# Patient Record
Sex: MALE | Race: WHITE | Employment: OTHER | URBAN - METROPOLITAN AREA
[De-identification: names, ages, dates, MRNs, and addresses within clinical notes are randomized per-mention and may not be internally consistent; named-entity substitution may affect disease eponyms.]

---

## 2020-09-17 ENCOUNTER — APPOINTMENT (OUTPATIENT)
Dept: GENERAL RADIOLOGY | Age: 70
End: 2020-09-17
Payer: MEDICARE

## 2020-09-17 ENCOUNTER — HOSPITAL ENCOUNTER (EMERGENCY)
Age: 70
Discharge: HOME | End: 2020-09-17
Payer: MEDICARE

## 2020-09-17 ENCOUNTER — APPOINTMENT (OUTPATIENT)
Dept: CT IMAGING | Age: 70
End: 2020-09-17
Payer: MEDICARE

## 2020-09-17 VITALS
WEIGHT: 164 LBS | SYSTOLIC BLOOD PRESSURE: 138 MMHG | RESPIRATION RATE: 17 BRPM | HEART RATE: 90 BPM | TEMPERATURE: 96.8 F | DIASTOLIC BLOOD PRESSURE: 70 MMHG | OXYGEN SATURATION: 98 %

## 2020-09-17 LAB
ALBUMIN SERPL-MCNC: 4 G/DL (ref 3.5–4.6)
ALP BLD-CCNC: 68 U/L (ref 35–104)
ALT SERPL-CCNC: 13 U/L (ref 0–41)
ANION GAP SERPL CALCULATED.3IONS-SCNC: 16 MEQ/L (ref 9–15)
AST SERPL-CCNC: 14 U/L (ref 0–40)
BASOPHILS ABSOLUTE: 0.1 K/UL (ref 0–0.2)
BASOPHILS RELATIVE PERCENT: 0.8 %
BILIRUB SERPL-MCNC: 0.6 MG/DL (ref 0.2–0.7)
BUN BLDV-MCNC: 21 MG/DL (ref 8–23)
CALCIUM SERPL-MCNC: 9.1 MG/DL (ref 8.5–9.9)
CHLORIDE BLD-SCNC: 98 MEQ/L (ref 95–107)
CO2: 19 MEQ/L (ref 20–31)
CREAT SERPL-MCNC: 1.14 MG/DL (ref 0.7–1.2)
EKG ATRIAL RATE: 79 BPM
EKG P AXIS: 80 DEGREES
EKG P-R INTERVAL: 182 MS
EKG Q-T INTERVAL: 396 MS
EKG QRS DURATION: 86 MS
EKG QTC CALCULATION (BAZETT): 454 MS
EKG R AXIS: 67 DEGREES
EKG T AXIS: 67 DEGREES
EKG VENTRICULAR RATE: 79 BPM
EOSINOPHILS ABSOLUTE: 0.1 K/UL (ref 0–0.7)
EOSINOPHILS RELATIVE PERCENT: 0.8 %
GFR AFRICAN AMERICAN: >60
GFR NON-AFRICAN AMERICAN: >60
GLOBULIN: 2.3 G/DL (ref 2.3–3.5)
GLUCOSE BLD-MCNC: 135 MG/DL (ref 70–99)
HCT VFR BLD CALC: 46.8 % (ref 42–52)
HEMOGLOBIN: 16 G/DL (ref 14–18)
LYMPHOCYTES ABSOLUTE: 2.2 K/UL (ref 1–4.8)
LYMPHOCYTES RELATIVE PERCENT: 21.3 %
MAGNESIUM: 1.9 MG/DL (ref 1.7–2.4)
MCH RBC QN AUTO: 31.6 PG (ref 27–31.3)
MCHC RBC AUTO-ENTMCNC: 34.1 % (ref 33–37)
MCV RBC AUTO: 92.5 FL (ref 80–100)
MONOCYTES ABSOLUTE: 0.8 K/UL (ref 0.2–0.8)
MONOCYTES RELATIVE PERCENT: 7.5 %
NEUTROPHILS ABSOLUTE: 7.1 K/UL (ref 1.4–6.5)
NEUTROPHILS RELATIVE PERCENT: 69.6 %
PDW BLD-RTO: 14.5 % (ref 11.5–14.5)
PLATELET # BLD: 268 K/UL (ref 130–400)
POTASSIUM SERPL-SCNC: 4.5 MEQ/L (ref 3.4–4.9)
RBC # BLD: 5.06 M/UL (ref 4.7–6.1)
SODIUM BLD-SCNC: 133 MEQ/L (ref 135–144)
TOTAL PROTEIN: 6.3 G/DL (ref 6.3–8)
TROPONIN: <0.01 NG/ML (ref 0–0.01)
WBC # BLD: 10.2 K/UL (ref 4.8–10.8)

## 2020-09-17 PROCEDURE — 36415 COLL VENOUS BLD VENIPUNCTURE: CPT

## 2020-09-17 PROCEDURE — 70450 CT HEAD/BRAIN W/O DYE: CPT

## 2020-09-17 PROCEDURE — 71045 X-RAY EXAM CHEST 1 VIEW: CPT

## 2020-09-17 PROCEDURE — 6370000000 HC RX 637 (ALT 250 FOR IP): Performed by: PHYSICIAN ASSISTANT

## 2020-09-17 PROCEDURE — 84484 ASSAY OF TROPONIN QUANT: CPT

## 2020-09-17 PROCEDURE — 6360000002 HC RX W HCPCS: Performed by: PHYSICIAN ASSISTANT

## 2020-09-17 PROCEDURE — 96374 THER/PROPH/DIAG INJ IV PUSH: CPT

## 2020-09-17 PROCEDURE — 83735 ASSAY OF MAGNESIUM: CPT

## 2020-09-17 PROCEDURE — 99284 EMERGENCY DEPT VISIT MOD MDM: CPT

## 2020-09-17 PROCEDURE — 94640 AIRWAY INHALATION TREATMENT: CPT

## 2020-09-17 PROCEDURE — 80053 COMPREHEN METABOLIC PANEL: CPT

## 2020-09-17 PROCEDURE — 93005 ELECTROCARDIOGRAM TRACING: CPT | Performed by: PHYSICIAN ASSISTANT

## 2020-09-17 PROCEDURE — 85025 COMPLETE CBC W/AUTO DIFF WBC: CPT

## 2020-09-17 RX ORDER — DEXAMETHASONE SODIUM PHOSPHATE 4 MG/ML
10 INJECTION, SOLUTION INTRA-ARTICULAR; INTRALESIONAL; INTRAMUSCULAR; INTRAVENOUS; SOFT TISSUE ONCE
Status: COMPLETED | OUTPATIENT
Start: 2020-09-17 | End: 2020-09-17

## 2020-09-17 RX ORDER — METHYLPREDNISOLONE 4 MG/1
TABLET ORAL
Qty: 1 KIT | Refills: 0 | Status: SHIPPED | OUTPATIENT
Start: 2020-09-17 | End: 2020-09-23

## 2020-09-17 RX ORDER — MECLIZINE HYDROCHLORIDE 25 MG/1
25 TABLET ORAL 3 TIMES DAILY PRN
Qty: 15 TABLET | Refills: 0 | Status: SHIPPED | OUTPATIENT
Start: 2020-09-17 | End: 2020-09-27

## 2020-09-17 RX ORDER — IPRATROPIUM BROMIDE AND ALBUTEROL SULFATE 2.5; .5 MG/3ML; MG/3ML
1 SOLUTION RESPIRATORY (INHALATION) EVERY 4 HOURS PRN
Status: DISCONTINUED | OUTPATIENT
Start: 2020-09-17 | End: 2020-09-17 | Stop reason: HOSPADM

## 2020-09-17 RX ORDER — MECLIZINE HYDROCHLORIDE 25 MG/1
25 TABLET ORAL ONCE
Status: COMPLETED | OUTPATIENT
Start: 2020-09-17 | End: 2020-09-17

## 2020-09-17 RX ADMIN — MECLIZINE HYDROCHLORIDE 25 MG: 25 TABLET ORAL at 16:52

## 2020-09-17 RX ADMIN — DEXAMETHASONE SODIUM PHOSPHATE 10 MG: 4 INJECTION, SOLUTION INTRAMUSCULAR; INTRAVENOUS at 16:52

## 2020-09-17 RX ADMIN — IPRATROPIUM BROMIDE AND ALBUTEROL SULFATE 1 AMPULE: .5; 3 SOLUTION RESPIRATORY (INHALATION) at 16:03

## 2020-09-17 ASSESSMENT — ENCOUNTER SYMPTOMS
VOICE CHANGE: 0
NAUSEA: 0
ABDOMINAL PAIN: 0
PHOTOPHOBIA: 0
SHORTNESS OF BREATH: 1
ANAL BLEEDING: 0
BACK PAIN: 1
APNEA: 0
VOMITING: 0
EYE DISCHARGE: 0
COUGH: 1
ABDOMINAL DISTENTION: 0

## 2020-09-17 NOTE — ED PROVIDER NOTES
[address] [facility] ED  eMERGENCY dEPARTMENT eNCOUnter      Pt Name: Becky Wilson  MRN: [id number]  [place] 1950  Date of evaluation: 9/17/2020  Provider: Ellis Nava PA-C    CHIEF COMPLAINT       Chief Complaint   Patient presents with    Dizziness     states its vertigo also has cloudy vision and poor hearing         HISTORY OF PRESENT ILLNESS   (Location/Symptom, Timing/Onset,Context/Setting, Quality, Duration, Modifying Factors, Severity)  Note limiting factors. Becky Wilson is a 65 y.o. male who presents to the emergency department patient presents with dizziness states he had a Ménière's attack 2 days ago he states he gets a headache with these and usually resolve this 1 has not resolved consistent with prior episodes he has had these episodes for 10 years. He also notes that he has COPD and is been short of breath the past few days his inhalers do help but did not resolve symptoms denies chest pain abdominal pain nausea vomiting he does note his legs are felt weak he does have back pain. Denies rectal bleeding loss of bowel or bladder control. Symptoms mild to moderate severity worse with motion. HPI    NursingNotes were reviewed. REVIEW OF SYSTEMS    (2-9 systems for level 4, 10 or more for level 5)     Review of Systems   Constitutional: Negative for activity change, appetite change, fever and unexpected weight change. HENT: Negative for ear discharge, nosebleeds and voice change. Eyes: Negative for photophobia, discharge and visual disturbance. Respiratory: Positive for cough and shortness of breath. Negative for apnea. Cardiovascular: Negative for chest pain. Gastrointestinal: Negative for abdominal distention, abdominal pain, anal bleeding, nausea and vomiting. Endocrine: Negative for cold intolerance, heat intolerance and polyphagia. Genitourinary: Negative for difficulty urinating, dysuria, flank pain, genital sores and hematuria.    Musculoskeletal: Positive for back pain. Negative for joint swelling. Skin: Negative for pallor. Allergic/Immunologic: Negative for immunocompromised state. Neurological: Positive for dizziness and headaches. Negative for tremors, seizures, syncope, facial asymmetry and weakness. Hematological: Does not bruise/bleed easily. Psychiatric/Behavioral: Negative for behavioral problems, self-injury and sleep disturbance. All other systems reviewed and are negative. Except as noted above the remainder of the review of systems was reviewed and negative. PAST MEDICAL HISTORY     Past Medical History:   Diagnosis Date    [facility]     [facility]     Diabetes mellitus [facility]     Emphy[facility]ma of lung ([facility]     Hyperlipidemia     Hypertension     Meniere's disease     Vertigo          SURGICALHISTORY     History reviewed. No pertinent surgical history. CURRENT MEDICATIONS       Previous Medications    No medications on file       ALLERGIES     Patient has no known allergies. FAMILY HISTORY     History reviewed. No pertinent family history.        SOCIAL HISTORY       Social History     Socioeconomic History    Marital status:      Spouse name: None    Number of children: None    Years of education: None    Highest education level: None   Occupational History    None   Social Needs    Financial resource strain: None    Food insecurity     Worry: None     Inability: None    Transportation needs     Medical: None     Non-medical: None   Tobacco Use    Smoking status: Current Every Day Smoker    Smokeless tobacco: Never Used   Substance and Sexual Activity    Alcohol use: None    Drug use: None    Sexual activity: None   Lifestyle    Physical activity     Days per week: None     Minutes per session: None    Stress: None   Relationships    Social connections     Talks on phone: None     Gets together: None     Attends [employer] service: mouth 3 times daily as needed for Dizziness    METHYLPREDNISOLONE (MEDROL, SAARH,) 4 MG TABLET    Take by mouth. (Please note that portions of this note were completed with a voice recognition program.  Efforts were made to edit the dictations but occasionally words are mis-transcribed.)    Mago Sanchez PA-C (electronically signed)  Attending Emergency Physician       Mago Sanchez PA-C  09/17/20 8840    Attending Supervisory Note/Shared Visit   I have personally performed a face to face diagnostic evaluation on this patient. I have reviewed the mid-levels findings and agree.   History and Exam by me shows vertigo      Kasey Cuevas DO  Attending Emergency Physician         Kasey Cuevas DO  09/21/20 1038

## 2020-09-17 NOTE — ED TRIAGE NOTES
Pt to ER with c/o dizziness with cloudy vision and muffled hearing, pt states its his vertigo and meniere's , pt also states it feels hard to breathe, pt a&ox4, resp even and unlabored

## 2020-09-17 NOTE — ED NOTES
Pt up ambulating  Family at bedside  Denies needs  Will continue to monitor     [employer] V, RN  09/17/20 1806

## 2020-09-18 PROCEDURE — 93010 ELECTROCARDIOGRAM REPORT: CPT | Performed by: INTERNAL MEDICINE

## 2020-11-28 ENCOUNTER — APPOINTMENT (OUTPATIENT)
Dept: CT IMAGING | Age: 70
End: 2020-11-28
Payer: MEDICARE

## 2020-11-28 ENCOUNTER — HOSPITAL ENCOUNTER (EMERGENCY)
Age: 70
Discharge: HOME | End: 2020-11-28
Attending: EMERGENCY MEDICINE
Payer: MEDICARE

## 2020-11-28 VITALS
TEMPERATURE: 98.6 F | HEIGHT: 66 IN | DIASTOLIC BLOOD PRESSURE: 60 MMHG | SYSTOLIC BLOOD PRESSURE: 148 MMHG | RESPIRATION RATE: 10 BRPM | WEIGHT: 164 LBS | HEART RATE: 67 BPM | OXYGEN SATURATION: 100 % | BODY MASS INDEX: 26.36 KG/M2

## 2020-11-28 LAB
ALBUMIN SERPL-MCNC: 4.3 G/DL (ref 3.5–4.6)
ALP BLD-CCNC: 82 U/L (ref 35–104)
ALT SERPL-CCNC: 10 U/L (ref 0–41)
ANION GAP SERPL CALCULATED.3IONS-SCNC: 13 MEQ/L (ref 9–15)
AST SERPL-CCNC: 13 U/L (ref 0–40)
BASOPHILS ABSOLUTE: 0.1 K/UL (ref 0–0.2)
BASOPHILS RELATIVE PERCENT: 0.8 %
BILIRUB SERPL-MCNC: 0.4 MG/DL (ref 0.2–0.7)
BUN BLDV-MCNC: 10 MG/DL (ref 8–23)
CALCIUM SERPL-MCNC: 8.8 MG/DL (ref 8.5–9.9)
CHLORIDE BLD-SCNC: 98 MEQ/L (ref 95–107)
CO2: 22 MEQ/L (ref 20–31)
CREAT SERPL-MCNC: 0.97 MG/DL (ref 0.7–1.2)
EKG ATRIAL RATE: 72 BPM
EKG P AXIS: 88 DEGREES
EKG P-R INTERVAL: 178 MS
EKG Q-T INTERVAL: 408 MS
EKG QRS DURATION: 84 MS
EKG QTC CALCULATION (BAZETT): 446 MS
EKG R AXIS: 36 DEGREES
EKG T AXIS: 63 DEGREES
EKG VENTRICULAR RATE: 72 BPM
EOSINOPHILS ABSOLUTE: 0.1 K/UL (ref 0–0.7)
EOSINOPHILS RELATIVE PERCENT: 1.3 %
GFR AFRICAN AMERICAN: >60
GFR AFRICAN AMERICAN: >60
GFR NON-AFRICAN AMERICAN: >60
GFR NON-AFRICAN AMERICAN: >60
GLOBULIN: 2.5 G/DL (ref 2.3–3.5)
GLUCOSE BLD-MCNC: 254 MG/DL (ref 70–99)
HCT VFR BLD CALC: 47.1 % (ref 42–52)
HEMOGLOBIN: 15.9 G/DL (ref 14–18)
LYMPHOCYTES ABSOLUTE: 2.4 K/UL (ref 1–4.8)
LYMPHOCYTES RELATIVE PERCENT: 30.5 %
MAGNESIUM: 2.1 MG/DL (ref 1.7–2.4)
MCH RBC QN AUTO: 31.8 PG (ref 27–31.3)
MCHC RBC AUTO-ENTMCNC: 33.8 % (ref 33–37)
MCV RBC AUTO: 94.2 FL (ref 80–100)
MONOCYTES ABSOLUTE: 0.4 K/UL (ref 0.2–0.8)
MONOCYTES RELATIVE PERCENT: 5.2 %
NEUTROPHILS ABSOLUTE: 4.8 K/UL (ref 1.4–6.5)
NEUTROPHILS RELATIVE PERCENT: 62.2 %
PDW BLD-RTO: 15.9 % (ref 11.5–14.5)
PERFORMED ON: NORMAL
PLATELET # BLD: 349 K/UL (ref 130–400)
POC CREATININE: 1.1 MG/DL (ref 0.8–1.3)
POC SAMPLE TYPE: NORMAL
POTASSIUM SERPL-SCNC: 4.1 MEQ/L (ref 3.4–4.9)
RBC # BLD: 5 M/UL (ref 4.7–6.1)
SODIUM BLD-SCNC: 133 MEQ/L (ref 135–144)
TOTAL PROTEIN: 6.8 G/DL (ref 6.3–8)
TROPONIN: <0.01 NG/ML (ref 0–0.01)
WBC # BLD: 7.8 K/UL (ref 4.8–10.8)

## 2020-11-28 PROCEDURE — 71275 CT ANGIOGRAPHY CHEST: CPT

## 2020-11-28 PROCEDURE — 84484 ASSAY OF TROPONIN QUANT: CPT

## 2020-11-28 PROCEDURE — 2580000003 HC RX 258: Performed by: EMERGENCY MEDICINE

## 2020-11-28 PROCEDURE — 83735 ASSAY OF MAGNESIUM: CPT

## 2020-11-28 PROCEDURE — 96374 THER/PROPH/DIAG INJ IV PUSH: CPT

## 2020-11-28 PROCEDURE — 6370000000 HC RX 637 (ALT 250 FOR IP): Performed by: EMERGENCY MEDICINE

## 2020-11-28 PROCEDURE — 6360000004 HC RX CONTRAST MEDICATION: Performed by: EMERGENCY MEDICINE

## 2020-11-28 PROCEDURE — 6360000002 HC RX W HCPCS: Performed by: EMERGENCY MEDICINE

## 2020-11-28 PROCEDURE — 36415 COLL VENOUS BLD VENIPUNCTURE: CPT

## 2020-11-28 PROCEDURE — 99284 EMERGENCY DEPT VISIT MOD MDM: CPT

## 2020-11-28 PROCEDURE — 80053 COMPREHEN METABOLIC PANEL: CPT

## 2020-11-28 PROCEDURE — 96375 TX/PRO/DX INJ NEW DRUG ADDON: CPT

## 2020-11-28 PROCEDURE — 85025 COMPLETE CBC W/AUTO DIFF WBC: CPT

## 2020-11-28 PROCEDURE — 93005 ELECTROCARDIOGRAM TRACING: CPT | Performed by: EMERGENCY MEDICINE

## 2020-11-28 RX ORDER — TRAMADOL HYDROCHLORIDE 50 MG/1
50 TABLET ORAL EVERY 4 HOURS PRN
Qty: 18 TABLET | Refills: 0 | Status: SHIPPED | OUTPATIENT
Start: 2020-11-28 | End: 2020-12-01

## 2020-11-28 RX ORDER — AZITHROMYCIN 250 MG/1
TABLET, FILM COATED ORAL
Qty: 1 PACKET | Refills: 0 | Status: SHIPPED | OUTPATIENT
Start: 2020-11-28 | End: 2020-12-02

## 2020-11-28 RX ORDER — PREDNISONE 20 MG/1
40 TABLET ORAL DAILY
Qty: 20 TABLET | Refills: 0 | Status: SHIPPED | OUTPATIENT
Start: 2020-11-28 | End: 2020-12-08

## 2020-11-28 RX ORDER — KETOROLAC TROMETHAMINE 15 MG/ML
15 INJECTION, SOLUTION INTRAMUSCULAR; INTRAVENOUS ONCE
Status: COMPLETED | OUTPATIENT
Start: 2020-11-28 | End: 2020-11-28

## 2020-11-28 RX ORDER — LORAZEPAM 2 MG/ML
1 INJECTION INTRAMUSCULAR ONCE
Status: COMPLETED | OUTPATIENT
Start: 2020-11-28 | End: 2020-11-28

## 2020-11-28 RX ORDER — ACETAMINOPHEN 500 MG
1000 TABLET ORAL ONCE
Status: COMPLETED | OUTPATIENT
Start: 2020-11-28 | End: 2020-11-28

## 2020-11-28 RX ORDER — 0.9 % SODIUM CHLORIDE 0.9 %
1000 INTRAVENOUS SOLUTION INTRAVENOUS ONCE
Status: COMPLETED | OUTPATIENT
Start: 2020-11-28 | End: 2020-11-28

## 2020-11-28 RX ADMIN — IOPAMIDOL 100 ML: 612 INJECTION, SOLUTION INTRAVENOUS at 12:18

## 2020-11-28 RX ADMIN — KETOROLAC TROMETHAMINE 15 MG: 15 INJECTION, SOLUTION INTRAMUSCULAR; INTRAVENOUS at 11:10

## 2020-11-28 RX ADMIN — LORAZEPAM 1 MG: 2 INJECTION INTRAMUSCULAR; INTRAVENOUS at 11:10

## 2020-11-28 RX ADMIN — SODIUM CHLORIDE 1000 ML: 9 INJECTION, SOLUTION INTRAVENOUS at 11:10

## 2020-11-28 RX ADMIN — ACETAMINOPHEN 1000 MG: 500 TABLET ORAL at 11:12

## 2020-11-28 ASSESSMENT — ENCOUNTER SYMPTOMS
COUGH: 0
BACK PAIN: 0
ABDOMINAL PAIN: 0
DIARRHEA: 0
SORE THROAT: 0
VOMITING: 0
SHORTNESS OF BREATH: 0
NAUSEA: 0

## 2020-11-28 ASSESSMENT — PAIN SCALES - GENERAL
PAINLEVEL_OUTOF10: 0
PAINLEVEL_OUTOF10: 6

## 2020-11-28 ASSESSMENT — PAIN DESCRIPTION - ORIENTATION: ORIENTATION: MID

## 2020-11-28 ASSESSMENT — PAIN DESCRIPTION - PAIN TYPE: TYPE: ACUTE PAIN

## 2020-11-28 ASSESSMENT — PAIN DESCRIPTION - LOCATION: LOCATION: CHEST

## 2020-11-28 NOTE — ED TRIAGE NOTES
Pt in from home with mid chest pain 6/10 \"only when breathing\" started after an argument with granddaughter. Pt is A&Ox4, skin intact, msps intact, afebrile, breaths are equal and unlabored. Pt up to restroom on own.  Denies N/V/D

## 2020-11-28 NOTE — ED PROVIDER NOTES
[address] [facility] ED  eMERGENCYdEPARTMENT eNCOUnter      Pt Name: Leslie Robles  MRN: [id number]  [place] 1950  Date of evaluation: 11/28/2020  Christian Brown MD    CHIEF COMPLAINT           HPI  Leslie Robles is a 65 y.o. male per chart review has a h/o COPD, DM II, HTN, hpl presents to the ED with chest pain. Pt notes gradual onset, moderate, intermittent, aching, substernal chest pain since last night. Worse with deep breath. Pt notes he got into an argument with his granddaughter prior to arrival which made it worse. Pt denies fever, n/v, sob, ab pain, dysuria, diarrhea. ROS  Review of Systems   Constitutional: Negative for activity change, chills and fever. HENT: Negative for ear pain and sore throat. Eyes: Negative for visual disturbance. Respiratory: Negative for cough and shortness of breath. Cardiovascular: Positive for chest pain. Negative for palpitations and leg swelling. Gastrointestinal: Negative for abdominal pain, diarrhea, nausea and vomiting. Genitourinary: Negative for dysuria. Musculoskeletal: Negative for back pain. Skin: Negative for rash. Neurological: Negative for dizziness and weakness. Except as noted above the remainder of the review of systems was reviewed and negative. PAST MEDICAL HISTORY     Past Medical History:   Diagnosis Date    Chronic bronchitis ([address]     COPD (chronic obstructive pulmonary disease) ([address]     Diabetes mellitus ([address]     Emphysema of lung ([address]     Hyperlipidemia     Hypertension     Meniere's disease     Vertigo          SURGICAL HISTORY     History reviewed. No pertinent surgical history. CURRENTMEDICATIONS       Previous Medications    No medications on file       ALLERGIES     Patient has no known allergies. FAMILY HISTORY     History reviewed. No pertinent family history.        SOCIAL HISTORY       Social History     Socioeconomic History    Marital status:      Spouse name: None is no abdominal tenderness. Musculoskeletal: Normal range of motion. Skin:     General: Skin is warm and dry. Neurological:      Mental Status: He is alert and oriented to person, place, and time. Psychiatric:         Mood and Affect: Mood normal.           MDM  64 yo male presents to the ED with chest pain. Pt is afebrile, hemodynamically stable. Pt notes that argument with his daughter made his pain worse. EKG shows NSR with HR 72, normal axis, normal intervals, no ST changes. Pt given 1 L NS, IV toradol, PO tylenol, IV ativan in the ED. Labs remarkable for glucose 254. CT PE shows COPD with no PE. Pt reassessed and feels much better. Pt still smokes. Low suspicion for cardiac chest pain as chest pain only occurs with deep inspiration and lasts seconds. Pt counseled on smoking cessation x 8 minutes and advised about how smoking is making his condition worse. Pt and I discussed treatment options and pt wanted to try cold turkey to help with smoking cessation. Suspect that chest pain is caused by smoking and mild COPD exacerbation. Pt given prescription for tramadol, azithromycin, prednisone. Pt will f/u with pcp. Pt understands plan. FINAL IMPRESSION      1. Chest pain, unspecified type    2.  Pleurisy          DISPOSITION/PLAN   DISPOSITION Decision To Discharge 11/28/2020 12:28:29 PM        DISCHARGE MEDICATIONS:  [unfilled]         Sarkis Souza MD(electronically signed)  Attending Emergency Physician            Sarkis Souza MD  11/28/20 8097
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
## 2020-12-01 PROCEDURE — 93010 ELECTROCARDIOGRAM REPORT: CPT | Performed by: INTERNAL MEDICINE

## 2020-12-11 ENCOUNTER — APPOINTMENT (OUTPATIENT)
Dept: CT IMAGING | Age: 70
DRG: 247 | End: 2020-12-11
Payer: MEDICARE

## 2020-12-11 ENCOUNTER — APPOINTMENT (OUTPATIENT)
Dept: GENERAL RADIOLOGY | Age: 70
DRG: 247 | End: 2020-12-11
Payer: MEDICARE

## 2020-12-11 ENCOUNTER — HOSPITAL ENCOUNTER (INPATIENT)
Age: 70
LOS: 4 days | Discharge: HOME | DRG: 247 | End: 2020-12-15
Attending: INTERNAL MEDICINE | Admitting: INTERNAL MEDICINE
Payer: MEDICARE

## 2020-12-11 PROBLEM — I21.4 NSTEMI (NON-ST ELEVATED MYOCARDIAL INFARCTION) (HCC): Status: ACTIVE | Noted: 2020-12-11

## 2020-12-11 LAB
ALBUMIN SERPL-MCNC: 3.8 G/DL (ref 3.5–4.6)
ALP BLD-CCNC: 74 U/L (ref 35–104)
ALT SERPL-CCNC: 8 U/L (ref 0–41)
ANION GAP SERPL CALCULATED.3IONS-SCNC: 14 MEQ/L (ref 9–15)
APTT: 28 SEC (ref 24.4–36.8)
AST SERPL-CCNC: 10 U/L (ref 0–40)
BASOPHILS ABSOLUTE: 0.1 K/UL (ref 0–0.2)
BASOPHILS RELATIVE PERCENT: 0.7 %
BILIRUB SERPL-MCNC: 0.7 MG/DL (ref 0.2–0.7)
BUN BLDV-MCNC: 11 MG/DL (ref 8–23)
CALCIUM SERPL-MCNC: 8.3 MG/DL (ref 8.5–9.9)
CHLORIDE BLD-SCNC: 102 MEQ/L (ref 95–107)
CO2: 20 MEQ/L (ref 20–31)
CREAT SERPL-MCNC: 0.9 MG/DL (ref 0.7–1.2)
EOSINOPHILS ABSOLUTE: 0.1 K/UL (ref 0–0.7)
EOSINOPHILS RELATIVE PERCENT: 0.8 %
GFR AFRICAN AMERICAN: >60
GFR NON-AFRICAN AMERICAN: >60
GLOBULIN: 2.2 G/DL (ref 2.3–3.5)
GLUCOSE BLD-MCNC: 188 MG/DL (ref 70–99)
HCT VFR BLD CALC: 46.8 % (ref 42–52)
HEMOGLOBIN: 16 G/DL (ref 14–18)
INR BLD: 0.9
LYMPHOCYTES ABSOLUTE: 2.4 K/UL (ref 1–4.8)
LYMPHOCYTES RELATIVE PERCENT: 19.9 %
MAGNESIUM: 2.1 MG/DL (ref 1.7–2.4)
MCH RBC QN AUTO: 32.3 PG (ref 27–31.3)
MCHC RBC AUTO-ENTMCNC: 34.1 % (ref 33–37)
MCV RBC AUTO: 94.7 FL (ref 80–100)
MONOCYTES ABSOLUTE: 0.7 K/UL (ref 0.2–0.8)
MONOCYTES RELATIVE PERCENT: 5.6 %
NEUTROPHILS ABSOLUTE: 8.9 K/UL (ref 1.4–6.5)
NEUTROPHILS RELATIVE PERCENT: 73 %
PDW BLD-RTO: 16.4 % (ref 11.5–14.5)
PLATELET # BLD: 277 K/UL (ref 130–400)
POTASSIUM SERPL-SCNC: 4.4 MEQ/L (ref 3.4–4.9)
PRO-BNP: 153 PG/ML
PROTHROMBIN TIME: 11.9 SEC (ref 12.3–14.9)
RBC # BLD: 4.94 M/UL (ref 4.7–6.1)
SARS-COV-2, NAAT: NOT DETECTED
SODIUM BLD-SCNC: 136 MEQ/L (ref 135–144)
TOTAL CK: 23 U/L (ref 0–190)
TOTAL CK: 63 U/L (ref 0–190)
TOTAL PROTEIN: 6 G/DL (ref 6.3–8)
TROPONIN: 0.05 NG/ML (ref 0–0.01)
TROPONIN: <0.01 NG/ML (ref 0–0.01)
WBC # BLD: 12.1 K/UL (ref 4.8–10.8)

## 2020-12-11 PROCEDURE — 80053 COMPREHEN METABOLIC PANEL: CPT

## 2020-12-11 PROCEDURE — 82550 ASSAY OF CK (CPK): CPT

## 2020-12-11 PROCEDURE — 6360000004 HC RX CONTRAST MEDICATION: Performed by: PHYSICIAN ASSISTANT

## 2020-12-11 PROCEDURE — U0002 COVID-19 LAB TEST NON-CDC: HCPCS

## 2020-12-11 PROCEDURE — 2709999900 HC NON-CHARGEABLE SUPPLY

## 2020-12-11 PROCEDURE — 93005 ELECTROCARDIOGRAM TRACING: CPT | Performed by: INTERNAL MEDICINE

## 2020-12-11 PROCEDURE — C1894 INTRO/SHEATH, NON-LASER: HCPCS

## 2020-12-11 PROCEDURE — 2060000000 HC ICU INTERMEDIATE R&B

## 2020-12-11 PROCEDURE — 85610 PROTHROMBIN TIME: CPT

## 2020-12-11 PROCEDURE — 84484 ASSAY OF TROPONIN QUANT: CPT

## 2020-12-11 PROCEDURE — 83735 ASSAY OF MAGNESIUM: CPT

## 2020-12-11 PROCEDURE — 99285 EMERGENCY DEPT VISIT HI MDM: CPT

## 2020-12-11 PROCEDURE — 6370000000 HC RX 637 (ALT 250 FOR IP): Performed by: PHYSICIAN ASSISTANT

## 2020-12-11 PROCEDURE — 96375 TX/PRO/DX INJ NEW DRUG ADDON: CPT

## 2020-12-11 PROCEDURE — 36415 COLL VENOUS BLD VENIPUNCTURE: CPT

## 2020-12-11 PROCEDURE — 85730 THROMBOPLASTIN TIME PARTIAL: CPT

## 2020-12-11 PROCEDURE — 96374 THER/PROPH/DIAG INJ IV PUSH: CPT

## 2020-12-11 PROCEDURE — 71045 X-RAY EXAM CHEST 1 VIEW: CPT

## 2020-12-11 PROCEDURE — 6360000002 HC RX W HCPCS: Performed by: PHYSICIAN ASSISTANT

## 2020-12-11 PROCEDURE — C1887 CATHETER, GUIDING: HCPCS

## 2020-12-11 PROCEDURE — 85025 COMPLETE CBC W/AUTO DIFF WBC: CPT

## 2020-12-11 PROCEDURE — 96376 TX/PRO/DX INJ SAME DRUG ADON: CPT

## 2020-12-11 PROCEDURE — 6370000000 HC RX 637 (ALT 250 FOR IP): Performed by: INTERNAL MEDICINE

## 2020-12-11 PROCEDURE — 83880 ASSAY OF NATRIURETIC PEPTIDE: CPT

## 2020-12-11 PROCEDURE — 70498 CT ANGIOGRAPHY NECK: CPT

## 2020-12-11 RX ORDER — KETOROLAC TROMETHAMINE 30 MG/ML
30 INJECTION, SOLUTION INTRAMUSCULAR; INTRAVENOUS ONCE
Status: COMPLETED | OUTPATIENT
Start: 2020-12-11 | End: 2020-12-11

## 2020-12-11 RX ORDER — ACETAMINOPHEN 325 MG/1
650 TABLET ORAL EVERY 6 HOURS PRN
Status: DISCONTINUED | OUTPATIENT
Start: 2020-12-11 | End: 2020-12-15 | Stop reason: HOSPADM

## 2020-12-11 RX ORDER — LOSARTAN POTASSIUM 25 MG/1
25 TABLET ORAL DAILY
Status: ON HOLD | COMMUNITY
Start: 2020-11-09 | End: 2020-12-15 | Stop reason: HOSPADM

## 2020-12-11 RX ORDER — MORPHINE SULFATE 2 MG/ML
2 INJECTION, SOLUTION INTRAMUSCULAR; INTRAVENOUS
Status: DISCONTINUED | OUTPATIENT
Start: 2020-12-11 | End: 2020-12-15 | Stop reason: HOSPADM

## 2020-12-11 RX ORDER — SODIUM CHLORIDE 0.9 % (FLUSH) 0.9 %
10 SYRINGE (ML) INJECTION EVERY 12 HOURS SCHEDULED
Status: DISCONTINUED | OUTPATIENT
Start: 2020-12-11 | End: 2020-12-15 | Stop reason: HOSPADM

## 2020-12-11 RX ORDER — MORPHINE SULFATE 4 MG/ML
4 INJECTION, SOLUTION INTRAMUSCULAR; INTRAVENOUS ONCE
Status: COMPLETED | OUTPATIENT
Start: 2020-12-11 | End: 2020-12-11

## 2020-12-11 RX ORDER — OMEPRAZOLE 20 MG/1
20 CAPSULE, DELAYED RELEASE ORAL 2 TIMES DAILY
Status: ON HOLD | COMMUNITY
Start: 2020-11-05 | End: 2020-12-20 | Stop reason: HOSPADM

## 2020-12-11 RX ORDER — PAROXETINE HYDROCHLORIDE 40 MG/1
40 TABLET, FILM COATED ORAL DAILY
COMMUNITY
Start: 2020-12-07 | End: 2021-02-17 | Stop reason: SDUPTHER

## 2020-12-11 RX ORDER — METHYLPREDNISOLONE SODIUM SUCCINATE 125 MG/2ML
125 INJECTION, POWDER, LYOPHILIZED, FOR SOLUTION INTRAMUSCULAR; INTRAVENOUS ONCE
Status: COMPLETED | OUTPATIENT
Start: 2020-12-11 | End: 2020-12-11

## 2020-12-11 RX ORDER — ONDANSETRON 2 MG/ML
4 INJECTION INTRAMUSCULAR; INTRAVENOUS EVERY 6 HOURS PRN
Status: DISCONTINUED | OUTPATIENT
Start: 2020-12-11 | End: 2020-12-15 | Stop reason: HOSPADM

## 2020-12-11 RX ORDER — ACETAMINOPHEN 650 MG/1
650 SUPPOSITORY RECTAL EVERY 6 HOURS PRN
Status: DISCONTINUED | OUTPATIENT
Start: 2020-12-11 | End: 2020-12-15 | Stop reason: HOSPADM

## 2020-12-11 RX ORDER — DILTIAZEM HYDROCHLORIDE 120 MG/1
120 CAPSULE, COATED, EXTENDED RELEASE ORAL DAILY
COMMUNITY
Start: 2020-12-07 | End: 2021-02-22 | Stop reason: SDUPTHER

## 2020-12-11 RX ORDER — ASPIRIN 81 MG/1
81 TABLET, CHEWABLE ORAL DAILY
Status: DISCONTINUED | OUTPATIENT
Start: 2020-12-12 | End: 2020-12-15 | Stop reason: HOSPADM

## 2020-12-11 RX ORDER — NITROGLYCERIN 0.4 MG/1
0.4 TABLET SUBLINGUAL EVERY 5 MIN PRN
Status: DISCONTINUED | OUTPATIENT
Start: 2020-12-11 | End: 2020-12-15 | Stop reason: HOSPADM

## 2020-12-11 RX ORDER — ATORVASTATIN CALCIUM 80 MG/1
80 TABLET, FILM COATED ORAL NIGHTLY
Status: DISCONTINUED | OUTPATIENT
Start: 2020-12-11 | End: 2020-12-12 | Stop reason: DRUGHIGH

## 2020-12-11 RX ORDER — SODIUM CHLORIDE 0.9 % (FLUSH) 0.9 %
10 SYRINGE (ML) INJECTION PRN
Status: DISCONTINUED | OUTPATIENT
Start: 2020-12-11 | End: 2020-12-15 | Stop reason: HOSPADM

## 2020-12-11 RX ORDER — ONDANSETRON 2 MG/ML
4 INJECTION INTRAMUSCULAR; INTRAVENOUS ONCE
Status: COMPLETED | OUTPATIENT
Start: 2020-12-11 | End: 2020-12-11

## 2020-12-11 RX ORDER — POLYETHYLENE GLYCOL 3350 17 G/17G
17 POWDER, FOR SOLUTION ORAL DAILY PRN
Status: DISCONTINUED | OUTPATIENT
Start: 2020-12-11 | End: 2020-12-15 | Stop reason: HOSPADM

## 2020-12-11 RX ORDER — PROMETHAZINE HYDROCHLORIDE 12.5 MG/1
12.5 TABLET ORAL EVERY 6 HOURS PRN
Status: DISCONTINUED | OUTPATIENT
Start: 2020-12-11 | End: 2020-12-15 | Stop reason: HOSPADM

## 2020-12-11 RX ADMIN — MORPHINE SULFATE 4 MG: 4 INJECTION, SOLUTION INTRAMUSCULAR; INTRAVENOUS at 17:33

## 2020-12-11 RX ADMIN — METHYLPREDNISOLONE SODIUM SUCCINATE 125 MG: 125 INJECTION, POWDER, FOR SOLUTION INTRAMUSCULAR; INTRAVENOUS at 16:46

## 2020-12-11 RX ADMIN — MORPHINE SULFATE 4 MG: 4 INJECTION, SOLUTION INTRAMUSCULAR; INTRAVENOUS at 19:02

## 2020-12-11 RX ADMIN — ENOXAPARIN SODIUM 70 MG: 80 INJECTION SUBCUTANEOUS at 20:57

## 2020-12-11 RX ADMIN — NITROGLYCERIN 0.5 INCH: 20 OINTMENT TOPICAL at 20:57

## 2020-12-11 RX ADMIN — ATORVASTATIN CALCIUM 80 MG: 80 TABLET, FILM COATED ORAL at 23:27

## 2020-12-11 RX ADMIN — ONDANSETRON 4 MG: 2 INJECTION INTRAMUSCULAR; INTRAVENOUS at 17:33

## 2020-12-11 RX ADMIN — METOPROLOL TARTRATE 25 MG: 25 TABLET, FILM COATED ORAL at 23:27

## 2020-12-11 RX ADMIN — KETOROLAC TROMETHAMINE 30 MG: 30 INJECTION, SOLUTION INTRAMUSCULAR at 16:45

## 2020-12-11 RX ADMIN — IOPAMIDOL 100 ML: 612 INJECTION, SOLUTION INTRAVENOUS at 18:45

## 2020-12-11 ASSESSMENT — ENCOUNTER SYMPTOMS
EYE PAIN: 0
SHORTNESS OF BREATH: 0
APNEA: 0
COLOR CHANGE: 0
COUGH: 1
TROUBLE SWALLOWING: 0
ABDOMINAL PAIN: 0
CHEST TIGHTNESS: 1
ALLERGIC/IMMUNOLOGIC NEGATIVE: 1

## 2020-12-11 ASSESSMENT — PAIN SCALES - GENERAL
PAINLEVEL_OUTOF10: 9
PAINLEVEL_OUTOF10: 9
PAINLEVEL_OUTOF10: 7
PAINLEVEL_OUTOF10: 6
PAINLEVEL_OUTOF10: 2

## 2020-12-11 ASSESSMENT — PAIN DESCRIPTION - DESCRIPTORS: DESCRIPTORS: ACHING;THROBBING

## 2020-12-11 ASSESSMENT — PAIN DESCRIPTION - FREQUENCY: FREQUENCY: CONTINUOUS

## 2020-12-11 ASSESSMENT — PAIN DESCRIPTION - LOCATION: LOCATION: BACK

## 2020-12-11 ASSESSMENT — PAIN DESCRIPTION - PAIN TYPE: TYPE: ACUTE PAIN

## 2020-12-11 NOTE — ED NOTES
Pt on call light  Says he is in pain  Pt is pointing to left shoulder blade   Bg anna notified      Germán Henriquez RN  12/11/20 8796

## 2020-12-11 NOTE — ED TRIAGE NOTES
Pt presents to ED from home via EMS with c/o chest pain. Pt states that chest pain started at approximately 1000 this am while sitting in bed; pt denies nausea or SOB at onset. Pt reports that pain extended from his chest into his upper back, between his shoulder blades and down his left arm. Upon assessment, pt is A/Ox4, skin p/w/d, resp even and unlabored, msp's intact. Pt denies sob, n/v/d, fever, or chills.

## 2020-12-11 NOTE — ED NOTES
Bed: 18  Expected date: 12/11/20  Expected time:   Means of arrival:   Comments:  65year old male  Chest pain  Radiates to the back since 10 am. 139/73, 77, 18, 97% ra.   18 in left f.a.  324 mg asa given     April L Fariha Mendoza RN  12/11/20 5152
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
## 2020-12-12 PROBLEM — I21.9 AMI (ACUTE MYOCARDIAL INFARCTION) (HCC): Status: ACTIVE | Noted: 2020-12-12

## 2020-12-12 LAB
CHOLESTEROL, TOTAL: 244 MG/DL (ref 0–199)
EKG ATRIAL RATE: 56 BPM
EKG ATRIAL RATE: 76 BPM
EKG P AXIS: 81 DEGREES
EKG P AXIS: 85 DEGREES
EKG P-R INTERVAL: 172 MS
EKG P-R INTERVAL: 172 MS
EKG Q-T INTERVAL: 372 MS
EKG Q-T INTERVAL: 448 MS
EKG QRS DURATION: 76 MS
EKG QRS DURATION: 98 MS
EKG QTC CALCULATION (BAZETT): 418 MS
EKG QTC CALCULATION (BAZETT): 432 MS
EKG R AXIS: 68 DEGREES
EKG R AXIS: 70 DEGREES
EKG T AXIS: 39 DEGREES
EKG T AXIS: 67 DEGREES
EKG VENTRICULAR RATE: 56 BPM
EKG VENTRICULAR RATE: 76 BPM
GLUCOSE BLD-MCNC: 124 MG/DL (ref 60–115)
GLUCOSE BLD-MCNC: 146 MG/DL (ref 60–115)
GLUCOSE BLD-MCNC: 166 MG/DL (ref 60–115)
GLUCOSE BLD-MCNC: 182 MG/DL (ref 60–115)
HCT VFR BLD CALC: 47 % (ref 42–52)
HDLC SERPL-MCNC: 46 MG/DL (ref 40–59)
HEMOGLOBIN: 16 G/DL (ref 14–18)
LDL CHOLESTEROL CALCULATED: 159 MG/DL (ref 0–129)
MAGNESIUM: 2.4 MG/DL (ref 1.7–2.4)
MCH RBC QN AUTO: 32.1 PG (ref 27–31.3)
MCHC RBC AUTO-ENTMCNC: 34.1 % (ref 33–37)
MCV RBC AUTO: 94.3 FL (ref 80–100)
PDW BLD-RTO: 15.8 % (ref 11.5–14.5)
PERFORMED ON: ABNORMAL
PLATELET # BLD: 247 K/UL (ref 130–400)
RBC # BLD: 4.99 M/UL (ref 4.7–6.1)
TRIGL SERPL-MCNC: 195 MG/DL (ref 0–150)
TROPONIN: 0.2 NG/ML (ref 0–0.01)
TROPONIN: 0.41 NG/ML (ref 0–0.01)
WBC # BLD: 8.6 K/UL (ref 4.8–10.8)

## 2020-12-12 PROCEDURE — 93005 ELECTROCARDIOGRAM TRACING: CPT | Performed by: INTERNAL MEDICINE

## 2020-12-12 PROCEDURE — 84484 ASSAY OF TROPONIN QUANT: CPT

## 2020-12-12 PROCEDURE — 80061 LIPID PANEL: CPT

## 2020-12-12 PROCEDURE — 2580000003 HC RX 258: Performed by: INTERNAL MEDICINE

## 2020-12-12 PROCEDURE — 6360000002 HC RX W HCPCS: Performed by: INTERNAL MEDICINE

## 2020-12-12 PROCEDURE — 6370000000 HC RX 637 (ALT 250 FOR IP): Performed by: INTERNAL MEDICINE

## 2020-12-12 PROCEDURE — 85027 COMPLETE CBC AUTOMATED: CPT

## 2020-12-12 PROCEDURE — 83735 ASSAY OF MAGNESIUM: CPT

## 2020-12-12 PROCEDURE — 36415 COLL VENOUS BLD VENIPUNCTURE: CPT

## 2020-12-12 PROCEDURE — 2060000000 HC ICU INTERMEDIATE R&B

## 2020-12-12 PROCEDURE — 99223 1ST HOSP IP/OBS HIGH 75: CPT | Performed by: INTERNAL MEDICINE

## 2020-12-12 RX ORDER — PANTOPRAZOLE SODIUM 40 MG/1
40 TABLET, DELAYED RELEASE ORAL
Status: DISCONTINUED | OUTPATIENT
Start: 2020-12-13 | End: 2020-12-15 | Stop reason: HOSPADM

## 2020-12-12 RX ORDER — DEXTROSE MONOHYDRATE 25 G/50ML
12.5 INJECTION, SOLUTION INTRAVENOUS PRN
Status: DISCONTINUED | OUTPATIENT
Start: 2020-12-12 | End: 2020-12-15 | Stop reason: HOSPADM

## 2020-12-12 RX ORDER — NICOTINE POLACRILEX 4 MG
15 LOZENGE BUCCAL PRN
Status: DISCONTINUED | OUTPATIENT
Start: 2020-12-12 | End: 2020-12-15 | Stop reason: HOSPADM

## 2020-12-12 RX ORDER — ATORVASTATIN CALCIUM 10 MG/1
10 TABLET, FILM COATED ORAL NIGHTLY
Status: DISCONTINUED | OUTPATIENT
Start: 2020-12-12 | End: 2020-12-14

## 2020-12-12 RX ORDER — DEXTROSE MONOHYDRATE 50 MG/ML
100 INJECTION, SOLUTION INTRAVENOUS PRN
Status: DISCONTINUED | OUTPATIENT
Start: 2020-12-12 | End: 2020-12-15 | Stop reason: HOSPADM

## 2020-12-12 RX ORDER — PAROXETINE HYDROCHLORIDE 20 MG/1
40 TABLET, FILM COATED ORAL DAILY
Status: DISCONTINUED | OUTPATIENT
Start: 2020-12-12 | End: 2020-12-15 | Stop reason: HOSPADM

## 2020-12-12 RX ORDER — LOSARTAN POTASSIUM 25 MG/1
25 TABLET ORAL 2 TIMES DAILY
Status: DISCONTINUED | OUTPATIENT
Start: 2020-12-12 | End: 2020-12-15 | Stop reason: HOSPADM

## 2020-12-12 RX ADMIN — ATORVASTATIN CALCIUM 10 MG: 10 TABLET, FILM COATED ORAL at 21:06

## 2020-12-12 RX ADMIN — ENOXAPARIN SODIUM 40 MG: 40 INJECTION SUBCUTANEOUS at 21:06

## 2020-12-12 RX ADMIN — Medication 10 ML: at 08:22

## 2020-12-12 RX ADMIN — ASPIRIN 81 MG: 81 TABLET, CHEWABLE ORAL at 08:21

## 2020-12-12 RX ADMIN — Medication 10 ML: at 21:07

## 2020-12-12 RX ADMIN — PAROXETINE HYDROCHLORIDE 40 MG: 20 TABLET, FILM COATED ORAL at 16:21

## 2020-12-12 RX ADMIN — INSULIN LISPRO 1 UNITS: 100 INJECTION, SOLUTION INTRAVENOUS; SUBCUTANEOUS at 08:22

## 2020-12-12 RX ADMIN — METOPROLOL TARTRATE 25 MG: 25 TABLET, FILM COATED ORAL at 08:21

## 2020-12-12 RX ADMIN — LOSARTAN POTASSIUM 25 MG: 25 TABLET, FILM COATED ORAL at 16:21

## 2020-12-12 ASSESSMENT — PAIN SCALES - GENERAL
PAINLEVEL_OUTOF10: 0

## 2020-12-12 NOTE — CARE COORDINATION
[facility] [place] Case Management Initial Discharge Assessment    Met with Patient to discuss discharge plan. PCP: Wendy Islas MD                                Date of Last Visit:  2 1/2 MONTHS AGO    If no PCP, list provided? N/A    Discharge Planning    Living Arrangements: independently at home    Who do you live with? GF    Who helps you with your care:  self    If lives at home:     Do you have any barriers navigating in your home? no    Patient can perform ADL? Yes    Current Services (outpatient and in home) :  None    Dialysis: No    Is transportation available to get to your appointments? Yes -PT states he doesn't drive much due to verigo    DME Equipment:  no    Respiratory equipment: None    Respiratory provider:  no     Pharmacy:  yes - [facility] [address] with Medication Assistance Program?  No      Patient agreeable to [facility]? Declined    Patient agreeable to SNF/Rehab? Declined    Other discharge needs identified? Cardiac Rehab    [place] of choice list provided with basic dialogue that supports the patient's individualized plan of care/goals and shares the quality data associated with the providers. Yes    Does Patient Have a High-Risk for Readmission Diagnosis (CHF, PN, MI, COPD)? Yes    If Yes,       Consult with cardiologist? Yes   Cardiac Rehab referral if EF <35%? Yes   Consult with Pharmacy for medication assessment prior to discharge? Yes    Nutrition consult for CHF? Yes   Respiratory therapy consult that includes bedside instruction on administration of nebulizers and/or inhalers, and assessment of oxygen and equipment needs in the home? Yes    The plan for Transition of Care is related to the following treatment goals:Safe discharge    Initial Discharge Plan? (Note: please see concurrent daily documentation for any updates after initial note).     Return home with gf    The Patient   was provided with choice of any post-acute providers for care and equipment and agrees with discharge plan  Yes    Electronically signed by Diann Ceron on 12/12/2020 at 10:05 AM

## 2020-12-12 NOTE — H&P
History and Physical  Patient: Leslie Robles  Unit/Bed:W188/W188-01  YOB: 1950  MRN: [id number]  Acct: [de-identified]   Admitting Diagnosis: NSTEMI (non-ST elevated myocardial infarction) [facility]) [I21.4]  AMI (acute myocardial infarction) [facility]) [I21.9]  Admit Date:  12/11/2020  Hospital Day: 1      Chief Complaint:   Angina    History of Present Illness:  Middle-aged white male who lives with his girlfriend. Has been complaining of chest pain chest pressure across the chest very suspicious for angina. He thought was pleurisy. However on the day of admission the discomfort got worse he came to ER was admitted to my service. Troponins are positive. He is a smoker. Has diabetes. No chest pain currently. Vital signs are stable.     No Known Allergies    Current Facility-Administered Medications   Medication Dose Route Frequency Provider Last Rate Last Admin    insulin lispro (HUMALOG) injection vial 0-6 Units  0-6 Units Subcutaneous TID WC Alem Ozuna MD   1 Units at 12/12/20 6405    insulin lispro (HUMALOG) injection vial 0-3 Units  0-3 Units Subcutaneous Nightly MD Misty Laceyeeter ON 12/13/2020] empagliflozin (JARDIANCE) tablet TABS 10 mg  10 mg Oral Daily with breakfast Alem Ozuna MD        metFORMIN (GLUCOPHAGE) tablet 500 mg  500 mg Oral BID Alem Ozuna MD        [START ON 12/13/2020] pantoprazole (PROTONIX) tablet 40 mg  40 mg Oral QAM AC Alem Ozuna MD        PARoxetine (PAXIL) tablet 40 mg  40 mg Oral Daily Alem Ozuna MD        atorvastatin (LIPITOR) tablet 10 mg  10 mg Oral Nightly Alem Ozuna MD        metoprolol tartrate (LOPRESSOR) tablet 25 mg  25 mg Oral BID Alem Ozuna MD        losartan (COZAAR) tablet 25 mg  25 mg Oral BID Alem Ozuna MD        sodium chloride flush 0.9 % injection 10 mL  10 mL Intravenous 2 times per day Alem Ozuna MD   10 mL at 12/12/20 7896    sodium chloride flush 0.9 % injection 10 mL  10 mL Intravenous PRN Caterina Bass MD        acetaminophen (TYLENOL) tablet 650 mg  650 mg Oral Q6H PRN Caterina Bass MD        Or    acetaminophen (TYLENOL) suppository 650 mg  650 mg Rectal Q6H PRN Caterina Bass MD        polyethylene glycol [facility]) packet 17 g  17 g Oral Daily PRN Caterina Bass MD        promethazine (PHENERGAN) tablet 12.5 mg  12.5 mg Oral Q6H PRN Caterina Bass MD        Or    ondansetron [facility]) injection 4 mg  4 mg Intravenous Q6H PRN Caterina Bass MD        atorvastatin (LIPITOR) tablet 80 mg  80 mg Oral Nightly Caterina Bass MD   80 mg at 12/11/20 2327    aspirin chewable tablet 81 mg  81 mg Oral Daily Caterina Bass MD   81 mg at 12/12/20 3499    enoxaparin (LOVENOX) injection 40 mg  40 mg Subcutaneous Daily Caterina Bass MD        nitroGLYCERIN (NITROSTAT) SL tablet 0.4 mg  0.4 mg Sublingual Q5 Min PRN Caterina Bass MD        metoprolol tartrate (LOPRESSOR) tablet 25 mg  25 mg Oral BID Caterina Bass MD   25 mg at 12/12/20 8061    morphine (PF) injection 2 mg  2 mg Intravenous Q2H PRN Caterina Bass MD           PMHx:  Past Medical History:   Diagnosis Date    [facility]     COPD (chronic obstructive pulmonary disease) ([facility]     Diabetes mellitus ([facility]     Emphysema of lung ([facility]     Hyperlipidemia     Hypertension     Meniere's disease     Vertigo        PSHx:  History reviewed. No pertinent surgical history.     Social Hx:  Social History     Socioeconomic History    Marital status:      Spouse name: None    Number of children: None    Years of education: None    Highest education level: None   Occupational History    None   Social Needs    Financial resource strain: None    Food insecurity     Worry: None     Inability: None    Transportation needs     Medical: None     Non-medical: None   Tobacco Use    Smoking status: Current Every Day Smoker    Smokeless tobacco: Never Used   Substance and Sexual Activity    Alcohol use: None  Drug use: None    Sexual activity: None   Lifestyle    Physical activity     Days per week: None     Minutes per session: None    Stress: None   Relationships    Social connections     Talks on phone: None     Gets together: None     Attends [employer] service: None     Active member of club or organization: None     Attends meetings of clubs or organizations: None     Relationship status: None    Intimate partner violence     Fear of current or ex partner: None     Emotionally abused: None     Physically abused: None     Forced sexual activity: None   Other Topics Concern    None   Social History Narrative    None       Family Hx:  History reviewed. No pertinent family history.     Review ofSystems:   Review of Systems       Physical Examination:    BP (!) 131/56   Pulse 57   Temp 97.9 °F (36.6 °C) (Oral)   Resp 17   Ht 5' 6\" (1.676 m)   Wt 141 lb 12.8 oz (64.3 kg)   SpO2 97%   BMI 22.89 kg/m²    Physical Exam     LABS:  CBC:   Lab Results   Component Value Date    WBC 8.6 12/12/2020    RBC 4.99 12/12/2020    RBC 4.64 04/11/2012    HGB 16.0 12/12/2020    HCT 47.0 12/12/2020    MCV 94.3 12/12/2020    MCH 32.1 12/12/2020    MCHC 34.1 12/12/2020    RDW 15.8 12/12/2020     12/12/2020    MPV 7.3 06/08/2013     CBC with Differential:   Lab Results   Component Value Date    WBC 8.6 12/12/2020    RBC 4.99 12/12/2020    RBC 4.64 04/11/2012    HGB 16.0 12/12/2020    HCT 47.0 12/12/2020     12/12/2020    MCV 94.3 12/12/2020    MCH 32.1 12/12/2020    MCHC 34.1 12/12/2020    RDW 15.8 12/12/2020    LYMPHOPCT 19.9 12/11/2020    MONOPCT 5.6 12/11/2020    EOSPCT 0.8 04/11/2012    BASOPCT 0.7 12/11/2020    MONOSABS 0.7 12/11/2020    LYMPHSABS 2.4 12/11/2020    EOSABS 0.1 12/11/2020    BASOSABS 0.1 12/11/2020     CMP:    Lab Results   Component Value Date     12/11/2020    K 4.4 12/11/2020     12/11/2020    CO2 20 12/11/2020    BUN 11 12/11/2020    CREATININE 0.90 12/11/2020    GFRAA >60.0 12/11/2020    LABGLOM >60.0 12/11/2020    GLUCOSE 188 12/11/2020    GLUCOSE 152 04/11/2012    PROT 6.0 12/11/2020    LABALBU 3.8 12/11/2020    LABALBU 4.3 04/11/2012    CALCIUM 8.3 12/11/2020    BILITOT 0.7 12/11/2020    ALKPHOS 74 12/11/2020    AST 10 12/11/2020    ALT 8 12/11/2020     BMP:    Lab Results   Component Value Date     12/11/2020    K 4.4 12/11/2020     12/11/2020    CO2 20 12/11/2020    BUN 11 12/11/2020    LABALBU 3.8 12/11/2020    LABALBU 4.3 04/11/2012    CREATININE 0.90 12/11/2020    CALCIUM 8.3 12/11/2020    GFRAA >60.0 12/11/2020    LABGLOM >60.0 12/11/2020    GLUCOSE 188 12/11/2020    GLUCOSE 152 04/11/2012     Magnesium:    Lab Results   Component Value Date    MG 2.4 12/12/2020    MG 1.9 04/11/2012     Troponin:    Lab Results   Component Value Date    TROPONINI 0.415 12/12/2020     Recent Labs     12/11/20  1645   PROBNP 153     Recent Labs     12/11/20  1645   INR 0.9       RADIOLOGY:  Cta Neck W Wo Contrast    Result Date: 12/11/2020  EXAMINATION: CTA NECK W WO CONTRAST DATE AND TIME:12/11/2020 6:44 PM CLINICAL HISTORY: Stroke symptoms   \"severe\" left sided neck pain  COMPARISON: None TECHNIQUE: Helical CTA of the neck was performed from the aortic arch to the foramen magnum following the uneventful intravenous administration of 100 cc of nonionic contrast without incident. 2-D images were reconstructed in the sagittal and coronal planes. Three Dimensional Maximum Intensity Projection (3D-MIP) images were created. All images were reviewed and primarily archived to PACS workstation. All CT scans at this facility use dose modulation, iterative reconstruction, and/or weight based dosing when appropriate to reduce radiation dose to as low as reasonably achievable. NASCET Criteria were utilized FINDINGS Aortic Arch: The visualized portions of the aortic arch and proximal arch vessels demonstrates no focal stenosis aneurysm or dissection.   Carotid:   Right  Carotid Stenosis (% by NASCET Criteria): There is no hemodynamically significant stenosis, vascular dissection or aneurysm of the right common or internal carotid arteries. Left  Carotid Stenosis (% by NASCET Criteria): There is no hemodynamically significant stenosis, vascular dissection or aneurysm in the left common or internal carotid arteries. Cervical Vertebral Arteries:    Patency: The vertebral arteries are well visualized to the level of the basilar artery. There is no focal stenosis aneurysm or dissection. Vertebral arteries are codominant. Negative CTA of the neck. Xr Chest Portable    Result Date: 12/11/2020  Exam: XR CHEST PORTABLE History:  pain Technique: AP portable view of the chest obtained. Comparison: None Chest x-ray portable Findings: The cardiomediastinal silhouette is within normal limits. There are no infiltrates, consolidations or effusions. Bones of the thorax appear intact. No radiographic evidence of acute intrathoracic process. EKG:   Assessment:    Active Hospital Problems    Diagnosis Date Noted    AMI (acute myocardial infarction) ([facility] [I21.9] 12/12/2020    NSTEMI (non-ST elevated myocardial infarction) ([facility] [I21.4] 12/11/2020         Smoker        Type 2 DM    Plan:  1. Increase losartan  2. DC Cardizem  3. Start Lopressor  4. Start statin  5. And aspirin  6. Echo  7.  Cath        Electronically signed by Katrin Parker MD on 12/12/2020 at 1:29 PM

## 2020-12-12 NOTE — PROGRESS NOTES
2200 - Assessment completed. Pt Ax0x4. NSR/SB on tele. VSS at rest. Pt's home medications have been req'd. Denies any pain or needs at this time. Pt is resting comfortably in bed. Call light in reach, will note any deviations. Pt's troponin's are trending upwards. 0.010, 0.053, 0.201. Dr. Segovia Alter aware. Pt is not c/o any chest pain. Pt has been resting quietly.        Electronically signed by Aleksandra Barth RN on 12/12/2020 at 5:19 AM

## 2020-12-13 LAB
GLUCOSE BLD-MCNC: 111 MG/DL (ref 60–115)
GLUCOSE BLD-MCNC: 116 MG/DL (ref 60–115)
GLUCOSE BLD-MCNC: 123 MG/DL (ref 60–115)
GLUCOSE BLD-MCNC: 150 MG/DL (ref 60–115)
PERFORMED ON: ABNORMAL
PERFORMED ON: NORMAL

## 2020-12-13 PROCEDURE — 6370000000 HC RX 637 (ALT 250 FOR IP): Performed by: INTERNAL MEDICINE

## 2020-12-13 PROCEDURE — 2060000000 HC ICU INTERMEDIATE R&B

## 2020-12-13 PROCEDURE — 6360000002 HC RX W HCPCS: Performed by: INTERNAL MEDICINE

## 2020-12-13 PROCEDURE — 99233 SBSQ HOSP IP/OBS HIGH 50: CPT | Performed by: INTERNAL MEDICINE

## 2020-12-13 PROCEDURE — 2580000003 HC RX 258: Performed by: INTERNAL MEDICINE

## 2020-12-13 RX ADMIN — PANTOPRAZOLE SODIUM 40 MG: 40 TABLET, DELAYED RELEASE ORAL at 06:22

## 2020-12-13 RX ADMIN — ATORVASTATIN CALCIUM 10 MG: 10 TABLET, FILM COATED ORAL at 20:27

## 2020-12-13 RX ADMIN — ENOXAPARIN SODIUM 40 MG: 40 INJECTION SUBCUTANEOUS at 20:27

## 2020-12-13 RX ADMIN — Medication 10 ML: at 20:27

## 2020-12-13 RX ADMIN — INSULIN LISPRO 1 UNITS: 100 INJECTION, SOLUTION INTRAVENOUS; SUBCUTANEOUS at 17:01

## 2020-12-13 RX ADMIN — Medication 10 ML: at 08:42

## 2020-12-13 RX ADMIN — PAROXETINE HYDROCHLORIDE 40 MG: 20 TABLET, FILM COATED ORAL at 08:42

## 2020-12-13 RX ADMIN — METOPROLOL TARTRATE 25 MG: 25 TABLET, FILM COATED ORAL at 08:42

## 2020-12-13 RX ADMIN — ASPIRIN 81 MG: 81 TABLET, CHEWABLE ORAL at 08:42

## 2020-12-13 RX ADMIN — LOSARTAN POTASSIUM 25 MG: 25 TABLET, FILM COATED ORAL at 08:42

## 2020-12-13 RX ADMIN — LOSARTAN POTASSIUM 25 MG: 25 TABLET, FILM COATED ORAL at 17:01

## 2020-12-13 ASSESSMENT — ENCOUNTER SYMPTOMS
CHEST TIGHTNESS: 0
VOMITING: 0
APNEA: 0
ABDOMINAL DISTENTION: 0
COLOR CHANGE: 0
SHORTNESS OF BREATH: 0
DIARRHEA: 0
NAUSEA: 0

## 2020-12-13 ASSESSMENT — PAIN SCALES - GENERAL
PAINLEVEL_OUTOF10: 0

## 2020-12-13 NOTE — PROGRESS NOTES
Progress Note  Patient: Robb Ballard  Unit/Bed: F409/L609-18  YOB: 1950  MRN: [id number]  Acct: [de-identified]   Admitting Diagnosis: NSTEMI (non-ST elevated myocardial infarction) [facility]) [I21.4]  AMI (acute myocardial infarction) [facility]) [I21.9]  Date:  12/11/2020  Hospital Day: 2    Chief Complaint:  Unstable angina    Subjective  Uneventful night. No chest pain. No significant arrhythmias were noted. Review of Systems:   Review of Systems   Constitutional: Negative for appetite change, diaphoresis and fatigue. Respiratory: Negative for apnea, chest tightness and shortness of breath. Cardiovascular: Negative for chest pain, palpitations and leg swelling. Gastrointestinal: Negative for abdominal distention, diarrhea, nausea and vomiting. Skin: Negative for color change, pallor, rash and wound. Neurological: Negative for dizziness, syncope, weakness, light-headedness, numbness and headaches. Psychiatric/Behavioral: Negative for agitation, behavioral problems and confusion. The patient is not nervous/anxious and is not hyperactive. All other systems reviewed and are negative. Physical Examination:    /74   Pulse 67   Temp 98.1 °F (36.7 °C) (Oral)   Resp 17   Ht 5' 6\" (1.676 m)   Wt 141 lb 12.8 oz (64.3 kg)   SpO2 99%   BMI 22.89 kg/m²    Physical Exam  Constitutional:       Appearance: He is well-developed. HENT:      Head: Atraumatic. Eyes:      Conjunctiva/sclera: Conjunctivae normal.      Pupils: Pupils are equal, round, and reactive to light. Neck:      Thyroid: No thyromegaly. Vascular: No JVD. Trachea: No tracheal deviation. Cardiovascular:      Rate and Rhythm: Normal rate and regular rhythm. Heart sounds: No murmur. No friction rub. No gallop. Pulmonary:      Effort: No respiratory distress. Breath sounds: No wheezing or rales. Chest:      Chest wall: No tenderness. Abdominal:      General: There is no distension. Palpations: Abdomen is soft. There is no mass. Tenderness: There is no abdominal tenderness. There is no guarding or rebound. Musculoskeletal: Normal range of motion. Lymphadenopathy:      Cervical: No cervical adenopathy. Skin:     General: Skin is warm. Neurological:      Mental Status: He is alert and oriented to person, place, and time.          LABS:  CBC:   Lab Results   Component Value Date    WBC 8.6 12/12/2020    RBC 4.99 12/12/2020    RBC 4.64 04/11/2012    HGB 16.0 12/12/2020    HCT 47.0 12/12/2020    MCV 94.3 12/12/2020    MCH 32.1 12/12/2020    MCHC 34.1 12/12/2020    RDW 15.8 12/12/2020     12/12/2020    MPV 7.3 06/08/2013     CBC with Differential:   Lab Results   Component Value Date    WBC 8.6 12/12/2020    RBC 4.99 12/12/2020    RBC 4.64 04/11/2012    HGB 16.0 12/12/2020    HCT 47.0 12/12/2020     12/12/2020    MCV 94.3 12/12/2020    MCH 32.1 12/12/2020    MCHC 34.1 12/12/2020    RDW 15.8 12/12/2020    LYMPHOPCT 19.9 12/11/2020    MONOPCT 5.6 12/11/2020    EOSPCT 0.8 04/11/2012    BASOPCT 0.7 12/11/2020    MONOSABS 0.7 12/11/2020    LYMPHSABS 2.4 12/11/2020    EOSABS 0.1 12/11/2020    BASOSABS 0.1 12/11/2020     CMP:    Lab Results   Component Value Date     12/11/2020    K 4.4 12/11/2020     12/11/2020    CO2 20 12/11/2020    BUN 11 12/11/2020    CREATININE 0.90 12/11/2020    GFRAA >60.0 12/11/2020    LABGLOM >60.0 12/11/2020    GLUCOSE 188 12/11/2020    GLUCOSE 152 04/11/2012    PROT 6.0 12/11/2020    LABALBU 3.8 12/11/2020    LABALBU 4.3 04/11/2012    CALCIUM 8.3 12/11/2020    BILITOT 0.7 12/11/2020    ALKPHOS 74 12/11/2020    AST 10 12/11/2020    ALT 8 12/11/2020     BMP:    Lab Results   Component Value Date     12/11/2020    K 4.4 12/11/2020     12/11/2020    CO2 20 12/11/2020    BUN 11 12/11/2020    LABALBU 3.8 12/11/2020    LABALBU 4.3 04/11/2012    CREATININE 0.90 12/11/2020    CALCIUM 8.3 12/11/2020    GFRAA >60.0 12/11/2020    LABGLOM >60.0 12/11/2020    GLUCOSE 188 12/11/2020    GLUCOSE 152 04/11/2012     Magnesium:    Lab Results   Component Value Date    MG 2.4 12/12/2020    MG 1.9 04/11/2012     Troponin:    Lab Results   Component Value Date    TROPONINI 0.415 12/12/2020       Radiology:  Cta Neck W Wo Contrast    Result Date: 12/11/2020  EXAMINATION: CTA NECK W WO CONTRAST DATE AND TIME:12/11/2020 6:44 PM CLINICAL HISTORY: Stroke symptoms   \"severe\" left sided neck pain  COMPARISON: None TECHNIQUE: Helical CTA of the neck was performed from the aortic arch to the foramen magnum following the uneventful intravenous administration of 100 cc of nonionic contrast without incident. 2-D images were reconstructed in the sagittal and coronal planes. Three Dimensional Maximum Intensity Projection (3D-MIP) images were created. All images were reviewed and primarily archived to PACS workstation. All CT scans at this facility use dose modulation, iterative reconstruction, and/or weight based dosing when appropriate to reduce radiation dose to as low as reasonably achievable. NASCET Criteria were utilized FINDINGS Aortic Arch: The visualized portions of the aortic arch and proximal arch vessels demonstrates no focal stenosis aneurysm or dissection. Carotid:   Right  Carotid Stenosis (% by NASCET Criteria): There is no hemodynamically significant stenosis, vascular dissection or aneurysm of the right common or internal carotid arteries. Left  Carotid Stenosis (% by NASCET Criteria): There is no hemodynamically significant stenosis, vascular dissection or aneurysm in the left common or internal carotid arteries. Cervical Vertebral Arteries:    Patency: The vertebral arteries are well visualized to the level of the basilar artery. There is no focal stenosis aneurysm or dissection. Vertebral arteries are codominant. Negative CTA of the neck.      Xr Chest Portable    Result Date: 12/11/2020  Exam: XR CHEST PORTABLE History:  pain Technique: AP portable view of the chest obtained. Comparison: None Chest x-ray portable Findings: The cardiomediastinal silhouette is within normal limits. There are no infiltrates, consolidations or effusions. Bones of the thorax appear intact. No radiographic evidence of acute intrathoracic process. EKG:   Assessment:    Active Hospital Problems    Diagnosis Date Noted    AMI (acute myocardial infarction) ([facility] [I21.9] 12/12/2020    NSTEMI (non-ST elevated myocardial infarction) ([facility] [I21.4] 12/11/2020           Plan:  1. Cath in the morning  2.  Echo  Electronically signed by Alem Ozuna MD on 12/13/2020 at 9:47 AM

## 2020-12-13 NOTE — FLOWSHEET NOTE
Jana Blood 281 care of pt from [facility], [address] [place] [address].    2106 PM assessment and medications completed. Pt denies further needs at this time.

## 2020-12-14 ENCOUNTER — APPOINTMENT (OUTPATIENT)
Dept: CARDIAC CATH/INVASIVE PROCEDURES | Age: 70
DRG: 247 | End: 2020-12-14
Payer: MEDICARE

## 2020-12-14 LAB
GLUCOSE BLD-MCNC: 127 MG/DL (ref 60–115)
GLUCOSE BLD-MCNC: 134 MG/DL (ref 60–115)
GLUCOSE BLD-MCNC: 203 MG/DL (ref 60–115)
LV EF: 55 %
LV EF: 60 %
LVEF MODALITY: NORMAL
LVEF MODALITY: NORMAL
PERFORMED ON: ABNORMAL

## 2020-12-14 PROCEDURE — 93458 L HRT ARTERY/VENTRICLE ANGIO: CPT | Performed by: INTERNAL MEDICINE

## 2020-12-14 PROCEDURE — 027034Z DILATION OF CORONARY ARTERY, ONE ARTERY WITH DRUG-ELUTING INTRALUMINAL DEVICE, PERCUTANEOUS APPROACH: ICD-10-PCS | Performed by: INTERNAL MEDICINE

## 2020-12-14 PROCEDURE — 6370000000 HC RX 637 (ALT 250 FOR IP)

## 2020-12-14 PROCEDURE — 93010 ELECTROCARDIOGRAM REPORT: CPT | Performed by: INTERNAL MEDICINE

## 2020-12-14 PROCEDURE — 6360000002 HC RX W HCPCS: Performed by: INTERNAL MEDICINE

## 2020-12-14 PROCEDURE — 2500000003 HC RX 250 WO HCPCS

## 2020-12-14 PROCEDURE — B2151ZZ FLUOROSCOPY OF LEFT HEART USING LOW OSMOLAR CONTRAST: ICD-10-PCS | Performed by: INTERNAL MEDICINE

## 2020-12-14 PROCEDURE — 93306 TTE W/DOPPLER COMPLETE: CPT

## 2020-12-14 PROCEDURE — 6370000000 HC RX 637 (ALT 250 FOR IP): Performed by: INTERNAL MEDICINE

## 2020-12-14 PROCEDURE — C1725 CATH, TRANSLUMIN NON-LASER: HCPCS

## 2020-12-14 PROCEDURE — C1769 GUIDE WIRE: HCPCS

## 2020-12-14 PROCEDURE — 6360000004 HC RX CONTRAST MEDICATION: Performed by: INTERNAL MEDICINE

## 2020-12-14 PROCEDURE — 6360000002 HC RX W HCPCS

## 2020-12-14 PROCEDURE — 2709999900 HC NON-CHARGEABLE SUPPLY

## 2020-12-14 PROCEDURE — 2580000003 HC RX 258

## 2020-12-14 PROCEDURE — 92928 PRQ TCAT PLMT NTRAC ST 1 LES: CPT | Performed by: INTERNAL MEDICINE

## 2020-12-14 PROCEDURE — C9600 PERC DRUG-EL COR STENT SING: HCPCS | Performed by: INTERNAL MEDICINE

## 2020-12-14 PROCEDURE — C1874 STENT, COATED/COV W/DEL SYS: HCPCS

## 2020-12-14 PROCEDURE — C1776 JOINT DEVICE (IMPLANTABLE): HCPCS

## 2020-12-14 PROCEDURE — B2111ZZ FLUOROSCOPY OF MULTIPLE CORONARY ARTERIES USING LOW OSMOLAR CONTRAST: ICD-10-PCS | Performed by: INTERNAL MEDICINE

## 2020-12-14 PROCEDURE — 4A023N7 MEASUREMENT OF CARDIAC SAMPLING AND PRESSURE, LEFT HEART, PERCUTANEOUS APPROACH: ICD-10-PCS | Performed by: INTERNAL MEDICINE

## 2020-12-14 PROCEDURE — 85347 COAGULATION TIME ACTIVATED: CPT

## 2020-12-14 PROCEDURE — 2060000000 HC ICU INTERMEDIATE R&B

## 2020-12-14 PROCEDURE — 2580000003 HC RX 258: Performed by: INTERNAL MEDICINE

## 2020-12-14 RX ORDER — SODIUM CHLORIDE 9 MG/ML
INJECTION, SOLUTION INTRAVENOUS CONTINUOUS
Status: CANCELLED | OUTPATIENT
Start: 2020-12-14 | End: 2020-12-14

## 2020-12-14 RX ORDER — SODIUM CHLORIDE 0.9 % (FLUSH) 0.9 %
10 SYRINGE (ML) INJECTION PRN
Status: DISCONTINUED | OUTPATIENT
Start: 2020-12-14 | End: 2020-12-15 | Stop reason: HOSPADM

## 2020-12-14 RX ORDER — ACETAMINOPHEN 325 MG/1
650 TABLET ORAL EVERY 4 HOURS PRN
Status: CANCELLED | OUTPATIENT
Start: 2020-12-14

## 2020-12-14 RX ORDER — SODIUM CHLORIDE 0.9 % (FLUSH) 0.9 %
10 SYRINGE (ML) INJECTION EVERY 12 HOURS SCHEDULED
Status: DISCONTINUED | OUTPATIENT
Start: 2020-12-14 | End: 2020-12-15 | Stop reason: HOSPADM

## 2020-12-14 RX ORDER — ATORVASTATIN CALCIUM 40 MG/1
40 TABLET, FILM COATED ORAL NIGHTLY
Status: DISCONTINUED | OUTPATIENT
Start: 2020-12-14 | End: 2020-12-15 | Stop reason: HOSPADM

## 2020-12-14 RX ORDER — LABETALOL HYDROCHLORIDE 5 MG/ML
10 INJECTION, SOLUTION INTRAVENOUS EVERY 30 MIN PRN
Status: CANCELLED | OUTPATIENT
Start: 2020-12-14

## 2020-12-14 RX ORDER — SODIUM CHLORIDE 9 MG/ML
INJECTION, SOLUTION INTRAVENOUS CONTINUOUS
Status: DISCONTINUED | OUTPATIENT
Start: 2020-12-14 | End: 2020-12-15 | Stop reason: HOSPADM

## 2020-12-14 RX ORDER — ATROPINE SULFATE 0.1 MG/ML
INJECTION INTRAVENOUS
Status: DISPENSED
Start: 2020-12-14 | End: 2020-12-15

## 2020-12-14 RX ADMIN — ATORVASTATIN CALCIUM 40 MG: 40 TABLET, FILM COATED ORAL at 20:43

## 2020-12-14 RX ADMIN — METOPROLOL TARTRATE 25 MG: 25 TABLET, FILM COATED ORAL at 08:30

## 2020-12-14 RX ADMIN — SODIUM CHLORIDE: 9 INJECTION, SOLUTION INTRAVENOUS at 05:13

## 2020-12-14 RX ADMIN — ASPIRIN 81 MG: 81 TABLET, CHEWABLE ORAL at 08:29

## 2020-12-14 RX ADMIN — IOPAMIDOL 80 ML: 612 INJECTION, SOLUTION INTRAVENOUS at 16:13

## 2020-12-14 RX ADMIN — LOSARTAN POTASSIUM 25 MG: 25 TABLET, FILM COATED ORAL at 08:30

## 2020-12-14 RX ADMIN — METOPROLOL TARTRATE 25 MG: 25 TABLET, FILM COATED ORAL at 20:43

## 2020-12-14 RX ADMIN — LOSARTAN POTASSIUM 25 MG: 25 TABLET, FILM COATED ORAL at 20:47

## 2020-12-14 RX ADMIN — ENOXAPARIN SODIUM 40 MG: 40 INJECTION SUBCUTANEOUS at 20:43

## 2020-12-14 RX ADMIN — PAROXETINE HYDROCHLORIDE 40 MG: 20 TABLET, FILM COATED ORAL at 08:31

## 2020-12-14 RX ADMIN — ONDANSETRON 4 MG: 2 INJECTION INTRAMUSCULAR; INTRAVENOUS at 17:31

## 2020-12-14 RX ADMIN — TICAGRELOR 90 MG: 90 TABLET ORAL at 20:43

## 2020-12-14 ASSESSMENT — PAIN DESCRIPTION - DESCRIPTORS
DESCRIPTORS: DULL;DISCOMFORT
DESCRIPTORS: DISCOMFORT;DULL

## 2020-12-14 ASSESSMENT — PAIN DESCRIPTION - PAIN TYPE
TYPE: ACUTE PAIN;CHRONIC PAIN
TYPE: ACUTE PAIN;CHRONIC PAIN

## 2020-12-14 ASSESSMENT — PAIN DESCRIPTION - LOCATION
LOCATION: BACK
LOCATION: BACK

## 2020-12-14 ASSESSMENT — PAIN SCALES - GENERAL
PAINLEVEL_OUTOF10: 0
PAINLEVEL_OUTOF10: 0

## 2020-12-14 ASSESSMENT — PAIN DESCRIPTION - FREQUENCY
FREQUENCY: INTERMITTENT
FREQUENCY: INTERMITTENT

## 2020-12-14 NOTE — PROGRESS NOTES
Arrived to pre/post from the cath lab and report from [facility]. vasc band to right wrist no bleeding or hematoma. Attached to monitor and vitals are stable.   Resting comfortably

## 2020-12-14 NOTE — FLOWSHEET NOTE
Jana Blood 281 care of pt from [facility], [address] [place] [address].    2027 PM assessment and medications completed. Consents for cardiac cath and PPCI signed. Pt denies further needs at this time.

## 2020-12-14 NOTE — PROGRESS NOTES
Report called to rickie on one [facility].  Monitor attached to patient.   Right wrist no bleeding or hematoma

## 2020-12-15 VITALS
OXYGEN SATURATION: 100 % | WEIGHT: 141.8 LBS | HEIGHT: 66 IN | TEMPERATURE: 98.1 F | BODY MASS INDEX: 22.79 KG/M2 | SYSTOLIC BLOOD PRESSURE: 127 MMHG | RESPIRATION RATE: 17 BRPM | HEART RATE: 52 BPM | DIASTOLIC BLOOD PRESSURE: 40 MMHG

## 2020-12-15 LAB
GLUCOSE BLD-MCNC: 105 MG/DL (ref 60–115)
PERFORMED ON: NORMAL

## 2020-12-15 PROCEDURE — 6370000000 HC RX 637 (ALT 250 FOR IP): Performed by: INTERNAL MEDICINE

## 2020-12-15 PROCEDURE — 99239 HOSP IP/OBS DSCHRG MGMT >30: CPT | Performed by: INTERNAL MEDICINE

## 2020-12-15 RX ORDER — LOSARTAN POTASSIUM 25 MG/1
25 TABLET ORAL 2 TIMES DAILY
Qty: 30 TABLET | Refills: 3 | Status: SHIPPED | OUTPATIENT
Start: 2020-12-15 | End: 2020-12-15

## 2020-12-15 RX ORDER — LOSARTAN POTASSIUM 25 MG/1
25 TABLET ORAL 2 TIMES DAILY
Qty: 30 TABLET | Refills: 3 | Status: SHIPPED | OUTPATIENT
Start: 2020-12-15 | End: 2021-02-22 | Stop reason: SDUPTHER

## 2020-12-15 RX ORDER — ATORVASTATIN CALCIUM 40 MG/1
40 TABLET, FILM COATED ORAL NIGHTLY
Qty: 30 TABLET | Refills: 3 | Status: SHIPPED | OUTPATIENT
Start: 2020-12-15 | End: 2021-04-26 | Stop reason: SDUPTHER

## 2020-12-15 RX ORDER — ASPIRIN 81 MG/1
81 TABLET, CHEWABLE ORAL DAILY
Qty: 30 TABLET | Refills: 3 | Status: ON HOLD | OUTPATIENT
Start: 2020-12-16 | End: 2020-12-20 | Stop reason: HOSPADM

## 2020-12-15 RX ORDER — NITROGLYCERIN 0.4 MG/1
TABLET SUBLINGUAL
Qty: 25 TABLET | Refills: 3 | Status: SHIPPED | OUTPATIENT
Start: 2020-12-15

## 2020-12-15 RX ADMIN — PAROXETINE HYDROCHLORIDE 40 MG: 20 TABLET, FILM COATED ORAL at 08:30

## 2020-12-15 RX ADMIN — PANTOPRAZOLE SODIUM 40 MG: 40 TABLET, DELAYED RELEASE ORAL at 05:59

## 2020-12-15 RX ADMIN — METOPROLOL TARTRATE 25 MG: 25 TABLET, FILM COATED ORAL at 08:30

## 2020-12-15 RX ADMIN — LOSARTAN POTASSIUM 25 MG: 25 TABLET, FILM COATED ORAL at 08:30

## 2020-12-15 RX ADMIN — TICAGRELOR 90 MG: 90 TABLET ORAL at 08:30

## 2020-12-15 RX ADMIN — ASPIRIN 81 MG: 81 TABLET, CHEWABLE ORAL at 08:30

## 2020-12-15 ASSESSMENT — PAIN DESCRIPTION - PAIN TYPE
TYPE: ACUTE PAIN;CHRONIC PAIN
TYPE: ACUTE PAIN;CHRONIC PAIN

## 2020-12-15 ASSESSMENT — PAIN DESCRIPTION - DESCRIPTORS
DESCRIPTORS: DULL;DISCOMFORT
DESCRIPTORS: DULL;DISCOMFORT

## 2020-12-15 ASSESSMENT — PAIN DESCRIPTION - FREQUENCY
FREQUENCY: INTERMITTENT
FREQUENCY: INTERMITTENT

## 2020-12-15 ASSESSMENT — PAIN DESCRIPTION - LOCATION: LOCATION: BACK

## 2020-12-15 ASSESSMENT — PAIN SCALES - GENERAL
PAINLEVEL_OUTOF10: 0
PAINLEVEL_OUTOF10: 0

## 2020-12-15 ASSESSMENT — PAIN DESCRIPTION - ORIENTATION: ORIENTATION: MID

## 2020-12-15 NOTE — CARE COORDINATION
This Care Transition Nurse provided AMI booklet and zones sheet and reviewed. Discussed the use of the zones sheet and stressed importance of phoning cardiologist promptly for symptoms in the yellow zone and ems for symptoms in the red zone. Reviewed symptoms, risk factors, BP, medications, heart healthy diet, activity, smoking cessation. Discussed managing cholesterol, blood pressure, weight, diabetes, increase physical activity but get clearance from cardiologist before starting a new exercise routine, benefits of cardiac rehab, managing stress. Reviewed how to read food labels for fat, cholesterol, sodium, sugar. Advised to limit sodium intake to 2000 mg per day, eat fresh fruits, vegetables, whole grains, lean proteins  (chicken, turkey, fish, beans) , lowfat or non-fat dairy products, healthy fats in small amounts. Avoid trans fats, and saturated fat, limit beef. Limit eating out and when doing so choose grilled, baked, or broiled entrees. Stressed importance of PCP follow up within one week of discharge and cardiology follow up as directed. Patient states he tries to follow a health diet. He eats fish twice per week. He noted that the cod he purchases is high in sodium. Advised to purchase fish, meats, vegetables that have no seasoning added as these are higher in sodium. He was smoking prior to admission. He has stopped and plans on remaining smoke free. Advised to review tips in booklet for smoking cessation, attend smoking cessation classes when available. Discuss with cardiologist methods of smoking cessation that are acceptable such as a nicotine patch or medication. He states he recently lost 37 pounds rapidly without trying. Advised to discuss this with physician. Advised to add Glucerna to his diet for increased calories and nutrition since he is diabetic. Reviewed use of nitrogycerin. He is familiar since his girlfriend has nitro and he has had to give it to her.  Stressed importance of taking medications as directed especially blood thinners. He uses a medication box to organize his pills weekly. He does use some processed foods but will try to decrease the use of these. He lives in an upper level apartment and uses the stairs several times per day.

## 2020-12-15 NOTE — FLOWSHEET NOTE
PM assessment completed. VSS. Patient denies complaints of chest pain or shortness of breath. Right radial dressing dry and intact. No signs of hematoma or bruising present. Patient resting in bed. Voices no needs at this time.

## 2020-12-15 NOTE — DISCHARGE SUMMARY
Cardiology Discharge Summary      Patient Identification:  Deon Stafford  :   MRN: [id number]   Account: [de-identified]     Admit date: 2020  Discharge date: 12/15/20  Attending provider: Kareen Crews MD        Primary care provider: Azul Fan MD     Admission Diagnoses:  Non-ST elevation MI        Discharge Diagnoses: Active Hospital Problems    Diagnosis Date Noted    [facility] [I21.9] 2020    NSTEMI (non-ST elevated [facility]) ([facility] [I21.4] 2020         PTCA        Smoker        Hyperlipidemia        Hypertension       Hospital Course:   Deon Stafford is a68 y.o. male admitted to [facility] on 2020 for angina. Enzymes were positive. He underwent a cardiac catheterization requiring angioplasty of the right coronary artery. It was uncomplicated procedure. He will be discharged home with instruction follow with me as an outpatient and go to cardiac rehab. Procedures:   Heart cath angioplasty     Consults:       Examination:  BP (!) 127/40   Pulse 52   Temp 98.1 °F (36.7 °C) (Oral)   Resp 17   Ht 5' 6\" (1.676 m)   Wt 141 lb 12.8 oz (64.3 kg)   SpO2 100%   BMI 22.89 kg/m²    Physical Exam  Constitutional:       Appearance: He is well-developed. HENT:      Head: Atraumatic. Eyes:      Conjunctiva/sclera: Conjunctivae normal.      Pupils: Pupils are equal, round, and reactive to light. Neck:      Thyroid: No thyromegaly. Vascular: No JVD. Trachea: No tracheal deviation. Cardiovascular:      Rate and Rhythm: Normal rate and regular rhythm. Heart sounds: No murmur. No friction rub. No gallop. Pulmonary:      Effort: No respiratory distress. Breath sounds: No wheezing or rales. Chest:      Chest wall: No tenderness. Abdominal:      General: There is no distension. Palpations: Abdomen is soft. There is no mass. Tenderness: There is no abdominal tenderness. There is no guarding or rebound. Musculoskeletal: Normal range of motion. Lymphadenopathy:      Cervical: No cervical adenopathy. Skin:     General: Skin is warm. Neurological:      Mental Status: He is alert and oriented to person, place, and time. Medications:  Current Discharge Medication List      START taking these medications    Details   aspirin 81 MG chewable tablet Take 1 tablet by mouth daily  Qty: 30 tablet, Refills: 3      nitroGLYCERIN (NITROSTAT) 0.4 MG SL tablet up to max of 3 total doses. If no relief after 1 dose, call 911. Qty: 25 tablet, Refills: 3      atorvastatin (LIPITOR) 40 MG tablet Take 1 tablet by mouth nightly  Qty: 30 tablet, Refills: 3      metoprolol tartrate (LOPRESSOR) 25 MG tablet Take 1 tablet by mouth 2 times daily  Qty: 60 tablet, Refills: 3      !! losartan (COZAAR) 25 MG tablet Take 1 tablet by mouth 2 times daily  Qty: 30 tablet, Refills: 3      ticagrelor (BRILINTA) 90 MG TABS tablet Take 1 tablet by mouth 2 times daily  Qty: 60 tablet, Refills: 1       !! - Potential duplicate medications found. Please discuss with provider. CONTINUE these medications which have NOT CHANGED    Details   PARoxetine (PAXIL) 40 MG tablet Take 40 mg by mouth daily      dilTIAZem (CARDIZEM CD) 120 MG extended release capsule Take 120 mg by mouth daily      !! losartan (COZAAR) 25 MG tablet Take 25 mg by mouth daily      metFORMIN (GLUCOPHAGE) 500 MG tablet Take 500 mg by mouth 2 times daily      empagliflozin (JARDIANCE) 10 MG tablet Take 10 mg by mouth daily (with breakfast)      omeprazole (PRILOSEC) 20 MG delayed release capsule Take 20 mg by mouth 2 times daily       ! ! - Potential duplicate medications found. Please discuss with provider.           Significant Diagnostics:   Radiology: Cta Neck W Wo Contrast    Result Date: 12/11/2020  EXAMINATION: CTA NECK W WO CONTRAST DATE AND TIME:12/11/2020 6:44 PM CLINICAL HISTORY: Stroke symptoms   \"severe\" left sided neck pain  COMPARISON: None TECHNIQUE: Helical CTA of the neck was performed from the aortic arch to the foramen magnum following the uneventful intravenous administration of 100 cc of nonionic contrast without incident. 2-D images were reconstructed in the sagittal and coronal planes. Three Dimensional Maximum Intensity Projection (3D-MIP) images were created. All images were reviewed and primarily archived to PACS workstation. All CT scans at this facility use dose modulation, iterative reconstruction, and/or weight based dosing when appropriate to reduce radiation dose to as low as reasonably achievable. NASCET Criteria were utilized FINDINGS Aortic Arch: The visualized portions of the aortic arch and proximal arch vessels demonstrates no focal stenosis aneurysm or dissection. Carotid:   Right  Carotid Stenosis (% by NASCET Criteria): There is no hemodynamically significant stenosis, vascular dissection or aneurysm of the right common or internal carotid arteries. Left  Carotid Stenosis (% by NASCET Criteria): There is no hemodynamically significant stenosis, vascular dissection or aneurysm in the left common or internal carotid arteries. Cervical Vertebral Arteries:    Patency: The vertebral arteries are well visualized to the level of the basilar artery. There is no focal stenosis aneurysm or dissection. Vertebral arteries are codominant. Negative CTA of the neck. Xr Chest Portable    Result Date: 12/11/2020  Exam: XR CHEST PORTABLE History:  pain Technique: AP portable view of the chest obtained. Comparison: None Chest x-ray portable Findings: The cardiomediastinal silhouette is within normal limits. There are no infiltrates, consolidations or effusions. Bones of the thorax appear intact. No radiographic evidence of acute intrathoracic process.        Labs:   Recent Results (from the past 72 hour(s))   POCT Glucose    Collection Time: 12/12/20 11:08 AM   Result Value Ref Range    POC Glucose 124 (H) 60 - 115 mg/dl    Performed on ACCU-CHEK    POCT Glucose    Collection Time: 12/12/20  4:05 PM   Result Value Ref Range    POC Glucose 146 (H) 60 - 115 mg/dl    Performed on ACCU-CHEK    POCT Glucose    Collection Time: 12/12/20  8:13 PM   Result Value Ref Range    POC Glucose 166 (H) 60 - 115 mg/dl    Performed on ACCU-CHEK    POCT Glucose    Collection Time: 12/13/20  5:53 AM   Result Value Ref Range    POC Glucose 123 (H) 60 - 115 mg/dl    Performed on ACCU-CHEK    POCT Glucose    Collection Time: 12/13/20 11:36 AM   Result Value Ref Range    POC Glucose 111 60 - 115 mg/dl    Performed on ACCU-CHEK    POCT Glucose    Collection Time: 12/13/20  4:36 PM   Result Value Ref Range    POC Glucose 150 (H) 60 - 115 mg/dl    Performed on ACCU-CHEK    POCT Glucose    Collection Time: 12/13/20  8:04 PM   Result Value Ref Range    POC Glucose 116 (H) 60 - 115 mg/dl    Performed on ACCU-CHEK    POCT Glucose    Collection Time: 12/14/20  5:49 AM   Result Value Ref Range    POC Glucose 134 (H) 60 - 115 mg/dl    Performed on ACCU-CHEK    POCT Glucose    Collection Time: 12/14/20 11:04 AM   Result Value Ref Range    POC Glucose 127 (H) 60 - 115 mg/dl    Performed on ACCU-CHEK    POCT Glucose    Collection Time: 12/14/20  7:54 PM   Result Value Ref Range    POC Glucose 203 (H) 60 - 115 mg/dl    Performed on ACCU-CHEK    POCT Glucose    Collection Time: 12/15/20  5:57 AM   Result Value Ref Range    POC Glucose 105 60 - 115 mg/dl    Performed on ACCU-CHEK              Follow-up visits:   Gris Bain MD  [address] [facility]  [place]audia Bautista [address] [facility] [address]  [phone]    In 2 weeks         Assessment:  Active Hospital Problems    Diagnosis Date Noted    AM[facility] (acute [facility] infarction) ([facility] [I21.9] 12/12/2020    NSTEMI (non-ST elevated myocardial infarction) ([facility] [I21.4] 12/11/2020         Plan:   1.   Discharge home        Electronically signed by Rosanne Rees 12/15/2020 at 9:00 AM

## 2020-12-16 LAB
PERFORMED ON: ABNORMAL
POC ACTIVATED CLOTTING TIME KAOLIN: 290 SEC (ref 82–152)
POC SAMPLE TYPE: ABNORMAL

## 2020-12-17 ENCOUNTER — HOSPITAL ENCOUNTER (INPATIENT)
Age: 70
LOS: 3 days | Discharge: HOME | DRG: 377 | End: 2020-12-20
Attending: INTERNAL MEDICINE | Admitting: INTERNAL MEDICINE
Payer: MEDICARE

## 2020-12-17 ENCOUNTER — APPOINTMENT (OUTPATIENT)
Dept: CT IMAGING | Age: 70
DRG: 377 | End: 2020-12-17
Payer: MEDICARE

## 2020-12-17 PROBLEM — F41.9 ANXIETY: Status: ACTIVE | Noted: 2020-12-17

## 2020-12-17 PROBLEM — E78.49 OTHER HYPERLIPIDEMIA: Status: ACTIVE | Noted: 2020-12-17

## 2020-12-17 PROBLEM — I10 HTN (HYPERTENSION): Status: ACTIVE | Noted: 2020-12-17

## 2020-12-17 PROBLEM — K62.5 RECTAL BLEED: Status: ACTIVE | Noted: 2020-12-17

## 2020-12-17 PROBLEM — K57.92 DIVERTICULITIS: Status: ACTIVE | Noted: 2020-12-17

## 2020-12-17 PROBLEM — E11.9 TYPE 2 DIABETES MELLITUS, WITHOUT LONG-TERM CURRENT USE OF INSULIN (HCC): Status: ACTIVE | Noted: 2020-12-17

## 2020-12-17 LAB
ABO/RH: NORMAL
ABO/RH: NORMAL
ALBUMIN SERPL-MCNC: 4.2 G/DL (ref 3.5–4.6)
ALP BLD-CCNC: 96 U/L (ref 35–104)
ALT SERPL-CCNC: 12 U/L (ref 0–41)
ANION GAP SERPL CALCULATED.3IONS-SCNC: 11 MEQ/L (ref 9–15)
ANTIBODY SCREEN: NORMAL
APTT: 26.8 SEC (ref 24.4–36.8)
AST SERPL-CCNC: 13 U/L (ref 0–40)
BASOPHILS ABSOLUTE: 0.2 K/UL (ref 0–0.2)
BASOPHILS RELATIVE PERCENT: 1.5 %
BILIRUB SERPL-MCNC: 0.5 MG/DL (ref 0.2–0.7)
BUN BLDV-MCNC: 15 MG/DL (ref 8–23)
CALCIUM SERPL-MCNC: 8.8 MG/DL (ref 8.5–9.9)
CHLORIDE BLD-SCNC: 99 MEQ/L (ref 95–107)
CO2: 24 MEQ/L (ref 20–31)
CREAT SERPL-MCNC: 1.07 MG/DL (ref 0.7–1.2)
EKG ATRIAL RATE: 60 BPM
EKG P AXIS: 80 DEGREES
EKG P-R INTERVAL: 172 MS
EKG Q-T INTERVAL: 412 MS
EKG QRS DURATION: 74 MS
EKG QTC CALCULATION (BAZETT): 412 MS
EKG R AXIS: 45 DEGREES
EKG T AXIS: 66 DEGREES
EKG VENTRICULAR RATE: 60 BPM
EOSINOPHILS ABSOLUTE: 0.2 K/UL (ref 0–0.7)
EOSINOPHILS RELATIVE PERCENT: 1.4 %
GFR AFRICAN AMERICAN: >60
GFR AFRICAN AMERICAN: >60
GFR NON-AFRICAN AMERICAN: >60
GFR NON-AFRICAN AMERICAN: >60
GLOBULIN: 1.9 G/DL (ref 2.3–3.5)
GLUCOSE BLD-MCNC: 122 MG/DL (ref 60–115)
GLUCOSE BLD-MCNC: 129 MG/DL (ref 60–115)
GLUCOSE BLD-MCNC: 135 MG/DL (ref 60–115)
GLUCOSE BLD-MCNC: 135 MG/DL (ref 60–115)
GLUCOSE BLD-MCNC: 176 MG/DL (ref 70–99)
HCT VFR BLD CALC: 29.5 % (ref 42–52)
HCT VFR BLD CALC: 32.4 % (ref 42–52)
HCT VFR BLD CALC: 34.1 % (ref 42–52)
HCT VFR BLD CALC: 39.7 % (ref 42–52)
HCT VFR BLD CALC: 47.1 % (ref 42–52)
HEMOGLOBIN: 10 G/DL (ref 14–18)
HEMOGLOBIN: 11 G/DL (ref 14–18)
HEMOGLOBIN: 11.7 G/DL (ref 14–18)
HEMOGLOBIN: 13.3 G/DL (ref 14–18)
HEMOGLOBIN: 15.8 G/DL (ref 14–18)
INR BLD: 0.9
LYMPHOCYTES ABSOLUTE: 2.2 K/UL (ref 1–4.8)
LYMPHOCYTES RELATIVE PERCENT: 17.9 %
MCH RBC QN AUTO: 31.3 PG (ref 27–31.3)
MCHC RBC AUTO-ENTMCNC: 33.6 % (ref 33–37)
MCV RBC AUTO: 93.3 FL (ref 80–100)
MONOCYTES ABSOLUTE: 0.8 K/UL (ref 0.2–0.8)
MONOCYTES RELATIVE PERCENT: 6.4 %
NEUTROPHILS ABSOLUTE: 8.8 K/UL (ref 1.4–6.5)
NEUTROPHILS RELATIVE PERCENT: 72.8 %
PDW BLD-RTO: 15.5 % (ref 11.5–14.5)
PERFORMED ON: ABNORMAL
PERFORMED ON: NORMAL
PLATELET # BLD: 244 K/UL (ref 130–400)
POC CREATININE WHOLE BLOOD: 1.1
POC CREATININE: 1.1 MG/DL (ref 0.8–1.3)
POC SAMPLE TYPE: NORMAL
POTASSIUM SERPL-SCNC: 4.5 MEQ/L (ref 3.4–4.9)
PROTHROMBIN TIME: 11.8 SEC (ref 12.3–14.9)
RBC # BLD: 5.04 M/UL (ref 4.7–6.1)
SARS-COV-2, NAAT: NOT DETECTED
SODIUM BLD-SCNC: 134 MEQ/L (ref 135–144)
TOTAL PROTEIN: 6.1 G/DL (ref 6.3–8)
TROPONIN: 0.21 NG/ML (ref 0–0.01)
WBC # BLD: 12 K/UL (ref 4.8–10.8)

## 2020-12-17 PROCEDURE — 96361 HYDRATE IV INFUSION ADD-ON: CPT

## 2020-12-17 PROCEDURE — 2580000003 HC RX 258: Performed by: NURSE PRACTITIONER

## 2020-12-17 PROCEDURE — 1210000000 HC MED SURG R&B

## 2020-12-17 PROCEDURE — 36415 COLL VENOUS BLD VENIPUNCTURE: CPT

## 2020-12-17 PROCEDURE — 85014 HEMATOCRIT: CPT

## 2020-12-17 PROCEDURE — 74177 CT ABD & PELVIS W/CONTRAST: CPT

## 2020-12-17 PROCEDURE — 6360000004 HC RX CONTRAST MEDICATION: Performed by: PERSONAL EMERGENCY RESPONSE ATTENDANT

## 2020-12-17 PROCEDURE — 6370000000 HC RX 637 (ALT 250 FOR IP): Performed by: INTERNAL MEDICINE

## 2020-12-17 PROCEDURE — 85025 COMPLETE CBC W/AUTO DIFF WBC: CPT

## 2020-12-17 PROCEDURE — 2580000003 HC RX 258: Performed by: PERSONAL EMERGENCY RESPONSE ATTENDANT

## 2020-12-17 PROCEDURE — 99285 EMERGENCY DEPT VISIT HI MDM: CPT

## 2020-12-17 PROCEDURE — 85610 PROTHROMBIN TIME: CPT

## 2020-12-17 PROCEDURE — 86923 COMPATIBILITY TEST ELECTRIC: CPT

## 2020-12-17 PROCEDURE — P9016 RBC LEUKOCYTES REDUCED: HCPCS

## 2020-12-17 PROCEDURE — 99223 1ST HOSP IP/OBS HIGH 75: CPT | Performed by: INTERNAL MEDICINE

## 2020-12-17 PROCEDURE — 96374 THER/PROPH/DIAG INJ IV PUSH: CPT

## 2020-12-17 PROCEDURE — 93005 ELECTROCARDIOGRAM TRACING: CPT | Performed by: EMERGENCY MEDICINE

## 2020-12-17 PROCEDURE — 36430 TRANSFUSION BLD/BLD COMPNT: CPT

## 2020-12-17 PROCEDURE — 84484 ASSAY OF TROPONIN QUANT: CPT

## 2020-12-17 PROCEDURE — 99222 1ST HOSP IP/OBS MODERATE 55: CPT | Performed by: INTERNAL MEDICINE

## 2020-12-17 PROCEDURE — 85018 HEMOGLOBIN: CPT

## 2020-12-17 PROCEDURE — 6360000002 HC RX W HCPCS: Performed by: NURSE PRACTITIONER

## 2020-12-17 PROCEDURE — 80053 COMPREHEN METABOLIC PANEL: CPT

## 2020-12-17 PROCEDURE — 85730 THROMBOPLASTIN TIME PARTIAL: CPT

## 2020-12-17 PROCEDURE — U0002 COVID-19 LAB TEST NON-CDC: HCPCS

## 2020-12-17 PROCEDURE — 6360000002 HC RX W HCPCS: Performed by: PERSONAL EMERGENCY RESPONSE ATTENDANT

## 2020-12-17 PROCEDURE — 86901 BLOOD TYPING SEROLOGIC RH(D): CPT

## 2020-12-17 PROCEDURE — 86850 RBC ANTIBODY SCREEN: CPT

## 2020-12-17 PROCEDURE — 86900 BLOOD TYPING SEROLOGIC ABO: CPT

## 2020-12-17 RX ORDER — PROMETHAZINE HYDROCHLORIDE 12.5 MG/1
12.5 TABLET ORAL EVERY 6 HOURS PRN
Status: DISCONTINUED | OUTPATIENT
Start: 2020-12-17 | End: 2020-12-20 | Stop reason: HOSPADM

## 2020-12-17 RX ORDER — 0.9 % SODIUM CHLORIDE 0.9 %
1000 INTRAVENOUS SOLUTION INTRAVENOUS ONCE
Status: COMPLETED | OUTPATIENT
Start: 2020-12-17 | End: 2020-12-17

## 2020-12-17 RX ORDER — ACETAMINOPHEN 650 MG/1
650 SUPPOSITORY RECTAL EVERY 6 HOURS PRN
Status: DISCONTINUED | OUTPATIENT
Start: 2020-12-17 | End: 2020-12-20 | Stop reason: HOSPADM

## 2020-12-17 RX ORDER — NICOTINE POLACRILEX 4 MG
15 LOZENGE BUCCAL PRN
Status: DISCONTINUED | OUTPATIENT
Start: 2020-12-17 | End: 2020-12-20 | Stop reason: HOSPADM

## 2020-12-17 RX ORDER — ACETAMINOPHEN 325 MG/1
650 TABLET ORAL EVERY 6 HOURS PRN
Status: DISCONTINUED | OUTPATIENT
Start: 2020-12-17 | End: 2020-12-20 | Stop reason: HOSPADM

## 2020-12-17 RX ORDER — SODIUM CHLORIDE 0.9 % (FLUSH) 0.9 %
10 SYRINGE (ML) INJECTION EVERY 12 HOURS SCHEDULED
Status: DISCONTINUED | OUTPATIENT
Start: 2020-12-17 | End: 2020-12-20 | Stop reason: HOSPADM

## 2020-12-17 RX ORDER — 0.9 % SODIUM CHLORIDE 0.9 %
20 INTRAVENOUS SOLUTION INTRAVENOUS ONCE
Status: COMPLETED | OUTPATIENT
Start: 2020-12-17 | End: 2020-12-17

## 2020-12-17 RX ORDER — SODIUM CHLORIDE 9 MG/ML
INJECTION, SOLUTION INTRAVENOUS CONTINUOUS
Status: DISCONTINUED | OUTPATIENT
Start: 2020-12-17 | End: 2020-12-20 | Stop reason: HOSPADM

## 2020-12-17 RX ORDER — CIPROFLOXACIN 2 MG/ML
400 INJECTION, SOLUTION INTRAVENOUS EVERY 12 HOURS
Status: CANCELLED | OUTPATIENT
Start: 2020-12-17

## 2020-12-17 RX ORDER — CLOPIDOGREL BISULFATE 75 MG/1
75 TABLET ORAL DAILY
Status: DISCONTINUED | OUTPATIENT
Start: 2020-12-17 | End: 2020-12-20 | Stop reason: HOSPADM

## 2020-12-17 RX ORDER — ATORVASTATIN CALCIUM 80 MG/1
80 TABLET, FILM COATED ORAL NIGHTLY
Status: DISCONTINUED | OUTPATIENT
Start: 2020-12-17 | End: 2020-12-20 | Stop reason: HOSPADM

## 2020-12-17 RX ORDER — ONDANSETRON 2 MG/ML
4 INJECTION INTRAMUSCULAR; INTRAVENOUS EVERY 6 HOURS PRN
Status: DISCONTINUED | OUTPATIENT
Start: 2020-12-17 | End: 2020-12-20 | Stop reason: HOSPADM

## 2020-12-17 RX ORDER — DEXTROSE MONOHYDRATE 25 G/50ML
12.5 INJECTION, SOLUTION INTRAVENOUS PRN
Status: DISCONTINUED | OUTPATIENT
Start: 2020-12-17 | End: 2020-12-20 | Stop reason: HOSPADM

## 2020-12-17 RX ORDER — PEG-3350, SODIUM SULFATE, SODIUM CHLORIDE, POTASSIUM CHLORIDE, SODIUM ASCORBATE AND ASCORBIC ACID 7.5-2.691G
100 KIT ORAL ONCE
Status: COMPLETED | OUTPATIENT
Start: 2020-12-17 | End: 2020-12-17

## 2020-12-17 RX ORDER — SODIUM CHLORIDE 0.9 % (FLUSH) 0.9 %
10 SYRINGE (ML) INJECTION PRN
Status: DISCONTINUED | OUTPATIENT
Start: 2020-12-17 | End: 2020-12-20 | Stop reason: HOSPADM

## 2020-12-17 RX ORDER — DEXTROSE MONOHYDRATE 50 MG/ML
100 INJECTION, SOLUTION INTRAVENOUS PRN
Status: DISCONTINUED | OUTPATIENT
Start: 2020-12-17 | End: 2020-12-20 | Stop reason: HOSPADM

## 2020-12-17 RX ADMIN — CLOPIDOGREL BISULFATE 75 MG: 75 TABLET ORAL at 10:22

## 2020-12-17 RX ADMIN — PIPERACILLIN AND TAZOBACTAM 3.38 G: 3; .375 INJECTION, POWDER, LYOPHILIZED, FOR SOLUTION INTRAVENOUS at 12:51

## 2020-12-17 RX ADMIN — SODIUM CHLORIDE 250 ML: 9 INJECTION, SOLUTION INTRAVENOUS at 06:14

## 2020-12-17 RX ADMIN — IOPAMIDOL 100 ML: 612 INJECTION, SOLUTION INTRAVENOUS at 03:07

## 2020-12-17 RX ADMIN — Medication 10 ML: at 09:00

## 2020-12-17 RX ADMIN — NITROGLYCERIN 0.5 INCH: 20 OINTMENT TOPICAL at 12:56

## 2020-12-17 RX ADMIN — NITROGLYCERIN 0.5 INCH: 20 OINTMENT TOPICAL at 17:38

## 2020-12-17 RX ADMIN — SODIUM CHLORIDE 1000 ML: 9 INJECTION, SOLUTION INTRAVENOUS at 01:49

## 2020-12-17 RX ADMIN — ATORVASTATIN CALCIUM 80 MG: 80 TABLET, FILM COATED ORAL at 20:22

## 2020-12-17 RX ADMIN — SODIUM CHLORIDE: 9 INJECTION, SOLUTION INTRAVENOUS at 08:55

## 2020-12-17 RX ADMIN — POLYETHYLENE GLYCOL 3350, SODIUM SULFATE, SODIUM CHLORIDE, POTASSIUM CHLORIDE, ASCORBIC ACID, SODIUM ASCORBATE 100 G: KIT at 10:22

## 2020-12-17 RX ADMIN — SODIUM CHLORIDE 1000 ML: 9 INJECTION, SOLUTION INTRAVENOUS at 06:15

## 2020-12-17 RX ADMIN — PIPERACILLIN AND TAZOBACTAM 3.38 G: 3; .375 INJECTION, POWDER, LYOPHILIZED, FOR SOLUTION INTRAVENOUS at 03:52

## 2020-12-17 RX ADMIN — PIPERACILLIN AND TAZOBACTAM 3.38 G: 3; .375 INJECTION, POWDER, LYOPHILIZED, FOR SOLUTION INTRAVENOUS at 20:22

## 2020-12-17 SDOH — HEALTH STABILITY: MENTAL HEALTH: HOW OFTEN DO YOU HAVE A DRINK CONTAINING ALCOHOL?: NEVER

## 2020-12-17 ASSESSMENT — ENCOUNTER SYMPTOMS
WHEEZING: 0
BLOOD IN STOOL: 0
STRIDOR: 0
ABDOMINAL PAIN: 1
COLOR CHANGE: 0
PHOTOPHOBIA: 0
SORE THROAT: 0
BLOOD IN STOOL: 1
NAUSEA: 0
RECTAL PAIN: 1
CONSTIPATION: 1
EYES NEGATIVE: 1
BACK PAIN: 0
DIARRHEA: 0
SHORTNESS OF BREATH: 0
VOMITING: 0
RHINORRHEA: 0
ABDOMINAL PAIN: 0
RESPIRATORY NEGATIVE: 1
COUGH: 0
CHEST TIGHTNESS: 0

## 2020-12-17 ASSESSMENT — PAIN DESCRIPTION - LOCATION: LOCATION: ABDOMEN

## 2020-12-17 ASSESSMENT — PAIN SCALES - GENERAL: PAINLEVEL_OUTOF10: 1

## 2020-12-17 ASSESSMENT — PAIN DESCRIPTION - DESCRIPTORS: DESCRIPTORS: ACHING

## 2020-12-17 ASSESSMENT — PAIN DESCRIPTION - PAIN TYPE: TYPE: ACUTE PAIN

## 2020-12-17 ASSESSMENT — PAIN DESCRIPTION - ORIENTATION: ORIENTATION: LOWER

## 2020-12-17 ASSESSMENT — PAIN DESCRIPTION - FREQUENCY: FREQUENCY: CONTINUOUS

## 2020-12-17 NOTE — PROGRESS NOTES
Physician Progress Note      PATIENTTajl Stager  CSN #:                  [id number]  :                       1950  ADMIT DATE:       2020 1:43 AM  DISCH DATE:  RESPONDING  PROVIDER #:        Rasheed Avendaño MD          QUERY TEXT:    Pt admitted with lower GI bleed. Pt noted to have drop in H&H. If possible,   please document in the progress notes and discharge summary if you are   evaluating and/or treating any of the following: The medical record reflects the following:  Risk Factors: lower GI bleed, 2500 ml NS bolus given  Clinical Indicators: H&H 15.8/47.1  11.7/34.1  prior H 15-16  H&P-lower GI bleed. large amount of blood loss. Drop by 4 g hemoglobin. Patient started having multiple episodes of GI bleed. He had he lost over 700   mL of blood at home  Treatment: 2U PRBCs, H&H post transfusion and q 6hrs, GI consult, IVF    Thank you, Saad Heaton RN BSN [facility]  [phone]  Options provided:  -- Acute blood loss anemia  -- Precipitous drop in Hemoglobin and Hematocrit  -- Other - I will add my own diagnosis  -- Disagree - Not applicable / Not valid  -- Disagree - Clinically unable to determine / Unknown  -- Refer to Clinical Documentation Reviewer    PROVIDER RESPONSE TEXT:    This patient has acute blood loss anemia.     Query created by: Hai Gamez on 2020 9:05 AM      Electronically signed by:  Rasheed Avendaño MD 2020 2:02 PM

## 2020-12-17 NOTE — ED NOTES
Pt reports he is harris blood thinner and was just DCd from this hospital yesterday s/p MI with stent placement. Pt reports intermittent lower abd pain/cramping.  States he has had this rectal bleeding for the past 4 hours but it got worse in the last 30 mins     Selena Velasquez RN  12/17/20 [id number]

## 2020-12-17 NOTE — FLOWSHEET NOTE
Patient awake and resting in bed; Movi prep started, as per ordered per Kristine NP. BSC placed at bedside. Lungs clear. No edema. Alert and oriented x4. Had a 1 unit of blood that was started per ER-dept. Will look for post lab transfusion H & H. No bleeding noted so far. Dr Nelson Powell visited. Tele. [id number].

## 2020-12-17 NOTE — ED NOTES
Pt cleansed with soap and water, new bed linens and gown provided. Pt assisted to position of comfort. Call bell in reach.      Dinorah Lackey RN  12/17/20 [id number]

## 2020-12-17 NOTE — ED PROVIDER NOTES
[address] [facility] ED  eMERGENCY dEPARTMENT eNCOUnter      Pt Name: Rafi Castañeda  MRN: [id number]  [place] 1950  Date of evaluation: 12/17/2020  Provider: ALLIE Camara      HISTORY OF PRESENT ILLNESS    Rafi Castañeda is a 65 y.o. male with PMHx of COPD, diabetes, emphysema, NSTEMI, hyperlipidemia, hypertension, diverticulitis, vertigo presents to the emergency department with rectal bleeding. Pt had a NSTEMI 12/11 with cardiac catheterization with angioplasty of the right coronary artery, complicated. Patient was placed on aspirin and Brilinta. He states he has been constipated for the past couple days and 6 hours ago he was straining with a bowel movement started with bright red rectal bleeding. This has occurred 4 times since intermittently. He comes to the ER with continuous bleeding for 10 minutes. ~700cc blood loss ambulance noted on scene with 2 inch clot noted on pad with patient arrival.  Patient is lightheaded. He states he does have a history of internal hemorrhoids, has never had bleeding like this in the past, last colonoscopy 8 years ago. [place]. He denies any other bleeding sites. He has had lower abdominal cramping throughout the day. He denies fevers, cough, chest pain, shortness of breath, nausea, vomiting, diarrhea, urinary symptoms, hematuria. No rectal pain. No trauma to rectal area. HPI    Nursing Notes were reviewed. REVIEW OF SYSTEMS       Review of Systems   Constitutional: Negative for appetite change, chills and fever. HENT: Negative for congestion, rhinorrhea and sore throat. Respiratory: Negative for cough and shortness of breath. Cardiovascular: Negative for chest pain. Gastrointestinal: Positive for abdominal pain and rectal pain. Negative for blood in stool, diarrhea, nausea and vomiting. Genitourinary: Negative for difficulty urinating. Musculoskeletal: Negative for neck stiffness.    Skin: Negative for color change and rash.   Neurological: Positive for light-headedness. Negative for dizziness, syncope, weakness, numbness and headaches. All other systems reviewed and are negative. PAST MEDICAL HISTORY     Past Medical History:   Diagnosis Date    [facility]     COPD [facility] obstructive [facility]     Diabetes mellitus [facility]     Diverticulitis     Em[facility] lung ([facility]     Heart attack ([facility]     Hyperlipidemia     Hypertension     Meniere's disease     Vertigo          SURGICAL HISTORY     History reviewed. No pertinent surgical history. CURRENT MEDICATIONS       Previous Medications    ASPIRIN 81 MG CHEWABLE TABLET    Take 1 tablet by mouth daily    ATORVASTATIN (LIPITOR) 40 MG TABLET    Take 1 tablet by mouth nightly    DILTIAZEM (CARDIZEM CD) 120 MG EXTENDED RELEASE CAPSULE    Take 120 mg by mouth daily    EMPAGLIFLOZIN (JARDIANCE) 10 MG TABLET    Take 10 mg by mouth daily (with breakfast)    LOSARTAN (COZAAR) 25 MG TABLET    Take 1 tablet by mouth 2 times daily    METFORMIN (GLUCOPHAGE) 500 MG TABLET    Take 500 mg by mouth 2 times daily    METOPROLOL TARTRATE (LOPRESSOR) 25 MG TABLET    Take 1 tablet by mouth 2 times daily    NITROGLYCERIN (NITROSTAT) 0.4 MG SL TABLET    up to max of 3 total doses. If no relief after 1 dose, call 911. OMEPRAZOLE (PRILOSEC) 20 MG DELAYED RELEASE CAPSULE    Take 20 mg by mouth 2 times daily    PAROXETINE (PAXIL) 40 MG TABLET    Take 40 mg by mouth daily    TICAGRELOR (BRILINTA) 90 MG TABS TABLET    Take 1 tablet by mouth 2 times daily       ALLERGIES     Patient has no known allergies. FAMILY HISTORY     History reviewed. No pertinent family history.        SOCIAL HISTORY       Social History     Socioeconomic History    Marital status:      Spouse name: None    Number of children: None    Years of education: None    Highest education level: None   Occupational History    None   Social Needs    Financial resource strain: None    Food insecurity     Worry: None     Inability: None    Transportation needs     Medical: None     Non-medical: None   Tobacco Use    Smoking status: Current Every Day Smoker    Smokeless tobacco: Never Used   Substance and Sexual Activity    Alcohol use: Never     Frequency: Never    Drug use: Never    Sexual activity: Never   Lifestyle    Physical activity     Days per week: None     Minutes per session: None    Stress: None   Relationships    Social connections     Talks on phone: None     Gets together: None     Attends Hinduism service: None     Active member of club or organization: None     Attends meetings of clubs or organizations: None     Relationship status: None    Intimate partner violence     Fear of current or ex partner: None     Emotionally abused: None     Physically abused: None     Forced sexual activity: None   Other Topics Concern    None   Social History Narrative    None         PHYSICAL EXAM         ED Triage Vitals   BP Temp Temp Source Pulse Resp SpO2 Height Weight   12/17/20 0143 12/17/20 0143 12/17/20 0143 12/17/20 0143 12/17/20 0139 12/17/20 0143 12/17/20 0139 12/17/20 0139   123/72 98.1 °F (36.7 °C) Oral 69 16 98 % 5' 6\" (1.676 m) 170 lb (77.1 kg)       Physical Exam  Constitutional:       Appearance: He is well-developed. HENT:      Head: Normocephalic and atraumatic. Eyes:      Conjunctiva/sclera: Conjunctivae normal.      Pupils: Pupils are equal, round, and reactive to light. Neck:      Musculoskeletal: Normal range of motion and neck supple. Trachea: No tracheal deviation. Cardiovascular:      Heart sounds: Normal heart sounds. Pulmonary:      Effort: Pulmonary effort is normal. No respiratory distress. Breath sounds: Normal breath sounds. No stridor. Abdominal:      General: Bowel sounds are normal. There is no distension. Palpations: Abdomen is soft. There is no mass. Tenderness: There is abdominal tenderness.  There is no guarding or rebound. Comments: Patient does have mild LLQ and RLQ pain and moderate suprapubic abdominal TTP. Patient does have a 4 inch area of ecchymosis to left side of abdomen. abdomen soft and nondistended, no rebound or rigidity, no pulsatile mass or bruit. Denies falls or trauma to abdomen. Genitourinary:     Rectum: Guaiac result positive. Comments: 2 inch size clot noted on pad with no active bleeding. BRB noted on legs and around rectum. No external hemorrhoids seen. No internal hemorrhoids palpated. Musculoskeletal: Normal range of motion. Skin:     General: Skin is warm and dry. Capillary Refill: Capillary refill takes less than 2 seconds. Coloration: Skin is pale. Findings: No rash. Neurological:      Mental Status: He is alert and oriented to person, place, and time. Deep Tendon Reflexes: Reflexes are normal and symmetric. Psychiatric:         Behavior: Behavior normal.         Thought Content:  Thought content normal.         Judgment: Judgment normal.         DIAGNOSTIC RESULTS     EKG:All EKG's are interpreted by the Emergency Department Physician who either signs or Co-signs this chart in the absence of a cardiologist.        RADIOLOGY:   Non-plain film images such as CT, Ultrasound and MRI are read by theradiologist. Plain radiographic images are visualized and preliminarily interpreted by the emergency physician with the below findings:    Interpretation per theRadiologist below, if available at the time of this note:    CT ABDOMEN PELVIS W IV CONTRAST Additional Contrast? None    (Results Pending)           LABS:  Labs Reviewed   COMPREHENSIVE METABOLIC PANEL - Abnormal; Notable for the following components:       Result Value    Sodium 134 (*)     Glucose 176 (*)     Total Protein 6.1 (*)     Globulin 1.9 (*)     All other components within normal limits   CBC WITH AUTO DIFFERENTIAL - Abnormal; Notable for the following components:    WBC 12.0 (*) RDW 15.5 (*)     Neutrophils Absolute 8.8 (*)     All other components within normal limits   PROTIME-INR - Abnormal; Notable for the following components:    Protime 11.8 (*)     All other components within normal limits   POCT CREATININE - URINE - Normal   APTT   COVID-19   HEMOGLOBIN AND HEMATOCRIT, BLOOD   TROPONIN   TYPE AND SCREEN   ABO/RH       All other labs were within normal range or not returned as of this dictation. EMERGENCY DEPARTMENT COURSE and DIFFERENTIAL DIAGNOSIS/MDM:   Vitals:    Vitals:    12/17/20 0330 12/17/20 0400 12/17/20 0415 12/17/20 0430   BP: 117/78 (!) 109/55  99/62   Pulse: 62 65 64 68   Resp: 17 14 11 10   Temp:       TempSrc:       SpO2: 99% 100% 98% 98%   Weight:       Height:             MDM    Sodium 134, WBC 12.0. H&H stable and within normal limits, hemoglobin 15.8, hematocrit 47.1. Patient's vital signs at this time have remained within normal limits and hemodynamically stable. CT of abdomen shows colonic diverticulosis with mild sigmoid wall thickening and surrounding fat stranding, correlating with acute diverticulitis. Hyperdense material within the sigmoid at the abnormal segment may represent active bleeding. No free air or abscess. Irregular plaque/thrombus especially in upper abdominal aorta. Patient while in the ER has had minimal active bleeding. He has had 2 minimal episodes of small dime size clots. He was started on Zosyn. Admitted to the hospital.  Pt stood up and walked to Rm #3 to use the bathroom to have a BM. He was being walked to the wheelchair and felt nauseous and had a syncopal episode. Assisted down with RNs without injuries. Rectal bleeding began with BRB ~500cc with small clots as well. He was unresponsive with pulse present. Unresponsive for ~3 minutes without seizure like activity and then arousable. Pt placed on backboard and placed in bed with ~200cc bleeding occurring again.  EKG shows normal sinus rhythm, rate 60, normal intervals, no ST segment changes. 1L IVF started and H&H repeated. Hemoglobin has dropped 4 points to 11.7. 1 unit of PRBCs ordered. Pt hemodynamically stable at this time. No further bleeding since syncopal episode. Pt does have elevated troponin (ordered after syncope) but 0.4 5 days ago and it has decreased from then. EKG shows no acute changes. CRITICAL CARE TIME   Total Critical Caretime was 0 minutes, excluding separately reportable procedures. There was a high probability of clinically significant/life threatening deterioration in the patient's condition which required my urgent intervention. Procedures    FINAL IMPRESSION      1. Diverticulitis of colon    2. Lower GI bleed    3. Syncope and collapse          DISPOSITION/PLAN   DISPOSITION Admitted 12/17/2020 03:53:03 AM      PATIENT REFERRED TO:  No follow-up provider specified. DISCHARGE MEDICATIONS:  New Prescriptions    No medications on file          (Please notethat portions of this note were completed with a voice recognition program.  Efforts were made to edit the dictations but occasionally words are mis-transcribed. )    ALLIE Brizuela (electronically signed)  Emergency Physician Assistant         ALLIE Willard  12/17/20 0600

## 2020-12-17 NOTE — CONSULTS
Inpatient consult to Cardiology  Consult performed by: Brynn Henning MD  Consult ordered by: Casey Wilburn RN, NP          Patient Name: Amanda Ag Date: 2020  1:43 AM  MR #: [id number]  : 1950    Attending Physician: Skyla Benjamin MD  Reason for consult: CAD GIB    History of Presenting Illness:      Aparna Mao is a 65 y.o. male on hospital day 0 with a history of . History Obtained From:  patient, electronic medical record    Pt has RED RCA for NSTEMI on 2020. Last PM had Torito colored large BM with clots. Had significant abd cramping. In the ER had another large bloody stool and had a syncopal spell. Hb dropped 4g. He denies CP nor SOB. Has been taking all his meds to include DAPT with Brilinta. Trops elevated and it is related to recent NSTEMI    Stopped smoking 7 days ago. History:      EKG: SR  Past Medical History:   Diagnosis Date    Cancer ([address]     skin left neck    Chronic bronchitis (HCC)     COPD (chronic obstructive pulmonary disease) (HCC)     Diabetes mellitus (HCC)     Diverticulitis     Emphysema of lung ([address]     Heart attack ([facility]     History of blood transfusion     Hyperlipidemia     Hypertension     Meniere's disease     Vertigo      Past Surgical History:   Procedure Laterality Date    CARDIAC SURGERY      stent    SKIN BIOPSY      left neck       Family History  History reviewed. No pertinent family history.   [] Unable to obtain due to ventilated and/ or neurologic status    Social History     Socioeconomic History    Marital status:      Spouse name: Not on file    Number of children: Not on file    Years of education: Not on file    Highest education level: Not on file   Occupational History    Not on file   Social Needs    Financial resource strain: Not on file    Food insecurity     Worry: Not on file     Inability: Not on file    Transportation needs     Medical: Not on file     Non-medical: Not on file Tobacco Use    Smoking status: Former Smoker     Packs/day: 1.00     Years: 40.00     Pack years: 40.00     Types: Cigars     Quit date: 12/10/2020     Years since quittin.0    Smokeless tobacco: Never Used   Substance and Sexual Activity    Alcohol use: Never     Frequency: Never    Drug use: Never    Sexual activity: Never   Lifestyle    Physical activity     Days per week: Not on file     Minutes per session: Not on file    Stress: Not on file   Relationships    Social connections     Talks on phone: Not on file     Gets together: Not on file     Attends Buddhism service: Not on file     Active member of club or organization: Not on file     Attends meetings of clubs or organizations: Not on file     Relationship status: Not on file    Intimate partner violence     Fear of current or ex partner: Not on file     Emotionally abused: Not on file     Physically abused: Not on file     Forced sexual activity: Not on file   Other Topics Concern    Not on file   Social History Narrative    Not on file      [] Unable to obtain due to ventilated and/ or neurologic status      Home Medications:      Medications Prior to Admission: aspirin 81 MG chewable tablet, Take 1 tablet by mouth daily  nitroGLYCERIN (NITROSTAT) 0.4 MG SL tablet, up to max of 3 total doses.  If no relief after 1 dose, call 911.  atorvastatin (LIPITOR) 40 MG tablet, Take 1 tablet by mouth nightly  metoprolol tartrate (LOPRESSOR) 25 MG tablet, Take 1 tablet by mouth 2 times daily  ticagrelor (BRILINTA) 90 MG TABS tablet, Take 1 tablet by mouth 2 times daily  losartan (COZAAR) 25 MG tablet, Take 1 tablet by mouth 2 times daily (Patient taking differently: Take 25 mg by mouth daily )  PARoxetine (PAXIL) 40 MG tablet, Take 40 mg by mouth daily  dilTIAZem (CARDIZEM CD) 120 MG extended release capsule, Take 120 mg by mouth daily  metFORMIN (GLUCOPHAGE) 500 MG tablet, Take 500 mg by mouth 2 times daily  empagliflozin (JARDIANCE) 10 MG tablet, Take 10 mg by mouth daily (with breakfast)  omeprazole (PRILOSEC) 20 MG delayed release capsule, Take 20 mg by mouth 2 times daily    Current Hospital Medications:     Scheduled Meds:   sodium chloride flush  10 mL Intravenous 2 times per day    piperacillin-tazobactam  3.375 g Intravenous Q8H    insulin lispro  0-12 Units Subcutaneous Q4H    PEG-KCl-NaCl-NaSulf-Na Asc-C  100 g Oral Once    nitroglycerin  0.5 inch Topical 4 times per day    clopidogrel  75 mg Oral Daily     Continuous Infusions:   sodium chloride      sodium chloride 100 mL/hr at 12/17/20 0855    dextrose       PRN Meds:.sodium chloride flush, promethazine **OR** ondansetron, acetaminophen **OR** acetaminophen, glucose, dextrose, glucagon (rDNA), dextrose  .  sodium chloride      sodium chloride 100 mL/hr at 12/17/20 0855    dextrose          Allergies:     No Known Allergies     Review of Systems:       Review of Systems   Constitutional: Negative. Negative for diaphoresis and fatigue. HENT: Negative. Eyes: Negative. Respiratory: Negative. Negative for cough, chest tightness, shortness of breath, wheezing and stridor. Cardiovascular: Negative. Negative for chest pain, palpitations and leg swelling. Gastrointestinal: Positive for abdominal pain and blood in stool. Negative for nausea. Genitourinary: Negative. Musculoskeletal: Negative. Skin: Negative. Neurological: Negative. Negative for dizziness, syncope, weakness and light-headedness. Hematological: Negative. Psychiatric/Behavioral: Negative. Objective Findings:     Vitals:BP (!) 125/45   Pulse 58   Temp 98.5 °F (36.9 °C)   Resp 16   Ht 5' 6\" (1.676 m)   Wt 170 lb (77.1 kg)   SpO2 100%   BMI 27.44 kg/m²      Physical Examination:    Physical Exam   Constitutional: He appears healthy. No distress. HENT:   Normal cephalic and Atraumatic   Eyes: Pupils are equal, round, and reactive to light.    Neck: Normal range of motion and thyroid normal. Neck supple. No JVD present. No neck adenopathy. No thyromegaly present. Cardiovascular: Normal rate, regular rhythm, normal heart sounds, intact distal pulses and normal pulses. Pulmonary/Chest: Effort normal and breath sounds normal. He has no wheezes. He has no rales. He exhibits no tenderness. Abdominal: Soft. Bowel sounds are normal. There is no abdominal tenderness. Musculoskeletal: Normal range of motion. General: No tenderness or edema. Neurological: He is alert and oriented to person, place, and time. Skin: Skin is warm. No cyanosis. Nails show no clubbing. Results/ Medications reviewed 12/17/2020, 9:40 AM     Laboratory, Microbiology, Pathology, Radiology, Cardiology, Medications and Transcriptions reviewed  Scheduled Meds:   sodium chloride flush  10 mL Intravenous 2 times per day    piperacillin-tazobactam  3.375 g Intravenous Q8H    insulin lispro  0-12 Units Subcutaneous Q4H    PEG-KCl-NaCl-NaSulf-Na Asc-C  100 g Oral Once    nitroglycerin  0.5 inch Topical 4 times per day    clopidogrel  75 mg Oral Daily     Continuous Infusions:   sodium chloride      sodium chloride 100 mL/hr at 12/17/20 0855    dextrose         Recent Labs     12/17/20  0200 12/17/20  0500   WBC 12.0*  --    HGB 15.8 11.7*   HCT 47.1 34.1*   MCV 93.3  --      --      Recent Labs     12/17/20  0145 12/17/20  0151   *  --    K 4.5  --    CL 99  --    CO2 24  --    BUN 15  --    CREATININE 1.07 1.1     Recent Labs     12/17/20  0145   AST 13   ALT 12   BILITOT 0.5   ALKPHOS 96     No results for input(s): LIPASE, AMYLASE in the last 72 hours.   Recent Labs     12/17/20  0145   PROT 6.1*   INR 0.9     Echocardiogram Complete 2d With Doppler With Color    Result Date: 12/15/2020  Transthoracic Echocardiography Report (TTE)  Demographics  Patient Name    Heriberto Barajas Gender                Male  Patient Number  [id number]      Race                   Ethnicity  Visit Number    [id number]     Room Number           W188  Corporate ID                  Date of Study         12/14/2020  Accession       [id number]    Referring Physician  Number  Date of Birth   1950    Sonographer           Hannah Mejia ADIELMIGEL  Age             65 year(s)    [address] [place]                                Physician             Cardiology                                                      Holiday Carmine CODY DO Procedure Type of Study  TTE procedure:ECHO COMPLETE 2D W/DOP W/COLOR. Procedure Date Date: 12/14/2020 Start: 11:29 AM Study Location: Portable Technical Quality: Adequate visualization Patient Status: Routine Height: 66 inches Weight: 141 pounds BSA: 1.72 m^2 BMI: 22.76 kg/m^2 BP: 131/61 mmHg  Conclusions  Summary  Normal left ventricular systolic function, no regional wall motion  abnormalities, estimated ejection fraction of 60%. Normal left ventricular size and function. Normal left ventricular wall thickness. No evidence of mitral regurgitation. No evidence of mitral valve stenosis. Mild aortic regurgitation is noted. No evidence of aortic valve stenosis. There is mild tricuspid regurgitation with estimated RVSP of 43 mm Hg. Right ventricular systolic pressure of 43 mm Hg consistent with mild  pulmonary hypertension. Signature  ----------------------------------------------------------------  Electronically signed by Francisco Victor DO(Interpreting  physician) on 12/15/2020 05:50 PM  ----------------------------------------------------------------  Findings Left Ventricle Normal left ventricular systolic function, no regional wall motion abnormalities, estimated ejection fraction of 60%. Normal left ventricular size and function. Normal left ventricular wall thickness. Right Ventricle Normal right ventricle structure and function. Right ventricular systolic pressure of 43 mm Hg consistent with mild pulmonary hypertension.  Left Atrium Normal left atrium. Right Atrium Normal right atrium. Mitral Valve Diffusely thickened and pliable mitral valve leaflets with normal excursion. No evidence of mitral regurgitation. No evidence of mitral valve stenosis. Tricuspid Valve Normal tricuspid valve structure and function. There is mild tricuspid regurgitation with estimated RVSP of 43 mm Hg. Aortic Valve Sclerotic, trileaflet aortic valve with diffusely thickened leaflets with normal cusp separation and excursion. Mild aortic regurgitation is noted. No evidence of aortic valve stenosis. Pulmonic Valve Normal pulmonic valve structure and function. Pericardial Effusion No evidence of pericardial effusion. Pleural Effusion No evidence of pleural effusion. Aorta \ Miscellaneous Miscellaneous normal findings were found. M-Mode Measurements (cm)  LVIDd: 4.25 cm                         LVIDs: 3.4 cm  IVSd: 1.06 cm                          IVSs: 1.36 cm  LVPWd: 1.16 cm                         LVPWs: 1.28 cm  Rt. Vent.  Dimension: 2.51 cm           AO Root Dimension: 3.51 cm                                         LA: 3.04 cm                                         LVOT: 2.13 cm Doppler Measurements:  AV Velocity:0.04 m/s                    MV Peak E-Wave: 0.52 m/s  AV Peak Gradient: 4.81 mmHg             MV Peak A-Wave: 0.73 m/s  AV Mean Gradient: 1.98 mmHg  AV Area (Continuity):3.79 cm^2  TR Velocity:3.17 m/s                    Estimated RAP:3 mmHg  TR Gradient:40.29 mmHg                  RVSP:43.29 mmHg Valves  Mitral Valve  Peak E-Wave: 0.52 m/s             Peak A-Wave: 0.73 m/s  Mean Velocity: 0.24 m/s           E/A Ratio: 0.72  Mean Gradient: 0.37 mmHg          Peak Gradient: 1.09 mmHg                                    Deceleration Time: 277.9 msec                                    Area (continuity): 5.21 cm^2  Aortic Valve  Peak Velocity: 1.1 m/s                 Mean Velocity: 0.65 m/s  Peak Gradient: 4.81 mmHg               Mean Gradient: 1.98 mmHg  Area created. All images were reviewed and primarily archived to PACS workstation. All CT scans at this facility use dose modulation, iterative reconstruction, and/or weight based dosing when appropriate to reduce radiation dose to as low as reasonably achievable. NASCET Criteria were utilized FINDINGS Aortic Arch: The visualized portions of the aortic arch and proximal arch vessels demonstrates no focal stenosis aneurysm or dissection. Carotid:   Right  Carotid Stenosis (% by NASCET Criteria): There is no hemodynamically significant stenosis, vascular dissection or aneurysm of the right common or internal carotid arteries. Left  Carotid Stenosis (% by NASCET Criteria): There is no hemodynamically significant stenosis, vascular dissection or aneurysm in the left common or internal carotid arteries. Cervical Vertebral Arteries:    Patency: The vertebral arteries are well visualized to the level of the basilar artery. There is no focal stenosis aneurysm or dissection. Vertebral arteries are codominant. Negative CTA of the neck. Cta Chest W Wo  (pe Study)    Result Date: 11/28/2020  EXAM: CTA CHEST W WO CONTRAST History: Chest pain Technique: Multiple contiguous axial images of the chest were obtained from the thoracic inlet through the upper abdomen with contrast. Multiplanar reformats including maximum intensity projection images were obtained. Comparison: None available Findings: Visualized portion of the thyroid gland is within normal limits. No axillary, mediastinal, or hilar lymphadenopathy. No thoracic aortic aneurysm or dissection. Atherosclerotic disease of the thoracic aorta. Heart size is within normal limits. Coronary artery calcifications. No significant pericardial effusion. Esophagus is within normal limits. No pulmonary nodule or mass. No consolidation, pleural effusion, or pneumothorax. Moderate emphysema of the bilateral upper lobes. Calcified granuloma of the right lower lobe.  No acute osseous abnormality. Atherosclerotic calcification of the visualized upper abdomen resulting in approximately 35% stenosis. Visualized branch vessels of the upper abdominal aorta are patent. Visualized upper abdomen demonstrates no acute abnormality. No pulmonary embolism or acute intrathoracic process. Moderate emphysema of the bilateral upper lobes. Evidence of coronary artery disease. All CT scans at this facility use dose modulation, iterative reconstruction, and/or weight based dosing when appropriate to reduce radiation dose to as low as reasonably achievable. Xr Chest Portable    Result Date: 12/11/2020  Exam: XR CHEST PORTABLE History:  pain Technique: AP portable view of the chest obtained. Comparison: None Chest x-ray portable Findings: The cardiomediastinal silhouette is within normal limits. There are no infiltrates, consolidations or effusions. Bones of the thorax appear intact. No radiographic evidence of acute intrathoracic process. Active Hospital Problems    Diagnosis Date Noted    Rectal bleed [K62.5] 12/17/2020     Priority: Low    Diverticulitis [K57.92] 12/17/2020     Priority: Low    Type 2 diabetes mellitus, without long-term current use of insulin ([facility] [E11.9] 12/17/2020     Priority: Low    HTN (hypertension) [I10] 12/17/2020     Priority: Low    Other hyperlipidemia [E78.49] 12/17/2020     Priority: Low    Anxiety [F41.9] 12/17/2020     Priority: Low    AMI (acute myocardial infarction) ([facility] [I21.9] 12/12/2020     Priority: Low         Impression/Plan:   1. LGIB - will need Colonoscopy. Stop ASA and Brilinta but give Plavix. Spoke with GI this am.   2. Recent NSTEMI and RCA RED- Plavix. Nitrate and Statin. Hold BB - baseline Bradycardia. 3. HTN Stable  4. HPL- Statin   5. LVEF 60%      Thank you for allowing us to participate in the care of this patient. Will continue to follow. Please call if questions or concerns arise.     Electronically signed

## 2020-12-17 NOTE — CARE COORDINATION
[facility] Case Management Initial Discharge Assessment    Met with patient to discuss discharge plan. PCP: Ovi Parra MD                                Date of Last Visit: 9/21/20    If no PCP, list provided? N/A    Discharge Planning    Living Arrangements: independently at home    Who do you live with? Girlfriend    Who helps you with your care:  self    If lives at home:     Do you have any barriers navigating in your home? no    Patient can perform ADL? Yes    Current Services (outpatient and in home) :  None    Dialysis: No    Is transportation available to get to your appointments? Yes    DME Equipment:  no    Respiratory equipment: None    Respiratory provider:  no     Pharmacy:  yes - [facility] [address] with Medication Assistance Program?  No      Patient agreeable to [employer]? Yes, [facility]    Patient agreeable to SNF/Rehab? N/A    Other discharge needs identified? N/A    Freedom of choice list provided with basic dialogue that supports the patient's individualized plan of care/goals and shares the quality data associated with the providers. Yes    Does Patient Have a High-Risk for Readmission Diagnosis (CHF, PN, MI, COPD)? No    The plan for Transition of Care is related to the following treatment goals:Resolve    Initial Discharge Plan? (Note: please see concurrent daily documentation for any updates after initial note). Pt reports he resides independently at home with his girlfriend. He states he doesn't generally drive due to vertigo, but his girlfriend will get him to his follow up appointments at [facility].     The Patient and/or patient representative: patient was provided with choice of any post-acute providers for care and equipment and agrees with discharge plan  Yes    Electronically signed by YOVANI Dockery on 12/17/2020 at 11:46 AM

## 2020-12-17 NOTE — H&P
[facility] MEDICINE    HISTORY AND PHYSICAL EXAM    PATIENT NAME:  Rafi Castañeda    MRN:  [id number]  SERVICE DATE:  12/17/2020   SERVICE TIME:  5:40 AM    Primary Care Physician: Kevin Light MD         SUBJECTIVE  CHIEF COMPLAINT: Rectal bleeding    HPI: Patient being admitted for GI bleed with severe blood loss and diverticulitis. Patient is a 75-year-old male who is alert and oriented x3. Patient reports that he was having a bowel movement today and began to have rectal bleeding. This happened several times today and then he started to have continuous bleeding. He called EMS and the squad reported to the ED that he lost about 700 mL of blood with large clots found. Patient does report that he was constipated for the past few days and was straining while using the restroom just before these events. In addition patient was treated for an acute MI 1 week ago and recently placed on Brilinta. He states that while he was in the hospital he was not able to use the restroom and was constipated. Patient has a history of diverticulitis 2 times previous to today. The abdomen pelvis CT today shows diverticulitis. While in the ED patient had a large bloody bowel movement and became weak and hypotensive. ER staff estimates this blood loss to be 700-800 mL. Estimated total blood loss 1500 mL. Stat H&H was drawn and his hemoglobin dropped from 15.8 on arrival to 11.7 after a bleeding event in the ED. Patient was given a liter of fluid. His blood pressure then stabilized to 145/69. Upon my arrival in examination patient was alert he had a weak. However, he was able to answer all questions appropriately considering the events today and his past medical history. Patient denies any recent fever, cough, nausea, vomiting, or diarrhea. Patient denies chest pain or shortness of breath.   Patient's other past medical history for which he takes medications includes hypertension, hyperlipidemia, DM 2 without insulin, and anxiety. PAST MEDICAL HISTORY:    Past Medical History:   Diagnosis Date    Chronic bronchitis (HCC)     [facility]     Diabetes mellitus ([facility]     Diverticulitis     Emphysema of lung ([facility]     Heart attack ([facility]     Hyperlipidemia     Hypertension     Meniere's disease     Vertigo      PAST SURGICAL HISTORY:  History reviewed. No pertinent surgical history. FAMILY HISTORY:  History reviewed. No pertinent family history.   SOCIAL HISTORY:    Social History     Socioeconomic History    Marital status:      Spouse name: Not on file    Number of children: Not on file    Years of education: Not on file    Highest education level: Not on file   Occupational History    Not on file   Social Needs    Financial resource strain: Not on file    Food insecurity     Worry: Not on file     Inability: Not on file    Transportation needs     Medical: Not on file     Non-medical: Not on file   Tobacco Use    Smoking status: Current Every Day Smoker    Smokeless tobacco: Never Used   Substance and Sexual Activity    Alcohol use: Never     Frequency: Never    Drug use: Never    Sexual activity: Never   Lifestyle    Physical activity     Days per week: Not on file     Minutes per session: Not on file    Stress: Not on file   Relationships    Social connections     Talks on phone: Not on file     Gets together: Not on file     Attends Jewish service: Not on file     Active member of club or organization: Not on file     Attends meetings of clubs or organizations: Not on file     Relationship status: Not on file    Intimate partner violence     Fear of current or ex partner: Not on file     Emotionally abused: Not on file     Physically abused: Not on file     Forced sexual activity: Not on file   Other Topics Concern    Not on file   Social History Narrative    Not on file     MEDICATIONS:   Prior to Admission medications    Medication Sig Start Date End Date Taking? Authorizing Provider   aspirin 81 MG chewable tablet Take 1 tablet by mouth daily 12/16/20   Aldair Root MD   nitroGLYCERIN (NITROSTAT) 0.4 MG SL tablet up to max of 3 total doses. If no relief after 1 dose, call 911. 12/15/20   Aldair Root MD   atorvastatin (LIPITOR) 40 MG tablet Take 1 tablet by mouth nightly 12/15/20   Aldair Root MD   metoprolol tartrate (LOPRESSOR) 25 MG tablet Take 1 tablet by mouth 2 times daily 12/15/20   Aldair Root MD   ticagrelor (BRILINTA) 90 MG TABS tablet Take 1 tablet by mouth 2 times daily 12/15/20   Aldair Root MD   losartan (COZAAR) 25 MG tablet Take 1 tablet by mouth 2 times daily 12/15/20   Aldair Root MD   PARoxetine (PAXIL) 40 MG tablet Take 40 mg by mouth daily 12/7/20 3/7/21  Historical Provider, MD   dilTIAZem (CARDIZEM CD) 120 MG extended release capsule Take 120 mg by mouth daily 12/7/20   Historical Provider, MD   metFORMIN (GLUCOPHAGE) 500 MG tablet Take 500 mg by mouth 2 times daily 10/13/20   Historical Provider, MD   empagliflozin (JARDIANCE) 10 MG tablet Take 10 mg by mouth daily (with breakfast) 9/21/20   Historical Provider, MD   omeprazole (PRILOSEC) 20 MG delayed release capsule Take 20 mg by mouth 2 times daily 11/5/20 11/5/21  Historical Provider, MD       ALLERGIES: Patient has no known allergies. REVIEW OF SYSTEM:   Review of Systems   Constitutional: Negative for activity change, chills, fatigue and fever. HENT: Negative for congestion, ear pain, rhinorrhea and sore throat. Eyes: Negative for photophobia and visual disturbance. Respiratory: Negative for cough, shortness of breath and wheezing. Cardiovascular: Negative for chest pain. Gastrointestinal: Positive for constipation and rectal pain. Negative for abdominal pain ( Denies pain but is tender on palpation during exam), diarrhea, nausea and vomiting. Endocrine: Negative for polydipsia, polyphagia and polyuria.    Genitourinary: Negative for dysuria, flank pain, hematuria and urgency. Musculoskeletal: Negative for back pain, myalgias and neck stiffness. Skin: Negative for rash and wound. Allergic/Immunologic: Negative for immunocompromised state. Neurological: Positive for weakness. Negative for dizziness and headaches. Psychiatric/Behavioral: Negative for behavioral problems and confusion. OBJECTIVE  PHYSICAL EXAM: BP (!) 145/69   Pulse 64   Temp 98.1 °F (36.7 °C) (Oral)   Resp 14   Ht 5' 6\" (1.676 m)   Wt 170 lb (77.1 kg)   SpO2 100%   BMI 27.44 kg/m²     Physical Exam  Vitals signs and nursing note reviewed. Constitutional:       Appearance: He is well-developed. HENT:      Right Ear: External ear normal.      Left Ear: External ear normal.      Nose: Nose normal.   Eyes:      Pupils: Pupils are equal, round, and reactive to light. Neck:      Musculoskeletal: Normal range of motion. Cardiovascular:      Rate and Rhythm: Normal rate and regular rhythm. Pulses: Normal pulses. Heart sounds: Normal heart sounds. Pulmonary:      Effort: Pulmonary effort is normal. No respiratory distress. Breath sounds: Normal breath sounds. No wheezing. Abdominal:      General: Bowel sounds are normal.      Palpations: Abdomen is soft. There is no mass. Tenderness: There is abdominal tenderness ( Worse tenderness in the left lower quadrant.) in the left upper quadrant and left lower quadrant. Musculoskeletal: Normal range of motion. Right lower leg: No edema. Left lower leg: No edema. Lymphadenopathy:      Cervical: No cervical adenopathy. Skin:     General: Skin is warm and dry. Capillary Refill: Capillary refill takes less than 2 seconds. Coloration: Skin is pale. Neurological:      Mental Status: He is alert and oriented to person, place, and time. Deep Tendon Reflexes: Reflexes normal.   Psychiatric:         Thought Content:  Thought content normal.           DATA: Diagnostic tests reviewed for today's visit:    Most recent labs and imaging results reviewed.      LABS:    Recent Results (from the past 24 hour(s))   Comprehensive Metabolic Panel    Collection Time: 12/17/20  1:45 AM   Result Value Ref Range    Sodium 134 (L) 135 - 144 mEq/L    Potassium 4.5 3.4 - 4.9 mEq/L    Chloride 99 95 - 107 mEq/L    CO2 24 20 - 31 mEq/L    Anion Gap 11 9 - 15 mEq/L    Glucose 176 (H) 70 - 99 mg/dL    BUN 15 8 - 23 mg/dL    CREATININE 1.07 0.70 - 1.20 mg/dL    GFR Non-African American >60.0 >60    GFR  >60.0 >60    Calcium 8.8 8.5 - 9.9 mg/dL    Total Protein 6.1 (L) 6.3 - 8.0 g/dL    Alb 4.2 3.5 - 4.6 g/dL    Total Bilirubin 0.5 0.2 - 0.7 mg/dL    Alkaline Phosphatase 96 35 - 104 U/L    ALT 12 0 - 41 U/L    AST 13 0 - 40 U/L    Globulin 1.9 (L) 2.3 - 3.5 g/dL   Protime-INR    Collection Time: 12/17/20  1:45 AM   Result Value Ref Range    Protime 11.8 (L) 12.3 - 14.9 sec    INR 0.9    APTT    Collection Time: 12/17/20  1:45 AM   Result Value Ref Range    aPTT 26.8 24.4 - 36.8 sec   TYPE AND SCREEN    Collection Time: 12/17/20  1:45 AM   Result Value Ref Range    ABO/Rh CANCELED     Antibody Screen NEG    ABO/RH    Collection Time: 12/17/20  1:45 AM   Result Value Ref Range    ABO/Rh O POS    PREPARE RBC (CROSSMATCH), 1 Units    Collection Time: 12/17/20  1:45 AM   Result Value Ref Range    Product Code Blood Bank [id number]     Description Blood Bank Red Blood Cells, Leuko-reduced     Unit Number [id number]     Dispense Status Blood Bank selected    POCT CREATININE    Collection Time: 12/17/20  1:53 AM   Result Value Ref Range    POC CREATININE WHOLE BLOOD 1.1    CBC Auto Differential    Collection Time: 12/17/20  2:00 AM   Result Value Ref Range    WBC 12.0 (H) 4.8 - 10.8 K/uL    RBC 5.04 4.70 - 6.10 M/uL    Hemoglobin 15.8 14.0 - 18.0 g/dL    Hematocrit 47.1 42.0 - 52.0 %    MCV 93.3 80.0 - 100.0 fL    MCH 31.3 27.0 - 31.3 pg    MCHC 33.6 33.0 - 37.0 %    RDW 15.5 (H) 11.5 - 14.5 %    Platelets 411 944 - 975 K/uL    Neutrophils % 72.8 %    Lymphocytes % 17.9 %    Monocytes % 6.4 %    Eosinophils % 1.4 %    Basophils % 1.5 %    Neutrophils Absolute 8.8 (H) 1.4 - 6.5 K/uL    Lymphocytes Absolute 2.2 1.0 - 4.8 K/uL    Monocytes Absolute 0.8 0.2 - 0.8 K/uL    Eosinophils Absolute 0.2 0.0 - 0.7 K/uL    Basophils Absolute 0.2 0.0 - 0.2 K/uL   COVID-19    Collection Time: 12/17/20  4:30 AM   Result Value Ref Range    SARS-CoV-2, NAAT Not Detected Not Detected   Hemoglobin and Hematocrit, Blood    Collection Time: 12/17/20  5:00 AM   Result Value Ref Range    Hemoglobin 11.7 (L) 14.0 - 18.0 g/dL    Hematocrit 34.1 (L) 42.0 - 52.0 %       IMAGING:  No results found. VTE Prophylaxis: Mechanical do to bleeding risk    ASSESSMENT AND PLAN    Principal Problem:  1) Rectal bleed/GI bleed: Bright red blood loss of approximately 1500 mL after constipation and straining to have a bowel movement. H&H dropped from 15.8 down to 11.7. Patient became symptomatic with hypotension, weakness, pale in appearance. Fluids given and blood pressure stabilized in ED. 1 unit RBCs ordered stat. We will monitor H&H and further signs and symptoms of GI bleeding. We will consult GI. Patient will be n.p.o. We will continue IV fluids. 2) Diverticulitis: Abdomen pelvis CT shows diverticulitis. We will consult GI. We will start antibiotics at this time. 3) MI 1 week ago: Status post 1 week MI with stent placement and started on Brilinta. We will consult cardiology. Patient will stay on telemetry monitoring. Active Problems:  4) Type 2 diabetes mellitus, without long-term current use of insulin: Patient on oral medications to control. Patient n.p.o. We will monitor and cover with medium sliding scale insulin AC at bedtime. 5) HTN: Patient on medications to control. We will hold medications until patient is no longer n.p.o.   We will monitor blood pressure regularly and cover with IV medications. 6) Hyperlipidemia: Patient on medications to control. We will hold medications   7) Anxiety: Patient on home medications to control. We will hold medications at this time. If absolutely needed we will medicate IV. Plan of care discussed with: patient    SIGNATURE: Rodolfo Crump RN, NP  DATE: December 17, 2020  TIME: 5:40 AM     ULISES Ruano MD - supervising physician

## 2020-12-17 NOTE — CONSULTS
Consult to Gastroenterology  Consult performed by: Hernandez Man MD  Consult ordered by: Denia Rosales RN, NP          Patient Name: Santhosh Maxwell Date: 2020  1:43 AM  MR #: [id number]  : 1950    Attending Physician: Hector Spivey MD  Reason for consult: Hematochezia     History of Presenting Illness:      Rafi Castañeda is a 65 y.o. male on hospital day 0 with a history of  COPD, HLD, HTN, CAD-with intervention/on DAPT, Diverticulosis, Meniere's disease . History Obtained From:  Patient  Patient reports experiencing hematochezia with clots around 7 PM last night. Does report slight left lower quadrant abdominal pain. No nausea or vomiting. Patient denies melena or hematemesis. Does experience occasional constipation. Does not eat a diet high in fiber. Status post left heart cath on 2020 with PCI to the RCA and drug-eluting stent. Patient is on DAPT. He denies chest pain, shortness of breath, or palpitations. Patient denies any syncopal episodes. No GERD or dysphagia. No weight loss or loss of appetite. No family history of colon cancer  Colonoscopy -diverticulosis in the sigmoid colon, otherwise a normal exam  EGD -normal esophagus, normal examined duodenum. Gastric emptying study 2015-possible rapid gastric emptying. History:      Past Medical History:   Diagnosis Date    Chronic bronchitis (HCC)     COPD (chronic obstructive pulmonary disease) ([address]     Diabetes mellitus ([address]     Diverticulitis     Emphysema of lung ([address]     Heart attack ([address]     Hyperlipidemia     Hypertension     Meniere's disease     Vertigo      History reviewed. No pertinent surgical history. Family History  History reviewed. No pertinent family history.   Social History     Socioeconomic History    Marital status:      Spouse name: Not on file    Number of children: Not on file    Years of education: Not on file    Highest education level: Not on file 2 times daily  empagliflozin (JARDIANCE) 10 MG tablet, Take 10 mg by mouth daily (with breakfast)  omeprazole (PRILOSEC) 20 MG delayed release capsule, Take 20 mg by mouth 2 times daily    Current Hospital Medications:   Scheduled Meds:   sodium chloride flush  10 mL Intravenous 2 times per day    piperacillin-tazobactam  3.375 g Intravenous Q8H    insulin lispro  0-12 Units Subcutaneous Q4H     Continuous Infusions:   sodium chloride      sodium chloride      dextrose       PRN Meds:.sodium chloride flush, promethazine **OR** ondansetron, acetaminophen **OR** acetaminophen, glucose, dextrose, glucagon (rDNA), dextrose   sodium chloride      sodium chloride      dextrose        Allergies:   No Known Allergies   Review of Systems:       [x] CV, Resp, Neuro, , and all other systems reviewed and negative other than listed in HPI. Objective Findings:     Vitals:   Vitals:    12/17/20 0618 12/17/20 0621 12/17/20 0715 12/17/20 0809   BP:  (!) 96/44 (!) 121/48 (!) 125/45   Pulse:  60 57 58   Resp:  14 17 16   Temp: 97.7 °F (36.5 °C)  98.4 °F (36.9 °C) 98.5 °F (36.9 °C)   TempSrc: Temporal  Oral    SpO2:  99% 100%    Weight:       Height:          Physical Examination:  General: No apparent distress, A/O x3  HEENT: Normocephalic, no scleral icterus. Neck: No JVD. Heart: Regular, no murmur, no rub/gallop. No RV heave. Lungs: Clear to ascultation, no rales/wheezing/rhonchi. Good chest wall excursion. Abdomen: Appearance:, Distension -none, Soft , tenderness -none, Bowel sounds normal   Extremities: No clubbing/cyanosis, no edema. Skin: Warm, dry, normal turgor, no rash, no bruise, no petichiae. Neuro: No myoclonus or tremor.    Psych: Normal affect    Results/ Medications reviewed 12/17/2020, 8:20 AM     Laboratory, Microbiology, Pathology, Radiology, Cardiology, Medications and Transcriptions reviewed  Scheduled Meds:   sodium chloride flush  10 mL Intravenous 2 times per day    piperacillin-tazobactam 3.375 g Intravenous Q8H    insulin lispro  0-12 Units Subcutaneous Q4H     Continuous Infusions:   sodium chloride      sodium chloride      dextrose         Recent Labs     12/17/20  0200 12/17/20  0500   WBC 12.0*  --    HGB 15.8 11.7*   HCT 47.1 34.1*   MCV 93.3  --      --      Recent Labs     12/17/20  0145 12/17/20  0151   *  --    K 4.5  --    CL 99  --    CO2 24  --    BUN 15  --    CREATININE 1.07 1.1     Recent Labs     12/17/20  0145   AST 13   ALT 12   BILITOT 0.5   ALKPHOS 96     No results for input(s): LIPASE, AMYLASE in the last 72 hours. Recent Labs     12/17/20  0145   PROT 6.1*   INR 0.9     Xr Chest Portable  Result Date: 12/11/2020  No radiographic evidence of acute intrathoracic process. Impression:   68-year-old male patient admitted with hematochezia and left lower quadrant abdominal pain. He is hemodynamically stable. Baseline hemoglobin 16. Hemoglobin on admission 11.7. Risk factors include DAPT. Status post left heart catheterization on 12/14/2020 with intervention. Patient's last colonoscopy was in 2014 with findings of sigmoid diverticulosis. Patient likely experiencing a diverticular bleed. Recommend bowel prep at this time. If bleeding spontaneously stops will defer colonoscopy. Plan:   -Continue course of care  -Monitor H&H  -Movi prep now  -Okay for clear liquid diet  -Liaise with cardiology and optimize antiplatelet therapy and anticoagulation  Comments: Thank you for allowing us to participate in the care of this patient. Will continue to follow. Please call if questions or concerns arise. Electronically signed by Yordy Mcgowan MD on 12/17/2020 at 8:20 AM    Please note this report has been partially produced using speech recognition software and may cause contain errors related to that system including grammar, punctuation and spelling as well as words and phrases that may seem inappropriate.  If there are questions or concerns please

## 2020-12-17 NOTE — ED NOTES
Bed: 04  Expected date: 12/17/20  Expected time: 1:35 AM  Means of arrival: [facility]  Comments:  66 M  Bleeding from hemorrhoids  MART Paez RN  12/17/20 7807

## 2020-12-18 LAB
ALBUMIN SERPL-MCNC: 2.5 G/DL (ref 3.5–4.6)
ALP BLD-CCNC: 39 U/L (ref 35–104)
ALT SERPL-CCNC: 10 U/L (ref 0–41)
ANION GAP SERPL CALCULATED.3IONS-SCNC: 6 MEQ/L (ref 9–15)
AST SERPL-CCNC: 13 U/L (ref 0–40)
BASOPHILS ABSOLUTE: 0 K/UL (ref 0–0.2)
BASOPHILS RELATIVE PERCENT: 0.7 %
BILIRUB SERPL-MCNC: 0.6 MG/DL (ref 0.2–0.7)
BUN BLDV-MCNC: 6 MG/DL (ref 8–23)
CALCIUM SERPL-MCNC: 7.2 MG/DL (ref 8.5–9.9)
CHLORIDE BLD-SCNC: 112 MEQ/L (ref 95–107)
CO2: 20 MEQ/L (ref 20–31)
CREAT SERPL-MCNC: 0.88 MG/DL (ref 0.7–1.2)
EOSINOPHILS ABSOLUTE: 0.1 K/UL (ref 0–0.7)
EOSINOPHILS RELATIVE PERCENT: 2.8 %
GFR AFRICAN AMERICAN: >60
GFR NON-AFRICAN AMERICAN: >60
GLOBULIN: 1.6 G/DL (ref 2.3–3.5)
GLUCOSE BLD-MCNC: 111 MG/DL (ref 60–115)
GLUCOSE BLD-MCNC: 144 MG/DL (ref 60–115)
GLUCOSE BLD-MCNC: 145 MG/DL (ref 70–99)
GLUCOSE BLD-MCNC: 152 MG/DL (ref 60–115)
GLUCOSE BLD-MCNC: 172 MG/DL (ref 60–115)
HCT VFR BLD CALC: 25.7 % (ref 42–52)
HCT VFR BLD CALC: 26.9 % (ref 42–52)
HCT VFR BLD CALC: 27.2 % (ref 42–52)
HEMOGLOBIN: 8.5 G/DL (ref 14–18)
HEMOGLOBIN: 9 G/DL (ref 14–18)
HEMOGLOBIN: 9.2 G/DL (ref 14–18)
IRON SATURATION: 29 % (ref 11–46)
IRON: 54 UG/DL (ref 59–158)
LYMPHOCYTES ABSOLUTE: 1 K/UL (ref 1–4.8)
LYMPHOCYTES RELATIVE PERCENT: 20.4 %
MCH RBC QN AUTO: 31.7 PG (ref 27–31.3)
MCHC RBC AUTO-ENTMCNC: 34.4 % (ref 33–37)
MCV RBC AUTO: 92.3 FL (ref 80–100)
MONOCYTES ABSOLUTE: 0.4 K/UL (ref 0.2–0.8)
MONOCYTES RELATIVE PERCENT: 9 %
NEUTROPHILS ABSOLUTE: 3.2 K/UL (ref 1.4–6.5)
NEUTROPHILS RELATIVE PERCENT: 67.1 %
PDW BLD-RTO: 15.6 % (ref 11.5–14.5)
PERFORMED ON: ABNORMAL
PERFORMED ON: NORMAL
PLATELET # BLD: 150 K/UL (ref 130–400)
POTASSIUM REFLEX MAGNESIUM: 4.3 MEQ/L (ref 3.4–4.9)
RBC # BLD: 2.91 M/UL (ref 4.7–6.1)
SODIUM BLD-SCNC: 138 MEQ/L (ref 135–144)
TOTAL IRON BINDING CAPACITY: 187 UG/DL (ref 178–450)
TOTAL PROTEIN: 4.1 G/DL (ref 6.3–8)
WBC # BLD: 4.8 K/UL (ref 4.8–10.8)

## 2020-12-18 PROCEDURE — 6370000000 HC RX 637 (ALT 250 FOR IP): Performed by: INTERNAL MEDICINE

## 2020-12-18 PROCEDURE — 36415 COLL VENOUS BLD VENIPUNCTURE: CPT

## 2020-12-18 PROCEDURE — 83540 ASSAY OF IRON: CPT

## 2020-12-18 PROCEDURE — 6360000002 HC RX W HCPCS: Performed by: NURSE PRACTITIONER

## 2020-12-18 PROCEDURE — 85018 HEMOGLOBIN: CPT

## 2020-12-18 PROCEDURE — 80053 COMPREHEN METABOLIC PANEL: CPT

## 2020-12-18 PROCEDURE — 1210000000 HC MED SURG R&B

## 2020-12-18 PROCEDURE — 2580000003 HC RX 258: Performed by: NURSE PRACTITIONER

## 2020-12-18 PROCEDURE — 2700000000 HC OXYGEN THERAPY PER DAY

## 2020-12-18 PROCEDURE — 93010 ELECTROCARDIOGRAM REPORT: CPT | Performed by: INTERNAL MEDICINE

## 2020-12-18 PROCEDURE — 85025 COMPLETE CBC W/AUTO DIFF WBC: CPT

## 2020-12-18 PROCEDURE — 85014 HEMATOCRIT: CPT

## 2020-12-18 PROCEDURE — 83550 IRON BINDING TEST: CPT

## 2020-12-18 PROCEDURE — 2580000003 HC RX 258: Performed by: PERSONAL EMERGENCY RESPONSE ATTENDANT

## 2020-12-18 PROCEDURE — 99232 SBSQ HOSP IP/OBS MODERATE 35: CPT | Performed by: INTERNAL MEDICINE

## 2020-12-18 RX ORDER — CALCIUM CARBONATE 200(500)MG
500 TABLET,CHEWABLE ORAL 3 TIMES DAILY PRN
Status: DISCONTINUED | OUTPATIENT
Start: 2020-12-18 | End: 2020-12-20 | Stop reason: HOSPADM

## 2020-12-18 RX ORDER — PANTOPRAZOLE SODIUM 40 MG/1
40 TABLET, DELAYED RELEASE ORAL
Status: DISCONTINUED | OUTPATIENT
Start: 2020-12-18 | End: 2020-12-20 | Stop reason: HOSPADM

## 2020-12-18 RX ADMIN — Medication 10 ML: at 20:05

## 2020-12-18 RX ADMIN — PIPERACILLIN AND TAZOBACTAM 3.38 G: 3; .375 INJECTION, POWDER, LYOPHILIZED, FOR SOLUTION INTRAVENOUS at 12:20

## 2020-12-18 RX ADMIN — SODIUM CHLORIDE: 9 INJECTION, SOLUTION INTRAVENOUS at 19:32

## 2020-12-18 RX ADMIN — PIPERACILLIN AND TAZOBACTAM 3.38 G: 3; .375 INJECTION, POWDER, LYOPHILIZED, FOR SOLUTION INTRAVENOUS at 03:41

## 2020-12-18 RX ADMIN — PIPERACILLIN AND TAZOBACTAM 3.38 G: 3; .375 INJECTION, POWDER, LYOPHILIZED, FOR SOLUTION INTRAVENOUS at 20:04

## 2020-12-18 RX ADMIN — ANTACID TABLETS 500 MG: 500 TABLET, CHEWABLE ORAL at 20:51

## 2020-12-18 RX ADMIN — CLOPIDOGREL BISULFATE 75 MG: 75 TABLET ORAL at 08:51

## 2020-12-18 RX ADMIN — ATORVASTATIN CALCIUM 80 MG: 80 TABLET, FILM COATED ORAL at 20:04

## 2020-12-18 RX ADMIN — NITROGLYCERIN 0.5 INCH: 20 OINTMENT TOPICAL at 17:00

## 2020-12-18 RX ADMIN — SODIUM CHLORIDE: 9 INJECTION, SOLUTION INTRAVENOUS at 08:54

## 2020-12-18 RX ADMIN — NITROGLYCERIN 0.5 INCH: 20 OINTMENT TOPICAL at 00:17

## 2020-12-18 RX ADMIN — NITROGLYCERIN 0.5 INCH: 20 OINTMENT TOPICAL at 12:20

## 2020-12-18 RX ADMIN — NITROGLYCERIN 0.5 INCH: 20 OINTMENT TOPICAL at 05:28

## 2020-12-18 RX ADMIN — Medication 10 ML: at 08:54

## 2020-12-18 ASSESSMENT — ENCOUNTER SYMPTOMS
EYES NEGATIVE: 1
CHEST TIGHTNESS: 0
SHORTNESS OF BREATH: 0
NAUSEA: 0
COUGH: 0
RESPIRATORY NEGATIVE: 1
BLOOD IN STOOL: 1
STRIDOR: 0
ABDOMINAL PAIN: 1
WHEEZING: 0

## 2020-12-18 NOTE — PROGRESS NOTES
Progress Note  Patient: Jessica Figueroa  Unit/Bed: P064/Z373-50  YOB: 1950  MRN: [id number]  Acct: [de-identified]   Admitting Diagnosis: Rectal bleed [K62.5]  Admit Date:  2020  Hospital Day: 1    Chief Complaint: LGIB CAD HTN     Histories:  Past Medical History:   Diagnosis Date    Cancer [facility])     skin left neck    [facility]     COPD [facility] obstructive [facility] disease) [facility]     Diabetes mellitus ([facility]     Diverticulitis     Emphysema of lung ([facility]     [facility]     History of blood transfusion     Hyperlipidemia     Hypertension     Meniere's disease     Vertigo      Past Surgical History:   Procedure Laterality Date    CARDIAC SURGERY      stent    SKIN BIOPSY      left neck     History reviewed. No pertinent family history.   Social History     Socioeconomic History    Marital status:      Spouse name: None    Number of children: None    Years of education: None    Highest education level: None   Occupational History    None   Social Needs    Financial resource strain: None    Food insecurity     Worry: None     Inability: None    Transportation needs     Medical: None     Non-medical: None   Tobacco Use    Smoking status: Former Smoker     Packs/day: 1.00     Years: 40.00     Pack years: 40.00     Types: Cigars     Quit date: 12/10/2020     Years since quittin.0    Smokeless tobacco: Never Used   Substance and Sexual Activity    Alcohol use: Never     Frequency: Never    Drug use: Never    Sexual activity: Never   Lifestyle    Physical activity     Days per week: None     Minutes per session: None    Stress: None   Relationships    Social connections     Talks on phone: None     Gets together: None     Attends [employer] service: None     Active member of club or organization: None     Attends meetings of clubs or organizations: None     Relationship status: None    Intimate partner violence     Fear of current or ex partner: None     Emotionally abused: None     Physically abused: None     Forced sexual activity: None   Other Topics Concern    None   Social History Narrative    None       Subjective/HPI little abd cramp this am. Still having little bloody stool. Not like yesterday. No cp no sob    EKG: SR69        Review of Systems:   Review of Systems   Constitutional: Negative. Negative for diaphoresis and fatigue. HENT: Negative. Eyes: Negative. Respiratory: Negative. Negative for cough, chest tightness, shortness of breath, wheezing and stridor. Cardiovascular: Negative. Negative for chest pain, palpitations and leg swelling. Gastrointestinal: Positive for abdominal pain and blood in stool. Negative for nausea. Genitourinary: Negative. Musculoskeletal: Negative. Skin: Negative. Neurological: Negative. Negative for dizziness, syncope, weakness and light-headedness. Hematological: Negative. Psychiatric/Behavioral: Negative. Physical Examination:    BP (!) 126/53   Pulse 65   Temp 98.4 °F (36.9 °C) (Oral)   Resp 16   Ht 5' 6\" (1.676 m)   Wt 170 lb (77.1 kg)   SpO2 99%   BMI 27.44 kg/m²    Physical Exam   Constitutional: He appears healthy. No distress. HENT:   Normal cephalic and Atraumatic   Eyes: Pupils are equal, round, and reactive to light. Neck: Normal range of motion and thyroid normal. Neck supple. No JVD present. No neck adenopathy. No thyromegaly present. Cardiovascular: Normal rate, regular rhythm, normal heart sounds, intact distal pulses and normal pulses. Pulmonary/Chest: Effort normal and breath sounds normal. He has no wheezes. He has no rales. He exhibits no tenderness. Abdominal: Soft. Bowel sounds are normal. There is no abdominal tenderness. Musculoskeletal: Normal range of motion. General: No tenderness or edema. Neurological: He is alert and oriented to person, place, and time. Skin: Skin is warm. No cyanosis. Nails show no clubbing. LABS:  CBC:   Lab Results   Component Value Date    WBC 4.8 12/18/2020    RBC 2.91 12/18/2020    RBC 4.64 04/11/2012    HGB 9.0 12/18/2020    HCT 27.2 12/18/2020    MCV 92.3 12/18/2020    MCH 31.7 12/18/2020    MCHC 34.4 12/18/2020    RDW 15.6 12/18/2020     12/18/2020    MPV 7.3 06/08/2013     CBC with Differential:    Lab Results   Component Value Date    WBC 4.8 12/18/2020    RBC 2.91 12/18/2020    RBC 4.64 04/11/2012    HGB 9.0 12/18/2020    HCT 27.2 12/18/2020     12/18/2020    MCV 92.3 12/18/2020    MCH 31.7 12/18/2020    MCHC 34.4 12/18/2020    RDW 15.6 12/18/2020    LYMPHOPCT 20.4 12/18/2020    MONOPCT 9.0 12/18/2020    EOSPCT 0.8 04/11/2012    BASOPCT 0.7 12/18/2020    MONOSABS 0.4 12/18/2020    LYMPHSABS 1.0 12/18/2020    EOSABS 0.1 12/18/2020    BASOSABS 0.0 12/18/2020     CMP:    Lab Results   Component Value Date     12/18/2020    K 4.3 12/18/2020     12/18/2020    CO2 20 12/18/2020    BUN 6 12/18/2020    CREATININE 0.88 12/18/2020    GFRAA >60.0 12/18/2020    LABGLOM >60.0 12/18/2020    GLUCOSE 145 12/18/2020    GLUCOSE 152 04/11/2012    PROT 4.1 12/18/2020    LABALBU 2.5 12/18/2020    LABALBU 4.3 04/11/2012    CALCIUM 7.2 12/18/2020    BILITOT 0.6 12/18/2020    ALKPHOS 39 12/18/2020    AST 13 12/18/2020    ALT 10 12/18/2020     BMP:    Lab Results   Component Value Date     12/18/2020    K 4.3 12/18/2020     12/18/2020    CO2 20 12/18/2020    BUN 6 12/18/2020    LABALBU 2.5 12/18/2020    LABALBU 4.3 04/11/2012    CREATININE 0.88 12/18/2020    CALCIUM 7.2 12/18/2020    GFRAA >60.0 12/18/2020    LABGLOM >60.0 12/18/2020    GLUCOSE 145 12/18/2020    GLUCOSE 152 04/11/2012     Magnesium:    Lab Results   Component Value Date    MG 2.4 12/12/2020    MG 1.9 04/11/2012     Troponin:    Lab Results   Component Value Date    TROPONINI 0.206 12/17/2020        Active Hospital Problems    Diagnosis Date Noted    Rectal bleed [K62.5] 12/17/2020     Priority: Low    Diverticulitis [K57.92] 12/17/2020     Priority: Low    Type 2 diabetes mellitus, without long-term current use of insulin ([facility] [E11.9] 12/17/2020     Priority: Low    HTN (hypertension) [I10] 12/17/2020     Priority: Low    Other hyperlipidemia [E78.49] 12/17/2020     Priority: Low    Anxiety [F41.9] 12/17/2020     Priority: Low    AMI (acute myocardial infarction) ([facility] [I21.9] 12/12/2020     Priority: Low        Assessment/Plan:  1. LGIB - GI note reviewed- felt to be Diverticular bleed. .  Stop ASA and Brilinta but give Plavix. 2. Recent NSTEMI and RCA RED- Plavix. Nitrate and Statin. resume low dose BB. 3. HTN Stable  4. HPL- Statin   5.  LVEF 60%             Electronically signed by Valeria Carballo MD on 12/18/2020 at 10:56 AM

## 2020-12-18 NOTE — PROGRESS NOTES
Pt has tinge of maroon in stool  Denies abd pain  Able to use bedside commode without issue  continue to monitor h&h

## 2020-12-18 NOTE — FLOWSHEET NOTE
Patient resting in bed; was up early this morning to [facility] and had large amt. of watery very dark orange-red stool, with urine mixed. HGB.9.2/ 26.9; Denies any pain. Slept well. Lungs clear. No edema. Dr Marina Cardozo NP visited and advance diet to cardiac.

## 2020-12-18 NOTE — PROGRESS NOTES
Gastroenterology Progress Note      Christal Lazaro   Hospitalization Day:1    Chief C/o: Hematochezia     SUBJECTIVE: Patient resting comfortably I bed. No significant events overnight. RN reports clear results after taking bowel prep. No complaints of melena, hematochezia, or hematemesis. No CP, SOB, or palpitations. He denies and pain, N/V.     ROS GI: as mentioned  above     Physical    VITALS:  BP (!) 126/53   Pulse 67   Temp 98.4 °F (36.9 °C) (Oral)   Resp 16   Ht 5' 6\" (1.676 m)   Wt 170 lb (77.1 kg)   SpO2 99%   BMI 27.44 kg/m²   TEMPERATURE:  Current - Temp: 98.4 °F (36.9 °C); Max - Temp  Av.6 °F (37 °C)  Min: 98.4 °F (36.9 °C)  Max: 98.8 °F (37.1 °C)    General- no apparent distress, A/O x3  Eyes- anicteric sclera, no pallor  Cardiovascular- RRR withtout murmur  Lungs- clear to auscultation bilaterally  Abdomen soft, nondistended, nontender, no organomegaly, Bowel sounds normal   Extremities- without edema  Skin- without jaundice    Data      Recent Labs     20  0200 20  0200 20  1814 20  2324 20  0709   WBC 12.0*  --   --   --  4.8   HGB 15.8   < > 11.0* 10.0* 9.2*   HCT 47.1   < > 32.4* 29.5* 26.9*   MCV 93.3  --   --   --  92.3     --   --   --  150    < > = values in this interval not displayed. Recent Labs     20  0145 20  0151 20  0709   *  --  138   K 4.5  --  4.3   CL 99  --  112*   CO2 24  --  20   BUN 15  --  6*   CREATININE 1.07 1.1 0.88     Recent Labs     20  0145 20  0709   AST 13 13   ALT 12 10   BILITOT 0.5 0.6   ALKPHOS 96 39     Recent Labs     20  014   PROTIME 11.8*   INR 0.9     ASSESSMENT :  65year old male  patient admitted with acute gastrointestinal hemorrhage, likely diverticular. Completed bowel prep with cessation of bleeding. Hb  Did decline, but he is hemodynamically stable with no overt signs of active GI bleeding.    Recommend he avoid constipation and maintain a high fiber diet.     PLAN:  - Advance diet  - Avoid constipation/diet high in fiber or add Metamucil 1-2 teaspoons daily   - Will sign off     Thank you for allowing me to participate in the care of your patient.   Feel free to contact me with any questions or concerns    Chinyere Bryant MD

## 2020-12-19 LAB
GLUCOSE BLD-MCNC: 112 MG/DL (ref 60–115)
GLUCOSE BLD-MCNC: 127 MG/DL (ref 60–115)
GLUCOSE BLD-MCNC: 158 MG/DL (ref 60–115)
GLUCOSE BLD-MCNC: 185 MG/DL (ref 60–115)
HCT VFR BLD CALC: 24.4 % (ref 42–52)
HCT VFR BLD CALC: 25.8 % (ref 42–52)
HCT VFR BLD CALC: 26.1 % (ref 42–52)
HCT VFR BLD CALC: 26.7 % (ref 42–52)
HEMOGLOBIN: 8.2 G/DL (ref 14–18)
HEMOGLOBIN: 8.7 G/DL (ref 14–18)
HEMOGLOBIN: 8.7 G/DL (ref 14–18)
HEMOGLOBIN: 8.9 G/DL (ref 14–18)
PERFORMED ON: ABNORMAL
PERFORMED ON: NORMAL

## 2020-12-19 PROCEDURE — 6360000002 HC RX W HCPCS: Performed by: NURSE PRACTITIONER

## 2020-12-19 PROCEDURE — 36415 COLL VENOUS BLD VENIPUNCTURE: CPT

## 2020-12-19 PROCEDURE — 85018 HEMOGLOBIN: CPT

## 2020-12-19 PROCEDURE — 85014 HEMATOCRIT: CPT

## 2020-12-19 PROCEDURE — 1210000000 HC MED SURG R&B

## 2020-12-19 PROCEDURE — 99232 SBSQ HOSP IP/OBS MODERATE 35: CPT | Performed by: INTERNAL MEDICINE

## 2020-12-19 PROCEDURE — 99231 SBSQ HOSP IP/OBS SF/LOW 25: CPT | Performed by: INTERNAL MEDICINE

## 2020-12-19 PROCEDURE — 2580000003 HC RX 258: Performed by: NURSE PRACTITIONER

## 2020-12-19 PROCEDURE — 6370000000 HC RX 637 (ALT 250 FOR IP): Performed by: INTERNAL MEDICINE

## 2020-12-19 PROCEDURE — 2580000003 HC RX 258: Performed by: PERSONAL EMERGENCY RESPONSE ATTENDANT

## 2020-12-19 RX ADMIN — NITROGLYCERIN 0.5 INCH: 20 OINTMENT TOPICAL at 10:18

## 2020-12-19 RX ADMIN — SODIUM CHLORIDE: 9 INJECTION, SOLUTION INTRAVENOUS at 04:58

## 2020-12-19 RX ADMIN — ANTACID TABLETS 500 MG: 500 TABLET, CHEWABLE ORAL at 21:18

## 2020-12-19 RX ADMIN — PANTOPRAZOLE SODIUM 40 MG: 40 TABLET, DELAYED RELEASE ORAL at 10:18

## 2020-12-19 RX ADMIN — NITROGLYCERIN 0.5 INCH: 20 OINTMENT TOPICAL at 16:30

## 2020-12-19 RX ADMIN — PIPERACILLIN AND TAZOBACTAM 3.38 G: 3; .375 INJECTION, POWDER, LYOPHILIZED, FOR SOLUTION INTRAVENOUS at 13:27

## 2020-12-19 RX ADMIN — NITROGLYCERIN 0.5 INCH: 20 OINTMENT TOPICAL at 01:08

## 2020-12-19 RX ADMIN — NITROGLYCERIN 0.5 INCH: 20 OINTMENT TOPICAL at 21:16

## 2020-12-19 RX ADMIN — PIPERACILLIN AND TAZOBACTAM 3.38 G: 3; .375 INJECTION, POWDER, LYOPHILIZED, FOR SOLUTION INTRAVENOUS at 21:16

## 2020-12-19 RX ADMIN — CLOPIDOGREL BISULFATE 75 MG: 75 TABLET ORAL at 10:19

## 2020-12-19 RX ADMIN — ATORVASTATIN CALCIUM 80 MG: 80 TABLET, FILM COATED ORAL at 21:18

## 2020-12-19 RX ADMIN — Medication 10 ML: at 10:19

## 2020-12-19 RX ADMIN — PIPERACILLIN AND TAZOBACTAM 3.38 G: 3; .375 INJECTION, POWDER, LYOPHILIZED, FOR SOLUTION INTRAVENOUS at 04:58

## 2020-12-19 ASSESSMENT — ENCOUNTER SYMPTOMS
SHORTNESS OF BREATH: 0
CHEST TIGHTNESS: 0
COUGH: 0
STRIDOR: 0
ABDOMINAL PAIN: 1
RESPIRATORY NEGATIVE: 1
WHEEZING: 0
NAUSEA: 0
BLOOD IN STOOL: 1
EYES NEGATIVE: 1

## 2020-12-19 ASSESSMENT — PAIN SCALES - GENERAL
PAINLEVEL_OUTOF10: 0
PAINLEVEL_OUTOF10: 0

## 2020-12-19 NOTE — PROGRESS NOTES
Gastroenterology Progress Note      Estephania Barrios   Hospitalization Day:2    Chief C/o: hematochezia     SUBJECTIVE: No significant events overnight. RN reports having soft/formed brown bowel movements. No complaints of melena, hematochezia, or hematemesis. Tolerating p.o. without any complaints. ROS GI: As mentioned above    Physical    VITALS:  BP (!) 117/50   Pulse 63   Temp 98.2 °F (36.8 °C) (Oral)   Resp 16   Ht 5' 6\" (1.676 m)   Wt 170 lb (77.1 kg)   SpO2 100%   BMI 27.44 kg/m²   TEMPERATURE:  Current - Temp: 98.2 °F (36.8 °C); Max - Temp  Av.5 °F (36.9 °C)  Min: 98.2 °F (36.8 °C)  Max: 98.8 °F (37.1 °C)    General-no apparent distress, A/O x3  Eyes- anicteric sclera, no pallor  Cardiovascular- RRR withtout murmur  Lungs- clear to auscultation bilaterally  Abdomen soft, nondistended, nontender, no organomegaly, Bowel sounds normal   Extremities- without edema  Skin- without jaundice    Data      Recent Labs     20  0200 20  0200 20  0709 20  0709 20  1701 20  2351 20  0818   WBC 12.0*  --  4.8  --   --   --   --    HGB 15.8   < > 9.2*   < > 8.5* 8.2* 8.7*   HCT 47.1   < > 26.9*   < > 25.7* 24.4* 25.8*   MCV 93.3  --  92.3  --   --   --   --      --  150  --   --   --   --     < > = values in this interval not displayed. Recent Labs     20  0145 20  0151 20  0709   *  --  138   K 4.5  --  4.3   CL 99  --  112*   CO2 24  --  20   BUN 15  --  6*   CREATININE 1.07 1.1 0.88     Recent Labs     20  0145 20  0709   AST 13 13   ALT 12 10   BILITOT 0.5 0.6   ALKPHOS 96 39     No results for input(s): LIPASE, AMYLASE in the last 72 hours. Recent Labs     20  0145   PROTIME 11.8*   INR 0.9     ASSESSMENT :  66-year-old male patient admitted with acute GI hemorrhage, likely due to diverticular bleed. Status post colonoscopy in  with findings of sigmoid diverticulosis.   Patient recently started on aspirin and Brilinta due to recent coronary artery intervention. Antiplatelet therapy was changed to clopidogrel. Bowel prep was started and there have been no further reports of any GI bleeding. Hemoglobin has remained stable and he is tolerating a diet. PLAN:  -Will sign off  -Follow-up with GI in 4 to 6 weeks    Thank you for allowing me to participate in the care of your patient.   Feel free to contact me with any questions or concerns    Simon Whitney MD

## 2020-12-19 NOTE — PROGRESS NOTES
Progress Note  Patient: Franny Chanel  Unit/Bed: M769/J195-27  YOB: 1950  MRN: [id number]  Acct: [de-identified]   Admitting Diagnosis: Rectal bleed [K62.5]  Admit Date:  2020  Hospital Day: 2    Chief Complaint: LGIB CAD HTN     Subjective: Patient is doing okay today. Denies any significant GI bleeding. No chest pain or shortness of breath. He is hemodynamically stable. On Plavix only. GI planning for endoscopy in the future. Histories:  Past Medical History:   Diagnosis Date    Cancer [facility])     skin left neck    Chronic bronchitis ([facility])     COPD (chronic obstructive pulmonary disease) ([facility])     Diabetes mellitus ([facility])     Diverticulitis     [facility]     Heart attack ([facility]     History of blood transfusion     Hyperlipidemia     Hypertension     Meniere's disease     Vertigo      Past Surgical History:   Procedure Laterality Date    CARDIAC SURGERY      stent    SKIN BIOPSY      left neck     History reviewed. No pertinent family history.   Social History     Socioeconomic History    Marital status:      Spouse name: None    Number of children: None    Years of education: None    Highest education level: None   Occupational History    None   Social Needs    Financial resource strain: None    Food insecurity     Worry: None     Inability: None    Transportation needs     Medical: None     Non-medical: None   Tobacco Use    Smoking status: Former Smoker     Packs/day: 1.00     Years: 40.00     Pack years: 40.00     Types: Cigars     Quit date: 12/10/2020     Years since quittin.0    Smokeless tobacco: Never Used   Substance and Sexual Activity    Alcohol use: Never     Frequency: Never    Drug use: Never    Sexual activity: Never   Lifestyle    Physical activity     Days per week: None     Minutes per session: None    Stress: None   Relationships    Social connections     Talks on phone: None     Gets together: None     Attends [employer] service: None     Active member of club or organization: None     Attends meetings of clubs or organizations: None     Relationship status: None    Intimate partner violence     Fear of current or ex partner: None     Emotionally abused: None     Physically abused: None     Forced sexual activity: None   Other Topics Concern    None   Social History Narrative    None       Subjective/HPI little abd cramp this am. Still having little bloody stool. Not like yesterday. No cp no sob    EKG: SR69        Review of Systems:   Review of Systems   Constitutional: Negative. Negative for diaphoresis and fatigue. HENT: Negative. Eyes: Negative. Respiratory: Negative. Negative for cough, chest tightness, shortness of breath, wheezing and stridor. Cardiovascular: Negative. Negative for chest pain, palpitations and leg swelling. Gastrointestinal: Positive for abdominal pain and blood in stool. Negative for nausea. Genitourinary: Negative. Musculoskeletal: Negative. Skin: Negative. Neurological: Negative. Negative for dizziness, syncope, weakness and light-headedness. Hematological: Negative. Psychiatric/Behavioral: Negative. Physical Examination:    BP (!) 117/50   Pulse 63   Temp 98.2 °F (36.8 °C) (Oral)   Resp 16   Ht 5' 6\" (1.676 m)   Wt 170 lb (77.1 kg)   SpO2 100%   BMI 27.44 kg/m²    Physical Exam   Constitutional: He appears healthy. No distress. HENT:   Normal cephalic and Atraumatic   Eyes: Pupils are equal, round, and reactive to light. Neck: Normal range of motion and thyroid normal. Neck supple. No JVD present. No neck adenopathy. No thyromegaly present. Cardiovascular: Normal rate, regular rhythm, normal heart sounds, intact distal pulses and normal pulses. Pulmonary/Chest: Effort normal and breath sounds normal. He has no wheezes. He has no rales. He exhibits no tenderness. Abdominal: Soft. Bowel sounds are normal. There is no abdominal tenderness. 1.9 04/11/2012     Troponin:    Lab Results   Component Value Date    TROPONINI 0.206 12/17/2020        Active Hospital Problems    Diagnosis Date Noted    HTN (hypertension) [I10] 12/17/2020     Priority: High    Rectal bleed [K62.5] 12/17/2020     Priority: Low    Diverticulitis [K57.92] 12/17/2020     Priority: Low    Type 2 diabetes mellitus, without long-term current use of insulin ([facility] [E11.9] 12/17/2020     Priority: Low    Other hyperlipidemia [E78.49] 12/17/2020     Priority: Low    Anxiety [F41.9] 12/17/2020     Priority: Low    AMI (acute myocardial infarction) ([facility] [I21.9] 12/12/2020     Priority: Low        Assessment/Plan:  1. LGIB -stable  2. Recent NSTEMI and RCA RED- Plavix only. No aspirin  3. HTN Stable  4. HPL- Statin   5. LVEF 60%   6. Stable for discharge from cardiology standpoint.             Electronically signed by Taylor Frazier DO on 12/19/2020 at 4:44 PM

## 2020-12-19 NOTE — PROGRESS NOTES
Hospitalist Daily Progress Note  Name: Evens Cedillo  Age: 65 y.o. Gender: male  CodeStatus: Full Code  Allergies: No Known Allergies    Chief Complaint:Rectal Bleeding      Primary Care Provider: Ramiro Hernandez MD    InpatientTreatment Team: Treatment Team: Attending Provider: Gena Savage MD; Consulting Physician: Jennifer Kumar MD; Consulting Physician: Manuel Montalvo MD; Utilization Reviewer: Cleo Gary RN; : Violeta Arana, STELLA; Registered Nurse: Toya Brambila RN; Tech: Lemond Or    Admission Date: 12/17/2020      Subjective: No chest pain, sob, nausea. No BM yet today. Physical Exam  Vitals signs and nursing note reviewed. Constitutional:       Appearance: Normal appearance. Cardiovascular:      Rate and Rhythm: Normal rate and regular rhythm. Pulmonary:      Effort: Pulmonary effort is normal.      Breath sounds: Normal breath sounds. Abdominal:      General: Bowel sounds are normal.      Palpations: Abdomen is soft. Musculoskeletal: Normal range of motion. Skin:     General: Skin is warm and dry. Neurological:      Mental Status: He is alert and oriented to person, place, and time. Mental status is at baseline.          Medications:  Reviewed    Infusion Medications:    sodium chloride 100 mL/hr at 12/19/20 0458    sodium chloride 100 mL/hr at 12/17/20 0855    dextrose       Scheduled Medications:    pantoprazole  40 mg Oral QAM AC    sodium chloride flush  10 mL Intravenous 2 times per day    piperacillin-tazobactam  3.375 g Intravenous Q8H    nitroglycerin  0.5 inch Topical 4 times per day    clopidogrel  75 mg Oral Daily    atorvastatin  80 mg Oral Nightly    insulin lispro  0-12 Units Subcutaneous 4x Daily AC & HS     PRN Meds: calcium carbonate, sodium chloride flush, promethazine **OR** ondansetron, acetaminophen **OR** acetaminophen, glucose, dextrose, glucagon (rDNA), dextrose    Labs:   Recent Labs     12/17/20 0200 12/17/20 0200 12/18/20  0709 12/18/20  0709 12/18/20  1701 12/18/20  2351 12/19/20  0818   WBC 12.0*  --  4.8  --   --   --   --    HGB 15.8   < > 9.2*   < > 8.5* 8.2* 8.7*   HCT 47.1   < > 26.9*   < > 25.7* 24.4* 25.8*     --  150  --   --   --   --     < > = values in this interval not displayed. Recent Labs     12/17/20  0145 12/17/20  0151 12/18/20  0709   *  --  138   K 4.5  --  4.3   CL 99  --  112*   CO2 24  --  20   BUN 15  --  6*   CREATININE 1.07 1.1 0.88   CALCIUM 8.8  --  7.2*     Recent Labs     12/17/20  0145 12/18/20  0709   AST 13 13   ALT 12 10   BILITOT 0.5 0.6   ALKPHOS 96 39     Recent Labs     12/17/20  0145   INR 0.9     Recent Labs     12/17/20  0511   TROPONINI 0.206*       Urinalysis:   Lab Results   Component Value Date    NITRU NEG 06/15/2012    BLOODU NEG 06/15/2012    SPECGRAV 1.013 06/15/2012    GLUCOSEU NEG 06/15/2012    GLUCOSEU NEG 04/11/2012       Radiology:   Most recent    Chest CT      WITH CONTRAST:No results found for this or any previous visit. WITHOUT CONTRAST: No results found for this or any previous visit. CXR      2-view: No results found for this or any previous visit. Portable:   Results for orders placed during the hospital encounter of 12/11/20   XR CHEST PORTABLE    Narrative Exam: XR CHEST PORTABLE    History:  pain     Technique: AP portable view of the chest obtained. Comparison: None    Chest x-ray portable   Findings: The cardiomediastinal silhouette is within normal limits. There are no infiltrates, consolidations or effusions. Bones of the thorax appear intact. Impression No radiographic evidence of acute intrathoracic process. Echo No results found for this or any previous visit.           Assessment/Plan:    Active Hospital Problems    Diagnosis Date Noted    Rectal bleed [K62.5] 12/17/2020    Diverticulitis [K57.92] 12/17/2020    Type 2 diabetes mellitus, without long-term current use of insulin ([facility] [E11.9] 12/17/2020    HTN (hypertension) [I10] 12/17/2020    Other hyperlipidemia [E78.49] 12/17/2020    Anxiety [F41.9] 12/17/2020    [facility] [I21.9] 12/12/2020     Principal Problem:  1) Rectal bleed/GI bleed: Bright red blood loss of approximately 1500 mL after constipation and straining to have a bowel movement. H&H dropped from 15.8 down to 11.7. Patient became symptomatic with hypotension, weakness, pale in appearance. Fluids given and blood pressure stabilized in ED. 1 unit RBCs ordered stat. We will monitor H&H and further signs and symptoms of GI bleeding. We will consult GI. Patient will be n.p.o. We will continue IV fluids.    12/18 - no gross bleed however h/h continues to drop. Monitor overnight. 2) Diverticulitis: Abdomen pelvis CT shows diverticulitis. We will consult GI. We will start antibiotics at this time.    3) MI 1 week ago: Status post 1 week MI with stent placement and started on Brilinta. We will consult cardiology. Patient will stay on telemetry monitoring.     Active Problems:  4) Type 2 diabetes mellitus, without long-term current use of insulin: Patient on oral medications to control. Patient n.p.o. We will monitor and cover with medium sliding scale insulin AC at bedtime. 5) HTN: Patient on medications to control. We will hold medications until patient is no longer n.p.o. We will monitor blood pressure regularly and cover with IV medications. 6) Hyperlipidemia: Patient on medications to control. We will hold medications   7) Anxiety: Patient on home medications to control. We will hold medications at this time.   If absolutely needed we will medicate IV.       Electronically signed by Siva Knapp MD on 12/19/2020 at 10:22 AM

## 2020-12-19 NOTE — PROGRESS NOTES
Shift assessment performed. Vitals stable. Patient is still having loose/liquid stools but there is no signs of an active bleed at this time. His stool is brown, malodorous, and thin in consistency. He has had multiple bowel movements using the bedside commode. Patient denies any chest pain SOB or dizziness. Denies any further needs at this time. Will continue to monitor.     Electronically signed by Kelley Rincon RN on 12/19/2020 at 6:39 PM

## 2020-12-19 NOTE — PROGRESS NOTES
Pt in bed resting. Assessment documented. A&O*4 at this time. VSS, afebrile. Medicated per MAR. Watery BM at this time r/t bowel prep, no s/sx of active bleeding noted. Hgb 8.2 at this time. Will continue to monitor. Electronically signed by Nisha Albert RN on 12/19/2020 at 5:33 AM

## 2020-12-20 VITALS
HEART RATE: 66 BPM | DIASTOLIC BLOOD PRESSURE: 47 MMHG | TEMPERATURE: 98.2 F | WEIGHT: 170 LBS | SYSTOLIC BLOOD PRESSURE: 119 MMHG | HEIGHT: 66 IN | BODY MASS INDEX: 27.32 KG/M2 | OXYGEN SATURATION: 100 % | RESPIRATION RATE: 20 BRPM

## 2020-12-20 LAB
GLUCOSE BLD-MCNC: 111 MG/DL (ref 60–115)
GLUCOSE BLD-MCNC: 143 MG/DL (ref 60–115)
HCT VFR BLD CALC: 24.9 % (ref 42–52)
HCT VFR BLD CALC: 25.6 % (ref 42–52)
HCT VFR BLD CALC: 26.6 % (ref 42–52)
HEMOGLOBIN: 8.4 G/DL (ref 14–18)
HEMOGLOBIN: 8.4 G/DL (ref 14–18)
HEMOGLOBIN: 8.8 G/DL (ref 14–18)
PERFORMED ON: ABNORMAL
PERFORMED ON: NORMAL

## 2020-12-20 PROCEDURE — 6360000002 HC RX W HCPCS: Performed by: NURSE PRACTITIONER

## 2020-12-20 PROCEDURE — 99232 SBSQ HOSP IP/OBS MODERATE 35: CPT | Performed by: INTERNAL MEDICINE

## 2020-12-20 PROCEDURE — 6370000000 HC RX 637 (ALT 250 FOR IP): Performed by: INTERNAL MEDICINE

## 2020-12-20 PROCEDURE — 6370000000 HC RX 637 (ALT 250 FOR IP): Performed by: FAMILY MEDICINE

## 2020-12-20 PROCEDURE — 2580000003 HC RX 258: Performed by: PERSONAL EMERGENCY RESPONSE ATTENDANT

## 2020-12-20 PROCEDURE — 85018 HEMOGLOBIN: CPT

## 2020-12-20 PROCEDURE — 36415 COLL VENOUS BLD VENIPUNCTURE: CPT

## 2020-12-20 PROCEDURE — 2580000003 HC RX 258: Performed by: NURSE PRACTITIONER

## 2020-12-20 PROCEDURE — 85014 HEMATOCRIT: CPT

## 2020-12-20 RX ORDER — PANTOPRAZOLE SODIUM 40 MG/1
40 TABLET, DELAYED RELEASE ORAL
Qty: 30 TABLET | Refills: 3 | Status: SHIPPED | OUTPATIENT
Start: 2020-12-21 | End: 2021-02-04

## 2020-12-20 RX ORDER — CLOPIDOGREL BISULFATE 75 MG/1
75 TABLET ORAL DAILY
Qty: 30 TABLET | Refills: 3 | Status: SHIPPED | OUTPATIENT
Start: 2020-12-21 | End: 2021-03-16 | Stop reason: SDUPTHER

## 2020-12-20 RX ORDER — METRONIDAZOLE 500 MG/1
500 TABLET ORAL 3 TIMES DAILY
Qty: 21 TABLET | Refills: 0 | Status: SHIPPED | OUTPATIENT
Start: 2020-12-20 | End: 2020-12-27

## 2020-12-20 RX ORDER — PAROXETINE HYDROCHLORIDE 20 MG/1
40 TABLET, FILM COATED ORAL DAILY
Status: DISCONTINUED | OUTPATIENT
Start: 2020-12-20 | End: 2020-12-20 | Stop reason: HOSPADM

## 2020-12-20 RX ORDER — CIPROFLOXACIN 500 MG/1
500 TABLET, FILM COATED ORAL 2 TIMES DAILY
Qty: 14 TABLET | Refills: 0 | Status: SHIPPED | OUTPATIENT
Start: 2020-12-20 | End: 2020-12-27

## 2020-12-20 RX ADMIN — CLOPIDOGREL BISULFATE 75 MG: 75 TABLET ORAL at 08:34

## 2020-12-20 RX ADMIN — NITROGLYCERIN 0.5 INCH: 20 OINTMENT TOPICAL at 04:15

## 2020-12-20 RX ADMIN — PAROXETINE HYDROCHLORIDE 40 MG: 20 TABLET, FILM COATED ORAL at 08:34

## 2020-12-20 RX ADMIN — PIPERACILLIN AND TAZOBACTAM 3.38 G: 3; .375 INJECTION, POWDER, LYOPHILIZED, FOR SOLUTION INTRAVENOUS at 04:15

## 2020-12-20 RX ADMIN — SODIUM CHLORIDE: 9 INJECTION, SOLUTION INTRAVENOUS at 01:17

## 2020-12-20 RX ADMIN — Medication 10 ML: at 08:35

## 2020-12-20 RX ADMIN — NITROGLYCERIN 0.5 INCH: 20 OINTMENT TOPICAL at 11:00

## 2020-12-20 ASSESSMENT — ENCOUNTER SYMPTOMS
RESPIRATORY NEGATIVE: 1
EYES NEGATIVE: 1
CHEST TIGHTNESS: 0
WHEEZING: 0
NAUSEA: 0
BLOOD IN STOOL: 1
STRIDOR: 0
SHORTNESS OF BREATH: 0
ABDOMINAL PAIN: 1
COUGH: 0

## 2020-12-20 NOTE — PROGRESS NOTES
Progress Note  Patient: Mellissa Lira  Unit/Bed: I376/A620-23  YOB: 1950  MRN: [id number]  Acct: [de-identified]   Admitting Diagnosis: Rectal bleed [K62.5]  Admit Date:  2020  Hospital Day: 3    Chief Complaint: LGIB CAD HTN     Subjective: Doing well. Denies any significant GI bleeding. No chest pain or shortness of breath. He is hemodynamically stable. On Plavix only. No plans for endoscopy during this admission. No further melena or hematochezia  Histories:  Past Medical History:   Diagnosis Date    Cancer ([facility]     skin left neck    Chronic bronchitis (HCC)     COPD (chronic obstructive pulmonary disease) (HCC)     Diabetes mellitus ([facility]     Diverticulitis     Emphysema of lung ([facility]     Heart attack ([facility]     History of blood transfusion     Hyperlipidemia     Hypertension     Meniere's disease     Vertigo      Past Surgical History:   Procedure Laterality Date    CARDIAC SURGERY      stent    SKIN BIOPSY      left neck     History reviewed. No pertinent family history.   Social History     Socioeconomic History    Marital status:      Spouse name: None    Number of children: None    Years of education: None    Highest education level: None   Occupational History    None   Social Needs    Financial resource strain: None    Food insecurity     Worry: None     Inability: None    Transportation needs     Medical: None     Non-medical: None   Tobacco Use    Smoking status: Former Smoker     Packs/day: 1.00     Years: 40.00     Pack years: 40.00     Types: Cigars     Quit date: 12/10/2020     Years since quittin.0    Smokeless tobacco: Never Used   Substance and Sexual Activity    Alcohol use: Never     Frequency: Never    Drug use: Never    Sexual activity: Never   Lifestyle    Physical activity     Days per week: None     Minutes per session: None    Stress: None   Relationships    Social connections     Talks on phone: None     Gets together: None Attends Jew service: None     Active member of club or organization: None     Attends meetings of clubs or organizations: None     Relationship status: None    Intimate partner violence     Fear of current or ex partner: None     Emotionally abused: None     Physically abused: None     Forced sexual activity: None   Other Topics Concern    None   Social History Narrative    None       Subjective/HPI little abd cramp this am. Still having little bloody stool. Not like yesterday. No cp no sob    EKG: SR69        Review of Systems:   Review of Systems   Constitutional: Negative. Negative for diaphoresis and fatigue. HENT: Negative. Eyes: Negative. Respiratory: Negative. Negative for cough, chest tightness, shortness of breath, wheezing and stridor. Cardiovascular: Negative. Negative for chest pain, palpitations and leg swelling. Gastrointestinal: Positive for abdominal pain and blood in stool. Negative for nausea. Genitourinary: Negative. Musculoskeletal: Negative. Skin: Negative. Neurological: Negative. Negative for dizziness, syncope, weakness and light-headedness. Hematological: Negative. Psychiatric/Behavioral: Negative. Physical Examination:    BP (!) 119/47   Pulse 66   Temp 98.2 °F (36.8 °C) (Oral)   Resp 20   Ht 5' 6\" (1.676 m)   Wt 170 lb (77.1 kg)   SpO2 100%   BMI 27.44 kg/m²    Physical Exam   Constitutional: He appears healthy. No distress. HENT:   Normal cephalic and Atraumatic   Eyes: Pupils are equal, round, and reactive to light. Neck: Normal range of motion and thyroid normal. Neck supple. No JVD present. No neck adenopathy. No thyromegaly present. Cardiovascular: Normal rate, regular rhythm, normal heart sounds, intact distal pulses and normal pulses. Pulmonary/Chest: Effort normal and breath sounds normal. He has no wheezes. He has no rales. He exhibits no tenderness. Abdominal: Soft.  Bowel sounds are normal. There is no abdominal tenderness. Musculoskeletal: Normal range of motion. General: No tenderness or edema. Neurological: He is alert and oriented to person, place, and time. Skin: Skin is warm. No cyanosis. Nails show no clubbing.        LABS:  CBC:   Lab Results   Component Value Date    WBC 4.8 12/18/2020    RBC 2.91 12/18/2020    RBC 4.64 04/11/2012    HGB 8.8 12/20/2020    HCT 26.6 12/20/2020    MCV 92.3 12/18/2020    MCH 31.7 12/18/2020    MCHC 34.4 12/18/2020    RDW 15.6 12/18/2020     12/18/2020    MPV 7.3 06/08/2013     CBC with Differential:    Lab Results   Component Value Date    WBC 4.8 12/18/2020    RBC 2.91 12/18/2020    RBC 4.64 04/11/2012    HGB 8.8 12/20/2020    HCT 26.6 12/20/2020     12/18/2020    MCV 92.3 12/18/2020    MCH 31.7 12/18/2020    MCHC 34.4 12/18/2020    RDW 15.6 12/18/2020    LYMPHOPCT 20.4 12/18/2020    MONOPCT 9.0 12/18/2020    EOSPCT 0.8 04/11/2012    BASOPCT 0.7 12/18/2020    MONOSABS 0.4 12/18/2020    LYMPHSABS 1.0 12/18/2020    EOSABS 0.1 12/18/2020    BASOSABS 0.0 12/18/2020     CMP:    Lab Results   Component Value Date     12/18/2020    K 4.3 12/18/2020     12/18/2020    CO2 20 12/18/2020    BUN 6 12/18/2020    CREATININE 0.88 12/18/2020    GFRAA >60.0 12/18/2020    LABGLOM >60.0 12/18/2020    GLUCOSE 145 12/18/2020    GLUCOSE 152 04/11/2012    PROT 4.1 12/18/2020    LABALBU 2.5 12/18/2020    LABALBU 4.3 04/11/2012    CALCIUM 7.2 12/18/2020    BILITOT 0.6 12/18/2020    ALKPHOS 39 12/18/2020    AST 13 12/18/2020    ALT 10 12/18/2020     BMP:    Lab Results   Component Value Date     12/18/2020    K 4.3 12/18/2020     12/18/2020    CO2 20 12/18/2020    BUN 6 12/18/2020    LABALBU 2.5 12/18/2020    LABALBU 4.3 04/11/2012    CREATININE 0.88 12/18/2020    CALCIUM 7.2 12/18/2020    GFRAA >60.0 12/18/2020    LABGLOM >60.0 12/18/2020    GLUCOSE 145 12/18/2020    GLUCOSE 152 04/11/2012     Magnesium:    Lab Results   Component Value Date    MG 2.4

## 2020-12-20 NOTE — DISCHARGE SUMMARY
Physician Discharge Summary     Patient ID:  Chin Hopper  [id number]  26 y.o.  1950    Admit date: 12/17/2020    Discharge date : 12/20/20     Admitting Physician: Vero Strange MD     Discharge Physician: Wil Padilla MD     Admission Diagnoses: Rectal bleed [K62.5]    Discharge Diagnoses: Acute lower gi bleed, acute diverticulitis, acute blood loss anemia, subacute MI    Admission Condition: fair    Discharged Condition: good    Hospital Course:   1) Acute lower gi bleed with blood loss anemia: Bright red blood loss of approximately 1500 mL after constipation and straining to have a bowel movement.  H&H dropped from 15.8 down to 11.7.  Patient became symptomatic with hypotension, weakness, pale in appearance.  Fluids given and blood pressure stabilized in ED.  1 unit RBCs ordered stat. We will monitor H&H and further signs and symptoms of GI bleeding.  We will consult GI.  Patient will be n.p.o.  We will continue IV fluids.              12/18 - no gross bleed however h/h continues to drop. Monitor overnight. 12/19 - h/h remains low, will check h/h overnight, if stable dc 12/20     2) Diverticulitis: Abdomen pelvis CT shows diverticulitis. We will consult GI.  We will start antibiotics at this time.    3) MI 1 week ago: Status post 1 week MI with stent placement and started on Brilinta. We will consult cardiology. Emilie Alonzo will stay on telemetry monitoring. 12/20 - d/c on plavix only, no asa or brilinta.     Active Problems:  4) Type 2 diabetes mellitus, without long-term current use of insulin: Patient on oral medications to control. Patient n.p.o. [place] Randi will monitor and cover with medium sliding scale insulin AC at bedtime. 5) HTN: Patient on medications to control. We will hold medications until patient is no longer n.p.o.  We will monitor blood pressure regularly and cover with IV medications. 6) Hyperlipidemia: Patient on medications to control.   We will hold medications   7) Anxiety: Patient on home medications to control. We will hold medications at this time.  If absolutely needed we will medicate IV.       Consults: cardiology and GI    Significant Diagnostic Studies: as below    Discharge Exam:  BP (!) 119/47   Pulse 66   Temp 98.2 °F (36.8 °C) (Oral)   Resp 20   Ht 5' 6\" (1.676 m)   Wt 170 lb (77.1 kg)   SpO2 100%   BMI 27.44 kg/m²   General appearance: alert, appears stated age and cooperative  Lungs: clear to auscultation bilaterally  Heart: regular rate and rhythm, S1, S2 normal, no murmur, click, rub or gallop  Abdomen: soft, non-tender; bowel sounds normal; no masses,  no organomegaly  Extremities: extremities normal, atraumatic, no cyanosis or edema  Skin: Skin color, texture, turgor normal. No rashes or lesions    Labs:   Recent Labs     12/18/20  0709 12/18/20  0709 12/20/20  0028 12/20/20  0728 12/20/20  1109   WBC 4.8  --   --   --   --    HGB 9.2*   < > 8.4* 8.8* 8.4*   HCT 26.9*   < > 25.6* 26.6* 24.9*     --   --   --   --     < > = values in this interval not displayed. Recent Labs     12/18/20  0709      K 4.3   *   CO2 20   BUN 6*   CREATININE 0.88   CALCIUM 7.2*     Recent Labs     12/18/20  0709   AST 13   ALT 10   BILITOT 0.6   ALKPHOS 39     No results for input(s): INR in the last 72 hours. No results for input(s): Verlena Blake in the last 72 hours. Urinalysis:   Lab Results   Component Value Date    NITRU NEG 06/15/2012    BLOODU NEG 06/15/2012    SPECGRAV 1.013 06/15/2012    GLUCOSEU NEG 06/15/2012    GLUCOSEU NEG 04/11/2012       Radiology:   Most recent    Chest CT      WITH CONTRAST:No results found for this or any previous visit. WITHOUT CONTRAST: No results found for this or any previous visit. CXR      2-view: No results found for this or any previous visit.      Portable:   Results for orders placed during the hospital encounter of 12/11/20   XR CHEST PORTABLE    Narrative Exam: XR CHEST PORTABLE    History:  pain     Technique: AP portable view of the chest obtained. Comparison: None    Chest x-ray portable   Findings: The cardiomediastinal silhouette is within normal limits. There are no infiltrates, consolidations or effusions. Bones of the thorax appear intact. Impression No radiographic evidence of acute intrathoracic process. Echo No results found for this or any previous visit. Disposition: home    In process/preliminary results:  Outstanding Order Results     Date and Time Order Name Status Description    12/17/2020 0145 PREPARE RBC (CROSSMATCH), 1 Units Preliminary           Patient Instructions:   Current Discharge Medication List      START taking these medications    Details   clopidogrel (PLAVIX) 75 MG tablet Take 1 tablet by mouth daily  Qty: 30 tablet, Refills: 3      pantoprazole (PROTONIX) 40 MG tablet Take 1 tablet by mouth every morning (before breakfast)  Qty: 30 tablet, Refills: 3      ciprofloxacin (CIPRO) 500 MG tablet Take 1 tablet by mouth 2 times daily for 7 days  Qty: 14 tablet, Refills: 0      metroNIDAZOLE (FLAGYL) 500 MG tablet Take 1 tablet by mouth 3 times daily for 7 days  Qty: 21 tablet, Refills: 0         CONTINUE these medications which have NOT CHANGED    Details   nitroGLYCERIN (NITROSTAT) 0.4 MG SL tablet up to max of 3 total doses. If no relief after 1 dose, call 911.   Qty: 25 tablet, Refills: 3      atorvastatin (LIPITOR) 40 MG tablet Take 1 tablet by mouth nightly  Qty: 30 tablet, Refills: 3      metoprolol tartrate (LOPRESSOR) 25 MG tablet Take 1 tablet by mouth 2 times daily  Qty: 60 tablet, Refills: 3      losartan (COZAAR) 25 MG tablet Take 1 tablet by mouth 2 times daily  Qty: 30 tablet, Refills: 3      PARoxetine (PAXIL) 40 MG tablet Take 40 mg by mouth daily      dilTIAZem (CARDIZEM CD) 120 MG extended release capsule Take 120 mg by mouth daily      metFORMIN (GLUCOPHAGE) 500 MG tablet Take 500 mg by mouth 2 times daily empagliflozin (JARDIANCE) 10 MG tablet Take 10 mg by mouth daily (with breakfast)         STOP taking these medications       aspirin 81 MG chewable tablet Comments:   Reason for Stopping:         ticagrelor (BRILINTA) 90 MG TABS tablet Comments:   Reason for Stopping:         omeprazole (PRILOSEC) 20 MG delayed release capsule Comments:   Reason for Stopping:             Activity: activity as tolerated  Diet: diabetic diet  Wound Care: none needed    Follow-up with PCP 1-2 weeks, cardiology 1 month, GI 1 month.     DC time 35 minutes    Signed:  Electronically signed by Kathryn Ervin MD on 12/20/2020 at 12:13 PM

## 2020-12-20 NOTE — PROGRESS NOTES
Hospitalist Daily Progress Note  Name: Bianka Srinivasan  Age: 65 y.o. Gender: male  CodeStatus: Full Code  Allergies: No Known Allergies    Chief Complaint:Rectal Bleeding      Primary Care Provider: Daysi Vargas MD    InpatientTreatment Team: Treatment Team: Attending Provider: María Thomas MD; Utilization Reviewer: Oscar Maxwell RN; Tech: CosmosIDbuddy Plate; : Za Darden, RN; Registered Nurse: Rosendo Knapp RN; Patient Care Tech: Pati Dawkins; Respiratory Therapist (Day): Eileen Wilkerson RCP    Admission Date: 12/17/2020      Subjective: No chest pain, sob, nausea. No BM yet today. Physical Exam  Vitals signs and nursing note reviewed. Constitutional:       Appearance: Normal appearance. Cardiovascular:      Rate and Rhythm: Normal rate and regular rhythm. Pulmonary:      Effort: Pulmonary effort is normal.      Breath sounds: Normal breath sounds. Abdominal:      General: Bowel sounds are normal.      Palpations: Abdomen is soft. Musculoskeletal: Normal range of motion. Skin:     General: Skin is warm and dry. Neurological:      Mental Status: He is alert and oriented to person, place, and time. Mental status is at baseline.          Medications:  Reviewed    Infusion Medications:    sodium chloride 100 mL/hr at 12/20/20 0117    sodium chloride 100 mL/hr at 12/17/20 0855    dextrose       Scheduled Medications:    PARoxetine  40 mg Oral Daily    pantoprazole  40 mg Oral QAM AC    sodium chloride flush  10 mL Intravenous 2 times per day    piperacillin-tazobactam  3.375 g Intravenous Q8H    nitroglycerin  0.5 inch Topical 4 times per day    clopidogrel  75 mg Oral Daily    atorvastatin  80 mg Oral Nightly    insulin lispro  0-12 Units Subcutaneous 4x Daily AC & HS     PRN Meds: calcium carbonate, sodium chloride flush, promethazine **OR** ondansetron, acetaminophen **OR** acetaminophen, glucose, dextrose, glucagon (rDNA), dextrose    Labs:   Recent Labs 12/18/20  0709 12/18/20  0709 12/20/20  0028 12/20/20  0728 12/20/20  1109   WBC 4.8  --   --   --   --    HGB 9.2*   < > 8.4* 8.8* 8.4*   HCT 26.9*   < > 25.6* 26.6* 24.9*     --   --   --   --     < > = values in this interval not displayed. Recent Labs     12/18/20  0709      K 4.3   *   CO2 20   BUN 6*   CREATININE 0.88   CALCIUM 7.2*     Recent Labs     12/18/20  0709   AST 13   ALT 10   BILITOT 0.6   ALKPHOS 39     No results for input(s): INR in the last 72 hours. No results for input(s): Willis Freddy in the last 72 hours. Urinalysis:   Lab Results   Component Value Date    NITRU NEG 06/15/2012    BLOODU NEG 06/15/2012    SPECGRAV 1.013 06/15/2012    GLUCOSEU NEG 06/15/2012    GLUCOSEU NEG 04/11/2012       Radiology:   Most recent    Chest CT      WITH CONTRAST:No results found for this or any previous visit. WITHOUT CONTRAST: No results found for this or any previous visit. CXR      2-view: No results found for this or any previous visit. Portable:   Results for orders placed during the hospital encounter of 12/11/20   XR CHEST PORTABLE    Narrative Exam: XR CHEST PORTABLE    History:  pain     Technique: AP portable view of the chest obtained. Comparison: None    Chest x-ray portable   Findings: The cardiomediastinal silhouette is within normal limits. There are no infiltrates, consolidations or effusions. Bones of the thorax appear intact. Impression No radiographic evidence of acute intrathoracic process. Echo No results found for this or any previous visit.           Assessment/Plan:    Active Hospital Problems    Diagnosis Date Noted    Rectal bleed [K62.5] 12/17/2020    Diverticulitis [K57.92] 12/17/2020    Type 2 diabetes mellitus, without long-term current use of insulin ([facility] [E11.9] 12/17/2020    HTN (hypertension) [I10] 12/17/2020    Other hyperlipidemia [E78.49] 12/17/2020    Anxiety [F41.9] 12/17/2020    [facility] [I21.9] 12/12/2020     Principal Problem:  1) Rectal bleed/GI bleed: Bright red blood loss of approximately 1500 mL after constipation and straining to have a bowel movement. H&H dropped from 15.8 down to 11.7. Patient became symptomatic with hypotension, weakness, pale in appearance. Fluids given and blood pressure stabilized in ED. 1 unit RBCs ordered stat. We will monitor H&H and further signs and symptoms of GI bleeding. We will consult GI. Patient will be n.p.o. We will continue IV fluids.    12/18 - no gross bleed however h/h continues to drop. Monitor overnight. 12/19 - h/h remains low, will check h/h overnight, if stable dc 12/20    2) Diverticulitis: Abdomen pelvis CT shows diverticulitis. We will consult GI. We will start antibiotics at this time.    3) MI 1 week ago: Status post 1 week MI with stent placement and started on Brilinta. We will consult cardiology. Patient will stay on telemetry monitoring.     Active Problems:  4) Type 2 diabetes mellitus, without long-term current use of insulin: Patient on oral medications to control. Patient n.p.o. We will monitor and cover with medium sliding scale insulin AC at bedtime. 5) HTN: Patient on medications to control. We will hold medications until patient is no longer n.p.o. We will monitor blood pressure regularly and cover with IV medications. 6) Hyperlipidemia: Patient on medications to control. We will hold medications   7) Anxiety: Patient on home medications to control. We will hold medications at this time.   If absolutely needed we will medicate IV.       Electronically signed by Roxann Rivera MD on 12/20/2020 at 12:12 PM

## 2020-12-21 LAB
BLOOD BANK DISPENSE STATUS: NORMAL
BLOOD BANK DISPENSE STATUS: NORMAL
BLOOD BANK PRODUCT CODE: NORMAL
BLOOD BANK PRODUCT CODE: NORMAL
BPU ID: NORMAL
BPU ID: NORMAL
DESCRIPTION BLOOD BANK: NORMAL
DESCRIPTION BLOOD BANK: NORMAL

## 2021-01-27 ENCOUNTER — OFFICE VISIT (OUTPATIENT)
Dept: FAMILY MEDICINE CLINIC | Age: 71
End: 2021-01-27
Payer: MEDICARE

## 2021-01-27 VITALS
OXYGEN SATURATION: 97 % | RESPIRATION RATE: 14 BRPM | BODY MASS INDEX: 25.16 KG/M2 | DIASTOLIC BLOOD PRESSURE: 66 MMHG | HEART RATE: 67 BPM | TEMPERATURE: 97 F | WEIGHT: 151 LBS | SYSTOLIC BLOOD PRESSURE: 132 MMHG | HEIGHT: 65 IN

## 2021-01-27 DIAGNOSIS — E11.9 TYPE 2 DIABETES MELLITUS WITHOUT COMPLICATION, WITHOUT LONG-TERM CURRENT USE OF INSULIN (HCC): ICD-10-CM

## 2021-01-27 DIAGNOSIS — I25.10 CORONARY ARTERY DISEASE INVOLVING NATIVE HEART WITHOUT ANGINA PECTORIS, UNSPECIFIED VESSEL OR LESION TYPE: ICD-10-CM

## 2021-01-27 DIAGNOSIS — F41.9 ANXIETY: ICD-10-CM

## 2021-01-27 DIAGNOSIS — K57.93 GASTROINTESTINAL HEMORRHAGE ASSOCIATED WITH INTESTINAL DIVERTICULITIS: ICD-10-CM

## 2021-01-27 DIAGNOSIS — E11.9 TYPE 2 DIABETES MELLITUS WITHOUT COMPLICATION, WITHOUT LONG-TERM CURRENT USE OF INSULIN (HCC): Primary | ICD-10-CM

## 2021-01-27 LAB
CREATININE URINE: 334.1 MG/DL
HBA1C MFR BLD: 6 %
HCT VFR BLD CALC: 35.3 % (ref 42–52)
HEMOGLOBIN: 11.7 G/DL (ref 14–18)
MCH RBC QN AUTO: 30.3 PG (ref 27–31.3)
MCHC RBC AUTO-ENTMCNC: 33.2 % (ref 33–37)
MCV RBC AUTO: 91.2 FL (ref 80–100)
MICROALBUMIN UR-MCNC: 20.4 MG/DL
MICROALBUMIN/CREAT UR-RTO: 61.1 MG/G (ref 0–30)
PDW BLD-RTO: 14.4 % (ref 11.5–14.5)
PLATELET # BLD: 246 K/UL (ref 130–400)
RBC # BLD: 3.87 M/UL (ref 4.7–6.1)
WBC # BLD: 6.9 K/UL (ref 4.8–10.8)

## 2021-01-27 PROCEDURE — 1123F ACP DISCUSS/DSCN MKR DOCD: CPT | Performed by: INTERNAL MEDICINE

## 2021-01-27 PROCEDURE — 83036 HEMOGLOBIN GLYCOSYLATED A1C: CPT | Performed by: INTERNAL MEDICINE

## 2021-01-27 PROCEDURE — G8427 DOCREV CUR MEDS BY ELIG CLIN: HCPCS | Performed by: INTERNAL MEDICINE

## 2021-01-27 PROCEDURE — 3046F HEMOGLOBIN A1C LEVEL >9.0%: CPT | Performed by: INTERNAL MEDICINE

## 2021-01-27 PROCEDURE — G8417 CALC BMI ABV UP PARAM F/U: HCPCS | Performed by: INTERNAL MEDICINE

## 2021-01-27 PROCEDURE — 99214 OFFICE O/P EST MOD 30 MIN: CPT | Performed by: INTERNAL MEDICINE

## 2021-01-27 PROCEDURE — G8484 FLU IMMUNIZE NO ADMIN: HCPCS | Performed by: INTERNAL MEDICINE

## 2021-01-27 PROCEDURE — 1036F TOBACCO NON-USER: CPT | Performed by: INTERNAL MEDICINE

## 2021-01-27 PROCEDURE — 3017F COLORECTAL CA SCREEN DOC REV: CPT | Performed by: INTERNAL MEDICINE

## 2021-01-27 PROCEDURE — 2022F DILAT RTA XM EVC RTNOPTHY: CPT | Performed by: INTERNAL MEDICINE

## 2021-01-27 PROCEDURE — 4040F PNEUMOC VAC/ADMIN/RCVD: CPT | Performed by: INTERNAL MEDICINE

## 2021-01-27 RX ORDER — COVID-19 ANTIGEN TEST
KIT MISCELLANEOUS
COMMUNITY
End: 2021-09-13

## 2021-01-27 SDOH — ECONOMIC STABILITY: TRANSPORTATION INSECURITY
IN THE PAST 12 MONTHS, HAS LACK OF TRANSPORTATION KEPT YOU FROM MEETINGS, WORK, OR FROM GETTING THINGS NEEDED FOR DAILY LIVING?: NO

## 2021-01-27 SDOH — ECONOMIC STABILITY: TRANSPORTATION INSECURITY
IN THE PAST 12 MONTHS, HAS THE LACK OF TRANSPORTATION KEPT YOU FROM MEDICAL APPOINTMENTS OR FROM GETTING MEDICATIONS?: NO

## 2021-01-27 SDOH — ECONOMIC STABILITY: INCOME INSECURITY: HOW HARD IS IT FOR YOU TO PAY FOR THE VERY BASICS LIKE FOOD, HOUSING, MEDICAL CARE, AND HEATING?: NOT ASKED

## 2021-01-27 SDOH — ECONOMIC STABILITY: FOOD INSECURITY: WITHIN THE PAST 12 MONTHS, YOU WORRIED THAT YOUR FOOD WOULD RUN OUT BEFORE YOU GOT MONEY TO BUY MORE.: NEVER TRUE

## 2021-01-27 ASSESSMENT — ENCOUNTER SYMPTOMS
EYE DISCHARGE: 0
BLOOD IN STOOL: 0
CHEST TIGHTNESS: 0
CONSTIPATION: 0
TROUBLE SWALLOWING: 0
SHORTNESS OF BREATH: 0
ABDOMINAL DISTENTION: 0
ABDOMINAL PAIN: 0
EYE REDNESS: 0
DIARRHEA: 0
NAUSEA: 0
SINUS PAIN: 0
FACIAL SWELLING: 0
EYE PAIN: 0
BACK PAIN: 0
VOICE CHANGE: 0
WHEEZING: 0
COUGH: 0
PHOTOPHOBIA: 0
RECTAL PAIN: 0
COLOR CHANGE: 0
EYE ITCHING: 0
APNEA: 0
SINUS PRESSURE: 0
SORE THROAT: 0
VOMITING: 0
RHINORRHEA: 0

## 2021-01-27 ASSESSMENT — PATIENT HEALTH QUESTIONNAIRE - PHQ9
1. LITTLE INTEREST OR PLEASURE IN DOING THINGS: 1
SUM OF ALL RESPONSES TO PHQ QUESTIONS 1-9: 2
SUM OF ALL RESPONSES TO PHQ9 QUESTIONS 1 & 2: 2
SUM OF ALL RESPONSES TO PHQ QUESTIONS 1-9: 2

## 2021-01-27 NOTE — PROGRESS NOTES
Subjective:      Patient ID: Yevgeniy Adjutant is a 65 y.o. male New patient, here for evaluation of the following chief complaint(s):  Chief Complaint   Patient presents with    Established New Doctor       HPI     68-year-old male with a history of type 2 diabetes established coronary artery disease anxiety and hypertension presents to establish continuity with me as his primary care doctor. The patient voices no complaints at this time. Hx  Of CAD S/P MI-metoprolol, lipitor, plavix          Anxiety-Paxil          DMII-Metformin and jardiance. A1C-8.3 -June of 2020        Nicotine dependence      Diverticular bleed: The patient has not experience additional bleeding since being discharged from the hospital.  Cat : Tiger       At present he denies polyuria,  Polydipsia, constitutional, sinus, visual, cardiopulmonary, urologic, gastrointestinal, immunologic/hematologic, musculoskeletal, neurologic,dermatologic, or psychiatric complaints. Review of Systems   Constitutional: Negative for chills, diaphoresis, fatigue and fever. HENT: Negative for congestion, dental problem, drooling, ear discharge, ear pain, facial swelling, hearing loss, mouth sores, nosebleeds, postnasal drip, rhinorrhea, sinus pressure, sinus pain, sneezing, sore throat, tinnitus, trouble swallowing and voice change. Eyes: Negative for photophobia, pain, discharge, redness, itching and visual disturbance. Respiratory: Negative for apnea, cough, chest tightness, shortness of breath and wheezing. Cardiovascular: Negative for chest pain, palpitations and leg swelling. Gastrointestinal: Negative for abdominal distention, abdominal pain, blood in stool, constipation, diarrhea, nausea, rectal pain and vomiting. Endocrine: Negative for cold intolerance, heat intolerance, polydipsia, polyphagia and polyuria.    Genitourinary: Negative for decreased urine volume, difficulty urinating, dysuria, flank pain, frequency, genital sores, hematuria and urgency. Musculoskeletal: Negative for arthralgias, back pain, gait problem, joint swelling, myalgias, neck pain and neck stiffness. Skin: Negative for color change, rash and wound. Allergic/Immunologic: Negative for environmental allergies and food allergies. Neurological: Negative for dizziness, tremors, seizures, syncope, facial asymmetry, speech difficulty, weakness, light-headedness, numbness and headaches. Hematological: Negative for adenopathy. Does not bruise/bleed easily. Psychiatric/Behavioral: Negative for agitation, confusion, decreased concentration, hallucinations, self-injury, sleep disturbance and suicidal ideas. The patient is not nervous/anxious. Objective:   /66   Pulse 67   Temp 97 °F (36.1 °C)   Resp 14   Ht 5' 5\" (1.651 m)   Wt 151 lb (68.5 kg)   SpO2 97%   BMI 25.13 kg/m²     Physical Exam  Constitutional:       General: He is not in acute distress. Appearance: He is well-developed. HENT:      Head: Normocephalic. Right Ear: External ear normal.      Left Ear: External ear normal.   Eyes:      Conjunctiva/sclera: Conjunctivae normal.   Neck:      Musculoskeletal: Neck supple. Vascular: No JVD. Trachea: No tracheal deviation. Cardiovascular:      Rate and Rhythm: Normal rate and regular rhythm. Heart sounds: Normal heart sounds. Pulmonary:      Effort: Pulmonary effort is normal. No respiratory distress. Breath sounds: Normal breath sounds. No wheezing or rales. Chest:      Chest wall: No tenderness. Abdominal:      General: Bowel sounds are normal. There is no distension. Palpations: Abdomen is soft. There is no mass. Tenderness: There is no abdominal tenderness. There is no guarding or rebound. Musculoskeletal:         General: No tenderness or deformity. Skin:     General: Skin is warm and dry. Coloration: Skin is not pale. Findings: No erythema or rash.    Neurological:      Mental Status: He is alert and oriented to person, place, and time. Motor: No abnormal muscle tone. Psychiatric:         Thought Content: Thought content normal.         Judgment: Judgment normal.         Assessment:       Diagnosis Orders   1. Type 2 diabetes mellitus without complication, without long-term current use of insulin (HCC)  Microalbumin / Creatinine Urine Ratio    KATIA Hernandez DPM, Podiatry, Anam Rojas MD, Ophthalmology Vitreoretinal Specialist, [facility]   2. Anxiety     3. Gastrointestinal hemorrhage associated with intestinal diverticulitis     4. Coronary artery disease involving native heart without angina pectoris, unspecified vessel or lesion type           Plan:      Cece Navarro was seen today for established new doctor. Diagnoses and all orders for this visit:    Type 2 diabetes mellitus without complication, without long-term current use of insulin (HCC)  -     Microalbumin / Creatinine Urine Ratio; Future  -     KATIA - Joby Hernandez DPM, Podiatry, Caroline Bacon MD, Ophthalmology Vitreoretinal Specialist, [facility]       jARDIANCE 10 MG DIAL Y, METFORMIN 500 MG DAILY      Anxiety-Paxil 40 mg po dialy     Gastrointestinal hemorrhage associated with intestinal diverticulitis-obtain a complete blood cell count        Coronary artery disease involving native heart without angina pectoris, unspecified vessel or lesion type-continue plavix , lipitor    Hypertension -lopressor 25 lopressor bid , cozaar diltiazem 120 mg daily,  Metoprolol 25 bid     GERD- PROTONIX 40 MG DAIJL Y    No follow-ups on file. On this date 01/27/21 I have spent 20 minutes reviewing previous notes, test results and face to face with the patient discussing the diagnosis and importance of compliance with the treatment plan.      Goyo Mueller MD    Please note, this report has been partially produced using speech recognition software  and may cause  and /or contain errors related to that system including grammar, punctuation and spelling as well as words and phrases that may seem inappropriate. If there are questions or concerns please feel free to contact me to clarify.

## 2021-02-04 ENCOUNTER — VIRTUAL VISIT (OUTPATIENT)
Dept: FAMILY MEDICINE CLINIC | Age: 71
End: 2021-02-04
Payer: MEDICARE

## 2021-02-04 DIAGNOSIS — K57.93 GASTROINTESTINAL HEMORRHAGE ASSOCIATED WITH INTESTINAL DIVERTICULITIS: ICD-10-CM

## 2021-02-04 DIAGNOSIS — E11.9 TYPE 2 DIABETES MELLITUS WITHOUT COMPLICATION, WITHOUT LONG-TERM CURRENT USE OF INSULIN (HCC): Primary | ICD-10-CM

## 2021-02-04 DIAGNOSIS — F41.9 ANXIETY: ICD-10-CM

## 2021-02-04 DIAGNOSIS — I25.10 CORONARY ARTERY DISEASE INVOLVING NATIVE HEART WITHOUT ANGINA PECTORIS, UNSPECIFIED VESSEL OR LESION TYPE: ICD-10-CM

## 2021-02-04 PROCEDURE — 3044F HG A1C LEVEL LT 7.0%: CPT | Performed by: INTERNAL MEDICINE

## 2021-02-04 PROCEDURE — 1123F ACP DISCUSS/DSCN MKR DOCD: CPT | Performed by: INTERNAL MEDICINE

## 2021-02-04 PROCEDURE — G8484 FLU IMMUNIZE NO ADMIN: HCPCS | Performed by: INTERNAL MEDICINE

## 2021-02-04 PROCEDURE — 4040F PNEUMOC VAC/ADMIN/RCVD: CPT | Performed by: INTERNAL MEDICINE

## 2021-02-04 PROCEDURE — 99213 OFFICE O/P EST LOW 20 MIN: CPT | Performed by: INTERNAL MEDICINE

## 2021-02-04 PROCEDURE — 3017F COLORECTAL CA SCREEN DOC REV: CPT | Performed by: INTERNAL MEDICINE

## 2021-02-04 PROCEDURE — 2022F DILAT RTA XM EVC RTNOPTHY: CPT | Performed by: INTERNAL MEDICINE

## 2021-02-04 PROCEDURE — G8428 CUR MEDS NOT DOCUMENT: HCPCS | Performed by: INTERNAL MEDICINE

## 2021-02-04 PROCEDURE — 1036F TOBACCO NON-USER: CPT | Performed by: INTERNAL MEDICINE

## 2021-02-04 PROCEDURE — G8417 CALC BMI ABV UP PARAM F/U: HCPCS | Performed by: INTERNAL MEDICINE

## 2021-02-04 RX ORDER — OMEPRAZOLE 40 MG/1
40 CAPSULE, DELAYED RELEASE ORAL
Qty: 30 CAPSULE | Refills: 0 | Status: SHIPPED | OUTPATIENT
Start: 2021-02-04 | End: 2021-03-15

## 2021-02-04 ASSESSMENT — PATIENT HEALTH QUESTIONNAIRE - PHQ9
SUM OF ALL RESPONSES TO PHQ9 QUESTIONS 1 & 2: 2
SUM OF ALL RESPONSES TO PHQ QUESTIONS 1-9: 2
1. LITTLE INTEREST OR PLEASURE IN DOING THINGS: 1

## 2021-02-04 ASSESSMENT — ENCOUNTER SYMPTOMS
SHORTNESS OF BREATH: 0
SINUS PRESSURE: 0
RECTAL PAIN: 0
BACK PAIN: 0
COUGH: 0
FACIAL SWELLING: 0
ABDOMINAL DISTENTION: 0
TROUBLE SWALLOWING: 0
EYE PAIN: 0
VOMITING: 0
WHEEZING: 0
EYE ITCHING: 0
ABDOMINAL PAIN: 0
COLOR CHANGE: 0
SORE THROAT: 0
PHOTOPHOBIA: 0
CONSTIPATION: 0
APNEA: 0
CHEST TIGHTNESS: 0
SINUS PAIN: 0
EYE DISCHARGE: 0
VOICE CHANGE: 0
NAUSEA: 0
EYE REDNESS: 0
BLOOD IN STOOL: 0
RHINORRHEA: 0
DIARRHEA: 0

## 2021-02-04 NOTE — PROGRESS NOTES
Subjective:      Patient ID: Josetta Crigler is a 65 y.o. male New patient, here for evaluation of the following chief complaint(s):  Chief Complaint   Patient presents with    Proteinuria       HPI     75-year-old male with a history of type 2 diabetes established coronary artery disease anxiety and hypertension presents for follow-up visit with me as his primary care doctor. The patient voices no complaints at this time. Recent labs revealed chronic anemia. Chronic anemia: Hemoglobin of 11.7 hematocrit 35.3          Hx  Of CAD S/P MI-metoprolol, lipitor, plavix      GERD: The patient is compliant with Prilosec however his GERD symptoms have become more pronounced since discontinuation of his omeprazole. He would like to be switched back to omeprazole at this time. Anxiety-Paxil          DMII-compliant with Metformin. However the patient is out of jardiance. A1C-8.3 -June of 2020        Nicotine dependence-non-smoker      Diverticular bleed: The patient has not experience additional bleeding since being discharged from the hospital.        Cat : Tiger      Favorite : Alisha Cardenas         At present he denies polyuria,  Polydipsia, constitutional, sinus, visual, cardiopulmonary, urologic, gastrointestinal, immunologic/hematologic, musculoskeletal, neurologic,dermatologic, or psychiatric complaints. Review of Systems   Constitutional: Negative for chills, diaphoresis, fatigue and fever. HENT: Negative for congestion, dental problem, drooling, ear discharge, ear pain, facial swelling, hearing loss, mouth sores, nosebleeds, postnasal drip, rhinorrhea, sinus pressure, sinus pain, sneezing, sore throat, tinnitus, trouble swallowing and voice change. Eyes: Negative for photophobia, pain, discharge, redness, itching and visual disturbance. Respiratory: Negative for apnea, cough, chest tightness, shortness of breath and wheezing.     Cardiovascular: Negative for chest pain, palpitations and leg swelling. Gastrointestinal: Negative for abdominal distention, abdominal pain, blood in stool, constipation, diarrhea, nausea, rectal pain and vomiting. Endocrine: Negative for cold intolerance, heat intolerance, polydipsia, polyphagia and polyuria. Genitourinary: Negative for decreased urine volume, difficulty urinating, dysuria, flank pain, frequency, genital sores, hematuria and urgency. Musculoskeletal: Negative for arthralgias, back pain, gait problem, joint swelling, myalgias, neck pain and neck stiffness. Skin: Negative for color change, rash and wound. Allergic/Immunologic: Negative for environmental allergies and food allergies. Neurological: Negative for dizziness, tremors, seizures, syncope, facial asymmetry, speech difficulty, weakness, light-headedness, numbness and headaches. Hematological: Negative for adenopathy. Does not bruise/bleed easily. Psychiatric/Behavioral: Negative for agitation, confusion, decreased concentration, hallucinations, self-injury, sleep disturbance and suicidal ideas. The patient is not nervous/anxious. Objective: There were no vitals taken for this visit. Assessment:       Diagnosis Orders   1. Type 2 diabetes mellitus without complication, without long-term current use of insulin ([facility] [address]     2. Anxiety     3. Gastrointestinal hemorrhage associated with intestinal diverticulitis     4. Coronary artery disease involving native heart without angina pectoris, unspecified vessel or lesion type           Plan:      Blake Barrios was seen today for established new doctor.     Diagnoses and all orders for this visit:    Type 2 diabetes mellitus without complication, without long-term current use of insulin (HCC)  -     Microalbumin / Creatinine Urine Ratio elevated   -     AFL - Lila Durant DPM, Nathaniel Kaminski MD, Ophthalmology Vitreoretinal Specialist, [address] 10 MG DIAL Y, METFORMIN 500 MG DAILY      Anxiety-Paxil 40 mg po dialy         Gastrointestinal hemorrhage associated with intestinal diverticulitis-monitor H&H. It is stable at this time. Gastroesophageal reflux disease: Discontinue Prilosec and resume omeprazole    Coronary artery disease involving native heart without angina pectoris, unspecified vessel or lesion type-continue plavix , lipitor    Hypertension -lopressor 25 lopressor bid , cozaar diltiazem 120 mg daily,  Metoprolol 25 bid     GERD- PROTONIX 40 MG DAIJL Y    Return in about 3 months (around 5/4/2021). On this date 02/04/21 I have spent 20 minutes reviewing previous notes, test results and face to face with the patient discussing the diagnosis and importance of compliance with the treatment plan. Wendy De Leon MD      TELEHEALTH EVALUATION -- Audio/Visual (During YQRLA-75 public health emergency)    -   Priscilla Bloom is a 65 y.o. male being evaluated by a Virtual Visit (video visit) encounter to address concerns as mentioned above. A caregiver was present when appropriate. Due to this being a TeleHealth encounter (During EFYOE-73 public health emergency), evaluation of the following organ systems was limited: Vitals/Constitutional/EENT/Resp/CV/GI//MS/Neuro/Skin/Heme-Lymph-Imm. Pursuant to the emergency declaration under the [address] [place] [address] and the [employer] and [employer] Act, this Virtual Visit was conducted with patient's (and/or legal guardian's) consent, to reduce the patient's risk of exposure to COVID-19 and provide necessary medical care. The patient (and/or legal guardian) has also been advised to contact this office for worsening conditions or problems, and seek emergency medical treatment and/or call 911 if deemed necessary. Services were provided through a video synchronous discussion virtually to substitute for in-person clinic visit.  Type of encounter was __ Doxy __ MyChart ___Facetime    Patient was located at their home. Provider was located at their ___ home or        ____ office. Kam Pickard is a 65 y.o. male evaluated via telephone on 2/4/2021. Consent:  He and/or health care decision maker is aware that that he may receive a bill for this telephone service, depending on his insurance coverage, and has provided verbal consent to proceed: Yes      Documentation:  I communicated with the patient and/or health care decision maker about proteinuria and anemia. Details of this discussion including any medical advice provided: Please refer to note above      I affirm this is a Patient Initiated Episode with a Patient who has not had a related appointment within my department in the past 7 days or scheduled within the next 24 hours. Patient identification was verified at the start of the visit: Yes    Total Time: minutes: 5-10 minutes    Note: not billable if this call serves to triage the patient into an appointment for the relevant concern      Rosalee Moffett MD on 2/4/2021 at 4:47 PM    An electronic signature was used to authenticate this note. Please note, this report has been partially produced using speech recognition software  and may cause  and /or contain errors related to that system including grammar, punctuation and spelling as well as words and phrases that may seem inappropriate. If there are questions or concerns please feel free to contact me to clarify.

## 2021-02-05 ENCOUNTER — TELEPHONE (OUTPATIENT)
Dept: FAMILY MEDICINE CLINIC | Age: 71
End: 2021-02-05

## 2021-02-15 ENCOUNTER — TELEPHONE (OUTPATIENT)
Dept: FAMILY MEDICINE CLINIC | Age: 71
End: 2021-02-15

## 2021-02-17 RX ORDER — PAROXETINE HYDROCHLORIDE 40 MG/1
40 TABLET, FILM COATED ORAL DAILY
Qty: 30 TABLET | Refills: 2 | Status: SHIPPED | OUTPATIENT
Start: 2021-02-17 | End: 2021-04-17 | Stop reason: SDUPTHER

## 2021-02-22 ENCOUNTER — VIRTUAL VISIT (OUTPATIENT)
Dept: CARDIOLOGY CLINIC | Age: 71
End: 2021-02-22
Payer: MEDICARE

## 2021-02-22 DIAGNOSIS — I10 HYPERTENSION, UNSPECIFIED TYPE: ICD-10-CM

## 2021-02-22 DIAGNOSIS — Z98.61 POST PTCA: ICD-10-CM

## 2021-02-22 DIAGNOSIS — I21.4 ACUTE NON-ST ELEVATION MYOCARDIAL INFARCTION (NSTEMI) (HCC): ICD-10-CM

## 2021-02-22 PROCEDURE — 99443 PR PHYS/QHP TELEPHONE EVALUATION 21-30 MIN: CPT | Performed by: INTERNAL MEDICINE

## 2021-02-22 RX ORDER — DILTIAZEM HYDROCHLORIDE 120 MG/1
120 CAPSULE, COATED, EXTENDED RELEASE ORAL DAILY
Qty: 30 CAPSULE | Refills: 1 | Status: SHIPPED | OUTPATIENT
Start: 2021-02-22 | End: 2021-04-24 | Stop reason: SDUPTHER

## 2021-02-22 RX ORDER — LOSARTAN POTASSIUM 25 MG/1
25 TABLET ORAL 2 TIMES DAILY
Qty: 30 TABLET | Refills: 3 | Status: SHIPPED | OUTPATIENT
Start: 2021-02-22 | End: 2021-02-24 | Stop reason: SDUPTHER

## 2021-02-22 NOTE — TELEPHONE ENCOUNTER
Requesting medication refill.  Please approve or deny this request.    Rx requested:  Requested Prescriptions     Pending Prescriptions Disp Refills    losartan (COZAAR) 25 MG tablet 30 tablet 3     Sig: Take 1 tablet by mouth 2 times daily    dilTIAZem (CARDIZEM CD) 120 MG extended release capsule 30 capsule 1     Sig: Take 1 capsule by mouth daily Take 120 mg by mouth daily       Last Office Visit:   2/4/2021    Last Filled:      Last Labs:      Next Visit Date:  Future Appointments   Date Time Provider Jeff Martinez   3/29/2021 10:45 AM Danay Vicente  [place], [place]

## 2021-02-22 NOTE — PROGRESS NOTES
Jose Alberto Goddard is a 65 y.o. male evaluated via telephone on 2/22/2021.        2/22/21 (VIRTUAL): For follow-up of recent hospitalization. Acute MI. He had PCI of proximal to mid RCA with RED. Moderate diffuse disease of circumflex and LAD. EF fraction 55%. He claims he has no angina no congestive heart failure. He is diabetic on chronic Plavix therapy. Also on diltiazem 120 daily. Continue same medications and see me in 3 months          Consent:  He and/or health care decision maker is aware that that he may receive a bill for this telephone service, depending on his insurance coverage, and has provided verbal consent to proceed: Yes      Documentation:  I communicated with the patient and/or health care decision maker about    Details of this discussion including any medical advice provided: Yes      I affirm this is a Patient Initiated Episode with a Patient who has not had a related appointment within my department in the past 7 days or scheduled within the next 24 hours.     Patient identification was verified at the start of the visit: Yes    Total Time: minutes: 21-30 minutes    Note: not billable if this call serves to triage the patient into an appointment for the relevant concern      Quentin Abdi MD

## 2021-02-24 RX ORDER — LOSARTAN POTASSIUM 25 MG/1
25 TABLET ORAL 2 TIMES DAILY
Qty: 60 TABLET | Refills: 3 | Status: SHIPPED | OUTPATIENT
Start: 2021-02-24 | End: 2021-03-08

## 2021-03-08 RX ORDER — LOSARTAN POTASSIUM 25 MG/1
TABLET ORAL
Qty: 180 TABLET | Refills: 3 | Status: SHIPPED | OUTPATIENT
Start: 2021-03-08 | End: 2022-03-06 | Stop reason: SDUPTHER

## 2021-03-11 ENCOUNTER — PATIENT MESSAGE (OUTPATIENT)
Dept: FAMILY MEDICINE CLINIC | Age: 71
End: 2021-03-11

## 2021-03-15 RX ORDER — OMEPRAZOLE 40 MG/1
CAPSULE, DELAYED RELEASE ORAL
Qty: 30 CAPSULE | Refills: 0 | Status: SHIPPED | OUTPATIENT
Start: 2021-03-15 | End: 2021-04-12 | Stop reason: SDUPTHER

## 2021-03-16 RX ORDER — CLOPIDOGREL BISULFATE 75 MG/1
75 TABLET ORAL DAILY
Qty: 30 TABLET | Refills: 3 | Status: SHIPPED | OUTPATIENT
Start: 2021-03-16 | End: 2021-04-17 | Stop reason: SDUPTHER

## 2021-03-16 NOTE — TELEPHONE ENCOUNTER
From: James Monreal  To: Maria C Calloway MD  Sent: 3/11/2021 5:40 PM EST  Subject: Prescription Question    Doc Ean Hung, I need refills on the following: Clopidogrel 75mg; Omeprazole 40mg; Metoprolol Tartrate 25mg. Please send to [facility] on [address] [place] [address] in [facility].

## 2021-04-12 RX ORDER — OMEPRAZOLE 40 MG/1
40 CAPSULE, DELAYED RELEASE ORAL DAILY
Qty: 30 CAPSULE | Refills: 0 | Status: SHIPPED | OUTPATIENT
Start: 2021-04-12 | End: 2021-05-11 | Stop reason: SDUPTHER

## 2021-04-17 NOTE — TELEPHONE ENCOUNTER
Rx requested:  Requested Prescriptions     Pending Prescriptions Disp Refills    PARoxetine (PAXIL) 40 MG tablet 30 tablet 2     Sig: Take 1 tablet by mouth daily    clopidogrel (PLAVIX) 75 MG tablet 30 tablet 3     Sig: Take 1 tablet by mouth daily       Last Office Visit:   2/4/2021        Next Visit Date:  Future Appointments   Date Time Provider Jeff Martinez   4/30/2021  1:30 PM James Alford MD [address] [place] [address]

## 2021-04-18 RX ORDER — PAROXETINE HYDROCHLORIDE 40 MG/1
40 TABLET, FILM COATED ORAL DAILY
Qty: 30 TABLET | Refills: 2 | Status: SHIPPED | OUTPATIENT
Start: 2021-04-18 | End: 2021-04-26 | Stop reason: SDUPTHER

## 2021-04-18 RX ORDER — CLOPIDOGREL BISULFATE 75 MG/1
75 TABLET ORAL DAILY
Qty: 30 TABLET | Refills: 0 | Status: SHIPPED | OUTPATIENT
Start: 2021-04-18 | End: 2021-05-11 | Stop reason: SDUPTHER

## 2021-04-18 RX ORDER — CLOPIDOGREL BISULFATE 75 MG/1
75 TABLET ORAL DAILY
Qty: 30 TABLET | Refills: 3 | Status: SHIPPED | OUTPATIENT
Start: 2021-04-18 | Stop reason: SDUPTHER

## 2021-04-18 NOTE — TELEPHONE ENCOUNTER
Rx requested:  Requested Prescriptions     Pending Prescriptions Disp Refills    clopidogrel (PLAVIX) 75 MG tablet [Pharmacy Med Name: CLOPIDOGREL 75MG TABLETS] 90 tablet      Sig: TAKE 1 TABLET BY MOUTH DAILY       Last Office Visit:   2/4/2021        Next Visit Date:  Future Appointments   Date Time Provider Jeff Martinez   4/30/2021  1:30 PM Moshe Kirkland  [facility] [place] [facility],[place] [facility]

## 2021-04-20 ENCOUNTER — TELEPHONE (OUTPATIENT)
Dept: FAMILY MEDICINE CLINIC | Age: 71
End: 2021-04-20

## 2021-04-20 NOTE — TELEPHONE ENCOUNTER
Unable to LM due to VM being full.     Called to schedule AWV with Howard Posadas (she has much earlier apts)

## 2021-04-26 RX ORDER — PAROXETINE HYDROCHLORIDE 40 MG/1
40 TABLET, FILM COATED ORAL DAILY
Qty: 30 TABLET | Refills: 3 | Status: SHIPPED | OUTPATIENT
Start: 2021-04-26 | End: 2021-05-25 | Stop reason: SDUPTHER

## 2021-04-26 RX ORDER — ATORVASTATIN CALCIUM 40 MG/1
40 TABLET, FILM COATED ORAL NIGHTLY
Qty: 30 TABLET | Refills: 3 | Status: SHIPPED | OUTPATIENT
Start: 2021-04-26 | End: 2021-04-27

## 2021-04-26 RX ORDER — DILTIAZEM HYDROCHLORIDE 120 MG/1
120 CAPSULE, COATED, EXTENDED RELEASE ORAL DAILY
Qty: 30 CAPSULE | Refills: 1 | Status: SHIPPED | OUTPATIENT
Start: 2021-04-26 | End: 2021-04-26

## 2021-04-26 RX ORDER — DILTIAZEM HYDROCHLORIDE 120 MG/1
CAPSULE, COATED, EXTENDED RELEASE ORAL
Qty: 90 CAPSULE | Refills: 0 | Status: SHIPPED | OUTPATIENT
Start: 2021-04-26 | End: 2021-07-27

## 2021-04-27 RX ORDER — ATORVASTATIN CALCIUM 40 MG/1
TABLET, FILM COATED ORAL
Qty: 90 TABLET | Refills: 3 | Status: SHIPPED | OUTPATIENT
Start: 2021-04-27 | End: 2022-04-26 | Stop reason: SDUPTHER

## 2021-05-11 RX ORDER — OMEPRAZOLE 40 MG/1
40 CAPSULE, DELAYED RELEASE ORAL DAILY
Qty: 30 CAPSULE | Refills: 0 | Status: SHIPPED | OUTPATIENT
Start: 2021-05-11 | End: 2021-05-11

## 2021-05-11 RX ORDER — CLOPIDOGREL BISULFATE 75 MG/1
75 TABLET ORAL DAILY
Qty: 30 TABLET | Refills: 0 | Status: SHIPPED | OUTPATIENT
Start: 2021-05-11 | End: 2021-06-17

## 2021-05-12 RX ORDER — OMEPRAZOLE 40 MG/1
40 CAPSULE, DELAYED RELEASE ORAL DAILY
Qty: 90 CAPSULE | Refills: 1 | Status: SHIPPED | OUTPATIENT
Start: 2021-05-12 | End: 2021-11-09

## 2021-05-25 RX ORDER — PAROXETINE HYDROCHLORIDE 40 MG/1
40 TABLET, FILM COATED ORAL DAILY
Qty: 30 TABLET | Refills: 3 | Status: SHIPPED | OUTPATIENT
Start: 2021-05-25 | End: 2021-10-03

## 2021-06-17 RX ORDER — CLOPIDOGREL BISULFATE 75 MG/1
75 TABLET ORAL DAILY
Qty: 30 TABLET | Refills: 0 | Status: SHIPPED | OUTPATIENT
Start: 2021-06-17 | End: 2021-11-09

## 2021-07-27 RX ORDER — DILTIAZEM HYDROCHLORIDE 120 MG/1
CAPSULE, COATED, EXTENDED RELEASE ORAL
Qty: 90 CAPSULE | Refills: 0 | Status: SHIPPED | OUTPATIENT
Start: 2021-07-27 | End: 2021-10-28

## 2021-08-26 ENCOUNTER — OFFICE VISIT (OUTPATIENT)
Dept: FAMILY MEDICINE CLINIC | Age: 71
End: 2021-08-26
Payer: MEDICARE

## 2021-08-26 VITALS
BODY MASS INDEX: 27.66 KG/M2 | HEART RATE: 56 BPM | OXYGEN SATURATION: 98 % | WEIGHT: 166 LBS | HEIGHT: 65 IN | RESPIRATION RATE: 14 BRPM | SYSTOLIC BLOOD PRESSURE: 110 MMHG | DIASTOLIC BLOOD PRESSURE: 70 MMHG

## 2021-08-26 DIAGNOSIS — I10 ESSENTIAL HYPERTENSION: ICD-10-CM

## 2021-08-26 DIAGNOSIS — L98.9 SKIN LESION: ICD-10-CM

## 2021-08-26 DIAGNOSIS — K57.93 GASTROINTESTINAL HEMORRHAGE ASSOCIATED WITH INTESTINAL DIVERTICULITIS: ICD-10-CM

## 2021-08-26 DIAGNOSIS — E11.9 TYPE 2 DIABETES MELLITUS WITHOUT COMPLICATION, WITHOUT LONG-TERM CURRENT USE OF INSULIN (HCC): Primary | ICD-10-CM

## 2021-08-26 DIAGNOSIS — I25.10 CORONARY ARTERY DISEASE INVOLVING NATIVE HEART WITHOUT ANGINA PECTORIS, UNSPECIFIED VESSEL OR LESION TYPE: ICD-10-CM

## 2021-08-26 DIAGNOSIS — F41.9 ANXIETY: ICD-10-CM

## 2021-08-26 LAB — HBA1C MFR BLD: 11.2 %

## 2021-08-26 PROCEDURE — 1123F ACP DISCUSS/DSCN MKR DOCD: CPT | Performed by: INTERNAL MEDICINE

## 2021-08-26 PROCEDURE — 4040F PNEUMOC VAC/ADMIN/RCVD: CPT | Performed by: INTERNAL MEDICINE

## 2021-08-26 PROCEDURE — 99214 OFFICE O/P EST MOD 30 MIN: CPT | Performed by: INTERNAL MEDICINE

## 2021-08-26 PROCEDURE — 2022F DILAT RTA XM EVC RTNOPTHY: CPT | Performed by: INTERNAL MEDICINE

## 2021-08-26 PROCEDURE — 3017F COLORECTAL CA SCREEN DOC REV: CPT | Performed by: INTERNAL MEDICINE

## 2021-08-26 PROCEDURE — G8427 DOCREV CUR MEDS BY ELIG CLIN: HCPCS | Performed by: INTERNAL MEDICINE

## 2021-08-26 PROCEDURE — G8417 CALC BMI ABV UP PARAM F/U: HCPCS | Performed by: INTERNAL MEDICINE

## 2021-08-26 PROCEDURE — 3046F HEMOGLOBIN A1C LEVEL >9.0%: CPT | Performed by: INTERNAL MEDICINE

## 2021-08-26 PROCEDURE — 83036 HEMOGLOBIN GLYCOSYLATED A1C: CPT | Performed by: INTERNAL MEDICINE

## 2021-08-26 PROCEDURE — 1036F TOBACCO NON-USER: CPT | Performed by: INTERNAL MEDICINE

## 2021-08-26 RX ORDER — GLIPIZIDE 5 MG/1
5 TABLET ORAL 2 TIMES DAILY
Qty: 60 TABLET | Refills: 3 | Status: SHIPPED | OUTPATIENT
Start: 2021-08-26 | End: 2021-08-26

## 2021-08-26 RX ORDER — GLIPIZIDE 5 MG/1
TABLET ORAL
Qty: 180 TABLET | Refills: 1 | Status: SHIPPED | OUTPATIENT
Start: 2021-08-26

## 2021-08-26 ASSESSMENT — ENCOUNTER SYMPTOMS
BACK PAIN: 0
ABDOMINAL DISTENTION: 0
PHOTOPHOBIA: 0
WHEEZING: 0
BLOOD IN STOOL: 0
TROUBLE SWALLOWING: 0
VOMITING: 0
EYE PAIN: 0
EYE REDNESS: 0
CHEST TIGHTNESS: 0
RHINORRHEA: 0
SINUS PAIN: 0
ABDOMINAL PAIN: 0
APNEA: 0
RECTAL PAIN: 0
DIARRHEA: 0
EYE DISCHARGE: 0
CONSTIPATION: 0
NAUSEA: 0
FACIAL SWELLING: 0
VOICE CHANGE: 0
EYE ITCHING: 0
SINUS PRESSURE: 0
COLOR CHANGE: 0
SORE THROAT: 0
SHORTNESS OF BREATH: 0
COUGH: 0

## 2021-08-26 NOTE — PROGRESS NOTES
palpitations and leg swelling. Gastrointestinal: Negative for abdominal distention, abdominal pain, blood in stool, constipation, diarrhea, nausea, rectal pain and vomiting. Endocrine: Negative for cold intolerance, heat intolerance, polydipsia, polyphagia and polyuria. Genitourinary: Negative for decreased urine volume, difficulty urinating, dysuria, flank pain, frequency, genital sores, hematuria and urgency. Musculoskeletal: Negative for arthralgias, back pain, gait problem, joint swelling, myalgias, neck pain and neck stiffness. Skin: Negative for color change, rash and wound. Allergic/Immunologic: Negative for environmental allergies and food allergies. Neurological: Negative for dizziness, tremors, seizures, syncope, facial asymmetry, speech difficulty, weakness, light-headedness, numbness and headaches. Hematological: Negative for adenopathy. Does not bruise/bleed easily. Psychiatric/Behavioral: Negative for agitation, confusion, decreased concentration, hallucinations, self-injury, sleep disturbance and suicidal ideas. The patient is not nervous/anxious. Objective:   /70   Pulse 56   Resp 14   Ht 5' 5\" (1.651 m)   Wt 166 lb (75.3 kg)   SpO2 98%   BMI 27.62 kg/m²     Physical Exam  Constitutional:       General: He is not in acute distress. Appearance: He is well-developed. HENT:      Head: Normocephalic. Right Ear: External ear normal.      Left Ear: External ear normal.   Eyes:      Conjunctiva/sclera: Conjunctivae normal.   Neck:      Vascular: No JVD. Trachea: No tracheal deviation. Cardiovascular:      Rate and Rhythm: Normal rate and regular rhythm. Heart sounds: Normal heart sounds. Pulmonary:      Effort: Pulmonary effort is normal. No respiratory distress. Breath sounds: Normal breath sounds. No wheezing or rales. Chest:      Chest wall: No tenderness. Abdominal:      General: Bowel sounds are normal. There is no distension. importance of compliance with the treatment plan. Blanca Lema MD    Please note, this report has been partially produced using speech recognition software  and may cause  and /or contain errors related to that system including grammar, punctuation and spelling as well as words and phrases that may seem inappropriate. If there are questions or concerns please feel free to contact me to clarify.

## 2021-09-07 ENCOUNTER — VIRTUAL VISIT (OUTPATIENT)
Dept: FAMILY MEDICINE CLINIC | Age: 71
End: 2021-09-07
Payer: MEDICARE

## 2021-09-07 DIAGNOSIS — Z00.00 ROUTINE GENERAL MEDICAL EXAMINATION AT A HEALTH CARE FACILITY: Primary | ICD-10-CM

## 2021-09-07 PROCEDURE — G0438 PPPS, INITIAL VISIT: HCPCS | Performed by: INTERNAL MEDICINE

## 2021-09-07 PROCEDURE — 4040F PNEUMOC VAC/ADMIN/RCVD: CPT | Performed by: INTERNAL MEDICINE

## 2021-09-07 PROCEDURE — 3017F COLORECTAL CA SCREEN DOC REV: CPT | Performed by: INTERNAL MEDICINE

## 2021-09-07 PROCEDURE — 1123F ACP DISCUSS/DSCN MKR DOCD: CPT | Performed by: INTERNAL MEDICINE

## 2021-09-07 ASSESSMENT — PATIENT HEALTH QUESTIONNAIRE - PHQ9
1. LITTLE INTEREST OR PLEASURE IN DOING THINGS: 0
SUM OF ALL RESPONSES TO PHQ9 QUESTIONS 1 & 2: 0
SUM OF ALL RESPONSES TO PHQ QUESTIONS 1-9: 2
1. LITTLE INTEREST OR PLEASURE IN DOING THINGS: 1
2. FEELING DOWN, DEPRESSED OR HOPELESS: 0
SUM OF ALL RESPONSES TO PHQ9 QUESTIONS 1 & 2: 2
SUM OF ALL RESPONSES TO PHQ QUESTIONS 1-9: 0
SUM OF ALL RESPONSES TO PHQ QUESTIONS 1-9: 0
2. FEELING DOWN, DEPRESSED OR HOPELESS: 1
SUM OF ALL RESPONSES TO PHQ QUESTIONS 1-9: 2
SUM OF ALL RESPONSES TO PHQ QUESTIONS 1-9: 2
SUM OF ALL RESPONSES TO PHQ QUESTIONS 1-9: 0

## 2021-09-07 ASSESSMENT — LIFESTYLE VARIABLES
HOW OFTEN DO YOU HAVE A DRINK CONTAINING ALCOHOL: NEVER
AUDIT-C TOTAL SCORE: INCOMPLETE
HOW OFTEN DO YOU HAVE A DRINK CONTAINING ALCOHOL: 0
AUDIT TOTAL SCORE: INCOMPLETE

## 2021-09-07 NOTE — PROGRESS NOTES
Medicare Annual Wellness Visit  Are Name: Maya Stallings Date: 2021   MRN: [id number] Sex: Male   Age: 66 y.o. Ethnicity: Non- / Non    : 1950 Race: White (non-)      Yani Giordano is here for Medicare AWV    Screenings for behavioral, psychosocial and functional/safety risks, and cognitive dysfunction are all negative except as indicated below. These results, as well as other patient data from the [address] form, are documented in Flowsheets linked to this Encounter. No Known Allergies    Prior to Visit Medications    Medication Sig Taking? Authorizing Provider   glipiZIDE (GLUCOTROL) 5 MG tablet TAKE 1 TABLET BY MOUTH TWICE DAILY  Kaity Ag MD   metFORMIN (GLUCOPHAGE) 1000 MG tablet TAKE 1 TABLET BY MOUTH TWICE DAILY WITH MEALS  Kaity Ag MD   dilTIAZem (CARDIZEM CD) 120 MG extended release capsule TAKE 1 CAPSULE(120 MG) BY MOUTH DAILY  Kaity Ag MD   clopidogrel (PLAVIX) 75 MG tablet TAKE 1 TABLET BY MOUTH DAILY  Kaity Ag MD   PARoxetine (PAXIL) 40 MG tablet Take 1 tablet by mouth daily  Kaity Ag MD   omeprazole (PRILOSEC) 40 MG delayed release capsule TAKE 1 CAPSULE BY MOUTH DAILY  Kaity Ag MD   empagliflozin (JARDIANCE) 10 MG tablet Take 1 tablet by mouth daily (with breakfast)  Patient not taking: Reported on 2021  Kaity Ag MD   atorvastatin (LIPITOR) 40 MG tablet TAKE 1 TABLET BY MOUTH EVERY NIGHT  Kaity Ag MD   clopidogrel (PLAVIX) 75 MG tablet Take 1 tablet by mouth daily  Kaity Ag MD   metoprolol tartrate (LOPRESSOR) 25 MG tablet Take 1 tablet by mouth 2 times daily  Kaity Ag MD   losartan (COZAAR) 25 MG tablet TAKE 1 TABLET BY MOUTH TWICE DAILY  Kaity Ag MD   Naproxen Sodium (ALEVE) 220 MG CAPS Take by mouth  Historical Provider, MD   nitroGLYCERIN (NITROSTAT) 0.4 MG SL tablet up to max of 3 total doses. If no relief after 1 dose, call 911.   Dorothy Richard MD Past Medical History:   Diagnosis Date    Cancer [facility])     skin left neck    Chronic bronchitis (HCC)     COPD (chronic obstructive pulmonary disease) (HCC)     Diabetes mellitus (HCC)     Diverticulitis     [facility]ma [facility] lung ([facility] [address]     Heart attack ([facility] [address]     History of blood transfusion     Hyperlipidemia     Hypertension     Meniere's disease     Vertigo        Past Surgical History:   Procedure Laterality Date    CARDIAC SURGERY      stent    SKIN BIOPSY      left neck       No family history on file. CareTeam (Including outside providers/suppliers regularly involved in providing care):   Patient Care Team:  Fidelina Brower MD as PCP - General (Internal Medicine)  Fidelina Brower MD as PCP - [facility][1] Provider    Wt Readings from Last 3 Encounters:   08/26/21 166 lb (75.3 kg)   01/27/21 151 lb (68.5 kg)   12/17/20 170 lb (77.1 kg)      Patient-Reported Vitals 2/17/2021   Patient-Reported Weight 151 lbs   Patient-Reported Height 5'5\"   Patient-Reported Systolic 393   Patient-Reported Diastolic 84   Patient-Reported Pulse 65   Patient-Reported Temperature 97.9   Patient-Reported SpO2 97      There is no height or weight on file to calculate BMI. Based upon direct observation of the patient, evaluation of cognition reveals recent and remote memory intact. Telephone encpounter    Patient's complete Health Risk Assessment and screening values have been reviewed and are found in Flowsheets. The following problems were reviewed today and where indicated follow up appointments were made and/or referrals ordered. Positive Risk Factor Screenings with Interventions:          General Health and ACP:  General  In general, how would you say your health is?: Fair  In the past 7 days, have you experienced any of the following?  New or Increased Pain, New or Increased Fatigue, Loneliness, Social Isolation, Stress or Anger?: None of These  Do you get the social and emotional support that you need?: Yes  Do you have a Living Will?: (!) No  Advance Directives     Power of [address] Will ACP-Advance Directive ACP-Power of     Not on File Not on File Not on File Not on File      General Health Risk Interventions:  · none    Health Habits/Nutrition:  Health Habits/Nutrition  Do you exercise for at least 20 minutes 2-3 times per week?: (!) No  Have you lost any weight without trying in the past 3 months?: No  Do you eat only one meal per day?: No  Have you seen the dentist within the past year?: N/A - wear dentures     Health Habits/Nutrition Interventions:  · none    Hearing/Vision:  No exam data present  Hearing/Vision  Do you or your family notice any trouble with your hearing that hasn't been managed with hearing aids?: No  Do you have difficulty driving, watching TV, or doing any of your daily activities because of your eyesight?: No  Have you had an eye exam within the past year?: (!) No  Hearing/Vision Interventions:  · none      Personalized Preventive Plan   Current Health Maintenance Status  Immunization History   Administered Date(s) Administered    COVID-19, Moderna, PF, 100mcg/0.5mL 04/20/2021, 05/18/2021    Influenza A ([id number]),all Formulations 01/14/2010    Influenza Virus Vaccine 10/17/2012, 11/01/2012, 02/28/2014, 09/22/2014, 10/15/2015, 12/24/2015, 10/17/2016    Pneumococcal Conjugate 13-valent (Link Cords) 01/01/2013, 12/24/2015    Pneumococcal Polysaccharide ([id number]) 09/06/2011, 09/25/2013    Pneumococcal Vaccine 09/06/2011    Tdap (Boostrix, Adacel) 09/25/2013, 05/01/2017        Health Maintenance   Topic Date Due    Diabetic retinal exam  Never done    Shingles Vaccine (1 of 2) Never done   [employer] Visit (AWV)  Never done    Flu vaccine (1) 09/01/2021    A1C test (Diabetic or Prediabetic)  11/26/2021    Low dose CT lung screening  11/28/2021    Lipid screen  12/12/2021    Potassium monitoring  12/18/2021    Creatinine monitoring  12/18/2021    individual homes. --Radha Miles MD on 9/7/2021 at 3:10 PM    An electronic signature was used to authenticate this note.

## 2021-09-07 NOTE — PATIENT INSTRUCTIONS
Personalized Preventive Plan for Leslie Robles - 9/7/2021  Medicare offers a range of preventive health benefits. Some of the tests and screenings are paid in full while other may be subject to a deductible, co-insurance, and/or copay. Some of these benefits include a comprehensive review of your medical history including lifestyle, illnesses that may run in your family, and various assessments and screenings as appropriate. After reviewing your medical record and screening and assessments performed today your provider may have ordered immunizations, labs, imaging, and/or referrals for you. A list of these orders (if applicable) as well as your Preventive Care list are included within your After Visit Summary for your review. Other Preventive Recommendations:    · A preventive eye exam performed by an eye specialist is recommended every 1-2 years to screen for glaucoma; cataracts, macular degeneration, and other eye disorders. · A preventive dental visit is recommended every 6 months. · Try to get at least 150 minutes of exercise per week or 10,000 steps per day on a pedometer . · Order or download the FREE \"Exercise & Physical Activity: Your Everyday Guide\" from [employer]. Call 2-792.718.2279 or search [employer] online. · You need 0834-1705 mg of calcium and 6812-5327 IU of vitamin D per day. It is possible to meet your calcium requirement with diet alone, but a vitamin D supplement is usually necessary to meet this goal.  · When exposed to the sun, use a sunscreen that protects against both UVA and UVB radiation with an SPF of 30 or greater. Reapply every 2 to 3 hours or after sweating, drying off with a towel, or swimming. · Always wear a seat belt when traveling in a car. Always wear a helmet when riding a bicycle or motorcycle. Personalized Preventive Plan for Leslei Robles - 9/7/2021  Medicare offers a range of preventive health benefits.  Some of the tests and screenings are paid in full while other may be subject to a deductible, co-insurance, and/or copay. Some of these benefits include a comprehensive review of your medical history including lifestyle, illnesses that may run in your family, and various assessments and screenings as appropriate. After reviewing your medical record and screening and assessments performed today your provider may have ordered immunizations, labs, imaging, and/or referrals for you. A list of these orders (if applicable) as well as your Preventive Care list are included within your After Visit Summary for your review. Other Preventive Recommendations:    A preventive eye exam performed by an eye specialist is recommended every 1-2 years to screen for glaucoma; cataracts, macular degeneration, and other eye disorders. A preventive dental visit is recommended every 6 months. Try to get at least 150 minutes of exercise per week or 10,000 steps per day on a pedometer . Order or download the FREE \"Exercise & Physical Activity: Your Everyday Guide\" from [employer]. Call 2-204.472.1045 or search [employer] online. You need 1972-5822 mg of calcium and 1206-3022 IU of vitamin D per day. It is possible to meet your calcium requirement with diet alone, but a vitamin D supplement is usually necessary to meet this goal.  When exposed to the sun, use a sunscreen that protects against both UVA and UVB radiation with an SPF of 30 or greater. Reapply every 2 to 3 hours or after sweating, drying off with a towel, or swimming. Always wear a seat belt when traveling in a car. Always wear a helmet when riding a bicycle or motorcycle. Personalized Preventive Plan for [employer] - 9/7/2021  Medicare offers a range of preventive health benefits. Some of the tests and screenings are paid in full while other may be subject to a deductible, co-insurance, and/or copay.     Some of these benefits include a comprehensive review of your medical history including lifestyle, illnesses that may run in your family, and various assessments and screenings as appropriate. After reviewing your medical record and screening and assessments performed today your provider may have ordered immunizations, labs, imaging, and/or referrals for you. A list of these orders (if applicable) as well as your Preventive Care list are included within your After Visit Summary for your review. Other Preventive Recommendations:    A preventive eye exam performed by an eye specialist is recommended every 1-2 years to screen for glaucoma; cataracts, macular degeneration, and other eye disorders. A preventive dental visit is recommended every 6 months. Try to get at least 150 minutes of exercise per week or 10,000 steps per day on a pedometer . Order or download the FREE \"Exercise & Physical Activity: Your Everyday Guide\" from [employer]. Call 6-172.670.1105 or search [employer] online. You need 8708-9844 mg of calcium and 8981-6116 IU of vitamin D per day. It is possible to meet your calcium requirement with diet alone, but a vitamin D supplement is usually necessary to meet this goal.  When exposed to the sun, use a sunscreen that protects against both UVA and UVB radiation with an SPF of 30 or greater. Reapply every 2 to 3 hours or after sweating, drying off with a towel, or swimming. Always wear a seat belt when traveling in a car. Always wear a helmet when riding a bicycle or motorcycle.

## 2021-09-13 ENCOUNTER — OFFICE VISIT (OUTPATIENT)
Dept: FAMILY MEDICINE CLINIC | Age: 71
End: 2021-09-13
Payer: MEDICARE

## 2021-09-13 VITALS
TEMPERATURE: 96.9 F | OXYGEN SATURATION: 98 % | HEIGHT: 62 IN | HEART RATE: 59 BPM | WEIGHT: 166 LBS | BODY MASS INDEX: 30.55 KG/M2

## 2021-09-13 DIAGNOSIS — D49.2 ABNORMAL SKIN GROWTH: Primary | ICD-10-CM

## 2021-09-13 DIAGNOSIS — L30.9 DERMATITIS: ICD-10-CM

## 2021-09-13 PROCEDURE — 99214 OFFICE O/P EST MOD 30 MIN: CPT | Performed by: FAMILY MEDICINE

## 2021-09-13 PROCEDURE — G8427 DOCREV CUR MEDS BY ELIG CLIN: HCPCS | Performed by: FAMILY MEDICINE

## 2021-09-13 PROCEDURE — 4040F PNEUMOC VAC/ADMIN/RCVD: CPT | Performed by: FAMILY MEDICINE

## 2021-09-13 PROCEDURE — 1036F TOBACCO NON-USER: CPT | Performed by: FAMILY MEDICINE

## 2021-09-13 PROCEDURE — G8417 CALC BMI ABV UP PARAM F/U: HCPCS | Performed by: FAMILY MEDICINE

## 2021-09-13 PROCEDURE — 3017F COLORECTAL CA SCREEN DOC REV: CPT | Performed by: FAMILY MEDICINE

## 2021-09-13 PROCEDURE — 1123F ACP DISCUSS/DSCN MKR DOCD: CPT | Performed by: FAMILY MEDICINE

## 2021-09-13 RX ORDER — TRIAMCINOLONE ACETONIDE 1 MG/G
CREAM TOPICAL
Qty: 454 G | Refills: 0 | Status: SHIPPED | OUTPATIENT
Start: 2021-09-13 | End: 2022-06-11 | Stop reason: SDUPTHER

## 2021-09-13 ASSESSMENT — ENCOUNTER SYMPTOMS: COLOR CHANGE: 1

## 2021-09-13 NOTE — PROGRESS NOTES
MG tablet TAKE 1 TABLET BY MOUTH EVERY NIGHT 90 tablet 3    metoprolol tartrate (LOPRESSOR) 25 MG tablet Take 1 tablet by mouth 2 times daily 180 tablet 1    losartan (COZAAR) 25 MG tablet TAKE 1 TABLET BY MOUTH TWICE DAILY 180 tablet 3    nitroGLYCERIN (NITROSTAT) 0.4 MG SL tablet up to max of 3 total doses. If no relief after 1 dose, call 911. 25 tablet 3    [DISCONTINUED] clopidogrel (PLAVIX) 75 MG tablet Take 1 tablet by mouth daily 30 tablet 3     No current facility-administered medications on file prior to visit. Past Medical History:   Diagnosis Date    Cancer [facility])     skin left neck    Chronic bronchitis (HCC)     COPD (chronic obstructive pulmonary disease) (HCC)     Diabetes mellitus (HCC)     Diverticulitis     [facility] lung [facility] [address]     [facility] attack ([facility]     History of blood transfusion     Hyperlipidemia     Hypertension     Meniere's disease     Vertigo      Past Surgical History:   Procedure Laterality Date    CARDIAC SURGERY      stent    SKIN BIOPSY      left neck     Social History     Socioeconomic History    Marital status:       Spouse name: Not on file    Number of children: Not on file    Years of education: Not on file    Highest education level: Not on file   Occupational History    Not on file   Tobacco Use    Smoking status: Former Smoker     Packs/day: 1.00     Years: 40.00     Pack years: 40.00     Types: Cigars     Quit date: 12/10/2020     Years since quittin.7    Smokeless tobacco: Never Used   Vaping Use    Vaping Use: Never used   Substance and Sexual Activity    Alcohol use: Never    Drug use: Never    Sexual activity: Never   Other Topics Concern    Not on file   Social History Narrative    Not on file     Social Determinants of Health     Financial Resource Strain:     Difficulty of Paying Living Expenses:    Food Insecurity: No Food Insecurity    Worried About [address] in the Last Year: Never true    [employer] thyromegaly. Vascular: No JVD. Trachea: Trachea and phonation normal.   Cardiovascular:      Rate and Rhythm: Normal rate and regular rhythm. Pulmonary:      Effort: No accessory muscle usage or respiratory distress. Musculoskeletal:      Cervical back: Full passive range of motion without pain. Comments: FROM all large muscle groups and joints as witnessed when walking to exam table, getting on, and getting off the exam table. Skin:     General: Skin is warm and dry. Findings: No rash. Comments: In addition to the pictures below there is a irregularly shaped black-brown nevi in the thoracic back area   Neurological:      Mental Status: He is alert. Motor: No tremor or atrophy. Gait: Gait normal.   Psychiatric:         Speech: Speech normal.         Behavior: Behavior normal.         Thought Content: Thought content normal.                 No results found for this visit on 09/13/21. [] Pt was seen by provider for      Minutes  Counseling and coordination of care was done for all assessment diagnosis listed for today with patient and any family/friend present. More than 50% of this visit was spent coordinating cuurent care, obtaining information for prior records, and counseling for current plan of action. Assessment:       Diagnosis Orders   1. Abnormal skin growth     2. Dermatitis  triamcinolone (KENALOG) 0.1 % cream         No orders of the defined types were placed in this encounter. Orders Placed This Encounter   Medications    triamcinolone (KENALOG) 0.1 % cream     Sig: Apply topically 2 times daily. Dispense:  454 g     Refill:  0          Medication List          Accurate as of September 13, 2021  3:51 PM. If you have any questions, ask your nurse or doctor. START taking these medications    triamcinolone 0.1 % cream  Commonly known as: KENALOG  Apply topically 2 times daily.   Started by: Chaka Larios MD        CONTINUE taking these medications    atorvastatin 40 MG tablet  Commonly known as: LIPITOR  TAKE 1 TABLET BY MOUTH EVERY NIGHT     clopidogrel 75 MG tablet  Commonly known as: PLAVIX  TAKE 1 TABLET BY MOUTH DAILY     dilTIAZem 120 MG extended release capsule  Commonly known as: CARDIZEM CD  TAKE 1 CAPSULE(120 MG) BY MOUTH DAILY     empagliflozin 10 MG tablet  Commonly known as: JARDIANCE  Take 1 tablet by mouth daily (with breakfast)     glipiZIDE 5 MG tablet  Commonly known as: GLUCOTROL  TAKE 1 TABLET BY MOUTH TWICE DAILY     losartan 25 MG tablet  Commonly known as: COZAAR  TAKE 1 TABLET BY MOUTH TWICE DAILY     metFORMIN 1000 MG tablet  Commonly known as: GLUCOPHAGE  TAKE 1 TABLET BY MOUTH TWICE DAILY WITH MEALS     metoprolol tartrate 25 MG tablet  Commonly known as: LOPRESSOR  Take 1 tablet by mouth 2 times daily     nitroGLYCERIN 0.4 MG SL tablet  Commonly known as: NITROSTAT  up to max of 3 total doses. If no relief after 1 dose, call 911. omeprazole 40 MG delayed release capsule  Commonly known as: PRILOSEC  TAKE 1 CAPSULE BY MOUTH DAILY     PARoxetine 40 MG tablet  Commonly known as: PAXIL  Take 1 tablet by mouth daily        STOP taking these medications    Aleve 220 MG Caps  Generic drug: Naproxen Sodium  Stopped by: Ayla Maldonado MD           Where to Get Your Medications      These medications were sent to [facility] [address], [address]  [address] [facility] [address]    Phone: [phone]   · triamcinolone 0.1 % cream           Plan:   Return for 30 min procedure  biopsy abnl nevus thoracic, changing lesion shoulder,  and poss dermatitis back . Patient Instructions   Patient will utilize triamcinolone cream twice a day for the back rash. He will schedule his biopsy appointment for about 2 weeks from now.   If the rash is not responding significantly to the cream treatment we will look at biopsying that out along with the other

## 2021-09-13 NOTE — PATIENT INSTRUCTIONS
Patient will utilize triamcinolone cream twice a day for the back rash. He will schedule his biopsy appointment for about 2 weeks from now.   If the rash is not responding significantly to the cream treatment we will look at biopsying that out along with the other abnormal lesions

## 2021-09-23 ENCOUNTER — TELEPHONE (OUTPATIENT)
Dept: FAMILY MEDICINE CLINIC | Age: 71
End: 2021-09-23

## 2021-09-23 NOTE — TELEPHONE ENCOUNTER
Pt was wondering if he would need a  to drive him home after his procedure or if he will be able to drive himself?           Pt # [phone]

## 2021-10-04 RX ORDER — PAROXETINE HYDROCHLORIDE 40 MG/1
40 TABLET, FILM COATED ORAL DAILY
Qty: 30 TABLET | Refills: 2 | Status: SHIPPED | OUTPATIENT
Start: 2021-10-04 | End: 2022-01-03 | Stop reason: SDUPTHER

## 2021-10-04 NOTE — TELEPHONE ENCOUNTER
Future Appointments     Provider Department   10/7/2021 Lemuel Grimes, [facility]   Appt Notes: 6 week follow up   Recent Visits    09/07/2021 Routine general medical examination at a health care facility   [facility] and [address], [place]

## 2021-10-05 ENCOUNTER — OFFICE VISIT (OUTPATIENT)
Dept: FAMILY MEDICINE CLINIC | Age: 71
End: 2021-10-05
Payer: MEDICARE

## 2021-10-05 VITALS — HEIGHT: 62 IN | BODY MASS INDEX: 30.55 KG/M2 | WEIGHT: 166 LBS | TEMPERATURE: 98.7 F

## 2021-10-05 DIAGNOSIS — D49.2 ABNORMAL SKIN GROWTH: ICD-10-CM

## 2021-10-05 DIAGNOSIS — L30.9 DERMATITIS: Primary | ICD-10-CM

## 2021-10-05 PROCEDURE — 4040F PNEUMOC VAC/ADMIN/RCVD: CPT | Performed by: FAMILY MEDICINE

## 2021-10-05 PROCEDURE — 1036F TOBACCO NON-USER: CPT | Performed by: FAMILY MEDICINE

## 2021-10-05 PROCEDURE — G8484 FLU IMMUNIZE NO ADMIN: HCPCS | Performed by: FAMILY MEDICINE

## 2021-10-05 PROCEDURE — G8427 DOCREV CUR MEDS BY ELIG CLIN: HCPCS | Performed by: FAMILY MEDICINE

## 2021-10-05 PROCEDURE — 99213 OFFICE O/P EST LOW 20 MIN: CPT | Performed by: FAMILY MEDICINE

## 2021-10-05 PROCEDURE — 11305 SHAVE SKIN LESION 0.5 CM/<: CPT | Performed by: FAMILY MEDICINE

## 2021-10-05 PROCEDURE — 11301 SHAVE SKIN LESION 0.6-1.0 CM: CPT | Performed by: FAMILY MEDICINE

## 2021-10-05 PROCEDURE — 3017F COLORECTAL CA SCREEN DOC REV: CPT | Performed by: FAMILY MEDICINE

## 2021-10-05 PROCEDURE — G8417 CALC BMI ABV UP PARAM F/U: HCPCS | Performed by: FAMILY MEDICINE

## 2021-10-05 PROCEDURE — 1123F ACP DISCUSS/DSCN MKR DOCD: CPT | Performed by: FAMILY MEDICINE

## 2021-10-05 ASSESSMENT — ENCOUNTER SYMPTOMS: COLOR CHANGE: 1

## 2021-10-05 NOTE — PROGRESS NOTES
Diagnosis Orders   1. Dermatitis     2. Abnormal skin growth  Specimen to Pathology Outpatient    WI SHAV SKIN LES 6-10MM TRUNK,ARM,LEG    WI SHAV SKIN LES <5MM REMAINDR BODY    Specimen to Pathology Outpatient         Orders Placed This Encounter   Procedures    Specimen to Pathology Outpatient     Margins requested on all specimens  R/O SCC  Location  Rshoulder  post     Standing Status:   Future     Standing Expiration Date:   10/5/2022     Order Specific Question:   PREVIOUS BIOPSY     Answer:   No     Order Specific Question:   PREOP DIAGNOSIS     Answer:   abnormal skin growth     Order Specific Question:   FROZEN SECTION - NO OR YES/SPECIMEN     Answer:   No    Specimen to Pathology Outpatient     Margins requested on all specimens  R/O melanoma  Location L thoracic back     Standing Status:   Future     Standing Expiration Date:   10/5/2022     Order Specific Question:   PREVIOUS BIOPSY     Answer:   No     Order Specific Question:   PREOP DIAGNOSIS     Answer:   abnormal skin growths     Order Specific Question:   FROZEN SECTION - NO OR YES/SPECIMEN     Answer:   No    WI SHAV SKIN LES 6-10MM TRUNK,ARM,LEG     R post shoulder    WI SHAV SKIN LES <5MM REMAINDR BODY     L thoracic back       Jennifer Díaz was seen today for procedure. Diagnoses and all orders for this visit:    Dermatitis    Abnormal skin growth  -     Specimen to Pathology Outpatient; Future  -     WI SHAV SKIN LES 6-10MM TRUNK,ARM,LEG  -     WI SHAV SKIN LES <5MM REMAINDR BODY  -     Specimen to Pathology Outpatient; Future        Return in about 6 months (around 4/5/2022) for 15 min skin check. Patient Instructions   Incision/Laceration repair    -Clean surgical area with antibacterial soap and water once daily. --You may be instructed to soak the wound with Hydrogen Peroxide to loosen scabbing around sutures, this is not to be done more often that every 3 days, should be for 30 seconds-1 min and then rinsed off with water. -Keep surgical site moist with vaseline or antibiotic ointment (single, not tripleantibiotic or Neosporin) and apply a fresh bandage daily until a solid scab forms or if the wound is at risk for trauma or dirt.     -Follow up immediately if any growing redness (minimal redness or pale pink is normal along wound edges) surrounds the surgical site or if dripping drainage occurs at surgical site. Once a solid scab forms no more bandage needed. A wet scab can look yellow. This is not infection, just moisture.  -Once the lesion is healed be sure to apply sunscreen to the area to prevent burn of the newer and more delicate skin during the first 6 months of healing.    -If the scar begins to be raised you may massage the area firmly twice a day to help break down scar tissue and help the area become a flat scar. There is some evidence that Mederma applied to a scar daily for the first few months can help shrink and fade it more quickly then without intervention. Patient may use triamcinolone cream on the right shoulder as needed. If this does not resolve contact the office. Patient here for follow-up of dermatitis. The cream he used prescription from this office worked very well. No problems. Clearing up very well and is back. He has a section on her shoulder now that is continued to be symptomatic but he did not he can use the cream on that or not. He is also here for follow-up regarding 2 abnormal lesions to be biopsied today. Current Outpatient Medications on File Prior to Visit   Medication Sig Dispense Refill    PARoxetine (PAXIL) 40 MG tablet Take 1 tablet by mouth daily 30 tablet 2    triamcinolone (KENALOG) 0.1 % cream Apply topically 2 times daily.  454 g 0    glipiZIDE (GLUCOTROL) 5 MG tablet TAKE 1 TABLET BY MOUTH TWICE DAILY 180 tablet 1    metFORMIN (GLUCOPHAGE) 1000 MG tablet TAKE 1 TABLET BY MOUTH TWICE DAILY WITH MEALS 180 tablet 1    dilTIAZem (CARDIZEM CD) 120 MG extended release capsule TAKE 1 CAPSULE(120 MG) BY MOUTH DAILY 90 capsule 0    clopidogrel (PLAVIX) 75 MG tablet TAKE 1 TABLET BY MOUTH DAILY 30 tablet 0    omeprazole (PRILOSEC) 40 MG delayed release capsule TAKE 1 CAPSULE BY MOUTH DAILY 90 capsule 1    empagliflozin (JARDIANCE) 10 MG tablet Take 1 tablet by mouth daily (with breakfast) 30 tablet 3    atorvastatin (LIPITOR) 40 MG tablet TAKE 1 TABLET BY MOUTH EVERY NIGHT 90 tablet 3    metoprolol tartrate (LOPRESSOR) 25 MG tablet Take 1 tablet by mouth 2 times daily 180 tablet 1    losartan (COZAAR) 25 MG tablet TAKE 1 TABLET BY MOUTH TWICE DAILY 180 tablet 3    nitroGLYCERIN (NITROSTAT) 0.4 MG SL tablet up to max of 3 total doses. If no relief after 1 dose, call 911. 25 tablet 3    [DISCONTINUED] clopidogrel (PLAVIX) 75 MG tablet Take 1 tablet by mouth daily 30 tablet 3     No current facility-administered medications on file prior to visit. Patient has no known allergies. Review of Systems   Constitutional: Negative for chills and fever. Skin: Positive for color change and rash. Allergic/Immunologic: Negative for environmental allergies, food allergies and immunocompromised state. Hematological: Negative for adenopathy. Does not bruise/bleed easily. Psychiatric/Behavioral: Negative for behavioral problems and sleep disturbance. Temp 98.7 °F (37.1 °C)   Ht 5' 2\" (1.575 m)   Wt 166 lb (75.3 kg)   BMI 30.36 kg/m²   Physical Exam  Constitutional:       General: He is not in acute distress. Appearance: Normal appearance. He is well-developed. He is not toxic-appearing. HENT:      Head: Normocephalic and atraumatic. Right Ear: Hearing and tympanic membrane normal.      Left Ear: Hearing and tympanic membrane normal.      Nose: Nose normal. No nasal deformity. Eyes:      General: Lids are normal.         Right eye: No discharge. Left eye: No discharge.       Conjunctiva/sclera: Conjunctivae normal.      Pupils: Pupils are equal, round, and reactive to light. Neck:      Thyroid: No thyroid mass or thyromegaly. Vascular: No JVD. Trachea: Trachea and phonation normal.   Cardiovascular:      Rate and Rhythm: Normal rate and regular rhythm. Pulmonary:      Effort: No accessory muscle usage or respiratory distress. Musculoskeletal:      Cervical back: Full passive range of motion without pain. Comments: FROM all large muscle groups and joints as witnessed when walking to exam table, getting on, and getting off the exam table. Skin:     General: Skin is warm and dry. Findings: No rash. Comments: Central lumbar region showing good healing tissue. No further dermatitis noted. Some hyper keratotic area at this time. Right posterior shoulder shows excoriations and erythema. 2 lesions on the back are noted one is on the right posterior shoulder suspicious for squamous cell cancer measuring 6 mm on its long axis. In the left posterior thoracic area near the spine and a prior scar is a 3 mm irregular pigmented irregular shaped lesion   Neurological:      Mental Status: He is alert. Motor: No tremor or atrophy. Gait: Gait normal.   Psychiatric:         Speech: Speech normal.         Behavior: Behavior normal.         Thought Content: Thought content normal.           PROCEDURE: Right posterior shoulder and thoracic region both on the same technique  Informed consent was given by the patient. Risks including infection, bleeding and scarring were discussed. No guarantee how the scar will look. Patient skin was prepped with alcohol. Wound was anesthetized using 0.3 cc of 1% lidocaine with epinephrine for anesthesia. A Dermablade was used to obtain the complete removal of the visible lesion. Drysol was used at the base of site. Bacitracin ointment and bandage were placed on the wound.   Estimated blood loss less than 1 cc    Post op wound care instructions were given to the patient. He/She will f/u in 2 weeks or sooner if needed for problems. Tahira Sexton was seen today for procedure. Diagnoses and all orders for this visit:    Dermatitis    Abnormal skin growth  -     Specimen to Pathology Outpatient; Future  -     MN SHAV SKIN LES 6-10MM TRUNK,ARM,LEG  -     MN SHAV SKIN LES <5MM REMAINDR BODY  -     Specimen to Pathology Outpatient; Future        Return in about 6 months (around 4/5/2022) for 15 min skin check. Patient Instructions   Incision/Laceration repair    -Clean surgical area with antibacterial soap and water once daily. --You may be instructed to soak the wound with Hydrogen Peroxide to loosen scabbing around sutures, this is not to be done more often that every 3 days, should be for 30 seconds-1 min and then rinsed off with water.     -Keep surgical site moist with vaseline or antibiotic ointment (single, not tripleantibiotic or Neosporin) and apply a fresh bandage daily until a solid scab forms or if the wound is at risk for trauma or dirt.     -Follow up immediately if any growing redness (minimal redness or pale pink is normal along wound edges) surrounds the surgical site or if dripping drainage occurs at surgical site. Once a solid scab forms no more bandage needed. A wet scab can look yellow. This is not infection, just moisture.  -Once the lesion is healed be sure to apply sunscreen to the area to prevent burn of the newer and more delicate skin during the first 6 months of healing.    -If the scar begins to be raised you may massage the area firmly twice a day to help break down scar tissue and help the area become a flat scar. There is some evidence that Mederma applied to a scar daily for the first few months can help shrink and fade it more quickly then without intervention. Patient may use triamcinolone cream on the right shoulder as needed. If this does not resolve contact the office. Sameer Rose M.D.     This note was partially created with the assistance of dictation. This may lead to grammatical or spelling errors.

## 2021-10-05 NOTE — PATIENT INSTRUCTIONS
Incision/Laceration repair    -Clean surgical area with antibacterial soap and water once daily. --You may be instructed to soak the wound with Hydrogen Peroxide to loosen scabbing around sutures, this is not to be done more often that every 3 days, should be for 30 seconds-1 min and then rinsed off with water.     -Keep surgical site moist with vaseline or antibiotic ointment (single, not tripleantibiotic or Neosporin) and apply a fresh bandage daily until a solid scab forms or if the wound is at risk for trauma or dirt.     -Follow up immediately if any growing redness (minimal redness or pale pink is normal along wound edges) surrounds the surgical site or if dripping drainage occurs at surgical site. Once a solid scab forms no more bandage needed. A wet scab can look yellow. This is not infection, just moisture.  -Once the lesion is healed be sure to apply sunscreen to the area to prevent burn of the newer and more delicate skin during the first 6 months of healing.    -If the scar begins to be raised you may massage the area firmly twice a day to help break down scar tissue and help the area become a flat scar. There is some evidence that Mederma applied to a scar daily for the first few months can help shrink and fade it more quickly then without intervention. Patient may use triamcinolone cream on the right shoulder as needed. If this does not resolve contact the office.

## 2021-10-05 NOTE — PROGRESS NOTES
Return for 30 min procedure  biopsy abnl nevus thoracic, changing lesion shoulder,  and poss dermatitis back . Skin:     General: Skin is warm and dry. Findings: No rash. Comments: In addition to the pictures below there is a irregularly shaped black-brown nevi in the thoracic back area   Neurological:      Mental Status: He is alert. Motor: No tremor or atrophy. Gait: Gait normal.   Psychiatric:         Speech: Speech normal.         Behavior: Behavior normal.         Thought Content: Thought content normal.                   Prior to the start of the procedure known as bx an informed consent has been signed and scanned into patients EMR. After the patient is draped and prepped and before the start of the procedure  Dr. Vicente Coe and attending staff Zuleika Alas. Amos Son both must perform a verbal confirmation using patients Name   and  The patient should participate in this. Dr. Vicente Coe will emily the surgical site if needed for documenting superior and inferior aspects. \"correct site. \" will be verified on container, and order. These above steps will be documented into the patients EMR chart.

## 2021-10-07 ENCOUNTER — OFFICE VISIT (OUTPATIENT)
Dept: FAMILY MEDICINE CLINIC | Age: 71
End: 2021-10-07
Payer: MEDICARE

## 2021-10-07 VITALS
BODY MASS INDEX: 29.44 KG/M2 | HEIGHT: 62 IN | WEIGHT: 160 LBS | OXYGEN SATURATION: 98 % | HEART RATE: 60 BPM | DIASTOLIC BLOOD PRESSURE: 80 MMHG | RESPIRATION RATE: 14 BRPM | SYSTOLIC BLOOD PRESSURE: 124 MMHG

## 2021-10-07 DIAGNOSIS — I10 ESSENTIAL HYPERTENSION: ICD-10-CM

## 2021-10-07 DIAGNOSIS — K57.93 GASTROINTESTINAL HEMORRHAGE ASSOCIATED WITH INTESTINAL DIVERTICULITIS: ICD-10-CM

## 2021-10-07 DIAGNOSIS — I25.10 CORONARY ARTERY DISEASE INVOLVING NATIVE HEART WITHOUT ANGINA PECTORIS, UNSPECIFIED VESSEL OR LESION TYPE: ICD-10-CM

## 2021-10-07 DIAGNOSIS — F41.9 ANXIETY: ICD-10-CM

## 2021-10-07 DIAGNOSIS — E11.9 TYPE 2 DIABETES MELLITUS WITHOUT COMPLICATION, WITHOUT LONG-TERM CURRENT USE OF INSULIN (HCC): Primary | ICD-10-CM

## 2021-10-07 PROCEDURE — G8427 DOCREV CUR MEDS BY ELIG CLIN: HCPCS | Performed by: INTERNAL MEDICINE

## 2021-10-07 PROCEDURE — G8417 CALC BMI ABV UP PARAM F/U: HCPCS | Performed by: INTERNAL MEDICINE

## 2021-10-07 PROCEDURE — 3046F HEMOGLOBIN A1C LEVEL >9.0%: CPT | Performed by: INTERNAL MEDICINE

## 2021-10-07 PROCEDURE — 2022F DILAT RTA XM EVC RTNOPTHY: CPT | Performed by: INTERNAL MEDICINE

## 2021-10-07 PROCEDURE — 90694 VACC AIIV4 NO PRSRV 0.5ML IM: CPT | Performed by: INTERNAL MEDICINE

## 2021-10-07 PROCEDURE — 99214 OFFICE O/P EST MOD 30 MIN: CPT | Performed by: INTERNAL MEDICINE

## 2021-10-07 PROCEDURE — 4040F PNEUMOC VAC/ADMIN/RCVD: CPT | Performed by: INTERNAL MEDICINE

## 2021-10-07 PROCEDURE — G8484 FLU IMMUNIZE NO ADMIN: HCPCS | Performed by: INTERNAL MEDICINE

## 2021-10-07 PROCEDURE — 1036F TOBACCO NON-USER: CPT | Performed by: INTERNAL MEDICINE

## 2021-10-07 PROCEDURE — 1123F ACP DISCUSS/DSCN MKR DOCD: CPT | Performed by: INTERNAL MEDICINE

## 2021-10-07 PROCEDURE — 3017F COLORECTAL CA SCREEN DOC REV: CPT | Performed by: INTERNAL MEDICINE

## 2021-10-07 PROCEDURE — G0008 ADMIN INFLUENZA VIRUS VAC: HCPCS | Performed by: INTERNAL MEDICINE

## 2021-10-07 RX ORDER — GLUCOSAMINE HCL/CHONDROITIN SU 500-400 MG
CAPSULE ORAL
Qty: 100 STRIP | Refills: 3 | Status: SHIPPED | OUTPATIENT
Start: 2021-10-07

## 2021-10-07 ASSESSMENT — ENCOUNTER SYMPTOMS
RECTAL PAIN: 0
FACIAL SWELLING: 0
CHEST TIGHTNESS: 0
APNEA: 0
EYE ITCHING: 0
ABDOMINAL PAIN: 0
VOMITING: 0
NAUSEA: 0
VOICE CHANGE: 0
EYE REDNESS: 0
SINUS PAIN: 0
EYE DISCHARGE: 0
RHINORRHEA: 0
DIARRHEA: 0
BACK PAIN: 0
TROUBLE SWALLOWING: 0
SINUS PRESSURE: 0
SHORTNESS OF BREATH: 0
EYE PAIN: 0
COUGH: 0
BLOOD IN STOOL: 0
PHOTOPHOBIA: 0
WHEEZING: 0
CONSTIPATION: 0
ABDOMINAL DISTENTION: 0
COLOR CHANGE: 0
SORE THROAT: 0

## 2021-10-07 NOTE — PROGRESS NOTES
Subjective:      Patient ID: Elida Sheets is a 66 y.o. male New patient, here for evaluation of the following chief complaint(s):  Chief Complaint   Patient presents with    Diabetes       Diabetes  Pertinent negatives for hypoglycemia include no confusion, dizziness, headaches, nervousness/anxiousness, seizures, speech difficulty or tremors. Pertinent negatives for diabetes include no chest pain, no fatigue, no polydipsia, no polyphagia, no polyuria and no weakness. Hypertension  Pertinent negatives include no chest pain, headaches, neck pain, palpitations or shortness of breath. 67-year-old male with a history of type 2 diabetes established coronary artery disease anxiety and hypertension presents for follow-up visit. Hx  Of CAD S/P MI-metoprolol, lipitor, plavix          Anxiety-Paxil        Essential hypertension: Compliant with Cardizem 120 mg orally daily and Lopressor 25 mg twice daily      DMII-compliant with Metformin and GLIPIZIDE. Blood sugars improving. Mostly mid 100s        Nicotine dependence:NO LONGER SMOKING SINCE 10/2020        Diverticular bleed: The patient has not experience additional bleeding since being discharged from the hospital.  Cat : Tiger       At present he denies polyuria,  Polydipsia, constitutional, sinus, visual, cardiopulmonary, urologic, gastrointestinal, immunologic/hematologic, musculoskeletal, neurologic,dermatologic, or psychiatric complaints. Review of Systems   Constitutional: Negative for chills, diaphoresis, fatigue and fever. HENT: Negative for congestion, dental problem, drooling, ear discharge, ear pain, facial swelling, hearing loss, mouth sores, nosebleeds, postnasal drip, rhinorrhea, sinus pressure, sinus pain, sneezing, sore throat, tinnitus, trouble swallowing and voice change. Eyes: Negative for photophobia, pain, discharge, redness, itching and visual disturbance.    Respiratory: Negative for apnea, cough, chest tightness, results and face to face with the patient discussing the diagnosis and importance of compliance with the treatment plan. Samanta Marcelino MD    Please note, this report has been partially produced using speech recognition software  and may cause  and /or contain errors related to that system including grammar, punctuation and spelling as well as words and phrases that may seem inappropriate. If there are questions or concerns please feel free to contact me to clarify.

## 2021-10-28 RX ORDER — DILTIAZEM HYDROCHLORIDE 120 MG/1
CAPSULE, COATED, EXTENDED RELEASE ORAL
Qty: 90 CAPSULE | Refills: 0 | Status: SHIPPED | OUTPATIENT
Start: 2021-10-28 | End: 2022-01-26

## 2021-10-28 NOTE — TELEPHONE ENCOUNTER
Requesting medication refill.  Please approve or deny this request.    Rx requested:  Requested Prescriptions     Pending Prescriptions Disp Refills    dilTIAZem (CARDIZEM CD) 120 MG extended release capsule [Pharmacy Med Name: DILTIAZEM CD 120MG CAPSULES (24 HR)] 90 capsule 0     Sig: TAKE 1 CAPSULE(120 MG) BY MOUTH DAILY       Last Office Visit:   10/7/2021    Last Filled:      Last Labs:      Next Visit Date:  Future Appointments   Date Time Provider Jeff Martinez   11/1/2021 12:15 PM Robinson Denney MD [facility]   1/7/2022 11:00 AM Sarbjit Faith  [place] [address]   4/4/2022  1:15 PM Robinson Denney MD [facility] EMERGENCY [facility]

## 2021-11-01 ENCOUNTER — OFFICE VISIT (OUTPATIENT)
Dept: FAMILY MEDICINE CLINIC | Age: 71
End: 2021-11-01
Payer: MEDICARE

## 2021-11-01 VITALS — HEIGHT: 62 IN | WEIGHT: 160 LBS | TEMPERATURE: 97.5 F | BODY MASS INDEX: 29.44 KG/M2

## 2021-11-01 DIAGNOSIS — L28.1 PRURIGO NODULARIS: Primary | ICD-10-CM

## 2021-11-01 DIAGNOSIS — L81.4 LENTIGO SIMPLEX: ICD-10-CM

## 2021-11-01 DIAGNOSIS — E11.9 TYPE 2 DIABETES MELLITUS WITHOUT COMPLICATION, WITHOUT LONG-TERM CURRENT USE OF INSULIN (HCC): ICD-10-CM

## 2021-11-01 PROCEDURE — 3046F HEMOGLOBIN A1C LEVEL >9.0%: CPT | Performed by: FAMILY MEDICINE

## 2021-11-01 PROCEDURE — 1036F TOBACCO NON-USER: CPT | Performed by: FAMILY MEDICINE

## 2021-11-01 PROCEDURE — 2022F DILAT RTA XM EVC RTNOPTHY: CPT | Performed by: FAMILY MEDICINE

## 2021-11-01 PROCEDURE — G8484 FLU IMMUNIZE NO ADMIN: HCPCS | Performed by: FAMILY MEDICINE

## 2021-11-01 PROCEDURE — 4040F PNEUMOC VAC/ADMIN/RCVD: CPT | Performed by: FAMILY MEDICINE

## 2021-11-01 PROCEDURE — G8427 DOCREV CUR MEDS BY ELIG CLIN: HCPCS | Performed by: FAMILY MEDICINE

## 2021-11-01 PROCEDURE — 3017F COLORECTAL CA SCREEN DOC REV: CPT | Performed by: FAMILY MEDICINE

## 2021-11-01 PROCEDURE — G8417 CALC BMI ABV UP PARAM F/U: HCPCS | Performed by: FAMILY MEDICINE

## 2021-11-01 PROCEDURE — 1123F ACP DISCUSS/DSCN MKR DOCD: CPT | Performed by: FAMILY MEDICINE

## 2021-11-01 PROCEDURE — 99214 OFFICE O/P EST MOD 30 MIN: CPT | Performed by: FAMILY MEDICINE

## 2021-11-01 ASSESSMENT — ENCOUNTER SYMPTOMS: ROS SKIN COMMENTS: ITCHING

## 2021-11-01 NOTE — PATIENT INSTRUCTIONS
Patient will continue using triamcinolone cream as needed on the itchy areas. Most recent medication addition was glipizide. We will eliminate this for 2 weeks as a trial to see if this decreases the occurrence of new lesions and itching. If this does he will need to speak with his PCP about replacing that medication for his diabetic treatment. Also talk with patient about ways to burn sugar such as small exercise activities around the house. Be sure to stick with natural sugars.   Diabetes being out of control will make all other illnesses including skin conditions difficult to treat

## 2021-11-01 NOTE — PROGRESS NOTES
Kathrin Humphrey MD   10/14/2021 12:57 PM EDT       Appointment to discuss results and decide potential treatment options 06-Apr-2020

## 2021-11-01 NOTE — PROGRESS NOTES
Diagnosis Orders   1. Prurigo nodularis     2. Lentigo simplex     3. Type 2 diabetes mellitus without complication, without long-term current use of insulin ([facility] [address]       Return in about 2 weeks (around 11/15/2021) for for review of outcome of today's recommendation. Patient Instructions   Patient will continue using triamcinolone cream as needed on the itchy areas. Most recent medication addition was glipizide. We will eliminate this for 2 weeks as a trial to see if this decreases the occurrence of new lesions and itching. If this does he will need to speak with his PCP about replacing that medication for his diabetic treatment. Also talk with patient about ways to burn sugar such as small exercise activities around the house. Be sure to stick with natural sugars. Diabetes being out of control will make all other illnesses including skin conditions difficult to treat      Subjective:      Patient ID: John Parra is a 66 y.o. male who presents for:  Chief Complaint   Patient presents with    Results     pathology & treatment options     Skin Problem     skin only        Patient here to review diagnosis on biopsy. Patient states the triamcinolone cream was helping on the lesions he had however he is noticing new lesions popping up on his body. Mother talks about it the more he says he actually realizes he itches all over. We reviewed his medications to find that glipizide with his most recent addition. That was about 6 months ago. Patient is diabetic, he comes in with a Coke Zero in hand. Denies physical activity or exercise. Mostly spends time on the couch for playing video games. Has been paying a little more attention to his diet trying to go with fresh fruits, Jell-O. Realizing canned vegetables or not such a great idea.       Current Outpatient Medications on File Prior to Visit   Medication Sig Dispense Refill    dilTIAZem (CARDIZEM CD) 120 MG extended release capsule TAKE 1 CAPSULE(120 MG) BY MOUTH DAILY 90 capsule 0    metoprolol tartrate (LOPRESSOR) 25 MG tablet TAKE 1 TABLET BY MOUTH TWICE DAILY 180 tablet 1    blood glucose monitor supplies LANCETS  Test 2 times a day & as needed for symptoms of irregular blood glucose. Dispense sufficient amount for indicated testing frequency plus additional to accommodate PRN testing needs. 100 each 3    blood glucose monitor kit and supplies tEST 2 TIMES A DAY 1 kit 0    blood glucose monitor strips Test 2 times a day & as needed for symptoms of irregular blood glucose. Dispense sufficient amount for indicated testing frequency plus additional to accommodate PRN testing needs. 100 strip 3    PARoxetine (PAXIL) 40 MG tablet Take 1 tablet by mouth daily 30 tablet 2    triamcinolone (KENALOG) 0.1 % cream Apply topically 2 times daily. 454 g 0    glipiZIDE (GLUCOTROL) 5 MG tablet TAKE 1 TABLET BY MOUTH TWICE DAILY 180 tablet 1    metFORMIN (GLUCOPHAGE) 1000 MG tablet TAKE 1 TABLET BY MOUTH TWICE DAILY WITH MEALS 180 tablet 1    omeprazole (PRILOSEC) 40 MG delayed release capsule TAKE 1 CAPSULE BY MOUTH DAILY 90 capsule 1    atorvastatin (LIPITOR) 40 MG tablet TAKE 1 TABLET BY MOUTH EVERY NIGHT 90 tablet 3    losartan (COZAAR) 25 MG tablet TAKE 1 TABLET BY MOUTH TWICE DAILY 180 tablet 3    nitroGLYCERIN (NITROSTAT) 0.4 MG SL tablet up to max of 3 total doses. If no relief after 1 dose, call 911. 25 tablet 3    clopidogrel (PLAVIX) 75 MG tablet TAKE 1 TABLET BY MOUTH DAILY 30 tablet 0    [DISCONTINUED] clopidogrel (PLAVIX) 75 MG tablet Take 1 tablet by mouth daily 30 tablet 3     No current facility-administered medications on file prior to visit.      Past Medical History:   Diagnosis Date    Cancer [facility])     skin left neck    Chronic bronchitis (HCC)     COPD (chronic obstructive pulmonary disease) (HCC)     Diabetes mellitus ([facility] [address]     Diverticulitis     Emphysema of lung ([facility] [address]     Heart attack ([facility] [address]     History of blood transfusion     Hyperlipidemia     Hypertension     Meniere's disease     Vertigo      Past Surgical History:   Procedure Laterality Date    CARDIAC SURGERY      stent    SKIN BIOPSY      left neck     Social History     Socioeconomic History    Marital status:      Spouse name: Not on file    Number of children: Not on file    Years of education: Not on file    Highest education level: Not on file   Occupational History    Not on file   Tobacco Use    Smoking status: Former Smoker     Packs/day: 1.00     Years: 40.00     Pack years: 40.00     Types: Cigars     Quit date: 12/10/2020     Years since quittin.8    Smokeless tobacco: Never Used   Vaping Use    Vaping Use: Never used   Substance and Sexual Activity    Alcohol use: Never    Drug use: Never    Sexual activity: Never   Other Topics Concern    Not on file   Social History Narrative    Not on file     Social Determinants of Health     Financial Resource Strain:     Difficulty of Paying Living Expenses:    Food Insecurity: No Food Insecurity    Worried About Running Out of Food in the Last Year: Never true    [place] of Food in the Last Year: Never true   Transportation Needs: No Transportation Needs    Lack of Transportation (Medical): No    Lack of Transportation (Non-Medical): No   Physical Activity:     Days of Exercise per Week:     Minutes of Exercise per Session:    Stress:     Feeling of Stress :    Social Connections:     Frequency of Communication with Friends and Family:     Frequency of Social Gatherings with Friends and Family:     Attends Oriental orthodox Services:     Active Member of Clubs or Organizations:     Attends Club or Organization Meetings:     Marital Status:    Intimate Partner Violence:     Fear of Current or Ex-Partner:     Emotionally Abused:     Physically Abused:     Sexually Abused:      History reviewed. No pertinent family history. Allergies:  Patient has no known allergies.     Review of Systems   Constitutional: Negative for chills and fever. Skin: Positive for rash. Itching   Allergic/Immunologic: Negative for environmental allergies, food allergies and immunocompromised state. Hematological: Negative for adenopathy. Does not bruise/bleed easily. Psychiatric/Behavioral: Negative for behavioral problems and sleep disturbance. Objective:   Temp 97.5 °F (36.4 °C)   Ht 5' 2\" (1.575 m)   Wt 160 lb (72.6 kg)   BMI 29.26 kg/m²     Physical Exam    No results found for this visit on 11/01/21. Recent Results (from the past 2016 hour(s))   POCT glycosylated hemoglobin (Hb A1C)    Collection Time: 08/26/21 10:51 AM   Result Value Ref Range    Hemoglobin A1C 11.2 %       [] Pt was seen by provider for      Minutes  Counseling and coordination of care was done for all assessment diagnosis listed for today with patient and any family/friend present. More than 50% of this visit was spent coordinating cuurent care, obtaining information for prior records, and counseling for current plan of action. Assessment:       Diagnosis Orders   1. Prurigo nodularis     2. Lentigo simplex     3. Type 2 diabetes mellitus without complication, without long-term current use of insulin (HCC)           No orders of the defined types were placed in this encounter. No orders of the defined types were placed in this encounter. Medication List          Accurate as of November 1, 2021 12:53 PM. If you have any questions, ask your nurse or doctor. CONTINUE taking these medications    atorvastatin 40 MG tablet  Commonly known as: LIPITOR  TAKE 1 TABLET BY MOUTH EVERY NIGHT     * blood glucose monitor supplies Supplies  LANCETS  Test 2 times a day & as needed for symptoms of irregular blood glucose. Dispense sufficient amount for indicated testing frequency plus additional to accommodate PRN testing needs.      * blood glucose monitor kit and supplies  tEST 2 TIMES A DAY he will need to speak with his PCP about replacing that medication for his diabetic treatment. Also talk with patient about ways to burn sugar such as small exercise activities around the house. Be sure to stick with natural sugars. Diabetes being out of control will make all other illnesses including skin conditions difficult to treat      This note was partially created with the assistance of dictation. This may lead to grammatical or spelling errors. Ashish Yan M.D.

## 2021-11-09 RX ORDER — CLOPIDOGREL BISULFATE 75 MG/1
75 TABLET ORAL DAILY
Qty: 30 TABLET | Refills: 0 | Status: SHIPPED | OUTPATIENT
Start: 2021-11-09 | End: 2021-12-16 | Stop reason: SDUPTHER

## 2021-11-09 RX ORDER — OMEPRAZOLE 40 MG/1
40 CAPSULE, DELAYED RELEASE ORAL DAILY
Qty: 90 CAPSULE | Refills: 1 | Status: SHIPPED | OUTPATIENT
Start: 2021-11-09 | End: 2022-02-06 | Stop reason: SDUPTHER

## 2021-11-16 ENCOUNTER — OFFICE VISIT (OUTPATIENT)
Dept: FAMILY MEDICINE CLINIC | Age: 71
End: 2021-11-16
Payer: MEDICARE

## 2021-11-16 VITALS
BODY MASS INDEX: 29.44 KG/M2 | HEART RATE: 60 BPM | TEMPERATURE: 98 F | OXYGEN SATURATION: 98 % | HEIGHT: 62 IN | WEIGHT: 160 LBS

## 2021-11-16 DIAGNOSIS — L28.1 PRURIGO NODULARIS: Primary | ICD-10-CM

## 2021-11-16 PROCEDURE — G8417 CALC BMI ABV UP PARAM F/U: HCPCS | Performed by: FAMILY MEDICINE

## 2021-11-16 PROCEDURE — 99214 OFFICE O/P EST MOD 30 MIN: CPT | Performed by: FAMILY MEDICINE

## 2021-11-16 PROCEDURE — G8484 FLU IMMUNIZE NO ADMIN: HCPCS | Performed by: FAMILY MEDICINE

## 2021-11-16 PROCEDURE — G8427 DOCREV CUR MEDS BY ELIG CLIN: HCPCS | Performed by: FAMILY MEDICINE

## 2021-11-16 PROCEDURE — 1123F ACP DISCUSS/DSCN MKR DOCD: CPT | Performed by: FAMILY MEDICINE

## 2021-11-16 PROCEDURE — 1036F TOBACCO NON-USER: CPT | Performed by: FAMILY MEDICINE

## 2021-11-16 PROCEDURE — 4040F PNEUMOC VAC/ADMIN/RCVD: CPT | Performed by: FAMILY MEDICINE

## 2021-11-16 PROCEDURE — 3017F COLORECTAL CA SCREEN DOC REV: CPT | Performed by: FAMILY MEDICINE

## 2021-11-16 RX ORDER — CALCIPOTRIENE 50 UG/G
CREAM TOPICAL 2 TIMES DAILY
Qty: 60 G | Refills: 1 | Status: SHIPPED | OUTPATIENT
Start: 2021-11-16

## 2021-11-16 NOTE — PROGRESS NOTES
symptoms of irregular blood glucose. Dispense sufficient amount for indicated testing frequency plus additional to accommodate PRN testing needs. 100 strip 3    PARoxetine (PAXIL) 40 MG tablet Take 1 tablet by mouth daily 30 tablet 2    triamcinolone (KENALOG) 0.1 % cream Apply topically 2 times daily. 454 g 0    glipiZIDE (GLUCOTROL) 5 MG tablet TAKE 1 TABLET BY MOUTH TWICE DAILY 180 tablet 1    metFORMIN (GLUCOPHAGE) 1000 MG tablet TAKE 1 TABLET BY MOUTH TWICE DAILY WITH MEALS 180 tablet 1    atorvastatin (LIPITOR) 40 MG tablet TAKE 1 TABLET BY MOUTH EVERY NIGHT 90 tablet 3    losartan (COZAAR) 25 MG tablet TAKE 1 TABLET BY MOUTH TWICE DAILY 180 tablet 3    nitroGLYCERIN (NITROSTAT) 0.4 MG SL tablet up to max of 3 total doses. If no relief after 1 dose, call 911. 25 tablet 3    [DISCONTINUED] clopidogrel (PLAVIX) 75 MG tablet Take 1 tablet by mouth daily 30 tablet 3     No current facility-administered medications on file prior to visit. Past Medical History:   Diagnosis Date    Cancer [facility])     skin left neck    Chronic bronchitis (HCC)     COPD (chronic obstructive pulmonary disease) (HCC)     Diabetes mellitus (HCC)     Diverticulitis     [facility] lung ([facility] [address]     Heart attack ([facility] [address]     History of blood transfusion     Hyperlipidemia     Hypertension     Meniere's disease     Vertigo      Past Surgical History:   Procedure Laterality Date    CARDIAC SURGERY      stent    SKIN BIOPSY      left neck     Social History     Socioeconomic History    Marital status:       Spouse name: Not on file    Number of children: Not on file    Years of education: Not on file    Highest education level: Not on file   Occupational History    Not on file   Tobacco Use    Smoking status: Former Smoker     Packs/day: 1.00     Years: 40.00     Pack years: 40.00     Types: Cigars     Quit date: 12/10/2020     Years since quittin.9    Smokeless tobacco: Never Used   Vaping Use    Vaping Use: Never used   Substance and Sexual Activity    Alcohol use: Never    Drug use: Never    Sexual activity: Never   Other Topics Concern    Not on file   Social History Narrative    Not on file     Social Determinants of Health     Financial Resource Strain:     Difficulty of Paying Living Expenses: Not on file   Food Insecurity: No Food Insecurity    Worried About Running Out of Food in the Last Year: Never true    [place] of Food in the Last Year: Never true   Transportation Needs: No Transportation Needs    Lack of Transportation (Medical): No    Lack of Transportation (Non-Medical): No   Physical Activity:     Days of Exercise per Week: Not on file    Minutes of Exercise per Session: Not on file   Stress:     Feeling of Stress : Not on file   Social Connections:     Frequency of Communication with Friends and Family: Not on file    Frequency of Social Gatherings with Friends and Family: Not on file    Attends [employer] Services: Not on file    Active Member of [employer] or Organizations: Not on file    Attends Club or Organization Meetings: Not on file    Marital Status: Not on file   Intimate Partner Violence:     Fear of Current or Ex-Partner: Not on file    Emotionally Abused: Not on file    Physically Abused: Not on file    Sexually Abused: Not on file   Housing Stability:     Unable to Pay for Housing in the Last Year: Not on file    Number of [place] in the Last Year: Not on file    Unstable Housing in the Last Year: Not on file     History reviewed. No pertinent family history. Allergies:  Patient has no known allergies. Review of Systems   Constitutional: Negative for chills and fever. Skin: Positive for rash. Allergic/Immunologic: Negative for environmental allergies, food allergies and immunocompromised state. Hematological: Negative for adenopathy. Does not bruise/bleed easily. Psychiatric/Behavioral: Negative for behavioral problems and sleep disturbance. Objective:   Pulse 60   Temp 98 °F (36.7 °C)   Ht 5' 2\" (1.575 m)   Wt 160 lb (72.6 kg)   SpO2 98%   BMI 29.26 kg/m²     Physical Exam  Vitals reviewed. Constitutional:       General: He is not in acute distress. Appearance: He is well-developed. HENT:      Head: Normocephalic and atraumatic. Right Ear: External ear normal.      Left Ear: External ear normal.      Nose: Nose normal.   Eyes:      General:         Right eye: No discharge. Left eye: No discharge. Conjunctiva/sclera: Conjunctivae normal.      Pupils: Pupils are equal, round, and reactive to light. Neck:      Thyroid: No thyromegaly. Cardiovascular:      Rate and Rhythm: Normal rate and regular rhythm. Pulmonary:      Effort: Pulmonary effort is normal. No respiratory distress. Abdominal:      General: There is no distension. Musculoskeletal:      Cervical back: Neck supple. Skin:     General: Skin is warm and dry. Comments: Erythematous raised rash some with excoriations somewhat flaking throughout the right side of the torso and anterior chest   Neurological:      Mental Status: He is alert and oriented to person, place, and time. Coordination: Coordination normal.   Psychiatric:         Thought Content: Thought content normal.         Judgment: Judgment normal.         No results found for this visit on 11/16/21. Recent Results (from the past 2016 hour(s))   POCT glycosylated hemoglobin (Hb A1C)    Collection Time: 08/26/21 10:51 AM   Result Value Ref Range    Hemoglobin A1C 11.2 %       [] Pt was seen by provider for      Minutes  Counseling and coordination of care was done for all assessment diagnosis listed for today with patient and any family/friend present. More than 50% of this visit was spent coordinating cuurent care, obtaining information for prior records, and counseling for current plan of action. Assessment:       Diagnosis Orders   1.  Prurigo nodularis No orders of the defined types were placed in this encounter. Orders Placed This Encounter   Medications    calcipotriene (DOVONEX) 0.005 % cream     Sig: Apply topically 2 times daily     Dispense:  60 g     Refill:  1          Medication List          Accurate as of November 16, 2021 12:45 PM. If you have any questions, ask your nurse or doctor. START taking these medications    calcipotriene 0.005 % cream  Commonly known as: Dovonex  Apply topically 2 times daily  Started by: Gerda Crump MD        CONTINUE taking these medications    atorvastatin 40 MG tablet  Commonly known as: LIPITOR  TAKE 1 TABLET BY MOUTH EVERY NIGHT     * blood glucose monitor supplies Supplies  LANCETS  Test 2 times a day & as needed for symptoms of irregular blood glucose. Dispense sufficient amount for indicated testing frequency plus additional to accommodate PRN testing needs. * blood glucose monitor kit and supplies  tEST 2 TIMES A DAY     blood glucose test strips  Test 2 times a day & as needed for symptoms of irregular blood glucose. Dispense sufficient amount for indicated testing frequency plus additional to accommodate PRN testing needs. clopidogrel 75 MG tablet  Commonly known as: PLAVIX  TAKE 1 TABLET BY MOUTH DAILY     dilTIAZem 120 MG extended release capsule  Commonly known as: CARDIZEM CD  TAKE 1 CAPSULE(120 MG) BY MOUTH DAILY     glipiZIDE 5 MG tablet  Commonly known as: GLUCOTROL  TAKE 1 TABLET BY MOUTH TWICE DAILY     losartan 25 MG tablet  Commonly known as: COZAAR  TAKE 1 TABLET BY MOUTH TWICE DAILY     metFORMIN 1000 MG tablet  Commonly known as: GLUCOPHAGE  TAKE 1 TABLET BY MOUTH TWICE DAILY WITH MEALS     metoprolol tartrate 25 MG tablet  Commonly known as: LOPRESSOR  TAKE 1 TABLET BY MOUTH TWICE DAILY     nitroGLYCERIN 0.4 MG SL tablet  Commonly known as: NITROSTAT  up to max of 3 total doses. If no relief after 1 dose, call 911.      omeprazole 40 MG delayed release

## 2021-11-16 NOTE — PATIENT INSTRUCTIONS
Patient's blood sugar has been elevated with the glipizide decreased and the skin did not respond to this change. He will restart this medication. We will add calcipotriene to his lotion regimen. This can be used twice a day. He will verify with pharmacy if he can mix this with triamcinolone to apply at the same time. Appointment 2 weeks.

## 2021-12-16 RX ORDER — CLOPIDOGREL BISULFATE 75 MG/1
75 TABLET ORAL DAILY
Qty: 30 TABLET | Refills: 0 | Status: SHIPPED | OUTPATIENT
Start: 2021-12-16 | End: 2022-01-12

## 2021-12-16 NOTE — TELEPHONE ENCOUNTER
Future Appointments    Encounter Information    Provider Department Appt Notes   1/7/2022 MD SYED Yang AT [facility] 3 month f/u       Recent Visits    10/07/2021 Type 2 diabetes mellitus without complication, without long-term current use of insulin [facility])   SOJOURN AT [facility] [address], [place]

## 2021-12-30 ENCOUNTER — TELEPHONE (OUTPATIENT)
Dept: FAMILY MEDICINE CLINIC | Age: 71
End: 2021-12-30

## 2021-12-30 NOTE — TELEPHONE ENCOUNTER
Notify patient of the denial and that when I get back in town Ill be looking at this a little more closely to see if we can do something else.     Send a message back to me so I dont forget

## 2022-01-04 RX ORDER — PAROXETINE HYDROCHLORIDE 40 MG/1
40 TABLET, FILM COATED ORAL DAILY
Qty: 30 TABLET | Refills: 2 | Status: SHIPPED | OUTPATIENT
Start: 2022-01-04 | End: 2022-02-05 | Stop reason: SDUPTHER

## 2022-01-07 ENCOUNTER — VIRTUAL VISIT (OUTPATIENT)
Dept: FAMILY MEDICINE CLINIC | Age: 72
End: 2022-01-07
Payer: MEDICARE

## 2022-01-07 DIAGNOSIS — F41.9 ANXIETY: ICD-10-CM

## 2022-01-07 DIAGNOSIS — I10 ESSENTIAL HYPERTENSION: ICD-10-CM

## 2022-01-07 DIAGNOSIS — I25.10 CORONARY ARTERY DISEASE INVOLVING NATIVE HEART WITHOUT ANGINA PECTORIS, UNSPECIFIED VESSEL OR LESION TYPE: ICD-10-CM

## 2022-01-07 DIAGNOSIS — E11.9 TYPE 2 DIABETES MELLITUS WITHOUT COMPLICATION, WITHOUT LONG-TERM CURRENT USE OF INSULIN (HCC): Primary | ICD-10-CM

## 2022-01-07 PROCEDURE — 2022F DILAT RTA XM EVC RTNOPTHY: CPT | Performed by: INTERNAL MEDICINE

## 2022-01-07 PROCEDURE — 99214 OFFICE O/P EST MOD 30 MIN: CPT | Performed by: INTERNAL MEDICINE

## 2022-01-07 PROCEDURE — 3017F COLORECTAL CA SCREEN DOC REV: CPT | Performed by: INTERNAL MEDICINE

## 2022-01-07 PROCEDURE — G8417 CALC BMI ABV UP PARAM F/U: HCPCS | Performed by: INTERNAL MEDICINE

## 2022-01-07 PROCEDURE — 3046F HEMOGLOBIN A1C LEVEL >9.0%: CPT | Performed by: INTERNAL MEDICINE

## 2022-01-07 PROCEDURE — G8428 CUR MEDS NOT DOCUMENT: HCPCS | Performed by: INTERNAL MEDICINE

## 2022-01-07 PROCEDURE — 4040F PNEUMOC VAC/ADMIN/RCVD: CPT | Performed by: INTERNAL MEDICINE

## 2022-01-07 PROCEDURE — G8484 FLU IMMUNIZE NO ADMIN: HCPCS | Performed by: INTERNAL MEDICINE

## 2022-01-07 PROCEDURE — 1123F ACP DISCUSS/DSCN MKR DOCD: CPT | Performed by: INTERNAL MEDICINE

## 2022-01-07 PROCEDURE — 1036F TOBACCO NON-USER: CPT | Performed by: INTERNAL MEDICINE

## 2022-01-07 RX ORDER — GLIPIZIDE 10 MG/1
10 TABLET ORAL 2 TIMES DAILY
Qty: 60 TABLET | Refills: 3 | Status: SHIPPED | OUTPATIENT
Start: 2022-01-07 | End: 2022-03-17

## 2022-01-07 ASSESSMENT — ENCOUNTER SYMPTOMS
SHORTNESS OF BREATH: 0
RHINORRHEA: 0
BACK PAIN: 0
CHEST TIGHTNESS: 0
DIARRHEA: 0
EYE PAIN: 0
RECTAL PAIN: 0
COLOR CHANGE: 0
SORE THROAT: 0
APNEA: 0
SINUS PAIN: 0
ABDOMINAL PAIN: 0
COUGH: 0
ABDOMINAL DISTENTION: 0
EYE ITCHING: 0
EYE REDNESS: 0
FACIAL SWELLING: 0
NAUSEA: 0
PHOTOPHOBIA: 0
VOICE CHANGE: 0
TROUBLE SWALLOWING: 0
BLOOD IN STOOL: 0
WHEEZING: 0
CONSTIPATION: 0
SINUS PRESSURE: 0
VOMITING: 0
EYE DISCHARGE: 0

## 2022-01-07 NOTE — PROGRESS NOTES
Subjective:      Patient ID: Bill Bond is a 66 y.o. male New patient, here for evaluation of the following chief complaint(s):  No chief complaint on file. Diabetes  Pertinent negatives for hypoglycemia include no confusion, dizziness, headaches, nervousness/anxiousness, seizures, speech difficulty or tremors. Pertinent negatives for diabetes include no chest pain, no fatigue, no polydipsia, no polyphagia, no polyuria and no weakness. Hypertension  Pertinent negatives include no chest pain, headaches, neck pain, palpitations or shortness of breath. 75-year-old male with a history of type 2 diabetes established coronary artery disease anxiety and hypertension presents for follow-up visit. Hx  Of CAD S/P MI-metoprolol, lipitor, plavix          Anxiety-Paxil            Essential hypertension: Compliant with Cardizem 120 mg orally daily and Lopressor 25 mg twice daily  in and GLIPIZIDE. Blood sugars improving. Mostly mid 100s        Nicotine dependence:NO LONGER SMOKING SINCE 10/2020        Diverticular bleed: The patient has not experience additional bleeding since being discharged from the hospital.          Cat : Tiger       At present he denies polyuria,  Polydipsia, constitutional, sinus, visual, cardiopulmonary, urologic, gastrointestinal, immunologic/hematologic, musculoskeletal, neurologic,dermatologic, or psychiatric complaints. Review of Systems   Constitutional: Negative for chills, diaphoresis, fatigue and fever. HENT: Negative for congestion, dental problem, drooling, ear discharge, ear pain, facial swelling, hearing loss, mouth sores, nosebleeds, postnasal drip, rhinorrhea, sinus pressure, sinus pain, sneezing, sore throat, tinnitus, trouble swallowing and voice change. Eyes: Negative for photophobia, pain, discharge, redness, itching and visual disturbance. Respiratory: Negative for apnea, cough, chest tightness, shortness of breath and wheezing.     Cardiovascular: Negative for chest pain, palpitations and leg swelling. Gastrointestinal: Negative for abdominal distention, abdominal pain, blood in stool, constipation, diarrhea, nausea, rectal pain and vomiting. Endocrine: Negative for cold intolerance, heat intolerance, polydipsia, polyphagia and polyuria. Genitourinary: Negative for decreased urine volume, difficulty urinating, dysuria, flank pain, frequency, genital sores, hematuria and urgency. Musculoskeletal: Negative for arthralgias, back pain, gait problem, joint swelling, myalgias, neck pain and neck stiffness. Skin: Negative for color change, rash and wound. Allergic/Immunologic: Negative for environmental allergies and food allergies. Neurological: Negative for dizziness, tremors, seizures, syncope, facial asymmetry, speech difficulty, weakness, light-headedness, numbness and headaches. Hematological: Negative for adenopathy. Does not bruise/bleed easily. Psychiatric/Behavioral: Negative for agitation, confusion, decreased concentration, hallucinations, self-injury, sleep disturbance and suicidal ideas. The patient is not nervous/anxious. Objective: There were no vitals taken for this visit. Assessment:       Diagnosis Orders   1. Type 2 diabetes mellitus without complication, without long-term current use of insulin ([facility] [address]     2. Anxiety     3. Coronary artery disease involving native heart without angina pectoris, unspecified vessel or lesion type     4. Essential hypertension           Plan:      Tad Rendon was seen today for established new doctor.     Diagnoses and all orders for this visit:    Type 2 diabetes mellitus without complication, without long-term current use of insulin ([facility])  -Increase glipizide to 10 bid  -continue Metformin to 1000 mg twice daily    Anxiety-continue Paxil 40 mg po dialy     Gastrointestinal hemorrhage associated with intestinal diverticulitis-obtain a complete blood cell count    Coronary artery disease involving native heart without angina pectoris, unspecified vessel or lesion type-continue plavix , lipitor    Hypertension -lopressor 25 bid, cozaar 25 mg, diltiazem 120 mg daily,  Metoprolol 25 bid     GERD- Protonix 40 MG COBY Y    No follow-ups on file. On this date 01/07/22 I have spent 20 minutes reviewing previous notes, test results and face to face with the patient discussing the diagnosis and importance of compliance with the treatment plan. Sandra Lamas MD    Please note, this report has been partially produced using speech recognition software  and may cause  and /or contain errors related to that system including grammar, punctuation and spelling as well as words and phrases that may seem inappropriate. If there are questions or concerns please feel free to contact me to clarify.

## 2022-01-11 DIAGNOSIS — L81.4 LENTIGO SIMPLEX: ICD-10-CM

## 2022-01-11 DIAGNOSIS — L28.1 PRURIGO NODULARIS: Primary | ICD-10-CM

## 2022-01-11 RX ORDER — CALCIPOTRIENE, BETAMETHASONE DIPROPIONATE 50; .643 UG/G; MG/G
OINTMENT TOPICAL
Qty: 60 G | Refills: 1 | Status: SHIPPED | OUTPATIENT
Start: 2022-01-11

## 2022-01-12 RX ORDER — CLOPIDOGREL BISULFATE 75 MG/1
75 TABLET ORAL DAILY
Qty: 30 TABLET | Refills: 0 | Status: SHIPPED | OUTPATIENT
Start: 2022-01-12 | End: 2022-02-16 | Stop reason: SDUPTHER

## 2022-01-12 NOTE — TELEPHONE ENCOUNTER
Name from pharmacy: CLOPIDOGREL 75MG TABLETS         Will file in chart as: clopidogrel (PLAVIX) 75 MG tablet    Sig: Take 1 tablet by mouth daily    Disp:  30 tablet    Refills:  0 (Pharmacy requested: Not specified)    Start: 1/12/2022    Class: Normal    Last ordered: 3 weeks ago by Jasbir Xiong MD Last refill: 12/16/2021    Rx #: [id number]       To be filled at: [facility] [address], [address]             Medication Refill    FavioTamara In routed conversation to [facility] Clinical Staff 4 minutes ago (12:53 PM)                 Future Appointments    This patient does not currently have any appointments scheduled.     Recent Visits    01/07/2022 Type 2 diabetes mellitus without complication, without long-term current use of insulin [facility])   [facility] and Madelyn Alex MD   11/16/2021 Prurigo nodularis   [facility] Dania Hernández MD   11/01/2021 Prurigo nodularis   [facility] Dania Hernández MD   10/07/2021 Type 2 diabetes mellitus without complication, without long-term current use of insulin [facility])   [facility] and Madelyn Alex MD   10/05/2021 Dermatitis   [facility]

## 2022-01-26 RX ORDER — DILTIAZEM HYDROCHLORIDE 120 MG/1
CAPSULE, COATED, EXTENDED RELEASE ORAL
Qty: 90 CAPSULE | Refills: 0 | Status: SHIPPED | OUTPATIENT
Start: 2022-01-26 | End: 2022-04-26 | Stop reason: SDUPTHER

## 2022-02-07 RX ORDER — PAROXETINE HYDROCHLORIDE 40 MG/1
40 TABLET, FILM COATED ORAL DAILY
Qty: 30 TABLET | Refills: 2 | Status: SHIPPED | OUTPATIENT
Start: 2022-02-07 | End: 2022-03-06 | Stop reason: SDUPTHER

## 2022-02-07 RX ORDER — OMEPRAZOLE 40 MG/1
40 CAPSULE, DELAYED RELEASE ORAL DAILY
Qty: 90 CAPSULE | Refills: 1 | Status: SHIPPED | OUTPATIENT
Start: 2022-02-07 | End: 2022-05-02 | Stop reason: SDUPTHER

## 2022-02-07 NOTE — TELEPHONE ENCOUNTER
Requesting medication refill. Please approve or deny this request.    Rx requested:  Requested Prescriptions     Pending Prescriptions Disp Refills    omeprazole (PRILOSEC) 40 MG delayed release capsule 90 capsule 1     Sig: Take 1 capsule by mouth daily       Last Office Visit:   1/7/2022    Last Filled:      Last Labs:      Next Visit Date:  No future appointments.

## 2022-02-07 NOTE — TELEPHONE ENCOUNTER
Recent Visits    01/07/2022 Type 2 diabetes mellitus without complication, without long-term current use of insulin [facility])   [facility] AT [facility] and [address], MD

## 2022-02-17 RX ORDER — CLOPIDOGREL BISULFATE 75 MG/1
75 TABLET ORAL DAILY
Qty: 30 TABLET | Refills: 0 | Status: SHIPPED | OUTPATIENT
Start: 2022-02-17 | End: 2022-03-15 | Stop reason: SDUPTHER

## 2022-02-18 RX ORDER — CLOPIDOGREL BISULFATE 75 MG/1
75 TABLET ORAL DAILY
Qty: 90 TABLET | OUTPATIENT
Start: 2022-02-18 | End: 2022-03-20

## 2022-02-21 NOTE — TELEPHONE ENCOUNTER
Comments:     Last Office Visit (last PCP visit):   1/7/2022    Next Visit Date:  No future appointments. **If hasn't been seen in over a year OR hasn't followed up according to last diabetes/ADHD visit, make appointment for patient before sending refill to provider.     Rx requested:  Requested Prescriptions     Pending Prescriptions Disp Refills    metFORMIN (GLUCOPHAGE) 1000 MG tablet 180 tablet 1

## 2022-03-07 RX ORDER — LOSARTAN POTASSIUM 25 MG/1
25 TABLET ORAL DAILY
Qty: 180 TABLET | Refills: 3 | Status: SHIPPED | OUTPATIENT
Start: 2022-03-07 | End: 2022-06-04 | Stop reason: SDUPTHER

## 2022-03-07 RX ORDER — PAROXETINE HYDROCHLORIDE 40 MG/1
40 TABLET, FILM COATED ORAL DAILY
Qty: 30 TABLET | Refills: 2 | Status: SHIPPED | OUTPATIENT
Start: 2022-03-07 | End: 2022-04-05 | Stop reason: SDUPTHER

## 2022-03-15 RX ORDER — CLOPIDOGREL BISULFATE 75 MG/1
75 TABLET ORAL DAILY
Qty: 30 TABLET | Refills: 0 | Status: SHIPPED | OUTPATIENT
Start: 2022-03-15 | End: 2022-03-15

## 2022-03-15 RX ORDER — CLOPIDOGREL BISULFATE 75 MG/1
75 TABLET ORAL DAILY
Qty: 90 TABLET | Refills: 5 | Status: SHIPPED | OUTPATIENT
Start: 2022-03-15 | End: 2022-04-11 | Stop reason: SDUPTHER

## 2022-03-17 RX ORDER — GLIPIZIDE 10 MG/1
TABLET ORAL
Qty: 180 TABLET | Refills: 0 | Status: SHIPPED | OUTPATIENT
Start: 2022-03-17 | End: 2022-06-04 | Stop reason: SDUPTHER

## 2022-03-17 NOTE — TELEPHONE ENCOUNTER
Rx request   Requested Prescriptions     Pending Prescriptions Disp Refills    glipiZIDE (GLUCOTROL) 10 MG tablet [Pharmacy Med Name: GLIPIZIDE 10MG TABLETS] 180 tablet 0     Sig: TAKE 1 TABLET BY MOUTH TWICE DAILY     LOV 1/7/2022    Next Visit Date:  No future appointments.

## 2022-04-05 RX ORDER — PAROXETINE HYDROCHLORIDE 40 MG/1
40 TABLET, FILM COATED ORAL DAILY
Qty: 30 TABLET | Refills: 2 | Status: SHIPPED | OUTPATIENT
Start: 2022-04-05 | End: 2022-05-02 | Stop reason: SDUPTHER

## 2022-04-11 RX ORDER — CLOPIDOGREL BISULFATE 75 MG/1
75 TABLET ORAL DAILY
Qty: 90 TABLET | Refills: 5 | Status: SHIPPED | OUTPATIENT
Start: 2022-04-11 | End: 2022-07-10 | Stop reason: SDUPTHER

## 2022-04-11 NOTE — TELEPHONE ENCOUNTER
Future Appointments    This patient does not currently have any appointments scheduled.     Past Visits    Date Provider Specialty Visit Type Primary Dx   01/07/2022 Stalin Donohue MD Family Medicine Virtual Visit Type 2 diabetes mellitus without complication, without long-term current use of insulin ([address]

## 2022-04-26 RX ORDER — DILTIAZEM HYDROCHLORIDE 120 MG/1
120 CAPSULE, COATED, EXTENDED RELEASE ORAL DAILY
Qty: 90 CAPSULE | Refills: 0 | Status: SHIPPED | OUTPATIENT
Start: 2022-04-26 | End: 2022-07-24 | Stop reason: SDUPTHER

## 2022-04-26 RX ORDER — ATORVASTATIN CALCIUM 40 MG/1
40 TABLET, FILM COATED ORAL DAILY
Qty: 90 TABLET | Refills: 3 | Status: SHIPPED | OUTPATIENT
Start: 2022-04-26 | End: 2022-08-01 | Stop reason: SDUPTHER

## 2022-05-02 RX ORDER — PAROXETINE HYDROCHLORIDE 40 MG/1
40 TABLET, FILM COATED ORAL DAILY
Qty: 30 TABLET | Refills: 2 | Status: SHIPPED | OUTPATIENT
Start: 2022-05-02 | End: 2022-06-04 | Stop reason: SDUPTHER

## 2022-05-02 RX ORDER — OMEPRAZOLE 40 MG/1
40 CAPSULE, DELAYED RELEASE ORAL DAILY
Qty: 90 CAPSULE | Refills: 1 | Status: SHIPPED | OUTPATIENT
Start: 2022-05-02 | End: 2022-08-01 | Stop reason: SDUPTHER

## 2022-06-05 RX ORDER — PAROXETINE HYDROCHLORIDE 40 MG/1
40 TABLET, FILM COATED ORAL DAILY
Qty: 30 TABLET | Refills: 2 | Status: SHIPPED | OUTPATIENT
Start: 2022-06-05 | End: 2022-07-05 | Stop reason: SDUPTHER

## 2022-06-05 RX ORDER — GLIPIZIDE 10 MG/1
10 TABLET ORAL
Qty: 180 TABLET | Refills: 5 | Status: SHIPPED | OUTPATIENT
Start: 2022-06-05

## 2022-06-05 RX ORDER — LOSARTAN POTASSIUM 25 MG/1
25 TABLET ORAL DAILY
Qty: 180 TABLET | Refills: 3 | Status: SHIPPED | OUTPATIENT
Start: 2022-06-05

## 2022-06-11 DIAGNOSIS — L30.9 DERMATITIS: ICD-10-CM

## 2022-06-13 RX ORDER — TRIAMCINOLONE ACETONIDE 1 MG/G
CREAM TOPICAL
Qty: 454 G | Refills: 0 | Status: SHIPPED | OUTPATIENT
Start: 2022-06-13

## 2022-06-13 NOTE — TELEPHONE ENCOUNTER
Requesting medication refill. Please approve or deny this request.    Rx requested:  Requested Prescriptions     Pending Prescriptions Disp Refills    triamcinolone (KENALOG) 0.1 % cream 454 g 0     Sig: Apply topically 2 times daily. Last Office Visit:   11/16/2021    Next Visit Date:  No future appointments. Message sent to schedule appt.

## 2022-07-07 RX ORDER — PAROXETINE HYDROCHLORIDE 40 MG/1
40 TABLET, FILM COATED ORAL DAILY
Qty: 30 TABLET | Refills: 2 | Status: SHIPPED | OUTPATIENT
Start: 2022-07-07 | End: 2022-08-01 | Stop reason: SDUPTHER

## 2022-07-11 RX ORDER — CLOPIDOGREL BISULFATE 75 MG/1
75 TABLET ORAL DAILY
Qty: 90 TABLET | Refills: 5 | Status: SHIPPED | OUTPATIENT
Start: 2022-07-11 | End: 2022-08-10

## 2022-07-25 RX ORDER — DILTIAZEM HYDROCHLORIDE 120 MG/1
120 CAPSULE, COATED, EXTENDED RELEASE ORAL DAILY
Qty: 90 CAPSULE | Refills: 0 | Status: SHIPPED | OUTPATIENT
Start: 2022-07-25 | End: 2022-10-20 | Stop reason: SDUPTHER

## 2022-07-29 ENCOUNTER — APPOINTMENT (OUTPATIENT)
Dept: GENERAL RADIOLOGY | Age: 72
End: 2022-07-29
Payer: MEDICARE

## 2022-07-29 ENCOUNTER — HOSPITAL ENCOUNTER (EMERGENCY)
Age: 72
Discharge: HOME | End: 2022-07-29
Payer: MEDICARE

## 2022-07-29 VITALS
HEART RATE: 61 BPM | WEIGHT: 170 LBS | HEIGHT: 65 IN | BODY MASS INDEX: 28.32 KG/M2 | RESPIRATION RATE: 16 BRPM | OXYGEN SATURATION: 98 % | TEMPERATURE: 96 F | DIASTOLIC BLOOD PRESSURE: 62 MMHG | SYSTOLIC BLOOD PRESSURE: 165 MMHG

## 2022-07-29 DIAGNOSIS — S20.212A CONTUSION OF RIB ON LEFT SIDE, INITIAL ENCOUNTER: Primary | ICD-10-CM

## 2022-07-29 PROCEDURE — 99283 EMERGENCY DEPT VISIT LOW MDM: CPT

## 2022-07-29 PROCEDURE — 71101 X-RAY EXAM UNILAT RIBS/CHEST: CPT

## 2022-07-29 RX ORDER — LIDOCAINE 50 MG/G
1 PATCH TOPICAL DAILY
Qty: 10 PATCH | Refills: 0 | Status: SHIPPED | OUTPATIENT
Start: 2022-07-29 | End: 2022-08-08

## 2022-07-29 ASSESSMENT — PAIN DESCRIPTION - ORIENTATION: ORIENTATION: LEFT

## 2022-07-29 ASSESSMENT — PAIN - FUNCTIONAL ASSESSMENT
PAIN_FUNCTIONAL_ASSESSMENT: PREVENTS OR INTERFERES SOME ACTIVE ACTIVITIES AND ADLS
PAIN_FUNCTIONAL_ASSESSMENT: 0-10
PAIN_FUNCTIONAL_ASSESSMENT: 0-10

## 2022-07-29 ASSESSMENT — PAIN DESCRIPTION - FREQUENCY: FREQUENCY: CONTINUOUS

## 2022-07-29 ASSESSMENT — PAIN SCALES - GENERAL
PAINLEVEL_OUTOF10: 8
PAINLEVEL_OUTOF10: 8

## 2022-07-29 ASSESSMENT — ENCOUNTER SYMPTOMS
EYES NEGATIVE: 1
RESPIRATORY NEGATIVE: 1
GASTROINTESTINAL NEGATIVE: 1

## 2022-07-29 ASSESSMENT — PAIN DESCRIPTION - DESCRIPTORS
DESCRIPTORS: ACHING
DESCRIPTORS: ACHING;SHARP

## 2022-07-29 ASSESSMENT — PAIN DESCRIPTION - ONSET: ONSET: ON-GOING

## 2022-07-29 ASSESSMENT — PAIN DESCRIPTION - LOCATION
LOCATION: RIB CAGE
LOCATION: RIB CAGE

## 2022-07-29 NOTE — ED PROVIDER NOTES
[facility] ED  EMERGENCY DEPARTMENT ENCOUNTER      Pt Name: Pawan Snowden  MRN: [id number]  [place] 1950  Date of evaluation: 7/29/2022  Provider: TERRA Mcghee [id number]       Chief Complaint   Patient presents with    Rib Pain (injury)     Left sided rib pain since he fell out of bed last week          HISTORY OF PRESENT ILLNESS   (Location/Symptom, Timing/Onset, Context/Setting, Quality, Duration, Modifying Factors, Severity)  Note limiting factors. Pawan Snowden is a 74 y.o. male who presents to the emergency department      25-year-old  male comes the ER with complaints of left lateral rib pain after falling out of bed and striking his ribs 1 week ago. He denies striking his head or loss of consciousness. He states he was having a bad dream which caused him to fall bed. He has been taking Tylenol for pain with minimal relief. He states the pain is worse with cough, deep breath, movement of his left arm. He denies chest pain or shortness of breath      Nursing Notes were reviewed. REVIEW OF SYSTEMS    (2-9 systems for level 4, 10 or more for level 5)     Review of Systems   Constitutional: Negative. Eyes: Negative. Respiratory: Negative. Cardiovascular: Negative. Gastrointestinal: Negative. Musculoskeletal:         Left lateral rib pain   Neurological: Negative. Psychiatric/Behavioral: Negative. Except as noted above the remainder of the review of systems was reviewed and negative.        PAST MEDICAL HISTORY     Past Medical History:   Diagnosis Date    Cancer ([address]     skin left neck    Chronic bronchitis (HCC)     COPD (chronic obstructive pulmonary disease) ([address]     Diabetes mellitus ([address]     Diverticulitis     Emphysema of lung ([address]     Heart attack ([address]     History of blood transfusion     Hyperlipidemia     Hypertension     Meniere's disease     Vertigo          SURGICAL HISTORY       Past Surgical History:   Procedure tablet by mouth daily    TRIAMCINOLONE (KENALOG) 0.1 % CREAM    Apply topically 2 times daily. ALLERGIES     Patient has no known allergies. FAMILY HISTORY     History reviewed. No pertinent family history. SOCIAL HISTORY       Social History     Socioeconomic History    Marital status:      Spouse name: None    Number of children: None    Years of education: None    Highest education level: None   Tobacco Use    Smoking status: Former     Packs/day: 1.00     Years: 40.00     Pack years: 40.00     Types: Cigars, Cigarettes     Quit date: 12/10/2020     Years since quittin.6    Smokeless tobacco: Never   Vaping Use    Vaping Use: Never used   Substance and Sexual Activity    Alcohol use: Never    Drug use: Never    Sexual activity: Not Currently       SCREENINGS    Anne Coma Scale  Eye Opening: Spontaneous  Best Verbal Response: Oriented  Best Motor Response: Obeys commands  Anne Coma Scale Score: 15          PHYSICAL EXAM    (up to 7 for level 4, 8 or more for level 5)     ED Triage Vitals [22 1442]   BP Temp Temp Source Heart Rate Resp SpO2 Height Weight   (!) 165/62 (!) 96 °F (35.6 °C) Temporal 61 16 98 % 5' 5\" (1.651 m) 170 lb (77.1 kg)       Physical Exam  Vitals and nursing note reviewed. Constitutional:       Appearance: Normal appearance. Eyes:      Pupils: Pupils are equal, round, and reactive to light. Cardiovascular:      Rate and Rhythm: Normal rate and regular rhythm. Pulmonary:      Effort: Pulmonary effort is normal.      Breath sounds: Normal breath sounds. Musculoskeletal:      Cervical back: Normal range of motion. Comments: Moderate tenderness to the left lateral ribs near ribs 4 and 5. There is no ecchymosis, swelling, deformity, rash, bony crepitus. Skin:     General: Skin is warm and dry. Findings: No rash. Neurological:      General: No focal deficit present. Mental Status: He is alert and oriented to person, place, and time. Psychiatric:         Mood and Affect: Mood normal.         Behavior: Behavior normal.       DIAGNOSTIC RESULTS     EKG: All EKG's are interpreted by the Emergency Department Physician who either signs or Co-signs this chart in the absence of a cardiologist.        RADIOLOGY:   Non-plain film images such as CT, Ultrasound and MRI are read by the radiologist. Plain radiographic images are visualized and preliminarily interpreted by the emergency physician with the below findings:        Interpretation per the Radiologist below, if available at the time of this note:    XR RIBS LEFT INCLUDE CHEST (MIN 3 VIEWS)   Final Result   Impression:   1. No acute displaced rib fracture is identified. 2. No acute cardiopulmonary disease. ED BEDSIDE ULTRASOUND:   Performed by ED Physician - none    LABS:  Labs Reviewed - No data to display    All other labs were within normal range or not returned as of this dictation. EMERGENCY DEPARTMENT COURSE and DIFFERENTIAL DIAGNOSIS/MDM:   Vitals:    Vitals:    07/29/22 1442   BP: (!) 165/62   Pulse: 61   Resp: 16   Temp: (!) 96 °F (35.6 °C)   TempSrc: Temporal   SpO2: 98%   Weight: 170 lb (77.1 kg)   Height: 5' 5\" (1.651 m)           MDM  Number of Diagnoses or Management Options  Diagnosis management comments: X-ray of the left ribs is negative for acute findings. No evidence of pneumothorax per [facility] radiologist reading       Amount and/or Complexity of Data Reviewed  Tests in the radiology section of CPT®: reviewed          REASSESSMENT            PROCEDURES:  Unless otherwise noted below, none     Procedures      FINAL IMPRESSION      1.  Contusion of rib on left side, initial encounter          DISPOSITION/PLAN   DISPOSITION        PATIENT REFERRED TO:  Clara Veronica MD  [id number] [phone]    In 1 week  if no improvement    DISCHARGE MEDICATIONS:  New Prescriptions    LIDOCAINE (LIDODERM) 5 %    Place 1 patch onto the skin in the morning for 10 days. 12 hours on, 12 hours off. .     Controlled Substances Monitoring:     No flowsheet data found.     (Please note that portions of this note were completed with a voice recognition program.  Efforts were made to edit the dictations but occasionally words are mis-transcribed.)    TERRA Mcghee CNP (electronically signed)  Attending Emergency Physician          TERRA Haque CNP  07/29/22 5095

## 2022-07-29 NOTE — DISCHARGE INSTRUCTIONS
Drink plenty fluids, get plenty of rest.  Continue home medications as prescribed. Take Tylenol as needed for pain. Use lidocaine patches for pain. Apply ice to the area as needed for comfort. Follow-up with your family physician if no improvement and 5 to 7 days.   Return to the ER for worsening symptoms or concerns

## 2022-08-01 RX ORDER — ATORVASTATIN CALCIUM 40 MG/1
40 TABLET, FILM COATED ORAL DAILY
Qty: 90 TABLET | Refills: 3 | Status: SHIPPED | OUTPATIENT
Start: 2022-08-01

## 2022-08-01 RX ORDER — PAROXETINE HYDROCHLORIDE 40 MG/1
40 TABLET, FILM COATED ORAL DAILY
Qty: 30 TABLET | Refills: 2 | Status: SHIPPED | OUTPATIENT
Start: 2022-08-01 | End: 2022-10-03 | Stop reason: SDUPTHER

## 2022-08-01 RX ORDER — OMEPRAZOLE 40 MG/1
40 CAPSULE, DELAYED RELEASE ORAL DAILY
Qty: 90 CAPSULE | Refills: 1 | Status: SHIPPED | OUTPATIENT
Start: 2022-08-01 | End: 2022-11-02 | Stop reason: SDUPTHER

## 2022-08-01 NOTE — TELEPHONE ENCOUNTER
requesting medication refill. Please approve or deny this request.    Rx requested:  Requested Prescriptions     Pending Prescriptions Disp Refills    atorvastatin (LIPITOR) 40 MG tablet 90 tablet 3     Sig: Take 1 tablet by mouth in the morning. TAKE 1 TABLET BY MOUTH EVERY NIGHT. omeprazole (PRILOSEC) 40 MG delayed release capsule 90 capsule 1     Sig: Take 1 capsule by mouth in the morning. PARoxetine (PAXIL) 40 MG tablet 30 tablet 2     Sig: Take 1 tablet by mouth in the morning. Last Office Visit:   1/7/2022      Next Visit Date:  No future appointments.

## 2022-10-03 RX ORDER — PAROXETINE HYDROCHLORIDE 40 MG/1
40 TABLET, FILM COATED ORAL DAILY
Qty: 30 TABLET | Refills: 2 | Status: SHIPPED | OUTPATIENT
Start: 2022-10-03 | End: 2022-11-02 | Stop reason: SDUPTHER

## 2022-10-11 RX ORDER — CLOPIDOGREL BISULFATE 75 MG/1
75 TABLET ORAL DAILY
Qty: 90 TABLET | Refills: 5 | OUTPATIENT
Start: 2022-10-11 | End: 2022-11-10

## 2022-10-20 RX ORDER — DILTIAZEM HYDROCHLORIDE 120 MG/1
120 CAPSULE, COATED, EXTENDED RELEASE ORAL DAILY
Qty: 90 CAPSULE | Refills: 0 | Status: SHIPPED | OUTPATIENT
Start: 2022-10-20

## 2022-10-20 NOTE — TELEPHONE ENCOUNTER
Comments: This request is coming from the pharmacy. Last Office Visit (last PCP visit):   1/7/2022    Next Visit Date:  No future appointments. If hasn't been seen in over a year OR hasn't followed up according to last diabetes/ADHD visit, make appointment for patient before sending refill to provider.     Rx requested:  Requested Prescriptions     Pending Prescriptions Disp Refills    dilTIAZem (CARDIZEM CD) 120 MG extended release capsule 90 capsule 0     Sig: Take 1 capsule by mouth daily TAKE 1 CAPSULE(120 MG) BY MOUTH DAILY

## 2022-11-02 RX ORDER — OMEPRAZOLE 40 MG/1
40 CAPSULE, DELAYED RELEASE ORAL DAILY
Qty: 90 CAPSULE | Refills: 1 | Status: SHIPPED | OUTPATIENT
Start: 2022-11-02

## 2022-11-02 RX ORDER — PAROXETINE HYDROCHLORIDE 40 MG/1
40 TABLET, FILM COATED ORAL DAILY
Qty: 30 TABLET | Refills: 2 | Status: SHIPPED | OUTPATIENT
Start: 2022-11-02 | End: 2023-01-31

## 2022-11-02 NOTE — TELEPHONE ENCOUNTER
requesting medication refill. Please approve or deny this request.    Rx requested:  Requested Prescriptions     Pending Prescriptions Disp Refills    omeprazole (PRILOSEC) 40 MG delayed release capsule 90 capsule 1     Sig: Take 1 capsule by mouth daily    PARoxetine (PAXIL) 40 MG tablet 30 tablet 2     Sig: Take 1 tablet by mouth daily         Last Office Visit:   1/7/2022      Next Visit Date:  No future appointments.

## 2022-11-09 RX ORDER — ATORVASTATIN CALCIUM 40 MG/1
40 TABLET, FILM COATED ORAL DAILY
Qty: 90 TABLET | Refills: 0 | Status: SHIPPED | OUTPATIENT
Start: 2022-11-09

## 2022-12-05 RX ORDER — PAROXETINE HYDROCHLORIDE 40 MG/1
40 TABLET, FILM COATED ORAL DAILY
Qty: 30 TABLET | Refills: 2 | OUTPATIENT
Start: 2022-12-05 | End: 2023-03-05

## 2022-12-05 RX ORDER — LOSARTAN POTASSIUM 25 MG/1
25 TABLET ORAL DAILY
Qty: 180 TABLET | Refills: 3 | OUTPATIENT
Start: 2022-12-05

## 2022-12-05 RX ORDER — GLIPIZIDE 10 MG/1
10 TABLET ORAL
Qty: 180 TABLET | Refills: 5 | OUTPATIENT
Start: 2022-12-05

## 2023-01-03 RX ORDER — PAROXETINE HYDROCHLORIDE 40 MG/1
40 TABLET, FILM COATED ORAL DAILY
Qty: 30 TABLET | Refills: 0 | Status: SHIPPED | OUTPATIENT
Start: 2023-01-03 | End: 2023-04-03

## 2023-01-09 RX ORDER — CLOPIDOGREL BISULFATE 75 MG/1
75 TABLET ORAL DAILY
Qty: 90 TABLET | Refills: 5 | Status: SHIPPED | OUTPATIENT
Start: 2023-01-09 | End: 2023-02-08

## 2023-01-09 NOTE — TELEPHONE ENCOUNTER
Rx requested:  Requested Prescriptions     Pending Prescriptions Disp Refills    clopidogrel (PLAVIX) 75 MG tablet 90 tablet 5     Sig: Take 1 tablet by mouth daily         Last Office Visit:   1/7/2022      Next Visit Date:  Future Appointments   Date Time Provider Jeff Martinez   1/26/2023  1:15 PM Marcelina Alejnadra  [place], [place]

## 2023-01-17 RX ORDER — DILTIAZEM HYDROCHLORIDE 120 MG/1
120 CAPSULE, COATED, EXTENDED RELEASE ORAL DAILY
Qty: 90 CAPSULE | Refills: 1 | Status: SHIPPED | OUTPATIENT
Start: 2023-01-17

## 2023-01-17 RX ORDER — DILTIAZEM HYDROCHLORIDE 120 MG/1
120 CAPSULE, COATED, EXTENDED RELEASE ORAL DAILY
Qty: 30 CAPSULE | Refills: 0 | Status: SHIPPED | OUTPATIENT
Start: 2023-01-17 | End: 2023-01-17

## 2023-01-17 NOTE — TELEPHONE ENCOUNTER
Patient is requesting medication refill.  Please approve or deny this request.    Rx requested:  Requested Prescriptions     Pending Prescriptions Disp Refills    dilTIAZem (CARDIZEM CD) 120 MG extended release capsule [Pharmacy Med Name: DILTIAZEM CD 120MG CAPSULES (24 HR)] 90 capsule      Sig: TAKE 1 CAPSULE BY MOUTH DAILY         Last Office Visit:   1/7/2022      Next Visit Date:  Future Appointments   Date Time Provider Jeff Martinez   1/26/2023  1:15 PM Arron Rea  [place], [place]

## 2023-01-17 NOTE — TELEPHONE ENCOUNTER
Pharmacy requesting medication refill.  Please approve or deny this request.    Rx requested:  Requested Prescriptions     Pending Prescriptions Disp Refills    dilTIAZem (CARDIZEM CD) 120 MG extended release capsule 30 capsule 0     Sig: Take 1 capsule by mouth daily         Last Office Visit:   1/7/2022      Next Visit Date:  Future Appointments   Date Time Provider Jeff Martinez   1/26/2023  1:15 PM Crystal Murray MD [address] [place], [place]

## 2023-01-17 NOTE — TELEPHONE ENCOUNTER
Patient is requesting medication refill.  Please approve or deny this request.    Rx requested:  Requested Prescriptions     Pending Prescriptions Disp Refills    dilTIAZem (CARDIZEM CD) 120 MG extended release capsule 90 capsule 0     Sig: Take 1 capsule by mouth daily TAKE 1 CAPSULE(120 MG) BY MOUTH DAILY         Last Office Visit:   1/7/2022      Next Visit Date:  Future Appointments   Date Time Provider Jeff Martinez   1/26/2023  1:15 PM Crystal Murray  [place], [place]

## 2023-01-21 ASSESSMENT — ENCOUNTER SYMPTOMS
RHINORRHEA: 0
APNEA: 0
FACIAL SWELLING: 0
RECTAL PAIN: 0
EYE PAIN: 0
CHEST TIGHTNESS: 0
CONSTIPATION: 0
WHEEZING: 0
COUGH: 0
PHOTOPHOBIA: 0
ABDOMINAL DISTENTION: 0
EYE ITCHING: 0
VOMITING: 0
BLOOD IN STOOL: 0
NAUSEA: 0
ABDOMINAL PAIN: 0
EYE DISCHARGE: 0
COLOR CHANGE: 0
BACK PAIN: 0
SINUS PAIN: 0
EYE REDNESS: 0
SORE THROAT: 0
DIARRHEA: 0
SINUS PRESSURE: 0
TROUBLE SWALLOWING: 0
VOICE CHANGE: 0
SHORTNESS OF BREATH: 0

## 2023-01-21 NOTE — PROGRESS NOTES
Subjective:      Patient ID: Kal Li is a 74 y.o. male New patient, here for evaluation of the following chief complaint(s):  Chief Complaint   Patient presents with    Diabetes     Has been taking glipizide only daily for the past 10 days    Fall       Diabetes  Pertinent negatives for hypoglycemia include no confusion, dizziness, headaches, nervousness/anxiousness, seizures, speech difficulty or tremors. Pertinent negatives for diabetes include no chest pain, no fatigue, no polydipsia, no polyphagia, no polyuria and no weakness. Hypertension  Pertinent negatives include no chest pain, headaches, neck pain, palpitations or shortness of breath. 68-year-old male with a history of type 2 diabetes established coronary artery disease anxiety and hypertension presents for follow-up visit. He is experiencing chest pain. Chest pain: 4/10 , daily , last seconds, occurs most often when the patient is laying down. W/o diaphoresis. Cramping - occurring daily . Hx  Of CAD S/P MI-metoprolol, lipitor, plavix        Diabetes -glipizide 10 mg bid      Anxiety-Paxil        Essential hypertension: Compliant with   Cozaar 25 mg daily Cardizem 120 mg orally daily and Lopressor 25 mg twice daily  in and GLIPIZIDE. Blood sugars improving. Mostly mid 100s        Nicotine dependence:NO LONGER SMOKING SINCE 10/2020        Diverticular bleed: The patient has not experience additional bleeding since being discharged from the hospital.          Cat : Tiger       At present he denies polyuria,  Polydipsia, constitutional, sinus, visual, cardiopulmonary, urologic, gastrointestinal, immunologic/hematologic, musculoskeletal, neurologic,dermatologic, or psychiatric complaints. Review of Systems   Constitutional:  Negative for chills, diaphoresis, fatigue and fever.    HENT:  Negative for congestion, dental problem, drooling, ear discharge, ear pain, facial swelling, hearing loss, mouth sores, nosebleeds, postnasal drip, rhinorrhea, sinus pressure, sinus pain, sneezing, sore throat, tinnitus, trouble swallowing and voice change. Eyes:  Negative for photophobia, pain, discharge, redness, itching and visual disturbance. Respiratory:  Negative for apnea, cough, chest tightness, shortness of breath and wheezing. Cardiovascular:  Negative for chest pain, palpitations and leg swelling. Gastrointestinal:  Negative for abdominal distention, abdominal pain, blood in stool, constipation, diarrhea, nausea, rectal pain and vomiting. Endocrine: Negative for cold intolerance, heat intolerance, polydipsia, polyphagia and polyuria. Genitourinary:  Negative for decreased urine volume, difficulty urinating, dysuria, flank pain, frequency, genital sores, hematuria and urgency. Musculoskeletal:  Negative for arthralgias, back pain, gait problem, joint swelling, myalgias, neck pain and neck stiffness. Skin:  Negative for color change, rash and wound. Allergic/Immunologic: Negative for environmental allergies and food allergies. Neurological:  Negative for dizziness, tremors, seizures, syncope, facial asymmetry, speech difficulty, weakness, light-headedness, numbness and headaches. Hematological:  Negative for adenopathy. Does not bruise/bleed easily. Psychiatric/Behavioral:  Negative for agitation, confusion, decreased concentration, hallucinations, self-injury, sleep disturbance and suicidal ideas. The patient is not nervous/anxious. Objective:   /60   Pulse 60   Temp 98.1 °F (36.7 °C)   Resp 14   Ht 5' 5\" (1.651 m)   Wt 170 lb (77.1 kg)   SpO2 98%   BMI 28.29 kg/m²     Physical Exam  Constitutional:       General: He is not in acute distress. Appearance: He is well-developed. HENT:      Head: Normocephalic. Right Ear: External ear normal.      Left Ear: External ear normal.   Eyes:      Conjunctiva/sclera: Conjunctivae normal.   Neck:      Vascular: No JVD.       Trachea: No tracheal deviation. Cardiovascular:      Rate and Rhythm: Normal rate and regular rhythm. Heart sounds: Normal heart sounds. Pulmonary:      Effort: Pulmonary effort is normal. No respiratory distress. Breath sounds: Normal breath sounds. No wheezing or rales. Chest:      Chest wall: No tenderness. Abdominal:      General: Bowel sounds are normal. There is no distension. Palpations: Abdomen is soft. There is no mass. Tenderness: There is no abdominal tenderness. There is no guarding or rebound. Musculoskeletal:         General: No tenderness or deformity. Cervical back: Neck supple. Skin:     General: Skin is warm and dry. Coloration: Skin is not pale. Findings: No erythema or rash. Neurological:      Mental Status: He is alert and oriented to person, place, and time. Sensory: No sensory deficit. Motor: No abnormal muscle tone. Coordination: Coordination normal.   Psychiatric:         Thought Content: Thought content normal.         Judgment: Judgment normal.         Assessment:       Diagnosis Orders   1. Type 2 diabetes mellitus without complication, without long-term current use of insulin (HCC)  Microalbumin / Creatinine Urine Ratio    Hemoglobin A1C      2. Hypertension, unspecified type  Basic Metabolic Panel      3. Anxiety        4. Essential hypertension        5. Gastrointestinal hemorrhage associated with intestinal diverticulitis              Plan:      Isaiah Tamayo was seen today for established new doctor.     Diagnoses and all orders for this visit:    Type 2 diabetes mellitus without complication, without long-term current use of insulin (HCC)  -Increase glipizide to 10 bid  -continue Metformin to 1000 mg twice daily    Anxiety-continue Paxil 40 mg po dialy     Gastrointestinal hemorrhage associated with intestinal diverticulitis-obtain a complete blood cell count    Coronary artery disease involving native heart without angina pectoris, unspecified vessel or lesion type-continue plavix , lipitor    Hypertension -lopressor 25 bid, cozaar 25 mg, diltiazem 120 mg daily,  Metoprolol 25 bid     GERD- Protonix 40 MG COBY Y    No follow-ups on file. On this date 01/26/23 I have spent 20 minutes reviewing previous notes, test results and face to face with the patient discussing the diagnosis and importance of compliance with the treatment plan. Ernestine Acharya MD    Please note, this report has been partially produced using speech recognition software  and may cause  and /or contain errors related to that system including grammar, punctuation and spelling as well as words and phrases that may seem inappropriate. If there are questions or concerns please feel free to contact me to clarify.

## 2023-01-26 ENCOUNTER — OFFICE VISIT (OUTPATIENT)
Dept: FAMILY MEDICINE CLINIC | Age: 73
End: 2023-01-26
Payer: MEDICARE

## 2023-01-26 ENCOUNTER — OFFICE VISIT (OUTPATIENT)
Dept: FAMILY MEDICINE CLINIC | Age: 73
End: 2023-01-26

## 2023-01-26 VITALS
OXYGEN SATURATION: 98 % | TEMPERATURE: 98.1 F | WEIGHT: 170 LBS | RESPIRATION RATE: 14 BRPM | BODY MASS INDEX: 28.32 KG/M2 | SYSTOLIC BLOOD PRESSURE: 136 MMHG | HEART RATE: 60 BPM | HEIGHT: 65 IN | DIASTOLIC BLOOD PRESSURE: 60 MMHG

## 2023-01-26 VITALS
SYSTOLIC BLOOD PRESSURE: 136 MMHG | RESPIRATION RATE: 14 BRPM | DIASTOLIC BLOOD PRESSURE: 60 MMHG | OXYGEN SATURATION: 98 % | TEMPERATURE: 98.1 F | HEIGHT: 66 IN | HEART RATE: 60 BPM | WEIGHT: 170 LBS | BODY MASS INDEX: 27.32 KG/M2

## 2023-01-26 DIAGNOSIS — I10 HYPERTENSION, UNSPECIFIED TYPE: ICD-10-CM

## 2023-01-26 DIAGNOSIS — K57.93 GASTROINTESTINAL HEMORRHAGE ASSOCIATED WITH INTESTINAL DIVERTICULITIS: ICD-10-CM

## 2023-01-26 DIAGNOSIS — Z00.00 MEDICARE ANNUAL WELLNESS VISIT, SUBSEQUENT: Primary | ICD-10-CM

## 2023-01-26 DIAGNOSIS — F41.9 ANXIETY: ICD-10-CM

## 2023-01-26 DIAGNOSIS — I10 ESSENTIAL HYPERTENSION: ICD-10-CM

## 2023-01-26 DIAGNOSIS — E11.9 TYPE 2 DIABETES MELLITUS WITHOUT COMPLICATION, WITHOUT LONG-TERM CURRENT USE OF INSULIN (HCC): ICD-10-CM

## 2023-01-26 DIAGNOSIS — E11.9 TYPE 2 DIABETES MELLITUS WITHOUT COMPLICATION, WITHOUT LONG-TERM CURRENT USE OF INSULIN (HCC): Primary | ICD-10-CM

## 2023-01-26 DIAGNOSIS — R07.9 CHEST PAIN, UNSPECIFIED TYPE: ICD-10-CM

## 2023-01-26 LAB
ALBUMIN SERPL-MCNC: 4.4 G/DL (ref 3.5–4.6)
ALP BLD-CCNC: 78 U/L (ref 35–104)
ALT SERPL-CCNC: 13 U/L (ref 0–41)
ANION GAP SERPL CALCULATED.3IONS-SCNC: 16 MEQ/L (ref 9–15)
AST SERPL-CCNC: 22 U/L (ref 0–40)
BILIRUB SERPL-MCNC: 0.5 MG/DL (ref 0.2–0.7)
BUN BLDV-MCNC: 18 MG/DL (ref 8–23)
CALCIUM SERPL-MCNC: 9.6 MG/DL (ref 8.5–9.9)
CHLORIDE BLD-SCNC: 99 MEQ/L (ref 95–107)
CO2: 21 MEQ/L (ref 20–31)
CREAT SERPL-MCNC: 1.36 MG/DL (ref 0.7–1.2)
CREATININE URINE: 250.8 MG/DL
GFR SERPL CREATININE-BSD FRML MDRD: 54.4 ML/MIN/{1.73_M2}
GLOBULIN: 2.2 G/DL (ref 2.3–3.5)
GLUCOSE BLD-MCNC: 220 MG/DL (ref 70–99)
HBA1C MFR BLD: 8.5 % (ref 4.8–5.9)
MICROALBUMIN UR-MCNC: 34.5 MG/DL
MICROALBUMIN/CREAT UR-RTO: 137.6 MG/G (ref 0–30)
POTASSIUM SERPL-SCNC: 4.8 MEQ/L (ref 3.4–4.9)
SODIUM BLD-SCNC: 136 MEQ/L (ref 135–144)
TOTAL PROTEIN: 6.6 G/DL (ref 6.3–8)

## 2023-01-26 PROCEDURE — G8484 FLU IMMUNIZE NO ADMIN: HCPCS | Performed by: INTERNAL MEDICINE

## 2023-01-26 PROCEDURE — 1036F TOBACCO NON-USER: CPT | Performed by: INTERNAL MEDICINE

## 2023-01-26 PROCEDURE — 2022F DILAT RTA XM EVC RTNOPTHY: CPT | Performed by: INTERNAL MEDICINE

## 2023-01-26 PROCEDURE — 3078F DIAST BP <80 MM HG: CPT | Performed by: INTERNAL MEDICINE

## 2023-01-26 PROCEDURE — 3017F COLORECTAL CA SCREEN DOC REV: CPT | Performed by: INTERNAL MEDICINE

## 2023-01-26 PROCEDURE — 3046F HEMOGLOBIN A1C LEVEL >9.0%: CPT | Performed by: INTERNAL MEDICINE

## 2023-01-26 PROCEDURE — 1123F ACP DISCUSS/DSCN MKR DOCD: CPT | Performed by: INTERNAL MEDICINE

## 2023-01-26 PROCEDURE — 3075F SYST BP GE 130 - 139MM HG: CPT | Performed by: INTERNAL MEDICINE

## 2023-01-26 PROCEDURE — G8428 CUR MEDS NOT DOCUMENT: HCPCS | Performed by: INTERNAL MEDICINE

## 2023-01-26 PROCEDURE — G8417 CALC BMI ABV UP PARAM F/U: HCPCS | Performed by: INTERNAL MEDICINE

## 2023-01-26 PROCEDURE — 99214 OFFICE O/P EST MOD 30 MIN: CPT | Performed by: INTERNAL MEDICINE

## 2023-01-26 SDOH — ECONOMIC STABILITY: FOOD INSECURITY: WITHIN THE PAST 12 MONTHS, THE FOOD YOU BOUGHT JUST DIDN'T LAST AND YOU DIDN'T HAVE MONEY TO GET MORE.: NEVER TRUE

## 2023-01-26 SDOH — ECONOMIC STABILITY: FOOD INSECURITY: WITHIN THE PAST 12 MONTHS, YOU WORRIED THAT YOUR FOOD WOULD RUN OUT BEFORE YOU GOT MONEY TO BUY MORE.: NEVER TRUE

## 2023-01-26 ASSESSMENT — PATIENT HEALTH QUESTIONNAIRE - PHQ9
1. LITTLE INTEREST OR PLEASURE IN DOING THINGS: 1
SUM OF ALL RESPONSES TO PHQ9 QUESTIONS 1 & 2: 2
SUM OF ALL RESPONSES TO PHQ QUESTIONS 1-9: 2
2. FEELING DOWN, DEPRESSED OR HOPELESS: 1
SUM OF ALL RESPONSES TO PHQ QUESTIONS 1-9: 2

## 2023-01-26 ASSESSMENT — SOCIAL DETERMINANTS OF HEALTH (SDOH): HOW HARD IS IT FOR YOU TO PAY FOR THE VERY BASICS LIKE FOOD, HOUSING, MEDICAL CARE, AND HEATING?: NOT HARD AT ALL

## 2023-01-26 NOTE — PROGRESS NOTES
Medicare Annual Wellness Visit    Elizabeth Yamila is here for Medicare AWV (Here for Medicare AWV)    Assessment & Plan    Recommendations for Preventive Services Due: see orders and patient instructions/AVS.  Recommended screening schedule for the next 5-10 years is provided to the patient in written form: see Patient Instructions/AVS.     No follow-ups on file. Subjective       Patient's complete Health Risk Assessment and screening values have been reviewed and are found in Flowsheets. The following problems were reviewed today and where indicated follow up appointments were made and/or referrals ordered.     Positive Risk Factor Screenings with Interventions:    Fall Risk:  Do you feel unsteady or are you worried about falling? : (!) yes (feels his balance is unsteady)  2 or more falls in past year?: (!) yes  Fall with injury in past year?: (!) yes     Interventions:    See AVS for additional eduction material                Weight and Activity:  Physical Activity: Inactive    Days of Exercise per Week: 0 days    Minutes of Exercise per Session: 0 min     On average, how many days per week do you engage in moderate to strenuous exercise (like a brisk walk)?: 0 days  Have you lost any weight without trying in the past 3 months?: No  Body mass index: (!) 27.44      Inactivity Interventions:  See AVS for additional education material       Hearing Screen:  Do you or your family notice any trouble with your hearing that hasn't been managed with hearing aids?: (!) Yes (deaf in R ear)    Interventions:  Patient declines any further evaluation or treatment       Advanced Directives:  Do you have a Living Will?: (!) No    Intervention:  has NO advanced directive - information provided       Lung Cancer Screening:                      Objective   Vitals:    01/26/23 1321   BP: 136/60   Pulse: 60   Resp: 14   Temp: 98.1 °F (36.7 °C)   SpO2: 98%   Weight: 170 lb (77.1 kg)   Height: 5' 6\" (1.676 m)      Body mass index is 27.44 kg/m². No Known Allergies  Prior to Visit Medications    Medication Sig Taking? Authorizing Provider   dilTIAZem (CARDIZEM CD) 120 MG extended release capsule TAKE 1 CAPSULE BY MOUTH DAILY  Napoleon Soni MD   clopidogrel (PLAVIX) 75 MG tablet Take 1 tablet by mouth daily  Napoleon Soni MD   metoprolol tartrate (LOPRESSOR) 25 MG tablet Take 1 tablet by mouth 2 times daily TAKE 1 TABLET BY MOUTH TWICE DAILY  Napoleon Soni MD   PARoxetine (PAXIL) 40 MG tablet Take 1 tablet by mouth daily  Napoleon Soni MD   metFORMIN (GLUCOPHAGE) 1000 MG tablet Take 1 tablet by mouth 2 times daily (with meals) TAKE 1 TABLET BY MOUTH TWICE DAILY WITH MEALS **APPT NEEDED FOR ADDITION FILLS  Napoleon Soni MD   atorvastatin (LIPITOR) 40 MG tablet Take 1 tablet by mouth daily TAKE 1 TABLET BY MOUTH EVERY NIGHT **APPT NEEDED FOR ADDITIONAL FILLS  Napoleon Soni MD   omeprazole (PRILOSEC) 40 MG delayed release capsule Take 1 capsule by mouth daily  Napoleon Soni MD   triamcinolone (KENALOG) 0.1 % cream Apply topically 2 times daily. Shannon Luis DO   losartan (COZAAR) 25 MG tablet Take 1 tablet by mouth daily TAKE 1 TABLET BY MOUTH TWICE DAILY  Napoleon Soni MD   glipiZIDE (GLUCOTROL) 10 MG tablet Take 1 tablet by mouth 2 times daily (before meals) TAKE 1 TABLET BY MOUTH TWICE DAILY  Napoleon Soni MD   calcipotriene-betamethasone (TACLONEX) 0.005-0.064 % ointment Apply topically daily up to 4 weeks. Josette Navas MD   calcipotriene (DOVONEX) 0.005 % cream Apply topically 2 times daily  Josette Navas MD   blood glucose monitor supplies LANCETS  Test 2 times a day & as needed for symptoms of irregular blood glucose. Dispense sufficient amount for indicated testing frequency plus additional to accommodate PRN testing needs.   Napoleon Soni MD   blood glucose monitor kit and supplies tEST 2 TIMES A DAY  Napoleon Soni MD   blood glucose monitor strips Test 2 times a day & as needed for symptoms of irregular blood glucose. Dispense sufficient amount for indicated testing frequency plus additional to accommodate PRN testing needs. Emmy Carmona MD   clopidogrel (PLAVIX) 75 MG tablet Take 1 tablet by mouth daily  Emmy Carmona MD   nitroGLYCERIN (NITROSTAT) 0.4 MG SL tablet up to max of 3 total doses. If no relief after 1 dose, call 911. Estrada Zhang MD       [facility] (Including outside providers/suppliers regularly involved in providing care):   Patient Care Team:  Emmy Carmona MD as PCP - General (Internal Medicine)  Emmy Carmona MD as PCP - [facility]     Reviewed and updated this visit:  Tobacco  Allergies  Meds  Med Hx  Surg Hx  Soc Hx  Fam Hx        I, Sonal Roldan LPN, 5/72/[id number], performed the documented evaluation under the direct supervision of the attending physician. This encounter was performed under my, Theresa Cleveland, direct supervision, 1/26/2023.

## 2023-01-26 NOTE — PATIENT INSTRUCTIONS
Personalized Preventive Plan for Merritt Gupta - 1/26/2023  Medicare offers a range of preventive health benefits. Some of the tests and screenings are paid in full while other may be subject to a deductible, co-insurance, and/or copay. Some of these benefits include a comprehensive review of your medical history including lifestyle, illnesses that may run in your family, and various assessments and screenings as appropriate. After reviewing your medical record and screening and assessments performed today your provider may have ordered immunizations, labs, imaging, and/or referrals for you. A list of these orders (if applicable) as well as your Preventive Care list are included within your After Visit Summary for your review. Other Preventive Recommendations:    A preventive eye exam performed by an eye specialist is recommended every 1-2 years to screen for glaucoma; cataracts, macular degeneration, and other eye disorders. A preventive dental visit is recommended every 6 months. Try to get at least 150 minutes of exercise per week or 10,000 steps per day on a pedometer . Order or download the FREE \"Exercise & Physical Activity: Your Everyday Guide\" from [employer]. Call 9-646.280.2312 or search [employer] online. You need 7824-5547 mg of calcium and 6884-2006 IU of vitamin D per day. It is possible to meet your calcium requirement with diet alone, but a vitamin D supplement is usually necessary to meet this goal.  When exposed to the sun, use a sunscreen that protects against both UVA and UVB radiation with an SPF of 30 or greater. Reapply every 2 to 3 hours or after sweating, drying off with a towel, or swimming. Always wear a seat belt when traveling in a car. Always wear a helmet when riding a bicycle or motorcycle. Heart-Healthy Diet   Sodium, Fat, and Cholesterol Controlled Diet       What Is a Heart Healthy Diet?    A heart-healthy diet is one that limits sodium , certain types of fat , and cholesterol . This type of diet is recommended for:   · People with any form of cardiovascular disease (eg, coronary heart disease , peripheral vascular disease , previous heart attack , previous stroke )   · People with risk factors for cardiovascular disease, such as high blood pressure , high cholesterol , or diabetes   · Anyone who wants to lower their risk of developing cardiovascular disease   Sodium    Sodium is a mineral found in many foods. In general, most people consume much more sodium than they need. Diets high in sodium can increase blood pressure and lead to edema (water retention). On a heart-healthy diet, you should consume no more than 2,300 mg (milligrams) of sodium per dayabout the amount in one teaspoon of table salt. The foods highest in sodium include table salt (about 50% sodium), processed foods, convenience foods, and preserved foods. Cholesterol    Cholesterol is a fat-like, waxy substance in your blood. Our bodies make some cholesterol. It is also found in animal products, with the highest amounts in fatty meat, egg yolks, whole milk, cheese, shellfish, and organ meats. On a heart-healthy diet, you should limit your cholesterol intake to less than 200 mg per day. It is normal and important to have some cholesterol in your bloodstream. But too much cholesterol can cause plaque to build up within your arteries, which can eventually lead to a heart attack or stroke. The two types of cholesterol that are most commonly referred to are:   · Low-density lipoprotein (LDL) cholesterol  Also known as bad cholesterol, this is the cholesterol that tends to build up along your arteries. Bad cholesterol levels are increased by eating fats that are saturated or hydrogenated. Optimal level of this cholesterol is less than 100. Over 130 starts to get risky for heart disease.    · High-density lipoprotein (HDL) cholesterol  Also known as good cholesterol, this type of cholesterol actually carries cholesterol away from your arteries and may, therefore, help lower your risk of having a heart attack. You want this level to be high (ideally greater than 60). It is a risk to have a level less than 40. You can raise this good cholesterol by eating olive oil, canola oil, avocados, or nuts. Exercise raises this level, too. Fat    Fat is calorie dense and packs a lot of calories into a small amount of food. Even though fats should be limited due to their high calorie content, not all fats are bad. In fact, some fats are quite healthful. Fat can be broken down into four main types. · The good-for-you fats are:   ¨ Monounsaturated fat  found in oils such as olive and canola, avocados, and nuts and natural nut butters; can decrease cholesterol levels, while keeping levels of HDL cholesterol high   ¨ Polyunsaturated fat  found in oils such as safflower, sunflower, soybean, corn, and sesame; can decrease total cholesterol and LDL cholesterol   ¨ Omega-3 fatty acids  particularly those found in fatty fish (such as salmon, trout, tuna, mackerel, herring, and sardines); can decrease risk of arrhythmias, decrease triglyceride levels, and slightly lower blood pressure   · The fats that you want to limit are:   ¨ Saturated fat  found in animal products, many fast foods, and a few vegetables; increases total blood cholesterol, including LDL levels   § Animal fats that are saturated include: butter, lard, whole-milk dairy products, meat fat, and poultry skin   § Vegetable fats that are saturated include: hydrogenated shortening, palm oil, coconut oil, cocoa butter   ¨ Hydrogenated or trans fat  found in margarine and vegetable shortening, most shelf stable snack foods, and fried foods; increases LDL and decreases HDL     It is generally recommended that you limit your total fat for the day to less than 30% of your total calories.  If you follow an 1800-calorie heart healthy diet, for example, this would mean 60 grams of fat or less per day. Saturated fat and trans fat in your diet raises your blood cholesterol the most, much more than dietary cholesterol does. For this reason, on a heart-healthy diet, less than 7% of your calories should come from saturated fat and ideally 0% from trans fat. On an 1800-calorie diet, this translates into less than 14 grams of saturated fat per day, leaving 46 grams of fat to come from mono- and polyunsaturated fats.    Food Choices on a Heart Healthy Diet   Food Category   Foods Recommended   Foods to Avoid   Grains   Breads and rolls without salted tops Most dry and cooked cereals Unsalted crackers and breadsticks Low-sodium or homemade breadcrumbs or stuffing All rice and pastas   Breads, rolls, and crackers with salted tops High-fat baked goods (eg, muffins, donuts, pastries) Quick breads, self-rising flour, and biscuit mixes Regular bread crumbs Instant hot cereals Commercially prepared rice, pasta, or stuffing mixes   Vegetables   Most fresh, frozen, and low-sodium canned vegetables Low-sodium and salt-free vegetable juices Canned vegetables if unsalted or rinsed   Regular canned vegetables and juices, including sauerkraut and pickled vegetables Frozen vegetables with sauces Commercially prepared potato and vegetable mixes   Fruits   Most fresh, frozen, and canned fruits All fruit juices   Fruits processed with salt or sodium   Milk   Nonfat or low-fat (1%) milk Nonfat or low-fat yogurt Cottage cheese, low-fat ricotta, cheeses labeled as low-fat and low-sodium   Whole milk Reduced-fat (2%) milk Malted and chocolate milk Full fat yogurt Most cheeses (unless low-fat and low salt) Buttermilk (no more than 1 cup per week)   Meats and Beans   Lean cuts of fresh or frozen beef, veal, lamb, or pork (look for the word loin) Fresh or frozen poultry without the skin Fresh or frozen fish and some shellfish Egg whites and egg substitutes (Limit whole eggs to three per week) Tofu Nuts or seeds (unsalted, dry-roasted), low-sodium peanut butter Dried peas, beans, and lentils   Any smoked, cured, salted, or canned meat, fish, or poultry (including barrow, chipped beef, cold cuts, hot dogs, sausages, sardines, and anchovies) Poultry skins Breaded and/or fried fish or meats Canned peas, beans, and lentils Salted nuts   Fats and Oils   Olive oil and canola oil Low-sodium, low-fat salad dressings and mayonnaise   Butter, margarine, coconut and palm oils, barrow fat   Snacks, Sweets, and Condiments   Low-sodium or unsalted versions of broths, soups, soy sauce, and condiments Pepper, herbs, and spices; vinegar, lemon, or lime juice Low-fat frozen desserts (yogurt, sherbet, fruit bars) Sugar, cocoa powder, honey, syrup, jam, and preserves Low-fat, trans-fat free cookies, cakes, and pies Víctor and animal crackers, fig bars, alfred snaps   High-fat desserts Broth, soups, gravies, and sauces, made from instant mixes or other high-sodium ingredients Salted snack foods Canned olives Meat tenderizers, seasoning salt, and most flavored vinegars   Beverages   Low-sodium carbonated beverages Tea and coffee in moderation Soy milk   Commercially softened water   Suggestions   · Make whole grains, fruits, and vegetables the base of your diet. · Choose heart-healthy fats such as canola, olive, and flaxseed oil, and foods high in heart-healthy fats, such as nuts, seeds, soybeans, tofu, and fish. · Eat fish at least twice per week; the fish highest in omega-3 fatty acids and lowest in mercury include salmon, herring, mackerel, sardines, and canned chunk light tuna. If you eat fish less than twice per week or have high triglycerides, talk to your doctor about taking fish oil supplements. · Read food labels.    ¨ For products low in fat and cholesterol, look for fat free, low-fat, cholesterol free, saturated fat free, and trans fat freeAlso scan the Nutrition Facts Label, which lists saturated fat, trans fat, and cholesterol amounts. ¨ For products low in sodium, look for sodium free, very low sodium, low sodium, no added salt, and unsalted   · Skip the salt when cooking or at the table; if food needs more flavor, get creative and try out different herbs and spices. Garlic and onion also add substantial flavor to foods. · Trim any visible fat off meat and poultry before cooking, and drain the fat off after stewart. · Use cooking methods that require little or no added fat, such as grilling, boiling, baking, poaching, broiling, roasting, steaming, stir-frying, and sauting. · Avoid fast food and convenience food. They tend to be high in saturated and trans fat and have a lot of added salt. · Talk to a registered dietitian for individualized diet advice. Last Reviewed: March 2011 Berkley Jaquez MS, MPH, RD   Updated: 3/29/2011     Keep Your Memory [place]       Many factors can affect your ability to remembera hectic lifestyle, aging, stress, chronic disease, and certain medicines. But, there are steps you can take to sharpen your mind and help preserve your memory. Challenge Your Brain   Regularly challenging your mind may help keeps it in top shape. Good mental exercises include:   · Crossword puzzlesUse a dictionary if you need it; you will learn more that way. · Brainteasers Try some! · Crafts, such as wood working and sewing   · Hobbies, such as gardening and building model airplanes   · [facility] old friends or join groups to meet new ones.    · Reading   · Learning a new language   · Taking a class, whether it be art history or samantha chi   · TravelingExperience the food, history, and culture of your destination   · Learning to use a computer   · Going to museums, the theater, or thought-provoking movies   · Changing things in your daily life, such as reversing your pattern in the grocery store or brushing your teeth using your nondominant hand   Use Memory Aids   There is no need to remember every detail on your own. These memory aids can help:   · Calendars and day planners   · Electronic organizers to store all sorts of helpful informationThese devices can \"beep\" to remind you of appointments. · A book of days to record birthdays, anniversaries, and other occasions that occur on the same date every year   · Detailed \"to-do\" lists and strategically placed sticky notes   · Quick \"study\" sessionsBefore a gathering, review who will be there so their names will be fresh in your mind. · Establish routinesFor example, keep your keys, wallet, and umbrella in the same place all the time or take medicine with your 8:00 AM glass of juice   Live a Healthy Life   Many actions that will keep your body strong will do the same for your mind. For example:   Talk to Your Doctor About Herbs and Supplements    Malnutrition and vitamin deficiencies can impair your mental function. For example, vitamin B12 deficiency can cause a range of symptoms, including confusion. But, what if your nutritional needs are being met? Can herbs and supplements still offer a benefit? Researchers have investigated a range of natural remedies, such as ginkgo , ginseng , and the supplement phosphatidylserine (PS). So far, though, the evidence is inconsistent as to whether these products can improve memory or thinking. If you are interested in taking herbs and supplements, talk to your doctor first because they may interact with other medicines that you are taking. Exercise Regularly    Among the many benefits of regular exercise are increased blood flow to the brain and decreased risk of certain diseases that can interfere with memory function. One study found that even moderate exercise has a beneficial effect.  Examples of \"moderate\" exercise include:   · Playing 18 holes of golf once a week, without a cart   · Playing tennis twice a week   · Walking one mile per day   Manage Stress    It can be tough to remember what is important when your mind is cluttered. Make time for relaxation. Choose activities that calm you down, and make it routine. Manage Chronic Conditions    Side effects of high blood pressure , diabetes, and heart disease can interfere with mental function. Many of the lifestyle steps discussed here can help manage these conditions. Strive to eat a healthy diet, exercise regularly, get stress under control, and follow your doctor's advice for your condition. Minimize Medications    Talk to your doctor about the medicines that you take. Some may be unnecessary. Also, healthy lifestyle habits may lower the need for certain drugs. Last Reviewed: April 2010 Alla Jones MD   Updated: 4/13/2010     Keeping Home a [address] [facility]       As we get older, changes in balance, gait, strength, vision, hearing, and cognition make even the most youthful senior more prone to accidents. Falls are one of the leading health risks for older people. This increased risk of falling is related to:   · Aging process (eg, decreased muscle strength, slowed reflexes)   · Higher incidence of chronic health problems (eg, arthritis, diabetes) that may limit mobility, agility or sensory awareness   · Side effects of medicine (eg, dizziness, blurred vision)especially medicines like prescription pain medicines and drugs used to treat mental health conditions   Depending on the brittleness of your bones, the consequences of a fall can be serious and long lasting. Home Life   Research by the Association of Aging [facility]) shows that some home accidents among older adults can be prevented by making simple lifestyle changes and basic modifications and repairs to the home environment. Here are some lifestyle changes that experts recommend:   · Have your hearing and vision checked regularly. Be sure to wear prescription glasses that are right for you. · Speak to your doctor or pharmacist about the possible side effects of your medicines.  A number of medicines can cause dizziness. · If you have problems with sleep, talk to your doctor. · Limit your intake of alcohol. · If necessary, use a cane or walker to help maintain your balance. · Wear supportive, rubber-soled shoes, even at home. If you live in a region that gets wintry weather, you may want to put special cleats on your shoes to prevent you from slipping on the snow and ice. · Exercise regularly to help maintain muscle tone, agility, and balance. · Always hold the banister when going up or down stairs. Also, use  bars when getting in or out of the bath or shower, or using the toilet. · To avoid dizziness, get up slowly from a lying down position. Sit up first, dangling your legs for a minute or two before rising to a standing position. Overall Home Safety Check   According to the [employer]'s \"Older Consumer Home Safety Checklist,\" it is important to check for potential hazards in each room. And remember, proper lighting is an essential factor in home safety. If you cannot see clearly, you are more likely to fall. Important questions to ask yourself include:   · Are lamp, electric, extension, and telephone cords placed out of the flow of traffic and maintained in good condition? Have frayed cords been replaced? · Are all small rugs and runners slip resistant? If not, you can secure them to the floor with a special double-sided carpet tape. · Are smoke detectors properly locatedone on every floor of your home and one outside of every sleeping area? Are they in good working order? Are batteries replaced at least once a year? · Do you have a well-maintained carbon monoxide detector outside every sleeping are in your home? · Does your furniture layout leave plenty of space to maneuver between and around chairs, tables, beds, and sofas? · Are hallways, stairs and passages between rooms well lit? Can you reach a lamp without getting out of bed?    · Are floor surfaces well maintained? Shag rugs, high-pile carpeting, tile floors, and polished wood floors can be particularly slippery. Stairs should always have handrails and be carpeted or fitted with a non-skid tread. · Is your telephone easily reachable. Is the cord safely tucked away? Room by Room   According to the Association of Aging, bathrooms and balwinder are the two most potentially hazardous rooms in your home. In the Kitchen    · Be sure your stove is in proper working order and always make sure burners and the oven are off before you go out or go to sleep. · Keep pots on the back burners, turn handles away from the front of the stove, and keep stove clean and free of grease build-up. · Kitchen ventilation systems and range exhausts should be working properly. · Keep flammable objects such as towels and pot holders away from the cooking area except when in use. Make sure kitchen curtains are tied back. · Move cords and appliances away from the sink and hot surfaces. If extension cords are needed, install wiring guides so they do not hang over the sink, range, or working areas. · Look for coffee pots, kettles and toaster ovens with automatic shut-offs. · Keep a mop handy in the kitchen so you can wipe up spills instantly. You should also have a small fire extinguisher. · Arrange your kitchen with frequently used items on lower shelves to avoid the need to stand on a stepstool to reach them. · Make sure countertops are well-lit to avoid injuries while cutting and preparing food. In the Bathroom    · Use a non-slip mat or decals in the tub and shower, since wet, soapy tile or porcelain surfaces are extremely slippery. · Make sure bathroom rugs are non-skid or tape them firmly to the floor. Bathtubs should have at least one, preferably two, grab bars, firmly attached to structural supports in the wall.  (Do not use built-in soap holders or glass shower doors as grab bars.) · Tub seats fitted with non-slip material on the legs allow you to wash sitting down. For people with limited mobility, bathtub transfer benches allow you to slide safely into the tub. · Raised toilet seats and toilet safety rails are helpful for those with knee or hip problems. In the Bedroom    · Make sure you use a nightlight and that the area around your bed is clear of potential obstacles. · Be careful with electric blankets and never go to sleep with a heating pad, which can cause serious burns even if on a low setting. · Use fire-resistant mattress covers and pillows, and NEVER smoke in bed. · Keep a phone next to the bed that is programmed to dial 911 at the push of a button. If you have a chronic condition, you may want to sign on with an automatic call-in service. Typically the system includes a small pendant that connects directly to an emergency medical voice-response system. You should also make arrangements to stay in contact with someonefriend, neighbor, family memberon a regular schedule. Fire Prevention   According to the [employer]. (Smoke Alarms for Every) [address] [place] [address], senior citizens are one of the two highest risk groups for death and serious injuries due to residential fires. · When cooking, wear short-sleeved items, never a bulky long-sleeved robe. · The [facility]'s Safety Checklist for Older Consumers emphasizes the importance of checking basements, garages, workshops and storage areas for fire hazards, such as volatile liquids, piles of old rags or clothing and overloaded circuits. · Never smoke in bed or when lying down on a couch or recliner chair. · Small portable electric or kerosene heaters are responsible for many home fires and should be used cautiously if at all. If you do use one, be sure to keep them away from flammable materials. · In case of fire, make sure you have a pre-established emergency exit plan.     · Have a professional check your fireplace and other fuel-burning appliances yearly. Helping Hands   Baby boomers entering the ferrer years will continue to see the development of new products to help older adults live safely and independently in spite of age-related changes. Making Life More Livable  , by Trent Duenas, lists over 1,000 products for \"living well in the mature years,\" such as bathing and mobility aids, household security devices, ergonomically designed knives and peelers, and faucet valves and knobs for temperature control. Medical supply stores and organizations are good sources of information about products that improve your quality of life and insure your safety. Last Reviewed: November 2009 Medhat Mooney MD   Updated: 3/7/2011            Advance Care Planning: Care Instructions  Your Care Instructions     It can be hard to live with an illness that cannot be cured. But if your health is getting worse, you may want to make decisions about end-of-life care. Planning for the end of your life does not mean that you are giving up. It is a way to make sure that your wishes are met. Clearly stating your wishes can make it easier for your loved ones. Making plans while you are still able may also ease your mind and make your final days less stressful and more meaningful. Follow-up care is a key part of your treatment and safety. Be sure to make and go to all appointments, and call your doctor if you are having problems. It's also a good idea to know your test results and keep a list of the medicines you take. What can you do to plan for the end of life? · You can bring these issues up with your doctor. You do not need to wait until your doctor starts the conversation. You might start with, \"What makes life worth living for me is. [place] .\" And then follow it with, \"I would not be willing to live with . [place] \" When you complete this sentence it helps your doctor understand your wishes.   · Talk openly and honestly with your doctor. This is the best way to understand the decisions you will need to make as your health changes. Know that you can always change your mind. · Ask your doctor about commonly used life-support measures. These include tube feedings, breathing machines, and fluids given through a vein (IV). Understanding these treatments will help you decide whether you want them. · You may choose to have these life-supporting treatments for a limited time. This allows a trial period to see whether they will help you. You may also decide that you want your doctor to take only certain measures to keep you alive. It may help to think about the big picture, like what makes life worth living for you or what your values and goals are. · Talk to your doctor about how long you are likely to live. Your doctor may be able to give you an idea of what usually happens with your specific illness. · Think about preparing papers that state your wishes. These papers are called advance directives. If you do this early and review them often, there will not be any confusion about what you want. You can change your instructions at any time. Which papers should you prepare? Advance directives are legal papers that tell doctors how you want to be cared for at the end of your life. You do not need a  to write these papers. Ask your doctor or your state health department for information on how to write your advance directives. They may have the forms for each of these types of papers. Make sure your doctor has a copy of these on file, and give a copy to a family member or close friend. · Consider a do-not-resuscitate order (DNR). This order asks that no extra treatments be done if your heart stops or you stop breathing. Extra treatments may include cardiopulmonary resuscitation (CPR), electrical shock to restart your heart, or a machine to breathe for you. If you decide to have a DNR order, ask your doctor to explain and write it.  Place the order in your home where everyone can easily see it. · Consider a living will. A living will explains your wishes about life support and other treatments at the end of your life if you become unable to speak for yourself. Living rose tell doctors to use or not use treatments that would keep you alive. You must have one or two witnesses or a notary present when you sign this form. A living will may be called something else in your state. · Consider a medical power of . This form allows you to name a person to make decisions about your care if you are not able to. Most people ask a close friend or family member. Talk to this person about the kinds of treatments you want and those that you do not want. Make sure this person understands your wishes. A medical power of  may be called something else in your state. These legal papers are simple to change. Tell your doctor what you want to change, and ask him or her to make a note in your medical file. Give your family updated copies of the papers. Where can you learn more? Go to http://www.aaron.com/ and enter P184 to learn more about \"Advance Care Planning: Care Instructions. \"  Current as of: October 6, 2021               Content Version: 13.5  © 2006-2022 [employer]. Care instructions adapted under license by [facility] ([facility]). If you have questions about a medical condition or this instruction, always ask your healthcare professional. Serge Rayray any warranty or liability for your use of this information.

## 2023-01-30 RX ORDER — OMEPRAZOLE 40 MG/1
40 CAPSULE, DELAYED RELEASE ORAL DAILY
Qty: 90 CAPSULE | Refills: 1 | Status: ON HOLD | OUTPATIENT
Start: 2023-01-30

## 2023-01-30 RX ORDER — PAROXETINE HYDROCHLORIDE 40 MG/1
40 TABLET, FILM COATED ORAL DAILY
Qty: 30 TABLET | Refills: 0 | Status: ON HOLD | OUTPATIENT
Start: 2023-01-30 | End: 2023-04-30

## 2023-01-30 NOTE — TELEPHONE ENCOUNTER
Last OV: 01/26/2023 Minocycline Counseling: Patient advised regarding possible photosensitivity and discoloration of the teeth, skin, lips, tongue and gums.  Patient instructed to avoid sunlight, if possible.  When exposed to sunlight, patients should wear protective clothing, sunglasses, and sunscreen.  The patient was instructed to call the office immediately if the following severe adverse effects occur:  hearing changes, easy bruising/bleeding, severe headache, or vision changes.  The patient verbalized understanding of the proper use and possible adverse effects of minocycline.  All of the patient's questions and concerns were addressed.

## 2023-02-03 ENCOUNTER — HOSPITAL ENCOUNTER (INPATIENT)
Age: 73
LOS: 4 days | Discharge: HOME | DRG: 247 | End: 2023-02-07
Attending: STUDENT IN AN ORGANIZED HEALTH CARE EDUCATION/TRAINING PROGRAM | Admitting: INTERNAL MEDICINE
Payer: MEDICARE

## 2023-02-03 ENCOUNTER — APPOINTMENT (OUTPATIENT)
Dept: CARDIAC CATH/INVASIVE PROCEDURES | Age: 73
DRG: 247 | End: 2023-02-03
Payer: MEDICARE

## 2023-02-03 ENCOUNTER — APPOINTMENT (OUTPATIENT)
Dept: GENERAL RADIOLOGY | Age: 73
DRG: 247 | End: 2023-02-03
Payer: MEDICARE

## 2023-02-03 ENCOUNTER — APPOINTMENT (OUTPATIENT)
Dept: CT IMAGING | Age: 73
DRG: 247 | End: 2023-02-03
Payer: MEDICARE

## 2023-02-03 DIAGNOSIS — R07.9 CHEST PAIN, UNSPECIFIED TYPE: Primary | ICD-10-CM

## 2023-02-03 DIAGNOSIS — I25.10 CAD S/P PERCUTANEOUS CORONARY ANGIOPLASTY: ICD-10-CM

## 2023-02-03 DIAGNOSIS — N18.30 STAGE 3 CHRONIC KIDNEY DISEASE, UNSPECIFIED WHETHER STAGE 3A OR 3B CKD (HCC): ICD-10-CM

## 2023-02-03 DIAGNOSIS — I10 HYPERTENSION, UNSPECIFIED TYPE: ICD-10-CM

## 2023-02-03 DIAGNOSIS — Z98.61 CAD S/P PERCUTANEOUS CORONARY ANGIOPLASTY: ICD-10-CM

## 2023-02-03 LAB
ALBUMIN SERPL-MCNC: 3.9 G/DL (ref 3.5–4.6)
ALP BLD-CCNC: 70 U/L (ref 35–104)
ALT SERPL-CCNC: 10 U/L (ref 0–41)
ANION GAP SERPL CALCULATED.3IONS-SCNC: 11 MEQ/L (ref 9–15)
APTT: 27.5 SEC (ref 24.4–36.8)
AST SERPL-CCNC: 12 U/L (ref 0–40)
BASOPHILS ABSOLUTE: 0.1 K/UL (ref 0–0.2)
BASOPHILS RELATIVE PERCENT: 0.9 %
BILIRUB SERPL-MCNC: 0.3 MG/DL (ref 0.2–0.7)
BUN BLDV-MCNC: 13 MG/DL (ref 8–23)
CALCIUM SERPL-MCNC: 8.6 MG/DL (ref 8.5–9.9)
CHLORIDE BLD-SCNC: 105 MEQ/L (ref 95–107)
CO2: 22 MEQ/L (ref 20–31)
CREAT SERPL-MCNC: 1.29 MG/DL (ref 0.7–1.2)
EKG ATRIAL RATE: 63 BPM
EKG ATRIAL RATE: 65 BPM
EKG P AXIS: 78 DEGREES
EKG P AXIS: 86 DEGREES
EKG P-R INTERVAL: 178 MS
EKG P-R INTERVAL: 192 MS
EKG Q-T INTERVAL: 420 MS
EKG Q-T INTERVAL: 426 MS
EKG QRS DURATION: 86 MS
EKG QRS DURATION: 88 MS
EKG QTC CALCULATION (BAZETT): 429 MS
EKG QTC CALCULATION (BAZETT): 443 MS
EKG R AXIS: 36 DEGREES
EKG R AXIS: 40 DEGREES
EKG T AXIS: 69 DEGREES
EKG T AXIS: 76 DEGREES
EKG VENTRICULAR RATE: 63 BPM
EKG VENTRICULAR RATE: 65 BPM
EOSINOPHILS ABSOLUTE: 0.1 K/UL (ref 0–0.7)
EOSINOPHILS RELATIVE PERCENT: 1.8 %
GFR SERPL CREATININE-BSD FRML MDRD: 58 ML/MIN/{1.73_M2}
GLOBULIN: 1.9 G/DL (ref 2.3–3.5)
GLUCOSE BLD-MCNC: 195 MG/DL (ref 70–99)
GLUCOSE BLD-MCNC: 215 MG/DL (ref 70–99)
HCT VFR BLD CALC: 32.3 % (ref 42–52)
HEMOGLOBIN: 10.8 G/DL (ref 14–18)
INR BLD: 0.9
LACTIC ACID: 2.8 MMOL/L (ref 0.5–2.2)
LV EF: 60 %
LV EF: 60 %
LVEF MODALITY: NORMAL
LVEF MODALITY: NORMAL
LYMPHOCYTES ABSOLUTE: 1.6 K/UL (ref 1–4.8)
LYMPHOCYTES RELATIVE PERCENT: 25.3 %
MAGNESIUM: 1.4 MG/DL (ref 1.7–2.4)
MCH RBC QN AUTO: 31.1 PG (ref 27–31.3)
MCHC RBC AUTO-ENTMCNC: 33.5 % (ref 33–37)
MCV RBC AUTO: 92.7 FL (ref 79–92.2)
MONOCYTES ABSOLUTE: 0.5 K/UL (ref 0.2–0.8)
MONOCYTES RELATIVE PERCENT: 8.1 %
NEUTROPHILS ABSOLUTE: 4.1 K/UL (ref 1.4–6.5)
NEUTROPHILS RELATIVE PERCENT: 63.9 %
PDW BLD-RTO: 14.7 % (ref 11.5–14.5)
PERFORMED ON: ABNORMAL
PERFORMED ON: ABNORMAL
PLATELET # BLD: 248 K/UL (ref 130–400)
POC ACTIVATED CLOTTING TIME KAOLIN: 287 SEC (ref 82–152)
POC SAMPLE TYPE: ABNORMAL
POTASSIUM SERPL-SCNC: 4.5 MEQ/L (ref 3.4–4.9)
PRO-BNP: 595 PG/ML
PROTHROMBIN TIME: 12.7 SEC (ref 12.3–14.9)
RBC # BLD: 3.49 M/UL (ref 4.7–6.1)
SODIUM BLD-SCNC: 138 MEQ/L (ref 135–144)
TOTAL CK: 229 U/L (ref 0–190)
TOTAL PROTEIN: 5.8 G/DL (ref 6.3–8)
TROPONIN: <0.01 NG/ML (ref 0–0.01)
TROPONIN: <0.01 NG/ML (ref 0–0.01)
TSH REFLEX: 2.73 UIU/ML (ref 0.44–3.86)
WBC # BLD: 6.4 K/UL (ref 4.8–10.8)

## 2023-02-03 PROCEDURE — A4216 STERILE WATER/SALINE, 10 ML: HCPCS | Performed by: STUDENT IN AN ORGANIZED HEALTH CARE EDUCATION/TRAINING PROGRAM

## 2023-02-03 PROCEDURE — 6370000000 HC RX 637 (ALT 250 FOR IP): Performed by: EMERGENCY MEDICINE

## 2023-02-03 PROCEDURE — 93010 ELECTROCARDIOGRAM REPORT: CPT | Performed by: INTERNAL MEDICINE

## 2023-02-03 PROCEDURE — 6360000002 HC RX W HCPCS: Performed by: INTERNAL MEDICINE

## 2023-02-03 PROCEDURE — 83880 ASSAY OF NATRIURETIC PEPTIDE: CPT

## 2023-02-03 PROCEDURE — 2580000003 HC RX 258: Performed by: STUDENT IN AN ORGANIZED HEALTH CARE EDUCATION/TRAINING PROGRAM

## 2023-02-03 PROCEDURE — 6360000002 HC RX W HCPCS: Performed by: EMERGENCY MEDICINE

## 2023-02-03 PROCEDURE — 6360000004 HC RX CONTRAST MEDICATION: Performed by: EMERGENCY MEDICINE

## 2023-02-03 PROCEDURE — 6360000002 HC RX W HCPCS

## 2023-02-03 PROCEDURE — B2111ZZ FLUOROSCOPY OF MULTIPLE CORONARY ARTERIES USING LOW OSMOLAR CONTRAST: ICD-10-PCS | Performed by: INTERNAL MEDICINE

## 2023-02-03 PROCEDURE — 85610 PROTHROMBIN TIME: CPT

## 2023-02-03 PROCEDURE — B2151ZZ FLUOROSCOPY OF LEFT HEART USING LOW OSMOLAR CONTRAST: ICD-10-PCS | Performed by: INTERNAL MEDICINE

## 2023-02-03 PROCEDURE — 93005 ELECTROCARDIOGRAM TRACING: CPT | Performed by: EMERGENCY MEDICINE

## 2023-02-03 PROCEDURE — 85730 THROMBOPLASTIN TIME PARTIAL: CPT

## 2023-02-03 PROCEDURE — 2580000003 HC RX 258

## 2023-02-03 PROCEDURE — 6360000002 HC RX W HCPCS: Performed by: STUDENT IN AN ORGANIZED HEALTH CARE EDUCATION/TRAINING PROGRAM

## 2023-02-03 PROCEDURE — 99285 EMERGENCY DEPT VISIT HI MDM: CPT

## 2023-02-03 PROCEDURE — 83735 ASSAY OF MAGNESIUM: CPT

## 2023-02-03 PROCEDURE — 96375 TX/PRO/DX INJ NEW DRUG ADDON: CPT

## 2023-02-03 PROCEDURE — 71045 X-RAY EXAM CHEST 1 VIEW: CPT

## 2023-02-03 PROCEDURE — 6370000000 HC RX 637 (ALT 250 FOR IP): Performed by: PHYSICIAN ASSISTANT

## 2023-02-03 PROCEDURE — 2709999900 HC NON-CHARGEABLE SUPPLY

## 2023-02-03 PROCEDURE — 93005 ELECTROCARDIOGRAM TRACING: CPT | Performed by: STUDENT IN AN ORGANIZED HEALTH CARE EDUCATION/TRAINING PROGRAM

## 2023-02-03 PROCEDURE — 6370000000 HC RX 637 (ALT 250 FOR IP): Performed by: INTERNAL MEDICINE

## 2023-02-03 PROCEDURE — 70450 CT HEAD/BRAIN W/O DYE: CPT

## 2023-02-03 PROCEDURE — 2500000003 HC RX 250 WO HCPCS

## 2023-02-03 PROCEDURE — 93458 L HRT ARTERY/VENTRICLE ANGIO: CPT | Performed by: INTERNAL MEDICINE

## 2023-02-03 PROCEDURE — C1887 CATHETER, GUIDING: HCPCS

## 2023-02-03 PROCEDURE — 99223 1ST HOSP IP/OBS HIGH 75: CPT | Performed by: INTERNAL MEDICINE

## 2023-02-03 PROCEDURE — 85025 COMPLETE CBC W/AUTO DIFF WBC: CPT

## 2023-02-03 PROCEDURE — 2060000000 HC ICU INTERMEDIATE R&B

## 2023-02-03 PROCEDURE — 71275 CT ANGIOGRAPHY CHEST: CPT

## 2023-02-03 PROCEDURE — 6360000004 HC RX CONTRAST MEDICATION: Performed by: INTERNAL MEDICINE

## 2023-02-03 PROCEDURE — 6370000000 HC RX 637 (ALT 250 FOR IP)

## 2023-02-03 PROCEDURE — C9600 PERC DRUG-EL COR STENT SING: HCPCS

## 2023-02-03 PROCEDURE — 2580000003 HC RX 258: Performed by: INTERNAL MEDICINE

## 2023-02-03 PROCEDURE — 92928 PRQ TCAT PLMT NTRAC ST 1 LES: CPT | Performed by: INTERNAL MEDICINE

## 2023-02-03 PROCEDURE — C1874 STENT, COATED/COV W/DEL SYS: HCPCS

## 2023-02-03 PROCEDURE — C1894 INTRO/SHEATH, NON-LASER: HCPCS

## 2023-02-03 PROCEDURE — 96361 HYDRATE IV INFUSION ADD-ON: CPT

## 2023-02-03 PROCEDURE — 2500000003 HC RX 250 WO HCPCS: Performed by: STUDENT IN AN ORGANIZED HEALTH CARE EDUCATION/TRAINING PROGRAM

## 2023-02-03 PROCEDURE — APPSS45 APP SPLIT SHARED TIME 31-45 MINUTES: Performed by: PHYSICIAN ASSISTANT

## 2023-02-03 PROCEDURE — 4A023N7 MEASUREMENT OF CARDIAC SAMPLING AND PRESSURE, LEFT HEART, PERCUTANEOUS APPROACH: ICD-10-PCS | Performed by: INTERNAL MEDICINE

## 2023-02-03 PROCEDURE — 96365 THER/PROPH/DIAG IV INF INIT: CPT

## 2023-02-03 PROCEDURE — C1769 GUIDE WIRE: HCPCS

## 2023-02-03 PROCEDURE — C1725 CATH, TRANSLUMIN NON-LASER: HCPCS

## 2023-02-03 PROCEDURE — 84443 ASSAY THYROID STIM HORMONE: CPT

## 2023-02-03 PROCEDURE — 83605 ASSAY OF LACTIC ACID: CPT

## 2023-02-03 PROCEDURE — 93458 L HRT ARTERY/VENTRICLE ANGIO: CPT

## 2023-02-03 PROCEDURE — 93306 TTE W/DOPPLER COMPLETE: CPT

## 2023-02-03 PROCEDURE — 027034Z DILATION OF CORONARY ARTERY, ONE ARTERY WITH DRUG-ELUTING INTRALUMINAL DEVICE, PERCUTANEOUS APPROACH: ICD-10-PCS | Performed by: INTERNAL MEDICINE

## 2023-02-03 PROCEDURE — 80053 COMPREHEN METABOLIC PANEL: CPT

## 2023-02-03 PROCEDURE — 82550 ASSAY OF CK (CPK): CPT

## 2023-02-03 PROCEDURE — 36415 COLL VENOUS BLD VENIPUNCTURE: CPT

## 2023-02-03 PROCEDURE — 85347 COAGULATION TIME ACTIVATED: CPT

## 2023-02-03 PROCEDURE — 84484 ASSAY OF TROPONIN QUANT: CPT

## 2023-02-03 PROCEDURE — 6370000000 HC RX 637 (ALT 250 FOR IP): Performed by: STUDENT IN AN ORGANIZED HEALTH CARE EDUCATION/TRAINING PROGRAM

## 2023-02-03 RX ORDER — CLOPIDOGREL 300 MG/1
300 TABLET, FILM COATED ORAL ONCE
Status: COMPLETED | OUTPATIENT
Start: 2023-02-03 | End: 2023-02-03

## 2023-02-03 RX ORDER — ASPIRIN 81 MG/1
324 TABLET, CHEWABLE ORAL ONCE
Status: COMPLETED | OUTPATIENT
Start: 2023-02-03 | End: 2023-02-03

## 2023-02-03 RX ORDER — LANOLIN ALCOHOL/MO/W.PET/CERES
400 CREAM (GRAM) TOPICAL DAILY
Status: DISCONTINUED | OUTPATIENT
Start: 2023-02-03 | End: 2023-02-07 | Stop reason: HOSPADM

## 2023-02-03 RX ORDER — NITROGLYCERIN 0.4 MG/1
0.4 TABLET SUBLINGUAL EVERY 5 MIN PRN
Status: DISCONTINUED | OUTPATIENT
Start: 2023-02-03 | End: 2023-02-03 | Stop reason: SDUPTHER

## 2023-02-03 RX ORDER — CARVEDILOL 6.25 MG/1
6.25 TABLET ORAL 2 TIMES DAILY WITH MEALS
Status: DISCONTINUED | OUTPATIENT
Start: 2023-02-03 | End: 2023-02-07

## 2023-02-03 RX ORDER — MORPHINE SULFATE 4 MG/ML
4 INJECTION, SOLUTION INTRAMUSCULAR; INTRAVENOUS ONCE
Status: COMPLETED | OUTPATIENT
Start: 2023-02-03 | End: 2023-02-03

## 2023-02-03 RX ORDER — FENTANYL CITRATE 0.05 MG/ML
25 INJECTION, SOLUTION INTRAMUSCULAR; INTRAVENOUS
Status: ACTIVE | OUTPATIENT
Start: 2023-02-03 | End: 2023-02-04

## 2023-02-03 RX ORDER — ACETAMINOPHEN 650 MG/1
650 SUPPOSITORY RECTAL EVERY 6 HOURS PRN
Status: DISCONTINUED | OUTPATIENT
Start: 2023-02-03 | End: 2023-02-07 | Stop reason: HOSPADM

## 2023-02-03 RX ORDER — NITROGLYCERIN 0.4 MG/1
0.4 TABLET SUBLINGUAL EVERY 5 MIN PRN
Status: DISCONTINUED | OUTPATIENT
Start: 2023-02-03 | End: 2023-02-07 | Stop reason: HOSPADM

## 2023-02-03 RX ORDER — ONDANSETRON 4 MG/1
4 TABLET, ORALLY DISINTEGRATING ORAL EVERY 8 HOURS PRN
Status: DISCONTINUED | OUTPATIENT
Start: 2023-02-03 | End: 2023-02-07 | Stop reason: HOSPADM

## 2023-02-03 RX ORDER — DILTIAZEM HYDROCHLORIDE 120 MG/1
120 CAPSULE, COATED, EXTENDED RELEASE ORAL DAILY
Status: DISCONTINUED | OUTPATIENT
Start: 2023-02-03 | End: 2023-02-03

## 2023-02-03 RX ORDER — ACETAMINOPHEN 325 MG/1
650 TABLET ORAL EVERY 4 HOURS PRN
Status: DISCONTINUED | OUTPATIENT
Start: 2023-02-03 | End: 2023-02-07 | Stop reason: HOSPADM

## 2023-02-03 RX ORDER — ENOXAPARIN SODIUM 100 MG/ML
40 INJECTION SUBCUTANEOUS DAILY
Status: DISCONTINUED | OUTPATIENT
Start: 2023-02-03 | End: 2023-02-07 | Stop reason: HOSPADM

## 2023-02-03 RX ORDER — SODIUM CHLORIDE 0.9 % (FLUSH) 0.9 %
5-40 SYRINGE (ML) INJECTION PRN
Status: DISCONTINUED | OUTPATIENT
Start: 2023-02-03 | End: 2023-02-06

## 2023-02-03 RX ORDER — PANTOPRAZOLE SODIUM 40 MG/1
40 TABLET, DELAYED RELEASE ORAL
Status: DISCONTINUED | OUTPATIENT
Start: 2023-02-04 | End: 2023-02-07 | Stop reason: HOSPADM

## 2023-02-03 RX ORDER — SODIUM CHLORIDE 9 MG/ML
INJECTION, SOLUTION INTRAVENOUS PRN
Status: DISCONTINUED | OUTPATIENT
Start: 2023-02-03 | End: 2023-02-07 | Stop reason: HOSPADM

## 2023-02-03 RX ORDER — ASPIRIN 81 MG/1
81 TABLET, CHEWABLE ORAL DAILY
Status: DISCONTINUED | OUTPATIENT
Start: 2023-02-04 | End: 2023-02-07 | Stop reason: HOSPADM

## 2023-02-03 RX ORDER — SODIUM CHLORIDE 0.9 % (FLUSH) 0.9 %
5-40 SYRINGE (ML) INJECTION EVERY 12 HOURS SCHEDULED
Status: DISCONTINUED | OUTPATIENT
Start: 2023-02-03 | End: 2023-02-06

## 2023-02-03 RX ORDER — ONDANSETRON 2 MG/ML
4 INJECTION INTRAMUSCULAR; INTRAVENOUS EVERY 6 HOURS PRN
Status: DISCONTINUED | OUTPATIENT
Start: 2023-02-03 | End: 2023-02-06 | Stop reason: SDUPTHER

## 2023-02-03 RX ORDER — SODIUM CHLORIDE 9 MG/ML
INJECTION, SOLUTION INTRAVENOUS CONTINUOUS
Status: DISCONTINUED | OUTPATIENT
Start: 2023-02-03 | End: 2023-02-04

## 2023-02-03 RX ORDER — SODIUM CHLORIDE 9 MG/ML
25 INJECTION, SOLUTION INTRAVENOUS PRN
Status: DISCONTINUED | OUTPATIENT
Start: 2023-02-03 | End: 2023-02-07 | Stop reason: HOSPADM

## 2023-02-03 RX ORDER — ONDANSETRON 2 MG/ML
4 INJECTION INTRAMUSCULAR; INTRAVENOUS EVERY 6 HOURS PRN
Status: DISCONTINUED | OUTPATIENT
Start: 2023-02-03 | End: 2023-02-07 | Stop reason: HOSPADM

## 2023-02-03 RX ORDER — ONDANSETRON 2 MG/ML
4 INJECTION INTRAMUSCULAR; INTRAVENOUS ONCE
Status: COMPLETED | OUTPATIENT
Start: 2023-02-03 | End: 2023-02-03

## 2023-02-03 RX ORDER — GLIPIZIDE 5 MG/1
10 TABLET ORAL
Status: DISCONTINUED | OUTPATIENT
Start: 2023-02-03 | End: 2023-02-07 | Stop reason: HOSPADM

## 2023-02-03 RX ORDER — HYDRALAZINE HYDROCHLORIDE 20 MG/ML
10 INJECTION INTRAMUSCULAR; INTRAVENOUS ONCE
Status: COMPLETED | OUTPATIENT
Start: 2023-02-03 | End: 2023-02-03

## 2023-02-03 RX ORDER — CLOPIDOGREL BISULFATE 75 MG/1
75 TABLET ORAL DAILY
Qty: 90 TABLET | Refills: 3 | Status: SHIPPED | OUTPATIENT
Start: 2023-02-03

## 2023-02-03 RX ORDER — ASPIRIN 81 MG/1
81 TABLET, CHEWABLE ORAL DAILY
Qty: 90 TABLET | Refills: 1 | Status: SHIPPED | OUTPATIENT
Start: 2023-02-04

## 2023-02-03 RX ORDER — PAROXETINE HYDROCHLORIDE 20 MG/1
40 TABLET, FILM COATED ORAL DAILY
Status: DISCONTINUED | OUTPATIENT
Start: 2023-02-03 | End: 2023-02-07 | Stop reason: HOSPADM

## 2023-02-03 RX ORDER — LOSARTAN POTASSIUM 25 MG/1
25 TABLET ORAL DAILY
Status: DISCONTINUED | OUTPATIENT
Start: 2023-02-03 | End: 2023-02-07 | Stop reason: HOSPADM

## 2023-02-03 RX ORDER — ACETAMINOPHEN 325 MG/1
650 TABLET ORAL EVERY 6 HOURS PRN
Status: DISCONTINUED | OUTPATIENT
Start: 2023-02-03 | End: 2023-02-07 | Stop reason: HOSPADM

## 2023-02-03 RX ORDER — LABETALOL HYDROCHLORIDE 5 MG/ML
10 INJECTION, SOLUTION INTRAVENOUS EVERY 30 MIN PRN
Status: DISCONTINUED | OUTPATIENT
Start: 2023-02-03 | End: 2023-02-07 | Stop reason: HOSPADM

## 2023-02-03 RX ORDER — MIDAZOLAM HYDROCHLORIDE 2 MG/2ML
2 INJECTION, SOLUTION INTRAMUSCULAR; INTRAVENOUS
Status: ACTIVE | OUTPATIENT
Start: 2023-02-03 | End: 2023-02-04

## 2023-02-03 RX ORDER — MORPHINE SULFATE 2 MG/ML
2 INJECTION, SOLUTION INTRAMUSCULAR; INTRAVENOUS
Status: ACTIVE | OUTPATIENT
Start: 2023-02-03 | End: 2023-02-04

## 2023-02-03 RX ORDER — ATORVASTATIN CALCIUM 80 MG/1
80 TABLET, FILM COATED ORAL NIGHTLY
Status: DISCONTINUED | OUTPATIENT
Start: 2023-02-03 | End: 2023-02-07 | Stop reason: HOSPADM

## 2023-02-03 RX ORDER — HYDRALAZINE HYDROCHLORIDE 50 MG/1
50 TABLET, FILM COATED ORAL 2 TIMES DAILY
Qty: 180 TABLET | Refills: 1 | Status: SHIPPED | OUTPATIENT
Start: 2023-02-03 | End: 2023-02-07 | Stop reason: HOSPADM

## 2023-02-03 RX ORDER — CLOPIDOGREL BISULFATE 75 MG/1
75 TABLET ORAL DAILY
Status: DISCONTINUED | OUTPATIENT
Start: 2023-02-03 | End: 2023-02-07 | Stop reason: HOSPADM

## 2023-02-03 RX ORDER — NITROGLYCERIN 0.4 MG/1
0.4 TABLET SUBLINGUAL EVERY 5 MIN PRN
Qty: 25 TABLET | Refills: 1 | Status: SHIPPED | OUTPATIENT
Start: 2023-02-03

## 2023-02-03 RX ORDER — HYDRALAZINE HYDROCHLORIDE 20 MG/ML
10 INJECTION INTRAMUSCULAR; INTRAVENOUS EVERY 10 MIN PRN
Status: COMPLETED | OUTPATIENT
Start: 2023-02-03 | End: 2023-02-04

## 2023-02-03 RX ORDER — ACETAMINOPHEN 500 MG
1000 TABLET ORAL ONCE
Status: COMPLETED | OUTPATIENT
Start: 2023-02-03 | End: 2023-02-03

## 2023-02-03 RX ORDER — SODIUM CHLORIDE 0.9 % (FLUSH) 0.9 %
5-40 SYRINGE (ML) INJECTION PRN
Status: DISCONTINUED | OUTPATIENT
Start: 2023-02-03 | End: 2023-02-07 | Stop reason: HOSPADM

## 2023-02-03 RX ORDER — MAGNESIUM SULFATE IN WATER 40 MG/ML
2000 INJECTION, SOLUTION INTRAVENOUS ONCE
Status: COMPLETED | OUTPATIENT
Start: 2023-02-03 | End: 2023-02-03

## 2023-02-03 RX ORDER — HYDRALAZINE HYDROCHLORIDE 50 MG/1
50 TABLET, FILM COATED ORAL 2 TIMES DAILY
Status: DISCONTINUED | OUTPATIENT
Start: 2023-02-03 | End: 2023-02-07

## 2023-02-03 RX ORDER — SODIUM CHLORIDE 9 MG/ML
INJECTION, SOLUTION INTRAVENOUS PRN
Status: DISCONTINUED | OUTPATIENT
Start: 2023-02-03 | End: 2023-02-06

## 2023-02-03 RX ORDER — CARVEDILOL 6.25 MG/1
6.25 TABLET ORAL 2 TIMES DAILY WITH MEALS
Qty: 180 TABLET | Refills: 1 | Status: SHIPPED | OUTPATIENT
Start: 2023-02-03 | End: 2023-02-07 | Stop reason: SDUPTHER

## 2023-02-03 RX ORDER — ATORVASTATIN CALCIUM 40 MG/1
40 TABLET, FILM COATED ORAL DAILY
Qty: 90 TABLET | Refills: 1 | Status: SHIPPED | OUTPATIENT
Start: 2023-02-03

## 2023-02-03 RX ORDER — SODIUM CHLORIDE 0.9 % (FLUSH) 0.9 %
5-40 SYRINGE (ML) INJECTION EVERY 12 HOURS SCHEDULED
Status: DISCONTINUED | OUTPATIENT
Start: 2023-02-03 | End: 2023-02-07 | Stop reason: HOSPADM

## 2023-02-03 RX ORDER — 0.9 % SODIUM CHLORIDE 0.9 %
1000 INTRAVENOUS SOLUTION INTRAVENOUS ONCE
Status: COMPLETED | OUTPATIENT
Start: 2023-02-03 | End: 2023-02-03

## 2023-02-03 RX ORDER — POLYETHYLENE GLYCOL 3350 17 G/17G
17 POWDER, FOR SOLUTION ORAL DAILY PRN
Status: DISCONTINUED | OUTPATIENT
Start: 2023-02-03 | End: 2023-02-07 | Stop reason: HOSPADM

## 2023-02-03 RX ADMIN — ASPIRIN 324 MG: 81 TABLET, CHEWABLE ORAL at 12:08

## 2023-02-03 RX ADMIN — HYDRALAZINE HYDROCHLORIDE 10 MG: 20 INJECTION INTRAMUSCULAR; INTRAVENOUS at 03:32

## 2023-02-03 RX ADMIN — NITROGLYCERIN 0.4 MG: 0.4 TABLET SUBLINGUAL at 04:14

## 2023-02-03 RX ADMIN — ONDANSETRON 4 MG: 2 INJECTION INTRAMUSCULAR; INTRAVENOUS at 08:22

## 2023-02-03 RX ADMIN — ONDANSETRON 4 MG: 2 INJECTION INTRAMUSCULAR; INTRAVENOUS at 01:49

## 2023-02-03 RX ADMIN — NITROGLYCERIN 0.4 MG: 0.4 TABLET SUBLINGUAL at 06:57

## 2023-02-03 RX ADMIN — ACETAMINOPHEN 1000 MG: 500 TABLET ORAL at 01:49

## 2023-02-03 RX ADMIN — NITROGLYCERIN 1 INCH: 20 OINTMENT TOPICAL at 08:27

## 2023-02-03 RX ADMIN — CLOPIDOGREL BISULFATE 300 MG: 300 TABLET, FILM COATED ORAL at 12:07

## 2023-02-03 RX ADMIN — IOPAMIDOL 80 ML: 612 INJECTION, SOLUTION INTRAVENOUS at 12:14

## 2023-02-03 RX ADMIN — ACETAMINOPHEN 650 MG: 325 TABLET ORAL at 20:11

## 2023-02-03 RX ADMIN — FAMOTIDINE 20 MG: 10 INJECTION, SOLUTION INTRAVENOUS at 01:50

## 2023-02-03 RX ADMIN — ACETAMINOPHEN 650 MG: 325 TABLET ORAL at 15:49

## 2023-02-03 RX ADMIN — MAGNESIUM SULFATE HEPTAHYDRATE 2000 MG: 40 INJECTION, SOLUTION INTRAVENOUS at 03:31

## 2023-02-03 RX ADMIN — MORPHINE SULFATE 4 MG: 4 INJECTION, SOLUTION INTRAMUSCULAR; INTRAVENOUS at 08:21

## 2023-02-03 RX ADMIN — ONDANSETRON 4 MG: 2 INJECTION INTRAMUSCULAR; INTRAVENOUS at 15:32

## 2023-02-03 RX ADMIN — SODIUM CHLORIDE 1000 ML: 9 INJECTION, SOLUTION INTRAVENOUS at 01:47

## 2023-02-03 RX ADMIN — ENOXAPARIN SODIUM 40 MG: 100 INJECTION SUBCUTANEOUS at 08:34

## 2023-02-03 RX ADMIN — IOPAMIDOL 75 ML: 612 INJECTION, SOLUTION INTRAVENOUS at 09:10

## 2023-02-03 RX ADMIN — Medication: at 01:49

## 2023-02-03 RX ADMIN — Medication 10 ML: at 20:11

## 2023-02-03 RX ADMIN — ATORVASTATIN CALCIUM 80 MG: 80 TABLET, FILM COATED ORAL at 20:11

## 2023-02-03 RX ADMIN — CLOPIDOGREL BISULFATE 75 MG: 75 TABLET ORAL at 09:53

## 2023-02-03 RX ADMIN — HYDRALAZINE HYDROCHLORIDE 50 MG: 50 TABLET, FILM COATED ORAL at 20:11

## 2023-02-03 RX ADMIN — METOPROLOL TARTRATE 25 MG: 25 TABLET, FILM COATED ORAL at 09:52

## 2023-02-03 ASSESSMENT — ENCOUNTER SYMPTOMS
COUGH: 0
SHORTNESS OF BREATH: 1
DIARRHEA: 0
ABDOMINAL PAIN: 0
EYE PAIN: 0
CHEST TIGHTNESS: 1
BACK PAIN: 0
VOMITING: 0
NAUSEA: 1
SORE THROAT: 0
RHINORRHEA: 0
SHORTNESS OF BREATH: 0

## 2023-02-03 ASSESSMENT — PAIN SCALES - GENERAL
PAINLEVEL_OUTOF10: 0
PAINLEVEL_OUTOF10: 6
PAINLEVEL_OUTOF10: 0
PAINLEVEL_OUTOF10: 4
PAINLEVEL_OUTOF10: 0
PAINLEVEL_OUTOF10: 8
PAINLEVEL_OUTOF10: 0
PAINLEVEL_OUTOF10: 2

## 2023-02-03 ASSESSMENT — PAIN DESCRIPTION - DESCRIPTORS
DESCRIPTORS: ACHING
DESCRIPTORS: STABBING

## 2023-02-03 ASSESSMENT — PAIN DESCRIPTION - ORIENTATION
ORIENTATION: LEFT
ORIENTATION: RIGHT;LEFT
ORIENTATION: LEFT

## 2023-02-03 ASSESSMENT — PAIN DESCRIPTION - LOCATION
LOCATION: CHEST;ARM
LOCATION: CHEST;ARM
LOCATION: CHEST
LOCATION: GENERALIZED

## 2023-02-03 ASSESSMENT — PAIN - FUNCTIONAL ASSESSMENT: PAIN_FUNCTIONAL_ASSESSMENT: NONE - DENIES PAIN

## 2023-02-03 NOTE — ED NOTES
Patient ambulated to the restroom with out assistance at this time      Dawna Stoll, [address] [facility]  02/03/23 [id number]

## 2023-02-03 NOTE — PROGRESS NOTES
Seen for AMS, non-focal. Not c/w acute CVA. CT H ordered. Discussed with STELLA.      Maco Ellis, [facility]

## 2023-02-03 NOTE — ED TRIAGE NOTES
Presents by squad for complaint of chest pain. Nitro x 1 and 324mg ASA PTA.  Patient denies chest pain on arrival.

## 2023-02-03 NOTE — PROGRESS NOTES
Patient return from Cath Lab, VSS, RED to RCA, R groin with angioseal, soft, no hematoma.     1320 R groin, soft, no hematoma    1420 - Bedrest complete    Called report to [facility]

## 2023-02-03 NOTE — H&P
History and Physical  Patient: Jose Hearing  Unit/Bed:05/05  YOB: 1950  MRN: [id number]  Acct: [de-identified]   Admitting Diagnosis: Chest pain [R07.9]  Admit Date:  2/3/2023  Hospital Day: 0      Chief Complaint:   Chest pain    History of Present Illness: This is a pleasant 75-year-old  male with past medical history significant for coronary artery disease status post PCI of the RCA in 2020, hypertension, dyslipidemia, diabetes, former tobacco abuse, COPD and obesity who presented to ER early this morning with chief complaint of chest pain. Over the past month or so, patient has been experiencing intermittent chest pain which he describes as poking/sharp discomfort that would occur intermittently sometimes waking him from sleep at night but also occasionally with moderate exertion. Early this morning, patient states that he was awakened from sleep with sharp stabbing discomfort which radiated into his back as well as his neck and jaw. He reports fairly persistent nausea over the past few days but not necessarily associated with chest pain episodes. He denies shortness of breath or dyspnea on exertion, orthopnea, PND, dizziness, lightheadedness, syncope, fever or chills. Due to the severe pain early this morning he presented to ER for further evaluation. On presentation to the emergency room, blood pressure elevated 154/110, heart rate 63, respiratory rate 18, pulse ox 99% on room air, temperature 98.1 °F.  Sodium 138, potassium 4.5, chloride 105, total CO2 22, BUN 13, creatinine elevated 1.29, GFR low at 58.0, glucose 195. Magnesium low at 1.4. Lactic acid elevated 2.8. Total CK elevated at 229. Troponin negative less than 0.010. proBNP 595. TSH 2.730. WBC 6.4, hemoglobin 10.8, hematocrit 32.3, platelets 439. Chest x-ray revealed no acute disease. CTA of the chest showed no evidence of pulmonary embolism, no evidence of acute cardiopulmonary disease.   EKG showed sinus rhythm at 63 bpm, nonspecific ST/T wave changes in lateral leads, QTc 429 ms. Cardiology was contacted for admission. At time of evaluation, patient is seen in ER resting comfortably. He continues to complain of persistent chest pain which is sharp in nature and now reportedly radiating under left armpit and down the left arm. He had no relief with sublingual nitroglycerin. He reports mild relief with morphine. He is currently hypertensive with systolic blood pressure of 190 mmHg. On telemetry he has sinus rhythm with heart rate in the 60s. Patient states he has been compliant with his medications. Reports that his blood pressure has been running high at home generally with systolic in the 588D but occasionally in the 180s range. States that blood sugars have been high and most recent hemoglobin A1c was approximately 8.5. He has not followed with cardiology since his prior PCI in 2020.     No Known Allergies    Current Facility-Administered Medications   Medication Dose Route Frequency Provider Last Rate Last Admin    sodium chloride flush 0.9 % injection 5-40 mL  5-40 mL IntraVENous 2 times per day Tia Hare MD        sodium chloride flush 0.9 % injection 5-40 mL  5-40 mL IntraVENous PRN Tia Hare MD        0.9 % sodium chloride infusion  25 mL IntraVENous PRN Tia Hare MD        ondansetron (ZOFRAN-ODT) disintegrating tablet 4 mg  4 mg Oral Q8H PRN Tia Hare MD        Or    ondansetron [facility]) injection 4 mg  4 mg IntraVENous Q6H PRN Tia Hare MD        acetaminophen (TYLENOL) tablet 650 mg  650 mg Oral Q6H PRN Tia Hare MD        Or    acetaminophen (TYLENOL) suppository 650 mg  650 mg Rectal Q6H PRN Tia Hare MD        polyethylene glycol [facility]) packet 17 g  17 g Oral Daily PRN Tia Hare MD        [START ON 2/4/2023] aspirin chewable tablet 81 mg  81 mg Oral Daily Tia Hare MD        atorvastatin (LIPITOR) tablet 80 mg  80 mg Oral Nightly Kay Samson April Shetty MD        nitroGLYCERIN (NITROSTAT) SL tablet 0.4 mg  0.4 mg SubLINGual Q5 Min PRN Johanna Barraza MD        enoxaparin (LOVENOX) injection 40 mg  40 mg SubCUTAneous Daily Johanna Barraza MD   40 mg at 02/03/23 0834    clopidogrel (PLAVIX) tablet 75 mg  75 mg Oral Daily Panchito Cough, [place]   75 mg at 02/03/23 0953    dilTIAZem (CARDIZEM CD) extended release capsule 120 mg  120 mg Oral Daily Panchito Cough, [place]        glipiZIDE (GLUCOTROL) tablet 10 mg  10 mg Oral BID AC Panchito Cough, [place]        losartan (COZAAR) tablet 25 mg  25 mg Oral Daily Panchito Cough, [place]        metoprolol tartrate (LOPRESSOR) tablet 25 mg  25 mg Oral BID Panchito Cough, [place]   25 mg at 02/03/23 0952    [START ON 2/4/2023] pantoprazole (PROTONIX) tablet 40 mg  40 mg Oral QAM AC Panchito Cough, [place]        PARoxetine (PAXIL) tablet 40 mg  40 mg Oral Daily Panchito Cough, [place]         Current Outpatient Medications   Medication Sig Dispense Refill    omeprazole (PRILOSEC) 40 MG delayed release capsule Take 1 capsule by mouth daily 90 capsule 1    PARoxetine (PAXIL) 40 MG tablet Take 1 tablet by mouth daily 30 tablet 0    Dulaglutide 0.75 MG/0.5ML SOPN Inject 0.75 mg into the skin once a week 2 mL 5    dilTIAZem (CARDIZEM CD) 120 MG extended release capsule TAKE 1 CAPSULE BY MOUTH DAILY 90 capsule 1    clopidogrel (PLAVIX) 75 MG tablet Take 1 tablet by mouth daily 90 tablet 5    metoprolol tartrate (LOPRESSOR) 25 MG tablet Take 1 tablet by mouth 2 times daily TAKE 1 TABLET BY MOUTH TWICE DAILY 60 tablet 0    metFORMIN (GLUCOPHAGE) 1000 MG tablet Take 1 tablet by mouth 2 times daily (with meals) TAKE 1 TABLET BY MOUTH TWICE DAILY WITH MEALS **APPT NEEDED FOR ADDITION FILLS 180 tablet 0    atorvastatin (LIPITOR) 40 MG tablet Take 1 tablet by mouth daily TAKE 1 TABLET BY MOUTH EVERY NIGHT **APPT NEEDED FOR ADDITIONAL FILLS 90 tablet 0    triamcinolone (KENALOG) 0.1 % cream Apply topically 2 times daily.  454 g 0 losartan (COZAAR) 25 MG tablet Take 1 tablet by mouth daily TAKE 1 TABLET BY MOUTH TWICE DAILY 180 tablet 3    glipiZIDE (GLUCOTROL) 10 MG tablet Take 1 tablet by mouth 2 times daily (before meals) TAKE 1 TABLET BY MOUTH TWICE DAILY 180 tablet 5    calcipotriene-betamethasone (TACLONEX) 0.005-0.064 % ointment Apply topically daily up to 4 weeks. 60 g 1    calcipotriene (DOVONEX) 0.005 % cream Apply topically 2 times daily 60 g 1    blood glucose monitor supplies LANCETS  Test 2 times a day & as needed for symptoms of irregular blood glucose. Dispense sufficient amount for indicated testing frequency plus additional to accommodate PRN testing needs. 100 each 3    blood glucose monitor kit and supplies tEST 2 TIMES A DAY 1 kit 0    blood glucose monitor strips Test 2 times a day & as needed for symptoms of irregular blood glucose. Dispense sufficient amount for indicated testing frequency plus additional to accommodate PRN testing needs. 100 strip 3    nitroGLYCERIN (NITROSTAT) 0.4 MG SL tablet up to max of 3 total doses. If no relief after 1 dose, call 911. 25 tablet 3       PMHx:  Past Medical History:   Diagnosis Date    Cancer ([facility]     skin left neck    Chronic bronchitis (H[facility])     COPD (chronic obstructive pulmonary disease) (HCC)     Diabetes mellitus ([facility]     Diverticulitis     Emphysema of lung ([facility]     Heart attack ([facility]     History of blood transfusion     Hyperlipidemia     Hypertension     Meniere's disease     Vertigo        PSHx:  Past Surgical History:   Procedure Laterality Date    CARDIAC SURGERY      stent    SKIN BIOPSY      left neck       Social Hx:  Social History     Socioeconomic History    Marital status:       Spouse name: None    Number of children: None    Years of education: None    Highest education level: None   Tobacco Use    Smoking status: Former     Packs/day: 1.00     Years: 40.00     Pack years: 40.00     Types: Cigars, Cigarettes     Quit date: 12/10/2020     Years since quittin.1    Smokeless tobacco: Never   Vaping Use    Vaping Use: Never used   Substance and Sexual Activity    Alcohol use: Never    Drug use: Never    Sexual activity: Not Currently     Social Determinants of Health     Financial Resource Strain: Low Risk     Difficulty of Paying Living Expenses: Not hard at all   Food Insecurity: No Food Insecurity    Worried About Running Out of Food in the Last Year: Never true    Ran Out of Food in the Last Year: Never true   Physical Activity: Inactive    Days of Exercise per Week: 0 days    Minutes of Exercise per Session: 0 min       Family Hx:  Family History   Problem Relation Age of Onset    Heart Disease Mother     Diabetes Mother        Review ofSystems:   Review of Systems   Constitutional:  Negative for activity change and fever. HENT:  Negative for congestion. Respiratory:  Negative for shortness of breath. Cardiovascular:  Positive for chest pain and palpitations (intermittent). Negative for leg swelling. Gastrointestinal:  Positive for nausea. Negative for abdominal pain and vomiting. Genitourinary:  Negative for difficulty urinating. Musculoskeletal:  Negative for arthralgias. Neurological:  Negative for dizziness, syncope and light-headedness. Psychiatric/Behavioral:  Negative for agitation. Physical Examination:    BP (!) 198/83   Pulse 63   Temp 98.1 °F (36.7 °C) (Oral)   Resp 14   Ht 5' 6\" (1.676 m)   Wt 170 lb (77.1 kg)   SpO2 97%   BMI 27.44 kg/m²    Physical Exam  Constitutional:       General: He is not in acute distress. Appearance: He is obese. HENT:      Head: Normocephalic and atraumatic. Cardiovascular:      Rate and Rhythm: Normal rate and regular rhythm. Pulmonary:      Effort: Pulmonary effort is normal. No respiratory distress. Breath sounds: No wheezing, rhonchi or rales. Abdominal:      Palpations: Abdomen is soft. Tenderness: There is no abdominal tenderness.    Musculoskeletal: General: Normal range of motion. Cervical back: Normal range of motion and neck supple. Right lower leg: No edema. Left lower leg: No edema. Skin:     General: Skin is warm and dry. Neurological:      General: No focal deficit present. Mental Status: He is alert and oriented to person, place, and time. Cranial Nerves: No cranial nerve deficit.    Psychiatric:         Mood and Affect: Mood normal.         Behavior: Behavior normal.        LABS:  CBC:   Lab Results   Component Value Date/Time    WBC 6.4 02/03/2023 01:30 AM    RBC 3.49 02/03/2023 01:30 AM    RBC 4.64 04/11/2012 07:55 PM    HGB 10.8 02/03/2023 01:30 AM    HCT 32.3 02/03/2023 01:30 AM    MCV 92.7 02/03/2023 01:30 AM    MCH 31.1 02/03/2023 01:30 AM    MCHC 33.5 02/03/2023 01:30 AM    RDW 14.7 02/03/2023 01:30 AM     02/03/2023 01:30 AM    MPV 7.3 06/08/2013 07:20 PM     CBC with Differential:   Lab Results   Component Value Date/Time    WBC 6.4 02/03/2023 01:30 AM    RBC 3.49 02/03/2023 01:30 AM    RBC 4.64 04/11/2012 07:55 PM    HGB 10.8 02/03/2023 01:30 AM    HCT 32.3 02/03/2023 01:30 AM     02/03/2023 01:30 AM    MCV 92.7 02/03/2023 01:30 AM    MCH 31.1 02/03/2023 01:30 AM    MCHC 33.5 02/03/2023 01:30 AM    RDW 14.7 02/03/2023 01:30 AM    LYMPHOPCT 25.3 02/03/2023 01:30 AM    MONOPCT 8.1 02/03/2023 01:30 AM    EOSPCT 0.8 04/11/2012 07:55 PM    BASOPCT 0.9 02/03/2023 01:30 AM    MONOSABS 0.5 02/03/2023 01:30 AM    LYMPHSABS 1.6 02/03/2023 01:30 AM    EOSABS 0.1 02/03/2023 01:30 AM    BASOSABS 0.1 02/03/2023 01:30 AM     CMP:    Lab Results   Component Value Date/Time     02/03/2023 01:30 AM    K 4.5 02/03/2023 01:30 AM    K 4.3 12/18/2020 07:09 AM     02/03/2023 01:30 AM    CO2 22 02/03/2023 01:30 AM    BUN 13 02/03/2023 01:30 AM    CREATININE 1.29 02/03/2023 01:30 AM    GFRAA >60.0 12/18/2020 07:09 AM    LABGLOM 58.0 02/03/2023 01:30 AM    GLUCOSE 195 02/03/2023 01:30 AM    GLUCOSE 152 04/11/2012 07:55 PM    PROT 5.8 02/03/2023 01:30 AM    LABALBU 3.9 02/03/2023 01:30 AM    LABALBU 4.3 04/11/2012 07:55 PM    CALCIUM 8.6 02/03/2023 01:30 AM    BILITOT 0.3 02/03/2023 01:30 AM    ALKPHOS 70 02/03/2023 01:30 AM    AST 12 02/03/2023 01:30 AM    ALT 10 02/03/2023 01:30 AM     BMP:    Lab Results   Component Value Date/Time     02/03/2023 01:30 AM    K 4.5 02/03/2023 01:30 AM    K 4.3 12/18/2020 07:09 AM     02/03/2023 01:30 AM    CO2 22 02/03/2023 01:30 AM    BUN 13 02/03/2023 01:30 AM    LABALBU 3.9 02/03/2023 01:30 AM    LABALBU 4.3 04/11/2012 07:55 PM    CREATININE 1.29 02/03/2023 01:30 AM    CALCIUM 8.6 02/03/2023 01:30 AM    GFRAA >60.0 12/18/2020 07:09 AM    LABGLOM 58.0 02/03/2023 01:30 AM    GLUCOSE 195 02/03/2023 01:30 AM    GLUCOSE 152 04/11/2012 07:55 PM     Magnesium:    Lab Results   Component Value Date/Time    MG 1.4 02/03/2023 01:30 AM    MG 1.9 04/11/2012 07:55 PM     Troponin:    Lab Results   Component Value Date/Time    TROPONINI <0.010 02/03/2023 04:30 AM     Recent Labs     02/03/23  0130   PROBNP 595     Recent Labs     02/03/23  0141   INR 0.9       RADIOLOGY:  XR CHEST PORTABLE    Result Date: 2/3/2023  EXAMINATION: ONE XRAY VIEW OF THE CHEST 2/3/2023 1:48 am COMPARISON: None. HISTORY: ORDERING SYSTEM PROVIDED HISTORY: CP TECHNOLOGIST PROVIDED HISTORY: Reason for exam:->CP What reading provider will be dictating this exam?->CRC FINDINGS: Normal cardiomediastinal silhouette. Lungs clear. No pneumothorax or effusion. Osseous thorax intact. No acute disease. RECOMMENDATION: Careful clinical correlation and follow up recommended. Echocardiogram 12/14/20:  Conclusions   Summary   Normal left ventricular systolic function, no regional wall motion   abnormalities, estimated ejection fraction of 60%. Normal left ventricular size and function. Normal left ventricular wall thickness. No evidence of mitral regurgitation. No evidence of mitral valve stenosis.    Mild aortic regurgitation is noted. No evidence of aortic valve stenosis. There is mild tricuspid regurgitation with estimated RVSP of 43 mm Hg. Right ventricular systolic pressure of 43 mm Hg consistent with mild   pulmonary hypertension. Signature   ----------------------------------------------------------------   Electronically signed by Ludin Vail DO(Interpreting   physician) on 12/15/2020 05:50 PM    [facility] 12/14/20:  Conclusions  Procedure Summary  s/p PCI of prox to mid RCA with riana, 0% residual stenosis, FANG III flow pre  and post PCI. moderate diffuse disease of CX and LAD  EF of 55%  Recommendations  Aggressive risk factor management. Maximize medical therapy. Angiographic Findings   Cardiac Arteries and Lesion Findings  LMCA: Diffuse disease. mild  LAD: moderate diffuse disease, 50% in mid. no focal lesions. LCx: mod diffuse disease, no focal lesions. RCA: 80% in mid. mod caliber vessel. Lesion on Prox RCA:   Coronary Tree   Dominance: Right  LV function assessed as:Normal.  Ejection Fraction    - Method: LV gram. EF%: 55.     EKG 2/3/23: SR 63, nonspecific ST changes, QTc 429ms    Telemetry 2/3/23: SR 60s, PVCs      Assessment:    Active Hospital Problems    Diagnosis Date Noted    Chest pain [R07.9] 02/03/2023     Priority: Medium     Chest pain with typical and atypical features r/o cardiac ischemia  Hx CAD s/p PCI of RCA on 12/14/20---mod diffuse circ and LAD disease at time of this [facility]  Uncontrolled HTN  Dyslipidemia  Uncontrolled DM--HgbA1c 8.5 on 1/26/23  COPD  CKD  Obesity  Former smoker  Hypomagnesemia    Plan:  Admit to telemetry  ACS orders initiated  Maximize medical therapy-aspirin 81 mg p.o. daily, Plavix 75 mg p.o. daily, Lopressor 25 mg p.o. twice daily, Cardizem  mg p.o. daily, Lipitor 80 mg p.o. nightly, sublingual nitroglycerin as needed for chest pain, Lovenox 40 mg subcu daily  Titrate antihypertensive medications as needed  Check echocardiogram  Cardiac/diabetic diet recommended  Weight loss recommended  Monitor on telemetry for any tacky or bradycardia arrhythmias  Maintain potassium greater than 4, magnesium greater than 2  GI/DVT prophylaxis  Coronary evaluation per Dr. Arnold Robertson--? Stress test versus cardiac catheterization. Further recommendations to follow        Electronically signed by ALLIE Gutierrez on 2/3/2023 at 10:54 AM      Attending Supervising Physician's Attestation Statement  I performed a history and physical examination on the patient and discussed the management with the physician assistant. I reviewed and agree with the findings and plan as documented in her note . ID  68-year-old male with history of CAD status post PCI's, hypertension, hyperlipidemia, diabetes, COPD, CKD, obesity who came to the hospital for management of chest pain    General: Alert and oriented x4 not acute distress  Lungs: Clear to auscultation bilaterally  CV: Regular rate and rhythm no murmurs  Extremities: No pitting edema    Assessment and plan  Chest pain.   Sounds typical patient with high risk factors for CAD including uncontrolled hypertension and uncontrolled diabetes  History of CAD with remote PCI's in the past  Hypertension  Hyperlipidemia  Uncontrolled diabetes  COPD  CKD  Obesity    Plan:  Admit patient to telemetry  Coronary angiogram this morning  Continue aspirin and atorvastatin indefinitely for secondary prevention of CAD  Continue Coreg  Continue hydralazine  Continue losartan  Obtain an echocardiogram      Electronically signed by Victor Manuel Will MD on 2/3/23 at 3:38 PM EST

## 2023-02-03 NOTE — ED PROVIDER NOTES
[address] [facility] ED  eMERGENCY dEPARTMENT eNCOUnter      Pt Name: Aurora Banks  MRN: [id number]  [place] 1950  Date of evaluation: 2/3/2023  Provider: Jelly Emanuel MD      HISTORY OF PRESENT ILLNESS      Chief Complaint   Patient presents with    Chest Pain       The history is provided by the Patient. Aurora Banks is a 74 y.o. male with a PMH clinically significant for HTN, HLD, COPD, DMII, CAD s/p stenting, Vertigo and diverticulitis presenting to the ED via EMS c/o chest pain associated with shortness of breath, lightheadedness, dizziness, diaphoresis and nausea with initial onset several hours prior to arrival.  States he has had it intermittently over the past 2 to 3 weeks and that it feels very similar to his previous MI 1.5 years ago. Characterizes pain as aching, intermittent, moderate to severe. Feels like he just got punched in the chest.  States symptoms are improved after he was given aspirin and nitro in route by EMS. States he was just laying in his bed when it happened. Denies any exertional activities. Also denies any abnormal p.o. intake or trauma. States this is similar to when he had his last heart attack as well. Denies any associated cough, hemoptysis, abdominal pain, headache, vomiting, BLE edema/pain. Denies any recent bloody stools or black stools. Has otherwise been feeling well. Hx of tobacco smoking, stopped 2 years ago. Per Chart Review: PMH as noted above obtained via outpatient chart review. REVIEW OF SYSTEMS       Review of Systems   Constitutional:  Positive for diaphoresis and fatigue. Negative for chills and fever. HENT:  Negative for rhinorrhea and sore throat. Eyes:  Negative for pain and visual disturbance. Respiratory:  Positive for chest tightness and shortness of breath. Negative for cough. Cardiovascular:  Positive for chest pain and palpitations. Negative for leg swelling. Gastrointestinal:  Positive for nausea.  Negative for abdominal pain, diarrhea and vomiting. Genitourinary:  Negative for dysuria. Musculoskeletal:  Negative for back pain and neck pain. Skin:  Negative for rash. Neurological:  Positive for dizziness and light-headedness. Negative for weakness, numbness and headaches. PAST MEDICAL HISTORY     Past Medical History:   Diagnosis Date    Cancer ([facility]     skin left neck    Chronic bronchitis (HCC)     COPD (chronic obstructive pulmonary disease) (HCC)     Diabetes mellitus ([facility]     Diverticulitis     Emphysema of lung ([facility]     Heart attack ([facility]     History of blood transfusion     Hyperlipidemia     Hypertension     Meniere's disease     Vertigo        SURGICAL HISTORY       Past Surgical History:   Procedure Laterality Date    CARDIAC SURGERY      stent    SKIN BIOPSY      left neck       FAMILY HISTORY       Family History   Problem Relation Age of Onset    Heart Disease Mother     Diabetes Mother        SOCIAL HISTORY       Social History     Socioeconomic History    Marital status:       Spouse name: None    Number of children: None    Years of education: None    Highest education level: None   Tobacco Use    Smoking status: Former     Packs/day: 1.00     Years: 40.00     Pack years: 40.00     Types: Cigars, Cigarettes     Quit date: 12/10/2020     Years since quittin.1    Smokeless tobacco: Never   Vaping Use    Vaping Use: Never used   Substance and Sexual Activity    Alcohol use: Never    Drug use: Never    Sexual activity: Not Currently     Social Determinants of Health     Financial Resource Strain: Low Risk     Difficulty of Paying Living Expenses: Not hard at all   Food Insecurity: No Food Insecurity    Worried About Running Out of Food in the Last Year: Never true    Ran Out of Food in the Last Year: Never true   Physical Activity: Inactive    Days of Exercise per Week: 0 days    Minutes of Exercise per Session: 0 min       CURRENT MEDICATIONS       Previous Medications    ATORVASTATIN (LIPITOR) 40 MG TABLET    Take 1 tablet by mouth daily TAKE 1 TABLET BY MOUTH EVERY NIGHT **APPT NEEDED FOR ADDITIONAL FILLS    BLOOD GLUCOSE MONITOR KIT AND SUPPLIES    tEST 2 TIMES A DAY    BLOOD GLUCOSE MONITOR STRIPS    Test 2 times a day & as needed for symptoms of irregular blood glucose. Dispense sufficient amount for indicated testing frequency plus additional to accommodate PRN testing needs. BLOOD GLUCOSE MONITOR SUPPLIES    LANCETS  Test 2 times a day & as needed for symptoms of irregular blood glucose. Dispense sufficient amount for indicated testing frequency plus additional to accommodate PRN testing needs. CALCIPOTRIENE (DOVONEX) 0.005 % CREAM    Apply topically 2 times daily    CALCIPOTRIENE-BETAMETHASONE (TACLONEX) 0.005-0.064 % OINTMENT    Apply topically daily up to 4 weeks. CLOPIDOGREL (PLAVIX) 75 MG TABLET    Take 1 tablet by mouth daily    DILTIAZEM (CARDIZEM CD) 120 MG EXTENDED RELEASE CAPSULE    TAKE 1 CAPSULE BY MOUTH DAILY    DULAGLUTIDE 0.75 MG/0.5ML SOPN    Inject 0.75 mg into the skin once a week    GLIPIZIDE (GLUCOTROL) 10 MG TABLET    Take 1 tablet by mouth 2 times daily (before meals) TAKE 1 TABLET BY MOUTH TWICE DAILY    LOSARTAN (COZAAR) 25 MG TABLET    Take 1 tablet by mouth daily TAKE 1 TABLET BY MOUTH TWICE DAILY    METFORMIN (GLUCOPHAGE) 1000 MG TABLET    Take 1 tablet by mouth 2 times daily (with meals) TAKE 1 TABLET BY MOUTH TWICE DAILY WITH MEALS **APPT NEEDED FOR ADDITION FILLS    METOPROLOL TARTRATE (LOPRESSOR) 25 MG TABLET    Take 1 tablet by mouth 2 times daily TAKE 1 TABLET BY MOUTH TWICE DAILY    NITROGLYCERIN (NITROSTAT) 0.4 MG SL TABLET    up to max of 3 total doses. If no relief after 1 dose, call 911. OMEPRAZOLE (PRILOSEC) 40 MG DELAYED RELEASE CAPSULE    Take 1 capsule by mouth daily    PAROXETINE (PAXIL) 40 MG TABLET    Take 1 tablet by mouth daily    TRIAMCINOLONE (KENALOG) 0.1 % CREAM    Apply topically 2 times daily.        ALLERGIES     Patient has no known allergies. PHYSICAL EXAM       ED Triage Vitals   BP Temp Temp Source Heart Rate Resp SpO2 Height Weight   02/03/23 0107 02/03/23 0109 02/03/23 0109 02/03/23 0107 02/03/23 0104 02/03/23 0107 02/03/23 0104 02/03/23 0104   (!) 154/110 98.1 °F (36.7 °C) Oral 63 18 99 % 5' 6\" (1.676 m) 170 lb (77.1 kg)       Physical Exam  Vitals and nursing note reviewed. Constitutional:       General: He is not in acute distress. Appearance: He is not ill-appearing, toxic-appearing or diaphoretic. HENT:      Head: Normocephalic and atraumatic. Mouth/Throat:      Mouth: Mucous membranes are moist.      Pharynx: Oropharynx is clear. Eyes:      Extraocular Movements: Extraocular movements intact. Pupils: Pupils are equal, round, and reactive to light. Cardiovascular:      Rate and Rhythm: Normal rate and regular rhythm. Pulses: Normal pulses. Heart sounds: Normal heart sounds. Pulmonary:      Effort: Pulmonary effort is normal. No respiratory distress. Breath sounds: Normal breath sounds. Chest:      Chest wall: No tenderness. Abdominal:      General: There is no distension. Palpations: Abdomen is soft. Tenderness: There is no abdominal tenderness. There is no guarding or rebound. Musculoskeletal:         General: No tenderness. Cervical back: Normal range of motion and neck supple. Right lower leg: No edema. Left lower leg: No edema. Skin:     General: Skin is warm and dry. Capillary Refill: Capillary refill takes less than 2 seconds. Neurological:      Mental Status: He is alert and oriented to person, place, and time. Mental status is at baseline.    Psychiatric:         Mood and Affect: Mood normal.         Behavior: Behavior normal.       MDM:   Chart Reviewed: PMH and additional information as noted in HPI obtained from chart review    Vitals:    Vitals:    02/03/23 0605 02/03/23 0631 02/03/23 0739 02/03/23 0836   BP: (!) 146/64 (!) 151/59 (!) 185/67 (!) 203/88   Pulse: 61 58 62 67   Resp: 17 11 17 11   Temp:       TempSrc:       SpO2: 97% 98% 100% 97%   Weight:       Height:           PROCEDURES:  Unless otherwise noted below, none  Procedures    LABS:  Labs Reviewed   LACTIC ACID - Abnormal; Notable for the following components:       Result Value    Lactic Acid 2.8 (*)     All other components within normal limits   COMPREHENSIVE METABOLIC PANEL - Abnormal; Notable for the following components:    Glucose 195 (*)     Creatinine 1.29 (*)     Est, Glom Filt Rate 58.0 (*)     Total Protein 5.8 (*)     Globulin 1.9 (*)     All other components within normal limits   MAGNESIUM - Abnormal; Notable for the following components:    Magnesium 1.4 (*)     All other components within normal limits   CBC WITH AUTO DIFFERENTIAL - Abnormal; Notable for the following components:    RBC 3.49 (*)     Hemoglobin 10.8 (*)     Hematocrit 32.3 (*)     MCV 92.7 (*)     RDW 14.7 (*)     All other components within normal limits   CK - Abnormal; Notable for the following components: Total  (*)     All other components within normal limits   TROPONIN   BRAIN NATRIURETIC PEPTIDE   TSH WITH REFLEX   PROTIME-INR   APTT   TROPONIN       XR CHEST PORTABLE   Final Result   No acute disease. RECOMMENDATION:   Careful clinical correlation and follow up recommended. CTA CHEST W WO CONTRAST PE Eval    (Results Pending)       ED Course as of 02/03/23 0922 Fri Feb 03, 2023   0129 EKG 12 Lead  EKG showing normal sinus rhythm, rate of 63 bpm.  Normal axis, normal intervals. Nonspecific T wave abnormalities noted most in the lateral leads. [NA]   0231 Pro-BNP: 595 [NA]   0232 Troponin: <0.010  WNL [NA]   0232 TSH: 2.730 [NA]   0232 INR: 0.9 [NA]   0232 aPTT: 27.5 [NA]   0237 Magnesium(!): 1.4  Mild hypomagnesemia. Will replace in the ED. [NA]   0238 XR CHEST PORTABLE  No gross acute cardiopulmonary abnormalities.  [NA]   [id number] Troponin: <0.010 [NA]      ED Course User Index  [NA] Danna Bence, MD       74 y.o. male with a PMH clinically significant for HTN, HLD, COPD, DMII, CAD s/p stenting, Vertigo and diverticulitis presenting to the ED via EMS c/o chest pain associated with shortness of breath, lightheadedness, dizziness, diaphoresis and nausea with initial onset several hours prior to arrival.   Upon initial evaluation, Pt mildly hypertensive, but otherwise Afebrile, HDS and in NAD. PE as noted above. Labs, , EKG,, and Imaging interpreted by myself and as noted above. Given findings, clinical presentation most likely consistent w/ CP concerning for unstable angina in the setting of known CAD with anginal equivalents similar to those appreciated during prior MI. Also found to have intermittent HTN in the ED. Was administered multiple meds as noted above for symptomatic relief and HTN as noted. Already received 325 ASA pta in ED. EKG largely non-specific and delta trop negative. Patient however noting symptoms are very similar to prior instance during which he received a stent. Lower suspicion for DVT/PE, Dissection. Did consider possible GERD. Will be admitted to cardiology for further ACS rule out.    Pt was administered   Medications   sodium chloride flush 0.9 % injection 5-40 mL (has no administration in time range)   sodium chloride flush 0.9 % injection 5-40 mL (has no administration in time range)   0.9 % sodium chloride infusion (has no administration in time range)   ondansetron (ZOFRAN-ODT) disintegrating tablet 4 mg (has no administration in time range)     Or   ondansetron (ZOFRAN) injection 4 mg (has no administration in time range)   acetaminophen (TYLENOL) tablet 650 mg (has no administration in time range)     Or   acetaminophen (TYLENOL) suppository 650 mg (has no administration in time range)   polyethylene glycol (GLYCOLAX) packet 17 g (has no administration in time range)   aspirin chewable tablet 81 mg (has no administration in time range) atorvastatin (LIPITOR) tablet 80 mg (has no administration in time range)   nitroGLYCERIN (NITROSTAT) SL tablet 0.4 mg (has no administration in time range)   enoxaparin (LOVENOX) injection 40 mg (40 mg SubCUTAneous Given 2/3/23 0834)   0.9 % sodium chloride bolus (0 mLs IntraVENous Stopped 2/3/23 0313)   ondansetron (ZOFRAN) injection 4 mg (4 mg IntraVENous Given 2/3/23 0149)   famotidine (PEPCID) 20 mg in sodium chloride (PF) 0.9 % 10 mL injection (20 mg IntraVENous Given 2/3/23 0150)   aluminum & magnesium hydroxide-simethicone (MAALOX) 30 mL, lidocaine viscous hcl (XYLOCAINE) 5 mL (GI COCKTAIL) ( Oral Given 2/3/23 0149)   acetaminophen (TYLENOL) tablet 1,000 mg (1,000 mg Oral Given 2/3/23 0149)   magnesium sulfate 2000 mg in 50 mL IVPB premix (0 mg IntraVENous Stopped 2/3/23 0430)   hydrALAZINE (APRESOLINE) injection 10 mg (10 mg IntraVENous Given 2/3/23 0332)   nitroglycerin (NITRO-BID) 2 % ointment 1 inch (1 inch Topical Given 2/3/23 0827)   morphine sulfate (PF) injection 4 mg (4 mg IntraVENous Given 2/3/23 0821)   ondansetron (ZOFRAN) injection 4 mg (4 mg IntraVENous Given 2/3/23 0822)   iopamidol (ISOVUE-300) 61 % injection 75 mL (75 mLs IntraVENous Given 2/3/23 0910)       Plan: Admit to Cardiology for further evaluation and management. Report given to Dr. Keisha Manriquez. and Patient understanding and amenable to the POC. CRITICAL CARE TIME   Total CriticalCare time was 0 minutes, excluding separately reportable procedures. There was a high probability of clinically significant/life threatening deterioration in the patient's condition which required my urgent intervention. FINAL IMPRESSION      1.  Chest pain, unspecified type          DISPOSITION/PLAN   DISPOSITION Admitted 02/03/2023 06:31:46 AM      Current Discharge Medication List           MD Rajni Mckeon MD  02/03/23 5143

## 2023-02-03 NOTE — CONSULTS
Information for Cardiac Rehab will be given to patient by Cath Lab RN team. Patient currently in ER.    Misty Driscoll M.S. [facility]  Cardiac Rehab Coordinator

## 2023-02-03 NOTE — PROGRESS NOTES
Notified of consult regarding evaluation of nausea associated with one episode of vomiting after returning from cardiac catheterization, improved with Zofran per nursing staff and is currently refusing to take meds or speak to anyone therefore I will evaluate him tomorrow in AM.

## 2023-02-03 NOTE — PROGRESS NOTES
1555 PM Patient arrived to 1 WT post cardiac cath lab;  patient was taken from ER today and after procedure s/p RED, came to 1 [facility]. Upon initial assessment, very polite and answering questions for admission, however, then patient got very \"nauseated\". Vital signs received. Medicated as per [facility] with Zofran IV as ordered prn immediately. Patient had small brown emesis; no food. Assessed Right groin  s/p cath site soft/no drainage/dressing dry and intact, angiseal closure device. Right PP +2. Repositioned patient and  agreed nausea \"better\". Completed admission, then  when trying ot give patient medications as ordered, he adimantly refused anything. Patient shaking \"cold\" in bed, afebrile, refuses to open eyes. Refuses accu check at this time. LIANNA Márquez  RN at bedside to evaluate with this RN and refused to have  any accu check with her also. Repositioned. Refusing to speak with this RN, wants to be left alone. Oksana Robin Supervisor rounding; given update. Spoke with patient who continued to not cooperate at this time. Asking to be left alone. 1606 PM Perfect serve sent to [facility] for Dr. Marybeth Kahn to update. Patient denies any CP, but refuses to talk to anyone, including Evelyn KELLEY who tried to call into room. No acute respiratory distress noted. Orders received for Hospitalist for medical management and persistent nausea. 1630 PM Patient assisted up to [facility]; slightly unsteady gaite; slow. Call light in reach. Patient continues to refuse all medications. Urinated on [facility]; patient verbalized wants to be left \"alone\" . Call, light in reach. SR up X3. Plan frequent rounds to promote safety. Patient upon return to bed wanting to just be 'left alone\". 1640 PM Walked patient to bathroom; standby assist; slightly unsteady. Patient voided again, no BM. Assisted standby back to bed; call light  in reach; bed alarm initiated. Aware to call for assist when up. Assessed right groin site; hemostasis maintained.      1745 PM Patient awakens to verbal stimuli; continues to refuse to take all of his home medications. Patient just \"wants to sleep\". No further emesis/nausea. Bed alarm on. Agrees to take his medications \"Later:\" Right groin hemostasis maintained; site soft to touch/nontender. PP +2. Will continue to do frequent rounds for safety. Patient refusing to open eyes or talk when spoken to. MOEX4, very strong. LIANNA Rosario Mai RN at bedside trying to get Accu check; patient pushed her hands away. Refusing accu checks. 1800 PM Perfect serve sent to Dr. Tera Serrano regarding patient agitation; requested to come round on patient. [address] PM Dr. Stephanie Zee here and updated; assessment at bedside completed per Dr. Stephanie Zee and orders received. 1900 PM Patient left via bed to CT as ordered. Non acute resp distress or discomfort when leaving. Carmen Jean No CP. Patient still continues to selectively answer questions. Patient more alert however, still answers as he wishes. 1910 PM Patient left via bed to CT as ordered.

## 2023-02-03 NOTE — ED NOTES
Report called to [facility] and advised room is dirty. Lenin Ziegler RN  02/03/23 5667

## 2023-02-03 NOTE — ED NOTES
Home medication list reviewed with pt.   Dr. Mcmillan Given notified of pts SBP and notifed of blood pressure medications pt takes at home     La Aaron RN  02/03/23 7182

## 2023-02-03 NOTE — CONSULTS
Hospital Medicine  Consult    Patient:  Araceli Cifuentes  MRN: [id number]    CHIEF COMPLAINT:    Chief Complaint   Patient presents with    Chest Pain       History Obtained From:  Patient, EMR  Primary Care Physician: Adriana Devine MD    HISTORY OF PRESENT ILLNESS:   The patient is a 74 y.o. male with PMH of CAD s/p PCI to RCA in 12/2020, GI bleed in 12/2020, HTN, DM2, tobacco use, anxiety who presented with chest pain, admitted to cardiology service, underwent LHC with PCI on 2/3, developed altered mental status after returning to the cardiology floor, was not answering questions, refusing meds, CT head was negative for acute findings, was febrile overnight with Tmax-38.5, viral respiratory panel, CXR and U/a were negative, blood cultures pending, still feels nauseous today, no chest pain or shortness of breath. Past Medical History:      Diagnosis Date    Cancer ([facility] [address]     skin left neck    Chronic bronchitis (HCC)     COPD (chronic obstructive pulmonary disease) (HCC)     Diabetes mellitus ([facility] [address]     Diverticulitis     Emphysema of lung ([facility] [address]     Heart attack ([facility] [address]     History of blood transfusion     Hyperlipidemia     Hypertension     Meniere's disease     Vertigo        Past Surgical History:      Procedure Laterality Date    CARDIAC SURGERY      stent    SKIN BIOPSY      left neck       Medications Prior to Admission:    Prior to Admission medications    Medication Sig Start Date End Date Taking?  Authorizing Provider   aspirin 81 MG chewable tablet Take 1 tablet by mouth daily 2/4/23  Yes ALLIE Michael   atorvastatin (LIPITOR) 40 MG tablet Take 1 tablet by mouth daily TAKE 1 TABLET BY MOUTH EVERY NIGHT **APPT NEEDED FOR ADDITIONAL FILLS 2/3/23  Yes ALLIE Michael   carvedilol (COREG) 6.25 MG tablet Take 1 tablet by mouth 2 times daily (with meals) 2/3/23  Yes ALLIE Michael   clopidogrel (PLAVIX) 75 MG tablet Take 1 tablet by mouth daily 2/3/23  Yes ALLIE Michael hydrALAZINE (APRESOLINE) 50 MG tablet Take 1 tablet by mouth in the morning and at bedtime 2/3/23  Yes ALLIE Zhou   metFORMIN (GLUCOPHAGE) 1000 MG tablet Take 1 tablet by mouth 2 times daily (with meals) TAKE 1 TABLET BY MOUTH TWICE DAILY WITH MEALS **APPT NEEDED FOR ADDITION FILLS  RESUME on 2/5/23 2/3/23  Yes ALLIE Zhou   nitroGLYCERIN (NITROSTAT) 0.4 MG SL tablet Place 1 tablet under the tongue every 5 minutes as needed for Chest pain up to max of 3 total doses. If no relief after 1 dose, call 911. 2/3/23  Yes ALLIE Zhou   omeprazole (PRILOSEC) 40 MG delayed release capsule Take 1 capsule by mouth daily 1/30/23   Helen Hardy MD   PARoxetine (PAXIL) 40 MG tablet Take 1 tablet by mouth daily 1/30/23 4/30/23  Helen Hardy MD   Dulaglutide 0.75 MG/0.5ML SOPN Inject 0.75 mg into the skin once a week 1/26/23   Helen Hardy MD   triamcinolone (KENALOG) 0.1 % cream Apply topically 2 times daily. 6/13/22   Kay Cloud DO   losartan (COZAAR) 25 MG tablet Take 1 tablet by mouth daily TAKE 1 TABLET BY MOUTH TWICE DAILY 6/5/22   Helen Hardy MD   glipiZIDE (GLUCOTROL) 10 MG tablet Take 1 tablet by mouth 2 times daily (before meals) TAKE 1 TABLET BY MOUTH TWICE DAILY 6/5/22   Helen Hardy MD   blood glucose monitor supplies LANCETS  Test 2 times a day & as needed for symptoms of irregular blood glucose. Dispense sufficient amount for indicated testing frequency plus additional to accommodate PRN testing needs. 10/7/21   Helen Hardy MD   blood glucose monitor kit and supplies tEST 2 TIMES A DAY 10/7/21   Helen Hardy MD   blood glucose monitor strips Test 2 times a day & as needed for symptoms of irregular blood glucose. Dispense sufficient amount for indicated testing frequency plus additional to accommodate PRN testing needs.  10/7/21   Helen Hardy MD   clopidogrel (PLAVIX) 75 MG tablet Take 1 tablet by mouth daily 4/18/21   Helen Hardy MD Allergies:  Patient has no known allergies. Social History:   TOBACCO:   reports that he quit smoking about 2 years ago. His smoking use included cigars and cigarettes. He has a 40.00 pack-year smoking history. He has never used smokeless tobacco.  ETOH:   reports no history of alcohol use. Family History:       Problem Relation Age of Onset    Heart Disease Mother     Diabetes Mother        REVIEW OF SYSTEMS:  Ten systems reviewed and negative except for stated in HPI    Physical Exam:    Vitals: BP (!) 185/67   Pulse 97   Temp 99 °F (37.2 °C) (Oral)   Resp 20   Ht 5' 6\" (1.676 m)   Wt 170 lb (77.1 kg)   SpO2 94%   BMI 27.44 kg/m²   General appearance: alert, cooperative  Skin: Skin color, texture, turgor normal.   HEENT: Head: Normocephalic, no lesions, without obvious abnormality. Eye: Normal external eye, conjunctiva, lids cornea, KIRAN. Neck: supple, symmetrical, trachea midline  Lungs: clear to auscultation bilaterally  Heart: S1/S2, RRR  Abdomen: soft, active BS  Extremities: no edema or cyanosis  Neurologic: Mental status: Alert, oriented, thought content appropriate     Recent Labs     02/03/23  0130   WBC 6.4   HGB 10.8*        Recent Labs     02/03/23  0130      K 4.5      CO2 22   BUN 13   CREATININE 1.29*   GLUCOSE 195*   AST 12   ALT 10   BILITOT 0.3   ALKPHOS 70     Troponin T:   Recent Labs     02/03/23  0130 02/03/23  0430   TROPONINI <0.010 <0.010       ABGs: No results found for: PHART, PO2ART, [id number]  INR:   Recent Labs     02/03/23  0141   INR 0.9     URINALYSIS:No results for input(s): NITRITE, COLORU, PHUR, LABCAST, WBCUA, RBCUA, MUCUS, TRICHOMONAS, YEAST, BACTERIA, CLARITYU, SPECGRAV, LEUKOCYTESUR, UROBILINOGEN, BILIRUBINUR, BLOODU, GLUCOSEU, AMORPHOUS in the last 72 hours.     Invalid input(s): KETONESU  -----------------------------------------------------------------   CTA CHEST W WO CONTRAST PE Eval    Result Date: 2/3/2023  EXAMINATION: CTA OF THE CHEST WITH AND WITHOUT CONTRAST 2/3/2023 9:08 am TECHNIQUE: CTA of the chest was performed before and after the administration of intravenous contrast.  Multiplanar reformatted images are provided for review. MIP images are provided for review. Automated exposure control, iterative reconstruction, and/or weight based adjustment of the mA/kV was utilized to reduce the radiation dose to as low as reasonably achievable. COMPARISON: Chest x-ray obtained the same day. HISTORY: ORDERING SYSTEM PROVIDED HISTORY: PE TECHNOLOGIST PROVIDED HISTORY: Reason for exam:->PE What reading provider will be dictating this exam?->CRC FINDINGS: Aorta: Atherosclerotic disease visualized in the thoracic aorta, no evidence of thoracic aortic aneurysm or dissection. No acute abnormality of the aorta. Four vessels aortic arch is seen. Mediastinum: Scattered hilar and mediastinal lymphadenopathy is seen. The heart and pericardium demonstrate no acute abnormality. Lungs/Pleura: Emphysematous changes visualized in bilateral lung fields. Linear streaky opacities and scarring seen. Bronchial wall thickening is visualized. No focal consolidation or pulmonary edema. No evidence of pleural effusion or pneumothorax. Upper Abdomen: Limited images of the upper abdomen are unremarkable. Soft Tissues/Bones: Degenerative bone changes. No acute bone or soft tissue abnormality. No evidence of pulmonary embolism is visualized. No evidence of acute cardiopulmonary disease is seen. XR CHEST PORTABLE    Result Date: 2/3/2023  EXAMINATION: ONE XRAY VIEW OF THE CHEST 2/3/2023 1:48 am COMPARISON: None. HISTORY: ORDERING SYSTEM PROVIDED HISTORY: CP TECHNOLOGIST PROVIDED HISTORY: Reason for exam:->CP What reading provider will be dictating this exam?->CRC FINDINGS: Normal cardiomediastinal silhouette. Lungs clear. No pneumothorax or effusion. Osseous thorax intact. No acute disease.  RECOMMENDATION: Careful clinical correlation and follow up recommended. Assessment and Plan     74 y.o. male with PMH of CAD s/p PCI to RCA in 12/2020, GI bleed in 12/2020, HTN, DM2, tobacco use, anxiety who presented with chest pain.     Chest pain  - s/p C with PCI on 2/3  - management per primary service    Transient encephalopathy / fever  - in the postprocedural setting  - CT head was negative for acute findings,   - viral respiratory panel, CXR and U/a were negative,   - blood cultures pending,   - UDS was positive for opiates and benzo, unclear if he received anything during the procedure  - clinically improved  - follow blood cultures    Nausea   - symptomatic therapy    DM2 with hyperglycemia  - home meds resumed by primary  - added ISS, hypoglycemia protocol    HTN  - management per primary service    Anemia   - follow H/H    Anxiety  - continue Laura Broderick MD, MD  Hospitalist

## 2023-02-04 ENCOUNTER — APPOINTMENT (OUTPATIENT)
Dept: GENERAL RADIOLOGY | Age: 73
DRG: 247 | End: 2023-02-04
Payer: MEDICARE

## 2023-02-04 LAB
ADENOVIRUS BY PCR: NOT DETECTED
AMPHETAMINE SCREEN, URINE: ABNORMAL
ANION GAP SERPL CALCULATED.3IONS-SCNC: 9 MEQ/L (ref 9–15)
ANION GAP SERPL CALCULATED.3IONS-SCNC: 9 MEQ/L (ref 9–15)
BACTERIA: NEGATIVE /HPF
BARBITURATE SCREEN URINE: ABNORMAL
BENZODIAZEPINE SCREEN, URINE: POSITIVE
BILIRUBIN URINE: NEGATIVE
BLOOD, URINE: ABNORMAL
BORDETELLA PARAPERTUSSIS BY PCR: NOT DETECTED
BORDETELLA PERTUSSIS BY PCR: NOT DETECTED
BUN BLDV-MCNC: 12 MG/DL (ref 8–23)
BUN BLDV-MCNC: 12 MG/DL (ref 8–23)
CALCIUM SERPL-MCNC: 8.2 MG/DL (ref 8.5–9.9)
CALCIUM SERPL-MCNC: 8.4 MG/DL (ref 8.5–9.9)
CANNABINOID SCREEN URINE: ABNORMAL
CHLAMYDOPHILIA PNEUMONIAE BY PCR: NOT DETECTED
CHLORIDE BLD-SCNC: 105 MEQ/L (ref 95–107)
CHLORIDE BLD-SCNC: 106 MEQ/L (ref 95–107)
CHOLESTEROL, TOTAL: 122 MG/DL (ref 0–199)
CLARITY: CLEAR
CO2: 21 MEQ/L (ref 20–31)
CO2: 23 MEQ/L (ref 20–31)
COCAINE METABOLITE SCREEN URINE: ABNORMAL
COLOR: YELLOW
CORONAVIRUS 229E BY PCR: NOT DETECTED
CORONAVIRUS HKU1 BY PCR: NOT DETECTED
CORONAVIRUS NL63 BY PCR: NOT DETECTED
CORONAVIRUS OC43 BY PCR: NOT DETECTED
CREAT SERPL-MCNC: 1.28 MG/DL (ref 0.7–1.2)
CREAT SERPL-MCNC: 1.28 MG/DL (ref 0.7–1.2)
EPITHELIAL CELLS, UA: NORMAL /HPF (ref 0–5)
FENTANYL SCREEN, URINE: POSITIVE
GFR SERPL CREATININE-BSD FRML MDRD: 58.5 ML/MIN/{1.73_M2}
GFR SERPL CREATININE-BSD FRML MDRD: 58.5 ML/MIN/{1.73_M2}
GLUCOSE BLD-MCNC: 147 MG/DL (ref 70–99)
GLUCOSE BLD-MCNC: 161 MG/DL (ref 70–99)
GLUCOSE BLD-MCNC: 195 MG/DL (ref 70–99)
GLUCOSE BLD-MCNC: 200 MG/DL (ref 70–99)
GLUCOSE BLD-MCNC: 208 MG/DL (ref 70–99)
GLUCOSE URINE: 500 MG/DL
HCT VFR BLD CALC: 30.2 % (ref 42–52)
HCT VFR BLD CALC: 30.7 % (ref 42–52)
HDLC SERPL-MCNC: 31 MG/DL (ref 40–59)
HEMOGLOBIN: 10.4 G/DL (ref 14–18)
HEMOGLOBIN: 10.6 G/DL (ref 14–18)
HUMAN METAPNEUMOVIRUS BY PCR: NOT DETECTED
HUMAN RHINOVIRUS/ENTEROVIRUS BY PCR: NOT DETECTED
HYALINE CASTS: NORMAL /HPF (ref 0–5)
INFLUENZA A BY PCR: NOT DETECTED
INFLUENZA B BY PCR: NOT DETECTED
KETONES, URINE: 15 MG/DL
LACTIC ACID: 1.6 MMOL/L (ref 0.5–2.2)
LACTIC ACID: 1.9 MMOL/L (ref 0.5–2.2)
LDL CHOLESTEROL CALCULATED: 52 MG/DL (ref 0–129)
LEUKOCYTE ESTERASE, URINE: NEGATIVE
Lab: ABNORMAL
MAGNESIUM: 1.6 MG/DL (ref 1.7–2.4)
MCH RBC QN AUTO: 31.4 PG (ref 27–31.3)
MCH RBC QN AUTO: 31.6 PG (ref 27–31.3)
MCHC RBC AUTO-ENTMCNC: 34.4 % (ref 33–37)
MCHC RBC AUTO-ENTMCNC: 34.5 % (ref 33–37)
MCV RBC AUTO: 91.1 FL (ref 79–92.2)
MCV RBC AUTO: 91.7 FL (ref 79–92.2)
METHADONE SCREEN, URINE: ABNORMAL
MYCOPLASMA PNEUMONIAE BY PCR: NOT DETECTED
NITRITE, URINE: NEGATIVE
OPIATE SCREEN URINE: POSITIVE
OXYCODONE URINE: ABNORMAL
PARAINFLUENZA VIRUS 1 BY PCR: NOT DETECTED
PARAINFLUENZA VIRUS 2 BY PCR: NOT DETECTED
PARAINFLUENZA VIRUS 3 BY PCR: NOT DETECTED
PARAINFLUENZA VIRUS 4 BY PCR: NOT DETECTED
PDW BLD-RTO: 14.6 % (ref 11.5–14.5)
PDW BLD-RTO: 14.7 % (ref 11.5–14.5)
PERFORMED ON: ABNORMAL
PH UA: 5.5 (ref 5–9)
PHENCYCLIDINE SCREEN URINE: ABNORMAL
PLATELET # BLD: 212 K/UL (ref 130–400)
PLATELET # BLD: 235 K/UL (ref 130–400)
POTASSIUM SERPL-SCNC: 4.2 MEQ/L (ref 3.4–4.9)
POTASSIUM SERPL-SCNC: 4.6 MEQ/L (ref 3.4–4.9)
PROPOXYPHENE SCREEN: ABNORMAL
PROTEIN UA: 100 MG/DL
RBC # BLD: 3.3 M/UL (ref 4.7–6.1)
RBC # BLD: 3.37 M/UL (ref 4.7–6.1)
RBC UA: NORMAL /HPF (ref 0–5)
RESPIRATORY SYNCYTIAL VIRUS BY PCR: NOT DETECTED
SARS-COV-2, PCR: NOT DETECTED
SODIUM BLD-SCNC: 136 MEQ/L (ref 135–144)
SODIUM BLD-SCNC: 137 MEQ/L (ref 135–144)
SPECIFIC GRAVITY UA: 1.03 (ref 1–1.03)
TRIGL SERPL-MCNC: 196 MG/DL (ref 0–150)
TROPONIN: 0.02 NG/ML (ref 0–0.01)
UROBILINOGEN, URINE: 0.2 E.U./DL
WBC # BLD: 6.6 K/UL (ref 4.8–10.8)
WBC # BLD: 7 K/UL (ref 4.8–10.8)
WBC UA: NORMAL /HPF (ref 0–5)

## 2023-02-04 PROCEDURE — 2700000000 HC OXYGEN THERAPY PER DAY

## 2023-02-04 PROCEDURE — 71045 X-RAY EXAM CHEST 1 VIEW: CPT

## 2023-02-04 PROCEDURE — 85027 COMPLETE CBC AUTOMATED: CPT

## 2023-02-04 PROCEDURE — 84484 ASSAY OF TROPONIN QUANT: CPT

## 2023-02-04 PROCEDURE — 80048 BASIC METABOLIC PNL TOTAL CA: CPT

## 2023-02-04 PROCEDURE — 2580000003 HC RX 258: Performed by: INTERNAL MEDICINE

## 2023-02-04 PROCEDURE — 81001 URINALYSIS AUTO W/SCOPE: CPT

## 2023-02-04 PROCEDURE — 2060000000 HC ICU INTERMEDIATE R&B

## 2023-02-04 PROCEDURE — 83735 ASSAY OF MAGNESIUM: CPT

## 2023-02-04 PROCEDURE — 93005 ELECTROCARDIOGRAM TRACING: CPT | Performed by: INTERNAL MEDICINE

## 2023-02-04 PROCEDURE — 0202U NFCT DS 22 TRGT SARS-COV-2: CPT

## 2023-02-04 PROCEDURE — 36415 COLL VENOUS BLD VENIPUNCTURE: CPT

## 2023-02-04 PROCEDURE — 2580000003 HC RX 258: Performed by: PHYSICIAN ASSISTANT

## 2023-02-04 PROCEDURE — 80307 DRUG TEST PRSMV CHEM ANLYZR: CPT

## 2023-02-04 PROCEDURE — 6370000000 HC RX 637 (ALT 250 FOR IP): Performed by: PHYSICIAN ASSISTANT

## 2023-02-04 PROCEDURE — 83605 ASSAY OF LACTIC ACID: CPT

## 2023-02-04 PROCEDURE — 87040 BLOOD CULTURE FOR BACTERIA: CPT

## 2023-02-04 PROCEDURE — 80061 LIPID PANEL: CPT

## 2023-02-04 PROCEDURE — 6370000000 HC RX 637 (ALT 250 FOR IP): Performed by: INTERNAL MEDICINE

## 2023-02-04 PROCEDURE — 99232 SBSQ HOSP IP/OBS MODERATE 35: CPT | Performed by: INTERNAL MEDICINE

## 2023-02-04 PROCEDURE — 6360000002 HC RX W HCPCS: Performed by: INTERNAL MEDICINE

## 2023-02-04 RX ORDER — INSULIN LISPRO 100 [IU]/ML
0-4 INJECTION, SOLUTION INTRAVENOUS; SUBCUTANEOUS
Status: DISCONTINUED | OUTPATIENT
Start: 2023-02-04 | End: 2023-02-07 | Stop reason: HOSPADM

## 2023-02-04 RX ORDER — INSULIN LISPRO 100 [IU]/ML
0-4 INJECTION, SOLUTION INTRAVENOUS; SUBCUTANEOUS NIGHTLY
Status: DISCONTINUED | OUTPATIENT
Start: 2023-02-04 | End: 2023-02-07 | Stop reason: HOSPADM

## 2023-02-04 RX ORDER — DEXTROSE MONOHYDRATE 100 MG/ML
INJECTION, SOLUTION INTRAVENOUS CONTINUOUS PRN
Status: DISCONTINUED | OUTPATIENT
Start: 2023-02-04 | End: 2023-02-07 | Stop reason: HOSPADM

## 2023-02-04 RX ADMIN — ATORVASTATIN CALCIUM 80 MG: 80 TABLET, FILM COATED ORAL at 20:44

## 2023-02-04 RX ADMIN — Medication 400 MG: at 09:45

## 2023-02-04 RX ADMIN — HYDRALAZINE HYDROCHLORIDE 50 MG: 50 TABLET, FILM COATED ORAL at 09:44

## 2023-02-04 RX ADMIN — ACETAMINOPHEN 650 MG: 325 TABLET ORAL at 06:46

## 2023-02-04 RX ADMIN — CLOPIDOGREL BISULFATE 75 MG: 75 TABLET ORAL at 09:44

## 2023-02-04 RX ADMIN — HYDRALAZINE HYDROCHLORIDE 10 MG: 20 INJECTION INTRAMUSCULAR; INTRAVENOUS at 04:27

## 2023-02-04 RX ADMIN — Medication 10 ML: at 09:00

## 2023-02-04 RX ADMIN — HYDRALAZINE HYDROCHLORIDE 50 MG: 50 TABLET, FILM COATED ORAL at 20:44

## 2023-02-04 RX ADMIN — PANTOPRAZOLE SODIUM 40 MG: 40 TABLET, DELAYED RELEASE ORAL at 06:46

## 2023-02-04 RX ADMIN — SODIUM CHLORIDE, PRESERVATIVE FREE 10 ML: 5 INJECTION INTRAVENOUS at 20:44

## 2023-02-04 RX ADMIN — GLIPIZIDE 10 MG: 5 TABLET ORAL at 06:46

## 2023-02-04 RX ADMIN — ENOXAPARIN SODIUM 40 MG: 100 INJECTION SUBCUTANEOUS at 09:44

## 2023-02-04 RX ADMIN — LOSARTAN POTASSIUM 25 MG: 25 TABLET, FILM COATED ORAL at 09:44

## 2023-02-04 RX ADMIN — HYDRALAZINE HYDROCHLORIDE 10 MG: 20 INJECTION INTRAMUSCULAR; INTRAVENOUS at 18:30

## 2023-02-04 RX ADMIN — GLIPIZIDE 10 MG: 5 TABLET ORAL at 18:26

## 2023-02-04 RX ADMIN — ACETAMINOPHEN 650 MG: 325 TABLET ORAL at 20:43

## 2023-02-04 RX ADMIN — CARVEDILOL 6.25 MG: 6.25 TABLET, FILM COATED ORAL at 18:26

## 2023-02-04 RX ADMIN — PAROXETINE HYDROCHLORIDE 40 MG: 20 TABLET, FILM COATED ORAL at 09:43

## 2023-02-04 RX ADMIN — CARVEDILOL 6.25 MG: 6.25 TABLET, FILM COATED ORAL at 09:44

## 2023-02-04 RX ADMIN — ASPIRIN 81 MG: 81 TABLET, CHEWABLE ORAL at 09:43

## 2023-02-04 ASSESSMENT — PAIN DESCRIPTION - DESCRIPTORS
DESCRIPTORS: ACHING
DESCRIPTORS: ACHING

## 2023-02-04 ASSESSMENT — PAIN DESCRIPTION - LOCATION
LOCATION: HEAD
LOCATION: HEAD

## 2023-02-04 ASSESSMENT — PAIN - FUNCTIONAL ASSESSMENT: PAIN_FUNCTIONAL_ASSESSMENT: PREVENTS OR INTERFERES SOME ACTIVE ACTIVITIES AND ADLS

## 2023-02-04 ASSESSMENT — PAIN DESCRIPTION - PAIN TYPE
TYPE: ACUTE PAIN
TYPE: ACUTE PAIN

## 2023-02-04 ASSESSMENT — PAIN SCALES - GENERAL
PAINLEVEL_OUTOF10: 4
PAINLEVEL_OUTOF10: 6

## 2023-02-04 NOTE — PLAN OF CARE
Problem: Safety - Adult  Goal: Free from fall injury  Outcome: Not Progressing  Flowsheets (Taken 2/4/2023 0456)  Free From Fall Injury: Instruct family/caregiver on patient safety     Problem: Confusion  Goal: Confusion, delirium, dementia, or psychosis is improved or at baseline  Description: INTERVENTIONS:  1. Assess for possible contributors to thought disturbance, including medications, impaired vision or hearing, underlying metabolic abnormalities, dehydration, psychiatric diagnoses, and notify attending LIP  2. [place] high risk fall precautions, as indicated  3. Provide frequent short contacts to provide reality reorientation, refocusing and direction  4. Decrease environmental stimuli, including noise as appropriate  5. Monitor and intervene to maintain adequate nutrition, hydration, elimination, sleep and activity  6. If unable to ensure safety without constant attention obtain sitter and review sitter guidelines with assigned personnel  7.  Initiate Psychosocial CNS and Spiritual Care consult, as indicated  Outcome: Not Progressing     Problem: Discharge Planning  Goal: Discharge to home or other facility with appropriate resources  Outcome: Progressing  Flowsheets (Taken 2/3/2023 1954)  Discharge to home or other facility with appropriate resources:   Identify barriers to discharge with patient and caregiver   Identify discharge learning needs (meds, wound care, etc)     Problem: Pain  Goal: Verbalizes/displays adequate comfort level or baseline comfort level  Outcome: Progressing  Flowsheets  Taken 2/3/2023 1810 by Reta Carrel, RN  Verbalizes/displays adequate comfort level or baseline comfort level: Encourage patient to monitor pain and request assistance  Taken 2/3/2023 1516 by Reta Carrel, RN  Verbalizes/displays adequate comfort level or baseline comfort level: Encourage patient to monitor pain and request assistance     Problem: Safety - Adult  Goal: Free from fall injury  Outcome: Not Progressing  Flowsheets (Taken 2/4/2023 0086)  Free From Fall Injury: Instruct family/caregiver on patient safety     Problem: Confusion  Goal: Confusion, delirium, dementia, or psychosis is improved or at baseline  Description: INTERVENTIONS:  1. Assess for possible contributors to thought disturbance, including medications, impaired vision or hearing, underlying metabolic abnormalities, dehydration, psychiatric diagnoses, and notify attending LIP  2. [place] high risk fall precautions, as indicated  3. Provide frequent short contacts to provide reality reorientation, refocusing and direction  4. Decrease environmental stimuli, including noise as appropriate  5. Monitor and intervene to maintain adequate nutrition, hydration, elimination, sleep and activity  6. If unable to ensure safety without constant attention obtain sitter and review sitter guidelines with assigned personnel  7.  Initiate Psychosocial CNS and Spiritual Care consult, as indicated  Outcome: Not Progressing

## 2023-02-04 NOTE — PROGRESS NOTES
Cardiology Follow up Note    Subjective:  2/3/23: 74 y.o. old male patient with history of CAD status post PCI of the RCA in 2020, hypertension, dyslipidemia, diabetes, former tobacco abuse, COPD and obesity who presented to ER early this morning with chief complaint of chest pain. Over the past month or so, patient has been experiencing intermittent chest pain which he describes as poking/sharp discomfort that would occur intermittently sometimes waking him from sleep at night but also occasionally with moderate exertion. Early this morning, patient states that he was awakened from sleep with sharp stabbing discomfort which radiated into his back as well as his neck and jaw. He reports fairly persistent nausea over the past few days but not necessarily associated with chest pain episodes. He denies shortness of breath or dyspnea on exertion, orthopnea, PND, dizziness, lightheadedness, syncope, fever or chills. Due to the severe pain early this morning he presented to ER for further evaluation. Went to cardiac catheterization yesterday with Dr. Cleo Talley, status post RED. He couldn't be discharged home yesterday because he had sedation and needed to go home in a taxi. 2/24/23: Overnight events noted. Notified he had some exertional dyspnea with concern for fluid overload so his IVF was stopped. CXR ordered but no overt CHF. Fever 101.3 F last night. Currently feels better. No CP or SOB. He reports feeling \"hot\" at times but no fevers/chills. IM consulted for possible sepsis/SIRS work up. Review of Systems:   General: Fatigued. Cardiovascular: See HPI. No orthopnea or PND. Respiratory: Dyspnea is present with mild degrees of activity. Gastrointestinal: No melena or hematochezia. + nausea   Genitourinary: No hematuria. Hematological: No easy bruising or bleeding. Vascular: No lower extremity edema or claudication. Neurological: No TIA or CVA symptoms. No paresthesias.    Musculoskeletal: No chest wall pain. Psychiatric: + anxiety. *All other systems were reviewed and found to be negative unless otherwise noted in the HPI*    Objective:  BP (!) 168/71   Pulse 97   Temp 98.8 °F (37.1 °C) (Oral)   Resp 18   Ht 5' 6\" (1.676 m)   Wt 170 lb (77.1 kg)   SpO2 98%   BMI 27.44 kg/m²   Vitals stable. Constitutional: alert, cooperative, in no distress  Eyes: Conjunctiva clear; no scleral icturus. PERRLA   Respiratory: clear to auscultation bilaterally  Musculoskeletal: No chest wall tenderness. No clubbing or cyanosis. Cardiovascular: regular rate and rhythm, S1, S2 normal, no murmur, click, rub or gallop. No carotid bruit. No JVD. Pulses 2+ and symmetric. GI: soft, non-tender, non-distended. Bowel sounds normal. No masses,  no organomegaly  MSK: extremities normal, atraumatic, no clubbing. No chest wall pain. Neurologic: Grossly normal  Skin: No rashes or lesions. No cyanosis.        Intake/Output Summary (Last 24 hours) at 2/4/2023 1706  Last data filed at 2/4/2023 5613  Gross per 24 hour   Intake 520 ml   Output 825 ml   Net -305 ml       Medications:  insulin lispro, 0-4 Units, TID WC  insulin lispro, 0-4 Units, Nightly  sodium chloride flush, 5-40 mL, 2 times per day  aspirin, 81 mg, Daily  atorvastatin, 80 mg, Nightly  enoxaparin, 40 mg, Daily  clopidogrel, 75 mg, Daily  glipiZIDE, 10 mg, BID AC  losartan, 25 mg, Daily  pantoprazole, 40 mg, QAM AC  PARoxetine, 40 mg, Daily  magnesium oxide, 400 mg, Daily  sodium chloride flush, 5-40 mL, 2 times per day  sodium chloride flush, 5-40 mL, 2 times per day  carvedilol, 6.25 mg, BID WC  hydrALAZINE, 50 mg, BID      dextrose  sodium chloride  sodium chloride  sodium chloride      Recent Labs     02/03/23  0130 02/03/23  0141 02/03/23  0430 02/04/23  0451 02/04/23  0452 02/04/23  0620 02/04/23  0621   HGB 10.8*  --   --   --  10.4*  --  10.6*   WBC 6.4  --   --   --  6.6  --  7.0     --   --   --  212  --  235     --   --  136  --  137  -- K 4.5  --   --  4.2  --  4.6  --    CO2 22  --   --  21  --  23  --    BUN 13  --   --  12  --  12  --    MG 1.4*  --   --  1.6*  --   --   --    INR  --  0.9  --   --   --   --   --    TROPONINI <0.010  --  <0.010 0.024*  --   --   --    CKTOTAL 229*  --   --   --   --   --   --      Cr 1.28     Last EKG: My independent review reveals sinus rhythm with nonspecific ST changes    Telemetry: Independent review reveals sinus rhythm to date. 2D Echo: 2/3/23   Normal left ventricular systolic function, no regional wall motion   abnormalities, estimated ejection fraction of 60%. Mild concentric left ventricular hypertrophy. Pseudonormal filling pattern noted. Normal right ventricular chamber size and function. Mild aortic regurgitation is noted. There is moderate tricuspid regurgitation with estimated RVSP of 50 mm Hg. Right ventricular systolic pressure of 50 mm Hg consistent with moderate   pulmonary hypertension. Assessment:  Angina pectoralis, now resolved. Stable CAD status post repeat PCI. Hx CAD with prior stents  HTN  DM, uncontrolled  COPD  CKD  Obesity  ?sepsis/fever     Recommendations:  Cardiac status stable. Continue aspirin and Plavix. Sepsis work up in progress. Negative to date. BC pending. Possible home tomorrow if afebrile and cleared by IM. Thank you for allowing me to participate in your patient's cardiac care. Should you have questions, please do not hesitate to contact me.      Abiel Hodgson DO, [facility] [address] [facility]

## 2023-02-04 NOTE — PROGRESS NOTES
Pt w/ SOB and dyspnea at 0420. Fine crackles to bilateral bases. Fluids stopped, MD notified. Improved mentation. Pt intermittently impulsive, VMT remains in place for Pt safety. Oriented X2-4. NIHSS 2 initially. CT head completed. HAQ w/ equal strength. Rt cath site I/C/D w/o hematoma. Distal pulses intact. Denies CP. SR/ST 90-110s. -195, PRN hydralazine given X1. Febrile, Tmax 101.3, MD notified of Sepsis/SIRIS criteria, pending orders. 2 L NC, external dyspnea. Voiding. Urine sample sent. Skin intact. Ataxia, unsteady gait. Would benefit from PT/Ot evaluation. Plan: Cardiac Rehab. Monitor neuro status. Sepsis workup initiated 0545: Blood cultures X2, lactate, respiratory viral panel, and UA obtained.

## 2023-02-05 LAB
ANION GAP SERPL CALCULATED.3IONS-SCNC: 12 MEQ/L (ref 9–15)
BUN BLDV-MCNC: 14 MG/DL (ref 8–23)
CALCIUM SERPL-MCNC: 8.6 MG/DL (ref 8.5–9.9)
CHLORIDE BLD-SCNC: 103 MEQ/L (ref 95–107)
CO2: 20 MEQ/L (ref 20–31)
CREAT SERPL-MCNC: 1.23 MG/DL (ref 0.7–1.2)
GFR SERPL CREATININE-BSD FRML MDRD: >60 ML/MIN/{1.73_M2}
GLUCOSE BLD-MCNC: 125 MG/DL (ref 70–99)
GLUCOSE BLD-MCNC: 165 MG/DL (ref 70–99)
GLUCOSE BLD-MCNC: 177 MG/DL (ref 70–99)
GLUCOSE BLD-MCNC: 215 MG/DL (ref 70–99)
GLUCOSE BLD-MCNC: 238 MG/DL (ref 70–99)
HCT VFR BLD CALC: 27.7 % (ref 42–52)
HEMOGLOBIN: 9.7 G/DL (ref 14–18)
MCH RBC QN AUTO: 31.9 PG (ref 27–31.3)
MCHC RBC AUTO-ENTMCNC: 35.1 % (ref 33–37)
MCV RBC AUTO: 91 FL (ref 79–92.2)
PDW BLD-RTO: 15.1 % (ref 11.5–14.5)
PERFORMED ON: ABNORMAL
PLATELET # BLD: 220 K/UL (ref 130–400)
POTASSIUM SERPL-SCNC: 3.9 MEQ/L (ref 3.4–4.9)
RBC # BLD: 3.05 M/UL (ref 4.7–6.1)
SODIUM BLD-SCNC: 135 MEQ/L (ref 135–144)
WBC # BLD: 5.8 K/UL (ref 4.8–10.8)

## 2023-02-05 PROCEDURE — 6370000000 HC RX 637 (ALT 250 FOR IP): Performed by: INTERNAL MEDICINE

## 2023-02-05 PROCEDURE — 6370000000 HC RX 637 (ALT 250 FOR IP): Performed by: PHYSICIAN ASSISTANT

## 2023-02-05 PROCEDURE — 36415 COLL VENOUS BLD VENIPUNCTURE: CPT

## 2023-02-05 PROCEDURE — 2060000000 HC ICU INTERMEDIATE R&B

## 2023-02-05 PROCEDURE — 85027 COMPLETE CBC AUTOMATED: CPT

## 2023-02-05 PROCEDURE — 99231 SBSQ HOSP IP/OBS SF/LOW 25: CPT | Performed by: INTERNAL MEDICINE

## 2023-02-05 PROCEDURE — 80048 BASIC METABOLIC PNL TOTAL CA: CPT

## 2023-02-05 PROCEDURE — 2580000003 HC RX 258: Performed by: INTERNAL MEDICINE

## 2023-02-05 PROCEDURE — 6360000002 HC RX W HCPCS: Performed by: INTERNAL MEDICINE

## 2023-02-05 PROCEDURE — 2700000000 HC OXYGEN THERAPY PER DAY

## 2023-02-05 RX ORDER — HYDRALAZINE HYDROCHLORIDE 20 MG/ML
10 INJECTION INTRAMUSCULAR; INTRAVENOUS EVERY 6 HOURS PRN
Status: DISCONTINUED | OUTPATIENT
Start: 2023-02-05 | End: 2023-02-07 | Stop reason: HOSPADM

## 2023-02-05 RX ADMIN — GLIPIZIDE 10 MG: 5 TABLET ORAL at 07:03

## 2023-02-05 RX ADMIN — HYDRALAZINE HYDROCHLORIDE 50 MG: 50 TABLET, FILM COATED ORAL at 09:18

## 2023-02-05 RX ADMIN — ENOXAPARIN SODIUM 40 MG: 100 INJECTION SUBCUTANEOUS at 09:19

## 2023-02-05 RX ADMIN — ACETAMINOPHEN 650 MG: 325 TABLET ORAL at 20:29

## 2023-02-05 RX ADMIN — CARVEDILOL 6.25 MG: 6.25 TABLET, FILM COATED ORAL at 16:21

## 2023-02-05 RX ADMIN — CARVEDILOL 6.25 MG: 6.25 TABLET, FILM COATED ORAL at 09:22

## 2023-02-05 RX ADMIN — ASPIRIN 81 MG: 81 TABLET, CHEWABLE ORAL at 09:18

## 2023-02-05 RX ADMIN — ATORVASTATIN CALCIUM 80 MG: 80 TABLET, FILM COATED ORAL at 20:30

## 2023-02-05 RX ADMIN — Medication 400 MG: at 09:19

## 2023-02-05 RX ADMIN — GLIPIZIDE 10 MG: 5 TABLET ORAL at 16:21

## 2023-02-05 RX ADMIN — SODIUM CHLORIDE, PRESERVATIVE FREE 10 ML: 5 INJECTION INTRAVENOUS at 09:20

## 2023-02-05 RX ADMIN — LOSARTAN POTASSIUM 25 MG: 25 TABLET, FILM COATED ORAL at 09:18

## 2023-02-05 RX ADMIN — HYDRALAZINE HYDROCHLORIDE 50 MG: 50 TABLET, FILM COATED ORAL at 20:30

## 2023-02-05 RX ADMIN — CLOPIDOGREL BISULFATE 75 MG: 75 TABLET ORAL at 09:18

## 2023-02-05 RX ADMIN — PANTOPRAZOLE SODIUM 40 MG: 40 TABLET, DELAYED RELEASE ORAL at 07:03

## 2023-02-05 RX ADMIN — SODIUM CHLORIDE, PRESERVATIVE FREE 10 ML: 5 INJECTION INTRAVENOUS at 20:30

## 2023-02-05 RX ADMIN — PAROXETINE HYDROCHLORIDE 40 MG: 20 TABLET, FILM COATED ORAL at 09:18

## 2023-02-05 ASSESSMENT — PAIN DESCRIPTION - PAIN TYPE: TYPE: ACUTE PAIN

## 2023-02-05 ASSESSMENT — PAIN SCALES - GENERAL: PAINLEVEL_OUTOF10: 4

## 2023-02-05 ASSESSMENT — PAIN DESCRIPTION - LOCATION: LOCATION: HEAD

## 2023-02-05 ASSESSMENT — PAIN DESCRIPTION - DESCRIPTORS: DESCRIPTORS: ACHING

## 2023-02-05 NOTE — PLAN OF CARE
Problem: Discharge Planning  Goal: Discharge to home or other facility with appropriate resources  Outcome: Progressing     Problem: Pain  Goal: Verbalizes/displays adequate comfort level or baseline comfort level  Outcome: Progressing     Problem: Safety - Adult  Goal: Free from fall injury  Outcome: Progressing     Problem: Confusion  Goal: Confusion, delirium, dementia, or psychosis is improved or at baseline  Description: INTERVENTIONS:  1. Assess for possible contributors to thought disturbance, including medications, impaired vision or hearing, underlying metabolic abnormalities, dehydration, psychiatric diagnoses, and notify attending LIP  2. [place] high risk fall precautions, as indicated  3. Provide frequent short contacts to provide reality reorientation, refocusing and direction  4. Decrease environmental stimuli, including noise as appropriate  5. Monitor and intervene to maintain adequate nutrition, hydration, elimination, sleep and activity  6. If unable to ensure safety without constant attention obtain sitter and review sitter guidelines with assigned personnel  7.  Initiate Psychosocial CNS and Spiritual Care consult, as indicated  Outcome: Progressing  Flowsheets (Taken 2/4/2023 2032)  Effect of thought disturbance (confusion, delirium, dementia, or psychosis) are managed with adequate functional status: Provide frequent short contacts to provide reality reorientation, refocusing and direction

## 2023-02-05 NOTE — PROGRESS NOTES
Cardiology Follow up Note    Subjective:  2/3/23: 74 y.o. old male patient with history of CAD status post PCI of the RCA in 2020, hypertension, dyslipidemia, diabetes, former tobacco abuse, COPD and obesity who presented to ER early this morning with chief complaint of chest pain. Over the past month or so, patient has been experiencing intermittent chest pain which he describes as poking/sharp discomfort that would occur intermittently sometimes waking him from sleep at night but also occasionally with moderate exertion. Early this morning, patient states that he was awakened from sleep with sharp stabbing discomfort which radiated into his back as well as his neck and jaw. He reports fairly persistent nausea over the past few days but not necessarily associated with chest pain episodes. He denies shortness of breath or dyspnea on exertion, orthopnea, PND, dizziness, lightheadedness, syncope, fever or chills. Due to the severe pain early this morning he presented to ER for further evaluation. Went to cardiac catheterization yesterday with Dr. Brady Huston, status post RED. He couldn't be discharged home yesterday because he had sedation and needed to go home in a taxi. 2/4/23: Overnight events noted. Notified he had some exertional dyspnea with concern for fluid overload so his IVF was stopped. CXR ordered but no overt CHF. Fever 101.3 F last night. Currently feels better. No CP or SOB. He reports feeling \"hot\" at times but no fevers/chills. IM consulted for possible sepsis/SIRS work up. 2/5/23: Feeling better. No chest pain or shortness of breath overnight. No fevers overnight. Hemoglobin trending lower, 7.1 today      Review of Systems:   General: Fatigued. Cardiovascular: See HPI. No orthopnea or PND. Respiratory: Dyspnea is present with mild degrees of activity. Gastrointestinal: No melena or hematochezia. + nausea   Genitourinary: No hematuria. Hematological: No easy bruising or bleeding. Vascular: No lower extremity edema or claudication. Neurological: No TIA or CVA symptoms. No paresthesias. Musculoskeletal: No chest wall pain. Psychiatric: + anxiety. *All other systems were reviewed and found to be negative unless otherwise noted in the HPI*    Objective:  BP (!) 161/62   Pulse (!) 106   Temp 98.8 °F (37.1 °C) (Oral)   Resp 18   Ht 5' 6\" (1.676 m)   Wt 167 lb 4.8 oz (75.9 kg)   SpO2 93%   BMI 27.00 kg/m²   Vitals stable. Constitutional: alert, cooperative, in no distress  Eyes: Conjunctiva clear; no scleral icturus. PERRLA   Respiratory: clear to auscultation bilaterally  Musculoskeletal: No chest wall tenderness. No clubbing or cyanosis. Cardiovascular: regular rate and rhythm, S1, S2 normal, no murmur, click, rub or gallop. No carotid bruit. No JVD. Pulses 2+ and symmetric. GI: soft, non-tender, non-distended. Bowel sounds normal. No masses,  no organomegaly  MSK: extremities normal, atraumatic, no clubbing. No chest wall pain. Neurologic: Grossly normal  Skin: No rashes or lesions. No cyanosis.        Intake/Output Summary (Last 24 hours) at 2/5/2023 1542  Last data filed at 2/5/2023 0330  Gross per 24 hour   Intake 550 ml   Output 400 ml   Net 150 ml         Medications:  insulin lispro, 0-4 Units, TID WC  insulin lispro, 0-4 Units, Nightly  sodium chloride flush, 5-40 mL, 2 times per day  aspirin, 81 mg, Daily  atorvastatin, 80 mg, Nightly  enoxaparin, 40 mg, Daily  clopidogrel, 75 mg, Daily  glipiZIDE, 10 mg, BID AC  losartan, 25 mg, Daily  pantoprazole, 40 mg, QAM AC  PARoxetine, 40 mg, Daily  magnesium oxide, 400 mg, Daily  sodium chloride flush, 5-40 mL, 2 times per day  sodium chloride flush, 5-40 mL, 2 times per day  carvedilol, 6.25 mg, BID WC  hydrALAZINE, 50 mg, BID    dextrose  sodium chloride  sodium chloride  sodium chloride    Recent Labs     02/03/23  0130 02/03/23  0141 02/03/23  0430 02/04/23  0451 02/04/23  2218 02/04/23  0419 02/04/23  7418 02/05/23  0446   HGB 10.8*  --   --   --  10.4*  --  10.6* 9.7*   WBC 6.4  --   --   --  6.6  --  7.0 5.8     --   --   --  212  --  235 220     --   --  136  --  137  --  135   K 4.5  --   --  4.2  --  4.6  --  3.9   CO2 22  --   --  21  --  23  --  20   BUN 13  --   --  12  --  12  --  14   MG 1.4*  --   --  1.6*  --   --   --   --    INR  --  0.9  --   --   --   --   --   --    TROPONINI <0.010  --  <0.010 0.024*  --   --   --   --    CKTOTAL 229*  --   --   --   --   --   --   --        Cr 1.28     Last EKG: My independent review reveals sinus rhythm with nonspecific ST changes    Telemetry: Independent review reveals sinus rhythm to date. 2D Echo: 2/3/23   Normal left ventricular systolic function, no regional wall motion   abnormalities, estimated ejection fraction of 60%. Mild concentric left ventricular hypertrophy. Pseudonormal filling pattern noted. Normal right ventricular chamber size and function. Mild aortic regurgitation is noted. There is moderate tricuspid regurgitation with estimated RVSP of 50 mm Hg. Right ventricular systolic pressure of 50 mm Hg consistent with moderate   pulmonary hypertension. Assessment:  Angina pectoralis, now resolved. Stable CAD status post repeat PCI. Hx CAD with prior stents  HTN  DM, uncontrolled  COPD  CKD  Obesity  ?sepsis/fever -no further fevers. Follow blood cultures. Anemia. Recommendations:  Cardiac status stable. Continue aspirin and Plavix. Sepsis work up in progress. Negative to date. No further fevers. BC pending. Discussed with IM. If hemoglobin remains stable, possible discharge home tomorrow if afebrile. Thank you for allowing me to participate in your patient's cardiac care. Should you have questions, please do not hesitate to contact me.      Danish Loja DO, [facility] [place], [facility] [address] [facility] [address] [facility]

## 2023-02-05 NOTE — CARE COORDINATION
[facility] [place] Case Management Initial Discharge Assessment    If patient is discharged prior to next notation, then this note serves as note for discharge by case management. Met with Patient to discuss discharge plan. MET WITH PT. AT BEDSIDE. PT A/OX3. PCP: Gerda Garrison MD                                Date of Last Visit: ABOUT 1MO AGO    [facility] Patient: No        [facility] Notified: no    If no PCP, list provided? N/A    Discharge Planning    Living Arrangements: independently at home    Who do you live with? GIRLFRIEND    Who helps you with your care:  self    If lives at home:     Do you have any barriers navigating in your home? no    Patient can perform ADL? Yes    Current Services (outpatient and in home) :  None    Dialysis: No    Is transportation available to get to your appointments? Yes PT DRIVES OCCASIONALLY, HAS TRANSPORTATION    DME Equipment:  yes - GLUCOMETER, DM SUPPLIES    Respiratory equipment: None    Respiratory provider:  no     Pharmacy:  yes - Muna Rodgers with Medication Assistance Program?  No      Patient agreeable to [facility] ? Declined    Patient agreeable to SNF/Rehab? Declined    Other discharge needs identified? Cardiac Rehab    Does Patient Have a High-Risk for Readmission Diagnosis (CHF, PN, MI, COPD)? Yes    If Yes,    Consult with pulmonologist? No  Consult with cardiologist? Yes  Cardiac Rehab referral if EF <35%? Yes  Consult with Pharmacy for medication assessment prior to discharge? Yes  Consult with Behavioral health to aid in depression, anxiety, or coping issues? N/A  Palliative Care Consult? No  Pulmonary Rehab order for COPD, PN, and CHF (if EF > 35%)? N/A   Does patient have a reliable scale and know how to read it (for CHF)? N/A  Nutrition consult for CHF? N/A  Respiratory therapy consult that includes bedside instruction on administration of nebulizers and/or inhalers, and assessment of oxygen and equipment needs in the home?  No    Initial Discharge Plan? (Note: please see concurrent daily documentation for any updates after initial note). RETURN HOME W/GF. DENIES NEEDS. CARDIAC REHAB HAS MET WITH PT AT BEDSIDE.      Readmission Risk              Risk of Unplanned Readmission:  16         Electronically signed by Macrina Connelly RN on 2/5/2023 at 9:39 AM

## 2023-02-05 NOTE — PROGRESS NOTES
No acute events overnight. Video monitoring discontinued. Pt A/O X2-3. Appropriate and utilizing call light. HAQ w/ equal strength. RA lungs clear, diminished to bases. Continues to have some exertional dyspnea. SR on monitor. VSS. Ambulating assist X1 in room. Unsteady gait. Cath site from 2/4 I/C/D. CMS intact. Plan: Possible discharge today.

## 2023-02-05 NOTE — PROGRESS NOTES
Physician Progress Note      Cesar Bower  [facility] #:                  [id number]  :                       1950  ADMIT DATE:       2/3/2023 1:03 AM  DISCH DATE:  RESPONDING  PROVIDER #:        Emily Mckinley DO        QUERY TEXT:    Stage of Chronic Kidney Disease: Please provide further specificity, if known. Clinical indicators include: bun, creatinine, gfr, probnp, ckd, kd, cr  Options provided:  -- Chronic kidney disease stage 1  -- Chronic kidney disease stage 2  -- Chronic kidney disease stage 3  -- Chronic kidney disease stage 3a  -- Chronic kidney disease stage 3b  -- Chronic kidney disease stage 4  -- Chronic kidney disease stage 5  -- Chronic kidney disease stage 5, requiring dialysis  -- End stage renal disease  -- Other - I will add my own diagnosis  -- Disagree - Not applicable / Not valid  -- Disagree - Clinically Unable to determine / Unknown        PROVIDER RESPONSE TEXT:    Provider was unable to determine a response for this query.       Electronically signed by:  Emily Mckinley DO 2023 9:27 AM

## 2023-02-05 NOTE — PROGRESS NOTES
Hospitalist Progress Note      PCP: Alondra Wells MD    Date of Admission: 2/3/2023    Chief Complaint:  no acute events, afebrile, stable HD, on RA,  feels better, nausea improved, tolerating diet    Medications:  Reviewed    Infusion Medications    dextrose      sodium chloride      sodium chloride      sodium chloride       Scheduled Medications    insulin lispro  0-4 Units SubCUTAneous TID WC    insulin lispro  0-4 Units SubCUTAneous Nightly    sodium chloride flush  5-40 mL IntraVENous 2 times per day    aspirin  81 mg Oral Daily    atorvastatin  80 mg Oral Nightly    enoxaparin  40 mg SubCUTAneous Daily    clopidogrel  75 mg Oral Daily    glipiZIDE  10 mg Oral BID AC    losartan  25 mg Oral Daily    pantoprazole  40 mg Oral QAM AC    PARoxetine  40 mg Oral Daily    magnesium oxide  400 mg Oral Daily    sodium chloride flush  5-40 mL IntraVENous 2 times per day    sodium chloride flush  5-40 mL IntraVENous 2 times per day    carvedilol  6.25 mg Oral BID WC    hydrALAZINE  50 mg Oral BID     PRN Meds: glucose, dextrose bolus **OR** dextrose bolus, glucagon (rDNA), dextrose, sodium chloride flush, sodium chloride, ondansetron **OR** ondansetron, acetaminophen **OR** acetaminophen, polyethylene glycol, nitroGLYCERIN, sodium chloride flush, sodium chloride, sodium chloride flush, sodium chloride, acetaminophen, ondansetron, labetalol      Intake/Output Summary (Last 24 hours) at 2/5/2023 1126  Last data filed at 2/5/2023 0330  Gross per 24 hour   Intake 550 ml   Output 400 ml   Net 150 ml       Exam:    BP (!) 161/62   Pulse (!) 106   Temp 98.8 °F (37.1 °C) (Oral)   Resp 18   Ht 5' 6\" (1.676 m)   Wt 167 lb 4.8 oz (75.9 kg)   SpO2 93%   BMI 27.00 kg/m²     General appearance: alert, cooperative  Lungs: clear to auscultation bilaterally  Heart: S1/S2, RRR  Abdomen: soft, active BS  Extremities: no edema       Labs:   Recent Labs     02/04/23  0452 02/04/23  0621 02/05/23  0446   WBC 6.6 7.0 5.8   HGB 10.4* 10.6* 9.7*   HCT 30.2* 30.7* 27.7*    235 220     Recent Labs     02/04/23  0451 02/04/23  0620 02/05/23  0446    137 135   K 4.2 4.6 3.9    105 103   CO2 21 23 20   BUN 12 12 14   CREATININE 1.28* 1.28* 1.23*   CALCIUM 8.2* 8.4* 8.6     Recent Labs     02/03/23  0130   AST 12   ALT 10   BILITOT 0.3   ALKPHOS 70     Recent Labs     02/03/23  0141   INR 0.9     Recent Labs     02/03/23  0130 02/03/23  0430 02/04/23  0451   CKTOTAL 229*  --   --    TROPONINI <0.010 <0.010 0.024*       Urinalysis:      Lab Results   Component Value Date/Time    NITRU Negative 02/04/2023 06:02 AM    WBCUA 0-2 02/04/2023 06:02 AM    BACTERIA Negative 02/04/2023 06:02 AM    RBCUA 0-2 02/04/2023 06:02 AM    BLOODU TRACE 02/04/2023 06:02 AM    SPECGRAV 1.027 02/04/2023 06:02 AM    GLUCOSEU 500 02/04/2023 06:02 AM    GLUCOSEU NEG 04/11/2012 06:43 PM       Radiology:  XR CHEST PORTABLE   Final Result   Stable chest with mild chronic changes and no superimposed acute infiltrate. CT HEAD WO CONTRAST   Final Result   No acute intracranial abnormality. No significant change in diffuse volume loss and chronic small vessel   ischemic white matter change. CTA CHEST W WO CONTRAST PE Eval   Final Result   No evidence of pulmonary embolism is visualized. No evidence of acute cardiopulmonary disease is seen. XR CHEST PORTABLE   Final Result   No acute disease. RECOMMENDATION:   Careful clinical correlation and follow up recommended. Assessment/Plan:    74 y.o. male with PMH of CAD s/p PCI to RCA in 12/2020, GI bleed in 12/2020, HTN, DM2, tobacco use, anxiety who presented with chest pain.      Chest pain  - s/p C with PCI on 2/3  - management per primary service     Transient encephalopathy / fever  - in the postprocedural setting  - CT head was negative for acute findings,   - viral respiratory panel, CXR and U/a were negative,   - UDS was positive for opiates and benzo, review of anesthesia records showed that he received Fentanyl during the procedure  - clinically improved, no further fevers  - follow blood cultures     Nausea   - improved     DM2 with hyperglycemia  - home meds resumed by primary  - added ISS, hypoglycemia protocol     HTN  - management per primary service     Anemia   - recent baseline around 11  - follow H/H     Anxiety  - continue paxil       Diet: ADULT DIET;  Regular; Low Fat/Low Chol/High Fiber/AVANI    Code Status: Full Code                Electronically signed by Genie Felty, MD on 2/5/2023 at 11:26 AM

## 2023-02-06 LAB
ANION GAP SERPL CALCULATED.3IONS-SCNC: 8 MEQ/L (ref 9–15)
BUN BLDV-MCNC: 16 MG/DL (ref 8–23)
CALCIUM SERPL-MCNC: 8.6 MG/DL (ref 8.5–9.9)
CHLORIDE BLD-SCNC: 105 MEQ/L (ref 95–107)
CO2: 24 MEQ/L (ref 20–31)
CREAT SERPL-MCNC: 1.31 MG/DL (ref 0.7–1.2)
EKG ATRIAL RATE: 97 BPM
EKG P AXIS: 62 DEGREES
EKG P-R INTERVAL: 140 MS
EKG Q-T INTERVAL: 372 MS
EKG QRS DURATION: 82 MS
EKG QTC CALCULATION (BAZETT): 472 MS
EKG R AXIS: 37 DEGREES
EKG T AXIS: 1 DEGREES
EKG VENTRICULAR RATE: 97 BPM
GFR SERPL CREATININE-BSD FRML MDRD: 56.9 ML/MIN/{1.73_M2}
GLUCOSE BLD-MCNC: 124 MG/DL (ref 70–99)
GLUCOSE BLD-MCNC: 181 MG/DL (ref 70–99)
GLUCOSE BLD-MCNC: 198 MG/DL (ref 70–99)
GLUCOSE BLD-MCNC: 202 MG/DL (ref 70–99)
GLUCOSE BLD-MCNC: 203 MG/DL (ref 70–99)
HCT VFR BLD CALC: 27.8 % (ref 42–52)
HEMOGLOBIN: 9.5 G/DL (ref 14–18)
MCH RBC QN AUTO: 31.1 PG (ref 27–31.3)
MCHC RBC AUTO-ENTMCNC: 33.9 % (ref 33–37)
MCV RBC AUTO: 91.7 FL (ref 79–92.2)
PDW BLD-RTO: 14.7 % (ref 11.5–14.5)
PERFORMED ON: ABNORMAL
PLATELET # BLD: 205 K/UL (ref 130–400)
POTASSIUM SERPL-SCNC: 3.8 MEQ/L (ref 3.4–4.9)
RBC # BLD: 3.04 M/UL (ref 4.7–6.1)
SODIUM BLD-SCNC: 137 MEQ/L (ref 135–144)
WBC # BLD: 4.2 K/UL (ref 4.8–10.8)

## 2023-02-06 PROCEDURE — 93010 ELECTROCARDIOGRAM REPORT: CPT | Performed by: INTERNAL MEDICINE

## 2023-02-06 PROCEDURE — 85027 COMPLETE CBC AUTOMATED: CPT

## 2023-02-06 PROCEDURE — 6360000002 HC RX W HCPCS: Performed by: INTERNAL MEDICINE

## 2023-02-06 PROCEDURE — 2060000000 HC ICU INTERMEDIATE R&B

## 2023-02-06 PROCEDURE — 80048 BASIC METABOLIC PNL TOTAL CA: CPT

## 2023-02-06 PROCEDURE — 36415 COLL VENOUS BLD VENIPUNCTURE: CPT

## 2023-02-06 PROCEDURE — 6370000000 HC RX 637 (ALT 250 FOR IP): Performed by: PHYSICIAN ASSISTANT

## 2023-02-06 PROCEDURE — 6370000000 HC RX 637 (ALT 250 FOR IP): Performed by: INTERNAL MEDICINE

## 2023-02-06 PROCEDURE — 2580000003 HC RX 258: Performed by: INTERNAL MEDICINE

## 2023-02-06 RX ADMIN — Medication 10 ML: at 20:38

## 2023-02-06 RX ADMIN — PANTOPRAZOLE SODIUM 40 MG: 40 TABLET, DELAYED RELEASE ORAL at 06:49

## 2023-02-06 RX ADMIN — Medication 10 ML: at 09:00

## 2023-02-06 RX ADMIN — HYDRALAZINE HYDROCHLORIDE 50 MG: 50 TABLET, FILM COATED ORAL at 20:37

## 2023-02-06 RX ADMIN — ENOXAPARIN SODIUM 40 MG: 100 INJECTION SUBCUTANEOUS at 09:33

## 2023-02-06 RX ADMIN — ATORVASTATIN CALCIUM 80 MG: 80 TABLET, FILM COATED ORAL at 20:37

## 2023-02-06 RX ADMIN — HYDRALAZINE HYDROCHLORIDE 10 MG: 20 INJECTION INTRAMUSCULAR; INTRAVENOUS at 09:32

## 2023-02-06 RX ADMIN — CARVEDILOL 6.25 MG: 6.25 TABLET, FILM COATED ORAL at 09:43

## 2023-02-06 RX ADMIN — PAROXETINE HYDROCHLORIDE 40 MG: 20 TABLET, FILM COATED ORAL at 09:33

## 2023-02-06 RX ADMIN — HYDRALAZINE HYDROCHLORIDE 50 MG: 50 TABLET, FILM COATED ORAL at 09:33

## 2023-02-06 RX ADMIN — Medication 400 MG: at 09:34

## 2023-02-06 RX ADMIN — CLOPIDOGREL BISULFATE 75 MG: 75 TABLET ORAL at 09:33

## 2023-02-06 RX ADMIN — LOSARTAN POTASSIUM 25 MG: 25 TABLET, FILM COATED ORAL at 09:33

## 2023-02-06 RX ADMIN — ASPIRIN 81 MG: 81 TABLET, CHEWABLE ORAL at 09:32

## 2023-02-06 RX ADMIN — GLIPIZIDE 10 MG: 5 TABLET ORAL at 06:49

## 2023-02-06 ASSESSMENT — ENCOUNTER SYMPTOMS
SHORTNESS OF BREATH: 0
VOMITING: 0
COUGH: 0
DIARRHEA: 0
NAUSEA: 0

## 2023-02-06 NOTE — PROGRESS NOTES
Progress Note  Date:2023       JJJT:[id number]  Patient Mike Jordan     YOB: 1950     Age:72 y.o. Subjective    Subjective:  Symptoms:  No shortness of breath, malaise, cough, chest pain, weakness, headache, chest pressure, anorexia, diarrhea or anxiety. Diet:  No nausea or vomiting. Review of Systems   Respiratory:  Negative for cough and shortness of breath. Cardiovascular:  Negative for chest pain. Gastrointestinal:  Negative for anorexia, diarrhea, nausea and vomiting. Neurological:  Negative for weakness. Objective         Vitals Last 24 Hours:  TEMPERATURE:  Temp  Av.8 °F (37.1 °C)  Min: 98.3 °F (36.8 °C)  Max: 99.1 °F (37.3 °C)  RESPIRATIONS RANGE: Resp  Av  Min: 16  Max: 18  PULSE OXIMETRY RANGE: SpO2  Av.8 %  Min: 93 %  Max: 100 %  PULSE RANGE: Pulse  Av.4  Min: 89  Max: 105  BLOOD PRESSURE RANGE: Systolic (52MNZ), SZH:226 , Min:160 , LR   ; Diastolic (42XPP), LXZ:66, Min:63, Max:89    I/O (24Hr): Intake/Output Summary (Last 24 hours) at 2023 1055  Last data filed at 2023 0449  Gross per 24 hour   Intake 120 ml   Output --   Net 120 ml     Objective:  General Appearance:  Comfortable, well-appearing and in no acute distress. Vital signs: (most recent): Blood pressure (!) 213/75, pulse (!) 105, temperature 98.3 °F (36.8 °C), temperature source Oral, resp. rate 16, height 5' 6\" (1.676 m), weight 164 lb 14.5 oz (74.8 kg), SpO2 94 %. HEENT: Normal HEENT exam.    Lungs: There are decreased breath sounds. Heart: S1 normal and S2 normal.    Abdomen: Abdomen is soft. Bowel sounds are normal.   There is no epigastric area tenderness. Pulses: Distal pulses are intact. Neurological: Patient is alert. Pupils:  Pupils are equal, round, and reactive to light. Skin:  Warm.     Labs/Imaging/Diagnostics    Labs:  CBC:  Recent Labs     23  0621 23  0446 23  0540   WBC 7.0 5.8 4.2*   RBC 3.37* 3.05* 3.04*   HGB 10.6* 9.7* 9.5*   HCT 30.7* 27.7* 27.8*   MCV 91.1 91.0 91.7   RDW 14.7* 15.1* 14.7*    220 205     CHEMISTRIES:  Recent Labs     02/04/23  0451 02/04/23  0620 02/05/23  0446 02/06/23  0540    137 135 137   K 4.2 4.6 3.9 3.8    105 103 105   CO2 21 23 20 24   BUN 12 12 14 16   CREATININE 1.28* 1.28* 1.23* 1.31*   GLUCOSE 200* 195* 177* 203*   MG 1.6*  --   --   --      PT/INR:No results for input(s): PROTIME, INR in the last 72 hours. APTT:No results for input(s): APTT in the last 72 hours. LIVER PROFILE:No results for input(s): AST, ALT, BILIDIR, BILITOT, ALKPHOS in the last 72 hours. Imaging Last 24 Hours:  No results found. Assessment//Plan           Hospital Problems             Last Modified POA    * (Principal) Chest pain 2/3/2023 Yes   Uncontrolled HTN  H/o fever 2 days ago  DM2   Anemia  Assessment & Plan  2/6: Anemia work-up as outpatient, continue with Paxil for anxiety. No nausea. Encephalopathy resolved. No more fever for the past 48 hours. Cultures has been negative. Patient denies any cough or chills. Elevated blood pressure noted before morning meds. Will monitor. Continue with IV hydralazine as needed.   Electronically signed by Dave Spivey MD on 2/6/23 at 10:55 AM EST

## 2023-02-06 NOTE — PROGRESS NOTES
No acute events overnight. Pt A/O X3-4. HAQ moderate strength. RA lungs clear/diminished. Activity intolerance noted. Denies SOB. SR on monitor. VSS. Afebrile. Voiding. Ambulating SBA in room. Ecchymosis to rt femoral site. Soft I/C/D. CMS intact. Plan: Trend H/H. Possible discharge today? Pending BC results.

## 2023-02-06 NOTE — PLAN OF CARE
Problem: Discharge Planning  Goal: Discharge to home or other facility with appropriate resources  Outcome: Progressing     Problem: Pain  Goal: Verbalizes/displays adequate comfort level or baseline comfort level  Outcome: Progressing     Problem: Safety - Adult  Goal: Free from fall injury  Outcome: Progressing     Problem: Confusion  Goal: Confusion, delirium, dementia, or psychosis is improved or at baseline  Description: INTERVENTIONS:  1. Assess for possible contributors to thought disturbance, including medications, impaired vision or hearing, underlying metabolic abnormalities, dehydration, psychiatric diagnoses, and notify attending LIP  2. [place] high risk fall precautions, as indicated  3. Provide frequent short contacts to provide reality reorientation, refocusing and direction  4. Decrease environmental stimuli, including noise as appropriate  5. Monitor and intervene to maintain adequate nutrition, hydration, elimination, sleep and activity  6. If unable to ensure safety without constant attention obtain sitter and review sitter guidelines with assigned personnel  7.  Initiate Psychosocial CNS and Spiritual Care consult, as indicated  Outcome: Adequate for Discharge

## 2023-02-07 VITALS
BODY MASS INDEX: 26.34 KG/M2 | HEIGHT: 66 IN | RESPIRATION RATE: 16 BRPM | WEIGHT: 163.9 LBS | TEMPERATURE: 98 F | SYSTOLIC BLOOD PRESSURE: 179 MMHG | OXYGEN SATURATION: 95 % | DIASTOLIC BLOOD PRESSURE: 60 MMHG | HEART RATE: 103 BPM

## 2023-02-07 PROBLEM — Z98.61 CAD S/P PERCUTANEOUS CORONARY ANGIOPLASTY: Status: ACTIVE | Noted: 2023-02-07

## 2023-02-07 PROBLEM — I25.10 CAD S/P PERCUTANEOUS CORONARY ANGIOPLASTY: Status: ACTIVE | Noted: 2023-02-07

## 2023-02-07 LAB
GLUCOSE BLD-MCNC: 161 MG/DL (ref 70–99)
GLUCOSE BLD-MCNC: 165 MG/DL (ref 70–99)
GLUCOSE BLD-MCNC: 169 MG/DL (ref 70–99)
PERFORMED ON: ABNORMAL

## 2023-02-07 PROCEDURE — 6370000000 HC RX 637 (ALT 250 FOR IP): Performed by: INTERNAL MEDICINE

## 2023-02-07 PROCEDURE — 6360000002 HC RX W HCPCS: Performed by: INTERNAL MEDICINE

## 2023-02-07 PROCEDURE — APPSS30 APP SPLIT SHARED TIME 16-30 MINUTES: Performed by: PHYSICIAN ASSISTANT

## 2023-02-07 PROCEDURE — 2580000003 HC RX 258: Performed by: INTERNAL MEDICINE

## 2023-02-07 PROCEDURE — 6370000000 HC RX 637 (ALT 250 FOR IP): Performed by: PHYSICIAN ASSISTANT

## 2023-02-07 PROCEDURE — 99238 HOSP IP/OBS DSCHRG MGMT 30/<: CPT | Performed by: INTERNAL MEDICINE

## 2023-02-07 RX ORDER — CARVEDILOL 12.5 MG/1
12.5 TABLET ORAL 2 TIMES DAILY WITH MEALS
Status: DISCONTINUED | OUTPATIENT
Start: 2023-02-07 | End: 2023-02-07 | Stop reason: HOSPADM

## 2023-02-07 RX ORDER — CARVEDILOL 12.5 MG/1
12.5 TABLET ORAL 2 TIMES DAILY WITH MEALS
Qty: 180 TABLET | Refills: 1 | Status: SHIPPED | OUTPATIENT
Start: 2023-02-07

## 2023-02-07 RX ORDER — HYDRALAZINE HYDROCHLORIDE 100 MG/1
100 TABLET, FILM COATED ORAL 2 TIMES DAILY
Status: DISCONTINUED | OUTPATIENT
Start: 2023-02-07 | End: 2023-02-07 | Stop reason: HOSPADM

## 2023-02-07 RX ORDER — HYDRALAZINE HYDROCHLORIDE 100 MG/1
100 TABLET, FILM COATED ORAL 2 TIMES DAILY
Qty: 180 TABLET | Refills: 1 | Status: SHIPPED | OUTPATIENT
Start: 2023-02-07

## 2023-02-07 RX ADMIN — CARVEDILOL 6.25 MG: 6.25 TABLET, FILM COATED ORAL at 09:27

## 2023-02-07 RX ADMIN — HYDRALAZINE HYDROCHLORIDE 10 MG: 20 INJECTION INTRAMUSCULAR; INTRAVENOUS at 13:37

## 2023-02-07 RX ADMIN — PANTOPRAZOLE SODIUM 40 MG: 40 TABLET, DELAYED RELEASE ORAL at 05:38

## 2023-02-07 RX ADMIN — CARVEDILOL 12.5 MG: 12.5 TABLET, FILM COATED ORAL at 17:21

## 2023-02-07 RX ADMIN — ENOXAPARIN SODIUM 40 MG: 100 INJECTION SUBCUTANEOUS at 09:27

## 2023-02-07 RX ADMIN — GLIPIZIDE 10 MG: 5 TABLET ORAL at 05:38

## 2023-02-07 RX ADMIN — CLOPIDOGREL BISULFATE 75 MG: 75 TABLET ORAL at 09:27

## 2023-02-07 RX ADMIN — GLIPIZIDE 10 MG: 5 TABLET ORAL at 17:21

## 2023-02-07 RX ADMIN — PAROXETINE HYDROCHLORIDE 40 MG: 20 TABLET, FILM COATED ORAL at 09:27

## 2023-02-07 RX ADMIN — Medication 10 ML: at 09:26

## 2023-02-07 RX ADMIN — HYDRALAZINE HYDROCHLORIDE 100 MG: 100 TABLET, FILM COATED ORAL at 09:27

## 2023-02-07 RX ADMIN — ASPIRIN 81 MG: 81 TABLET, CHEWABLE ORAL at 09:27

## 2023-02-07 RX ADMIN — HYDRALAZINE HYDROCHLORIDE 10 MG: 20 INJECTION INTRAMUSCULAR; INTRAVENOUS at 05:43

## 2023-02-07 RX ADMIN — LOSARTAN POTASSIUM 25 MG: 25 TABLET, FILM COATED ORAL at 09:27

## 2023-02-07 RX ADMIN — Medication 400 MG: at 09:26

## 2023-02-07 ASSESSMENT — ENCOUNTER SYMPTOMS
VOMITING: 0
SHORTNESS OF BREATH: 0
NAUSEA: 0
DIARRHEA: 0
COUGH: 0

## 2023-02-07 NOTE — DISCHARGE INSTR - COC
Continuity of Care Form    Patient Name: Amos Sheets   :  [age]  MRN:  [id number]    Admit date:  2/3/2023  Discharge date:  ***    Code Status Order: Full Code   Advance Directives:     Admitting Physician:  Jillian Amin MD  PCP: Izaiah Franco MD    Discharging Nurse: [facility] Unit/Room#: Y998/A730-06  Discharging Unit Phone Number: ***    Emergency Contact:   Extended Emergency Contact Information  Primary Emergency Contact: wicho leon  Home Phone: [phone]  Work Phone: [phone]  Mobile Phone: [phone]  Relation: Other   needed?  No    Past Surgical History:  Past Surgical History:   Procedure Laterality Date    CARDIAC SURGERY      stent    SKIN BIOPSY      left neck       Immunization History:   Immunization History   Administered Date(s) Administered    COVID-19, MODERNA BLUE border, Primary or Immunocompromised, (age 12y+), IM, 100 mcg/0.5mL 2021, 2021    COVID-19, MODERNA Bivalent BOOSTER, (age 12y+), IM, 50 mcg/0.5 mL 2022    COVID-19, MODERNA Booster BLUE border, (age 18y+), IM, 50mcg/0.25mL 2022    Influenza A (R4M3-72) Vaccine PF IM 2010    Influenza A (K0m1-45),all Formulations 2010    Influenza Virus Vaccine 11/10/2009, 12/15/2010, 2011, 10/17/2012, 2012, 2014, 2014, 10/15/2015, 2015, 10/17/2016    Influenza, FLUAD, (age 72 y+), Adjuvanted, 0.5mL 10/07/2021, 2022    Influenza, FLUARIX, FLULAVAL, FLUZONE (age 10 mo+) AND AFLURIA, (age 1 y+), PF, 0.5mL 2015    Influenza, FLUZONE (age 72 y+), High Dose, 0.7mL 2022    Pneumococcal Conjugate 13-valent (Ricky Bell) 2013, 2015    Pneumococcal Polysaccharide (Ngobiqaxi22) 2011, 2013    Pneumococcal Vaccine 2011    Tdap (Boostrix, Adacel) 2013, 2017       Active Problems:  Patient Active Problem List   Diagnosis Code    NSTEMI (non-ST elevated myocardial infarction) ([facility] [address] I21.4    AMI (acute myocardial infarction) ([facility] [address] I21.9    Rectal bleed K62.5    Diverticulitis K57.92    Type 2 diabetes mellitus, without long-term current use of insulin (HCC) E11.9    Uncontrolled hypertension I10    Other hyperlipidemia E78.49    Anxiety F41.9    Contusion of rib on left side S20.212A    Chest pain R07.9    CAD S/P percutaneous coronary angioplasty I25.10, Z98.61       Isolation/Infection:   Isolation            No Isolation          Patient Infection Status       Infection Onset Added Last Indicated Last Indicated By Review Planned Expiration Resolved Resolved By    None active    Resolved    COVID-19 (Rule Out) 23 Respiratory Panel, Molecular, with COVID-19 (Restricted: peds pts or suitable admitted adults) (Ordered)   23 Rule-Out Test Resulted    COVID-19 (Rule Out) 20 COVID-19 (Ordered)   20 Rule-Out Test Resulted    COVID-19 (Rule Out) 20 COVID-19 (Ordered)   20 Rule-Out Test Resulted            Nurse Assessment:  Last Vital Signs: BP (!) 170/55   Pulse 92   Temp 98 °F (36.7 °C) (Oral)   Resp 16   Ht 5' 6\" (1.676 m)   Wt 163 lb 14.4 oz (74.3 kg)   SpO2 95%   BMI 26.45 kg/m²     Last documented pain score (0-10 scale): Pain Level: 4  Last Weight:   Wt Readings from Last 1 Encounters:   23 163 lb 14.4 oz (74.3 kg)     Mental Status:  {IP PT MENTAL STATUS:}    IV Access:  { SHANTELL IV ACCESS:009824002}    Nursing Mobility/ADLs:  Walking   {CHP DME ECIT:605799658}  Transfer  {CHP DME KYSP:721317438}  Bathing  {CHP DME RLTS:605395019}  Dressing  {CHP DME DHHE:553143881}  Toileting  {CHP DME UYWL:448736890}  Feeding  {P DME IXTP:147269499}  Med Admin  {CHP DME ZPPC:507685497}  Med Delivery   { SHANTELL MED Delivery:950940616}    Wound Care Documentation and Therapy:        Elimination:  Continence:    Bowel: {YES / FC:12415}  Bladder: {YES / L}  Urinary Catheter: {Urinary Catheter:611954906} Colostomy/Ileostomy/Ileal Conduit: {YES / MK:28428}       Date of Last BM: ***  No intake or output data in the 24 hours ending 23 1141  I/O last 3 completed shifts:   In: 120 [P.O.:120]  Out: -     Safety Concerns:     [address] Safety Concerns:392084226}    Impairments/Disabilities:      [address] Impairments/Disabilities:051773373}    Nutrition Therapy:  Current Nutrition Therapy:   [address] Diet List:815121134}    Routes of Feeding: {CHP DME Other Feedings:054772493}  Liquids: {Slp liquid thickness:85898}  Daily Fluid Restriction: {CHP DME Yes amt example:238408755}  Last Modified Barium Swallow with Video (Video Swallowing Test): {Done Not Done NTTN:414399185}    Treatments at the Time of Hospital Discharge:   Respiratory Treatments: ***  Oxygen Therapy:  {Therapy; copd oxygen:71288}  Ventilator:    { CC Vent LBWP:533166397}    Rehab Therapies: {THERAPEUTIC INTERVENTION:5510138425}  Weight Bearing Status/Restrictions: [address]  Weight Bearin}  Other Medical Equipment (for information only, NOT a DME order):  {EQUIPMENT:621188741}  Other Treatments: ***    Patient's personal belongings (please select all that are sent with patient):  {CHP DME Belongings:979883128}    RN SIGNATURE:  {Esignature:552009049}    CASE MANAGEMENT/SOCIAL WORK SECTION    Inpatient Status Date: ***    Readmission Risk Assessment Score:  Readmission Risk              Risk of Unplanned Readmission:  16           Discharging to Facility/ Agency   Name:   Address:  Phone:  Fax:    Dialysis Facility (if applicable)   Name:  Address:  Dialysis Schedule:  Phone:  Fax:    / signature: {Esignature:369648576}    PHYSICIAN SECTION    Prognosis: {Prognosis:8210712984}    Condition at Discharge: [address] Patient Condition:611844427}    Rehab Potential (if transferring to Rehab): {Prognosis:9596192903}    Recommended Labs or Other Treatments After Discharge: ***    Physician Certification: I certify the above information and transfer of Tashia Roth  is necessary for the continuing treatment of the diagnosis listed and that he requires {Admit to Appropriate Level of Care:18179} for {GREATER/LESS:014440780} 30 days.      Update Admission H&P: {CHP DME Changes in BFTBV:883777011}    PHYSICIAN SIGNATURE:  {Esignature:520675854}

## 2023-02-07 NOTE — DISCHARGE INSTR - DIET
Good nutrition is important when healing from an illness, injury, or surgery. Follow any nutrition recommendations given to you during your hospital stay. If you were given an oral nutrition supplement while in the hospital, continue to take this supplement at home. You can take it with meals, in-between meals, and/or before bedtime. These supplements can be purchased at most local grocery stores, pharmacies, and chain [employer]-stores. If you have any questions about your diet or nutrition, call the hospital and ask for the dietitian. Low fat, low salt, heart healthy diet. Diabetic diet.

## 2023-02-07 NOTE — DISCHARGE SUMMARY
Cardiology Discharge Summary      Patient Identification:  Shola James  :   MRN: [id number]   Account: [de-identified]     Admit date: 2/3/2023  Discharge date: 23  Attending provider: Johanna Barraza MD        Primary care provider: Grey Perea MD     Admission Diagnoses:  Chest pain         Discharge Diagnoses: Active Hospital Problems    Diagnosis Date Noted    CAD S/P percutaneous coronary angioplasty [I25.10, Z98.61] 2023     Priority: High    Chest pain [R07.9] 2023     Priority: Medium    Uncontrolled hypertension [I10] 2020     Priority: [address] [facility] [address] Course:   Shola James is a74 y.o. male admitted to [facility] on 2/3/2023 for chest pain. Due to history of CAD status post PCI as well as multiple risk factors for CAD and chest pain concerning for possible angina, patient underwent cardiac catheterization with Dr. April Shetty on 2/3/2023 at which time he underwent PCI of the RCA. He was initiated on dual antiplatelet therapy with aspirin and Plavix. Patient was hypertensive during admission and BP meds adjusted. Postprocedure, patient had some nonspecific mental status changes as well as complaints of nausea and chills and had elevated temperature. Hospitalist was consulted for further evaluation. Underwent PCR viral panel, chest x-ray and UA all of which were unremarkable. Blood cultures checked and negative x2. Patient noted to be anemic during admission with initial hemoglobin of 10.8 on 2/3/2023 downtrending to 9.5 on 2023 but no obvious signs of bleeding. Blood pressure remains relatively uncontrolled with systolic blood pressure in the 160s to 170s range. BP medications further titrated and will increase Coreg to 12.5 mg p.o. twice daily today. Patient is stable for discharge home and will be advised to follow-up with PCP in 1 week and Dr. April Shetty in 2 weeks.       Procedures: [address] [place] [address] 2/3/23: PCI to RCA     Consults:   Hospitalist--med management    Examination:  BP (!) 170/55   Pulse 92   Temp 98 °F (36.7 °C) (Oral)   Resp 16   Ht 5' 6\" (1.676 m)   Wt 163 lb 14.4 oz (74.3 kg)   SpO2 95%   BMI 26.45 kg/m²    Physical Exam  Constitutional:       General: He is not in acute distress. Appearance: He is obese. HENT:      Head: Normocephalic and atraumatic. Cardiovascular:      Rate and Rhythm: Normal rate and regular rhythm. Pulmonary:      Effort: Pulmonary effort is normal. No respiratory distress. Breath sounds: No wheezing, rhonchi or rales. Abdominal:      Palpations: Abdomen is soft. Musculoskeletal:         General: Normal range of motion. Cervical back: Normal range of motion and neck supple. Right lower leg: No edema. Left lower leg: No edema. Skin:     General: Skin is warm and dry. Neurological:      General: No focal deficit present. Mental Status: He is alert and oriented to person, place, and time. Cranial Nerves: No cranial nerve deficit.    Psychiatric:         Mood and Affect: Mood normal.       Medications:  Current Discharge Medication List        START taking these medications    Details   hydrALAZINE (APRESOLINE) 100 MG tablet Take 1 tablet by mouth in the morning and at bedtime  Qty: 180 tablet, Refills: 1      carvedilol (COREG) 12.5 MG tablet Take 1 tablet by mouth 2 times daily (with meals)  Qty: 180 tablet, Refills: 1      aspirin 81 MG chewable tablet Take 1 tablet by mouth daily  Qty: 90 tablet, Refills: 1           CONTINUE these medications which have CHANGED    Details   atorvastatin (LIPITOR) 40 MG tablet Take 1 tablet by mouth daily TAKE 1 TABLET BY MOUTH EVERY NIGHT **APPT NEEDED FOR ADDITIONAL FILLS  Qty: 90 tablet, Refills: 1    Comments: **Patient requests 90 days supply**      clopidogrel (PLAVIX) 75 MG tablet Take 1 tablet by mouth daily  Qty: 90 tablet, Refills: 3    Comments: **Patient requests 90 days supply**      metFORMIN (GLUCOPHAGE) 1000 MG tablet Take 1 tablet by mouth 2 times daily (with meals) TAKE 1 TABLET BY MOUTH TWICE DAILY WITH MEALS **APPT NEEDED FOR ADDITION FILLS  RESUME on 2/5/23  Qty: 180 tablet, Refills: 0    Comments: **Patient requests 90 days supply**      nitroGLYCERIN (NITROSTAT) 0.4 MG SL tablet Place 1 tablet under the tongue every 5 minutes as needed for Chest pain up to max of 3 total doses. If no relief after 1 dose, call 911. Qty: 25 tablet, Refills: 1           CONTINUE these medications which have NOT CHANGED    Details   omeprazole (PRILOSEC) 40 MG delayed release capsule Take 1 capsule by mouth daily  Qty: 90 capsule, Refills: 1      PARoxetine (PAXIL) 40 MG tablet Take 1 tablet by mouth daily  Qty: 30 tablet, Refills: 0      Dulaglutide 0.75 MG/0.5ML SOPN Inject 0.75 mg into the skin once a week  Qty: 2 mL, Refills: 5      triamcinolone (KENALOG) 0.1 % cream Apply topically 2 times daily. Qty: 454 g, Refills: 0    Associated Diagnoses: Dermatitis      losartan (COZAAR) 25 MG tablet Take 1 tablet by mouth daily TAKE 1 TABLET BY MOUTH TWICE DAILY  Qty: 180 tablet, Refills: 3    Comments: **Patient requests 90 days supply**      glipiZIDE (GLUCOTROL) 10 MG tablet Take 1 tablet by mouth 2 times daily (before meals) TAKE 1 TABLET BY MOUTH TWICE DAILY  Qty: 180 tablet, Refills: 5    Comments: **Patient requests 90 days supply**      blood glucose monitor supplies LANCETS  Test 2 times a day & as needed for symptoms of irregular blood glucose. Dispense sufficient amount for indicated testing frequency plus additional to accommodate PRN testing needs. Qty: 100 each, Refills: 3    Comments: Brand per patient preference. May round up to next available package size.   Associated Diagnoses: Type 2 diabetes mellitus without complication, without long-term current use of insulin ([facility])      blood glucose monitor kit and supplies tEST 2 TIMES A DAY  Qty: 1 kit, Refills: 0    Associated Diagnoses: Type 2 diabetes mellitus without complication, without long-term current use of insulin (HCC)      blood glucose monitor strips Test 2 times a day & as needed for symptoms of irregular blood glucose. Dispense sufficient amount for indicated testing frequency plus additional to accommodate PRN testing needs. Qty: 100 strip, Refills: 3    Comments: Brand per patient preference. May round up to next available package size. Associated Diagnoses: Type 2 diabetes mellitus without complication, without long-term current use of insulin (HCC)           STOP taking these medications       dilTIAZem (CARDIZEM CD) 120 MG extended release capsule Comments:   Reason for Stopping:         metoprolol tartrate (LOPRESSOR) 25 MG tablet Comments:   Reason for Stopping:         calcipotriene-betamethasone (TACLONEX) 0.005-0.064 % ointment Comments:   Reason for Stopping:         calcipotriene (DOVONEX) 0.005 % cream Comments:   Reason for Stopping:               Significant Diagnostics:   Radiology: CT HEAD WO CONTRAST    Result Date: 2/3/2023  EXAMINATION: CT OF THE HEAD WITHOUT CONTRAST  2/3/2023 5:31 pm TECHNIQUE: CT of the head was performed without the administration of intravenous contrast. Automated exposure control, iterative reconstruction, and/or weight based adjustment of the mA/kV was utilized to reduce the radiation dose to as low as reasonably achievable. COMPARISON: 09/17/2020 HISTORY: ORDERING SYSTEM PROVIDED HISTORY: AMS TECHNOLOGIST PROVIDED HISTORY: Reason for exam:->AMS Has a \"code stroke\" or \"stroke alert\" been called? ->No What reading provider will be dictating this exam?->CRC FINDINGS: BRAIN/VENTRICLES: There is no acute intracranial hemorrhage, mass effect or midline shift. No abnormal extra-axial fluid collection. The gray-white differentiation is maintained without evidence of an acute infarct. There is prominence of the ventricles and sulci due to global parenchymal volume loss.  There are nonspecific areas of hypoattenuation within the periventricular and subcortical white matter, which likely represent chronic microvascular ischemic change. ORBITS: The visualized portion of the orbits demonstrate no acute abnormality. SINUSES: The visualized paranasal sinuses and mastoid air cells demonstrate no acute abnormality. SOFT TISSUES/SKULL: No acute abnormality of the visualized skull or soft tissues. No acute intracranial abnormality. No significant change in diffuse volume loss and chronic small vessel ischemic white matter change. CTA CHEST W WO CONTRAST PE Eval    Result Date: 2/3/2023  EXAMINATION: CTA OF THE CHEST WITH AND WITHOUT CONTRAST 2/3/2023 9:08 am TECHNIQUE: CTA of the chest was performed before and after the administration of intravenous contrast.  Multiplanar reformatted images are provided for review. MIP images are provided for review. Automated exposure control, iterative reconstruction, and/or weight based adjustment of the mA/kV was utilized to reduce the radiation dose to as low as reasonably achievable. COMPARISON: Chest x-ray obtained the same day. HISTORY: ORDERING SYSTEM PROVIDED HISTORY: PE TECHNOLOGIST PROVIDED HISTORY: Reason for exam:->PE What reading provider will be dictating this exam?->CRC FINDINGS: Aorta: Atherosclerotic disease visualized in the thoracic aorta, no evidence of thoracic aortic aneurysm or dissection. No acute abnormality of the aorta. Four vessels aortic arch is seen. Mediastinum: Scattered hilar and mediastinal lymphadenopathy is seen. The heart and pericardium demonstrate no acute abnormality. Lungs/Pleura: Emphysematous changes visualized in bilateral lung fields. Linear streaky opacities and scarring seen. Bronchial wall thickening is visualized. No focal consolidation or pulmonary edema. No evidence of pleural effusion or pneumothorax. Upper Abdomen: Limited images of the upper abdomen are unremarkable.  Soft Tissues/Bones: Degenerative bone changes. No acute bone or soft tissue abnormality. No evidence of pulmonary embolism is visualized. No evidence of acute cardiopulmonary disease is seen. XR CHEST PORTABLE    Result Date: 2/4/2023  EXAMINATION: ONE XRAY VIEW OF THE CHEST 2/4/2023 6:07 am COMPARISON: 02/03/2023 HISTORY: ORDERING SYSTEM PROVIDED HISTORY: SOB, fine crackles. TECHNOLOGIST PROVIDED HISTORY: Reason for exam:->SOB, fine crackles. What reading provider will be dictating this exam?->CRC FINDINGS: Portable chest reveals cardiac and mediastinal silhouettes within normal limits. The lung fields are grossly clear. No focal parenchymal opacification present. No pleural effusion or pneumothorax. The bony structures reveal minimal degenerative changes seen within the spine. Pulmonary vascularity is within normal limits. Stable chest with mild chronic changes and no superimposed acute infiltrate. XR CHEST PORTABLE    Result Date: 2/3/2023  EXAMINATION: ONE XRAY VIEW OF THE CHEST 2/3/2023 1:48 am COMPARISON: None. HISTORY: ORDERING SYSTEM PROVIDED HISTORY: CP TECHNOLOGIST PROVIDED HISTORY: Reason for exam:->CP What reading provider will be dictating this exam?->CRC FINDINGS: Normal cardiomediastinal silhouette. Lungs clear. No pneumothorax or effusion. Osseous thorax intact. No acute disease. RECOMMENDATION: Careful clinical correlation and follow up recommended.        Labs:   Recent Results (from the past 72 hour(s))   POCT Glucose    Collection Time: 02/04/23  6:15 PM   Result Value Ref Range    POC Glucose 161 (H) 70 - 99 mg/dl    Performed on ACCU-CHEK    POCT Glucose    Collection Time: 02/04/23  8:42 PM   Result Value Ref Range    POC Glucose 147 (H) 70 - 99 mg/dl    Performed on ACCU-CHEK    CBC    Collection Time: 02/05/23  4:46 AM   Result Value Ref Range    WBC 5.8 4.8 - 10.8 K/uL    RBC 3.05 (L) 4.70 - 6.10 M/uL    Hemoglobin 9.7 (L) 14.0 - 18.0 g/dL    Hematocrit 27.7 (L) 42.0 - 52.0 %    MCV 91.0 79.0 - 92.2 fL    MCH 31.9 (H) 27.0 - 31.3 pg    MCHC 35.1 33.0 - 37.0 %    RDW 15.1 (H) 11.5 - 14.5 %    Platelets 398 811 - 504 K/uL   Basic Metabolic Panel    Collection Time: 02/05/23  4:46 AM   Result Value Ref Range    Sodium 135 135 - 144 mEq/L    Potassium 3.9 3.4 - 4.9 mEq/L    Chloride 103 95 - 107 mEq/L    CO2 20 20 - 31 mEq/L    Anion Gap 12 9 - 15 mEq/L    Glucose 177 (H) 70 - 99 mg/dL    BUN 14 8 - 23 mg/dL    Creatinine 1.23 (H) 0.70 - 1.20 mg/dL    Est, Glom Filt Rate >60.0 >60    Calcium 8.6 8.5 - 9.9 mg/dL   POCT Glucose    Collection Time: 02/05/23  6:55 AM   Result Value Ref Range    POC Glucose 165 (H) 70 - 99 mg/dl    Performed on ACCU-CHEK    POCT Glucose    Collection Time: 02/05/23 12:07 PM   Result Value Ref Range    POC Glucose 215 (H) 70 - 99 mg/dl    Performed on ACCU-CHEK    POCT Glucose    Collection Time: 02/05/23  4:02 PM   Result Value Ref Range    POC Glucose 125 (H) 70 - 99 mg/dl    Performed on ACCU-CHEK    POCT Glucose    Collection Time: 02/05/23  9:02 PM   Result Value Ref Range    POC Glucose 238 (H) 70 - 99 mg/dl    Performed on ACCU-CHEK    CBC    Collection Time: 02/06/23  5:40 AM   Result Value Ref Range    WBC 4.2 (L) 4.8 - 10.8 K/uL    RBC 3.04 (L) 4.70 - 6.10 M/uL    Hemoglobin 9.5 (L) 14.0 - 18.0 g/dL    Hematocrit 27.8 (L) 42.0 - 52.0 %    MCV 91.7 79.0 - 92.2 fL    MCH 31.1 27.0 - 31.3 pg    MCHC 33.9 33.0 - 37.0 %    RDW 14.7 (H) 11.5 - 14.5 %    Platelets 645 619 - 363 K/uL   Basic Metabolic Panel    Collection Time: 02/06/23  5:40 AM   Result Value Ref Range    Sodium 137 135 - 144 mEq/L    Potassium 3.8 3.4 - 4.9 mEq/L    Chloride 105 95 - 107 mEq/L    CO2 24 20 - 31 mEq/L    Anion Gap 8 (L) 9 - 15 mEq/L    Glucose 203 (H) 70 - 99 mg/dL    BUN 16 8 - 23 mg/dL    Creatinine 1.31 (H) 0.70 - 1.20 mg/dL    Est, Glom Filt Rate 56.9 (L) >60    Calcium 8.6 8.5 - 9.9 mg/dL   POCT Glucose    Collection Time: 02/06/23  6:46 AM   Result Value Ref Range    POC Glucose 202 (H) 70 - 99 mg/dl    Performed on ACCU-CHEK    POCT Glucose    Collection Time: 02/06/23 11:12 AM   Result Value Ref Range    POC Glucose 198 (H) 70 - 99 mg/dl    Performed on ACCU-CHEK    POCT Glucose    Collection Time: 02/06/23  4:22 PM   Result Value Ref Range    POC Glucose 124 (H) 70 - 99 mg/dl    Performed on ACCU-CHEK    POCT Glucose    Collection Time: 02/06/23  8:09 PM   Result Value Ref Range    POC Glucose 181 (H) 70 - 99 mg/dl    Performed on ACCU-CHEK    POCT Glucose    Collection Time: 02/07/23  7:13 AM   Result Value Ref Range    POC Glucose 165 (H) 70 - 99 mg/dl    Performed on ACCU-CHEK          Telemetry 2/7/23: SR 80s    Follow-up visits:   MD Lee Sanchezraphael [address] [place] [id number]  [phone]    Schedule an appointment as soon as possible for a visit in 2 week(s)  Call to schedule cardiology follow-up       Assessment:  Active Hospital Problems    Diagnosis Date Noted    CAD S/P percutaneous coronary angioplasty [I25.10, Z98.61] 02/07/2023     Priority: High    Chest pain [R07.9] 02/03/2023     Priority: Medium    Uncontrolled hypertension [I10] 12/17/2020     Priority: Low     Chest pain s/p [facility] 2/3/23 with PCI of RCA  Hx CAD s/p PCI of RCA on 12/14/20---mod diffuse circ and LAD disease at time of this [facility]  Uncontrolled HTN  Dyslipidemia  Uncontrolled DM--HgbA1c 8.5 on 1/26/23  COPD  CKD  Obesity  Former smoker      Plan:   1. Maximize medical therapy-dual antiplatelet therapy with aspirin 81 mg p.o. daily and Plavix 75 mg p.o. daily, increase Coreg to 12.5 mg p.o. twice daily for improved BP management, hydralazine 100 mg p.o. twice daily, losartan 25 mg p.o. daily, Lipitor 80 mg p.o. daily, sublingual nitroglycerin as needed for chest pain Protonix 40 mg p.o. daily, glipizide 10 mg p.o. twice daily  2. Cardiac/diabetic diet recommended  3. Maintain potassium greater than 4, magnesium greater than 2  4. GI/DVT prophylaxis  5.   Will need outpatient follow-up with Dr. Mame Patel in 2 weeks upon discharge        Electronically signed by Deb Long 75 2/7/2023 at 10:17 AM    Attending Supervising Physician's Attestation Statement  I performed a history and physical examination on the patient and discussed the management with the physician assistant. I reviewed and agree with the findings and plan as documented in her note .     Status post PCI of the RCA 2/20/2023    Plan:  Okay to discharge patient home later on today  Continue aspirin and high intensity statin for secondary prevention of CAD  Continue Plavix  Follow-up with me in clinic in 2 weeks  Patient needs to return to the Cath Lab in 4 weeks for PCI of the circumflex      Electronically signed by Flaquita Yeung MD on 2/7/23 at 11:43 AM EST

## 2023-02-07 NOTE — PROGRESS NOTES
Progress Note  Date:2023       OAAE:[id number]  Patient Neris Gardner     YOB: 1950     Age:72 y.o. Subjective    Subjective:  Symptoms:  No shortness of breath, malaise, cough, chest pain, weakness, headache, chest pressure, anorexia, diarrhea or anxiety. Diet:  No nausea or vomiting. Review of Systems   Respiratory:  Negative for cough and shortness of breath. Cardiovascular:  Negative for chest pain. Gastrointestinal:  Negative for anorexia, diarrhea, nausea and vomiting. Neurological:  Negative for weakness. Objective         Vitals Last 24 Hours:  TEMPERATURE:  Temp  Av.3 °F (36.8 °C)  Min: 98 °F (36.7 °C)  Max: 98.6 °F (37 °C)  RESPIRATIONS RANGE: Resp  Av.3  Min: 16  Max: 20  PULSE OXIMETRY RANGE: SpO2  Av %  Min: 95 %  Max: 97 %  PULSE RANGE: Pulse  Av  Min: 80  Max: 92  BLOOD PRESSURE RANGE: Systolic (33SXX), UNE:903 , Min:160 , SZB:546   ; Diastolic (55MDI), OUQ:11, Min:54, Max:74    I/O (24Hr): No intake or output data in the 24 hours ending 23 1024    Objective:  General Appearance:  Comfortable, well-appearing and in no acute distress. Vital signs: (most recent): Blood pressure (!) 170/55, pulse 92, temperature 98 °F (36.7 °C), temperature source Oral, resp. rate 16, height 5' 6\" (1.676 m), weight 163 lb 14.4 oz (74.3 kg), SpO2 95 %. HEENT: Normal HEENT exam.    Lungs: There are decreased breath sounds. Heart: S1 normal and S2 normal.    Abdomen: Abdomen is soft. Bowel sounds are normal.   There is no epigastric area tenderness. Pulses: Distal pulses are intact. Neurological: Patient is alert. Pupils:  Pupils are equal, round, and reactive to light. Skin:  Warm.     Labs/Imaging/Diagnostics    Labs:  CBC:  Recent Labs     23  0446 23  0540   WBC 5.8 4.2*   RBC 3.05* 3.04*   HGB 9.7* 9.5*   HCT 27.7* 27.8*   MCV 91.0 91.7   RDW 15.1* 14.7*    205       CHEMISTRIES:  Recent Labs 02/05/23  0446 02/06/23  0540    137   K 3.9 3.8    105   CO2 20 24   BUN 14 16   CREATININE 1.23* 1.31*   GLUCOSE 177* 203*       PT/INR:No results for input(s): PROTIME, INR in the last 72 hours. APTT:No results for input(s): APTT in the last 72 hours. LIVER PROFILE:No results for input(s): AST, ALT, BILIDIR, BILITOT, ALKPHOS in the last 72 hours. Imaging Last 24 Hours:  No results found. Assessment//Plan           Hospital Problems             Last Modified POA    * (Principal) Chest pain 2/3/2023 Yes    CAD S/P percutaneous coronary angioplasty 2/7/2023 Yes    Uncontrolled hypertension 2/7/2023 Yes   Uncontrolled HTN  H/o fever 2 days ago  DM2   Anemia  Assessment & Plan  2/6: Anemia work-up as outpatient, continue with Paxil for anxiety. No nausea. Encephalopathy resolved. No more fever for the past 48 hours. Cultures has been negative. Patient denies any cough or chills. Elevated blood pressure noted before morning meds. Will monitor. Continue with IV hydralazine as needed. 2/7: Patient was seen and evaluated. Increase hydralazine for uncontrolled HTN. Patient can be discharged anytime. Follow-up with PCP as outpatient.   Electronically signed by Aspen Santacruz MD on 2/6/23 at 10:55 AM EST

## 2023-02-08 ENCOUNTER — TELEPHONE (OUTPATIENT)
Dept: FAMILY MEDICINE CLINIC | Age: 73
End: 2023-02-08

## 2023-02-08 NOTE — TELEPHONE ENCOUNTER
Care Transitions Initial Follow Up Call    Outreach made within 2 business days of discharge: Yes    Patient: Margo Duncan Patient : 1950   MRN: [id number]  Reason for Admission: There are no discharge diagnoses documented for the most recent discharge. Discharge Date: 23       Spoke with: Pt    Discharge department/facility: [facility]    [facility] Interactive Patient Contact:  Was patient able to fill all prescriptions: Yes  Was patient instructed to bring all medications to the follow-up visit: Yes  Is patient taking all medications as directed in the discharge summary?  Yes  Does patient understand their discharge instructions: Yes  Does patient have questions or concerns that need addressed prior to 7-14 day follow up office visit: no, appt for 23    Scheduled appointment with PCP within 7-14 days    Follow Up  Future Appointments   Date Time Provider Jeff Martinez   2023  1:00 PM Ivonne Davis, [address]   2023  1:15 PM Marlene English, [address] [place] [address]   3/23/2023 11:30 AM Ronal Mijares MD [address]

## 2023-02-08 NOTE — PROGRESS NOTES
Pt d/c'd to home at 1735. [place] [facility] [place]. Pt states all valuables in wallet accounted for.

## 2023-02-08 NOTE — PROCEDURES
Section of Cardiology  Adult Brief Cardiac Cath Procedure Note        Procedure(s):  LHC, b/l coronary angio, LV gram, successful PCI of the proximal RCA    Pre-operative Diagnosis: Chest pain    H&P Status: Completed and reviewed.      Post-operative Diagnosis: Two-vessel disease    Findings:  See full report  Right dominant system  Left main: Big size vessel mild disease  LAD: Big size vessel mild disease  Circumflex: Proximal 70% stenosis mid 80% stenosis left untreated this artery will be staged in 4 weeks  RCA: Proximal 99% in-stent restenosis status post successful PCI    =====================  Summary of successful PCI of the RCA  Right groin access  6 [employer] JR4 guide was used to cannulate into the RCA  0.014 BMW was used to cannulate into the RCA  A 2.0 x 12 mm balloon was used to predilate the lesion  A 3.0 x 22 mm Jose Juan frontier stent was placed from ostial to mid segment of the RCA inflated at nominal pressures  Stent was postdilated to 3 mm with a 3.0 x 15 mm NC balloon inflated at 20 sheng  Final angiogram showed 0% residual stenosis, preservation of FANG-3 flow, stent looked well opposed well-expanded no dissections    Complications:  none    Recommendations:  Transfer patient back to holding area for PCI management  Maximize medical therapy   Continue aspirin and high intensity statin indefinitely for secondary prevention of CAD  Plavix ideally for 1 year  Continue beta-blocker and ACE inhibitor  Bedrest for 4 hours  IV hydration at 75 cc/h to complete 1 L then TKO  Follow-up morning labs including CBC BMP    Primary Proceduralist:   Sarath Metcalf MD    Full procedure note to follow

## 2023-02-09 LAB
BLOOD CULTURE, ROUTINE: NORMAL
CULTURE, BLOOD 2: NORMAL

## 2023-02-14 ENCOUNTER — TELEPHONE (OUTPATIENT)
Dept: FAMILY MEDICINE CLINIC | Age: 73
End: 2023-02-14

## 2023-02-14 NOTE — TELEPHONE ENCOUNTER
----- Message from Carmen Bauman sent at 2/14/2023 11:34 AM EST -----  Subject: Hospital Follow Up    QUESTIONS  What hospital was the Patient Discharged from? [facility]  Date of Discharge? 2023-02-07  Discharge Location? Home  Reason for hospitalization as patient stated? What question does the patient have, if applicable?   ---------------------------------------------------------------------------  --------------  CALL BACK INFO  What is the best way for the office to contact you? OK to leave message on   voicemail  Preferred Call Back Phone Number? [phone]  ---------------------------------------------------------------------------  --------------  SCRIPT ANSWERS  Relationship to Patient?  Self

## 2023-02-14 NOTE — PROGRESS NOTES
Physician Progress Note      Ronal Carmona  CSN #:                  [id number]  :                       1950  ADMIT DATE:       2/3/2023 1:03 AM  DISCH DATE:        2023 5:48 PM  RESPONDING  PROVIDER #:        Yesenia Martin MD          QUERY TEXT:    Pt admitted with uncontrolled HTN and has encephalopathy documented. If   possible, please document in progress notes and discharge summary further   specificity regarding the type of encephalopathy:    The medical record reflects the following:  Risk Factors: fever, sedation, DM2, anemia  Clinical Indicators: intermittently impulsive, video monitoring, refusing   medications, accu checks, verbalization of wanting to be left alone  Treatment: video monitoring, CT head    Marshal CHIN, RN, 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     Query created by: Alejo Montalvo on 2023 6:55 AM      Electronically signed by:  Yesenia Martin MD 2023 7:43 PM

## 2023-02-24 ENCOUNTER — OFFICE VISIT (OUTPATIENT)
Dept: CARDIOLOGY CLINIC | Age: 73
End: 2023-02-24
Payer: MEDICARE

## 2023-02-24 VITALS
HEART RATE: 77 BPM | WEIGHT: 163 LBS | DIASTOLIC BLOOD PRESSURE: 76 MMHG | HEIGHT: 65 IN | BODY MASS INDEX: 27.16 KG/M2 | RESPIRATION RATE: 15 BRPM | OXYGEN SATURATION: 99 % | SYSTOLIC BLOOD PRESSURE: 128 MMHG

## 2023-02-24 DIAGNOSIS — I25.10 CAD S/P PERCUTANEOUS CORONARY ANGIOPLASTY: Primary | ICD-10-CM

## 2023-02-24 DIAGNOSIS — Z98.61 CAD S/P PERCUTANEOUS CORONARY ANGIOPLASTY: Primary | ICD-10-CM

## 2023-02-24 DIAGNOSIS — I15.9 SECONDARY HYPERTENSION: ICD-10-CM

## 2023-02-24 DIAGNOSIS — E78.5 HYPERLIPIDEMIA, UNSPECIFIED HYPERLIPIDEMIA TYPE: ICD-10-CM

## 2023-02-24 PROCEDURE — 3017F COLORECTAL CA SCREEN DOC REV: CPT | Performed by: INTERNAL MEDICINE

## 2023-02-24 PROCEDURE — 1123F ACP DISCUSS/DSCN MKR DOCD: CPT | Performed by: INTERNAL MEDICINE

## 2023-02-24 PROCEDURE — G8427 DOCREV CUR MEDS BY ELIG CLIN: HCPCS | Performed by: INTERNAL MEDICINE

## 2023-02-24 PROCEDURE — 1036F TOBACCO NON-USER: CPT | Performed by: INTERNAL MEDICINE

## 2023-02-24 PROCEDURE — G8417 CALC BMI ABV UP PARAM F/U: HCPCS | Performed by: INTERNAL MEDICINE

## 2023-02-24 PROCEDURE — 99203 OFFICE O/P NEW LOW 30 MIN: CPT | Performed by: INTERNAL MEDICINE

## 2023-02-24 PROCEDURE — 3078F DIAST BP <80 MM HG: CPT | Performed by: INTERNAL MEDICINE

## 2023-02-24 PROCEDURE — G8484 FLU IMMUNIZE NO ADMIN: HCPCS | Performed by: INTERNAL MEDICINE

## 2023-02-24 PROCEDURE — 1111F DSCHRG MED/CURRENT MED MERGE: CPT | Performed by: INTERNAL MEDICINE

## 2023-02-24 PROCEDURE — 3074F SYST BP LT 130 MM HG: CPT | Performed by: INTERNAL MEDICINE

## 2023-02-24 RX ORDER — SODIUM CHLORIDE 0.9 % (FLUSH) 0.9 %
5-40 SYRINGE (ML) INJECTION EVERY 12 HOURS SCHEDULED
OUTPATIENT
Start: 2023-02-24

## 2023-02-24 RX ORDER — SODIUM CHLORIDE 0.9 % (FLUSH) 0.9 %
5-40 SYRINGE (ML) INJECTION PRN
OUTPATIENT
Start: 2023-02-24

## 2023-02-24 RX ORDER — SODIUM CHLORIDE 9 MG/ML
INJECTION, SOLUTION INTRAVENOUS PRN
OUTPATIENT
Start: 2023-02-24

## 2023-02-24 ASSESSMENT — ENCOUNTER SYMPTOMS
CONSTIPATION: 0
VOMITING: 0
RHINORRHEA: 0
SHORTNESS OF BREATH: 0
APNEA: 0
WHEEZING: 0
DIARRHEA: 0
CHEST TIGHTNESS: 0
COUGH: 0
ABDOMINAL PAIN: 0
COLOR CHANGE: 0
EYE REDNESS: 0
NAUSEA: 0

## 2023-02-24 NOTE — PROGRESS NOTES
Chief Complaint   Patient presents with    Follow-Up from [facility]     02/03-02/07/2023 for Chest Pain    Chest Pain     Pin Needles     Shortness of Breath     DUTTON - Walking distance, up & down stairs     Other     DISCUSS HEART CATH TO BE DONE 03/02/2023       Patient presents for initial medical evaluation. Patient is followed on a regular basis by Dr. Masood Najera MD.     CAD status post PCI of the [facility] DESX1 with a 3.0 x 22 mm on 2/3/2023, RCA PCI 2020  Hypertension  Hyperlipidemia  Diabetes  COPD  TTE 2/3/2023 EF 60%  Coronary angiogram 2/3/2023  Left main mild disease  LAD: Mild disease  Circumflex: Proximal 70% stenosis, mid 80% stenosis  RCA: Proximal 99% in-stent restenosis  =============  2/24/2023  Follow-up on hospitalization  February 3, 2023 patient was admitted to the hospital for chest pain  Patient underwent PCI of the proximal RCA in-stent restenosis with RED x1  Patient has residual proximal 70% circumflex stenosis and mid 80% circumflex stenosis  Currently patient reports feeling well denies chest pain or shortness of breath  Compliant with all medications without side effects    Patient Active Problem List   Diagnosis    NSTEMI (non-ST elevated myocardial infarction) ([address]    AMI (acute myocardial infarction) ([address]    Rectal bleed    Diverticulitis    Type 2 diabetes mellitus, without long-term current use of insulin ([address]    Uncontrolled hypertension    Other hyperlipidemia    Anxiety    Contusion of rib on left side    Chest pain    CAD S/P percutaneous coronary angioplasty       Past Surgical History:   Procedure Laterality Date    CARDIAC SURGERY      stent    SKIN BIOPSY      left neck       Social History     Socioeconomic History    Marital status:       Spouse name: None    Number of children: None    Years of education: None    Highest education level: None   Tobacco Use    Smoking status: Former     Packs/day: 1.00     Years: 40.00     Pack years: 40.00     Types: Cigars, Cigarettes     Quit date: 12/10/2020     Years since quittin.2    Smokeless tobacco: Never   Vaping Use    Vaping Use: Never used   Substance and Sexual Activity    Alcohol use: Never    Drug use: Never    Sexual activity: Not Currently     Social Determinants of Health     Financial Resource Strain: Low Risk     Difficulty of Paying Living Expenses: Not hard at all   Food Insecurity: No Food Insecurity    Worried About Running Out of Food in the Last Year: Never true    Ran Out of Food in the Last Year: Never true   Physical Activity: Inactive    Days of Exercise per Week: 0 days    Minutes of Exercise per Session: 0 min       Family History   Problem Relation Age of Onset    Heart Disease Mother     Diabetes Mother        Current Outpatient Medications   Medication Sig Dispense Refill    metFORMIN (GLUCOPHAGE) 1000 MG tablet Take 1 tablet by mouth 2 times daily (with meals) TAKE 1 TABLET BY MOUTH TWICE DAILY WITH MEALS 180 tablet 1    hydrALAZINE (APRESOLINE) 100 MG tablet Take 1 tablet by mouth in the morning and at bedtime 180 tablet 1    carvedilol (COREG) 12.5 MG tablet Take 1 tablet by mouth 2 times daily (with meals) 180 tablet 1    aspirin 81 MG chewable tablet Take 1 tablet by mouth daily 90 tablet 1    atorvastatin (LIPITOR) 40 MG tablet Take 1 tablet by mouth daily TAKE 1 TABLET BY MOUTH EVERY NIGHT **APPT NEEDED FOR ADDITIONAL FILLS 90 tablet 1    clopidogrel (PLAVIX) 75 MG tablet Take 1 tablet by mouth daily 90 tablet 3    nitroGLYCERIN (NITROSTAT) 0.4 MG SL tablet Place 1 tablet under the tongue every 5 minutes as needed for Chest pain up to max of 3 total doses.  If no relief after 1 dose, call 911. 25 tablet 1    omeprazole (PRILOSEC) 40 MG delayed release capsule Take 1 capsule by mouth daily 90 capsule 1    PARoxetine (PAXIL) 40 MG tablet Take 1 tablet by mouth daily 30 tablet 0    Dulaglutide 0.75 MG/0.5ML SOPN Inject 0.75 mg into the skin once a week 2 mL 5    triamcinolone (KENALOG) 0.1 % cream Apply topically 2 times daily. 454 g 0    losartan (COZAAR) 25 MG tablet Take 1 tablet by mouth daily TAKE 1 TABLET BY MOUTH TWICE DAILY 180 tablet 3    glipiZIDE (GLUCOTROL) 10 MG tablet Take 1 tablet by mouth 2 times daily (before meals) TAKE 1 TABLET BY MOUTH TWICE DAILY 180 tablet 5    blood glucose monitor supplies LANCETS  Test 2 times a day & as needed for symptoms of irregular blood glucose. Dispense sufficient amount for indicated testing frequency plus additional to accommodate PRN testing needs. 100 each 3    blood glucose monitor kit and supplies tEST 2 TIMES A DAY 1 kit 0    blood glucose monitor strips Test 2 times a day & as needed for symptoms of irregular blood glucose. Dispense sufficient amount for indicated testing frequency plus additional to accommodate PRN testing needs. 100 strip 3     No current facility-administered medications for this visit. Patient has no known allergies. Review of Systems:  Review of Systems   Constitutional:  Negative for activity change, chills, diaphoresis and fever. HENT:  Negative for congestion, ear pain, nosebleeds and rhinorrhea. Eyes:  Negative for redness and visual disturbance. Respiratory:  Negative for apnea, cough, chest tightness, shortness of breath and wheezing. Cardiovascular:  Negative for chest pain, palpitations and leg swelling. Gastrointestinal:  Negative for abdominal pain, constipation, diarrhea, nausea and vomiting. Genitourinary:  Negative for difficulty urinating and dysuria. Musculoskeletal: Negative. Negative for joint swelling. Skin:  Negative for color change, rash and wound. Neurological:  Negative for dizziness, syncope, weakness, numbness and headaches. Psychiatric/Behavioral: Negative. VITALS:  Blood pressure 128/76, pulse 77, resp. rate 15, height 5' 5\" (1.651 m), weight 163 lb (73.9 kg), SpO2 99 %. Body mass index is 27.12 kg/m².     Physical Examination:  Physical Exam  Vitals and nursing note reviewed. Constitutional:       Appearance: Normal appearance. HENT:      Head: Normocephalic and atraumatic. Mouth/Throat:      Mouth: Mucous membranes are moist.      Pharynx: Oropharynx is clear. Eyes:      Extraocular Movements: Extraocular movements intact. Conjunctiva/sclera: Conjunctivae normal.      Pupils: Pupils are equal, round, and reactive to light. Cardiovascular:      Rate and Rhythm: Normal rate and regular rhythm. Pulses: Normal pulses. Heart sounds: Normal heart sounds. Pulmonary:      Effort: Pulmonary effort is normal.      Breath sounds: Normal breath sounds. Abdominal:      General: Abdomen is flat. Bowel sounds are normal.      Palpations: Abdomen is soft. Musculoskeletal:         General: Normal range of motion. Cervical back: Normal range of motion and neck supple. Skin:     General: Skin is warm. Neurological:      General: No focal deficit present. Mental Status: He is alert and oriented to person, place, and time. Mental status is at baseline. Psychiatric:         Mood and Affect: Mood normal.      EKG: normal sinus rhythm    No orders of the defined types were placed in this encounter. The ASCVD Risk score (Kulwinder DK, et al., 2019) failed to calculate for the following reasons: The patient has a prior MI or stroke diagnosis     ASSESSMENT:     Diagnosis Orders   1. CAD S/P percutaneous coronary angioplasty        2. Hyperlipidemia, unspecified hyperlipidemia type        3.  Secondary hypertension          PLAN:   CAD status post PCI of the pRCA DESX1 with a 3.0 x 22 mm on 2/3/2023, RCA PCI 2020  Patient denies chest pain reports shortness of breath  Patient has residual Circumflex: Proximal 70% stenosis, mid 80% stenosis  I would like to bring patient back next week for PCI of the circumflex  Proposed risk benefits and alternatives of the procedure were discussed in detail with the patient, all questions were answered  Continue aspirin and high intensity statin for secondary prevention of CAD  Continue Plavix ideally for 1 year  Continue Coreg  We will arrange coronary angiogram through left radial artery next week either Tuesday Thursday or Friday in the morning with me      Hypertension  Continue losartan 25 mg daily  Continue hydralazine 100 mg p.o. twice daily  Continue Coreg 12.5 mg p.o. twice daily    Hyperlipidemia  Continue atorvastatin    Diabetes  As per PCP    COPD  Refer patient to pulmonology    Return to clinic in 3 months    Recommended patient to eat diets that emphasize high intakes of vegetables, fruits, nuts, whole grains, lean vegetable or animal protein, and fish. As per 2019 ACC/AHA guideline on primary prevention of CVD     Recommended patient to engage in at least 150 minutes per week of accumulated moderate-intensity physical activity or 75 minutes per week of vigorous-intensity physical activity as per 2019 ACC/AHA guideline on primary prevention of CVD       This note was transcribed using voice recognition software. Every effort was made to ensure accuracy; however, inadvertent computerized transcription errors may be present.

## 2023-02-27 ENCOUNTER — OFFICE VISIT (OUTPATIENT)
Dept: FAMILY MEDICINE CLINIC | Age: 73
End: 2023-02-27
Payer: MEDICARE

## 2023-02-27 VITALS
HEART RATE: 71 BPM | HEIGHT: 65 IN | TEMPERATURE: 97.8 F | WEIGHT: 163 LBS | OXYGEN SATURATION: 99 % | DIASTOLIC BLOOD PRESSURE: 82 MMHG | BODY MASS INDEX: 27.16 KG/M2 | RESPIRATION RATE: 14 BRPM | SYSTOLIC BLOOD PRESSURE: 120 MMHG

## 2023-02-27 DIAGNOSIS — Z98.61 CAD S/P PERCUTANEOUS CORONARY ANGIOPLASTY: ICD-10-CM

## 2023-02-27 DIAGNOSIS — D64.9 ANEMIA, UNSPECIFIED TYPE: ICD-10-CM

## 2023-02-27 DIAGNOSIS — I10 HYPERTENSION, UNSPECIFIED TYPE: ICD-10-CM

## 2023-02-27 DIAGNOSIS — E11.9 TYPE 2 DIABETES MELLITUS WITHOUT COMPLICATION, WITHOUT LONG-TERM CURRENT USE OF INSULIN (HCC): ICD-10-CM

## 2023-02-27 DIAGNOSIS — I25.10 CAD S/P PERCUTANEOUS CORONARY ANGIOPLASTY: ICD-10-CM

## 2023-02-27 DIAGNOSIS — Z09 HOSPITAL DISCHARGE FOLLOW-UP: Primary | ICD-10-CM

## 2023-02-27 PROCEDURE — G8417 CALC BMI ABV UP PARAM F/U: HCPCS | Performed by: FAMILY MEDICINE

## 2023-02-27 PROCEDURE — 2022F DILAT RTA XM EVC RTNOPTHY: CPT | Performed by: FAMILY MEDICINE

## 2023-02-27 PROCEDURE — G8484 FLU IMMUNIZE NO ADMIN: HCPCS | Performed by: FAMILY MEDICINE

## 2023-02-27 PROCEDURE — 1111F DSCHRG MED/CURRENT MED MERGE: CPT | Performed by: FAMILY MEDICINE

## 2023-02-27 PROCEDURE — 3052F HG A1C>EQUAL 8.0%<EQUAL 9.0%: CPT | Performed by: FAMILY MEDICINE

## 2023-02-27 PROCEDURE — 3079F DIAST BP 80-89 MM HG: CPT | Performed by: FAMILY MEDICINE

## 2023-02-27 PROCEDURE — 3074F SYST BP LT 130 MM HG: CPT | Performed by: FAMILY MEDICINE

## 2023-02-27 PROCEDURE — G8427 DOCREV CUR MEDS BY ELIG CLIN: HCPCS | Performed by: FAMILY MEDICINE

## 2023-02-27 PROCEDURE — 1123F ACP DISCUSS/DSCN MKR DOCD: CPT | Performed by: FAMILY MEDICINE

## 2023-02-27 PROCEDURE — 1036F TOBACCO NON-USER: CPT | Performed by: FAMILY MEDICINE

## 2023-02-27 PROCEDURE — 99214 OFFICE O/P EST MOD 30 MIN: CPT | Performed by: FAMILY MEDICINE

## 2023-02-27 PROCEDURE — 3017F COLORECTAL CA SCREEN DOC REV: CPT | Performed by: FAMILY MEDICINE

## 2023-02-27 SDOH — ECONOMIC STABILITY: INCOME INSECURITY: HOW HARD IS IT FOR YOU TO PAY FOR THE VERY BASICS LIKE FOOD, HOUSING, MEDICAL CARE, AND HEATING?: NOT HARD AT ALL

## 2023-02-27 SDOH — ECONOMIC STABILITY: FOOD INSECURITY: WITHIN THE PAST 12 MONTHS, THE FOOD YOU BOUGHT JUST DIDN'T LAST AND YOU DIDN'T HAVE MONEY TO GET MORE.: NEVER TRUE

## 2023-02-27 SDOH — ECONOMIC STABILITY: FOOD INSECURITY: WITHIN THE PAST 12 MONTHS, YOU WORRIED THAT YOUR FOOD WOULD RUN OUT BEFORE YOU GOT MONEY TO BUY MORE.: NEVER TRUE

## 2023-02-27 SDOH — ECONOMIC STABILITY: HOUSING INSECURITY
IN THE LAST 12 MONTHS, WAS THERE A TIME WHEN YOU DID NOT HAVE A STEADY PLACE TO SLEEP OR SLEPT IN A SHELTER (INCLUDING NOW)?: NO

## 2023-02-27 ASSESSMENT — ENCOUNTER SYMPTOMS
VOMITING: 0
DIARRHEA: 0
SHORTNESS OF BREATH: 0

## 2023-02-27 NOTE — PROGRESS NOTES
Subjective:      Patient ID: Bina Walker is a 74 y.o. male    Chest Pain   This is a new problem. Pertinent negatives include no fever, shortness of breath, vomiting or weakness. Other  This is a new problem. The current episode started 1 to 4 weeks ago. Associated symptoms include chest pain. Pertinent negatives include no chills, fever, vomiting or weakness. Here in follow up from recent hospital stay for chest pain . Routinely seen by . did have cardiac stent placed on recent admission. Feeling much better. No sob. Just saw cardiology in follow up few days ago. Fasting blood sugar around 160 or so. Hx of anemia for 2 years---also still having elevated sugar readings. Was given trulicity-did not have time to take it   As he did get admitted shortly after that appt      Review of Systems   Constitutional:  Negative for chills and fever. Respiratory:  Negative for shortness of breath. Cardiovascular:  Positive for chest pain. Gastrointestinal:  Negative for diarrhea and vomiting. Neurological:  Negative for weakness. Reviewed allergy, medical, social, surgical, family and med list changes and updated   Files     Social History     Socioeconomic History    Marital status:     Tobacco Use    Smoking status: Former     Packs/day: 1.00     Years: 40.00     Pack years: 40.00     Types: Cigars, Cigarettes     Quit date: 12/10/2020     Years since quittin.2    Smokeless tobacco: Never   Vaping Use    Vaping Use: Never used   Substance and Sexual Activity    Alcohol use: Never    Drug use: Never    Sexual activity: Not Currently     Social Determinants of Health     Financial Resource Strain: Low Risk     Difficulty of Paying Living Expenses: Not hard at all   Food Insecurity: No Food Insecurity    Worried About Running Out of Food in the Last Year: Never true    Ran Out of Food in the Last Year: Never true   Transportation Needs: Unknown    Lack of Transportation (Non-Medical): No   Physical Activity: Inactive    Days of Exercise per Week: 0 days    Minutes of Exercise per Session: 0 min   Housing Stability: Unknown    Unstable Housing in the Last Year: No     Current Outpatient Medications   Medication Sig Dispense Refill    metFORMIN (GLUCOPHAGE) 1000 MG tablet Take 1 tablet by mouth 2 times daily (with meals) TAKE 1 TABLET BY MOUTH TWICE DAILY WITH MEALS 180 tablet 1    hydrALAZINE (APRESOLINE) 100 MG tablet Take 1 tablet by mouth in the morning and at bedtime 180 tablet 1    carvedilol (COREG) 12.5 MG tablet Take 1 tablet by mouth 2 times daily (with meals) 180 tablet 1    aspirin 81 MG chewable tablet Take 1 tablet by mouth daily 90 tablet 1    atorvastatin (LIPITOR) 40 MG tablet Take 1 tablet by mouth daily TAKE 1 TABLET BY MOUTH EVERY NIGHT **APPT NEEDED FOR ADDITIONAL FILLS 90 tablet 1    clopidogrel (PLAVIX) 75 MG tablet Take 1 tablet by mouth daily 90 tablet 3    nitroGLYCERIN (NITROSTAT) 0.4 MG SL tablet Place 1 tablet under the tongue every 5 minutes as needed for Chest pain up to max of 3 total doses. If no relief after 1 dose, call 911. 25 tablet 1    omeprazole (PRILOSEC) 40 MG delayed release capsule Take 1 capsule by mouth daily 90 capsule 1    PARoxetine (PAXIL) 40 MG tablet Take 1 tablet by mouth daily 30 tablet 0    Dulaglutide 0.75 MG/0.5ML SOPN Inject 0.75 mg into the skin once a week 2 mL 5    triamcinolone (KENALOG) 0.1 % cream Apply topically 2 times daily. 454 g 0    losartan (COZAAR) 25 MG tablet Take 1 tablet by mouth daily TAKE 1 TABLET BY MOUTH TWICE DAILY 180 tablet 3    glipiZIDE (GLUCOTROL) 10 MG tablet Take 1 tablet by mouth 2 times daily (before meals) TAKE 1 TABLET BY MOUTH TWICE DAILY 180 tablet 5    blood glucose monitor supplies LANCETS  Test 2 times a day & as needed for symptoms of irregular blood glucose. Dispense sufficient amount for indicated testing frequency plus additional to accommodate PRN testing needs.  100 each 3    blood glucose monitor kit and supplies tEST 2 TIMES A DAY 1 kit 0    blood glucose monitor strips Test 2 times a day & as needed for symptoms of irregular blood glucose. Dispense sufficient amount for indicated testing frequency plus additional to accommodate PRN testing needs. 100 strip 3     No current facility-administered medications for this visit. Family History   Problem Relation Age of Onset    Heart Disease Mother     Diabetes Mother      Past Medical History:   Diagnosis Date    CAD S/P percutaneous coronary angioplasty 2/7/2023    Cancer [facility])     skin left neck    Chronic bronchitis (HCC)     COPD (chronic obstructive pulmonary disease) [facility]     Diabetes mellitus ([facility]     Diverticulitis     Emphysema of lung ([facility]     Heart attack ([facility]     History of blood transfusion     Hyperlipidemia     Hypertension     Meniere's disease     Vertigo      Objective:   /82   Pulse 71   Temp 97.8 °F (36.6 °C)   Resp 14   Ht 5' 5\" (1.651 m)   Wt 163 lb (73.9 kg)   SpO2 99%   BMI 27.12 kg/m²     Physical Exam  Lungs:Clear  Equal b.s bilaterally  Heart:  Rate reg     No murmur  Back:       No  CVA tenderness  Abdomen: B.S present   Soft           No  Tenderness         No  Rebound                    No hepatosplenomegaly. No masses. Gen:      No acute distress  Skin:      No rashes    Assessment:       Diagnosis Orders   1. Hospital discharge follow-up        2. CAD S/P percutaneous coronary angioplasty        3. Type 2 diabetes mellitus without complication, without long-term current use of insulin ([facility]        4. Hypertension, unspecified type        5.  Anemia, unspecified type               Plan:      Orders Placed This Encounter   Procedures    Iron     Standing Status:   Future     Standing Expiration Date:   2/27/2024    Vitamin B12 & Folate     Standing Status:   Future     Standing Expiration Date:   2/27/2024    Continue current medications-glizipide and glucophage -coreg and losartan -and lipitor -does have trulicity at home given to him by primary-o.k to start taking this   Has appt with dr Js Hyatt next month-keep appt.

## 2023-02-28 LAB
FOLATE: 4.6 NG/ML
IRON SATURATION: 17 % (ref 20–55)
IRON: 52 UG/DL (ref 59–158)
TOTAL IRON BINDING CAPACITY: 306 UG/DL (ref 250–450)
UNSATURATED IRON BINDING CAPACITY: 254 UG/DL (ref 112–347)
VITAMIN B-12: 220 PG/ML (ref 232–1245)

## 2023-03-06 RX ORDER — PAROXETINE HYDROCHLORIDE 40 MG/1
40 TABLET, FILM COATED ORAL DAILY
Qty: 30 TABLET | Refills: 0 | Status: SHIPPED | OUTPATIENT
Start: 2023-03-06 | End: 2023-06-04

## 2023-03-09 ENCOUNTER — HOSPITAL ENCOUNTER (INPATIENT)
Dept: CARDIAC CATH/INVASIVE PROCEDURES | Age: 73
LOS: 1 days | Discharge: HOME | End: 2023-03-10
Attending: INTERNAL MEDICINE | Admitting: INTERNAL MEDICINE
Payer: MEDICARE

## 2023-03-09 DIAGNOSIS — R19.7 DIARRHEA, UNSPECIFIED TYPE: Primary | ICD-10-CM

## 2023-03-09 PROBLEM — I25.83 CORONARY ARTERY DISEASE DUE TO LIPID RICH PLAQUE: Status: ACTIVE | Noted: 2023-02-07

## 2023-03-09 LAB
ANION GAP SERPL CALCULATED.3IONS-SCNC: 13 MEQ/L (ref 9–15)
BUN BLDV-MCNC: 15 MG/DL (ref 8–23)
CALCIUM SERPL-MCNC: 8.6 MG/DL (ref 8.5–9.9)
CHLORIDE BLD-SCNC: 104 MEQ/L (ref 95–107)
CO2: 21 MEQ/L (ref 20–31)
CREAT SERPL-MCNC: 1.27 MG/DL (ref 0.7–1.2)
GFR SERPL CREATININE-BSD FRML MDRD: 59 ML/MIN/{1.73_M2}
GLUCOSE BLD-MCNC: 161 MG/DL (ref 70–99)
GLUCOSE BLD-MCNC: 183 MG/DL (ref 70–99)
GLUCOSE BLD-MCNC: 262 MG/DL (ref 70–99)
HCT VFR BLD CALC: 32 % (ref 42–52)
HEMOGLOBIN: 10.7 G/DL (ref 14–18)
MCH RBC QN AUTO: 30.6 PG (ref 27–31.3)
MCHC RBC AUTO-ENTMCNC: 33.4 % (ref 33–37)
MCV RBC AUTO: 91.6 FL (ref 79–92.2)
PDW BLD-RTO: 15 % (ref 11.5–14.5)
PERFORMED ON: ABNORMAL
PLATELET # BLD: 229 K/UL (ref 130–400)
POC ACTIVATED CLOTTING TIME KAOLIN: 305 SEC (ref 82–152)
POC SAMPLE TYPE: ABNORMAL
POTASSIUM SERPL-SCNC: 4.6 MEQ/L (ref 3.4–4.9)
RBC # BLD: 3.5 M/UL (ref 4.7–6.1)
SODIUM BLD-SCNC: 138 MEQ/L (ref 135–144)
WBC # BLD: 4.8 K/UL (ref 4.8–10.8)

## 2023-03-09 PROCEDURE — 99223 1ST HOSP IP/OBS HIGH 75: CPT | Performed by: INTERNAL MEDICINE

## 2023-03-09 PROCEDURE — C1760 CLOSURE DEV, VASC: HCPCS

## 2023-03-09 PROCEDURE — C1725 CATH, TRANSLUMIN NON-LASER: HCPCS

## 2023-03-09 PROCEDURE — 85027 COMPLETE CBC AUTOMATED: CPT

## 2023-03-09 PROCEDURE — 6360000002 HC RX W HCPCS

## 2023-03-09 PROCEDURE — 2580000003 HC RX 258: Performed by: INTERNAL MEDICINE

## 2023-03-09 PROCEDURE — C1769 GUIDE WIRE: HCPCS

## 2023-03-09 PROCEDURE — 6370000000 HC RX 637 (ALT 250 FOR IP): Performed by: INTERNAL MEDICINE

## 2023-03-09 PROCEDURE — 85347 COAGULATION TIME ACTIVATED: CPT

## 2023-03-09 PROCEDURE — 6360000002 HC RX W HCPCS: Performed by: INTERNAL MEDICINE

## 2023-03-09 PROCEDURE — C1887 CATHETER, GUIDING: HCPCS

## 2023-03-09 PROCEDURE — 80048 BASIC METABOLIC PNL TOTAL CA: CPT

## 2023-03-09 PROCEDURE — C1894 INTRO/SHEATH, NON-LASER: HCPCS

## 2023-03-09 PROCEDURE — 027034Z DILATION OF CORONARY ARTERY, ONE ARTERY WITH DRUG-ELUTING INTRALUMINAL DEVICE, PERCUTANEOUS APPROACH: ICD-10-PCS | Performed by: INTERNAL MEDICINE

## 2023-03-09 PROCEDURE — 93005 ELECTROCARDIOGRAM TRACING: CPT | Performed by: NURSE PRACTITIONER

## 2023-03-09 PROCEDURE — 2709999900 HC NON-CHARGEABLE SUPPLY

## 2023-03-09 PROCEDURE — 2500000003 HC RX 250 WO HCPCS

## 2023-03-09 PROCEDURE — 2060000000 HC ICU INTERMEDIATE R&B

## 2023-03-09 PROCEDURE — 92928 PRQ TCAT PLMT NTRAC ST 1 LES: CPT | Performed by: INTERNAL MEDICINE

## 2023-03-09 PROCEDURE — 6360000004 HC RX CONTRAST MEDICATION: Performed by: INTERNAL MEDICINE

## 2023-03-09 PROCEDURE — C1874 STENT, COATED/COV W/DEL SYS: HCPCS

## 2023-03-09 PROCEDURE — C9600 PERC DRUG-EL COR STENT SING: HCPCS

## 2023-03-09 PROCEDURE — 6370000000 HC RX 637 (ALT 250 FOR IP)

## 2023-03-09 RX ORDER — ASPIRIN 81 MG/1
81 TABLET, CHEWABLE ORAL ONCE
Status: DISCONTINUED | OUTPATIENT
Start: 2023-03-09 | End: 2023-03-09

## 2023-03-09 RX ORDER — GLUCAGON 1 MG/ML
1 KIT INJECTION PRN
Status: DISCONTINUED | OUTPATIENT
Start: 2023-03-09 | End: 2023-03-10 | Stop reason: HOSPADM

## 2023-03-09 RX ORDER — HYDRALAZINE HYDROCHLORIDE 100 MG/1
100 TABLET, FILM COATED ORAL ONCE
Status: DISCONTINUED | OUTPATIENT
Start: 2023-03-09 | End: 2023-03-10 | Stop reason: HOSPADM

## 2023-03-09 RX ORDER — ACETAMINOPHEN 325 MG/1
650 TABLET ORAL EVERY 4 HOURS PRN
Status: DISCONTINUED | OUTPATIENT
Start: 2023-03-09 | End: 2023-03-10 | Stop reason: HOSPADM

## 2023-03-09 RX ORDER — ATORVASTATIN CALCIUM 80 MG/1
80 TABLET, FILM COATED ORAL NIGHTLY
Status: DISCONTINUED | OUTPATIENT
Start: 2023-03-09 | End: 2023-03-10 | Stop reason: HOSPADM

## 2023-03-09 RX ORDER — ONDANSETRON 4 MG/1
4 TABLET, ORALLY DISINTEGRATING ORAL EVERY 8 HOURS PRN
Status: DISCONTINUED | OUTPATIENT
Start: 2023-03-09 | End: 2023-03-10 | Stop reason: HOSPADM

## 2023-03-09 RX ORDER — CARVEDILOL 6.25 MG/1
6.25 TABLET ORAL 2 TIMES DAILY
Status: DISCONTINUED | OUTPATIENT
Start: 2023-03-09 | End: 2023-03-10 | Stop reason: HOSPADM

## 2023-03-09 RX ORDER — SODIUM CHLORIDE 0.9 % (FLUSH) 0.9 %
5-40 SYRINGE (ML) INJECTION PRN
Status: DISCONTINUED | OUTPATIENT
Start: 2023-03-09 | End: 2023-03-10 | Stop reason: HOSPADM

## 2023-03-09 RX ORDER — LOSARTAN POTASSIUM 25 MG/1
25 TABLET ORAL ONCE
Status: COMPLETED | OUTPATIENT
Start: 2023-03-09 | End: 2023-03-09

## 2023-03-09 RX ORDER — SODIUM CHLORIDE 0.9 % (FLUSH) 0.9 %
5-40 SYRINGE (ML) INJECTION EVERY 12 HOURS SCHEDULED
Status: DISCONTINUED | OUTPATIENT
Start: 2023-03-09 | End: 2023-03-10 | Stop reason: HOSPADM

## 2023-03-09 RX ORDER — ONDANSETRON 2 MG/ML
4 INJECTION INTRAMUSCULAR; INTRAVENOUS EVERY 6 HOURS PRN
Status: DISCONTINUED | OUTPATIENT
Start: 2023-03-09 | End: 2023-03-10 | Stop reason: HOSPADM

## 2023-03-09 RX ORDER — ASPIRIN 81 MG/1
324 TABLET, CHEWABLE ORAL ONCE
Status: COMPLETED | OUTPATIENT
Start: 2023-03-09 | End: 2023-03-09

## 2023-03-09 RX ORDER — ENOXAPARIN SODIUM 100 MG/ML
40 INJECTION SUBCUTANEOUS DAILY
Status: DISCONTINUED | OUTPATIENT
Start: 2023-03-09 | End: 2023-03-10 | Stop reason: HOSPADM

## 2023-03-09 RX ORDER — HYDRALAZINE HYDROCHLORIDE 20 MG/ML
10 INJECTION INTRAMUSCULAR; INTRAVENOUS EVERY 10 MIN PRN
Status: DISCONTINUED | OUTPATIENT
Start: 2023-03-09 | End: 2023-03-10 | Stop reason: HOSPADM

## 2023-03-09 RX ORDER — DEXTROSE MONOHYDRATE 100 MG/ML
INJECTION, SOLUTION INTRAVENOUS CONTINUOUS PRN
Status: DISCONTINUED | OUTPATIENT
Start: 2023-03-09 | End: 2023-03-10 | Stop reason: HOSPADM

## 2023-03-09 RX ORDER — SODIUM CHLORIDE 9 MG/ML
INJECTION, SOLUTION INTRAVENOUS PRN
Status: DISCONTINUED | OUTPATIENT
Start: 2023-03-09 | End: 2023-03-10 | Stop reason: HOSPADM

## 2023-03-09 RX ORDER — ACETAMINOPHEN 325 MG/1
650 TABLET ORAL EVERY 6 HOURS PRN
Status: DISCONTINUED | OUTPATIENT
Start: 2023-03-09 | End: 2023-03-10 | Stop reason: HOSPADM

## 2023-03-09 RX ORDER — ASPIRIN 81 MG/1
81 TABLET, CHEWABLE ORAL DAILY
Status: DISCONTINUED | OUTPATIENT
Start: 2023-03-10 | End: 2023-03-10 | Stop reason: HOSPADM

## 2023-03-09 RX ORDER — POLYETHYLENE GLYCOL 3350 17 G/17G
17 POWDER, FOR SOLUTION ORAL DAILY PRN
Status: DISCONTINUED | OUTPATIENT
Start: 2023-03-09 | End: 2023-03-10 | Stop reason: HOSPADM

## 2023-03-09 RX ORDER — FENTANYL CITRATE 0.05 MG/ML
25 INJECTION, SOLUTION INTRAMUSCULAR; INTRAVENOUS
Status: ACTIVE | OUTPATIENT
Start: 2023-03-09 | End: 2023-03-10

## 2023-03-09 RX ORDER — MIDAZOLAM HYDROCHLORIDE 2 MG/2ML
2 INJECTION, SOLUTION INTRAMUSCULAR; INTRAVENOUS
Status: ACTIVE | OUTPATIENT
Start: 2023-03-09 | End: 2023-03-10

## 2023-03-09 RX ORDER — LABETALOL HYDROCHLORIDE 5 MG/ML
10 INJECTION, SOLUTION INTRAVENOUS EVERY 30 MIN PRN
Status: DISCONTINUED | OUTPATIENT
Start: 2023-03-09 | End: 2023-03-10 | Stop reason: HOSPADM

## 2023-03-09 RX ORDER — ASPIRIN 81 MG/1
81 TABLET, CHEWABLE ORAL DAILY
Status: DISCONTINUED | OUTPATIENT
Start: 2023-03-09 | End: 2023-03-09

## 2023-03-09 RX ORDER — CLOPIDOGREL BISULFATE 75 MG/1
75 TABLET ORAL DAILY
Status: DISCONTINUED | OUTPATIENT
Start: 2023-03-09 | End: 2023-03-10 | Stop reason: HOSPADM

## 2023-03-09 RX ORDER — LOSARTAN POTASSIUM 25 MG/1
25 TABLET ORAL DAILY
Qty: 180 TABLET | Refills: 3 | Status: SHIPPED | OUTPATIENT
Start: 2023-03-09

## 2023-03-09 RX ORDER — SODIUM CHLORIDE 9 MG/ML
INJECTION, SOLUTION INTRAVENOUS CONTINUOUS
Status: DISCONTINUED | OUTPATIENT
Start: 2023-03-09 | End: 2023-03-10 | Stop reason: HOSPADM

## 2023-03-09 RX ORDER — LOSARTAN POTASSIUM 25 MG/1
25 TABLET ORAL DAILY
Status: DISCONTINUED | OUTPATIENT
Start: 2023-03-09 | End: 2023-03-10 | Stop reason: HOSPADM

## 2023-03-09 RX ORDER — INSULIN LISPRO 100 [IU]/ML
0-4 INJECTION, SOLUTION INTRAVENOUS; SUBCUTANEOUS NIGHTLY
Status: DISCONTINUED | OUTPATIENT
Start: 2023-03-09 | End: 2023-03-10 | Stop reason: HOSPADM

## 2023-03-09 RX ORDER — ACETAMINOPHEN 650 MG/1
650 SUPPOSITORY RECTAL EVERY 6 HOURS PRN
Status: DISCONTINUED | OUTPATIENT
Start: 2023-03-09 | End: 2023-03-10 | Stop reason: HOSPADM

## 2023-03-09 RX ORDER — MORPHINE SULFATE 2 MG/ML
2 INJECTION, SOLUTION INTRAMUSCULAR; INTRAVENOUS
Status: ACTIVE | OUTPATIENT
Start: 2023-03-09 | End: 2023-03-10

## 2023-03-09 RX ORDER — INSULIN LISPRO 100 [IU]/ML
0-4 INJECTION, SOLUTION INTRAVENOUS; SUBCUTANEOUS
Status: DISCONTINUED | OUTPATIENT
Start: 2023-03-09 | End: 2023-03-10 | Stop reason: HOSPADM

## 2023-03-09 RX ORDER — CARVEDILOL 12.5 MG/1
12.5 TABLET ORAL ONCE
Status: COMPLETED | OUTPATIENT
Start: 2023-03-09 | End: 2023-03-09

## 2023-03-09 RX ADMIN — SODIUM CHLORIDE, PRESERVATIVE FREE 10 ML: 5 INJECTION INTRAVENOUS at 16:48

## 2023-03-09 RX ADMIN — ACETAMINOPHEN 650 MG: 325 TABLET ORAL at 18:05

## 2023-03-09 RX ADMIN — ENOXAPARIN SODIUM 40 MG: 100 INJECTION SUBCUTANEOUS at 16:48

## 2023-03-09 RX ADMIN — CARVEDILOL 6.25 MG: 6.25 TABLET, FILM COATED ORAL at 20:51

## 2023-03-09 RX ADMIN — LOSARTAN POTASSIUM 25 MG: 25 TABLET, FILM COATED ORAL at 10:01

## 2023-03-09 RX ADMIN — CLOPIDOGREL BISULFATE 75 MG: 75 TABLET, FILM COATED ORAL at 08:05

## 2023-03-09 RX ADMIN — CARVEDILOL 12.5 MG: 12.5 TABLET, FILM COATED ORAL at 09:54

## 2023-03-09 RX ADMIN — ATORVASTATIN CALCIUM 80 MG: 80 TABLET, FILM COATED ORAL at 20:51

## 2023-03-09 RX ADMIN — ONDANSETRON 4 MG: 2 INJECTION INTRAMUSCULAR; INTRAVENOUS at 11:06

## 2023-03-09 RX ADMIN — ASPIRIN 81 MG CHEWABLE TABLET 81 MG: 81 TABLET CHEWABLE at 08:04

## 2023-03-09 RX ADMIN — ASPIRIN 81 MG 324 MG: 81 TABLET ORAL at 08:49

## 2023-03-09 RX ADMIN — CARVEDILOL 6.25 MG: 6.25 TABLET, FILM COATED ORAL at 16:48

## 2023-03-09 RX ADMIN — IOPAMIDOL 50 ML: 612 INJECTION, SOLUTION INTRAVENOUS at 08:50

## 2023-03-09 RX ADMIN — SODIUM CHLORIDE, PRESERVATIVE FREE 10 ML: 5 INJECTION INTRAVENOUS at 20:51

## 2023-03-09 ASSESSMENT — PAIN DESCRIPTION - LOCATION: LOCATION: HEAD

## 2023-03-09 ASSESSMENT — PAIN SCALES - GENERAL
PAINLEVEL_OUTOF10: 4
PAINLEVEL_OUTOF10: 0
PAINLEVEL_OUTOF10: 0

## 2023-03-09 NOTE — DISCHARGE INSTR - ACTIVITY
No driving for 24 hours. You may shower starting tomorrow. You can change the bandage tomorrow to a band-aid for as long as you can see the puncture site. If the right groin site should start to bleed  after you leave here hold pressure the best you can and call 911.

## 2023-03-09 NOTE — CONSULTS
Consult Note            Date:3/9/2023        Patient Nikole Quintero     YOB: 1950     Age:72 y.o. Reason for Consult: Medical management of multiple episodes of diarrhea    Chief Complaint   Nausea and diarrhea     History Obtained From   patient    History of Present Illness   The patient is a 38-year-old male with significant past medical history of hypertension, MI, CAD s/p percutaneous coronary angioplasty (02/07/2023), hyperlipidemia, DM type II, COPD, chronic bronchitis, diverticulitis, who was admitted for chest pain due to CAD s/p PCI of the proximal mid circumflex RED x1 (03/09/23) under the service of Dr. Julietta Schwab. Currently, patient is complaining of nausea and diarrhea. Patient states that his diarrhea started 2 weeks ago after taking Miralax and Colace for constipation. Patient claims that his BMs are not all the time watery. Today, he ready have 3 BMs. Patient denies any sick encounter or recent travel outside of the [facility]. He also denies fever, chills, dizziness, shortness of breath, chest pain, and abdominal pain. Past Medical History     Past Medical History:   Diagnosis Date    CAD S/P percutaneous coronary angioplasty 2/7/2023    Cancer [facility])     skin left neck    Chronic bronchitis (HCC)     COPD (chronic obstructive pulmonary disease) (HCC)     Diabetes mellitus ([facility]     Diverticulitis     Emphysema of lung ([facility]     Heart attack ([facility]     History of blood transfusion     Hyperlipidemia     Hypertension     Meniere's disease     Vertigo         Past Surgical History     Past Surgical History:   Procedure Laterality Date    CARDIAC SURGERY      stent    SKIN BIOPSY      left neck        Medications     Prior to Admission medications    Medication Sig Start Date End Date Taking?  Authorizing Provider   PARoxetine (PAXIL) 40 MG tablet Take 1 tablet by mouth daily 3/6/23 6/4/23  Orlin Vuong MD   metFORMIN (GLUCOPHAGE) 1000 MG tablet Take 1 tablet by mouth 2 times daily (with meals) TAKE 1 TABLET BY MOUTH TWICE DAILY WITH MEALS 2/23/23   Tylor Oquendo MD   hydrALAZINE (APRESOLINE) 100 MG tablet Take 1 tablet by mouth in the morning and at bedtime 2/7/23   ALLIE Ziegler   carvedilol (COREG) 12.5 MG tablet Take 1 tablet by mouth 2 times daily (with meals) 2/7/23   ALLIE Ziegler   aspirin 81 MG chewable tablet Take 1 tablet by mouth daily 2/4/23   ALLIE Ziegler   atorvastatin (LIPITOR) 40 MG tablet Take 1 tablet by mouth daily TAKE 1 TABLET BY MOUTH EVERY NIGHT **APPT NEEDED FOR ADDITIONAL FILLS 2/3/23   ALLIE Ziegler   clopidogrel (PLAVIX) 75 MG tablet Take 1 tablet by mouth daily 2/3/23   ALLIE Ziegler   nitroGLYCERIN (NITROSTAT) 0.4 MG SL tablet Place 1 tablet under the tongue every 5 minutes as needed for Chest pain up to max of 3 total doses. If no relief after 1 dose, call 911. 2/3/23   ALLIE Ziegler   omeprazole (PRILOSEC) 40 MG delayed release capsule Take 1 capsule by mouth daily 1/30/23   Tylor Oquendo MD   Dulaglutide 0.75 MG/0.5ML SOPN Inject 0.75 mg into the skin once a week 1/26/23   Tylor Oquendo MD   triamcinolone (KENALOG) 0.1 % cream Apply topically 2 times daily. 6/13/22   Selene Calles DO   losartan (COZAAR) 25 MG tablet Take 1 tablet by mouth daily TAKE 1 TABLET BY MOUTH TWICE DAILY 6/5/22   Tylor Oquendo MD   glipiZIDE (GLUCOTROL) 10 MG tablet Take 1 tablet by mouth 2 times daily (before meals) TAKE 1 TABLET BY MOUTH TWICE DAILY 6/5/22   Tylor Oquendo MD   blood glucose monitor supplies LANCETS  Test 2 times a day & as needed for symptoms of irregular blood glucose. Dispense sufficient amount for indicated testing frequency plus additional to accommodate PRN testing needs.  10/7/21   Tylor Oquendo MD   blood glucose monitor kit and supplies tEST 2 TIMES A DAY 10/7/21   Tylor Oquendo MD   blood glucose monitor strips Test 2 times a day & as needed for symptoms of irregular blood glucose. Dispense sufficient amount for indicated testing frequency plus additional to accommodate PRN testing needs.  10/7/21   Rosetta Serna MD   clopidogrel (PLAVIX) 75 MG tablet Take 1 tablet by mouth daily 4/18/21   Rosetta Serna MD        sodium chloride flush 0.9 % injection 5-40 mL, 2 times per day  sodium chloride flush 0.9 % injection 5-40 mL, PRN  0.9 % sodium chloride infusion, PRN  clopidogrel (PLAVIX) tablet 75 mg, Daily  0.9 % sodium chloride infusion, Continuous  sodium chloride flush 0.9 % injection 5-40 mL, 2 times per day  sodium chloride flush 0.9 % injection 5-40 mL, PRN  0.9 % sodium chloride infusion, PRN  acetaminophen (TYLENOL) tablet 650 mg, Q4H PRN  morphine (PF) injection 2 mg, Once PRN  fentaNYL (SUBLIMAZE) injection 25 mcg, Once PRN  midazolam PF (VERSED) injection 2 mg, Once PRN  ondansetron (ZOFRAN) injection 4 mg, Q6H PRN  hydrALAZINE (APRESOLINE) injection 10 mg, Q10 Min PRN  labetalol (NORMODYNE;TRANDATE) injection 10 mg, Q30 Min PRN  hydrALAZINE (APRESOLINE) tablet 100 mg, Once  sodium chloride flush 0.9 % injection 5-40 mL, 2 times per day  sodium chloride flush 0.9 % injection 5-40 mL, PRN  0.9 % sodium chloride infusion, PRN  ondansetron (ZOFRAN-ODT) disintegrating tablet 4 mg, Q8H PRN   Or  ondansetron (ZOFRAN) injection 4 mg, Q6H PRN  acetaminophen (TYLENOL) tablet 650 mg, Q6H PRN   Or  acetaminophen (TYLENOL) suppository 650 mg, Q6H PRN  polyethylene glycol (GLYCOLAX) packet 17 g, Daily PRN  [START ON 3/10/2023] aspirin chewable tablet 81 mg, Daily  atorvastatin (LIPITOR) tablet 80 mg, Nightly  enoxaparin (LOVENOX) injection 40 mg, Daily  carvedilol (COREG) tablet 6.25 mg, BID  losartan (COZAAR) tablet 25 mg, Daily  insulin lispro (HUMALOG) injection vial 0-4 Units, TID WC  insulin lispro (HUMALOG) injection vial 0-4 Units, Nightly  glucose chewable tablet 16 g, PRN  dextrose bolus 10% 125 mL, PRN   Or  dextrose bolus 10% 250 mL, PRN  glucagon injection 1 mg, PRN  dextrose 10 % infusion, Continuous PRN        Allergies   Patient has no known allergies. Social History     Social History       Tobacco History       Smoking Status  Former Quit Date  12/10/2020 Smoking Frequency  1 pack/day for 40.00 years (40.00 pk-yrs) Smoking Tobacco Type  Cigarettes quit in 12/10/2020, Cigars      Smokeless Tobacco Use  Never              Alcohol History       Alcohol Use Status  Never              Drug Use       Drug Use Status  Never              Sexual Activity       Sexually Active  Not Currently                    Family History     Family History   Problem Relation Age of Onset    Heart Disease Mother     Diabetes Mother        Review of Systems   12 point review of systems reviewed with patient with pertinent positive listed in HPI, otherwise, negative. Physical Exam   /67   Pulse 88   Temp 99 °F (37.2 °C) (Oral)   Resp 18   Wt 162 lb 4.8 oz (73.6 kg)   SpO2 97%   BMI 27.01 kg/m²     CONSTITUTIONAL:  awake, alert, cooperative, no apparent distress, and appears stated age  EYES:  sclera clear  ENT:  normocepalic, without obvious abnormality  NECK:  supple, symmetrical, trachea midline  BACK:  symmetric  LUNGS:  No increased work of breathing, good air exchange, clear to auscultation bilaterally, no crackles or wheezing  CARDIOVASCULAR:  Normal apical impulse, regular rate and rhythm, normal S1 and S2, no S3 or S4, and no murmur noted  ABDOMEN:  hyperactive bowel sounds, non-distended, and non-tender  MUSCULOSKELETAL:  there is no redness, warmth, or swelling of the joints  NEUROLOGIC:  Awake, alert, oriented to name, place and time. Cranial nerves II-XII are grossly intact.    SKIN:  normal skin color, texture, turgor    Labs    CBC:  Recent Labs     03/09/23  0751   WBC 4.8   RBC 3.50*   HGB 10.7*   HCT 32.0*   MCV 91.6   RDW 15.0*        CHEMISTRIES:  Recent Labs     03/09/23  0751      K 4.6      CO2 21   BUN 15   CREATININE 1.27*   GLUCOSE 161*     PT/INR:No results for input(s): PROTIME, INR in the last 72 hours. APTT:No results for input(s): APTT in the last 72 hours. LIVER PROFILE:No results for input(s): AST, ALT, BILIDIR, BILITOT, ALKPHOS in the last 72 hours. Imaging/Diagnostics   No results found. Assessment      Hospital Problems             Last Modified POA    * (Principal) Diarrhea 3/9/2023 Yes    Coronary artery disease due to lipid rich plaque 3/9/2023 Yes       Plan     Diarrhea - BM x 3. For stool panel. On continuous NS infusion at 75 mL/hr. Vital signs stable. Will continue to monitor. CAD s/p PCI of the proximal mid circumflex RED x1 - cardiology, Dr. Katelyn Rivas managing. Thank you for consult. Hospital medicine managing acute needs. Patient will need to follow up with PCP for chronic disease management. Time spent evaluating and intervening patient, 35 minutes. Greater than 70% of time spent focused exclusively on this patient, reviewing chart, reconciling medications, and answering questions and discussing treatment plan. Electronically signed by TERRA Restrepo CNP on 3/9/23 at 4:49 PM EST    Will send stool PCR to rule out infectious causes, though I anticipate diarrhea due to stool softeners. Encourage PO hydration. Monitor vitals sp cardiac cath    I personally obtained the key and critical portions of the history and physical exam and made additions where appropriate in the documentation.  I reviewed the mid level documentation and agree with assessment and plan that we come up with together    Yolanda Little, DO  Internal Medicine

## 2023-03-09 NOTE — BRIEF OP NOTE
Section of Cardiology  Adult Brief Cardiac Cath Procedure Note        procedure(s): PCI of the proximal mid circumflex RED x1 (2.25 x 34 mm [place] frontier)      Pre-operative Diagnosis: CAD    H&P Status: Completed and reviewed. Post-operative Diagnosis: CAD    Findings:  See full report  Right dominant system  Left main: Big size vessel mild disease  LAD: Big size vessel mild disease  Circumflex: Medium size vessel proximal 80% stenosis mid 80% stenosis status post successful PCI  RCA: Not engaged known to be patent    Right groin access  6 [place] Melissa sheath  6 [place] EBU 3.5 was used to engage the LCA  0.014 BMW was used to cannulate into the circumflex  A 2.0 x 15 mm balloon was used to predilate the lesions inflated at nominal pressures  A single stent 2.25 x 34 mm [place] frontier was placed in the proximal to mid segment inflated at nominal pressures.   Final angiogram showed 0% residual stenosis, preservation of FANG-3 flow, stents look well opposed well-expanded no dissections    Complications:  none    Recommendations:  Transfer patient back to holding area for post PCI management  Maximize medical therapy   Continue aspirin and high intensity statin indefinitely for secondary prevention of CAD  Continue beta-blocker and ARB  Bedrest for 4 hours  IV hydration at 75 cc/h to complete 1 L   Follow-up with me in 2 weeks  After 4 hours of bedrest patient can be discharged home    Primary Proceduralist:   Андрей Taylor MD    Full procedure note to follow

## 2023-03-09 NOTE — H&P
Cardiology history and physical note    Patient Name: Amanda Santos Date: 3/9/2023  7:00 AM  MR #: [id number]  : 1950    Attending Physician: Paulina Hughes MD  Reason for admission: Diarrhea    History of Presenting Illness:      Juanis Fink is a 74 y.o. male who follows up with me in clinic on hospital day 0 with a history of CAD status post PCI of the proximal RCA RED x1 on 2/3/2022 hypertension hyperlipidemia diabetes COPD who initially came in as an outpatient for staged PCI of the circumflex. Christopher Amend PCI went well patient underwent PCI of the proximal to mid circumflex with RED times one 2.25 x 34 mm [place] frontier. Unfortunately during his recovery time patient had several bouts of diarrhea, patient endorses generalized weakness and requested admission history Obtained From:  patient, electronic medical record    Patient denies chest pain or shortness of breath      History:      Past Medical History:   Diagnosis Date    CAD S/P percutaneous coronary angioplasty 2023    Cancer ([address]     skin left neck    Chronic bronchitis (HCC)     COPD (chronic obstructive pulmonary disease) ([address]     Diabetes mellitus ([address]     Diverticulitis     Emphysema of lung ([address]     Heart attack ([address]     History of blood transfusion     Hyperlipidemia     Hypertension     Meniere's disease     Vertigo      Past Surgical History:   Procedure Laterality Date    CARDIAC SURGERY      stent    SKIN BIOPSY      left neck     Family History  Family History   Problem Relation Age of Onset    Heart Disease Mother     Diabetes Mother      Social History     Socioeconomic History    Marital status:       Spouse name: Not on file    Number of children: Not on file    Years of education: Not on file    Highest education level: Not on file   Occupational History    Not on file   Tobacco Use    Smoking status: Former     Packs/day: 1.00     Years: 40.00     Pack years: 40.00     Types: Cigars, Cigarettes     Quit date: 12/10/2020     Years since quittin.2    Smokeless tobacco: Never   Vaping Use    Vaping Use: Never used   Substance and Sexual Activity    Alcohol use: Never    Drug use: Never    Sexual activity: Not Currently   Other Topics Concern    Not on file   Social History Narrative    Not on file     Social Determinants of Health     Financial Resource Strain: Low Risk     Difficulty of Paying Living Expenses: Not hard at all   Food Insecurity: No Food Insecurity    Worried About [address] in the Last Year: Never true    [address] in the Last Year: Never true   Transportation Needs: Unknown    Lack of Transportation (Medical): Not on file    Lack of Transportation (Non-Medical): No   Physical Activity: Inactive    Days of Exercise per Week: 0 days    Minutes of Exercise per Session: 0 min   Stress: Not on file   Social Connections: Not on file   Intimate Partner Violence: Not on file   Housing Stability: Unknown    Unable to Pay for Housing in the Last Year: Not on file    Number of Places Lived in the Last Year: Not on file    Unstable Housing in the Last Year: No     Home Medications:      Medications Prior to Admission: PARoxetine (PAXIL) 40 MG tablet, Take 1 tablet by mouth daily  metFORMIN (GLUCOPHAGE) 1000 MG tablet, Take 1 tablet by mouth 2 times daily (with meals) TAKE 1 TABLET BY MOUTH TWICE DAILY WITH MEALS  hydrALAZINE (APRESOLINE) 100 MG tablet, Take 1 tablet by mouth in the morning and at bedtime  carvedilol (COREG) 12.5 MG tablet, Take 1 tablet by mouth 2 times daily (with meals)  aspirin 81 MG chewable tablet, Take 1 tablet by mouth daily  atorvastatin (LIPITOR) 40 MG tablet, Take 1 tablet by mouth daily TAKE 1 TABLET BY MOUTH EVERY NIGHT **APPT NEEDED FOR ADDITIONAL FILLS  clopidogrel (PLAVIX) 75 MG tablet, Take 1 tablet by mouth daily  nitroGLYCERIN (NITROSTAT) 0.4 MG SL tablet, Place 1 tablet under the tongue every 5 minutes as needed for Chest pain up to max of 3 total doses.  If no relief after 1 dose, call 911. omeprazole (PRILOSEC) 40 MG delayed release capsule, Take 1 capsule by mouth daily  Dulaglutide 0.75 MG/0.5ML SOPN, Inject 0.75 mg into the skin once a week  triamcinolone (KENALOG) 0.1 % cream, Apply topically 2 times daily. losartan (COZAAR) 25 MG tablet, Take 1 tablet by mouth daily TAKE 1 TABLET BY MOUTH TWICE DAILY  glipiZIDE (GLUCOTROL) 10 MG tablet, Take 1 tablet by mouth 2 times daily (before meals) TAKE 1 TABLET BY MOUTH TWICE DAILY  blood glucose monitor supplies, LANCETS  Test 2 times a day & as needed for symptoms of irregular blood glucose. Dispense sufficient amount for indicated testing frequency plus additional to accommodate PRN testing needs. blood glucose monitor kit and supplies, tEST 2 TIMES A DAY  blood glucose monitor strips, Test 2 times a day & as needed for symptoms of irregular blood glucose. Dispense sufficient amount for indicated testing frequency plus additional to accommodate PRN testing needs.     Current Hospital Medications:   Scheduled Meds:   sodium chloride flush  5-40 mL IntraVENous 2 times per day    clopidogrel  75 mg Oral Daily    sodium chloride flush  5-40 mL IntraVENous 2 times per day    hydrALAZINE  100 mg Oral Once    sodium chloride flush  5-40 mL IntraVENous 2 times per day    [START ON 3/10/2023] aspirin  81 mg Oral Daily    atorvastatin  80 mg Oral Nightly    enoxaparin  40 mg SubCUTAneous Daily    carvedilol  6.25 mg Oral BID    losartan  25 mg Oral Daily    insulin lispro  0-4 Units SubCUTAneous TID WC    insulin lispro  0-4 Units SubCUTAneous Nightly     Continuous Infusions:   sodium chloride      sodium chloride 75 mL/hr at 03/09/23 1606    sodium chloride      sodium chloride      dextrose       PRN Meds:.sodium chloride flush, sodium chloride, sodium chloride flush, sodium chloride, acetaminophen, morphine, fentanNYL, midazolam, ondansetron, hydrALAZINE, labetalol, sodium chloride flush, sodium chloride, ondansetron **OR** ondansetron, acetaminophen **OR** acetaminophen, polyethylene glycol, glucose, dextrose bolus **OR** dextrose bolus, glucagon (rDNA), dextrose   sodium chloride      sodium chloride 75 mL/hr at 03/09/23 1606    sodium chloride      sodium chloride      dextrose        Allergies:   No Known Allergies   Review of Systems:       [x] CV, Resp, Neuro, , and all other systems reviewed and negative other than listed in HPI. Objective Findings:     Vitals:   Vitals:    03/09/23 1100 03/09/23 1115 03/09/23 1545 03/09/23 1602   BP: (!) 145/61 (!) 166/72 136/71 133/67   Pulse: 80  89 88   Resp: 10   18   Temp:   98.8 °F (37.1 °C) 99 °F (37.2 °C)   TempSrc:    Oral   SpO2: 97% 96% 99% 97%   Weight:   162 lb 4.8 oz (73.6 kg)         Physical Examination:  General: Alert oriented x4 not acute distress  HEENT: Normocephalic, no scleral icterus. Neck: No JVD. Heart: Regular, no murmur, no rub/gallop. No RV heave. Lungs: Clear to ascultation, no rales/wheezing/rhonchi. Good chest wall excursion. Abdomen: Positive bowel sounds  Extremities: No clubbing/cyanosis, no edema. Skin: Warm, dry, normal turgor, no rash, no bruise, no petichiae. Neuro: No myoclonus or tremor.    Psych: Normal affect    Results/ Medications reviewed 3/9/2023, 4:59 PM     Laboratory, Microbiology, Pathology, Radiology, Cardiology, Medications and Transcriptions reviewed  Scheduled Meds:   sodium chloride flush  5-40 mL IntraVENous 2 times per day    clopidogrel  75 mg Oral Daily    sodium chloride flush  5-40 mL IntraVENous 2 times per day    hydrALAZINE  100 mg Oral Once    sodium chloride flush  5-40 mL IntraVENous 2 times per day    [START ON 3/10/2023] aspirin  81 mg Oral Daily    atorvastatin  80 mg Oral Nightly    enoxaparin  40 mg SubCUTAneous Daily    carvedilol  6.25 mg Oral BID    losartan  25 mg Oral Daily    insulin lispro  0-4 Units SubCUTAneous TID WC    insulin lispro  0-4 Units SubCUTAneous Nightly     Continuous Infusions:   sodium chloride      sodium chloride 75 mL/hr at 03/09/23 1606    sodium chloride      sodium chloride      dextrose         Recent Labs     03/09/23  0751   WBC 4.8   HGB 10.7*   HCT 32.0*   MCV 91.6        Recent Labs     03/09/23  0751      K 4.6      CO2 21   BUN 15   CREATININE 1.27*     No results for input(s): PROT, INR in the last 72 hours. No results found for this or any previous visit. Impression:   Diarrhea. CAD status post successful PCI of the proximal mid circumflex with RED x1 on 3/9/2023. Hypertension  Hyperlipidemia  Diabetes mellitus COPD  Plan:   Admit patient to telemetry  Consult hospitalist for diarrhea management  Continue aspirin and high intensity statin for secondary prevention of CAD  Continue Plavix  We will keep potassium between 4 and 5 magnesium above 2  We keep hemoglobin above 8  Once diarrhea resolves patient can be discharged home patient should follow-up with me in clinic in 2 weeks  Comments:     Please call if questions or concerns arise. Electronically signed by Nadia Watson MD on 3/9/2023 at 4:59 PM    Please note this report has been partially produced using speech recognition software and may cause contain errors related to that system including grammar, punctuation and spelling as well as words and phrases that may seem inappropriate. If there are questions or concerns please feel free to contact me to clarify.

## 2023-03-09 NOTE — PROGRESS NOTES
Patient conitinues to have nausea and multiple episodes of diarrhea. Dr. Laurent Alcantar aware and with admit patient to floor.  Report given to nurse on [id number]. Groin remains stable, no chest pain or shortness of breath

## 2023-03-09 NOTE — CONSULTS
Cardiac Rehab Consult Note  Name: Hyun Connelly  Age: 74 y.o. Gender: male    Chief Complaint:No chief complaint on file. Primary Care Provider: Lord Kenan MD  InpatientTreatment Team: Treatment Team: Attending Provider: Domo Irwin MD  Admission Date: 3/9/2023    Consult Received from  Dr. Gregorio Mccollum . Reason for consult angina and PAD. Patient visited at bedside. Program and benefits left bedside, patient sleeping. Active Problems:    Coronary artery disease due to lipid rich plaque  Resolved Problems:    * No resolved hospital problems. *       Plan: TBD     All of pt questions were answered. Case will be discussed with physician when appropriate.      Electronically signed by Catie Kiesha on 3/9/2023 at 9:36 AM

## 2023-03-09 NOTE — DISCHARGE INSTRUCTIONS
No driving for 24 hours. OK to shower tomorrow and then remove clear dressing from your right groin. Monitor your right groin for bleeding. If bleeding occurs hold pressure. If unable to control bleeding call 911 or go to the nearest emergency room. No heavy lifting or strenuous activity for 48 hours.

## 2023-03-09 NOTE — PROGRESS NOTES
0915: Returned from cath lab to pre/post cath, R groin is stable, no bleeding or hematoma. VSS. 1115: Pt sitting up in bed states he feels nausea, dry heaving in basin but no emesis. 4mg IV zofran given. VSS. 1130: Pt state he feels better. R groin remains stable, no bleeding or hematoma. VSS.

## 2023-03-10 VITALS
DIASTOLIC BLOOD PRESSURE: 64 MMHG | OXYGEN SATURATION: 98 % | WEIGHT: 160.5 LBS | BODY MASS INDEX: 26.71 KG/M2 | TEMPERATURE: 98.1 F | HEART RATE: 78 BPM | SYSTOLIC BLOOD PRESSURE: 154 MMHG | RESPIRATION RATE: 18 BRPM

## 2023-03-10 LAB
ANION GAP SERPL CALCULATED.3IONS-SCNC: 11 MEQ/L (ref 9–15)
BUN BLDV-MCNC: 17 MG/DL (ref 8–23)
CALCIUM SERPL-MCNC: 7.6 MG/DL (ref 8.5–9.9)
CHLORIDE BLD-SCNC: 111 MEQ/L (ref 95–107)
CO2: 19 MEQ/L (ref 20–31)
CREAT SERPL-MCNC: 1.41 MG/DL (ref 0.7–1.2)
EKG ATRIAL RATE: 90 BPM
EKG P AXIS: 79 DEGREES
EKG P-R INTERVAL: 170 MS
EKG Q-T INTERVAL: 364 MS
EKG QRS DURATION: 80 MS
EKG QTC CALCULATION (BAZETT): 445 MS
EKG R AXIS: 31 DEGREES
EKG T AXIS: 38 DEGREES
EKG VENTRICULAR RATE: 90 BPM
GFR SERPL CREATININE-BSD FRML MDRD: 52.1 ML/MIN/{1.73_M2}
GLUCOSE BLD-MCNC: 134 MG/DL (ref 70–99)
GLUCOSE BLD-MCNC: 143 MG/DL (ref 70–99)
GLUCOSE BLD-MCNC: 153 MG/DL (ref 70–99)
HCT VFR BLD CALC: 27.3 % (ref 42–52)
HEMOGLOBIN: 9.1 G/DL (ref 14–18)
MCH RBC QN AUTO: 30.4 PG (ref 27–31.3)
MCHC RBC AUTO-ENTMCNC: 33.5 % (ref 33–37)
MCV RBC AUTO: 90.5 FL (ref 79–92.2)
PDW BLD-RTO: 14.6 % (ref 11.5–14.5)
PERFORMED ON: ABNORMAL
PERFORMED ON: ABNORMAL
PLATELET # BLD: 198 K/UL (ref 130–400)
POTASSIUM REFLEX MAGNESIUM: 4.1 MEQ/L (ref 3.4–4.9)
RBC # BLD: 3.01 M/UL (ref 4.7–6.1)
SODIUM BLD-SCNC: 141 MEQ/L (ref 135–144)
WBC # BLD: 4.3 K/UL (ref 4.8–10.8)

## 2023-03-10 PROCEDURE — 85027 COMPLETE CBC AUTOMATED: CPT

## 2023-03-10 PROCEDURE — 6370000000 HC RX 637 (ALT 250 FOR IP): Performed by: INTERNAL MEDICINE

## 2023-03-10 PROCEDURE — 80048 BASIC METABOLIC PNL TOTAL CA: CPT

## 2023-03-10 PROCEDURE — 93010 ELECTROCARDIOGRAM REPORT: CPT | Performed by: INTERNAL MEDICINE

## 2023-03-10 PROCEDURE — 2580000003 HC RX 258: Performed by: INTERNAL MEDICINE

## 2023-03-10 PROCEDURE — 36415 COLL VENOUS BLD VENIPUNCTURE: CPT

## 2023-03-10 PROCEDURE — 6360000002 HC RX W HCPCS: Performed by: INTERNAL MEDICINE

## 2023-03-10 RX ADMIN — SODIUM CHLORIDE, PRESERVATIVE FREE 10 ML: 5 INJECTION INTRAVENOUS at 07:54

## 2023-03-10 RX ADMIN — CARVEDILOL 6.25 MG: 6.25 TABLET, FILM COATED ORAL at 07:53

## 2023-03-10 RX ADMIN — CLOPIDOGREL BISULFATE 75 MG: 75 TABLET, FILM COATED ORAL at 07:53

## 2023-03-10 RX ADMIN — ASPIRIN 81 MG 81 MG: 81 TABLET ORAL at 07:53

## 2023-03-10 RX ADMIN — ENOXAPARIN SODIUM 40 MG: 100 INJECTION SUBCUTANEOUS at 07:54

## 2023-03-10 RX ADMIN — LOSARTAN POTASSIUM 25 MG: 25 TABLET, FILM COATED ORAL at 07:54

## 2023-03-10 ASSESSMENT — PAIN SCALES - GENERAL: PAINLEVEL_OUTOF10: 0

## 2023-03-10 NOTE — CARE COORDINATION
[facility] COPIED AND PASTED FROM LAST MONTH DURING INPT STAY . PT WAS DC HOME WITH CARDIAC REHAB :    If patient is discharged prior to next notation, then this note serves as note for discharge by case management. Met with Patient to discuss discharge plan. MET WITH PT. AT BEDSIDE. PT A/OX3. PCP: Darion Zaldivar MD                                Date of Last Visit: ABOUT 1MO AGO     VA Patient: No        VA Notified: no     If no PCP, list provided? N/A     Discharge Planning     Living Arrangements: independently at home     Who do you live with? GIRLFRIEND     Who helps you with your care:  self     If lives at home:                Do you have any barriers navigating in your home? no     Patient can perform ADL? Yes     Current Services (outpatient and in home) :  None     Dialysis: No     Is transportation available to get to your appointments? Yes PT DRIVES OCCASIONALLY, HAS TRANSPORTATION     DME Equipment:  yes - GLUCOMETER, DM SUPPLIES     Respiratory equipment: None     Respiratory provider:  no     Pharmacy:  yes - Erasmo Barriga with Medication Assistance Program?  No       Patient agreeable to [facility]? Declined     Patient agreeable to SNF/Rehab? Declined     Other discharge needs identified? Cardiac Rehab     Does Patient Have a High-Risk for Readmission Diagnosis (CHF, PN, MI, COPD)? Yes     If Yes,     Consult with pulmonologist? No  Consult with cardiologist? Yes  Cardiac Rehab referral if EF <35%? Yes  Consult with Pharmacy for medication assessment prior to discharge? Yes  Consult with Behavioral health to aid in depression, anxiety, or coping issues? N/A  Palliative Care Consult? No  Pulmonary Rehab order for COPD, PN, and CHF (if EF > 35%)? N/A   Does patient have a reliable scale and know how to read it (for CHF)? N/A  Nutrition consult for CHF?  N/A  Respiratory therapy consult that includes bedside instruction on administration of nebulizers and/or inhalers, and assessment of oxygen and equipment needs in the home? No     Initial Discharge Plan? (Note: please see concurrent daily documentation for any updates after initial note). RETURN HOME W/GF. DENIES NEEDS. CARDIAC REHAB HAS MET WITH PT AT BEDSIDE. 3/10 - I CALLED PT TO DISCUSS ABOVE AND HE PLANS TO RETURN HOME W GF AND THINKS HE WILL BE D/CD HOME TODAY. HE DENIES ANY NEEDS FOR HOME. HE DOES NOT HAVE OR USE MOBILITY EQUIP OR RESP EQUIP AND STATES HIS GF DOES. HE WILL HAVE CARDIAC REHAB FROM PRIOR STAY THAT HAS NOT STARTED YET.    Readmission Risk              Risk of Unplanned Readmission:  16

## 2023-03-10 NOTE — FLOWSHEET NOTE
0800- Assessment complete. Patient alert and oriented X 4. Vital signs stable. Medications given as ordered. Right groin soft, dressing clean, dry and intact. Juan Goss and Dr. JIANG [facility] [employer] state patient is cleared for discharge. Patient notified and states his ride will pick him up at 1700.  1430- Vital signs obtained and stable. 1655- Discharge instructions given to patient who verbalized understanding. Transport in to wheel patient out the front entrance. Electronically signed by Syed Lainez on 3/10/2023 at 5:01 PM

## 2023-03-10 NOTE — DISCHARGE INSTR - DIET
Good nutrition is important when healing from an illness, injury, or surgery. Follow any nutrition recommendations given to you during your hospital stay. If you were given an oral nutrition supplement while in the hospital, continue to take this supplement at home. You can take it with meals, in-between meals, and/or before bedtime. These supplements can be purchased at most local grocery stores, pharmacies, and chain [facility]-stores. If you have any questions about your diet or nutrition, call the hospital and ask for the dietitian.   Diabetic, Low Sodium

## 2023-03-13 ENCOUNTER — TELEPHONE (OUTPATIENT)
Dept: FAMILY MEDICINE CLINIC | Age: 73
End: 2023-03-13

## 2023-03-13 NOTE — TELEPHONE ENCOUNTER
Care Transitions Initial Follow Up Call    Outreach made within 2 business days of discharge: Yes    Patient: Candice Hurtado Patient : 1950   MRN: [id number]  Reason for Admission: There are no discharge diagnoses documented for the most recent discharge. Discharge Date: 3/10/23       Spoke with: Pt    Discharge department/facility: [facility] Interactive Patient Contact:  Was patient able to fill all prescriptions: Yes  Was patient instructed to bring all medications to the follow-up visit: Yes  Is patient taking all medications as directed in the discharge summary?  Yes  Does patient understand their discharge instructions: Yes  Does patient have questions or concerns that need addressed prior to 7-14 day follow up office visit: no    Scheduled appointment with PCP within 7-14 days    Follow Up  Future Appointments   Date Time Provider Jeff Martinez   3/17/2023 10:00 AM Katarina Siegel MD [address] [place] [address] [place]   3/23/2023 11:30 AM Katarina Siegel MD [facility] [place]   2023 11:45 AM Jayna Hartman MD [place], [place]

## 2023-03-16 NOTE — PROGRESS NOTES
Post-Discharge Transitional Care  Follow Up      Gabe Carlson   YOB: 1950    Date of Office Visit:  3/17/2023  Date of Hospital Admission: 3/9/23  Date of Hospital Discharge: 3/10/23  Risk of hospital readmission (high >=14%. Medium >=10%) :Readmission Risk Score: 17.7      Care management risk score Rising risk (score 2-5) and Complex Care (Scores >=6): No Risk Score On File     Non face to face  following discharge, date last encounter closed (first attempt may have been earlier): 03/13/2023    Call initiated 2 business days of discharge: Yes    ASSESSMENT/PLAN:   CAD S/P percutaneous coronary angioplasty  Type 2 diabetes mellitus without complication, without long-term current use of insulin ([address]  Anxiety  Essential hypertension  Hospital discharge follow-up  -     LA DISCHARGE MEDS RECONCILED W/ CURRENT OUTPATIENT MED LIST  Anemia, unspecified type  -     CBC with Auto Differential; Future  -     Vitamin B12; Future  -     Iron and TIBC; Future  -     Ferritin; Future  Other vitamin B12 deficiency anemias   -     Vitamin B12; Future      Medical Decision Making: moderate complexity  Return in about 3 months (around 6/17/2023). On this date 3/17/2023 I have spent   30 minutes reviewing previous notes, test results and face to face with the patient discussing the diagnosis and importance of compliance with the treatment plan as well as documenting on the day of the visit. Subjective:   HPI:  Follow up of Hospital problems/diagnosis(es): CAD s/p PCI, diarrhea. Inpatient course: Discharge summary reviewed- see chart. Interval history/Current status: The patient is doing well since being discharged home.     Patient Active Problem List   Diagnosis    NSTEMI (non-ST elevated myocardial infarction) ([address]    AMI (acute myocardial infarction) ([address]    Rectal bleed    Diverticulitis    Type 2 diabetes mellitus, without long-term current use of insulin ([address]    Uncontrolled hypertension Other hyperlipidemia    Anxiety    Contusion of rib on left side    Chest pain    Coronary artery disease due to lipid rich plaque    Diarrhea       Medications listed as ordered at the time of discharge from hospital     Medication List            Accurate as of March 17, 2023  3:22 PM. If you have any questions, ask your nurse or doctor. START taking these medications      multivitamin Tabs tablet  Take 1 tablet by mouth daily  Started by: Nigel Shen MD     pantoprazole 40 MG tablet  Commonly known as: PROTONIX  Take 1 tablet by mouth every morning (before breakfast)  Started by: Nigel Shen MD            CONTINUE taking these medications      aspirin 81 MG chewable tablet  Take 1 tablet by mouth daily     atorvastatin 40 MG tablet  Commonly known as: LIPITOR  Take 1 tablet by mouth daily TAKE 1 TABLET BY MOUTH EVERY NIGHT **APPT NEEDED FOR ADDITIONAL FILLS     * blood glucose monitor supplies Supplies  LANCETS  Test 2 times a day & as needed for symptoms of irregular blood glucose. Dispense sufficient amount for indicated testing frequency plus additional to accommodate PRN testing needs. * blood glucose monitor kit and supplies  tEST 2 TIMES A DAY     blood glucose test strips  Test 2 times a day & as needed for symptoms of irregular blood glucose. Dispense sufficient amount for indicated testing frequency plus additional to accommodate PRN testing needs.      carvedilol 12.5 MG tablet  Commonly known as: COREG  Take 1 tablet by mouth 2 times daily (with meals)     clopidogrel 75 MG tablet  Commonly known as: PLAVIX  Take 1 tablet by mouth daily     Dulaglutide 0.75 MG/0.5ML Sopn  Inject 0.75 mg into the skin once a week     glipiZIDE 10 MG tablet  Commonly known as: GLUCOTROL  Take 1 tablet by mouth 2 times daily (before meals) TAKE 1 TABLET BY MOUTH TWICE DAILY     hydrALAZINE 100 MG tablet  Commonly known as: APRESOLINE  Take 1 tablet by mouth in the morning and at bedtime losartan 25 MG tablet  Commonly known as: COZAAR  Take 1 tablet by mouth daily TAKE 1 TABLET BY MOUTH TWICE DAILY     metFORMIN 1000 MG tablet  Commonly known as: GLUCOPHAGE  Take 1 tablet by mouth 2 times daily (with meals) TAKE 1 TABLET BY MOUTH TWICE DAILY WITH MEALS     nitroGLYCERIN 0.4 MG SL tablet  Commonly known as: NITROSTAT  Place 1 tablet under the tongue every 5 minutes as needed for Chest pain up to max of 3 total doses. If no relief after 1 dose, call 911. PARoxetine 40 MG tablet  Commonly known as: PAXIL  Take 1 tablet by mouth daily     triamcinolone 0.1 % cream  Commonly known as: KENALOG  Apply topically 2 times daily. * This list has 2 medication(s) that are the same as other medications prescribed for you. Read the directions carefully, and ask your doctor or other care provider to review them with you. STOP taking these medications      omeprazole 40 MG delayed release capsule  Commonly known as: PRILOSEC  Stopped by: Edenilson Kelsey MD               Where to Get Your Medications        These medications were sent to [facility] [address] [address] [id number]      Phone: [phone]   multivitamin Tabs tablet  pantoprazole 40 MG tablet           Medications marked \"taking\" at this time  Outpatient Medications Marked as Taking for the 3/17/23 encounter (Office Visit) with Edenilson Kelsey MD   Medication Sig Dispense Refill    Multiple Vitamin (MULTIVITAMIN) TABS tablet Take 1 tablet by mouth daily 30 tablet 0    pantoprazole (PROTONIX) 40 MG tablet Take 1 tablet by mouth every morning (before breakfast) 90 tablet 3        Medications patient taking as of now reconciled against medications ordered at time of hospital discharge: Yes    A comprehensive review of systems was negative except for what was noted in the HPI.     Objective:    /60   Pulse 85   Resp 14   Ht 5' 6\" (1.676 m)   Wt 162 lb (73.5 kg)   SpO2 97%   BMI 26.15 kg/m²   General Appearance: alert and oriented to person, place and time, well developed and well- nourished, in no acute distress  Skin: warm and dry, no rash or erythema  Head: normocephalic and atraumatic  Eyes: pupils equal, round, and reactive to light, extraocular eye movements intact, conjunctivae normal  ENT: tympanic membrane, external ear and ear canal normal bilaterally, nose without deformity, nasal mucosa and turbinates normal without polyps  Neck: supple and non-tender without mass, no thyromegaly or thyroid nodules, no cervical lymphadenopathy  Pulmonary/Chest: clear to auscultation bilaterally- no wheezes, rales or rhonchi, normal air movement, no respiratory distress  Cardiovascular: normal rate, regular rhythm, normal S1 and S2, no murmurs, rubs, clicks, or gallops, distal pulses intact, no carotid bruits  Abdomen: soft, non-tender, non-distended, normal bowel sounds, no masses or organomegaly  Extremities: no cyanosis, clubbing or edema  Musculoskeletal: normal range of motion, no joint swelling, deformity or tenderness  Neurologic: reflexes normal and symmetric, no cranial nerve deficit, gait, coordination and speech normal      An electronic signature was used to authenticate this note.   --Tana Rick MD

## 2023-03-17 ENCOUNTER — OFFICE VISIT (OUTPATIENT)
Dept: FAMILY MEDICINE CLINIC | Age: 73
End: 2023-03-17

## 2023-03-17 VITALS
HEIGHT: 66 IN | DIASTOLIC BLOOD PRESSURE: 60 MMHG | RESPIRATION RATE: 14 BRPM | OXYGEN SATURATION: 97 % | BODY MASS INDEX: 26.03 KG/M2 | HEART RATE: 85 BPM | SYSTOLIC BLOOD PRESSURE: 110 MMHG | WEIGHT: 162 LBS

## 2023-03-17 DIAGNOSIS — D51.8 OTHER VITAMIN B12 DEFICIENCY ANEMIAS: ICD-10-CM

## 2023-03-17 DIAGNOSIS — D64.9 ANEMIA, UNSPECIFIED TYPE: ICD-10-CM

## 2023-03-17 DIAGNOSIS — E11.9 TYPE 2 DIABETES MELLITUS WITHOUT COMPLICATION, WITHOUT LONG-TERM CURRENT USE OF INSULIN (HCC): ICD-10-CM

## 2023-03-17 DIAGNOSIS — I25.10 CAD S/P PERCUTANEOUS CORONARY ANGIOPLASTY: Primary | ICD-10-CM

## 2023-03-17 DIAGNOSIS — I10 ESSENTIAL HYPERTENSION: ICD-10-CM

## 2023-03-17 DIAGNOSIS — Z98.61 CAD S/P PERCUTANEOUS CORONARY ANGIOPLASTY: Primary | ICD-10-CM

## 2023-03-17 DIAGNOSIS — Z09 HOSPITAL DISCHARGE FOLLOW-UP: ICD-10-CM

## 2023-03-17 DIAGNOSIS — F41.9 ANXIETY: ICD-10-CM

## 2023-03-17 LAB
BASOPHILS # BLD: 0.1 K/UL (ref 0–0.2)
BASOPHILS NFR BLD: 1.3 %
EOSINOPHIL # BLD: 0.2 K/UL (ref 0–0.7)
EOSINOPHIL NFR BLD: 3.5 %
ERYTHROCYTE [DISTWIDTH] IN BLOOD BY AUTOMATED COUNT: 14.8 % (ref 11.5–14.5)
HCT VFR BLD AUTO: 32.8 % (ref 42–52)
HGB BLD-MCNC: 10.9 G/DL (ref 14–18)
LYMPHOCYTES # BLD: 1.2 K/UL (ref 1–4.8)
LYMPHOCYTES NFR BLD: 21.9 %
MCH RBC QN AUTO: 30.2 PG (ref 27–31.3)
MCHC RBC AUTO-ENTMCNC: 33.3 % (ref 33–37)
MCV RBC AUTO: 90.5 FL (ref 79–92.2)
MONOCYTES # BLD: 0.4 K/UL (ref 0.2–0.8)
MONOCYTES NFR BLD: 7.4 %
NEUTROPHILS # BLD: 3.7 K/UL (ref 1.4–6.5)
NEUTS SEG NFR BLD: 65.9 %
PLATELET # BLD AUTO: 273 K/UL (ref 130–400)
RBC # BLD AUTO: 3.62 M/UL (ref 4.7–6.1)
WBC # BLD AUTO: 5.5 K/UL (ref 4.8–10.8)

## 2023-03-17 RX ORDER — PANTOPRAZOLE SODIUM 40 MG/1
40 TABLET, DELAYED RELEASE ORAL
Qty: 90 TABLET | Refills: 3 | Status: SHIPPED | OUTPATIENT
Start: 2023-03-17 | End: 2023-06-15

## 2023-03-17 RX ORDER — MULTIVITAMIN WITH IRON
1 TABLET ORAL DAILY
Qty: 30 TABLET | Refills: 0 | Status: SHIPPED | OUTPATIENT
Start: 2023-03-17 | End: 2023-04-16

## 2023-03-18 LAB
FERRITIN: 49 NG/ML (ref 30–400)
IRON SATURATION: 16 % (ref 20–55)
IRON: 51 UG/DL (ref 59–158)
TOTAL IRON BINDING CAPACITY: 323 UG/DL (ref 250–450)
UNSATURATED IRON BINDING CAPACITY: 272 UG/DL (ref 112–347)
VITAMIN B-12: 183 PG/ML (ref 232–1245)

## 2023-03-19 RX ORDER — CHOLECALCIFEROL (VITAMIN D3) 125 MCG
500 CAPSULE ORAL DAILY
Qty: 30 TABLET | Refills: 3 | Status: SHIPPED | OUTPATIENT
Start: 2023-03-19 | End: 2024-03-18

## 2023-03-19 RX ORDER — FERROUS SULFATE 325(65) MG
325 TABLET ORAL
Qty: 30 TABLET | Refills: 5 | Status: SHIPPED | OUTPATIENT
Start: 2023-03-19

## 2023-03-20 RX ORDER — GLIPIZIDE 10 MG/1
10 TABLET ORAL
Qty: 180 TABLET | Refills: 1 | Status: SHIPPED | OUTPATIENT
Start: 2023-03-20

## 2023-03-20 RX ORDER — CHOLECALCIFEROL (VITAMIN D3) 125 MCG
500 CAPSULE ORAL DAILY
Qty: 90 TABLET | OUTPATIENT
Start: 2023-03-20 | End: 2024-03-19

## 2023-03-20 RX ORDER — PNV NO.95/FERROUS FUM/FOLIC AC 28MG-0.8MG
TABLET ORAL
Qty: 90 TABLET | OUTPATIENT
Start: 2023-03-20

## 2023-03-20 NOTE — TELEPHONE ENCOUNTER
Future Appointments    Encounter Information    Provider Department Appt Notes   5/24/2023 Irving Garcia MD [facility] Return in about 3 months (around 5/24/2023).    6/19/2023 Shaneka Navarro, [address] [place] [address] Primary and Specialty Care 3 months follow up     Past Visits    Date Provider Specialty Visit Type Primary Dx   03/17/2023 Shaneka Navarro MD Family Medicine Office Visit CAD S/P percutaneous coronary angioplasty

## 2023-03-22 ENCOUNTER — OFFICE VISIT (OUTPATIENT)
Dept: CARDIOLOGY CLINIC | Age: 73
End: 2023-03-22
Payer: MEDICARE

## 2023-03-22 VITALS
HEIGHT: 65 IN | DIASTOLIC BLOOD PRESSURE: 62 MMHG | SYSTOLIC BLOOD PRESSURE: 120 MMHG | BODY MASS INDEX: 26.99 KG/M2 | HEART RATE: 65 BPM | OXYGEN SATURATION: 97 % | RESPIRATION RATE: 15 BRPM | WEIGHT: 162 LBS

## 2023-03-22 DIAGNOSIS — J44.9 CHRONIC OBSTRUCTIVE PULMONARY DISEASE, UNSPECIFIED COPD TYPE (HCC): Primary | ICD-10-CM

## 2023-03-22 DIAGNOSIS — I25.83 CORONARY ARTERY DISEASE DUE TO LIPID RICH PLAQUE: ICD-10-CM

## 2023-03-22 DIAGNOSIS — I21.4 NSTEMI (NON-ST ELEVATED MYOCARDIAL INFARCTION) (HCC): ICD-10-CM

## 2023-03-22 DIAGNOSIS — E11.9 TYPE 2 DIABETES MELLITUS WITHOUT COMPLICATION, WITHOUT LONG-TERM CURRENT USE OF INSULIN (HCC): ICD-10-CM

## 2023-03-22 DIAGNOSIS — Z98.61 CAD S/P PERCUTANEOUS CORONARY ANGIOPLASTY: ICD-10-CM

## 2023-03-22 DIAGNOSIS — I25.10 CORONARY ARTERY DISEASE DUE TO LIPID RICH PLAQUE: ICD-10-CM

## 2023-03-22 DIAGNOSIS — Z95.5 STATUS POST CORONARY ARTERY STENT PLACEMENT: ICD-10-CM

## 2023-03-22 DIAGNOSIS — I21.4 NSTEMI (NON-ST ELEVATED MYOCARDIAL INFARCTION) (HCC): Primary | ICD-10-CM

## 2023-03-22 DIAGNOSIS — I25.10 CAD S/P PERCUTANEOUS CORONARY ANGIOPLASTY: ICD-10-CM

## 2023-03-22 DIAGNOSIS — I21.4 ACUTE NON-ST ELEVATION MYOCARDIAL INFARCTION (NSTEMI) (HCC): ICD-10-CM

## 2023-03-22 PROCEDURE — G8417 CALC BMI ABV UP PARAM F/U: HCPCS | Performed by: INTERNAL MEDICINE

## 2023-03-22 PROCEDURE — 2022F DILAT RTA XM EVC RTNOPTHY: CPT | Performed by: INTERNAL MEDICINE

## 2023-03-22 PROCEDURE — 1036F TOBACCO NON-USER: CPT | Performed by: INTERNAL MEDICINE

## 2023-03-22 PROCEDURE — 1111F DSCHRG MED/CURRENT MED MERGE: CPT | Performed by: INTERNAL MEDICINE

## 2023-03-22 PROCEDURE — 3078F DIAST BP <80 MM HG: CPT | Performed by: INTERNAL MEDICINE

## 2023-03-22 PROCEDURE — 1123F ACP DISCUSS/DSCN MKR DOCD: CPT | Performed by: INTERNAL MEDICINE

## 2023-03-22 PROCEDURE — 3052F HG A1C>EQUAL 8.0%<EQUAL 9.0%: CPT | Performed by: INTERNAL MEDICINE

## 2023-03-22 PROCEDURE — 99213 OFFICE O/P EST LOW 20 MIN: CPT | Performed by: INTERNAL MEDICINE

## 2023-03-22 PROCEDURE — G8427 DOCREV CUR MEDS BY ELIG CLIN: HCPCS | Performed by: INTERNAL MEDICINE

## 2023-03-22 PROCEDURE — G8484 FLU IMMUNIZE NO ADMIN: HCPCS | Performed by: INTERNAL MEDICINE

## 2023-03-22 PROCEDURE — 3074F SYST BP LT 130 MM HG: CPT | Performed by: INTERNAL MEDICINE

## 2023-03-22 PROCEDURE — 3017F COLORECTAL CA SCREEN DOC REV: CPT | Performed by: INTERNAL MEDICINE

## 2023-03-22 ASSESSMENT — ENCOUNTER SYMPTOMS
WHEEZING: 0
APNEA: 0
EYE REDNESS: 0
CONSTIPATION: 0
RHINORRHEA: 0
DIARRHEA: 0
CHEST TIGHTNESS: 0
COUGH: 0
SHORTNESS OF BREATH: 0
NAUSEA: 0
VOMITING: 0
ABDOMINAL PAIN: 0
COLOR CHANGE: 0

## 2023-03-22 NOTE — PROGRESS NOTES
calculate for the following reasons: The patient has a prior MI or stroke diagnosis     ASSESSMENT:     Diagnosis Orders   1. NSTEMI (non-ST elevated myocardial infarction) ([facility]        2. Coronary artery disease due to lipid rich plaque        3. Acute non-ST elevation myocardial infarction (NSTEMI) ([facility]        4. Type 2 diabetes mellitus without complication, without long-term current use of insulin (HCC)          PLAN:   CAD status post PCI of the pRCA DESX1 with a 3.0 x 22 mm on 2/3/2023, RCA PCI 2020. 3/9/2023 PCI of proximal mid circumflex RED x1 (2.25 x 34 mm [facility])  Patient denies chest pain reports shortness of breath  continue aspirin and high intensity statin for secondary prevention of CAD  Continue Plavix ideally for 1 year  Continue Coreg  I will refer patient to cardiac rehab     Hypertension  Continue losartan 25 mg daily  Continue hydralazine 100 mg p.o. twice daily  Continue Coreg 12.5 mg p.o. twice daily    Hyperlipidemia  Continue atorvastatin    Diabetes  As per PCP    COPD  Refer patient to pulmonology    Return to clinic in 6 months    Carotid ultrasound and CON in the future    Recommended patient to eat diets that emphasize high intakes of vegetables, fruits, nuts, whole grains, lean vegetable or animal protein, and fish. As per 2019 ACC/AHA guideline on primary prevention of CVD     Recommended patient to engage in at least 150 minutes per week of accumulated moderate-intensity physical activity or 75 minutes per week of vigorous-intensity physical activity as per 2019 ACC/AHA guideline on primary prevention of CVD       This note was transcribed using voice recognition software. Every effort was made to ensure accuracy; however, inadvertent computerized transcription errors may be present.

## 2023-03-30 ENCOUNTER — TELEPHONE (OUTPATIENT)
Dept: CARDIOLOGY CLINIC | Age: 73
End: 2023-03-30

## 2023-03-30 DIAGNOSIS — I25.83 CORONARY ARTERY DISEASE DUE TO LIPID RICH PLAQUE: Primary | ICD-10-CM

## 2023-03-30 DIAGNOSIS — I25.10 CORONARY ARTERY DISEASE DUE TO LIPID RICH PLAQUE: Primary | ICD-10-CM

## 2023-03-30 DIAGNOSIS — I21.4 NSTEMI (NON-ST ELEVATED MYOCARDIAL INFARCTION) (HCC): ICD-10-CM

## 2023-03-30 NOTE — TELEPHONE ENCOUNTER
----- Message from Oralia Flores sent at 3/27/2023  9:05 AM EDT -----  Regarding: DX code  HI Francine can you change the reason for referral on this order to a PTCA/stent? It says CABG but he did not have that done. Thanks!   Electronically signed by Oralia Flores on 3/27/2023 at 9:06 AM

## 2023-04-06 RX ORDER — PAROXETINE HYDROCHLORIDE 40 MG/1
40 TABLET, FILM COATED ORAL DAILY
Qty: 30 TABLET | Refills: 0 | Status: SHIPPED | OUTPATIENT
Start: 2023-04-06 | End: 2023-07-05

## 2023-05-08 RX ORDER — PAROXETINE HYDROCHLORIDE 40 MG/1
40 TABLET, FILM COATED ORAL DAILY
Qty: 30 TABLET | Refills: 0 | Status: SHIPPED | OUTPATIENT
Start: 2023-05-08 | End: 2023-08-06

## 2023-05-11 ASSESSMENT — ENCOUNTER SYMPTOMS
SINUS PAIN: 0
COLOR CHANGE: 0
APNEA: 0
SINUS PRESSURE: 0
SORE THROAT: 0
CHEST TIGHTNESS: 0
EYE DISCHARGE: 0
COUGH: 0
WHEEZING: 0
VOICE CHANGE: 0
RECTAL PAIN: 0
BACK PAIN: 0
TROUBLE SWALLOWING: 0
RHINORRHEA: 0
VOMITING: 0
SHORTNESS OF BREATH: 0
NAUSEA: 0
ABDOMINAL DISTENTION: 0
PHOTOPHOBIA: 0
ABDOMINAL PAIN: 0
BLOOD IN STOOL: 0
CONSTIPATION: 0
DIARRHEA: 0
FACIAL SWELLING: 0
EYE PAIN: 0
EYE REDNESS: 0
EYE ITCHING: 0

## 2023-05-11 NOTE — PROGRESS NOTES
long-term current use of insulin (HCC)  - glipizide  10 bid  -continue Metformin to 1000 mg twice daily    Anxiety-continue Paxil 40 mg po dialy     Coronary artery disease involving native heart without angina pectoris, unspecified vessel or lesion type-continue plavix , lipitor    Hypertension -lopressor 25 bid, cozaar 25 mg, diltiazem 120 mg daily,  Metoprolol 25 bid     GERD- Protonix 40 MG COBY Y    Return in about 5 weeks (around 6/19/2023). On this date 05/12/23 I have spent 20 minutes reviewing previous notes, test results and face to face with the patient discussing the diagnosis and importance of compliance with the treatment plan. Malorie Magaña MD    Please note, this report has been partially produced using speech recognition software  and may cause  and /or contain errors related to that system including grammar, punctuation and spelling as well as words and phrases that may seem inappropriate. If there are questions or concerns please feel free to contact me to clarify.

## 2023-05-12 ENCOUNTER — TELEMEDICINE (OUTPATIENT)
Dept: FAMILY MEDICINE CLINIC | Age: 73
End: 2023-05-12
Payer: MEDICARE

## 2023-05-12 DIAGNOSIS — F41.9 ANXIETY: Primary | ICD-10-CM

## 2023-05-12 DIAGNOSIS — I10 ESSENTIAL HYPERTENSION: ICD-10-CM

## 2023-05-12 DIAGNOSIS — Z98.61 CAD S/P PERCUTANEOUS CORONARY ANGIOPLASTY: ICD-10-CM

## 2023-05-12 DIAGNOSIS — K57.93 GASTROINTESTINAL HEMORRHAGE ASSOCIATED WITH INTESTINAL DIVERTICULITIS: ICD-10-CM

## 2023-05-12 DIAGNOSIS — D50.9 IRON DEFICIENCY ANEMIA, UNSPECIFIED IRON DEFICIENCY ANEMIA TYPE: ICD-10-CM

## 2023-05-12 DIAGNOSIS — D51.8 OTHER VITAMIN B12 DEFICIENCY ANEMIAS: ICD-10-CM

## 2023-05-12 DIAGNOSIS — I25.10 CAD S/P PERCUTANEOUS CORONARY ANGIOPLASTY: ICD-10-CM

## 2023-05-12 DIAGNOSIS — E11.9 TYPE 2 DIABETES MELLITUS WITHOUT COMPLICATION, WITHOUT LONG-TERM CURRENT USE OF INSULIN (HCC): ICD-10-CM

## 2023-05-12 PROCEDURE — 3017F COLORECTAL CA SCREEN DOC REV: CPT | Performed by: INTERNAL MEDICINE

## 2023-05-12 PROCEDURE — G8428 CUR MEDS NOT DOCUMENT: HCPCS | Performed by: INTERNAL MEDICINE

## 2023-05-12 PROCEDURE — G8417 CALC BMI ABV UP PARAM F/U: HCPCS | Performed by: INTERNAL MEDICINE

## 2023-05-12 PROCEDURE — 99214 OFFICE O/P EST MOD 30 MIN: CPT | Performed by: INTERNAL MEDICINE

## 2023-05-12 PROCEDURE — 1123F ACP DISCUSS/DSCN MKR DOCD: CPT | Performed by: INTERNAL MEDICINE

## 2023-05-12 PROCEDURE — 3052F HG A1C>EQUAL 8.0%<EQUAL 9.0%: CPT | Performed by: INTERNAL MEDICINE

## 2023-05-12 PROCEDURE — 2022F DILAT RTA XM EVC RTNOPTHY: CPT | Performed by: INTERNAL MEDICINE

## 2023-05-12 PROCEDURE — 1036F TOBACCO NON-USER: CPT | Performed by: INTERNAL MEDICINE

## 2023-05-26 NOTE — TELEPHONE ENCOUNTER
Rx requested:  Requested Prescriptions     Pending Prescriptions Disp Refills    metFORMIN (GLUCOPHAGE) 1000 MG tablet 180 tablet 1     Sig: Take 1 tablet by mouth 2 times daily (with meals) TAKE 1 TABLET BY MOUTH TWICE DAILY WITH MEALS    Multiple Vitamin (MULTIVITAMIN) TABS tablet 30 tablet 0     Sig: Take 1 tablet by mouth daily    vitamin B-12 (CYANOCOBALAMIN) 500 MCG tablet 30 tablet 3     Sig: Take 1 tablet by mouth daily    ferrous sulfate (IRON 325) 325 (65 Fe) MG tablet 30 tablet 5     Sig: Take 1 tablet by mouth daily (with breakfast)         Last Office Visit:   5/12/2023      Next Visit Date:  Future Appointments   Date Time Provider Jeff Martinez   6/1/2023 11:00 AM Reyna Mercer [address] [place] [address]   6/19/2023  1:00 PM Larry Plasencia MD [facility]   9/20/2023 11:15 AM Maricruz Fernandez MD [address] [place] [address]

## 2023-05-28 RX ORDER — CHOLECALCIFEROL (VITAMIN D3) 125 MCG
500 CAPSULE ORAL DAILY
Qty: 30 TABLET | Refills: 3 | Status: SHIPPED | OUTPATIENT
Start: 2023-05-28 | End: 2024-05-27

## 2023-05-28 RX ORDER — FERROUS SULFATE 325(65) MG
325 TABLET ORAL
Qty: 30 TABLET | Refills: 5 | Status: SHIPPED | OUTPATIENT
Start: 2023-05-28

## 2023-05-28 RX ORDER — MULTIVITAMIN WITH IRON
1 TABLET ORAL DAILY
Qty: 30 TABLET | Refills: 0 | Status: SHIPPED | OUTPATIENT
Start: 2023-05-28 | End: 2023-06-27

## 2023-05-30 RX ORDER — CHOLECALCIFEROL (VITAMIN D3) 125 MCG
500 CAPSULE ORAL DAILY
Qty: 90 TABLET | OUTPATIENT
Start: 2023-05-30 | End: 2024-05-29

## 2023-06-01 ENCOUNTER — HOSPITAL ENCOUNTER (OUTPATIENT)
Dept: CARDIAC REHAB | Age: 73
Setting detail: THERAPIES SERIES
Discharge: HOME | End: 2023-06-01
Payer: MEDICARE

## 2023-06-01 VITALS — OXYGEN SATURATION: 98 % | RESPIRATION RATE: 17 BRPM | HEIGHT: 65 IN | BODY MASS INDEX: 26.96 KG/M2

## 2023-06-01 PROCEDURE — G0422 INTENS CARDIAC REHAB W/EXERC: HCPCS

## 2023-06-01 ASSESSMENT — PATIENT HEALTH QUESTIONNAIRE - PHQ9
6. FEELING BAD ABOUT YOURSELF - OR THAT YOU ARE A FAILURE OR HAVE LET YOURSELF OR YOUR FAMILY DOWN: 0
7. TROUBLE CONCENTRATING ON THINGS, SUCH AS READING THE NEWSPAPER OR WATCHING TELEVISION: 0
SUM OF ALL RESPONSES TO PHQ QUESTIONS 1-9: 8
SUM OF ALL RESPONSES TO PHQ9 QUESTIONS 1 & 2: 3
5. POOR APPETITE OR OVEREATING: 0
SUM OF ALL RESPONSES TO PHQ QUESTIONS 1-9: 8
1. LITTLE INTEREST OR PLEASURE IN DOING THINGS: 1
2. FEELING DOWN, DEPRESSED OR HOPELESS: 2
10. IF YOU CHECKED OFF ANY PROBLEMS, HOW DIFFICULT HAVE THESE PROBLEMS MADE IT FOR YOU TO DO YOUR WORK, TAKE CARE OF THINGS AT HOME, OR GET ALONG WITH OTHER PEOPLE: 0
SUM OF ALL RESPONSES TO PHQ QUESTIONS 1-9: 8
SUM OF ALL RESPONSES TO PHQ QUESTIONS 1-9: 8
3. TROUBLE FALLING OR STAYING ASLEEP: 3
8. MOVING OR SPEAKING SO SLOWLY THAT OTHER PEOPLE COULD HAVE NOTICED. OR THE OPPOSITE, BEING SO FIGETY OR RESTLESS THAT YOU HAVE BEEN MOVING AROUND A LOT MORE THAN USUAL: 0
4. FEELING TIRED OR HAVING LITTLE ENERGY: 2
9. THOUGHTS THAT YOU WOULD BE BETTER OFF DEAD, OR OF HURTING YOURSELF: 0

## 2023-06-01 ASSESSMENT — EXERCISE STRESS TEST
PEAK_RPE: 12
PEAK_BP: 156/70
PEAK_BP: 156/70
PEAK_HR: 91
PEAK_RPD: 2

## 2023-06-01 ASSESSMENT — EJECTION FRACTION
EF_VALUE: 60
EF_VALUE: 60

## 2023-06-01 ASSESSMENT — NEW YORK HEART ASSOCIATION (NYHA) CLASSIFICATION: NYHA FUNCTIONAL CLASS: NO NYHA CLASS OR UNABLE TO DETERMINE

## 2023-06-01 NOTE — CARDIO/PULMONARY
[facility] Cardiac Rehab Intake Note    Patient arrived to OP Phase II with admitting DX: PCI referred by Dr. Souza Men     Patient has PMH of: CAD, CA, COPD, DM, MI, HLD, HTN, Vertigo, Meniere's disease, emphysema of lung, chronic bronchitis    Physical examination: Heart sounds normal, lungs sound clear, strong pulse BUE, no swelling noted BLE. Patient denies pain    Patient educated on importance of managing regular BG checks, Bps, medication compliance, following a low sodium diet, and regular attendance in cardiac rehab program.      Exercise: Patient tolerated warm up, six-minute walk test, 10 minutes of sustained CV exercise on Nu-Step seated stepper, and cool-down. Patient complaints/signs/symptoms: PAD like pain and SOB that resolved with rest      POC: Patient will attend OP Phase II program: ICR   3x weekly for 12 weeks or until 36 sessions are completed.    Electronically signed by Malvin Mccormick on 6/1/2023 at 2:14 PM

## 2023-06-05 ENCOUNTER — HOSPITAL ENCOUNTER (OUTPATIENT)
Dept: CARDIAC REHAB | Age: 73
Setting detail: THERAPIES SERIES
Discharge: HOME | End: 2023-06-05
Payer: MEDICARE

## 2023-06-05 PROCEDURE — G0422 INTENS CARDIAC REHAB W/EXERC: HCPCS

## 2023-06-05 NOTE — CARDIO/PULMONARY
COVID Screening completed. Patient denies complaints, no changes to PMH or medications. Patient states he did some walking and stretching over the weekend and reports being sore Patient tolerates exercise well. Weekly education , discussed hot weather precautions for exercise/yard work; also discussed signs and symptoms of heat exhaustion. Handout provided. All equipment used in the care for this patient has been cleaned.   Electronically signed by Radha Victor RN on 6/5/2023 at 10:48 AM

## 2023-06-06 RX ORDER — PAROXETINE HYDROCHLORIDE 40 MG/1
40 TABLET, FILM COATED ORAL DAILY
Qty: 30 TABLET | Refills: 0 | Status: SHIPPED | OUTPATIENT
Start: 2023-06-06 | End: 2023-09-04

## 2023-06-06 NOTE — TELEPHONE ENCOUNTER
Rx requested:  Requested Prescriptions     Pending Prescriptions Disp Refills    PARoxetine (PAXIL) 40 MG tablet 30 tablet 0     Sig: Take 1 tablet by mouth daily         Last Office Visit:   5/12/2023      Next Visit Date:  Future Appointments   Date Time Provider Jeff Martinez   6/7/2023 11:00 AM SCHEDULE, 10 [facility] [place]   6/9/2023 11:00 AM SCHEDULE, 10 [address] [facility] [place]   6/12/2023 11:00 AM SCHEDULE, 10 42 [facility] [place]   6/14/2023 11:00 AM SCHEDULE, 10 42 [facility] [place]   6/16/2023 11:00 AM SCHEDULE, 10 42 [facility] [place]   6/19/2023 11:00 AM SCHEDULE, 10 42 [facility] [place]   6/19/2023  1:00 PM Mynor Foley MD [facility]   6/21/2023 11:00 AM SCHEDULE, 10 42 [facility] [place]   6/23/2023 11:00 AM SCHEDULE, 10 42 [facility] [place]   6/26/2023 11:00 AM SCHEDULE, 10 42 [facility] [place]   6/28/2023 11:00 AM SCHEDULE, 10 42 [facility] [place]   6/30/2023 11:00 AM SCHEDULE, 10 42 [facility] [place]   7/3/2023 11:00 AM SCHEDULE, 10 42 [facility] [place]   7/5/2023 11:00 AM SCHEDULE, 10 42 [facility] [place]   7/7/2023 11:00 AM SCHEDULE, 10 42 [facility] [place]   7/10/2023 11:00 AM SCHEDULE, 10 42 [facility] [place]   7/12/2023 11:00 AM SCHEDULE, 10 42 [facility] [place]   7/14/2023 11:00 AM SCHEDULE, 10 42 [facility] [place]   7/17/2023 11:00 AM SCHEDULE, 10 42 [facility] [place]   7/19/2023 11:00 AM SCHEDULE, 10 42 [facility] [place]   7/21/2023 11:00 AM SCHEDULE, 10 42 [facility] [place]   7/24/2023 11:00 AM SCHEDULE, 10 42 [facility] [place]   7/26/2023 11:00 AM

## 2023-06-07 ENCOUNTER — APPOINTMENT (OUTPATIENT)
Dept: CARDIAC REHAB | Age: 73
End: 2023-06-07
Payer: MEDICARE

## 2023-06-09 ENCOUNTER — HOSPITAL ENCOUNTER (OUTPATIENT)
Dept: CARDIAC REHAB | Age: 73
Setting detail: THERAPIES SERIES
Discharge: HOME | End: 2023-06-09
Payer: MEDICARE

## 2023-06-09 ENCOUNTER — APPOINTMENT (OUTPATIENT)
Dept: CARDIAC REHAB | Age: 73
End: 2023-06-09
Payer: MEDICARE

## 2023-06-09 PROCEDURE — G0423 INTENS CARDIAC REHAB NO EXER: HCPCS

## 2023-06-09 PROCEDURE — G0422 INTENS CARDIAC REHAB W/EXERC: HCPCS

## 2023-06-09 NOTE — CARDIO/PULMONARY
COVID Screening completed. Patient has BLE hip pain when walking, no changes to PMH or medications. Patient given warm weather precautions hand out, stretch hand out, and advised to speak with PCP regarding hip pain. Patient verbalized understanding and plans on following up with PCP. Patient attended [place] [address] [employer] \"[employer]\".  Electronically signed by Sandee Rivera on 6/9/2023 at 3:33 PM

## 2023-06-09 NOTE — CARDIO/PULMONARY
Yadira IGNACIO.:  1950  Acct Number: [de-identified]  MRN:  [id number]                [facility] [employer] WORKSHOP             Date: 2023        Session # 12   Todays class covered:      () Adding Flavor     () Fast Breakfasts     () Salads and Dressings     () Soups and Sauces     () Simple Sides     () Appetizers and Snacks     () Delicious Desserts     () Plant Based Proteins     () Fast Evening Meals     () Weekend Breakfasts     () Efficiency Cooking     (x) [facility]     Patients were shown how to choose, prep, and cook; substitutions and other options were given. Samples were offered. Recipes were given and questions answered. The patient above was in the [employer] for 50 minutes.       Electronically signed by Boaz Blum RD on 2023 at 4:08 PM

## 2023-06-12 ENCOUNTER — APPOINTMENT (OUTPATIENT)
Dept: CARDIAC REHAB | Age: 73
End: 2023-06-12
Payer: MEDICARE

## 2023-06-14 ENCOUNTER — APPOINTMENT (OUTPATIENT)
Dept: CARDIAC REHAB | Age: 73
End: 2023-06-14
Payer: MEDICARE

## 2023-06-14 DIAGNOSIS — E11.9 TYPE 2 DIABETES MELLITUS WITHOUT COMPLICATION, WITHOUT LONG-TERM CURRENT USE OF INSULIN (HCC): ICD-10-CM

## 2023-06-14 DIAGNOSIS — D51.8 OTHER VITAMIN B12 DEFICIENCY ANEMIAS: ICD-10-CM

## 2023-06-14 DIAGNOSIS — D50.9 IRON DEFICIENCY ANEMIA, UNSPECIFIED IRON DEFICIENCY ANEMIA TYPE: ICD-10-CM

## 2023-06-14 LAB
ALBUMIN SERPL-MCNC: 4 G/DL (ref 3.5–4.6)
ALP SERPL-CCNC: 56 U/L (ref 35–104)
ALT SERPL-CCNC: 14 U/L (ref 0–41)
ANION GAP SERPL CALCULATED.3IONS-SCNC: 16 MEQ/L (ref 9–15)
AST SERPL-CCNC: 27 U/L (ref 0–40)
BASOPHILS # BLD: 0.1 K/UL (ref 0–0.2)
BASOPHILS NFR BLD: 0.9 %
BILIRUB SERPL-MCNC: 0.7 MG/DL (ref 0.2–0.7)
BUN SERPL-MCNC: 19 MG/DL (ref 8–23)
CALCIUM SERPL-MCNC: 8.7 MG/DL (ref 8.5–9.9)
CHLORIDE SERPL-SCNC: 104 MEQ/L (ref 95–107)
CO2 SERPL-SCNC: 18 MEQ/L (ref 20–31)
CREAT SERPL-MCNC: 1.65 MG/DL (ref 0.7–1.2)
EOSINOPHIL # BLD: 0.2 K/UL (ref 0–0.7)
EOSINOPHIL NFR BLD: 2.7 %
ERYTHROCYTE [DISTWIDTH] IN BLOOD BY AUTOMATED COUNT: 15.9 % (ref 11.5–14.5)
GLOBULIN SER CALC-MCNC: 2 G/DL (ref 2.3–3.5)
GLUCOSE SERPL-MCNC: 142 MG/DL (ref 70–99)
HBA1C MFR BLD: 6.1 % (ref 4.8–5.9)
HCT VFR BLD AUTO: 33.4 % (ref 42–52)
HGB BLD-MCNC: 11 G/DL (ref 14–18)
LYMPHOCYTES # BLD: 1.4 K/UL (ref 1–4.8)
LYMPHOCYTES NFR BLD: 21.6 %
MCH RBC QN AUTO: 29.1 PG (ref 27–31.3)
MCHC RBC AUTO-ENTMCNC: 32.9 % (ref 33–37)
MCV RBC AUTO: 88.4 FL (ref 79–92.2)
MONOCYTES # BLD: 0.6 K/UL (ref 0.2–0.8)
MONOCYTES NFR BLD: 9 %
NEUTROPHILS # BLD: 4.3 K/UL (ref 1.4–6.5)
NEUTS SEG NFR BLD: 65.8 %
PLATELET # BLD AUTO: 193 K/UL (ref 130–400)
POTASSIUM SERPL-SCNC: 4.5 MEQ/L (ref 3.4–4.9)
PROT SERPL-MCNC: 6 G/DL (ref 6.3–8)
RBC # BLD AUTO: 3.77 M/UL (ref 4.7–6.1)
SODIUM SERPL-SCNC: 138 MEQ/L (ref 135–144)
WBC # BLD AUTO: 6.5 K/UL (ref 4.8–10.8)

## 2023-06-15 LAB
FERRITIN: 38 NG/ML (ref 30–400)
IRON SATURATION: 17 % (ref 20–55)
IRON: 57 UG/DL (ref 59–158)
TOTAL IRON BINDING CAPACITY: 331 UG/DL (ref 250–450)
UNSATURATED IRON BINDING CAPACITY: 274 UG/DL (ref 112–347)
VITAMIN B-12: 244 PG/ML (ref 232–1245)

## 2023-06-16 ENCOUNTER — APPOINTMENT (OUTPATIENT)
Dept: CARDIAC REHAB | Age: 73
End: 2023-06-16
Payer: MEDICARE

## 2023-06-19 ENCOUNTER — APPOINTMENT (OUTPATIENT)
Dept: CARDIAC REHAB | Age: 73
End: 2023-06-19
Payer: MEDICARE

## 2023-06-19 ENCOUNTER — OFFICE VISIT (OUTPATIENT)
Dept: FAMILY MEDICINE CLINIC | Age: 73
End: 2023-06-19
Payer: MEDICARE

## 2023-06-19 VITALS
HEART RATE: 76 BPM | RESPIRATION RATE: 14 BRPM | SYSTOLIC BLOOD PRESSURE: 130 MMHG | DIASTOLIC BLOOD PRESSURE: 70 MMHG | HEIGHT: 65 IN | OXYGEN SATURATION: 97 % | BODY MASS INDEX: 26.82 KG/M2 | WEIGHT: 161 LBS

## 2023-06-19 DIAGNOSIS — Z98.61 CAD S/P PERCUTANEOUS CORONARY ANGIOPLASTY: ICD-10-CM

## 2023-06-19 DIAGNOSIS — M25.551 PAIN OF RIGHT HIP: ICD-10-CM

## 2023-06-19 DIAGNOSIS — D50.9 IRON DEFICIENCY ANEMIA, UNSPECIFIED IRON DEFICIENCY ANEMIA TYPE: ICD-10-CM

## 2023-06-19 DIAGNOSIS — I25.10 CAD S/P PERCUTANEOUS CORONARY ANGIOPLASTY: ICD-10-CM

## 2023-06-19 DIAGNOSIS — F41.9 ANXIETY: ICD-10-CM

## 2023-06-19 DIAGNOSIS — E11.9 TYPE 2 DIABETES MELLITUS WITHOUT COMPLICATION, WITHOUT LONG-TERM CURRENT USE OF INSULIN (HCC): ICD-10-CM

## 2023-06-19 DIAGNOSIS — I10 ESSENTIAL HYPERTENSION: Primary | ICD-10-CM

## 2023-06-19 PROCEDURE — 1123F ACP DISCUSS/DSCN MKR DOCD: CPT | Performed by: INTERNAL MEDICINE

## 2023-06-19 PROCEDURE — G8427 DOCREV CUR MEDS BY ELIG CLIN: HCPCS | Performed by: INTERNAL MEDICINE

## 2023-06-19 PROCEDURE — 3017F COLORECTAL CA SCREEN DOC REV: CPT | Performed by: INTERNAL MEDICINE

## 2023-06-19 PROCEDURE — 99214 OFFICE O/P EST MOD 30 MIN: CPT | Performed by: INTERNAL MEDICINE

## 2023-06-19 PROCEDURE — G8417 CALC BMI ABV UP PARAM F/U: HCPCS | Performed by: INTERNAL MEDICINE

## 2023-06-19 PROCEDURE — 3078F DIAST BP <80 MM HG: CPT | Performed by: INTERNAL MEDICINE

## 2023-06-19 PROCEDURE — 3044F HG A1C LEVEL LT 7.0%: CPT | Performed by: INTERNAL MEDICINE

## 2023-06-19 PROCEDURE — 3075F SYST BP GE 130 - 139MM HG: CPT | Performed by: INTERNAL MEDICINE

## 2023-06-19 PROCEDURE — 2022F DILAT RTA XM EVC RTNOPTHY: CPT | Performed by: INTERNAL MEDICINE

## 2023-06-19 PROCEDURE — 1036F TOBACCO NON-USER: CPT | Performed by: INTERNAL MEDICINE

## 2023-06-19 RX ORDER — METFORMIN HYDROCHLORIDE 500 MG/1
1000 TABLET, EXTENDED RELEASE ORAL
Qty: 360 TABLET | Refills: 5 | Status: SHIPPED | OUTPATIENT
Start: 2023-06-19

## 2023-06-19 ASSESSMENT — ENCOUNTER SYMPTOMS
WHEEZING: 0
COUGH: 0
BLOOD IN STOOL: 0
APNEA: 0
EYE REDNESS: 0
BACK PAIN: 0
RHINORRHEA: 0
EYE PAIN: 0
SHORTNESS OF BREATH: 0
COLOR CHANGE: 0
DIARRHEA: 0
VOMITING: 0
TROUBLE SWALLOWING: 0
RECTAL PAIN: 0
EYE DISCHARGE: 0
CONSTIPATION: 0
PHOTOPHOBIA: 0
FACIAL SWELLING: 0
EYE ITCHING: 0
NAUSEA: 0
SINUS PRESSURE: 0
ABDOMINAL DISTENTION: 0
SINUS PAIN: 0
SORE THROAT: 0
CHEST TIGHTNESS: 0
ABDOMINAL PAIN: 0
VOICE CHANGE: 0

## 2023-06-19 NOTE — PROGRESS NOTES
Subjective:      Patient ID: Carley Carrasco is a 74 y.o. male New patient, here for evaluation of the following chief complaint(s):  Chief Complaint   Patient presents with    Discuss Labs         Diabetes  Pertinent negatives for hypoglycemia include no confusion, dizziness, headaches, nervousness/anxiousness, seizures, speech difficulty or tremors. Pertinent negatives for diabetes include no chest pain, no fatigue, no polydipsia, no polyphagia, no polyuria and no weakness. Hypertension  Pertinent negatives include no chest pain, headaches, neck pain, palpitations or shortness of breath. 58-year-old male with a history of type 2 diabetes established coronary artery disease anxiety and hypertension presents for follow-up visit. He is experiencing chest pain. Iron  deficiency anemia: The patient is receiving 1/17/2023. His most recent labs are noted below:  Component      Latest Ref Rng & Units 6/14/2023          12:51 PM   WBC      4.8 - 10.8 K/uL 6.5   RBC      4.70 - 6.10 M/uL 3.77 (L)   Hemoglobin Quant      14.0 - 18.0 g/dL 11.0 (L)   Hematocrit      42.0 - 52.0 % 33.4 (L)   MCV      79.0 - 92.2 fL 88.4   MCH      27.0 - 31.3 pg 29.1   MCHC      33.0 - 37.0 % 32.9 (L)   RDW      11.5 - 14.5 % 15.9 (H)   Platelet Count      568 - 400 K/uL 193         Hx  Of CAD S/P MI-metoprolol, lipitor, plavix        Diabetes -glipizide 10 mg bid. Blood sugars well controlled averaging in the 100s. Anxiety-Paxil        Essential hypertension: Compliant with   Cozaar 25 mg daily Cardizem 120 mg orally daily and Lopressor 25 mg twice daily          Nicotine dependence:NO LONGER SMOKING SINCE 10/2020        Diverticular bleed:  The patient has not experience additional bleeding since being discharged from the hospital.      Cat : Tiger      At present he denies polyuria,  Polydipsia, constitutional, sinus, visual, cardiopulmonary, urologic, gastrointestinal, immunologic/hematologic, musculoskeletal,

## 2023-06-21 ENCOUNTER — HOSPITAL ENCOUNTER (OUTPATIENT)
Dept: CARDIAC REHAB | Age: 73
Setting detail: THERAPIES SERIES
Discharge: HOME | End: 2023-06-21
Payer: MEDICARE

## 2023-06-21 ENCOUNTER — TELEPHONE (OUTPATIENT)
Dept: DIABETES SERVICES | Age: 73
End: 2023-06-21

## 2023-06-21 ENCOUNTER — APPOINTMENT (OUTPATIENT)
Dept: CARDIAC REHAB | Age: 73
End: 2023-06-21
Payer: MEDICARE

## 2023-06-21 PROCEDURE — G0422 INTENS CARDIAC REHAB W/EXERC: HCPCS

## 2023-06-21 NOTE — CARDIO/PULMONARY
COVID Screening completed. Patient denies complaints, no changes to PMH or medications. EP reviewed program overview and benefits with patient. Patient reporting pain in BLE, had X ray. Patient is following up with PCP and Cardiology for findings. EP advised patient to inform staff of signs/symptoms during cardiac rehab. Patient performed ambulatory exercise and Nu-step exercise reporting BLE discomfort. Exercise terminated after patient reported increasing BLE discomfort. Patient reported discomfort resolved with rest, denied further care. HR and BP WDL during exercise. Patient reports wanting to continue cardiac rehab for prescribed sessions. Patient advised to discuss discomfort with PCP and Cardiology. Patient reporting that cardiology care will by covered by Dr. Hector Hatch. At this time patient does not wish to continue participation in [address] [place] [address] [facility] in cardiac rehab.  Electronically signed by Ilene Mcgee on 6/21/2023 at 3:33 PM

## 2023-06-21 NOTE — PROGRESS NOTES
Call to [facility] with referral from C/P rehab for diabetes education . Last A1C 6.1 on 6/14, he is not interested in any education at this time.

## 2023-06-23 ENCOUNTER — HOSPITAL ENCOUNTER (OUTPATIENT)
Dept: CARDIAC REHAB | Age: 73
Setting detail: THERAPIES SERIES
Discharge: HOME | End: 2023-06-23
Payer: MEDICARE

## 2023-06-23 ENCOUNTER — APPOINTMENT (OUTPATIENT)
Dept: CARDIAC REHAB | Age: 73
End: 2023-06-23
Payer: MEDICARE

## 2023-06-23 PROCEDURE — G0422 INTENS CARDIAC REHAB W/EXERC: HCPCS

## 2023-06-23 ASSESSMENT — EXERCISE STRESS TEST: PEAK_BP: 146/66

## 2023-06-23 ASSESSMENT — EJECTION FRACTION: EF_VALUE: 60

## 2023-06-23 NOTE — PROGRESS NOTES
COVID Screening completed. Patient denies complaints, no changes to PMH or medications. Patient tolerates exercise well. Patient refuses to attend ICR classes.  Electronically signed by Robert Canada RN on 6/23/2023 at 3:24 PM

## 2023-06-26 ENCOUNTER — APPOINTMENT (OUTPATIENT)
Dept: CARDIAC REHAB | Age: 73
End: 2023-06-26
Payer: MEDICARE

## 2023-06-26 ENCOUNTER — HOSPITAL ENCOUNTER (OUTPATIENT)
Dept: CARDIAC REHAB | Age: 73
Setting detail: THERAPIES SERIES
Discharge: HOME | End: 2023-06-26
Payer: MEDICARE

## 2023-06-26 ENCOUNTER — TELEPHONE (OUTPATIENT)
Dept: FAMILY MEDICINE CLINIC | Age: 73
End: 2023-06-26

## 2023-06-26 PROCEDURE — G0422 INTENS CARDIAC REHAB W/EXERC: HCPCS

## 2023-06-28 ENCOUNTER — APPOINTMENT (OUTPATIENT)
Dept: CARDIAC REHAB | Age: 73
End: 2023-06-28
Payer: MEDICARE

## 2023-06-28 ENCOUNTER — HOSPITAL ENCOUNTER (OUTPATIENT)
Dept: CARDIAC REHAB | Age: 73
Setting detail: THERAPIES SERIES
Discharge: HOME | End: 2023-06-28
Payer: MEDICARE

## 2023-06-28 PROCEDURE — G0422 INTENS CARDIAC REHAB W/EXERC: HCPCS

## 2023-06-28 RX ORDER — METFORMIN HYDROCHLORIDE 500 MG/1
1000 TABLET, EXTENDED RELEASE ORAL 2 TIMES DAILY
Qty: 360 TABLET | Refills: 5 | Status: SHIPPED | OUTPATIENT
Start: 2023-06-28

## 2023-06-30 ENCOUNTER — HOSPITAL ENCOUNTER (OUTPATIENT)
Dept: CARDIAC REHAB | Age: 73
Setting detail: THERAPIES SERIES
Discharge: HOME | End: 2023-06-30
Payer: MEDICARE

## 2023-06-30 ENCOUNTER — APPOINTMENT (OUTPATIENT)
Dept: CARDIAC REHAB | Age: 73
End: 2023-06-30
Payer: MEDICARE

## 2023-06-30 PROCEDURE — G0422 INTENS CARDIAC REHAB W/EXERC: HCPCS

## 2023-07-03 ENCOUNTER — HOSPITAL ENCOUNTER (OUTPATIENT)
Dept: CARDIAC REHAB | Age: 73
Setting detail: THERAPIES SERIES
Discharge: HOME | End: 2023-07-03
Payer: MEDICARE

## 2023-07-03 ENCOUNTER — APPOINTMENT (OUTPATIENT)
Dept: CARDIAC REHAB | Age: 73
End: 2023-07-03
Payer: MEDICARE

## 2023-07-03 PROCEDURE — G0422 INTENS CARDIAC REHAB W/EXERC: HCPCS

## 2023-07-03 NOTE — CARDIO/PULMONARY
COVID Screening completed. Patient denies complaints, no changes to PMH or medications. Patient tolerates exercise well. Weekly education, discussed RPE, RPE scales, MET levels with examples of MET levels with home ADL's and activities. Handout provided. Patient invited to Ken class, declines at this time. All equipment used in the care for this patient has been cleaned.   Electronically signed by Timo Ashley RN on 7/3/2023 at 2:57 PM

## 2023-07-05 ENCOUNTER — HOSPITAL ENCOUNTER (OUTPATIENT)
Dept: CARDIAC REHAB | Age: 73
Setting detail: THERAPIES SERIES
Discharge: HOME | End: 2023-07-05
Payer: MEDICARE

## 2023-07-05 ENCOUNTER — APPOINTMENT (OUTPATIENT)
Dept: CARDIAC REHAB | Age: 73
End: 2023-07-05
Payer: MEDICARE

## 2023-07-05 PROCEDURE — G0422 INTENS CARDIAC REHAB W/EXERC: HCPCS

## 2023-07-05 NOTE — PROGRESS NOTES
COVID Screening completed. Patient denies complaints, no changes to PMH or medications. Patient tolerates exercise well. Offered ICR Exercise Workshop, patient declined.  Electronically signed by Nigel Vidal RN on 7/5/2023 at 2:18 PM

## 2023-07-07 ENCOUNTER — HOSPITAL ENCOUNTER (OUTPATIENT)
Dept: CARDIAC REHAB | Age: 73
Setting detail: THERAPIES SERIES
Discharge: HOME | End: 2023-07-07
Payer: MEDICARE

## 2023-07-07 ENCOUNTER — APPOINTMENT (OUTPATIENT)
Dept: CARDIAC REHAB | Age: 73
End: 2023-07-07
Payer: MEDICARE

## 2023-07-07 PROCEDURE — G0422 INTENS CARDIAC REHAB W/EXERC: HCPCS

## 2023-07-07 NOTE — CARDIO/PULMONARY
COVID Screening completed. Patient denies any changes to medications. Patient tolerates exercise well. Patient does c/o claudication pain, physician is aware. Patient does have a colonoscopy scheduled in the next few weeks due to anemia. Patient declines education class today.   Electronically signed by Agapito Stoddard RN on 7/7/2023 at 2:38 PM

## 2023-07-10 ENCOUNTER — APPOINTMENT (OUTPATIENT)
Dept: CARDIAC REHAB | Age: 73
End: 2023-07-10
Payer: MEDICARE

## 2023-07-12 ENCOUNTER — HOSPITAL ENCOUNTER (OUTPATIENT)
Dept: CARDIAC REHAB | Age: 73
Setting detail: THERAPIES SERIES
Discharge: HOME | End: 2023-07-12
Payer: MEDICARE

## 2023-07-12 ENCOUNTER — APPOINTMENT (OUTPATIENT)
Dept: CARDIAC REHAB | Age: 73
End: 2023-07-12
Payer: MEDICARE

## 2023-07-13 ASSESSMENT — EJECTION FRACTION: EF_VALUE: 60

## 2023-07-13 ASSESSMENT — EXERCISE STRESS TEST: PEAK_BP: 130/68

## 2023-07-14 ENCOUNTER — APPOINTMENT (OUTPATIENT)
Dept: CARDIAC REHAB | Age: 73
End: 2023-07-14
Payer: MEDICARE

## 2023-07-14 ENCOUNTER — HOSPITAL ENCOUNTER (OUTPATIENT)
Dept: CARDIAC REHAB | Age: 73
Setting detail: THERAPIES SERIES
Discharge: HOME | End: 2023-07-14
Payer: MEDICARE

## 2023-07-17 ENCOUNTER — APPOINTMENT (OUTPATIENT)
Dept: CARDIAC REHAB | Age: 73
End: 2023-07-17
Payer: MEDICARE

## 2023-07-18 ENCOUNTER — TELEPHONE (OUTPATIENT)
Dept: GASTROENTEROLOGY | Age: 73
End: 2023-07-18

## 2023-07-18 ENCOUNTER — PREP FOR PROCEDURE (OUTPATIENT)
Dept: GASTROENTEROLOGY | Age: 73
End: 2023-07-18

## 2023-07-18 ENCOUNTER — OFFICE VISIT (OUTPATIENT)
Dept: GASTROENTEROLOGY | Age: 73
End: 2023-07-18
Payer: MEDICARE

## 2023-07-18 VITALS
SYSTOLIC BLOOD PRESSURE: 136 MMHG | BODY MASS INDEX: 26.79 KG/M2 | DIASTOLIC BLOOD PRESSURE: 68 MMHG | OXYGEN SATURATION: 98 % | WEIGHT: 161 LBS

## 2023-07-18 DIAGNOSIS — D64.9 ANEMIA, UNSPECIFIED TYPE: Primary | ICD-10-CM

## 2023-07-18 DIAGNOSIS — Z12.11 COLON CANCER SCREENING: ICD-10-CM

## 2023-07-18 DIAGNOSIS — K21.9 GASTROESOPHAGEAL REFLUX DISEASE WITHOUT ESOPHAGITIS: ICD-10-CM

## 2023-07-18 DIAGNOSIS — D50.9 IRON DEFICIENCY ANEMIA, UNSPECIFIED IRON DEFICIENCY ANEMIA TYPE: ICD-10-CM

## 2023-07-18 PROCEDURE — 99204 OFFICE O/P NEW MOD 45 MIN: CPT | Performed by: INTERNAL MEDICINE

## 2023-07-18 PROCEDURE — G8427 DOCREV CUR MEDS BY ELIG CLIN: HCPCS | Performed by: INTERNAL MEDICINE

## 2023-07-18 PROCEDURE — 3078F DIAST BP <80 MM HG: CPT | Performed by: INTERNAL MEDICINE

## 2023-07-18 PROCEDURE — 3017F COLORECTAL CA SCREEN DOC REV: CPT | Performed by: INTERNAL MEDICINE

## 2023-07-18 PROCEDURE — 3074F SYST BP LT 130 MM HG: CPT | Performed by: INTERNAL MEDICINE

## 2023-07-18 PROCEDURE — G8417 CALC BMI ABV UP PARAM F/U: HCPCS | Performed by: INTERNAL MEDICINE

## 2023-07-18 PROCEDURE — 1036F TOBACCO NON-USER: CPT | Performed by: INTERNAL MEDICINE

## 2023-07-18 PROCEDURE — 1123F ACP DISCUSS/DSCN MKR DOCD: CPT | Performed by: INTERNAL MEDICINE

## 2023-07-18 RX ORDER — SODIUM PICOSULFATE, MAGNESIUM OXIDE, AND ANHYDROUS CITRIC ACID 10; 3.5; 12 MG/160ML; G/160ML; G/160ML
LIQUID ORAL
Qty: 320 ML | Refills: 0 | Status: SHIPPED | OUTPATIENT
Start: 2023-07-18

## 2023-07-18 ASSESSMENT — ENCOUNTER SYMPTOMS
CHEST TIGHTNESS: 0
ABDOMINAL DISTENTION: 0
COLOR CHANGE: 0
BLOOD IN STOOL: 0
PHOTOPHOBIA: 0
VOMITING: 0
CONSTIPATION: 0
ABDOMINAL PAIN: 0
NAUSEA: 0
TROUBLE SWALLOWING: 0
WHEEZING: 0
SHORTNESS OF BREATH: 0
VOICE CHANGE: 0
DIARRHEA: 0
EYE REDNESS: 0
EYE PAIN: 0
RECTAL PAIN: 0

## 2023-07-18 NOTE — TELEPHONE ENCOUNTER
Patient is scheduled for an Colonoscopy on 9-5-23, how many days should patient hold his Plavix for?

## 2023-07-18 NOTE — PROGRESS NOTES
components found for: CELIACPANEL  No components found for: STOOLCULTURE, C.DIFF, STOOLOVAPARASITE, STOOLLEUCOCYTE        Assessment:      Assessment and plan:    1. Iron deficiency anemia:  No overt bleeding reported with no melena or hematochezia. No nocturnal or progressive abdominal pain. The patient has been on dual antiplatelet therapy with aspirin and clopidogrel  Proceed with bidirectional endoscopy. Had colonoscopy years back at least 9 years ago and was negative and showed only diverticulosis. Patient mentioned on dual antiplatelet therapy for coronary artery disease with prior stents PTCA. Obtain clearance from cardiology. We will obtain upper endoscopy. Patient does report history of GERD and currently on PPI  Reviewed cardiology records from March 2023. Patient did have recent stent on March 2023 with drug-eluting stents in 1. Prior to that he had in February admitted for chest pain underwent PCI of the proximal RCA in-stent restenosis with drug-eluting stent x1  Given the significant cardiac conditions, patient is at higher risk. Will proceed with endoscopy while on dual antiplatelet therapy for diagnostic purposes  2-Associated medical conditions: Include but not limited to use of hypertension, diabetes mellitus, coronary artery disease with significant coronary disease with recent stent in March 2023, anxiety        Return in about 3 months (around 10/18/2023) for Post procedure results discussion, further management.       Terrea Oppenheim, MD

## 2023-07-19 ENCOUNTER — APPOINTMENT (OUTPATIENT)
Dept: CARDIAC REHAB | Age: 73
End: 2023-07-19
Payer: MEDICARE

## 2023-07-19 NOTE — TELEPHONE ENCOUNTER
Patient should not stop Plavix until he completes at least 1 year of therapy    Stop date February 2024    Please defer any colonoscopies until after February 2024.   Unless colonoscopy is required for a life-threatening condition    Thank

## 2023-07-21 ENCOUNTER — APPOINTMENT (OUTPATIENT)
Dept: CARDIAC REHAB | Age: 73
End: 2023-07-21
Payer: MEDICARE

## 2023-07-24 ENCOUNTER — APPOINTMENT (OUTPATIENT)
Dept: CARDIAC REHAB | Age: 73
End: 2023-07-24
Payer: MEDICARE

## 2023-07-26 ENCOUNTER — APPOINTMENT (OUTPATIENT)
Dept: CARDIAC REHAB | Age: 73
End: 2023-07-26
Payer: MEDICARE

## 2023-07-28 ENCOUNTER — APPOINTMENT (OUTPATIENT)
Dept: CARDIAC REHAB | Age: 73
End: 2023-07-28
Payer: MEDICARE

## 2023-07-31 ENCOUNTER — APPOINTMENT (OUTPATIENT)
Dept: CARDIAC REHAB | Age: 73
End: 2023-07-31
Payer: MEDICARE

## 2023-08-02 ENCOUNTER — APPOINTMENT (OUTPATIENT)
Dept: CARDIAC REHAB | Age: 73
End: 2023-08-02
Payer: MEDICARE

## 2023-08-03 RX ORDER — ATORVASTATIN CALCIUM 40 MG/1
40 TABLET, FILM COATED ORAL EVERY EVENING
Qty: 90 TABLET | Refills: 1 | Status: SHIPPED | OUTPATIENT
Start: 2023-08-03

## 2023-08-03 RX ORDER — ASPIRIN 81 MG/1
81 TABLET, CHEWABLE ORAL DAILY
Qty: 90 TABLET | Refills: 1 | Status: SHIPPED | OUTPATIENT
Start: 2023-08-03

## 2023-08-04 ENCOUNTER — APPOINTMENT (OUTPATIENT)
Dept: CARDIAC REHAB | Age: 73
End: 2023-08-04
Payer: MEDICARE

## 2023-08-07 ENCOUNTER — APPOINTMENT (OUTPATIENT)
Dept: CARDIAC REHAB | Age: 73
End: 2023-08-07
Payer: MEDICARE

## 2023-08-07 RX ORDER — PAROXETINE HYDROCHLORIDE 40 MG/1
40 TABLET, FILM COATED ORAL DAILY
Qty: 30 TABLET | Refills: 0 | Status: SHIPPED | OUTPATIENT
Start: 2023-08-07 | End: 2023-11-05

## 2023-08-07 RX ORDER — CARVEDILOL 12.5 MG/1
12.5 TABLET ORAL 2 TIMES DAILY WITH MEALS
Qty: 180 TABLET | Refills: 1 | Status: SHIPPED | OUTPATIENT
Start: 2023-08-07

## 2023-08-07 NOTE — TELEPHONE ENCOUNTER
Pharmacy is requesting medication refill.  Please approve or deny this request.    Rx requested:  Requested Prescriptions      No prescriptions requested or ordered in this encounter         Last Office Visit:   Visit date not found      Next Visit Date:  Future Appointments   Date Time Provider [facility] [address]   9/20/2023 11:15 AM Tiffanie Ramos, [address]   9/26/2023  2:15 PM Joo Gutierres MD [address] [facility]

## 2023-08-09 ENCOUNTER — APPOINTMENT (OUTPATIENT)
Dept: CARDIAC REHAB | Age: 73
End: 2023-08-09
Payer: MEDICARE

## 2023-08-10 RX ORDER — HYDRALAZINE HYDROCHLORIDE 100 MG/1
100 TABLET, FILM COATED ORAL 2 TIMES DAILY
Qty: 180 TABLET | Refills: 1 | Status: SHIPPED | OUTPATIENT
Start: 2023-08-10

## 2023-08-10 NOTE — TELEPHONE ENCOUNTER
Pharmacy is requesting medication refill.  Please approve or deny this request.    Rx requested:  Requested Prescriptions     Pending Prescriptions Disp Refills    hydrALAZINE (APRESOLINE) 100 MG tablet 180 tablet 1     Sig: Take 1 tablet by mouth in the morning and at bedtime         Last Office Visit:   Visit date not found      Next Visit Date:  Future Appointments   Date Time Provider [address]   9/20/2023 11:15 AM Nurys Babb, [address] [facility]   9/26/2023  2:15 PM Laura Mattson MD [address] [facility]

## 2023-08-11 ENCOUNTER — APPOINTMENT (OUTPATIENT)
Dept: CARDIAC REHAB | Age: 73
End: 2023-08-11
Payer: MEDICARE

## 2023-08-14 ENCOUNTER — APPOINTMENT (OUTPATIENT)
Dept: CARDIAC REHAB | Age: 73
End: 2023-08-14
Payer: MEDICARE

## 2023-08-16 ENCOUNTER — APPOINTMENT (OUTPATIENT)
Dept: CARDIAC REHAB | Age: 73
End: 2023-08-16
Payer: MEDICARE

## 2023-08-18 ENCOUNTER — APPOINTMENT (OUTPATIENT)
Dept: CARDIAC REHAB | Age: 73
End: 2023-08-18
Payer: MEDICARE

## 2023-08-21 ENCOUNTER — APPOINTMENT (OUTPATIENT)
Dept: CARDIAC REHAB | Age: 73
End: 2023-08-21
Payer: MEDICARE

## 2023-08-23 ENCOUNTER — APPOINTMENT (OUTPATIENT)
Dept: CARDIAC REHAB | Age: 73
End: 2023-08-23
Payer: MEDICARE

## 2023-08-25 ENCOUNTER — APPOINTMENT (OUTPATIENT)
Dept: CARDIAC REHAB | Age: 73
End: 2023-08-25
Payer: MEDICARE

## 2023-09-06 RX ORDER — PAROXETINE HYDROCHLORIDE 40 MG/1
40 TABLET, FILM COATED ORAL DAILY
Qty: 30 TABLET | Refills: 0 | Status: SHIPPED | OUTPATIENT
Start: 2023-09-06 | End: 2023-12-05

## 2023-09-18 RX ORDER — GLIPIZIDE 10 MG/1
TABLET ORAL
Qty: 180 TABLET | Refills: 0 | Status: SHIPPED | OUTPATIENT
Start: 2023-09-18

## 2023-09-18 NOTE — TELEPHONE ENCOUNTER
Future Appointments    Encounter Information    Provider Department Appt Notes   9/20/2023 Gisela Silver, [address] [place] [facility] 6 month follow up   9/26/2023 Salena Bell MD [facility] [place] [facility] 3 month follow up     Past Visits    Date Provider Specialty Visit Type Primary Dx   09/05/2023 Dolly Kelley MD Endoscopy Surgery    07/18/2023 Dolly Kelley MD Gastroenterology Office Visit Anemia, unspecified type   06/19/2023 Salena Bell MD Family Medicine Office Visit Essential hypertension   05/12/2023 Salena Bell MD Family Medicine Telemedicine Anxiety   03/22/2023 Gisela Silver MD Cardiology Office Visit NSTEMI (non-ST elevated myocardial infarction) [facility])

## 2023-09-20 ENCOUNTER — OFFICE VISIT (OUTPATIENT)
Dept: CARDIOLOGY CLINIC | Age: 73
End: 2023-09-20
Payer: MEDICARE

## 2023-09-20 VITALS
DIASTOLIC BLOOD PRESSURE: 40 MMHG | WEIGHT: 158.6 LBS | BODY MASS INDEX: 26.42 KG/M2 | HEIGHT: 65 IN | OXYGEN SATURATION: 98 % | SYSTOLIC BLOOD PRESSURE: 110 MMHG | HEART RATE: 76 BPM

## 2023-09-20 DIAGNOSIS — I21.4 NSTEMI (NON-ST ELEVATED MYOCARDIAL INFARCTION) (HCC): ICD-10-CM

## 2023-09-20 DIAGNOSIS — I25.83 CORONARY ARTERY DISEASE DUE TO LIPID RICH PLAQUE: Primary | ICD-10-CM

## 2023-09-20 DIAGNOSIS — I25.10 CORONARY ARTERY DISEASE DUE TO LIPID RICH PLAQUE: Primary | ICD-10-CM

## 2023-09-20 DIAGNOSIS — I73.9 CLAUDICATION (HCC): ICD-10-CM

## 2023-09-20 PROCEDURE — 3074F SYST BP LT 130 MM HG: CPT | Performed by: INTERNAL MEDICINE

## 2023-09-20 PROCEDURE — 1036F TOBACCO NON-USER: CPT | Performed by: INTERNAL MEDICINE

## 2023-09-20 PROCEDURE — 3017F COLORECTAL CA SCREEN DOC REV: CPT | Performed by: INTERNAL MEDICINE

## 2023-09-20 PROCEDURE — 99214 OFFICE O/P EST MOD 30 MIN: CPT | Performed by: INTERNAL MEDICINE

## 2023-09-20 PROCEDURE — G8427 DOCREV CUR MEDS BY ELIG CLIN: HCPCS | Performed by: INTERNAL MEDICINE

## 2023-09-20 PROCEDURE — 1123F ACP DISCUSS/DSCN MKR DOCD: CPT | Performed by: INTERNAL MEDICINE

## 2023-09-20 PROCEDURE — 3078F DIAST BP <80 MM HG: CPT | Performed by: INTERNAL MEDICINE

## 2023-09-20 PROCEDURE — G8417 CALC BMI ABV UP PARAM F/U: HCPCS | Performed by: INTERNAL MEDICINE

## 2023-09-20 ASSESSMENT — ENCOUNTER SYMPTOMS
COLOR CHANGE: 0
VOMITING: 0
SHORTNESS OF BREATH: 0
EYE REDNESS: 0
ABDOMINAL PAIN: 0
CONSTIPATION: 0
COUGH: 0
DIARRHEA: 0
APNEA: 0
NAUSEA: 0
RHINORRHEA: 0
CHEST TIGHTNESS: 0
WHEEZING: 0

## 2023-09-20 NOTE — PROGRESS NOTES
vegetables, fruits, nuts, whole grains, lean vegetable or animal protein, and fish. As per 2019 ACC/AHA guideline on primary prevention of CVD     Recommended patient to engage in at least 150 minutes per week of accumulated moderate-intensity physical activity or 75 minutes per week of vigorous-intensity physical activity as per 2019 ACC/AHA guideline on primary prevention of CVD       This note was transcribed using voice recognition software. Every effort was made to ensure accuracy; however, inadvertent computerized transcription errors may be present.

## 2023-09-26 ENCOUNTER — OFFICE VISIT (OUTPATIENT)
Dept: FAMILY MEDICINE CLINIC | Age: 73
End: 2023-09-26
Payer: MEDICARE

## 2023-09-26 VITALS
OXYGEN SATURATION: 96 % | WEIGHT: 163 LBS | SYSTOLIC BLOOD PRESSURE: 116 MMHG | BODY MASS INDEX: 27.16 KG/M2 | HEART RATE: 70 BPM | HEIGHT: 65 IN | DIASTOLIC BLOOD PRESSURE: 60 MMHG | RESPIRATION RATE: 14 BRPM

## 2023-09-26 DIAGNOSIS — E11.9 TYPE 2 DIABETES MELLITUS WITHOUT COMPLICATION, WITHOUT LONG-TERM CURRENT USE OF INSULIN (HCC): ICD-10-CM

## 2023-09-26 DIAGNOSIS — Z98.61 CAD S/P PERCUTANEOUS CORONARY ANGIOPLASTY: ICD-10-CM

## 2023-09-26 DIAGNOSIS — I25.10 CAD S/P PERCUTANEOUS CORONARY ANGIOPLASTY: ICD-10-CM

## 2023-09-26 DIAGNOSIS — D50.9 IRON DEFICIENCY ANEMIA, UNSPECIFIED IRON DEFICIENCY ANEMIA TYPE: ICD-10-CM

## 2023-09-26 DIAGNOSIS — I10 ESSENTIAL HYPERTENSION: ICD-10-CM

## 2023-09-26 DIAGNOSIS — F41.9 ANXIETY: ICD-10-CM

## 2023-09-26 DIAGNOSIS — E11.9 TYPE 2 DIABETES MELLITUS WITHOUT COMPLICATION, WITHOUT LONG-TERM CURRENT USE OF INSULIN (HCC): Primary | ICD-10-CM

## 2023-09-26 LAB
ALBUMIN SERPL-MCNC: 4.3 G/DL (ref 3.5–4.6)
ALP SERPL-CCNC: 69 U/L (ref 35–104)
ALT SERPL-CCNC: 16 U/L (ref 0–41)
ANION GAP SERPL CALCULATED.3IONS-SCNC: 13 MEQ/L (ref 9–15)
AST SERPL-CCNC: 23 U/L (ref 0–40)
BASOPHILS # BLD: 0.1 K/UL (ref 0–0.2)
BASOPHILS NFR BLD: 1.3 %
BILIRUB SERPL-MCNC: 0.7 MG/DL (ref 0.2–0.7)
BUN SERPL-MCNC: 18 MG/DL (ref 8–23)
CALCIUM SERPL-MCNC: 8.8 MG/DL (ref 8.5–9.9)
CHLORIDE SERPL-SCNC: 106 MEQ/L (ref 95–107)
CO2 SERPL-SCNC: 21 MEQ/L (ref 20–31)
CREAT SERPL-MCNC: 1.85 MG/DL (ref 0.7–1.2)
EOSINOPHIL # BLD: 0.2 K/UL (ref 0–0.7)
EOSINOPHIL NFR BLD: 3.8 %
ERYTHROCYTE [DISTWIDTH] IN BLOOD BY AUTOMATED COUNT: 14.8 % (ref 11.5–14.5)
GLOBULIN SER CALC-MCNC: 2.2 G/DL (ref 2.3–3.5)
GLUCOSE SERPL-MCNC: 126 MG/DL (ref 70–99)
HBA1C MFR BLD: 6 %
HCT VFR BLD AUTO: 32 % (ref 42–52)
HGB BLD-MCNC: 10.2 G/DL (ref 14–18)
LYMPHOCYTES # BLD: 1.4 K/UL (ref 1–4.8)
LYMPHOCYTES NFR BLD: 25.9 %
MCH RBC QN AUTO: 29.3 PG (ref 27–31.3)
MCHC RBC AUTO-ENTMCNC: 31.9 % (ref 33–37)
MCV RBC AUTO: 92 FL (ref 79–92.2)
MONOCYTES # BLD: 0.6 K/UL (ref 0.2–0.8)
MONOCYTES NFR BLD: 9.9 %
NEUTROPHILS # BLD: 3.3 K/UL (ref 1.4–6.5)
NEUTS SEG NFR BLD: 58.9 %
PLATELET # BLD AUTO: 223 K/UL (ref 130–400)
POTASSIUM SERPL-SCNC: 4.8 MEQ/L (ref 3.4–4.9)
PROT SERPL-MCNC: 6.5 G/DL (ref 6.3–8)
RBC # BLD AUTO: 3.48 M/UL (ref 4.7–6.1)
SODIUM SERPL-SCNC: 140 MEQ/L (ref 135–144)
WBC # BLD AUTO: 5.6 K/UL (ref 4.8–10.8)

## 2023-09-26 PROCEDURE — 1036F TOBACCO NON-USER: CPT | Performed by: INTERNAL MEDICINE

## 2023-09-26 PROCEDURE — 83036 HEMOGLOBIN GLYCOSYLATED A1C: CPT | Performed by: INTERNAL MEDICINE

## 2023-09-26 PROCEDURE — 3078F DIAST BP <80 MM HG: CPT | Performed by: INTERNAL MEDICINE

## 2023-09-26 PROCEDURE — G8427 DOCREV CUR MEDS BY ELIG CLIN: HCPCS | Performed by: INTERNAL MEDICINE

## 2023-09-26 PROCEDURE — 1123F ACP DISCUSS/DSCN MKR DOCD: CPT | Performed by: INTERNAL MEDICINE

## 2023-09-26 PROCEDURE — 99214 OFFICE O/P EST MOD 30 MIN: CPT | Performed by: INTERNAL MEDICINE

## 2023-09-26 PROCEDURE — 3017F COLORECTAL CA SCREEN DOC REV: CPT | Performed by: INTERNAL MEDICINE

## 2023-09-26 PROCEDURE — 3044F HG A1C LEVEL LT 7.0%: CPT | Performed by: INTERNAL MEDICINE

## 2023-09-26 PROCEDURE — 2022F DILAT RTA XM EVC RTNOPTHY: CPT | Performed by: INTERNAL MEDICINE

## 2023-09-26 PROCEDURE — 3074F SYST BP LT 130 MM HG: CPT | Performed by: INTERNAL MEDICINE

## 2023-09-26 PROCEDURE — G8417 CALC BMI ABV UP PARAM F/U: HCPCS | Performed by: INTERNAL MEDICINE

## 2023-09-26 ASSESSMENT — ENCOUNTER SYMPTOMS
CONSTIPATION: 0
EYE DISCHARGE: 0
TROUBLE SWALLOWING: 0
VOICE CHANGE: 0
ABDOMINAL PAIN: 0
BACK PAIN: 0
SORE THROAT: 0
PHOTOPHOBIA: 0
COLOR CHANGE: 0
NAUSEA: 0
RHINORRHEA: 0
EYE PAIN: 0
WHEEZING: 0
ABDOMINAL DISTENTION: 0
SHORTNESS OF BREATH: 0
APNEA: 0
BLOOD IN STOOL: 0
CHEST TIGHTNESS: 0
EYE REDNESS: 0
SINUS PAIN: 0
EYE ITCHING: 0
VOMITING: 0
RECTAL PAIN: 0
COUGH: 0
FACIAL SWELLING: 0
SINUS PRESSURE: 0
DIARRHEA: 0

## 2023-09-26 NOTE — PROGRESS NOTES
pSubjective:      Patient ID: Rafael Whelan is a 74 y.o. male New patient, here for evaluation of the following chief complaint(s):  Chief Complaint   Patient presents with    Hypertension    Anxiety         Diabetes  Pertinent negatives for hypoglycemia include no confusion, dizziness, headaches, nervousness/anxiousness, seizures, speech difficulty or tremors. Pertinent negatives for diabetes include no chest pain, no fatigue, no polydipsia, no polyphagia, no polyuria and no weakness. Hypertension  Pertinent negatives include no chest pain, headaches, neck pain, palpitations or shortness of breath. 64-year-old male with a history of type 2 diabetes established coronary artery disease anxiety and hypertension presents for follow-up visit. He is experiencing chest pain. Iron  deficiency anemia: The patient is receiving 1/17/2023. His most recent labs are noted below:  Component      Latest Ref Rng & Units 6/14/2023          12:51 PM   WBC      4.8 - 10.8 K/uL 6.5   RBC      4.70 - 6.10 M/uL 3.77 (L)   Hemoglobin Quant      14.0 - 18.0 g/dL 11.0 (L)   Hematocrit      42.0 - 52.0 % 33.4 (L)   MCV      79.0 - 92.2 fL 88.4   MCH      27.0 - 31.3 pg 29.1   MCHC      33.0 - 37.0 % 32.9 (L)   RDW      11.5 - 14.5 % 15.9 (H)   Platelet Count      992 - 400 K/uL 193         Hx  Of CAD S/P MI-metoprolol, lipitor, plavix        Diabetes -glipizide 10 mg bid. Blood sugars well controlled averaging in the 100s. Anxiety-Paxil        Essential hypertension: Compliant with   Cozaar 25 mg daily Cardizem 120 mg orally daily and Lopressor 25 mg twice daily      Nicotine dependence:NO LONGER SMOKING SINCE 10/2020        Diverticular bleed:  The patient has not experience additional bleeding since being discharged from the hospital.      Cat : Tiger      At present he denies polyuria,  Polydipsia, constitutional, sinus, visual, cardiopulmonary, urologic, gastrointestinal, immunologic/hematologic, musculoskeletal,

## 2023-09-27 LAB
FERRITIN: 37 NG/ML (ref 30–400)
IRON SATURATION: 17 % (ref 20–55)
IRON: 59 UG/DL (ref 59–158)
TOTAL IRON BINDING CAPACITY: 349 UG/DL (ref 250–450)
UNSATURATED IRON BINDING CAPACITY: 290 UG/DL (ref 112–347)
VITAMIN B-12: 174 PG/ML (ref 232–1245)

## 2023-10-03 NOTE — TELEPHONE ENCOUNTER
Rx requested:  Requested Prescriptions     Pending Prescriptions Disp Refills    PARoxetine (PAXIL) 40 MG tablet 90 tablet 0     Sig: Take 1 tablet by mouth daily         Last Office Visit:   9/26/2023      Next Visit Date:  Future Appointments   Date Time Provider [address] [place] [address]   12/20/2023 11:45 AM Isaiah Arriaza MD [address] [facility]   12/26/2023  3:45 PM Pedro Valentin MD [address] [facility]

## 2023-10-04 RX ORDER — PAROXETINE HYDROCHLORIDE 40 MG/1
40 TABLET, FILM COATED ORAL DAILY
Qty: 90 TABLET | Refills: 0 | Status: SHIPPED | OUTPATIENT
Start: 2023-10-04 | End: 2024-01-02

## 2023-11-27 ENCOUNTER — HOSPITAL ENCOUNTER (EMERGENCY)
Age: 73
Discharge: HOME | End: 2023-11-27
Payer: MEDICARE

## 2023-11-27 ENCOUNTER — APPOINTMENT (OUTPATIENT)
Dept: GENERAL RADIOLOGY | Age: 73
End: 2023-11-27
Payer: MEDICARE

## 2023-11-27 VITALS
WEIGHT: 163 LBS | RESPIRATION RATE: 19 BRPM | TEMPERATURE: 98.8 F | BODY MASS INDEX: 27.16 KG/M2 | HEIGHT: 65 IN | OXYGEN SATURATION: 96 % | DIASTOLIC BLOOD PRESSURE: 66 MMHG | HEART RATE: 74 BPM | SYSTOLIC BLOOD PRESSURE: 175 MMHG

## 2023-11-27 DIAGNOSIS — D64.9 ANEMIA, UNSPECIFIED TYPE: ICD-10-CM

## 2023-11-27 DIAGNOSIS — R07.9 CHEST PAIN, UNSPECIFIED TYPE: Primary | ICD-10-CM

## 2023-11-27 DIAGNOSIS — N18.9 CHRONIC KIDNEY DISEASE, UNSPECIFIED CKD STAGE: ICD-10-CM

## 2023-11-27 LAB
ALBUMIN SERPL-MCNC: 4.1 G/DL (ref 3.5–4.6)
ALP SERPL-CCNC: 55 U/L (ref 35–104)
ALT SERPL-CCNC: 12 U/L (ref 0–41)
ANION GAP SERPL CALCULATED.3IONS-SCNC: 11 MEQ/L (ref 9–15)
AST SERPL-CCNC: 20 U/L (ref 0–40)
BASOPHILS # BLD: 0.1 K/UL (ref 0–0.2)
BASOPHILS NFR BLD: 1.2 %
BILIRUB SERPL-MCNC: 0.5 MG/DL (ref 0.2–0.7)
BNP BLD-MCNC: 409 PG/ML
BUN SERPL-MCNC: 23 MG/DL (ref 8–23)
CALCIUM SERPL-MCNC: 9 MG/DL (ref 8.5–9.9)
CHLORIDE SERPL-SCNC: 108 MEQ/L (ref 95–107)
CO2 SERPL-SCNC: 19 MEQ/L (ref 20–31)
CREAT SERPL-MCNC: 2.19 MG/DL (ref 0.7–1.2)
EOSINOPHIL # BLD: 0.2 K/UL (ref 0–0.7)
EOSINOPHIL NFR BLD: 3.7 %
ERYTHROCYTE [DISTWIDTH] IN BLOOD BY AUTOMATED COUNT: 14.5 % (ref 11.5–14.5)
GLOBULIN SER CALC-MCNC: 2 G/DL (ref 2.3–3.5)
GLUCOSE SERPL-MCNC: 84 MG/DL (ref 70–99)
HCT VFR BLD AUTO: 29.7 % (ref 42–52)
HGB BLD-MCNC: 9.9 G/DL (ref 14–18)
LYMPHOCYTES # BLD: 1.3 K/UL (ref 1–4.8)
LYMPHOCYTES NFR BLD: 26.2 %
MAGNESIUM SERPL-MCNC: 1.7 MG/DL (ref 1.7–2.4)
MCH RBC QN AUTO: 29.6 PG (ref 27–31.3)
MCHC RBC AUTO-ENTMCNC: 33.3 % (ref 33–37)
MCV RBC AUTO: 88.9 FL (ref 79–92.2)
MONOCYTES # BLD: 0.5 K/UL (ref 0.2–0.8)
MONOCYTES NFR BLD: 9.6 %
NEUTROPHILS # BLD: 3 K/UL (ref 1.4–6.5)
NEUTS SEG NFR BLD: 58.7 %
PLATELET # BLD AUTO: 195 K/UL (ref 130–400)
POTASSIUM SERPL-SCNC: 4.5 MEQ/L (ref 3.4–4.9)
PROT SERPL-MCNC: 6.1 G/DL (ref 6.3–8)
RBC # BLD AUTO: 3.34 M/UL (ref 4.7–6.1)
SODIUM SERPL-SCNC: 138 MEQ/L (ref 135–144)
TROPONIN, HIGH SENSITIVITY: 25 NG/L (ref 0–19)
TROPONIN, HIGH SENSITIVITY: 29 NG/L (ref 0–19)
TSH SERPL-MCNC: 2.22 UIU/ML (ref 0.44–3.86)
WBC # BLD AUTO: 5.1 K/UL (ref 4.8–10.8)

## 2023-11-27 PROCEDURE — 93005 ELECTROCARDIOGRAM TRACING: CPT | Performed by: EMERGENCY MEDICINE

## 2023-11-27 PROCEDURE — 71045 X-RAY EXAM CHEST 1 VIEW: CPT

## 2023-11-27 PROCEDURE — 80053 COMPREHEN METABOLIC PANEL: CPT

## 2023-11-27 PROCEDURE — 36415 COLL VENOUS BLD VENIPUNCTURE: CPT

## 2023-11-27 PROCEDURE — 83880 ASSAY OF NATRIURETIC PEPTIDE: CPT

## 2023-11-27 PROCEDURE — 84484 ASSAY OF TROPONIN QUANT: CPT

## 2023-11-27 PROCEDURE — 85025 COMPLETE CBC W/AUTO DIFF WBC: CPT

## 2023-11-27 PROCEDURE — 84443 ASSAY THYROID STIM HORMONE: CPT

## 2023-11-27 PROCEDURE — 83735 ASSAY OF MAGNESIUM: CPT

## 2023-11-27 PROCEDURE — 99285 EMERGENCY DEPT VISIT HI MDM: CPT

## 2023-11-27 ASSESSMENT — PAIN - FUNCTIONAL ASSESSMENT: PAIN_FUNCTIONAL_ASSESSMENT: NONE - DENIES PAIN

## 2023-11-27 ASSESSMENT — ENCOUNTER SYMPTOMS
APNEA: 0
ABDOMINAL DISTENTION: 0
ANAL BLEEDING: 0
EYE DISCHARGE: 0
NAUSEA: 0
VOICE CHANGE: 0
COUGH: 1
VOMITING: 0

## 2023-11-27 NOTE — ED TRIAGE NOTES
Pt arrived via ems from home with c/o chest pain and discomfort x 2 months but had sharp pain under breast bone and rapid heart rate. pt a/o x 3 skin pink w/d resp non labored,lungs clear but diminished. Pt has hx of MI and stent. Neg edema noted.

## 2023-11-27 NOTE — ED PROVIDER NOTES
[facility] ED  EMERGENCY DEPARTMENT ENCOUNTER      Pt Name: Dwain Walker  MRN: [id number]  [address] [place] 1950  Date of evaluation: 11/27/2023  Provider: Joon Barth PA-C  5:44 PM EST    CHIEF COMPLAINT       Chief Complaint   Patient presents with    Chest Pain         HISTORY OF PRESENT ILLNESS   (Location/Symptom, Timing/Onset, Context/Setting, Quality, Duration, Modifying Factors, Severity)  Note limiting factors. Dwain Walker is a 74 y.o. male who presents to the emergency department patient describes his chest pressure notes it comes and goes he does have history of 3 stents. Hehad chest discomfort x2 months that sharp pain today. States it feels like an needle poking him. Denies any fever chills nausea vomiting does have history of COPD discontinued smoking 3 years ago. Nothing improves or worsens his symptoms. HPI    Nursing Notes were reviewed. REVIEW OF SYSTEMS    (2-9 systems for level 4, 10 or more for level 5)     Review of Systems   Constitutional:  Negative for activity change, appetite change, fever and unexpected weight change. HENT:  Negative for ear discharge, nosebleeds and voice change. Eyes:  Negative for discharge. Respiratory:  Positive for cough. Negative for apnea. Cardiovascular:  Positive for chest pain. Negative for leg swelling. Gastrointestinal:  Negative for abdominal distention, anal bleeding, nausea and vomiting. Genitourinary:  Negative for dysuria. Musculoskeletal:  Negative for joint swelling. Skin:  Negative for pallor. Neurological:  Negative for seizures and facial asymmetry. Hematological:  Does not bruise/bleed easily. Psychiatric/Behavioral:  Negative for behavioral problems, self-injury and sleep disturbance. All other systems reviewed and are negative. Except as noted above the remainder of the review of systems was reviewed and negative.        PAST MEDICAL HISTORY     Past Medical History:   Diagnosis

## 2023-11-28 NOTE — ED NOTES
Pt given instructions on how to call for a ride through [employer]. Pt calling now to get a ride.       Saroj Vance RN  11/27/23 [id number]

## 2023-11-29 LAB
EKG ATRIAL RATE: 63 BPM
EKG P AXIS: 80 DEGREES
EKG P-R INTERVAL: 178 MS
EKG Q-T INTERVAL: 398 MS
EKG QRS DURATION: 80 MS
EKG QTC CALCULATION (BAZETT): 407 MS
EKG R AXIS: 6 DEGREES
EKG T AXIS: 62 DEGREES
EKG VENTRICULAR RATE: 63 BPM

## 2023-11-29 PROCEDURE — 93010 ELECTROCARDIOGRAM REPORT: CPT | Performed by: INTERNAL MEDICINE

## 2023-12-11 RX ORDER — GLIPIZIDE 10 MG/1
10 TABLET ORAL
Qty: 180 TABLET | Refills: 1 | Status: SHIPPED | OUTPATIENT
Start: 2023-12-11

## 2023-12-11 NOTE — TELEPHONE ENCOUNTER
Future Appointments    Encounter Information   Provider Department Appt Notes   12/20/2023 Jose Enrique Srinivasan, [address] [place] [facility] 3 month follow up   12/26/2023 Wilian Jessica, [address] [facility] 3 month f/u     Past Visits    Date Provider Specialty Visit Type Primary Dx   09/26/2023 Wilian Jessica MD Family Medicine Office Visit Type 2 diabetes mellitus without complication, without long-term current use of insulin ([address])

## 2023-12-26 ENCOUNTER — OFFICE VISIT (OUTPATIENT)
Dept: FAMILY MEDICINE CLINIC | Age: 73
End: 2023-12-26
Payer: MEDICARE

## 2023-12-26 VITALS
OXYGEN SATURATION: 97 % | DIASTOLIC BLOOD PRESSURE: 68 MMHG | SYSTOLIC BLOOD PRESSURE: 110 MMHG | HEIGHT: 65 IN | RESPIRATION RATE: 14 BRPM | BODY MASS INDEX: 25.66 KG/M2 | WEIGHT: 154 LBS | HEART RATE: 68 BPM

## 2023-12-26 DIAGNOSIS — I25.10 CAD S/P PERCUTANEOUS CORONARY ANGIOPLASTY: ICD-10-CM

## 2023-12-26 DIAGNOSIS — E11.9 TYPE 2 DIABETES MELLITUS WITHOUT COMPLICATION, WITHOUT LONG-TERM CURRENT USE OF INSULIN (HCC): Primary | ICD-10-CM

## 2023-12-26 DIAGNOSIS — F41.9 ANXIETY: ICD-10-CM

## 2023-12-26 DIAGNOSIS — Z98.61 CAD S/P PERCUTANEOUS CORONARY ANGIOPLASTY: ICD-10-CM

## 2023-12-26 DIAGNOSIS — R05.1 ACUTE COUGH: ICD-10-CM

## 2023-12-26 DIAGNOSIS — I10 ESSENTIAL HYPERTENSION: ICD-10-CM

## 2023-12-26 LAB
HBA1C MFR BLD: 6.3 %
INFLUENZA A ANTIBODY: ABNORMAL
INFLUENZA B ANTIBODY: ABNORMAL
Lab: NORMAL
PERFORMING INSTRUMENT: NORMAL
QC PASS/FAIL: NORMAL
RSV ANTIGEN: NORMAL
SARS-COV-2, POC: NORMAL

## 2023-12-26 PROCEDURE — G8427 DOCREV CUR MEDS BY ELIG CLIN: HCPCS | Performed by: INTERNAL MEDICINE

## 2023-12-26 PROCEDURE — G8417 CALC BMI ABV UP PARAM F/U: HCPCS | Performed by: INTERNAL MEDICINE

## 2023-12-26 PROCEDURE — 87426 SARSCOV CORONAVIRUS AG IA: CPT | Performed by: INTERNAL MEDICINE

## 2023-12-26 PROCEDURE — G8484 FLU IMMUNIZE NO ADMIN: HCPCS | Performed by: INTERNAL MEDICINE

## 2023-12-26 PROCEDURE — 2022F DILAT RTA XM EVC RTNOPTHY: CPT | Performed by: INTERNAL MEDICINE

## 2023-12-26 PROCEDURE — 83036 HEMOGLOBIN GLYCOSYLATED A1C: CPT | Performed by: INTERNAL MEDICINE

## 2023-12-26 PROCEDURE — 86756 RESPIRATORY VIRUS ANTIBODY: CPT | Performed by: INTERNAL MEDICINE

## 2023-12-26 PROCEDURE — 1123F ACP DISCUSS/DSCN MKR DOCD: CPT | Performed by: INTERNAL MEDICINE

## 2023-12-26 PROCEDURE — 1036F TOBACCO NON-USER: CPT | Performed by: INTERNAL MEDICINE

## 2023-12-26 PROCEDURE — 87804 INFLUENZA ASSAY W/OPTIC: CPT | Performed by: INTERNAL MEDICINE

## 2023-12-26 PROCEDURE — 3017F COLORECTAL CA SCREEN DOC REV: CPT | Performed by: INTERNAL MEDICINE

## 2023-12-26 PROCEDURE — 99214 OFFICE O/P EST MOD 30 MIN: CPT | Performed by: INTERNAL MEDICINE

## 2023-12-26 PROCEDURE — 3078F DIAST BP <80 MM HG: CPT | Performed by: INTERNAL MEDICINE

## 2023-12-26 PROCEDURE — 3044F HG A1C LEVEL LT 7.0%: CPT | Performed by: INTERNAL MEDICINE

## 2023-12-26 PROCEDURE — 3074F SYST BP LT 130 MM HG: CPT | Performed by: INTERNAL MEDICINE

## 2023-12-26 NOTE — PROGRESS NOTES
pSubjective:      Patient ID: Gloria Leung is a 74 y.o. male New patient, here for evaluation of the following chief complaint(s):  Chief Complaint   Patient presents with    Diabetes    Hypertension    Cough    Congestion         Diabetes  Pertinent negatives for hypoglycemia include no confusion, dizziness, headaches, nervousness/anxiousness, seizures, speech difficulty or tremors. Pertinent negatives for diabetes include no chest pain, no fatigue, no polydipsia, no polyphagia, no polyuria and no weakness. Hypertension  Pertinent negatives include no chest pain, headaches, neck pain, palpitations or shortness of breath. 68-year-old male with a history of type 2 diabetes established coronary artery disease anxiety and hypertension presents for follow-up visit. He is experiencing chest pain. Iron  deficiency anemia: The patient is receiving 1/17/2023. His most recent labs are noted below:  Component      Latest Ref Rng & Units 6/14/2023          12:51 PM   WBC      4.8 - 10.8 K/uL 6.5   RBC      4.70 - 6.10 M/uL 3.77 (L)   Hemoglobin Quant      14.0 - 18.0 g/dL 11.0 (L)   Hematocrit      42.0 - 52.0 % 33.4 (L)   MCV      79.0 - 92.2 fL 88.4   MCH      27.0 - 31.3 pg 29.1   MCHC      33.0 - 37.0 % 32.9 (L)   RDW      11.5 - 14.5 % 15.9 (H)   Platelet Count      581 - 400 K/uL 193         Hx  Of CAD S/P MI-metoprolol, lipitor, plavix        Diabetes -glipizide 10 mg bid. Blood sugars well controlled averaging in the 100s. Anxiety-Paxil        Essential hypertension: Compliant with   Cozaar 25 mg daily Cardizem 120 mg orally daily and Lopressor 25 mg twice daily      Nicotine dependence:NO LONGER SMOKING SINCE 10/2020        Diverticular bleed:  The patient has not experience additional bleeding since being discharged from the hospital.      Cat : Tiger      At present he denies polyuria,  Polydipsia, constitutional, sinus, visual, cardiopulmonary, urologic, gastrointestinal,

## 2024-01-02 RX ORDER — PAROXETINE HYDROCHLORIDE 40 MG/1
40 TABLET, FILM COATED ORAL DAILY
Qty: 90 TABLET | Refills: 0 | Status: SHIPPED | OUTPATIENT
Start: 2024-01-02 | End: 2024-04-01

## 2024-01-02 NOTE — TELEPHONE ENCOUNTER
Future Appointments    Encounter Information   Provider Department Appt Notes   1/3/2024 Sean Goss MD [facility] 3 month follow up   3/26/2024 Keith Yu MD [facility] F/u 3 month visit     Past Visits    Date Provider Specialty Visit Type Primary Dx   12/26/2023 Keith Yu MD Family Medicine Office Visit Type 2 diabetes mellitus without complication, without long-term current use of insulin (HCC)   09/26/2023 Keith Yu MD Family Medicine Office Visit Type 2 diabetes mellitus without complication, without long-term current use of insulin (HCC)

## 2024-01-11 RX ORDER — CLOPIDOGREL BISULFATE 75 MG/1
75 TABLET ORAL DAILY
Qty: 90 TABLET | Refills: 3 | Status: SHIPPED | OUTPATIENT
Start: 2024-01-11

## 2024-01-24 ENCOUNTER — OFFICE VISIT (OUTPATIENT)
Dept: CARDIOLOGY CLINIC | Age: 74
End: 2024-01-24
Payer: MEDICARE

## 2024-01-24 VITALS
HEIGHT: 65 IN | HEART RATE: 74 BPM | BODY MASS INDEX: 26.66 KG/M2 | OXYGEN SATURATION: 97 % | DIASTOLIC BLOOD PRESSURE: 68 MMHG | SYSTOLIC BLOOD PRESSURE: 112 MMHG | WEIGHT: 160 LBS | RESPIRATION RATE: 15 BRPM

## 2024-01-24 DIAGNOSIS — I25.10 CORONARY ARTERY DISEASE DUE TO LIPID RICH PLAQUE: Primary | ICD-10-CM

## 2024-01-24 DIAGNOSIS — I25.119 ATHEROSCLEROSIS OF NATIVE CORONARY ARTERY OF NATIVE HEART WITH ANGINA PECTORIS (HCC): ICD-10-CM

## 2024-01-24 DIAGNOSIS — I73.9 CLAUDICATION (HCC): ICD-10-CM

## 2024-01-24 DIAGNOSIS — I48.91 ATRIAL FIBRILLATION, UNSPECIFIED TYPE (HCC): ICD-10-CM

## 2024-01-24 DIAGNOSIS — I25.83 CORONARY ARTERY DISEASE DUE TO LIPID RICH PLAQUE: Primary | ICD-10-CM

## 2024-01-24 PROCEDURE — 3078F DIAST BP <80 MM HG: CPT | Performed by: INTERNAL MEDICINE

## 2024-01-24 PROCEDURE — G8427 DOCREV CUR MEDS BY ELIG CLIN: HCPCS | Performed by: INTERNAL MEDICINE

## 2024-01-24 PROCEDURE — 3017F COLORECTAL CA SCREEN DOC REV: CPT | Performed by: INTERNAL MEDICINE

## 2024-01-24 PROCEDURE — 99214 OFFICE O/P EST MOD 30 MIN: CPT | Performed by: INTERNAL MEDICINE

## 2024-01-24 PROCEDURE — 3074F SYST BP LT 130 MM HG: CPT | Performed by: INTERNAL MEDICINE

## 2024-01-24 PROCEDURE — 1123F ACP DISCUSS/DSCN MKR DOCD: CPT | Performed by: INTERNAL MEDICINE

## 2024-01-24 PROCEDURE — G8484 FLU IMMUNIZE NO ADMIN: HCPCS | Performed by: INTERNAL MEDICINE

## 2024-01-24 PROCEDURE — 1036F TOBACCO NON-USER: CPT | Performed by: INTERNAL MEDICINE

## 2024-01-24 PROCEDURE — G8417 CALC BMI ABV UP PARAM F/U: HCPCS | Performed by: INTERNAL MEDICINE

## 2024-01-24 ASSESSMENT — ENCOUNTER SYMPTOMS
APNEA: 0
ABDOMINAL PAIN: 0
DIARRHEA: 0
CONSTIPATION: 0
VOMITING: 0
COUGH: 0
COLOR CHANGE: 0
EYE REDNESS: 0
CHEST TIGHTNESS: 0
RHINORRHEA: 0
SHORTNESS OF BREATH: 0
WHEEZING: 0
NAUSEA: 0

## 2024-01-24 NOTE — PROGRESS NOTES
Chief Complaint   Patient presents with    3 Month Follow-Up     For CAD.        Patient presents for initial medical evaluation. Patient is followed on a regular basis by Keith Mathias MD.     CAD status post PCI of the pRCA DESX1 with a 3.0 x 22 mm on 2/3/2023, RCA PCI 2020, 3/9/2023 PCI of proximal mid circumflex RED x1 (2.25 x 34 mm [facility])  Hypertension  Hyperlipidemia  Diabetes  COPD  TTE 2/3/2023 EF 60%  Coronary angiogram 2/3/2023  Left main mild disease  LAD: Mild disease  Circumflex: Proximal 70% stenosis, mid 80% stenosis status post PCI on 3/9/2023  RCA: Proximal 99% in-stent restenosis  =============  1/24/2024  Follow-up on CAD  Denies chest pain  But endorses episodes of skipping beats  Episodes happen every other day and usually at nighttime  Denies episode of fainting episodes        9/20/2023  Follow-up on CAD  Patient reports feeling well denies chest pain or shortness of breath  Last night patient was not able to sleep well unknown reason  As a consequence today patient feels weak  Blood pressure today 110/40  Walking around the house with no cardiac issues    3/22/2023  Follow-up on CAD  Earlier this month 3/9/2023 patient underwent successful PCI to the circumflex with RED x1  After the procedure patient had subsequent diarrhea, patient was admitted to the hospital for 1 night patient was discharged home hemodynamically stable and diarrhea had resolved  Patient reports feeling well denies chest pain or shortness of breath, reports some twinges in his chest  Has not started cardiac rehab yet    2/24/2023  Follow-up on hospitalization  February 3, 2023 patient was admitted to the hospital for chest pain  Patient underwent PCI of the proximal RCA in-stent restenosis with RED x1  Patient has residual proximal 70% circumflex stenosis and mid 80% circumflex stenosis  Currently patient reports feeling well denies chest pain or shortness of breath  Compliant with all medications without

## 2024-01-31 RX ORDER — ASPIRIN 81 MG/1
81 TABLET, CHEWABLE ORAL
Qty: 90 TABLET | Refills: 3 | Status: SHIPPED | OUTPATIENT
Start: 2024-01-31

## 2024-01-31 RX ORDER — ATORVASTATIN CALCIUM 40 MG/1
40 TABLET, FILM COATED ORAL EVERY EVENING
Qty: 90 TABLET | Refills: 3 | Status: SHIPPED | OUTPATIENT
Start: 2024-01-31

## 2024-01-31 RX ORDER — CARVEDILOL 12.5 MG/1
12.5 TABLET ORAL 2 TIMES DAILY WITH MEALS
Qty: 180 TABLET | Refills: 3 | Status: SHIPPED | OUTPATIENT
Start: 2024-01-31

## 2024-01-31 NOTE — TELEPHONE ENCOUNTER
Please approve or deny this refill request. The order is pended.  Thank you.    LOV 01/24/2024    Next Visit Date:  Future Appointments   Date Time Provider Department Center   2/9/2024  1:00 PM Sean Goss MD [place] [facility]   3/26/2024 11:00 AM [facility]

## 2024-02-06 ENCOUNTER — TELEMEDICINE (OUTPATIENT)
Dept: FAMILY MEDICINE CLINIC | Age: 74
End: 2024-02-06
Payer: MEDICARE

## 2024-02-06 DIAGNOSIS — Z00.00 MEDICARE ANNUAL WELLNESS VISIT, SUBSEQUENT: Primary | ICD-10-CM

## 2024-02-06 PROCEDURE — 1123F ACP DISCUSS/DSCN MKR DOCD: CPT | Performed by: NURSE PRACTITIONER

## 2024-02-06 PROCEDURE — G8484 FLU IMMUNIZE NO ADMIN: HCPCS | Performed by: NURSE PRACTITIONER

## 2024-02-06 PROCEDURE — 3017F COLORECTAL CA SCREEN DOC REV: CPT | Performed by: NURSE PRACTITIONER

## 2024-02-06 PROCEDURE — G0439 PPPS, SUBSEQ VISIT: HCPCS | Performed by: NURSE PRACTITIONER

## 2024-02-06 ASSESSMENT — LIFESTYLE VARIABLES
HOW OFTEN DO YOU HAVE A DRINK CONTAINING ALCOHOL: NEVER
HOW MANY STANDARD DRINKS CONTAINING ALCOHOL DO YOU HAVE ON A TYPICAL DAY: PATIENT DOES NOT DRINK

## 2024-02-06 ASSESSMENT — PATIENT HEALTH QUESTIONNAIRE - PHQ9
SUM OF ALL RESPONSES TO PHQ QUESTIONS 1-9: 0
SUM OF ALL RESPONSES TO PHQ QUESTIONS 1-9: 0
1. LITTLE INTEREST OR PLEASURE IN DOING THINGS: 0
SUM OF ALL RESPONSES TO PHQ QUESTIONS 1-9: 0
SUM OF ALL RESPONSES TO PHQ QUESTIONS 1-9: 0
2. FEELING DOWN, DEPRESSED OR HOPELESS: 0
SUM OF ALL RESPONSES TO PHQ9 QUESTIONS 1 & 2: 0

## 2024-02-06 NOTE — PATIENT INSTRUCTIONS
to drive yourself.  Watch closely for changes in your health, and be sure to contact your doctor if you have any problems.  Where can you learn more?  Go to https://www.[employer].net/patientEd and enter F075 to learn more about \"A Healthy Heart: Care Instructions.\"  Current as of: June 25, 2023               Content Version: 13.9  © 7415-7739 [employer].   Care instructions adapted under license by [employer]. If you have questions about a medical condition or this instruction, always ask your healthcare professional. [employer] disclaims any warranty or liability for your use of this information.      Personalized Preventive Plan for Isaak Mitchell - 2/6/2024  Medicare offers a range of preventive health benefits. Some of the tests and screenings are paid in full while other may be subject to a deductible, co-insurance, and/or copay.    Some of these benefits include a comprehensive review of your medical history including lifestyle, illnesses that may run in your family, and various assessments and screenings as appropriate.    After reviewing your medical record and screening and assessments performed today your provider may have ordered immunizations, labs, imaging, and/or referrals for you.  A list of these orders (if applicable) as well as your Preventive Care list are included within your After Visit Summary for your review.    Other Preventive Recommendations:    A preventive eye exam performed by an eye specialist is recommended every 1-2 years to screen for glaucoma; cataracts, macular degeneration, and other eye disorders.  A preventive dental visit is recommended every 6 months.  Try to get at least 150 minutes of exercise per week or 10,000 steps per day on a pedometer .  Order or download the FREE \"Exercise & Physical Activity: Your Everyday Guide\" from [employer]. Call 1-168.813.9181 or search [employer] online.  You need

## 2024-02-06 NOTE — PROGRESS NOTES
Medicare Annual Wellness Visit    Isaak Mitchell is here for Medicare AWV    Assessment & Plan   Medicare annual wellness visit, subsequent    Recommendations for Preventive Services Due: see orders and patient instructions/AVS.  Recommended screening schedule for the next 5-10 years is provided to the patient in written form: see Patient Instructions/AVS.     Return in 1 year (on 2/6/2025).     Subjective       Patient's complete Health Risk Assessment and screening values have been reviewed and are found in Flowsheets. The following problems were reviewed today and where indicated follow up appointments were made and/or referrals ordered.    Positive Risk Factor Screenings with Interventions:       Cognitive:      Words recalled: 1 Word Recalled     Total Score Interpretation: Abnormal Mini-Cog  Interventions:  Patient declines any further evaluation or treatment            Activity, Diet, and Weight:  On average, how many days per week do you engage in moderate to strenuous exercise (like a brisk walk)?: 0 days  On average, how many minutes do you engage in exercise at this level?: 0 min    Do you eat balanced/healthy meals regularly?: (!) No    There is no height or weight on file to calculate BMI.      Inactivity Interventions:  Patient declined any further interventions or treatment  Do you eat balanced/healthy meals regularly Interventions:  Patient declines any further evaluation or treatment          Vision Screen:  Do you have difficulty driving, watching TV, or doing any of your daily activities because of your eyesight?: No  Have you had an eye exam within the past year?: (!) No  No results found.    Interventions:   Patient encouraged to make appointment with their eye specialist      Advanced Directives:  Do you have a Living Will?: (!) No    Intervention:                            Objective      Patient-Reported Vitals  No data recorded          No Known Allergies  Prior to Visit Medications

## 2024-02-08 RX ORDER — HYDRALAZINE HYDROCHLORIDE 100 MG/1
100 TABLET, FILM COATED ORAL 2 TIMES DAILY
Qty: 180 TABLET | Refills: 3 | Status: SHIPPED | OUTPATIENT
Start: 2024-02-08

## 2024-02-08 NOTE — TELEPHONE ENCOUNTER
Requesting medication refill. Please approve or deny this request.    Rx requested:  Requested Prescriptions     Pending Prescriptions Disp Refills    hydrALAZINE (APRESOLINE) 100 MG tablet [Pharmacy Med Name: HYDRALAZINE 100MG (HUNDRED MG) TABS] 180 tablet 1     Sig: TAKE 1 TABLET BY MOUTH IN THE MORNING AND AT BEDTIME         Last Office Visit:   01/24/2024      Next Visit Date:  Future Appointments   Date Time Provider Department Center   2/21/2024 10:45 AM Sean Goss MD [place] [facility]   3/26/2024 11:00 AM Keith Yu MD [facility]               Last refill 08/10/2023. Please approve or deny.

## 2024-02-09 DIAGNOSIS — I48.91 ATRIAL FIBRILLATION, UNSPECIFIED TYPE (HCC): ICD-10-CM

## 2024-02-21 ENCOUNTER — TELEPHONE (OUTPATIENT)
Dept: CARDIOLOGY CLINIC | Age: 74
End: 2024-02-21

## 2024-02-21 ENCOUNTER — SCHEDULED TELEPHONE ENCOUNTER (OUTPATIENT)
Dept: CARDIOLOGY CLINIC | Age: 74
End: 2024-02-21
Payer: MEDICARE

## 2024-02-21 DIAGNOSIS — I25.119 ATHEROSCLEROSIS OF NATIVE CORONARY ARTERY OF NATIVE HEART WITH ANGINA PECTORIS (HCC): Primary | ICD-10-CM

## 2024-02-21 PROCEDURE — 99213 OFFICE O/P EST LOW 20 MIN: CPT | Performed by: INTERNAL MEDICINE

## 2024-02-21 PROCEDURE — 1123F ACP DISCUSS/DSCN MKR DOCD: CPT | Performed by: INTERNAL MEDICINE

## 2024-02-21 ASSESSMENT — ENCOUNTER SYMPTOMS
CHEST TIGHTNESS: 0
VOMITING: 0
WHEEZING: 0
NAUSEA: 0
EYE REDNESS: 0
DIARRHEA: 0
ABDOMINAL PAIN: 0
RHINORRHEA: 0
COLOR CHANGE: 0
APNEA: 0
SHORTNESS OF BREATH: 0
CONSTIPATION: 0
COUGH: 0

## 2024-02-21 NOTE — PROGRESS NOTES
No chief complaint on file.      Patient presents for initial medical evaluation. Patient is followed on a regular basis by Keith Mathias MD.     CAD status post PCI of the pRCA DESX1 with a 3.0 x 22 mm on 2/3/2023, RCA PCI 2020, 3/9/2023 PCI of proximal mid circumflex RED x1 (2.25 x 34 mm Jose Juan frontier)  Hypertension  Hyperlipidemia  Diabetes  COPD  TTE 2/3/2023 EF 60%  Coronary angiogram 2/3/2023  Left main mild disease  LAD: Mild disease  Circumflex: Proximal 70% stenosis, mid 80% stenosis status post PCI on 3/9/2023  RCA: Proximal 99% in-stent restenosis  Event monitor 2/9/2024 atrial tachycardia cardia no major arrhythmias  =============  2/21/2024  Virtual encounter  Follow-up on palpitations  Patient reports that palpitations have decreased  Patient tolerating Coreg well  Denies chest pain or shortness of breath  Event monitor 2/9/2024 atrial tachycardia cardia no major arrhythmias    1/24/2024  Follow-up on CAD  Denies chest pain  But endorses episodes of skipping beats  Episodes happen every other day and usually at nighttime  Denies episode of fainting episodes        9/20/2023  Follow-up on CAD  Patient reports feeling well denies chest pain or shortness of breath  Last night patient was not able to sleep well unknown reason  As a consequence today patient feels weak  Blood pressure today 110/40  Walking around the house with no cardiac issues    3/22/2023  Follow-up on CAD  Earlier this month 3/9/2023 patient underwent successful PCI to the circumflex with RED x1  After the procedure patient had subsequent diarrhea, patient was admitted to the hospital for 1 night patient was discharged home hemodynamically stable and diarrhea had resolved  Patient reports feeling well denies chest pain or shortness of breath, reports some twinges in his chest  Has not started cardiac rehab yet    2/24/2023  Follow-up on hospitalization  February 3, 2023 patient was admitted to the hospital for chest

## 2024-02-21 NOTE — TELEPHONE ENCOUNTER
Patient had telephone appt this morning. Called back wondering if Dr. Goss was able to review his holter monitor results?

## 2024-02-23 PROBLEM — I21.9 AMI (ACUTE MYOCARDIAL INFARCTION) (HCC): Status: RESOLVED | Noted: 2020-12-12 | Resolved: 2024-02-23

## 2024-02-23 PROBLEM — I25.2 HISTORY OF ACUTE MYOCARDIAL INFARCTION: Status: ACTIVE | Noted: 2020-12-11

## 2024-03-11 RX ORDER — PANTOPRAZOLE SODIUM 40 MG/1
40 TABLET, DELAYED RELEASE ORAL
Qty: 90 TABLET | Refills: 3 | Status: SHIPPED | OUTPATIENT
Start: 2024-03-11

## 2024-03-11 RX ORDER — LOSARTAN POTASSIUM 25 MG/1
25 TABLET ORAL 2 TIMES DAILY
Qty: 180 TABLET | Refills: 3 | Status: SHIPPED | OUTPATIENT
Start: 2024-03-11

## 2024-03-11 NOTE — TELEPHONE ENCOUNTER
Yes   Please review my note and I discussed it with the pt on last appointment  No major or concerning arrhythmias  Follow up with me in one week  Thanks

## 2024-03-11 NOTE — PROGRESS NOTES
pSubjective:      Patient ID: Isaak Mitchell is a 73 y.o. male New patient, here for evaluation of the following chief complaint(s):  Chief Complaint   Patient presents with    Diabetes         Diabetes  Pertinent negatives for hypoglycemia include no confusion, dizziness, headaches, nervousness/anxiousness, seizures, speech difficulty or tremors. Pertinent negatives for diabetes include no chest pain, no fatigue, no polydipsia, no polyphagia, no polyuria and no weakness.   Hypertension  Pertinent negatives include no chest pain, headaches, neck pain, palpitations or shortness of breath.        70-year-old male with a history of type 2 diabetes established coronary artery disease anxiety and hypertension presents for follow-up visit.  He voices no complaints a complaint of bilateral hand pain.  He experiences intermittent stiffness.  Symptoms have been present for several months and progressively worsening.  He would like this evaluated more depth.      Iron  deficiency anemia: The patient is receiving 1/17/2023.  His most recent labs are noted below:  Component      Latest Ref Rng & Units 6/14/2023          12:51 PM   WBC      4.8 - 10.8 K/uL 6.5   RBC      4.70 - 6.10 M/uL 3.77 (L)   Hemoglobin Quant      14.0 - 18.0 g/dL 11.0 (L)   Hematocrit      42.0 - 52.0 % 33.4 (L)   MCV      79.0 - 92.2 fL 88.4   MCH      27.0 - 31.3 pg 29.1   MCHC      33.0 - 37.0 % 32.9 (L)   RDW      11.5 - 14.5 % 15.9 (H)   Platelet Count      130 - 400 K/uL 193         Hx  Of CAD S/P MI-metoprolol, lipitor, plavix        Diabetes -glipizide 10 mg bid.  Blood sugars well controlled averaging in the 100s.      Anxiety-Paxil        Essential hypertension: Compliant with   Cozaar 25 mg daily Cardizem 120 mg orally daily and Lopressor 25 mg twice daily      Nicotine dependence:NO LONGER SMOKING SINCE 10/2020        Diverticular bleed: The patient has not experience additional bleeding since being discharged from the

## 2024-03-11 NOTE — TELEPHONE ENCOUNTER
Future Appointments    Encounter Information   Provider Department Appt Notes   3/26/2024 Keith Yu MD [facility] F/u 3 month visit   5/22/2024 Sean Goss MD [facility] Card[facility] 3 month f/u     Past Visits    Date Provider Specialty Visit Type Primary Dx   02/06/2024 Graciela Borjas, APRN - CNP Family Medicine Telemedicine Medicare annual wellness visit, subsequent

## 2024-03-11 NOTE — TELEPHONE ENCOUNTER
Called patient to notify him of Dr. Goss's response. Per patient, his symptoms are improving. He is not experiencing palpitations like he was, they are much better. Patient already has a 3 month follow up appointment scheduled. Instructed patient that if he were to experience any worsening cardiac symptoms to call the office for a sooner appointment or go to the ER. Patient verbalized understanding of all information. No additional questions or concerns at this time.

## 2024-03-26 ENCOUNTER — OFFICE VISIT (OUTPATIENT)
Dept: FAMILY MEDICINE CLINIC | Age: 74
End: 2024-03-26
Payer: MEDICARE

## 2024-03-26 VITALS
SYSTOLIC BLOOD PRESSURE: 137 MMHG | DIASTOLIC BLOOD PRESSURE: 59 MMHG | TEMPERATURE: 97.6 F | OXYGEN SATURATION: 96 % | BODY MASS INDEX: 26.56 KG/M2 | WEIGHT: 159.4 LBS | HEIGHT: 65 IN | HEART RATE: 68 BPM

## 2024-03-26 DIAGNOSIS — M79.641 BILATERAL HAND PAIN: ICD-10-CM

## 2024-03-26 DIAGNOSIS — N18.30 STAGE 3 CHRONIC KIDNEY DISEASE, UNSPECIFIED WHETHER STAGE 3A OR 3B CKD (HCC): ICD-10-CM

## 2024-03-26 DIAGNOSIS — E11.9 TYPE 2 DIABETES MELLITUS WITHOUT COMPLICATION, WITHOUT LONG-TERM CURRENT USE OF INSULIN (HCC): Primary | ICD-10-CM

## 2024-03-26 DIAGNOSIS — M79.642 BILATERAL HAND PAIN: ICD-10-CM

## 2024-03-26 DIAGNOSIS — E11.9 TYPE 2 DIABETES MELLITUS WITHOUT COMPLICATION, WITHOUT LONG-TERM CURRENT USE OF INSULIN (HCC): ICD-10-CM

## 2024-03-26 LAB
ANION GAP SERPL CALCULATED.3IONS-SCNC: 15 MEQ/L (ref 9–15)
BASOPHILS # BLD: 0.1 K/UL (ref 0–0.2)
BASOPHILS NFR BLD: 1.1 %
BUN SERPL-MCNC: 27 MG/DL (ref 8–23)
CALCIUM SERPL-MCNC: 9.5 MG/DL (ref 8.5–9.9)
CHLORIDE SERPL-SCNC: 105 MEQ/L (ref 95–107)
CO2 SERPL-SCNC: 19 MEQ/L (ref 20–31)
CREAT SERPL-MCNC: 2.2 MG/DL (ref 0.7–1.2)
CRP SERPL HS-MCNC: 4.3 MG/L (ref 0–5)
EOSINOPHIL # BLD: 0.2 K/UL (ref 0–0.7)
EOSINOPHIL NFR BLD: 3.5 %
ERYTHROCYTE [DISTWIDTH] IN BLOOD BY AUTOMATED COUNT: 13.7 % (ref 11.5–14.5)
ERYTHROCYTE [SEDIMENTATION RATE] IN BLOOD BY WESTERGREN METHOD: 25 MM (ref 0–20)
GLUCOSE SERPL-MCNC: 134 MG/DL (ref 70–99)
HBA1C MFR BLD: 6 % (ref 4.8–5.9)
HCT VFR BLD AUTO: 30.7 % (ref 42–52)
HGB BLD-MCNC: 10.2 G/DL (ref 14–18)
LYMPHOCYTES # BLD: 1.4 K/UL (ref 1–4.8)
LYMPHOCYTES NFR BLD: 21.1 %
MCH RBC QN AUTO: 30.6 PG (ref 27–31.3)
MCHC RBC AUTO-ENTMCNC: 33.2 % (ref 33–37)
MCV RBC AUTO: 92.2 FL (ref 79–92.2)
MONOCYTES # BLD: 0.6 K/UL (ref 0.2–0.8)
MONOCYTES NFR BLD: 9.2 %
NEUTROPHILS # BLD: 4.3 K/UL (ref 1.4–6.5)
NEUTS SEG NFR BLD: 64.6 %
PLATELET # BLD AUTO: 243 K/UL (ref 130–400)
POTASSIUM SERPL-SCNC: 4.9 MEQ/L (ref 3.4–4.9)
RBC # BLD AUTO: 3.33 M/UL (ref 4.7–6.1)
SODIUM SERPL-SCNC: 139 MEQ/L (ref 135–144)
WBC # BLD AUTO: 6.6 K/UL (ref 4.8–10.8)

## 2024-03-26 PROCEDURE — 3075F SYST BP GE 130 - 139MM HG: CPT | Performed by: INTERNAL MEDICINE

## 2024-03-26 PROCEDURE — 3078F DIAST BP <80 MM HG: CPT | Performed by: INTERNAL MEDICINE

## 2024-03-26 PROCEDURE — G8427 DOCREV CUR MEDS BY ELIG CLIN: HCPCS | Performed by: INTERNAL MEDICINE

## 2024-03-26 PROCEDURE — G8417 CALC BMI ABV UP PARAM F/U: HCPCS | Performed by: INTERNAL MEDICINE

## 2024-03-26 PROCEDURE — 99214 OFFICE O/P EST MOD 30 MIN: CPT | Performed by: INTERNAL MEDICINE

## 2024-03-26 PROCEDURE — 1036F TOBACCO NON-USER: CPT | Performed by: INTERNAL MEDICINE

## 2024-03-26 PROCEDURE — 1123F ACP DISCUSS/DSCN MKR DOCD: CPT | Performed by: INTERNAL MEDICINE

## 2024-03-26 PROCEDURE — 3046F HEMOGLOBIN A1C LEVEL >9.0%: CPT | Performed by: INTERNAL MEDICINE

## 2024-03-26 PROCEDURE — G8484 FLU IMMUNIZE NO ADMIN: HCPCS | Performed by: INTERNAL MEDICINE

## 2024-03-26 PROCEDURE — 2022F DILAT RTA XM EVC RTNOPTHY: CPT | Performed by: INTERNAL MEDICINE

## 2024-03-26 PROCEDURE — 3017F COLORECTAL CA SCREEN DOC REV: CPT | Performed by: INTERNAL MEDICINE

## 2024-03-26 SDOH — ECONOMIC STABILITY: FOOD INSECURITY: WITHIN THE PAST 12 MONTHS, YOU WORRIED THAT YOUR FOOD WOULD RUN OUT BEFORE YOU GOT MONEY TO BUY MORE.: NEVER TRUE

## 2024-03-26 SDOH — ECONOMIC STABILITY: FOOD INSECURITY: WITHIN THE PAST 12 MONTHS, THE FOOD YOU BOUGHT JUST DIDN'T LAST AND YOU DIDN'T HAVE MONEY TO GET MORE.: NEVER TRUE

## 2024-03-26 SDOH — ECONOMIC STABILITY: INCOME INSECURITY: HOW HARD IS IT FOR YOU TO PAY FOR THE VERY BASICS LIKE FOOD, HOUSING, MEDICAL CARE, AND HEATING?: NOT HARD AT ALL

## 2024-03-29 LAB
CARBAMYLATED PROTEIN (CARP) ANTIBODY, IGG: 9 UNITS (ref 0–19)
CCP IGA+IGG SERPL IA-ACNC: 2 UNITS (ref 0–19)
RHEUMATOID FACT SER NEPH-ACNC: <10 IU/ML (ref 0–14)

## 2024-04-02 RX ORDER — PAROXETINE HYDROCHLORIDE 40 MG/1
40 TABLET, FILM COATED ORAL DAILY
Qty: 90 TABLET | Refills: 0 | Status: SHIPPED | OUTPATIENT
Start: 2024-04-02 | End: 2024-07-01

## 2024-05-21 ENCOUNTER — HOSPITAL ENCOUNTER (EMERGENCY)
Age: 74
Discharge: HOME | End: 2024-05-21
Attending: EMERGENCY MEDICINE
Payer: MEDICARE

## 2024-05-21 VITALS
TEMPERATURE: 98.4 F | HEART RATE: 69 BPM | SYSTOLIC BLOOD PRESSURE: 168 MMHG | RESPIRATION RATE: 14 BRPM | WEIGHT: 156 LBS | HEIGHT: 66 IN | DIASTOLIC BLOOD PRESSURE: 79 MMHG | OXYGEN SATURATION: 95 % | BODY MASS INDEX: 25.07 KG/M2

## 2024-05-21 DIAGNOSIS — I10 PRIMARY HYPERTENSION: Primary | ICD-10-CM

## 2024-05-21 LAB
ALBUMIN SERPL-MCNC: 4 G/DL (ref 3.5–4.6)
ALP SERPL-CCNC: 58 U/L (ref 35–104)
ALT SERPL-CCNC: 13 U/L (ref 0–41)
ANION GAP SERPL CALCULATED.3IONS-SCNC: 12 MEQ/L (ref 9–15)
AST SERPL-CCNC: 16 U/L (ref 0–40)
BASOPHILS # BLD: 0.1 K/UL (ref 0–0.2)
BASOPHILS NFR BLD: 1 %
BILIRUB SERPL-MCNC: 0.4 MG/DL (ref 0.2–0.7)
BUN SERPL-MCNC: 23 MG/DL (ref 8–23)
CALCIUM SERPL-MCNC: 8.2 MG/DL (ref 8.5–9.9)
CHLORIDE SERPL-SCNC: 109 MEQ/L (ref 95–107)
CO2 SERPL-SCNC: 17 MEQ/L (ref 20–31)
CREAT SERPL-MCNC: 1.9 MG/DL (ref 0.7–1.2)
EOSINOPHIL # BLD: 0.2 K/UL (ref 0–0.7)
EOSINOPHIL NFR BLD: 3.4 %
ERYTHROCYTE [DISTWIDTH] IN BLOOD BY AUTOMATED COUNT: 14.4 % (ref 11.5–14.5)
GLOBULIN SER CALC-MCNC: 2 G/DL (ref 2.3–3.5)
GLUCOSE SERPL-MCNC: 157 MG/DL (ref 70–99)
HCT VFR BLD AUTO: 29.1 % (ref 42–52)
HGB BLD-MCNC: 9.7 G/DL (ref 14–18)
LYMPHOCYTES # BLD: 1.1 K/UL (ref 1–4.8)
LYMPHOCYTES NFR BLD: 22.8 %
MCH RBC QN AUTO: 31 PG (ref 27–31.3)
MCHC RBC AUTO-ENTMCNC: 33.3 % (ref 33–37)
MCV RBC AUTO: 93 FL (ref 79–92.2)
MONOCYTES # BLD: 0.4 K/UL (ref 0.2–0.8)
MONOCYTES NFR BLD: 8.8 %
NEUTROPHILS # BLD: 3.2 K/UL (ref 1.4–6.5)
NEUTS SEG NFR BLD: 63.4 %
PLATELET # BLD AUTO: 192 K/UL (ref 130–400)
POTASSIUM SERPL-SCNC: 4.5 MEQ/L (ref 3.4–4.9)
PROT SERPL-MCNC: 6 G/DL (ref 6.3–8)
RBC # BLD AUTO: 3.13 M/UL (ref 4.7–6.1)
SODIUM SERPL-SCNC: 138 MEQ/L (ref 135–144)
TROPONIN, HIGH SENSITIVITY: 24 NG/L (ref 0–19)
TROPONIN, HIGH SENSITIVITY: 25 NG/L (ref 0–19)
WBC # BLD AUTO: 5 K/UL (ref 4.8–10.8)

## 2024-05-21 PROCEDURE — 36415 COLL VENOUS BLD VENIPUNCTURE: CPT

## 2024-05-21 PROCEDURE — 93005 ELECTROCARDIOGRAM TRACING: CPT | Performed by: EMERGENCY MEDICINE

## 2024-05-21 PROCEDURE — 84484 ASSAY OF TROPONIN QUANT: CPT

## 2024-05-21 PROCEDURE — 80053 COMPREHEN METABOLIC PANEL: CPT

## 2024-05-21 PROCEDURE — 85025 COMPLETE CBC W/AUTO DIFF WBC: CPT

## 2024-05-21 PROCEDURE — 99284 EMERGENCY DEPT VISIT MOD MDM: CPT

## 2024-05-21 RX ORDER — LABETALOL HYDROCHLORIDE 5 MG/ML
20 INJECTION, SOLUTION INTRAVENOUS ONCE
Status: DISCONTINUED | OUTPATIENT
Start: 2024-05-21 | End: 2024-05-21 | Stop reason: HOSPADM

## 2024-05-21 ASSESSMENT — ENCOUNTER SYMPTOMS
ABDOMINAL DISTENTION: 0
COUGH: 0
SORE THROAT: 0
CHEST TIGHTNESS: 0
EYE DISCHARGE: 0
PHOTOPHOBIA: 0
SHORTNESS OF BREATH: 0
ABDOMINAL PAIN: 0
WHEEZING: 0
VOMITING: 0

## 2024-05-21 ASSESSMENT — LIFESTYLE VARIABLES
HOW MANY STANDARD DRINKS CONTAINING ALCOHOL DO YOU HAVE ON A TYPICAL DAY: PATIENT DOES NOT DRINK
HOW OFTEN DO YOU HAVE A DRINK CONTAINING ALCOHOL: NEVER

## 2024-05-21 ASSESSMENT — PAIN - FUNCTIONAL ASSESSMENT
PAIN_FUNCTIONAL_ASSESSMENT: NONE - DENIES PAIN
PAIN_FUNCTIONAL_ASSESSMENT: NONE - DENIES PAIN

## 2024-05-21 NOTE — ED NOTES
Per Dr. Gotti ok to d/c pt home,  Aware of bp, 0 new orders, pt has 0 c/o, 0 pain, 0 distress, 0 dizziness, pt out from ed with steady gait, 0 problems.

## 2024-05-21 NOTE — ED TRIAGE NOTES
Patient states checked bp at home and was hypertensive at and \"Pulse was jumping around from 48-58\". Patient states \"I could hear my pulse in my ears. Patient denies CP SOB. Just concerned about HR and bp

## 2024-05-21 NOTE — ED NOTES
BP trending downwards, Dr. Gotti made aware prior to administration of Labetalol. States to hold medication

## 2024-05-21 NOTE — ED PROVIDER NOTES
[facility] ED  eMERGENCY dEPARTMENT eNCOUnter      Pt Name: Isaak Mitchell  MRN: [id number]  Birthdate 1950  Date of evaluation: 5/21/2024  Provider: David Gotti MD    CHIEF COMPLAINT       Chief Complaint   Patient presents with    Hypertension         HISTORY OF PRESENT ILLNESS   (Location/Symptom, Timing/Onset,Context/Setting, Quality, Duration, Modifying Factors, Severity)  Note limiting factors.   Isaak Mitchell is a 73 y.o. male who presents to the emergency department for evaluation of high blood pressure and possible low heart rate.  Patient states he has not been sleeping the last few nights because it has been hot in his apartment complex he lives in upper floor and does not have any good air conditioning.  He was starting to hear his pulse in his ears and checked his blood pressure was elevated so he comes in for evaluation.  He also checked his pulse ox and he was getting heart rates moving between 48 and 58 was not sure if that was too low.  Does not really have chest pain but he states when he feels anxious he gets some may be chest discomfort but heartburn describes does not have any right now.  No related fever or chills.  No abdominal pain nausea vomiting.  He has been compliant with his medications which include Coreg, hydrochlorothiazide and Cozaar.    HPI    NursingNotes were reviewed.    REVIEW OF SYSTEMS    (2-9 systems for level 4, 10 or more for level 5)     Review of Systems   Constitutional:  Negative for chills and diaphoresis.   HENT:  Negative for congestion, ear pain, mouth sores and sore throat.    Eyes:  Negative for photophobia and discharge.   Respiratory:  Negative for cough, chest tightness, shortness of breath and wheezing.    Cardiovascular:  Negative for chest pain and palpitations.   Gastrointestinal:  Negative for abdominal distention, abdominal pain and vomiting.   Endocrine: Negative for cold intolerance.   Genitourinary:  Negative for difficulty

## 2024-05-22 ENCOUNTER — OFFICE VISIT (OUTPATIENT)
Dept: CARDIOLOGY CLINIC | Age: 74
End: 2024-05-22
Payer: MEDICARE

## 2024-05-22 VITALS
OXYGEN SATURATION: 96 % | BODY MASS INDEX: 26.05 KG/M2 | SYSTOLIC BLOOD PRESSURE: 144 MMHG | WEIGHT: 161.4 LBS | HEART RATE: 71 BPM | DIASTOLIC BLOOD PRESSURE: 80 MMHG

## 2024-05-22 DIAGNOSIS — I48.0 PAROXYSMAL ATRIAL FIBRILLATION (HCC): Primary | ICD-10-CM

## 2024-05-22 DIAGNOSIS — I25.10 CORONARY ARTERY DISEASE DUE TO LIPID RICH PLAQUE: ICD-10-CM

## 2024-05-22 DIAGNOSIS — I25.83 CORONARY ARTERY DISEASE DUE TO LIPID RICH PLAQUE: ICD-10-CM

## 2024-05-22 DIAGNOSIS — J44.9 CHRONIC OBSTRUCTIVE PULMONARY DISEASE, UNSPECIFIED COPD TYPE (HCC): ICD-10-CM

## 2024-05-22 LAB
EKG ATRIAL RATE: 71 BPM
EKG P AXIS: 90 DEGREES
EKG P-R INTERVAL: 184 MS
EKG Q-T INTERVAL: 400 MS
EKG QRS DURATION: 82 MS
EKG QTC CALCULATION (BAZETT): 434 MS
EKG R AXIS: 41 DEGREES
EKG T AXIS: 70 DEGREES
EKG VENTRICULAR RATE: 71 BPM

## 2024-05-22 PROCEDURE — 99214 OFFICE O/P EST MOD 30 MIN: CPT | Performed by: INTERNAL MEDICINE

## 2024-05-22 PROCEDURE — 3077F SYST BP >= 140 MM HG: CPT | Performed by: INTERNAL MEDICINE

## 2024-05-22 PROCEDURE — G8427 DOCREV CUR MEDS BY ELIG CLIN: HCPCS | Performed by: INTERNAL MEDICINE

## 2024-05-22 PROCEDURE — G8417 CALC BMI ABV UP PARAM F/U: HCPCS | Performed by: INTERNAL MEDICINE

## 2024-05-22 PROCEDURE — 3079F DIAST BP 80-89 MM HG: CPT | Performed by: INTERNAL MEDICINE

## 2024-05-22 PROCEDURE — 1036F TOBACCO NON-USER: CPT | Performed by: INTERNAL MEDICINE

## 2024-05-22 PROCEDURE — 3017F COLORECTAL CA SCREEN DOC REV: CPT | Performed by: INTERNAL MEDICINE

## 2024-05-22 PROCEDURE — 1123F ACP DISCUSS/DSCN MKR DOCD: CPT | Performed by: INTERNAL MEDICINE

## 2024-05-22 RX ORDER — AMLODIPINE BESYLATE 10 MG/1
10 TABLET ORAL DAILY
Qty: 90 TABLET | Refills: 5 | Status: SHIPPED | OUTPATIENT
Start: 2024-05-22

## 2024-05-22 ASSESSMENT — ENCOUNTER SYMPTOMS
COLOR CHANGE: 0
NAUSEA: 0
WHEEZING: 0
SHORTNESS OF BREATH: 0
DIARRHEA: 0
COUGH: 0
ABDOMINAL PAIN: 0
CONSTIPATION: 0
CHEST TIGHTNESS: 0
VOMITING: 0
EYE REDNESS: 0
APNEA: 0
RHINORRHEA: 0

## 2024-05-22 NOTE — PROGRESS NOTES
cream Apply topically 2 times daily. 454 g 0    blood glucose monitor kit and supplies tEST 2 TIMES A DAY 1 kit 0    blood glucose monitor strips Test 2 times a day & as needed for symptoms of irregular blood glucose. Dispense sufficient amount for indicated testing frequency plus additional to accommodate PRN testing needs. 100 strip 3    Multiple Vitamin (MULTIVITAMIN) TABS tablet Take 1 tablet by mouth daily 30 tablet 0     No current facility-administered medications for this visit.       Patient has no known allergies.    Review of Systems:  Review of Systems   Constitutional:  Negative for activity change, chills, diaphoresis and fever.   HENT:  Negative for congestion, ear pain, nosebleeds and rhinorrhea.    Eyes:  Negative for redness and visual disturbance.   Respiratory:  Negative for apnea, cough, chest tightness, shortness of breath and wheezing.    Cardiovascular:  Negative for chest pain, palpitations and leg swelling.   Gastrointestinal:  Negative for abdominal pain, constipation, diarrhea, nausea and vomiting.   Genitourinary:  Negative for difficulty urinating and dysuria.   Musculoskeletal: Negative.  Negative for joint swelling.   Skin:  Negative for color change, rash and wound.   Neurological:  Negative for dizziness, syncope, weakness, numbness and headaches.   Psychiatric/Behavioral: Negative.          VITALS:  Blood pressure (!) 144/80, pulse 71, weight 73.2 kg (161 lb 6.4 oz), SpO2 96 %.  Body mass index is 26.05 kg/m².    Physical Examination:  Physical Exam  Vitals and nursing note reviewed.   Constitutional:       Appearance: Normal appearance.   HENT:      Head: Normocephalic and atraumatic.      Mouth/Throat:      Mouth: Mucous membranes are moist.      Pharynx: Oropharynx is clear.   Eyes:      Extraocular Movements: Extraocular movements intact.      Conjunctiva/sclera: Conjunctivae normal.      Pupils: Pupils are equal, round, and reactive to light.   Cardiovascular:      Rate and

## 2024-06-10 RX ORDER — GLIPIZIDE 10 MG/1
10 TABLET ORAL
Qty: 180 TABLET | Refills: 1 | Status: SHIPPED | OUTPATIENT
Start: 2024-06-10

## 2024-06-20 ENCOUNTER — OFFICE VISIT (OUTPATIENT)
Dept: PULMONOLOGY | Age: 74
End: 2024-06-20
Payer: MEDICARE

## 2024-06-20 VITALS
OXYGEN SATURATION: 98 % | WEIGHT: 163 LBS | HEART RATE: 64 BPM | DIASTOLIC BLOOD PRESSURE: 60 MMHG | SYSTOLIC BLOOD PRESSURE: 136 MMHG | BODY MASS INDEX: 26.31 KG/M2

## 2024-06-20 DIAGNOSIS — I27.20 PULMONARY HYPERTENSION (HCC): ICD-10-CM

## 2024-06-20 DIAGNOSIS — I48.0 PAROXYSMAL ATRIAL FIBRILLATION (HCC): ICD-10-CM

## 2024-06-20 DIAGNOSIS — G47.30 SLEEP APNEA, UNSPECIFIED TYPE: Primary | ICD-10-CM

## 2024-06-20 DIAGNOSIS — J44.9 CHRONIC OBSTRUCTIVE PULMONARY DISEASE, UNSPECIFIED COPD TYPE (HCC): ICD-10-CM

## 2024-06-20 PROCEDURE — 3078F DIAST BP <80 MM HG: CPT | Performed by: INTERNAL MEDICINE

## 2024-06-20 PROCEDURE — 3017F COLORECTAL CA SCREEN DOC REV: CPT | Performed by: INTERNAL MEDICINE

## 2024-06-20 PROCEDURE — G8427 DOCREV CUR MEDS BY ELIG CLIN: HCPCS | Performed by: INTERNAL MEDICINE

## 2024-06-20 PROCEDURE — 1036F TOBACCO NON-USER: CPT | Performed by: INTERNAL MEDICINE

## 2024-06-20 PROCEDURE — 3075F SYST BP GE 130 - 139MM HG: CPT | Performed by: INTERNAL MEDICINE

## 2024-06-20 PROCEDURE — 3023F SPIROM DOC REV: CPT | Performed by: INTERNAL MEDICINE

## 2024-06-20 PROCEDURE — 99205 OFFICE O/P NEW HI 60 MIN: CPT | Performed by: INTERNAL MEDICINE

## 2024-06-20 PROCEDURE — 1123F ACP DISCUSS/DSCN MKR DOCD: CPT | Performed by: INTERNAL MEDICINE

## 2024-06-20 PROCEDURE — G8417 CALC BMI ABV UP PARAM F/U: HCPCS | Performed by: INTERNAL MEDICINE

## 2024-06-20 NOTE — PROGRESS NOTES
Subjective:             Isaak Mitchell is a 73 y.o. male who complains today of:     Chief Complaint   Patient presents with    New Patient     Ref by Wolfgang for COPD       HPI  He was ER with bradycardia and hypertension. Seen by ER physician and he was sent home .    He has CAD s/p MI  s/p stent , COPD  , DM , HTn . He had 2DECHO  show LVEF 60%  RVSP 50 mm of Hg.       CXR FINDINGS:  The lungs are without acute focal process.  There is no effusion or  pneumothorax. The cardiomediastinal silhouette is without acute process. The  osseous structures are without acute process.  No acute process.     He has h/o smoking ,  for 35 yrs , quit smoking 4 yrs ago       C/o  mild shortness of breath with exertion.   Occasional Wheezing. C/o  Cough with  Sputum.No Hemoptysis.  No Chest tightness. No Chest pain with radiation  or pleuritic pain.  No  leg edema. No orthopnea. No Fever or chills.   C/o stuffy nose, no Rhinorrhea and postnasal drip +.  H/o GERD on Protonix   He is not using any inhaler at this time , he has no PFT but he was told  he has COPD      He is complaining of snoring and mild daytime sleepiness and tiredness.  No C/o witness apnea.  He  does not wakes up with gasping for air.   C/o wakes up frequently during sleep .   No complaint of morning headache.  He does not have restful sleep.  He does take daily naps.  He does not fall asleep while watching TV.  He does not have a complaint of sleepiness while driving.      Allergies:  Patient has no known allergies.  Past Medical History:   Diagnosis Date    AMI (acute myocardial infarction) ([facility]) 12/12/2020    CAD S/P percutaneous coronary angioplasty 2/7/2023    Cancer (HCC)     skin left neck    Chronic bronchitis (HCC)     COPD (chronic obstructive pulmonary disease) (HCC)     Diabetes mellitus (HCC)     Diverticulitis     Emphysema of lung (HCC)     Heart attack (HCC)     History of blood transfusion     Hyperlipidemia     Hypertension     Meniere's

## 2024-06-26 ENCOUNTER — OFFICE VISIT (OUTPATIENT)
Dept: FAMILY MEDICINE CLINIC | Age: 74
End: 2024-06-26
Payer: MEDICARE

## 2024-06-26 VITALS
HEIGHT: 66 IN | DIASTOLIC BLOOD PRESSURE: 54 MMHG | HEART RATE: 80 BPM | WEIGHT: 162 LBS | BODY MASS INDEX: 26.03 KG/M2 | SYSTOLIC BLOOD PRESSURE: 118 MMHG

## 2024-06-26 DIAGNOSIS — E11.9 TYPE 2 DIABETES MELLITUS WITHOUT COMPLICATION, WITHOUT LONG-TERM CURRENT USE OF INSULIN (HCC): ICD-10-CM

## 2024-06-26 DIAGNOSIS — I10 ESSENTIAL HYPERTENSION: ICD-10-CM

## 2024-06-26 DIAGNOSIS — I25.10 CAD S/P PERCUTANEOUS CORONARY ANGIOPLASTY: ICD-10-CM

## 2024-06-26 DIAGNOSIS — F41.9 ANXIETY: ICD-10-CM

## 2024-06-26 DIAGNOSIS — N18.30 STAGE 3 CHRONIC KIDNEY DISEASE, UNSPECIFIED WHETHER STAGE 3A OR 3B CKD (HCC): ICD-10-CM

## 2024-06-26 DIAGNOSIS — Z12.5 PROSTATE CANCER SCREENING: ICD-10-CM

## 2024-06-26 DIAGNOSIS — Z98.61 CAD S/P PERCUTANEOUS CORONARY ANGIOPLASTY: ICD-10-CM

## 2024-06-26 DIAGNOSIS — N18.30 STAGE 3 CHRONIC KIDNEY DISEASE, UNSPECIFIED WHETHER STAGE 3A OR 3B CKD (HCC): Primary | ICD-10-CM

## 2024-06-26 PROBLEM — E11.22 TYPE 2 DIABETES MELLITUS WITH CHRONIC KIDNEY DISEASE (HCC): Status: ACTIVE | Noted: 2024-06-26

## 2024-06-26 LAB
ALBUMIN SERPL-MCNC: 4.3 G/DL (ref 3.5–4.6)
ALP SERPL-CCNC: 56 U/L (ref 35–104)
ALT SERPL-CCNC: 13 U/L (ref 0–41)
ANION GAP SERPL CALCULATED.3IONS-SCNC: 17 MEQ/L (ref 9–15)
AST SERPL-CCNC: 19 U/L (ref 0–40)
BASOPHILS # BLD: 0.1 K/UL (ref 0–0.2)
BASOPHILS NFR BLD: 1.2 %
BILIRUB SERPL-MCNC: 0.8 MG/DL (ref 0.2–0.7)
BUN SERPL-MCNC: 30 MG/DL (ref 8–23)
CALCIUM SERPL-MCNC: 9.2 MG/DL (ref 8.5–9.9)
CHLORIDE SERPL-SCNC: 105 MEQ/L (ref 95–107)
CO2 SERPL-SCNC: 16 MEQ/L (ref 20–31)
CREAT SERPL-MCNC: 2.29 MG/DL (ref 0.7–1.2)
EOSINOPHIL # BLD: 0.1 K/UL (ref 0–0.7)
EOSINOPHIL NFR BLD: 2.3 %
ERYTHROCYTE [DISTWIDTH] IN BLOOD BY AUTOMATED COUNT: 14.2 % (ref 11.5–14.5)
GLOBULIN SER CALC-MCNC: 2.2 G/DL (ref 2.3–3.5)
GLUCOSE SERPL-MCNC: 228 MG/DL (ref 70–99)
HCT VFR BLD AUTO: 28.7 % (ref 42–52)
HGB BLD-MCNC: 9.4 G/DL (ref 14–18)
LYMPHOCYTES # BLD: 1.1 K/UL (ref 1–4.8)
LYMPHOCYTES NFR BLD: 19.9 %
MCH RBC QN AUTO: 30 PG (ref 27–31.3)
MCHC RBC AUTO-ENTMCNC: 32.8 % (ref 33–37)
MCV RBC AUTO: 91.7 FL (ref 79–92.2)
MONOCYTES # BLD: 0.5 K/UL (ref 0.2–0.8)
MONOCYTES NFR BLD: 8.1 %
NEUTROPHILS # BLD: 3.9 K/UL (ref 1.4–6.5)
NEUTS SEG NFR BLD: 68.1 %
PLATELET # BLD AUTO: 251 K/UL (ref 130–400)
POTASSIUM SERPL-SCNC: 4.5 MEQ/L (ref 3.4–4.9)
PROT SERPL-MCNC: 6.5 G/DL (ref 6.3–8)
PSA SERPL-MCNC: 0.44 NG/ML (ref 0–4)
RBC # BLD AUTO: 3.13 M/UL (ref 4.7–6.1)
SODIUM SERPL-SCNC: 138 MEQ/L (ref 135–144)
WBC # BLD AUTO: 5.7 K/UL (ref 4.8–10.8)

## 2024-06-26 PROCEDURE — 3074F SYST BP LT 130 MM HG: CPT | Performed by: INTERNAL MEDICINE

## 2024-06-26 PROCEDURE — 1036F TOBACCO NON-USER: CPT | Performed by: INTERNAL MEDICINE

## 2024-06-26 PROCEDURE — G8417 CALC BMI ABV UP PARAM F/U: HCPCS | Performed by: INTERNAL MEDICINE

## 2024-06-26 PROCEDURE — 3044F HG A1C LEVEL LT 7.0%: CPT | Performed by: INTERNAL MEDICINE

## 2024-06-26 PROCEDURE — 1123F ACP DISCUSS/DSCN MKR DOCD: CPT | Performed by: INTERNAL MEDICINE

## 2024-06-26 PROCEDURE — G8427 DOCREV CUR MEDS BY ELIG CLIN: HCPCS | Performed by: INTERNAL MEDICINE

## 2024-06-26 PROCEDURE — 2022F DILAT RTA XM EVC RTNOPTHY: CPT | Performed by: INTERNAL MEDICINE

## 2024-06-26 PROCEDURE — 99214 OFFICE O/P EST MOD 30 MIN: CPT | Performed by: INTERNAL MEDICINE

## 2024-06-26 PROCEDURE — 3017F COLORECTAL CA SCREEN DOC REV: CPT | Performed by: INTERNAL MEDICINE

## 2024-06-26 PROCEDURE — 3078F DIAST BP <80 MM HG: CPT | Performed by: INTERNAL MEDICINE

## 2024-06-26 ASSESSMENT — ENCOUNTER SYMPTOMS
BACK PAIN: 0
EYE PAIN: 0
EYE ITCHING: 0
NAUSEA: 0
COLOR CHANGE: 0
SORE THROAT: 0
EYE DISCHARGE: 0
FACIAL SWELLING: 0
ABDOMINAL DISTENTION: 0
RECTAL PAIN: 0
RHINORRHEA: 0
VOMITING: 0
COUGH: 0
SINUS PAIN: 0
SHORTNESS OF BREATH: 0
VOICE CHANGE: 0
PHOTOPHOBIA: 0
APNEA: 0
DIARRHEA: 0
BLOOD IN STOOL: 0
TROUBLE SWALLOWING: 0
CHEST TIGHTNESS: 0
WHEEZING: 0
ABDOMINAL PAIN: 0
SINUS PRESSURE: 0
EYE REDNESS: 0
CONSTIPATION: 0

## 2024-06-26 NOTE — PROGRESS NOTES
pSubjective:      Patient ID: Isaak Mitchell is a 73 y.o. male New patient, here for evaluation of the following chief complaint(s):  Chief Complaint   Patient presents with    Diabetes    Hypertension    Hyperlipidemia    Anxiety         Diabetes  Pertinent negatives for hypoglycemia include no confusion, dizziness, headaches, nervousness/anxiousness, seizures, speech difficulty or tremors. Pertinent negatives for diabetes include no chest pain, no fatigue, no polydipsia, no polyphagia, no polyuria and no weakness.   Hypertension  Pertinent negatives include no chest pain, headaches, neck pain, palpitations or shortness of breath.        73-year-old male with a history of type 2 diabetes established coronary artery disease anxiety and hypertension presents for follow-up visit.                Iron  deficiency anemia: The patient is receiving 1/17/2023.  His most recent labs are noted below:  Component      Latest Ref Rng & Units 6/14/2023          12:51 PM   WBC      4.8 - 10.8 K/uL 6.5   RBC      4.70 - 6.10 M/uL 3.77 (L)   Hemoglobin Quant      14.0 - 18.0 g/dL 11.0 (L)   Hematocrit      42.0 - 52.0 % 33.4 (L)   MCV      79.0 - 92.2 fL 88.4   MCH      27.0 - 31.3 pg 29.1   MCHC      33.0 - 37.0 % 32.9 (L)   RDW      11.5 - 14.5 % 15.9 (H)   Platelet Count      130 - 400 K/uL 193         Hx  Of CAD S/P MI-metoprolol, lipitor,no longer taking  plavix per the recommendation of cardiology. Aspirin 81  mg daily         Diabetes -glipizide 10 mg bid.  Blood sugars well controlled averaging in the 100s.      Anxiety-Paxil        Essential hypertension: Compliant with   Cozaar 25 mg daily, norvasc 10 mg daily, coreg 12. 5 bid  , hydralazine 100mg bid.         Nicotine dependence:NO LONGER SMOKING SINCE 10/2020        Diverticular bleed: The patient has not experience additional bleeding since being discharged from the hospital.          Chronic kidney disease stage IIIb: The patient's most recent GFR was 37.5.        Cat

## 2024-06-27 LAB
ESTIMATED AVERAGE GLUCOSE: 151 MG/DL
HBA1C MFR BLD: 6.9 % (ref 4–6)

## 2024-06-28 ENCOUNTER — OFFICE VISIT (OUTPATIENT)
Dept: CARDIOLOGY CLINIC | Age: 74
End: 2024-06-28
Payer: MEDICARE

## 2024-06-28 VITALS
OXYGEN SATURATION: 98 % | HEART RATE: 65 BPM | WEIGHT: 164 LBS | RESPIRATION RATE: 15 BRPM | SYSTOLIC BLOOD PRESSURE: 138 MMHG | BODY MASS INDEX: 26.36 KG/M2 | HEIGHT: 66 IN | DIASTOLIC BLOOD PRESSURE: 66 MMHG

## 2024-06-28 DIAGNOSIS — I10 UNCONTROLLED HYPERTENSION: Primary | ICD-10-CM

## 2024-06-28 DIAGNOSIS — E11.9 TYPE 2 DIABETES MELLITUS WITHOUT COMPLICATION, WITHOUT LONG-TERM CURRENT USE OF INSULIN (HCC): ICD-10-CM

## 2024-06-28 LAB
CREAT UR-MCNC: 66.7 MG/DL
MICROALBUMIN UR-MCNC: 4.1 MG/DL
MICROALBUMIN/CREAT UR-RTO: 61.5 MG/G (ref 0–30)

## 2024-06-28 PROCEDURE — 99214 OFFICE O/P EST MOD 30 MIN: CPT | Performed by: INTERNAL MEDICINE

## 2024-06-28 PROCEDURE — 3075F SYST BP GE 130 - 139MM HG: CPT | Performed by: INTERNAL MEDICINE

## 2024-06-28 PROCEDURE — G8417 CALC BMI ABV UP PARAM F/U: HCPCS | Performed by: INTERNAL MEDICINE

## 2024-06-28 PROCEDURE — 3078F DIAST BP <80 MM HG: CPT | Performed by: INTERNAL MEDICINE

## 2024-06-28 PROCEDURE — 3017F COLORECTAL CA SCREEN DOC REV: CPT | Performed by: INTERNAL MEDICINE

## 2024-06-28 PROCEDURE — 1123F ACP DISCUSS/DSCN MKR DOCD: CPT | Performed by: INTERNAL MEDICINE

## 2024-06-28 PROCEDURE — 1036F TOBACCO NON-USER: CPT | Performed by: INTERNAL MEDICINE

## 2024-06-28 PROCEDURE — G8427 DOCREV CUR MEDS BY ELIG CLIN: HCPCS | Performed by: INTERNAL MEDICINE

## 2024-06-28 ASSESSMENT — ENCOUNTER SYMPTOMS
DIARRHEA: 0
APNEA: 0
CONSTIPATION: 0
COLOR CHANGE: 0
WHEEZING: 0
CHEST TIGHTNESS: 0
ABDOMINAL PAIN: 0
VOMITING: 0
COUGH: 0
NAUSEA: 0
RHINORRHEA: 0
SHORTNESS OF BREATH: 0
EYE REDNESS: 0

## 2024-06-28 NOTE — PROGRESS NOTES
Chief Complaint   Patient presents with    Results     Of [place].         Patient presents for initial medical evaluation. Patient is followed on a regular basis by Keith Mathias MD.     CAD status post PCI of the pRCA DESX1 with a 3.0 x 22 mm on 2/3/2023, RCA PCI 2020, 3/9/2023 PCI of proximal mid circumflex RED x1 (2.25 x 34 mm Jose Juan frontier)  Hypertension  Hyperlipidemia  Diabetes  COPD    Cardiac studies:  TTE 2/3/2023 EF 60%  Coronary angiogram 2/3/2023  Left main mild disease  LAD: Mild disease  Circumflex: Proximal 70% stenosis, mid 80% stenosis status post PCI on 3/9/2023  RCA: Proximal 99% in-stent restenosis  Event monitor 2/9/2024 atrial tachycardia cardia no major arrhythmias  3-day Holter monitor 6/20/2024 3 episodes of atrial tachycardia longest run of 6 beats, 0.4% of PVCs, otherwise no malignant arrhythmia  =============  6/28/2024  Follow-up on palpitations  Patient reports feeling well denies chest pain or shortness of breath  Lately patient reports that he has been feeling more tired  Reports that he could be related to the weather which has been hot lately  Patient brought in a blood pressure log where blood pressures are ranging between 120s to 130s systolics  3-day Holter monitor 6/20/2024 3 episodes of atrial tachycardia longest run of 6 beats, 0.4% of PVCs, otherwise no malignant arrhythmia    2/21/2024  Virtual encounter  Follow-up on palpitations  Patient reports that palpitations have decreased  Patient tolerating Coreg well  Denies chest pain or shortness of breath  Event monitor 2/9/2024 atrial tachycardia cardia no major arrhythmias    1/24/2024  Follow-up on CAD  Denies chest pain  But endorses episodes of skipping beats  Episodes happen every other day and usually at nighttime  Denies episode of fainting episodes        9/20/2023  Follow-up on CAD  Patient reports feeling well denies chest pain or shortness of breath  Last night patient was not able to sleep well unknown

## 2024-07-05 RX ORDER — PAROXETINE HYDROCHLORIDE 40 MG/1
40 TABLET, FILM COATED ORAL DAILY
Qty: 90 TABLET | Refills: 0 | Status: SHIPPED | OUTPATIENT
Start: 2024-07-05

## 2024-07-05 NOTE — TELEPHONE ENCOUNTER
Please approve or deny request. Thank you!    Rx requested:  Requested Prescriptions     Pending Prescriptions Disp Refills    PARoxetine (PAXIL) 40 MG tablet [Pharmacy Med Name: PAROXETINE 40MG TABLETS] 90 tablet 0     Sig: TAKE 1 TABLET BY MOUTH EVERY DAY         Last Office Visit:   6/26/2024      Next Visit Date:  Future Appointments   Date Time Provider Department Center   7/16/2024 10:00 AM [place] PFT ROOM 1 [facility]   7/24/2024  4:45 PM [facility]   7/25/2024  2:15 PM Iker Crooks MD [facility]   9/27/2024  1:15 PM Sean Goss MD [facility]   10/3/2024  1:45 PM [facility]

## 2024-07-19 NOTE — PROGRESS NOTES
visit.    Physical Exam  Constitutional:       General: He is not in acute distress.     Appearance: He is well-developed.   HENT:      Head: Normocephalic.      Right Ear: External ear normal.      Left Ear: External ear normal.   Eyes:      Conjunctiva/sclera: Conjunctivae normal.   Neck:      Vascular: No JVD.      Trachea: No tracheal deviation.   Cardiovascular:      Rate and Rhythm: Normal rate and regular rhythm.      Heart sounds: Normal heart sounds.   Pulmonary:      Effort: Pulmonary effort is normal. No respiratory distress.      Breath sounds: Normal breath sounds. No wheezing or rales.   Chest:      Chest wall: No tenderness.   Abdominal:      General: Bowel sounds are normal. There is no distension.      Palpations: Abdomen is soft. There is no mass.      Tenderness: There is no abdominal tenderness. There is no guarding or rebound.   Musculoskeletal:         General: No tenderness or deformity.      Cervical back: Neck supple.   Skin:     General: Skin is warm and dry.      Coloration: Skin is not pale.      Findings: No erythema or rash.   Neurological:      Mental Status: He is alert and oriented to person, place, and time.      Sensory: No sensory deficit.      Motor: No abnormal muscle tone.      Coordination: Coordination normal.   Psychiatric:         Thought Content: Thought content normal.         Judgment: Judgment normal.           Assessment:       Diagnosis Orders   1. Acute kidney injury (HCC)              Plan:   Acute kidney injury: I have conveyed to the patient that there has been a mild decline in his kidney function we will continue to monitor closely.      Type 2 diabetes mellitus without complication, without long-term current use of insulin (HCC)  - glipizide  10 bid  -Metformin to 1000 mg twice daily    Anxiety-continue Paxil 40 mg po dialy     Coronary artery disease involving native heart without angina pectoris, unspecified vessel or lesion type-continue plavix ,

## 2024-07-24 ENCOUNTER — TELEMEDICINE (OUTPATIENT)
Dept: FAMILY MEDICINE CLINIC | Age: 74
End: 2024-07-24
Payer: MEDICARE

## 2024-07-24 ENCOUNTER — PATIENT MESSAGE (OUTPATIENT)
Dept: PULMONOLOGY | Age: 74
End: 2024-07-24

## 2024-07-24 DIAGNOSIS — N17.9 ACUTE KIDNEY INJURY (HCC): Primary | ICD-10-CM

## 2024-07-24 PROBLEM — E11.22 TYPE 2 DIABETES MELLITUS WITH CHRONIC KIDNEY DISEASE (HCC): Status: RESOLVED | Noted: 2024-06-26 | Resolved: 2024-07-24

## 2024-07-24 PROBLEM — E11.22 TYPE 2 DIABETES MELLITUS WITH CHRONIC KIDNEY DISEASE (HCC): Status: ACTIVE | Noted: 2024-07-24

## 2024-07-24 PROCEDURE — 1123F ACP DISCUSS/DSCN MKR DOCD: CPT | Performed by: INTERNAL MEDICINE

## 2024-07-24 PROCEDURE — 99213 OFFICE O/P EST LOW 20 MIN: CPT | Performed by: INTERNAL MEDICINE

## 2024-07-24 PROCEDURE — G8428 CUR MEDS NOT DOCUMENT: HCPCS | Performed by: INTERNAL MEDICINE

## 2024-07-24 RX ORDER — LOSARTAN POTASSIUM 25 MG/1
25 TABLET ORAL DAILY
Qty: 180 TABLET | Refills: 3 | COMMUNITY
Start: 2024-07-24

## 2024-09-04 NOTE — TELEPHONE ENCOUNTER
MEDICATION REFILL REQUEST     Rx Requested    Requested Prescriptions     Pending Prescriptions Disp Refills    losartan (COZAAR) 25 MG tablet 180 tablet 3     Sig: Take 1 tablet by mouth daily         Patient's Last Office Visit   7/24/2024      Patient's Next Office Visit   Future Appointments   Date Time Provider Department Center   9/27/2024  1:15 PM Goss, Sean, MD [facility]   10/3/2024  1:45 PM Keith Yu MD [facility]         Other comments   Prev req not rec'd by pharmacy

## 2024-09-05 RX ORDER — METFORMIN HCL 500 MG
1000 TABLET, EXTENDED RELEASE 24 HR ORAL 2 TIMES DAILY
Qty: 360 TABLET | Refills: 5 | Status: SHIPPED | OUTPATIENT
Start: 2024-09-05

## 2024-09-05 RX ORDER — LOSARTAN POTASSIUM 25 MG/1
25 TABLET ORAL DAILY
Qty: 180 TABLET | Refills: 3 | Status: SHIPPED | OUTPATIENT
Start: 2024-09-05

## 2024-09-05 RX ORDER — GLIPIZIDE 10 MG/1
10 TABLET ORAL
Qty: 180 TABLET | Refills: 1 | Status: SHIPPED | OUTPATIENT
Start: 2024-09-05

## 2024-09-05 NOTE — TELEPHONE ENCOUNTER
Future Appointments    Encounter Information   Provider Department Appt Notes   9/27/2024 Sean Goss MD [facility] 3 MONTHS   10/3/2024 Keith Yu MD [facility] 3 month f/u     Past Visits    Date Provider Specialty Visit Type Primary Dx   07/24/2024 Keith Yu MD Family Medicine Telemedicine Acute kidney injury (HCC)

## 2024-09-13 RX ORDER — PANTOPRAZOLE SODIUM 40 MG/1
40 TABLET, DELAYED RELEASE ORAL
Qty: 90 TABLET | Refills: 0 | Status: SHIPPED | OUTPATIENT
Start: 2024-09-13

## 2024-09-27 ENCOUNTER — OFFICE VISIT (OUTPATIENT)
Dept: CARDIOLOGY CLINIC | Age: 74
End: 2024-09-27
Payer: MEDICARE

## 2024-09-27 VITALS
BODY MASS INDEX: 25.84 KG/M2 | DIASTOLIC BLOOD PRESSURE: 68 MMHG | HEART RATE: 68 BPM | SYSTOLIC BLOOD PRESSURE: 120 MMHG | OXYGEN SATURATION: 97 % | RESPIRATION RATE: 16 BRPM | WEIGHT: 160 LBS

## 2024-09-27 DIAGNOSIS — N18.9 CHRONIC KIDNEY DISEASE, UNSPECIFIED CKD STAGE: Primary | ICD-10-CM

## 2024-09-27 DIAGNOSIS — I25.119 ATHEROSCLEROSIS OF NATIVE CORONARY ARTERY OF NATIVE HEART WITH ANGINA PECTORIS (HCC): ICD-10-CM

## 2024-09-27 DIAGNOSIS — I10 UNCONTROLLED HYPERTENSION: Primary | ICD-10-CM

## 2024-09-27 PROCEDURE — G8427 DOCREV CUR MEDS BY ELIG CLIN: HCPCS | Performed by: INTERNAL MEDICINE

## 2024-09-27 PROCEDURE — 99214 OFFICE O/P EST MOD 30 MIN: CPT | Performed by: INTERNAL MEDICINE

## 2024-09-27 PROCEDURE — 3078F DIAST BP <80 MM HG: CPT | Performed by: INTERNAL MEDICINE

## 2024-09-27 PROCEDURE — 1123F ACP DISCUSS/DSCN MKR DOCD: CPT | Performed by: INTERNAL MEDICINE

## 2024-09-27 PROCEDURE — 3074F SYST BP LT 130 MM HG: CPT | Performed by: INTERNAL MEDICINE

## 2024-09-27 PROCEDURE — 1036F TOBACCO NON-USER: CPT | Performed by: INTERNAL MEDICINE

## 2024-09-27 PROCEDURE — G8417 CALC BMI ABV UP PARAM F/U: HCPCS | Performed by: INTERNAL MEDICINE

## 2024-09-27 ASSESSMENT — ENCOUNTER SYMPTOMS
COUGH: 0
RHINORRHEA: 0
CONSTIPATION: 0
NAUSEA: 0
CHEST TIGHTNESS: 0
EYE REDNESS: 0
COLOR CHANGE: 0
APNEA: 0
SHORTNESS OF BREATH: 0
WHEEZING: 0
DIARRHEA: 0
ABDOMINAL PAIN: 0
VOMITING: 0

## 2024-10-03 ENCOUNTER — OFFICE VISIT (OUTPATIENT)
Dept: FAMILY MEDICINE CLINIC | Age: 74
End: 2024-10-03
Payer: MEDICARE

## 2024-10-03 VITALS
RESPIRATION RATE: 14 BRPM | WEIGHT: 160 LBS | HEART RATE: 67 BPM | HEIGHT: 66 IN | OXYGEN SATURATION: 98 % | BODY MASS INDEX: 25.71 KG/M2 | DIASTOLIC BLOOD PRESSURE: 60 MMHG | SYSTOLIC BLOOD PRESSURE: 128 MMHG

## 2024-10-03 DIAGNOSIS — N18.31 TYPE 2 DIABETES MELLITUS WITH STAGE 3A CHRONIC KIDNEY DISEASE, WITHOUT LONG-TERM CURRENT USE OF INSULIN (HCC): ICD-10-CM

## 2024-10-03 DIAGNOSIS — N18.30 STAGE 3 CHRONIC KIDNEY DISEASE, UNSPECIFIED WHETHER STAGE 3A OR 3B CKD (HCC): ICD-10-CM

## 2024-10-03 DIAGNOSIS — E11.22 TYPE 2 DIABETES MELLITUS WITH STAGE 3A CHRONIC KIDNEY DISEASE, WITHOUT LONG-TERM CURRENT USE OF INSULIN (HCC): ICD-10-CM

## 2024-10-03 DIAGNOSIS — I10 ESSENTIAL HYPERTENSION: ICD-10-CM

## 2024-10-03 DIAGNOSIS — J44.9 CHRONIC OBSTRUCTIVE PULMONARY DISEASE, UNSPECIFIED COPD TYPE (HCC): Primary | ICD-10-CM

## 2024-10-03 LAB
ANION GAP SERPL CALCULATED.3IONS-SCNC: 14 MEQ/L (ref 9–15)
BUN SERPL-MCNC: 16 MG/DL (ref 8–23)
CALCIUM SERPL-MCNC: 8.5 MG/DL (ref 8.5–9.9)
CHLORIDE SERPL-SCNC: 106 MEQ/L (ref 95–107)
CO2 SERPL-SCNC: 19 MEQ/L (ref 20–31)
CREAT SERPL-MCNC: 2.04 MG/DL (ref 0.7–1.2)
GLUCOSE SERPL-MCNC: 147 MG/DL (ref 70–99)
POTASSIUM SERPL-SCNC: 4.5 MEQ/L (ref 3.4–4.9)
SODIUM SERPL-SCNC: 139 MEQ/L (ref 135–144)

## 2024-10-03 PROCEDURE — 3078F DIAST BP <80 MM HG: CPT | Performed by: INTERNAL MEDICINE

## 2024-10-03 PROCEDURE — 3074F SYST BP LT 130 MM HG: CPT | Performed by: INTERNAL MEDICINE

## 2024-10-03 PROCEDURE — 3023F SPIROM DOC REV: CPT | Performed by: INTERNAL MEDICINE

## 2024-10-03 PROCEDURE — 99214 OFFICE O/P EST MOD 30 MIN: CPT | Performed by: INTERNAL MEDICINE

## 2024-10-03 PROCEDURE — 1036F TOBACCO NON-USER: CPT | Performed by: INTERNAL MEDICINE

## 2024-10-03 PROCEDURE — 1123F ACP DISCUSS/DSCN MKR DOCD: CPT | Performed by: INTERNAL MEDICINE

## 2024-10-03 PROCEDURE — G8417 CALC BMI ABV UP PARAM F/U: HCPCS | Performed by: INTERNAL MEDICINE

## 2024-10-03 PROCEDURE — G8428 CUR MEDS NOT DOCUMENT: HCPCS | Performed by: INTERNAL MEDICINE

## 2024-10-03 PROCEDURE — G8484 FLU IMMUNIZE NO ADMIN: HCPCS | Performed by: INTERNAL MEDICINE

## 2024-10-03 PROCEDURE — 3044F HG A1C LEVEL LT 7.0%: CPT | Performed by: INTERNAL MEDICINE

## 2024-10-03 RX ORDER — PAROXETINE 40 MG/1
40 TABLET, FILM COATED ORAL DAILY
Qty: 90 TABLET | Refills: 0 | Status: SHIPPED | OUTPATIENT
Start: 2024-10-03

## 2024-10-03 ASSESSMENT — ENCOUNTER SYMPTOMS
VOMITING: 0
ABDOMINAL DISTENTION: 0
COUGH: 0
BLOOD IN STOOL: 0
CHEST TIGHTNESS: 0
VOICE CHANGE: 0
EYE PAIN: 0
EYE REDNESS: 0
RECTAL PAIN: 0
TROUBLE SWALLOWING: 0
EYE DISCHARGE: 0
SINUS PAIN: 0
ABDOMINAL PAIN: 0
EYE ITCHING: 0
BACK PAIN: 0
FACIAL SWELLING: 0
SHORTNESS OF BREATH: 0
CONSTIPATION: 0
SINUS PRESSURE: 0
RHINORRHEA: 0
COLOR CHANGE: 0
WHEEZING: 0
APNEA: 0
PHOTOPHOBIA: 0
NAUSEA: 0
DIARRHEA: 0
SORE THROAT: 0

## 2024-10-03 NOTE — PROGRESS NOTES
Tiger      At present he denies polyuria,  Polydipsia, constitutional, sinus, visual, cardiopulmonary, urologic, gastrointestinal, immunologic/hematologic, musculoskeletal, neurologic,dermatologic, or psychiatric complaints.    Review of Systems   Constitutional:  Negative for chills, diaphoresis, fatigue and fever.   HENT:  Negative for congestion, dental problem, drooling, ear discharge, ear pain, facial swelling, hearing loss, mouth sores, nosebleeds, postnasal drip, rhinorrhea, sinus pressure, sinus pain, sneezing, sore throat, tinnitus, trouble swallowing and voice change.    Eyes:  Negative for photophobia, pain, discharge, redness, itching and visual disturbance.   Respiratory:  Negative for apnea, cough, chest tightness, shortness of breath and wheezing.    Cardiovascular:  Negative for chest pain, palpitations and leg swelling.   Gastrointestinal:  Negative for abdominal distention, abdominal pain, blood in stool, constipation, diarrhea, nausea, rectal pain and vomiting.   Endocrine: Negative for cold intolerance, heat intolerance, polydipsia, polyphagia and polyuria.   Genitourinary:  Negative for decreased urine volume, difficulty urinating, dysuria, flank pain, frequency, genital sores, hematuria and urgency.   Musculoskeletal:  Negative for arthralgias, back pain, gait problem, joint swelling, myalgias, neck pain and neck stiffness.   Skin:  Negative for color change, rash and wound.   Allergic/Immunologic: Negative for environmental allergies and food allergies.   Neurological:  Negative for dizziness, tremors, seizures, syncope, facial asymmetry, speech difficulty, weakness, light-headedness, numbness and headaches.   Hematological:  Negative for adenopathy. Does not bruise/bleed easily.   Psychiatric/Behavioral:  Negative for agitation, confusion, decreased concentration, hallucinations, self-injury, sleep disturbance and suicidal ideas. The patient is not nervous/anxious.        Objective:   BP

## 2024-11-04 DIAGNOSIS — I15.9 SECONDARY HYPERTENSION: ICD-10-CM

## 2024-11-04 DIAGNOSIS — E78.5 HYPERLIPIDEMIA, UNSPECIFIED HYPERLIPIDEMIA TYPE: ICD-10-CM

## 2024-11-04 DIAGNOSIS — I10 UNCONTROLLED HYPERTENSION: Primary | ICD-10-CM

## 2024-11-04 NOTE — TELEPHONE ENCOUNTER
Requesting medication refill. Please approve or deny this request.    Rx requested:  Requested Prescriptions     Pending Prescriptions Disp Refills    atorvastatin (LIPITOR) 40 MG tablet 90 tablet 3     Sig: Take 1 tablet by mouth every evening    hydrALAZINE (APRESOLINE) 100 MG tablet 180 tablet 3     Sig: Take 1 tablet by mouth in the morning and at bedtime         Last Office Visit:   Visit date not found      Next Visit Date:  Future Appointments   Date Time Provider Department Center   12/20/2024  1:45 PM Goss, Sean, MD [facility]   1/7/2025  2:15 PM Keith Yu MD [facility] DEP

## 2024-11-05 RX ORDER — HYDRALAZINE HYDROCHLORIDE 100 MG/1
100 TABLET, FILM COATED ORAL 2 TIMES DAILY
Qty: 180 TABLET | Refills: 3 | Status: SHIPPED | OUTPATIENT
Start: 2024-11-05

## 2024-11-05 RX ORDER — ATORVASTATIN CALCIUM 40 MG/1
40 TABLET, FILM COATED ORAL EVERY EVENING
Qty: 90 TABLET | Refills: 3 | Status: SHIPPED | OUTPATIENT
Start: 2024-11-05

## 2024-11-27 ENCOUNTER — HOSPITAL ENCOUNTER (EMERGENCY)
Age: 74
Discharge: HOME | End: 2024-11-27
Attending: EMERGENCY MEDICINE
Payer: MEDICARE

## 2024-11-27 ENCOUNTER — APPOINTMENT (OUTPATIENT)
Dept: GENERAL RADIOLOGY | Age: 74
End: 2024-11-27
Payer: MEDICARE

## 2024-11-27 VITALS
TEMPERATURE: 98.3 F | OXYGEN SATURATION: 95 % | WEIGHT: 161 LBS | SYSTOLIC BLOOD PRESSURE: 128 MMHG | HEART RATE: 72 BPM | DIASTOLIC BLOOD PRESSURE: 57 MMHG | HEIGHT: 65 IN | BODY MASS INDEX: 26.82 KG/M2 | RESPIRATION RATE: 14 BRPM

## 2024-11-27 DIAGNOSIS — R00.8 VENTRICULAR BIGEMINY SEEN ON CARDIAC MONITOR: Primary | ICD-10-CM

## 2024-11-27 DIAGNOSIS — J44.9 CHRONIC OBSTRUCTIVE PULMONARY DISEASE, UNSPECIFIED COPD TYPE (HCC): ICD-10-CM

## 2024-11-27 LAB
ALBUMIN SERPL-MCNC: 3.9 G/DL (ref 3.5–4.6)
ALP SERPL-CCNC: 55 U/L (ref 35–104)
ALT SERPL-CCNC: 13 U/L (ref 0–41)
ANION GAP SERPL CALCULATED.3IONS-SCNC: 15 MEQ/L (ref 9–15)
AST SERPL-CCNC: 25 U/L (ref 0–40)
BASOPHILS # BLD: 0.1 K/UL (ref 0–0.2)
BASOPHILS NFR BLD: 1 %
BILIRUB SERPL-MCNC: 0.6 MG/DL (ref 0.2–0.7)
BUN SERPL-MCNC: 21 MG/DL (ref 8–23)
CALCIUM SERPL-MCNC: 8.6 MG/DL (ref 8.5–9.9)
CHLORIDE SERPL-SCNC: 103 MEQ/L (ref 95–107)
CO2 SERPL-SCNC: 19 MEQ/L (ref 20–31)
CREAT SERPL-MCNC: 2.15 MG/DL (ref 0.7–1.2)
EKG ATRIAL RATE: 69 BPM
EKG P AXIS: 75 DEGREES
EKG P-R INTERVAL: 176 MS
EKG Q-T INTERVAL: 402 MS
EKG QRS DURATION: 82 MS
EKG QTC CALCULATION (BAZETT): 430 MS
EKG R AXIS: -16 DEGREES
EKG T AXIS: 34 DEGREES
EKG VENTRICULAR RATE: 69 BPM
EOSINOPHIL # BLD: 0.2 K/UL (ref 0–0.7)
EOSINOPHIL NFR BLD: 2.5 %
ERYTHROCYTE [DISTWIDTH] IN BLOOD BY AUTOMATED COUNT: 14.1 % (ref 11.5–14.5)
GLOBULIN SER CALC-MCNC: 2.3 G/DL (ref 2.3–3.5)
GLUCOSE SERPL-MCNC: 145 MG/DL (ref 70–99)
HCT VFR BLD AUTO: 27.4 % (ref 42–52)
HGB BLD-MCNC: 8.9 G/DL (ref 14–18)
INR PPP: 1
LYMPHOCYTES # BLD: 0.9 K/UL (ref 1–4.8)
LYMPHOCYTES NFR BLD: 14.7 %
MAGNESIUM SERPL-MCNC: 1.5 MG/DL (ref 1.7–2.4)
MCH RBC QN AUTO: 30.4 PG (ref 27–31.3)
MCHC RBC AUTO-ENTMCNC: 32.5 % (ref 33–37)
MCV RBC AUTO: 93.5 FL (ref 79–92.2)
MONOCYTES # BLD: 0.6 K/UL (ref 0.2–0.8)
MONOCYTES NFR BLD: 10.1 %
NEUTROPHILS # BLD: 4.4 K/UL (ref 1.4–6.5)
NEUTS SEG NFR BLD: 71.2 %
PLATELET # BLD AUTO: 234 K/UL (ref 130–400)
POTASSIUM SERPL-SCNC: 4.9 MEQ/L (ref 3.4–4.9)
PROT SERPL-MCNC: 6.2 G/DL (ref 6.3–8)
PROTHROMBIN TIME: 13.3 SEC (ref 12.3–14.9)
RBC # BLD AUTO: 2.93 M/UL (ref 4.7–6.1)
SODIUM SERPL-SCNC: 137 MEQ/L (ref 135–144)
TROPONIN, HIGH SENSITIVITY: 28 NG/L (ref 0–19)
TROPONIN, HIGH SENSITIVITY: 32 NG/L (ref 0–19)
WBC # BLD AUTO: 6.1 K/UL (ref 4.8–10.8)

## 2024-11-27 PROCEDURE — 96365 THER/PROPH/DIAG IV INF INIT: CPT

## 2024-11-27 PROCEDURE — 80053 COMPREHEN METABOLIC PANEL: CPT

## 2024-11-27 PROCEDURE — 36415 COLL VENOUS BLD VENIPUNCTURE: CPT

## 2024-11-27 PROCEDURE — 2500000003 HC RX 250 WO HCPCS: Performed by: EMERGENCY MEDICINE

## 2024-11-27 PROCEDURE — 6370000000 HC RX 637 (ALT 250 FOR IP): Performed by: EMERGENCY MEDICINE

## 2024-11-27 PROCEDURE — 99285 EMERGENCY DEPT VISIT HI MDM: CPT

## 2024-11-27 PROCEDURE — 94640 AIRWAY INHALATION TREATMENT: CPT

## 2024-11-27 PROCEDURE — 96375 TX/PRO/DX INJ NEW DRUG ADDON: CPT

## 2024-11-27 PROCEDURE — 6360000002 HC RX W HCPCS: Performed by: EMERGENCY MEDICINE

## 2024-11-27 PROCEDURE — 94761 N-INVAS EAR/PLS OXIMETRY MLT: CPT

## 2024-11-27 PROCEDURE — 84484 ASSAY OF TROPONIN QUANT: CPT

## 2024-11-27 PROCEDURE — 85025 COMPLETE CBC W/AUTO DIFF WBC: CPT

## 2024-11-27 PROCEDURE — 71045 X-RAY EXAM CHEST 1 VIEW: CPT

## 2024-11-27 PROCEDURE — 83735 ASSAY OF MAGNESIUM: CPT

## 2024-11-27 PROCEDURE — 85610 PROTHROMBIN TIME: CPT

## 2024-11-27 RX ORDER — METOPROLOL TARTRATE 1 MG/ML
5 INJECTION, SOLUTION INTRAVENOUS ONCE
Status: COMPLETED | OUTPATIENT
Start: 2024-11-27 | End: 2024-11-27

## 2024-11-27 RX ORDER — IPRATROPIUM BROMIDE AND ALBUTEROL SULFATE 2.5; .5 MG/3ML; MG/3ML
1 SOLUTION RESPIRATORY (INHALATION) PRN
Status: DISCONTINUED | OUTPATIENT
Start: 2024-11-27 | End: 2024-11-27 | Stop reason: HOSPADM

## 2024-11-27 RX ORDER — MAGNESIUM SULFATE IN WATER 40 MG/ML
2000 INJECTION, SOLUTION INTRAVENOUS ONCE
Status: COMPLETED | OUTPATIENT
Start: 2024-11-27 | End: 2024-11-27

## 2024-11-27 RX ADMIN — MAGNESIUM SULFATE HEPTAHYDRATE 2000 MG: 40 INJECTION, SOLUTION INTRAVENOUS at 15:32

## 2024-11-27 RX ADMIN — IPRATROPIUM BROMIDE AND ALBUTEROL SULFATE 1 DOSE: 2.5; .5 SOLUTION RESPIRATORY (INHALATION) at 16:47

## 2024-11-27 RX ADMIN — METHYLPREDNISOLONE SODIUM SUCCINATE 125 MG: 125 INJECTION INTRAMUSCULAR; INTRAVENOUS at 16:49

## 2024-11-27 RX ADMIN — METOPROLOL TARTRATE 5 MG: 5 INJECTION INTRAVENOUS at 16:12

## 2024-11-27 ASSESSMENT — ENCOUNTER SYMPTOMS
VOMITING: 0
ABDOMINAL PAIN: 0
NAUSEA: 0
EYE PAIN: 0
SORE THROAT: 0
CHEST TIGHTNESS: 1
SHORTNESS OF BREATH: 1

## 2024-11-27 ASSESSMENT — PAIN - FUNCTIONAL ASSESSMENT: PAIN_FUNCTIONAL_ASSESSMENT: NONE - DENIES PAIN

## 2024-11-27 NOTE — ED NOTES
Patient D/C instructions have been reviewed, patient is to follow up with PCP and cardiologist   Patient voiced understanding, no questions or concerns noted at this time.

## 2024-11-27 NOTE — ED PROVIDER NOTES
components within normal limits   PROTIME-INR       All other labs were within normal range or not returned as of this dictation.    EMERGENCY DEPARTMENT COURSE and DIFFERENTIAL DIAGNOSIS/MDM:   Vitals:    Vitals:    11/27/24 1618 11/27/24 1628 11/27/24 1647 11/27/24 1730   BP: (!) 141/98 (!) 130/55  (!) 128/57   Pulse: 62 65 71 72   Resp: 13 11 11 14   Temp:       TempSrc:       SpO2: 97% 97% 98% 95%   Weight:       Height:           Patient came in with months of shortness of breath.  Patient has COPD.  Lungs are clear today and pulse ox 96%.  Patient did come in in bigeminy with a heart rate of 75.  Patient was mildly low and magnesium and was given 2 g of magnesium as well as Lopressor 5 mg IV.  There is left ventricular ectopy at this time.  He is not having chest pain.  First troponin is 32.  If second troponin is similar patient can likely go home.  I discussed the case with cardiology, , who would like me to increase his Coreg from 12.5 mg to 25 mg twice daily.  He can follow-up with Dr. Goss in the office.  The shortness of breath is pulmonary in nature and there is nothing acute going on with it.  He will have to follow with pulmonology for that.    Medical Decision Making  Amount and/or Complexity of Data Reviewed  Labs: ordered.  Radiology: ordered.  ECG/medicine tests: ordered.    Risk  Prescription drug management.          REASSESSMENT          CRITICAL CARE TIME   Total Critical Care time was 0 minutes, excluding separately reportable procedures.  There was a high probability of clinically significant/life threatening deterioration in the patient's condition which required my urgent intervention.      CONSULTS:  None    PROCEDURES:  Unless otherwise noted below, none     Procedures        FINAL IMPRESSION      1. Ventricular bigeminy seen on cardiac monitor    2. Chronic obstructive pulmonary disease, unspecified COPD type (HCC)          DISPOSITION/PLAN   DISPOSITION Decision To Discharge

## 2024-11-27 NOTE — DISCHARGE INSTRUCTIONS
START TAKING COREG 25 MG TWICE A DAY INSTEAD OF 12.5 MG.  IT WILL MAKE YOUR HEARTBEAT MORE REGULAR.

## 2024-11-27 NOTE — ED NOTES
Dr. Guthrie at bedside and updated on the pt's current condition.    The pt's primary nurse, Leida, will also be updated.

## 2024-11-27 NOTE — ED NOTES
Pt. Resting on the exam cot upon this nurses arrival to the room.  Mild pallor noted.  No tachypnea present.  See VS.  Medicated as ordered & updated on the current plan of care with questions answered as much as possible.  Call light remains with reach.  When questioned regarding kidney function, as CREAT is elevated, the pt. Reports he has had an increase in urinary hesitancy and his providers are \"watching my blood work.\"  NSR in 60's, no ectopy at this time.

## 2024-12-02 LAB
EKG ATRIAL RATE: 69 BPM
EKG P AXIS: 75 DEGREES
EKG P-R INTERVAL: 176 MS
EKG Q-T INTERVAL: 402 MS
EKG QRS DURATION: 82 MS
EKG QTC CALCULATION (BAZETT): 430 MS
EKG R AXIS: -16 DEGREES
EKG T AXIS: 34 DEGREES
EKG VENTRICULAR RATE: 69 BPM

## 2024-12-08 ENCOUNTER — APPOINTMENT (OUTPATIENT)
Dept: GENERAL RADIOLOGY | Age: 74
DRG: 987 | End: 2024-12-08
Payer: MEDICARE

## 2024-12-08 ENCOUNTER — HOSPITAL ENCOUNTER (INPATIENT)
Age: 74
LOS: 19 days | Discharge: NURSING, REHAB OR LONG-TERM CARE | DRG: 987 | End: 2024-12-30
Attending: FAMILY MEDICINE | Admitting: INTERNAL MEDICINE
Payer: MEDICARE

## 2024-12-08 DIAGNOSIS — K62.5 RECTAL BLEED: ICD-10-CM

## 2024-12-08 DIAGNOSIS — D64.9 SYMPTOMATIC ANEMIA: ICD-10-CM

## 2024-12-08 DIAGNOSIS — D50.9 IDA (IRON DEFICIENCY ANEMIA): ICD-10-CM

## 2024-12-08 DIAGNOSIS — R06.02 SHORTNESS OF BREATH: ICD-10-CM

## 2024-12-08 DIAGNOSIS — R07.9 CHEST PAIN, UNSPECIFIED TYPE: Primary | ICD-10-CM

## 2024-12-08 PROBLEM — R06.00 DYSPNEA: Status: ACTIVE | Noted: 2024-12-08

## 2024-12-08 LAB
ALBUMIN SERPL-MCNC: 3.6 G/DL (ref 3.5–4.6)
ALP SERPL-CCNC: 63 U/L (ref 35–104)
ALT SERPL-CCNC: 10 U/L (ref 0–41)
ANION GAP SERPL CALCULATED.3IONS-SCNC: 15 MEQ/L (ref 9–15)
AST SERPL-CCNC: 16 U/L (ref 0–40)
BASOPHILS # BLD: 0 K/UL (ref 0–0.2)
BASOPHILS NFR BLD: 0.2 %
BILIRUB SERPL-MCNC: 0.7 MG/DL (ref 0.2–0.7)
BNP BLD-MCNC: 2709 PG/ML
BUN SERPL-MCNC: 19 MG/DL (ref 8–23)
CALCIUM SERPL-MCNC: 8.1 MG/DL (ref 8.5–9.9)
CHLORIDE SERPL-SCNC: 106 MEQ/L (ref 95–107)
CK SERPL-CCNC: 64 U/L (ref 0–190)
CO2 SERPL-SCNC: 16 MEQ/L (ref 20–31)
CREAT SERPL-MCNC: 1.9 MG/DL (ref 0.7–1.2)
EOSINOPHIL # BLD: 0.1 K/UL (ref 0–0.7)
EOSINOPHIL NFR BLD: 0.6 %
ERYTHROCYTE [DISTWIDTH] IN BLOOD BY AUTOMATED COUNT: 13.9 % (ref 11.5–14.5)
GLOBULIN SER CALC-MCNC: 1.9 G/DL (ref 2.3–3.5)
GLUCOSE SERPL-MCNC: 238 MG/DL (ref 70–99)
HCT VFR BLD AUTO: 24.4 % (ref 42–52)
HGB BLD-MCNC: 7.8 G/DL (ref 14–18)
LYMPHOCYTES # BLD: 0.8 K/UL (ref 1–4.8)
LYMPHOCYTES NFR BLD: 8.7 %
MCH RBC QN AUTO: 30.5 PG (ref 27–31.3)
MCHC RBC AUTO-ENTMCNC: 32 % (ref 33–37)
MCV RBC AUTO: 95.3 FL (ref 79–92.2)
MONOCYTES # BLD: 0.7 K/UL (ref 0.2–0.8)
MONOCYTES NFR BLD: 7.6 %
NEUTROPHILS # BLD: 7.1 K/UL (ref 1.4–6.5)
NEUTS SEG NFR BLD: 82.3 %
PLATELET # BLD AUTO: 194 K/UL (ref 130–400)
POTASSIUM SERPL-SCNC: 4.9 MEQ/L (ref 3.4–4.9)
PROT SERPL-MCNC: 5.5 G/DL (ref 6.3–8)
RBC # BLD AUTO: 2.56 M/UL (ref 4.7–6.1)
SODIUM SERPL-SCNC: 137 MEQ/L (ref 135–144)
TROPONIN, HIGH SENSITIVITY: 32 NG/L (ref 0–19)
TROPONIN, HIGH SENSITIVITY: 33 NG/L (ref 0–19)
WBC # BLD AUTO: 8.6 K/UL (ref 4.8–10.8)

## 2024-12-08 PROCEDURE — 93005 ELECTROCARDIOGRAM TRACING: CPT | Performed by: INTERNAL MEDICINE

## 2024-12-08 PROCEDURE — 71045 X-RAY EXAM CHEST 1 VIEW: CPT

## 2024-12-08 PROCEDURE — 87040 BLOOD CULTURE FOR BACTERIA: CPT

## 2024-12-08 PROCEDURE — 84484 ASSAY OF TROPONIN QUANT: CPT

## 2024-12-08 PROCEDURE — 36415 COLL VENOUS BLD VENIPUNCTURE: CPT

## 2024-12-08 PROCEDURE — 82550 ASSAY OF CK (CPK): CPT

## 2024-12-08 PROCEDURE — 99285 EMERGENCY DEPT VISIT HI MDM: CPT

## 2024-12-08 PROCEDURE — 80053 COMPREHEN METABOLIC PANEL: CPT

## 2024-12-08 PROCEDURE — 83880 ASSAY OF NATRIURETIC PEPTIDE: CPT

## 2024-12-08 PROCEDURE — 85025 COMPLETE CBC W/AUTO DIFF WBC: CPT

## 2024-12-08 PROCEDURE — 93005 ELECTROCARDIOGRAM TRACING: CPT | Performed by: FAMILY MEDICINE

## 2024-12-08 PROCEDURE — G0378 HOSPITAL OBSERVATION PER HR: HCPCS

## 2024-12-08 RX ORDER — SODIUM CHLORIDE 9 MG/ML
INJECTION, SOLUTION INTRAVENOUS
Status: DISPENSED
Start: 2024-12-08 | End: 2024-12-09

## 2024-12-08 RX ORDER — SODIUM CHLORIDE 9 MG/ML
INJECTION, SOLUTION INTRAVENOUS PRN
Status: DISCONTINUED | OUTPATIENT
Start: 2024-12-08 | End: 2024-12-30 | Stop reason: HOSPADM

## 2024-12-08 RX ORDER — POLYETHYLENE GLYCOL 3350 17 G/17G
17 POWDER, FOR SOLUTION ORAL DAILY PRN
Status: DISCONTINUED | OUTPATIENT
Start: 2024-12-08 | End: 2024-12-30 | Stop reason: HOSPADM

## 2024-12-08 RX ORDER — ONDANSETRON 2 MG/ML
4 INJECTION INTRAMUSCULAR; INTRAVENOUS EVERY 6 HOURS PRN
Status: DISCONTINUED | OUTPATIENT
Start: 2024-12-08 | End: 2024-12-19

## 2024-12-08 RX ORDER — DEXTROSE MONOHYDRATE 100 MG/ML
INJECTION, SOLUTION INTRAVENOUS CONTINUOUS PRN
Status: DISCONTINUED | OUTPATIENT
Start: 2024-12-08 | End: 2024-12-30 | Stop reason: HOSPADM

## 2024-12-08 RX ORDER — ACETAMINOPHEN 650 MG/1
650 SUPPOSITORY RECTAL EVERY 6 HOURS PRN
Status: DISCONTINUED | OUTPATIENT
Start: 2024-12-08 | End: 2024-12-30 | Stop reason: HOSPADM

## 2024-12-08 RX ORDER — SODIUM CHLORIDE 0.9 % (FLUSH) 0.9 %
5-40 SYRINGE (ML) INJECTION EVERY 12 HOURS SCHEDULED
Status: DISCONTINUED | OUTPATIENT
Start: 2024-12-08 | End: 2024-12-30 | Stop reason: HOSPADM

## 2024-12-08 RX ORDER — ONDANSETRON 4 MG/1
4 TABLET, ORALLY DISINTEGRATING ORAL EVERY 8 HOURS PRN
Status: DISCONTINUED | OUTPATIENT
Start: 2024-12-08 | End: 2024-12-19

## 2024-12-08 RX ORDER — ACETAMINOPHEN 325 MG/1
650 TABLET ORAL EVERY 6 HOURS PRN
Status: DISCONTINUED | OUTPATIENT
Start: 2024-12-08 | End: 2024-12-30 | Stop reason: HOSPADM

## 2024-12-08 RX ORDER — INSULIN LISPRO 100 [IU]/ML
0-8 INJECTION, SOLUTION INTRAVENOUS; SUBCUTANEOUS
Status: DISCONTINUED | OUTPATIENT
Start: 2024-12-08 | End: 2024-12-26

## 2024-12-08 RX ORDER — GLUCAGON 1 MG/ML
1 KIT INJECTION PRN
Status: DISCONTINUED | OUTPATIENT
Start: 2024-12-08 | End: 2024-12-30 | Stop reason: HOSPADM

## 2024-12-08 RX ORDER — SODIUM CHLORIDE 0.9 % (FLUSH) 0.9 %
5-40 SYRINGE (ML) INJECTION PRN
Status: DISCONTINUED | OUTPATIENT
Start: 2024-12-08 | End: 2024-12-30 | Stop reason: HOSPADM

## 2024-12-08 RX ORDER — ENOXAPARIN SODIUM 100 MG/ML
30 INJECTION SUBCUTANEOUS DAILY
Status: DISCONTINUED | OUTPATIENT
Start: 2024-12-09 | End: 2024-12-13

## 2024-12-08 ASSESSMENT — PAIN - FUNCTIONAL ASSESSMENT: PAIN_FUNCTIONAL_ASSESSMENT: 0-10

## 2024-12-08 ASSESSMENT — PAIN DESCRIPTION - PAIN TYPE: TYPE: ACUTE PAIN

## 2024-12-08 ASSESSMENT — PAIN SCALES - GENERAL: PAINLEVEL_OUTOF10: 9

## 2024-12-08 ASSESSMENT — PAIN DESCRIPTION - DESCRIPTORS: DESCRIPTORS: SHARP

## 2024-12-08 ASSESSMENT — PAIN DESCRIPTION - LOCATION: LOCATION: CHEST

## 2024-12-08 ASSESSMENT — PAIN DESCRIPTION - ORIENTATION: ORIENTATION: MID;RIGHT

## 2024-12-09 ENCOUNTER — APPOINTMENT (OUTPATIENT)
Age: 74
DRG: 987 | End: 2024-12-09
Payer: MEDICARE

## 2024-12-09 LAB
ALBUMIN SERPL-MCNC: 3.5 G/DL (ref 3.5–4.6)
ALP SERPL-CCNC: 50 U/L (ref 35–104)
ALT SERPL-CCNC: 11 U/L (ref 0–41)
ANION GAP SERPL CALCULATED.3IONS-SCNC: 13 MEQ/L (ref 9–15)
AST SERPL-CCNC: 17 U/L (ref 0–40)
BASOPHILS # BLD: 0 K/UL (ref 0–0.2)
BASOPHILS # BLD: 0 K/UL (ref 0–0.2)
BASOPHILS NFR BLD: 0.2 %
BASOPHILS NFR BLD: 0.2 %
BILIRUB DIRECT SERPL-MCNC: <0.2 MG/DL (ref 0–0.4)
BILIRUB INDIRECT SERPL-MCNC: ABNORMAL MG/DL (ref 0–0.6)
BILIRUB SERPL-MCNC: 0.9 MG/DL (ref 0.2–0.7)
BUN SERPL-MCNC: 21 MG/DL (ref 8–23)
CALCIUM SERPL-MCNC: 8.5 MG/DL (ref 8.5–9.9)
CHLORIDE SERPL-SCNC: 108 MEQ/L (ref 95–107)
CO2 SERPL-SCNC: 17 MEQ/L (ref 20–31)
CREAT SERPL-MCNC: 1.95 MG/DL (ref 0.7–1.2)
ECHO AR MAX VEL PISA: 3.8 M/S
ECHO AV MEAN GRADIENT: 4 MMHG
ECHO AV MEAN VELOCITY: 0.9 M/S
ECHO AV PEAK GRADIENT: 7 MMHG
ECHO AV PEAK VELOCITY: 1.4 M/S
ECHO AV REGURGITANT PHT: 352.7 MILLISECOND
ECHO AV VELOCITY RATIO: 0.79
ECHO AV VTI: 29.6 CM
ECHO BSA: 1.81 M2
ECHO EST RA PRESSURE: 3 MMHG
ECHO LA VOL A-L A2C: 86 ML (ref 18–58)
ECHO LA VOL A-L A4C: 31 ML (ref 18–58)
ECHO LA VOL MOD A2C: 85 ML (ref 18–58)
ECHO LA VOL MOD A4C: 29 ML (ref 18–58)
ECHO LA VOLUME AREA LENGTH: 62 ML
ECHO LA VOLUME INDEX A-L A2C: 48 ML/M2 (ref 16–34)
ECHO LA VOLUME INDEX A-L A4C: 17 ML/M2 (ref 16–34)
ECHO LA VOLUME INDEX AREA LENGTH: 35 ML/M2 (ref 16–34)
ECHO LA VOLUME INDEX MOD A2C: 48 ML/M2 (ref 16–34)
ECHO LA VOLUME INDEX MOD A4C: 16 ML/M2 (ref 16–34)
ECHO LV E' LATERAL VELOCITY: 12.52 CM/S
ECHO LV E' SEPTAL VELOCITY: 8.72 CM/S
ECHO LV EDV A2C: 89 ML
ECHO LV EDV A4C: 77 ML
ECHO LV EDV BP: 84 ML (ref 67–155)
ECHO LV EDV INDEX A4C: 43 ML/M2
ECHO LV EDV INDEX BP: 47 ML/M2
ECHO LV EDV NDEX A2C: 50 ML/M2
ECHO LV EJECTION FRACTION A2C: 65 %
ECHO LV EJECTION FRACTION A4C: 67 %
ECHO LV EJECTION FRACTION BIPLANE: 65 % (ref 55–100)
ECHO LV ESV A2C: 31 ML
ECHO LV ESV A4C: 25 ML
ECHO LV ESV BP: 29 ML (ref 22–58)
ECHO LV ESV INDEX A2C: 17 ML/M2
ECHO LV ESV INDEX A4C: 14 ML/M2
ECHO LV ESV INDEX BP: 16 ML/M2
ECHO LVOT AV VTI INDEX: 0.77
ECHO LVOT MEAN GRADIENT: 2 MMHG
ECHO LVOT PEAK GRADIENT: 4 MMHG
ECHO LVOT PEAK VELOCITY: 1.1 M/S
ECHO LVOT VTI: 22.8 CM
ECHO MV A VELOCITY: 0.74 M/S
ECHO MV E DECELERATION TIME (DT): 169.2 MS
ECHO MV E VELOCITY: 0.9 M/S
ECHO MV E/A RATIO: 1.22
ECHO MV E/E' LATERAL: 7.19
ECHO MV E/E' RATIO (AVERAGED): 8.75
ECHO MV E/E' SEPTAL: 10.32
ECHO PV MAX VELOCITY: 1 M/S
ECHO PV PEAK GRADIENT: 4 MMHG
ECHO RIGHT VENTRICULAR SYSTOLIC PRESSURE (RVSP): 50 MMHG
ECHO RV TAPSE: 3 CM (ref 1.7–?)
ECHO TV REGURGITANT MAX VELOCITY: 3.41 M/S
ECHO TV REGURGITANT PEAK GRADIENT: 46 MMHG
EKG ATRIAL RATE: 75 BPM
EKG ATRIAL RATE: 79 BPM
EKG P AXIS: 62 DEGREES
EKG P AXIS: 85 DEGREES
EKG P-R INTERVAL: 184 MS
EKG P-R INTERVAL: 188 MS
EKG Q-T INTERVAL: 382 MS
EKG Q-T INTERVAL: 386 MS
EKG QRS DURATION: 88 MS
EKG QRS DURATION: 88 MS
EKG QTC CALCULATION (BAZETT): 426 MS
EKG QTC CALCULATION (BAZETT): 442 MS
EKG R AXIS: 2 DEGREES
EKG R AXIS: 22 DEGREES
EKG T AXIS: 12 DEGREES
EKG T AXIS: 61 DEGREES
EKG VENTRICULAR RATE: 75 BPM
EKG VENTRICULAR RATE: 79 BPM
EOSINOPHIL # BLD: 0 K/UL (ref 0–0.7)
EOSINOPHIL # BLD: 0 K/UL (ref 0–0.7)
EOSINOPHIL NFR BLD: 0 %
EOSINOPHIL NFR BLD: 0.2 %
ERYTHROCYTE [DISTWIDTH] IN BLOOD BY AUTOMATED COUNT: 13.8 % (ref 11.5–14.5)
ERYTHROCYTE [DISTWIDTH] IN BLOOD BY AUTOMATED COUNT: 14.2 % (ref 11.5–14.5)
GLUCOSE BLD-MCNC: 117 MG/DL (ref 70–99)
GLUCOSE BLD-MCNC: 120 MG/DL (ref 70–99)
GLUCOSE BLD-MCNC: 121 MG/DL (ref 70–99)
GLUCOSE BLD-MCNC: 124 MG/DL (ref 70–99)
GLUCOSE BLD-MCNC: 179 MG/DL (ref 70–99)
GLUCOSE SERPL-MCNC: 119 MG/DL (ref 70–99)
HCT VFR BLD AUTO: 23.3 % (ref 42–52)
HCT VFR BLD AUTO: 24.3 % (ref 42–52)
HGB BLD-MCNC: 7.7 G/DL (ref 14–18)
HGB BLD-MCNC: 7.8 G/DL (ref 14–18)
LACTATE BLDV-SCNC: 2.7 MMOL/L (ref 0.5–2.2)
LACTIC ACID, SEPSIS: 3 MMOL/L (ref 0.5–1.9)
LYMPHOCYTES # BLD: 0.8 K/UL (ref 1–4.8)
LYMPHOCYTES # BLD: 1.1 K/UL (ref 1–4.8)
LYMPHOCYTES NFR BLD: 6.3 %
LYMPHOCYTES NFR BLD: 8.1 %
MCH RBC QN AUTO: 30.1 PG (ref 27–31.3)
MCH RBC QN AUTO: 30.6 PG (ref 27–31.3)
MCHC RBC AUTO-ENTMCNC: 32.1 % (ref 33–37)
MCHC RBC AUTO-ENTMCNC: 33 % (ref 33–37)
MCV RBC AUTO: 92.5 FL (ref 79–92.2)
MCV RBC AUTO: 93.8 FL (ref 79–92.2)
MONOCYTES # BLD: 0.8 K/UL (ref 0.2–0.8)
MONOCYTES # BLD: 1 K/UL (ref 0.2–0.8)
MONOCYTES NFR BLD: 6.7 %
MONOCYTES NFR BLD: 7.6 %
NEUTROPHILS # BLD: 10.7 K/UL (ref 1.4–6.5)
NEUTROPHILS # BLD: 11 K/UL (ref 1.4–6.5)
NEUTS SEG NFR BLD: 83.5 %
NEUTS SEG NFR BLD: 86.1 %
PERFORMED ON: ABNORMAL
PLATELET # BLD AUTO: 197 K/UL (ref 130–400)
PLATELET # BLD AUTO: 202 K/UL (ref 130–400)
POTASSIUM SERPL-SCNC: 4.9 MEQ/L (ref 3.4–4.9)
PROT SERPL-MCNC: 5.5 G/DL (ref 6.3–8)
RBC # BLD AUTO: 2.52 M/UL (ref 4.7–6.1)
RBC # BLD AUTO: 2.59 M/UL (ref 4.7–6.1)
SODIUM SERPL-SCNC: 138 MEQ/L (ref 135–144)
WBC # BLD AUTO: 12.5 K/UL (ref 4.8–10.8)
WBC # BLD AUTO: 13.2 K/UL (ref 4.8–10.8)

## 2024-12-09 PROCEDURE — APPSS45 APP SPLIT SHARED TIME 31-45 MINUTES: Performed by: PHYSICIAN ASSISTANT

## 2024-12-09 PROCEDURE — G0378 HOSPITAL OBSERVATION PER HR: HCPCS

## 2024-12-09 PROCEDURE — 99223 1ST HOSP IP/OBS HIGH 75: CPT | Performed by: INTERNAL MEDICINE

## 2024-12-09 PROCEDURE — 96372 THER/PROPH/DIAG INJ SC/IM: CPT

## 2024-12-09 PROCEDURE — 83605 ASSAY OF LACTIC ACID: CPT

## 2024-12-09 PROCEDURE — 2580000003 HC RX 258: Performed by: INTERNAL MEDICINE

## 2024-12-09 PROCEDURE — 6370000000 HC RX 637 (ALT 250 FOR IP): Performed by: INTERNAL MEDICINE

## 2024-12-09 PROCEDURE — 93306 TTE W/DOPPLER COMPLETE: CPT | Performed by: INTERNAL MEDICINE

## 2024-12-09 PROCEDURE — 99221 1ST HOSP IP/OBS SF/LOW 40: CPT | Performed by: PAIN MEDICINE

## 2024-12-09 PROCEDURE — 6360000002 HC RX W HCPCS: Performed by: INTERNAL MEDICINE

## 2024-12-09 PROCEDURE — 85025 COMPLETE CBC W/AUTO DIFF WBC: CPT

## 2024-12-09 PROCEDURE — 80076 HEPATIC FUNCTION PANEL: CPT

## 2024-12-09 PROCEDURE — 93306 TTE W/DOPPLER COMPLETE: CPT

## 2024-12-09 PROCEDURE — 93010 ELECTROCARDIOGRAM REPORT: CPT | Performed by: INTERNAL MEDICINE

## 2024-12-09 PROCEDURE — 94640 AIRWAY INHALATION TREATMENT: CPT

## 2024-12-09 PROCEDURE — 87040 BLOOD CULTURE FOR BACTERIA: CPT

## 2024-12-09 PROCEDURE — 36415 COLL VENOUS BLD VENIPUNCTURE: CPT

## 2024-12-09 PROCEDURE — 6370000000 HC RX 637 (ALT 250 FOR IP): Performed by: PHYSICIAN ASSISTANT

## 2024-12-09 PROCEDURE — 80048 BASIC METABOLIC PNL TOTAL CA: CPT

## 2024-12-09 PROCEDURE — 94761 N-INVAS EAR/PLS OXIMETRY MLT: CPT

## 2024-12-09 RX ORDER — IPRATROPIUM BROMIDE AND ALBUTEROL SULFATE 2.5; .5 MG/3ML; MG/3ML
1 SOLUTION RESPIRATORY (INHALATION)
Status: DISCONTINUED | OUTPATIENT
Start: 2024-12-09 | End: 2024-12-10

## 2024-12-09 RX ORDER — CARVEDILOL 3.12 MG/1
3.12 TABLET ORAL 2 TIMES DAILY WITH MEALS
Status: DISCONTINUED | OUTPATIENT
Start: 2024-12-09 | End: 2024-12-30 | Stop reason: HOSPADM

## 2024-12-09 RX ORDER — LOSARTAN POTASSIUM 25 MG/1
25 TABLET ORAL DAILY
Status: DISCONTINUED | OUTPATIENT
Start: 2024-12-09 | End: 2024-12-29

## 2024-12-09 RX ORDER — ATORVASTATIN CALCIUM 40 MG/1
40 TABLET, FILM COATED ORAL NIGHTLY
Status: DISCONTINUED | OUTPATIENT
Start: 2024-12-09 | End: 2024-12-30 | Stop reason: HOSPADM

## 2024-12-09 RX ORDER — 0.9 % SODIUM CHLORIDE 0.9 %
1000 INTRAVENOUS SOLUTION INTRAVENOUS ONCE
Status: DISCONTINUED | OUTPATIENT
Start: 2024-12-09 | End: 2024-12-09

## 2024-12-09 RX ORDER — 0.9 % SODIUM CHLORIDE 0.9 %
500 INTRAVENOUS SOLUTION INTRAVENOUS ONCE
Status: COMPLETED | OUTPATIENT
Start: 2024-12-09 | End: 2024-12-09

## 2024-12-09 RX ORDER — IPRATROPIUM BROMIDE AND ALBUTEROL SULFATE 2.5; .5 MG/3ML; MG/3ML
1 SOLUTION RESPIRATORY (INHALATION)
Status: DISCONTINUED | OUTPATIENT
Start: 2024-12-09 | End: 2024-12-09

## 2024-12-09 RX ORDER — IPRATROPIUM BROMIDE AND ALBUTEROL SULFATE 2.5; .5 MG/3ML; MG/3ML
1 SOLUTION RESPIRATORY (INHALATION) EVERY 4 HOURS PRN
Status: DISCONTINUED | OUTPATIENT
Start: 2024-12-09 | End: 2024-12-30 | Stop reason: HOSPADM

## 2024-12-09 RX ORDER — TRAMADOL HYDROCHLORIDE 50 MG/1
50 TABLET ORAL EVERY 6 HOURS PRN
Status: DISCONTINUED | OUTPATIENT
Start: 2024-12-09 | End: 2024-12-29

## 2024-12-09 RX ADMIN — TRAMADOL HYDROCHLORIDE 50 MG: 50 TABLET, COATED ORAL at 09:57

## 2024-12-09 RX ADMIN — TIZANIDINE 4 MG: 4 TABLET ORAL at 09:48

## 2024-12-09 RX ADMIN — ACETAMINOPHEN 650 MG: 325 TABLET ORAL at 06:06

## 2024-12-09 RX ADMIN — Medication 10 ML: at 04:28

## 2024-12-09 RX ADMIN — IPRATROPIUM BROMIDE AND ALBUTEROL SULFATE 1 DOSE: 2.5; .5 SOLUTION RESPIRATORY (INHALATION) at 15:44

## 2024-12-09 RX ADMIN — ACETAMINOPHEN 650 MG: 325 TABLET ORAL at 15:03

## 2024-12-09 RX ADMIN — IPRATROPIUM BROMIDE AND ALBUTEROL SULFATE 1 DOSE: 2.5; .5 SOLUTION RESPIRATORY (INHALATION) at 19:23

## 2024-12-09 RX ADMIN — ENOXAPARIN SODIUM 30 MG: 100 INJECTION SUBCUTANEOUS at 09:59

## 2024-12-09 RX ADMIN — CARVEDILOL 3.12 MG: 3.12 TABLET, FILM COATED ORAL at 18:29

## 2024-12-09 RX ADMIN — TRAMADOL HYDROCHLORIDE 50 MG: 50 TABLET, COATED ORAL at 21:36

## 2024-12-09 RX ADMIN — Medication 10 ML: at 21:38

## 2024-12-09 RX ADMIN — ACETAMINOPHEN 650 MG: 325 TABLET ORAL at 21:36

## 2024-12-09 RX ADMIN — ATORVASTATIN CALCIUM 40 MG: 40 TABLET, FILM COATED ORAL at 21:36

## 2024-12-09 RX ADMIN — SODIUM CHLORIDE 500 ML: 9 INJECTION, SOLUTION INTRAVENOUS at 07:01

## 2024-12-09 RX ADMIN — ACETAMINOPHEN 650 MG: 325 TABLET ORAL at 00:08

## 2024-12-09 RX ADMIN — LOSARTAN POTASSIUM 25 MG: 25 TABLET, FILM COATED ORAL at 18:29

## 2024-12-09 ASSESSMENT — PAIN DESCRIPTION - DESCRIPTORS
DESCRIPTORS: ACHING
DESCRIPTORS: ACHING
DESCRIPTORS: ACHING;SORE
DESCRIPTORS: ACHING;BURNING;DISCOMFORT;SHARP
DESCRIPTORS: ACHING

## 2024-12-09 ASSESSMENT — PAIN SCALES - GENERAL
PAINLEVEL_OUTOF10: 8
PAINLEVEL_OUTOF10: 8
PAINLEVEL_OUTOF10: 5
PAINLEVEL_OUTOF10: 8
PAINLEVEL_OUTOF10: 7
PAINLEVEL_OUTOF10: 2
PAINLEVEL_OUTOF10: 8

## 2024-12-09 ASSESSMENT — PAIN DESCRIPTION - ORIENTATION
ORIENTATION: LEFT
ORIENTATION: UPPER

## 2024-12-09 ASSESSMENT — PAIN DESCRIPTION - LOCATION
LOCATION: BACK;CHEST;ARM
LOCATION: CHEST
LOCATION: BACK;GENERALIZED
LOCATION: BACK
LOCATION: CHEST;RIB CAGE
LOCATION: CHEST
LOCATION: BACK;ARM;CHEST

## 2024-12-09 ASSESSMENT — PAIN DESCRIPTION - PAIN TYPE
TYPE: ACUTE PAIN
TYPE: ACUTE PAIN

## 2024-12-09 ASSESSMENT — PAIN DESCRIPTION - ONSET: ONSET: PROGRESSIVE

## 2024-12-09 ASSESSMENT — PAIN SCALES - WONG BAKER: WONGBAKER_NUMERICALRESPONSE: HURTS A LITTLE BIT

## 2024-12-09 ASSESSMENT — PAIN DESCRIPTION - FREQUENCY: FREQUENCY: CONTINUOUS

## 2024-12-09 NOTE — ED TRIAGE NOTES
Patient to ER with chest pain x2 days, worsened today with the pain radiated to his midchest/right side. Also worsening SOB- history of COPD, non oxygen dependent. EMS gave 1 duoneb, 1 nitro, and 324 ASA.

## 2024-12-09 NOTE — RT PROTOCOL NOTE
RT Inhaler-Nebulizer Bronchodilator Protocol Note    There is a bronchodilator order in the chart from a provider indicating to follow the RT Bronchodilator Protocol and there is an “Initiate RT Inhaler-Nebulizer Bronchodilator Protocol” order as well (see protocol at bottom of note).    CXR Findings:  XR CHEST PORTABLE    Result Date: 12/8/2024  Lungs are normally expanded.  No infiltrates, consolidation or pleural effusions are seen.  Has normal size.  Mediastinum appears unremarkable. There is no perihilar vascular congestion. There is no pneumothorax. RECOMMENDATION: No acute cardiopulmonary process.       The findings from the last RT Protocol Assessment were as follows:   History Pulmonary Disease: Chronic pulmonary disease  Respiratory Pattern: Regular pattern and RR 12-20 bpm  Breath Sounds: Slightly diminished and/or crackles  Cough: Strong, spontaneous, non-productive  Indication for Bronchodilator Therapy: Decreased or absent breath sounds  Bronchodilator Assessment Score: 4    Aerosolized bronchodilator medication orders have been revised according to the RT Inhaler-Nebulizer Bronchodilator Protocol below.    Respiratory Therapist to perform RT Therapy Protocol Assessment initially then follow the protocol.  Repeat RT Therapy Protocol Assessment PRN for score 0-3 or on second treatment, BID, and PRN for scores above 3.    No Indications - adjust the frequency to every 6 hours PRN wheezing or bronchospasm, if no treatments needed after 48 hours then discontinue using Per Protocol order mode.     If indication present, adjust the RT bronchodilator orders based on the Bronchodilator Assessment Score as indicated below.  Use Inhaler orders unless patient has one or more of the following: on home nebulizer, not able to hold breath for 10 seconds, is not alert and oriented, cannot activate and use MDI correctly, or respiratory rate 25 breaths per minute or more, then use the equivalent nebulizer order(s) with

## 2024-12-09 NOTE — CARE COORDINATION
Case Management Assessment  Initial Evaluation    Date/Time of Evaluation: 12/9/2024 12:08 PM  Assessment Completed by: YOVANI Cotto    If patient is discharged prior to next notation, then this note serves as note for discharge by case management.    Patient Name: Isaak Mitchell                   YOB: 1950  Diagnosis: Dyspnea [R06.00]  Symptomatic anemia [D64.9]  Chest pain, unspecified type [R07.9]                   Date / Time: 12/8/2024  7:32 PM    Patient Admission Status: Observation   Readmission Risk (Low < 19, Mod (19-27), High > 27): No data recorded  Current PCP: Keith Yu MD  PCP verified by CM? Yes    Chart Reviewed: Yes      History Provided by: Patient  Patient Orientation: Alert and Oriented    Patient Cognition: Alert    Hospitalization in the last 30 days (Readmission):  No    If yes, Readmission Assessment in  Navigator will be completed.    Advance Directives:      Code Status: Full Code   Patient's Primary Decision Maker is: Legal Next of Kin    Primary Decision Maker: carolynwicho Theo Ted - [phone]    Discharge Planning:    Patient lives with: Spouse/Significant Other Type of Home: Apartment  Primary Care Giver: Self  Patient Support Systems include: Spouse/Significant Other   Current Financial resources: Medicare  Current community resources: None  Current services prior to admission: None            Current DME:              Type of Home Care services:  None    ADLS  Prior functional level: Independent in ADLs/IADLs  Current functional level: Other (see comment) (PEND PT/OT EVAL)    PT AM-PAC:   /24  OT AM-PAC:   /24    Family can provide assistance at DC: Yes  Would you like Case Management to discuss the discharge plan with any other family members/significant others, and if so, who? Yes (GF)  Plans to Return to Present Housing: Yes  Other Identified Issues/Barriers to RETURNING to current housing: MEDICAL CLEARANCE  Potential Assistance needed at

## 2024-12-09 NOTE — SIGNIFICANT EVENT
Rapid response called to 2 W. room 288 at 2336 hrs. on 12/8/2024.  Patient is in the process of being admitted from the ED.  Complained of acute sudden flare of pre-existing chronic left chest and back pain, rated 8 out of 10, accompanying shortness of breath, pain radiation to neck, associated nausea.  First collected blood pressure during rapid was reportedly low with systolic in the 60s, but there is concerned this may have been inaccurate.  Immediately rechecked blood pressure 152/58, then later blood pressures with SBP in 130s.  Patient is a poor historian, appearing distracted and uncomfortable, citing left chest and left posterior shoulder/back pain.  When asked directly he does state that it is very similar/same to the pain and dyspnea that brought him to the ED both this admission and the 2 prior ED visits within recent week.  Sublingual nitroglycerin is given at 2343 hrs. with only very slight improvement from reported 8/10 to 7/10 ~5 minutes later, but with patient appearing to be in less obvious discomfort.  Twelve-lead stat EKG demonstrates normal sinus rhythm at 79 bpm with occasional PVCs, normal axis, and very slight possible lead II and lead III intraventricular conduction delay.  No overt ST or T wave changes are appreciated.  Patient does have significant reproducible pain to palpation of the left sternal margin and left anterolateral rib cage, which he states is consistent with the pain he is currently complaining of.  He denies any recent traumatic injuries or falls and denies any history of rib injury or fracture at that area.  Stat repeat high-sensitivity troponin and CBC are ordered and collected during the rapid response.  Patient will be administered Tylenol for presumed musculoskeletal pain/spasm, pending results of CBC and troponin.    Electronically signed by Kareen Barrios DO on 12/9/2024 at 12:37 AM

## 2024-12-09 NOTE — CARE COORDINATION
CTN to see patient for COPD exacerbation. COPD booklet and zone left at the patient's bedside for his review. Patient is not appropriate for education at this time. CTN to see patient at a later time if available.

## 2024-12-09 NOTE — ACP (ADVANCE CARE PLANNING)
Advance Care Planning   Healthcare Decision Maker:    Primary Decision Maker: wicho leon - [phone]    Click here to complete Healthcare Decision Makers including selection of the Healthcare Decision Maker Relationship (ie \"Primary\").  Today we referred to ACP Clinical Specialist for assistance.         PT WOULD LIKE GF TO BE PRIMARY DECISION MAKER.   DOES NOT HAVE HPOA AT THIS TIME. WOULD LIKE TO COMPLETE ONE. SPIRITUAL CARE CONSULT PLACED TO ASSIST.     Electronically signed by YOVANI Cotto on 12/9/2024 at 12:08 PM

## 2024-12-09 NOTE — ED PROVIDER NOTES
[12/08/24 1933]   BP Systolic BP Percentile Diastolic BP Percentile Temp Temp src Pulse Respirations SpO2   (!) 146/71 -- -- 98.7 °F (37.1 °C) -- 76 24 96 %      Height Weight - Scale         1.651 m (5' 5\") 71.4 kg (157 lb 6.4 oz)             Physical Exam  Vitals and nursing note reviewed.   Constitutional:       Appearance: He is well-developed.   HENT:      Head: Normocephalic and atraumatic.      Right Ear: External ear normal.      Left Ear: External ear normal.      Nose: Nose normal.   Eyes:      Pupils: Pupils are equal, round, and reactive to light.   Cardiovascular:      Rate and Rhythm: Normal rate and regular rhythm.      Heart sounds: Normal heart sounds.   Pulmonary:      Effort: Pulmonary effort is normal. No respiratory distress.      Breath sounds: Normal breath sounds. No wheezing or rales.   Chest:      Chest wall: No tenderness.   Abdominal:      General: Bowel sounds are normal. There is distension.      Palpations: Abdomen is soft.      Tenderness: There is no abdominal tenderness.   Genitourinary:     Rectum: Guaiac result negative. No mass, tenderness or anal fissure.   Musculoskeletal:         General: Normal range of motion.      Cervical back: Normal range of motion and neck supple.   Skin:     General: Skin is warm and dry.   Neurological:      Mental Status: He is alert and oriented to person, place, and time.      Cranial Nerves: No cranial nerve deficit.      Sensory: No sensory deficit.      Motor: No abnormal muscle tone.      Coordination: Coordination normal.      Deep Tendon Reflexes: Reflexes normal.   Psychiatric:         Behavior: Behavior normal.         Thought Content: Thought content normal.         Judgment: Judgment normal.             RESULTS     EKG: All EKG's are interpreted by the Emergency Department Physician who either signs or Co-signsthis chart in the absence of a cardiologist.         RADIOLOGY:   Non-plain filmimages such as CT, Ultrasound and MRI are read by 
Other Specify

## 2024-12-10 ENCOUNTER — APPOINTMENT (OUTPATIENT)
Dept: NUCLEAR MEDICINE | Age: 74
DRG: 987 | End: 2024-12-10
Payer: MEDICARE

## 2024-12-10 ENCOUNTER — APPOINTMENT (OUTPATIENT)
Age: 74
DRG: 987 | End: 2024-12-10
Payer: MEDICARE

## 2024-12-10 PROBLEM — J44.1 COPD EXACERBATION (HCC): Status: ACTIVE | Noted: 2024-12-10

## 2024-12-10 PROBLEM — I50.33 ACUTE ON CHRONIC DIASTOLIC HEART FAILURE (HCC): Status: ACTIVE | Noted: 2024-12-10

## 2024-12-10 PROBLEM — I27.20 PULMONARY HYPERTENSION (HCC): Status: ACTIVE | Noted: 2024-12-10

## 2024-12-10 PROBLEM — D64.9 SYMPTOMATIC ANEMIA: Status: ACTIVE | Noted: 2024-12-10

## 2024-12-10 LAB
AMORPH SED URNS QL MICRO: NORMAL
ANION GAP SERPL CALCULATED.3IONS-SCNC: 13 MEQ/L (ref 9–15)
BACTERIA URNS QL MICRO: NEGATIVE /HPF
BASOPHILS # BLD: 0 K/UL (ref 0–0.2)
BASOPHILS NFR BLD: 0.3 %
BILIRUB UR QL STRIP: NEGATIVE
BUN SERPL-MCNC: 27 MG/DL (ref 8–23)
CALCIUM SERPL-MCNC: 8.7 MG/DL (ref 8.5–9.9)
CHLORIDE SERPL-SCNC: 102 MEQ/L (ref 95–107)
CLARITY UR: CLEAR
CO2 SERPL-SCNC: 17 MEQ/L (ref 20–31)
COLOR UR: YELLOW
CREAT SERPL-MCNC: 1.97 MG/DL (ref 0.7–1.2)
ECHO BSA: 1.81 M2
EOSINOPHIL # BLD: 0 K/UL (ref 0–0.7)
EOSINOPHIL NFR BLD: 0.1 %
EPI CELLS #/AREA URNS AUTO: NORMAL /HPF (ref 0–5)
ERYTHROCYTE [DISTWIDTH] IN BLOOD BY AUTOMATED COUNT: 13.6 % (ref 11.5–14.5)
GLUCOSE BLD-MCNC: 176 MG/DL (ref 70–99)
GLUCOSE BLD-MCNC: 178 MG/DL (ref 70–99)
GLUCOSE BLD-MCNC: 178 MG/DL (ref 70–99)
GLUCOSE BLD-MCNC: 194 MG/DL (ref 70–99)
GLUCOSE SERPL-MCNC: 160 MG/DL (ref 70–99)
GLUCOSE UR STRIP-MCNC: NEGATIVE MG/DL
HCT VFR BLD AUTO: 25.4 % (ref 42–52)
HGB BLD-MCNC: 8.3 G/DL (ref 14–18)
HGB UR QL STRIP: NEGATIVE
HYALINE CASTS #/AREA URNS AUTO: NORMAL /HPF (ref 0–5)
KETONES UR STRIP-MCNC: NEGATIVE MG/DL
LACTIC ACID, SEPSIS: 1.6 MMOL/L (ref 0.5–1.9)
LEUKOCYTE ESTERASE UR QL STRIP: NEGATIVE
LYMPHOCYTES # BLD: 0.8 K/UL (ref 1–4.8)
LYMPHOCYTES NFR BLD: 5.2 %
MCH RBC QN AUTO: 30 PG (ref 27–31.3)
MCHC RBC AUTO-ENTMCNC: 32.7 % (ref 33–37)
MCV RBC AUTO: 91.7 FL (ref 79–92.2)
MONOCYTES # BLD: 0.9 K/UL (ref 0.2–0.8)
MONOCYTES NFR BLD: 6 %
MUCOUS THREADS URNS QL MICRO: PRESENT /LPF
NEUTROPHILS # BLD: 13.1 K/UL (ref 1.4–6.5)
NEUTS SEG NFR BLD: 87.7 %
NITRITE UR QL STRIP: NEGATIVE
NUC STRESS EJECTION FRACTION: 67 %
PERFORMED ON: ABNORMAL
PH UR STRIP: 5 [PH] (ref 5–9)
PLATELET # BLD AUTO: 232 K/UL (ref 130–400)
POTASSIUM SERPL-SCNC: 4.9 MEQ/L (ref 3.4–4.9)
PROT UR STRIP-MCNC: 30 MG/DL
RBC # BLD AUTO: 2.77 M/UL (ref 4.7–6.1)
RBC #/AREA URNS HPF: NORMAL /HPF (ref 0–2)
SODIUM SERPL-SCNC: 132 MEQ/L (ref 135–144)
SP GR UR STRIP: 1.01 (ref 1–1.03)
STRESS BASELINE DIAS BP: 69 MMHG
STRESS BASELINE HR: 76 BPM
STRESS BASELINE ST DEPRESSION: 0 MM
STRESS BASELINE SYS BP: 152 MMHG
STRESS ESTIMATED WORKLOAD: 1 METS
STRESS PEAK DIAS BP: 64 MMHG
STRESS PEAK SYS BP: 156 MMHG
STRESS PERCENT HR ACHIEVED: 63 %
STRESS POST PEAK HR: 90 BPM
STRESS RATE PRESSURE PRODUCT: NORMAL BPM*MMHG
STRESS ST DEPRESSION: 0 MM
STRESS TARGET HR: 144 BPM
TID: 1.14
URINE REFLEX TO CULTURE: ABNORMAL
UROBILINOGEN UR STRIP-ACNC: 0.2 E.U./DL
WBC # BLD AUTO: 14.9 K/UL (ref 4.8–10.8)
WBC #/AREA URNS AUTO: NORMAL /HPF (ref 0–5)

## 2024-12-10 PROCEDURE — 96372 THER/PROPH/DIAG INJ SC/IM: CPT

## 2024-12-10 PROCEDURE — 6360000002 HC RX W HCPCS: Performed by: INTERNAL MEDICINE

## 2024-12-10 PROCEDURE — 36415 COLL VENOUS BLD VENIPUNCTURE: CPT

## 2024-12-10 PROCEDURE — 93017 CV STRESS TEST TRACING ONLY: CPT

## 2024-12-10 PROCEDURE — G0378 HOSPITAL OBSERVATION PER HR: HCPCS

## 2024-12-10 PROCEDURE — 3430000000 HC RX DIAGNOSTIC RADIOPHARMACEUTICAL: Performed by: INTERNAL MEDICINE

## 2024-12-10 PROCEDURE — 99223 1ST HOSP IP/OBS HIGH 75: CPT | Performed by: INTERNAL MEDICINE

## 2024-12-10 PROCEDURE — 2580000003 HC RX 258: Performed by: INTERNAL MEDICINE

## 2024-12-10 PROCEDURE — 81003 URINALYSIS AUTO W/O SCOPE: CPT

## 2024-12-10 PROCEDURE — 85025 COMPLETE CBC W/AUTO DIFF WBC: CPT

## 2024-12-10 PROCEDURE — 6370000000 HC RX 637 (ALT 250 FOR IP): Performed by: INTERNAL MEDICINE

## 2024-12-10 PROCEDURE — A9502 TC99M TETROFOSMIN: HCPCS | Performed by: INTERNAL MEDICINE

## 2024-12-10 PROCEDURE — 6370000000 HC RX 637 (ALT 250 FOR IP): Performed by: PHYSICIAN ASSISTANT

## 2024-12-10 PROCEDURE — 94760 N-INVAS EAR/PLS OXIMETRY 1: CPT

## 2024-12-10 PROCEDURE — 99232 SBSQ HOSP IP/OBS MODERATE 35: CPT | Performed by: INTERNAL MEDICINE

## 2024-12-10 PROCEDURE — 78452 HT MUSCLE IMAGE SPECT MULT: CPT

## 2024-12-10 PROCEDURE — 80048 BASIC METABOLIC PNL TOTAL CA: CPT

## 2024-12-10 PROCEDURE — 94761 N-INVAS EAR/PLS OXIMETRY MLT: CPT

## 2024-12-10 PROCEDURE — 83605 ASSAY OF LACTIC ACID: CPT

## 2024-12-10 PROCEDURE — 93018 CV STRESS TEST I&R ONLY: CPT | Performed by: INTERNAL MEDICINE

## 2024-12-10 PROCEDURE — 2700000000 HC OXYGEN THERAPY PER DAY

## 2024-12-10 PROCEDURE — 94640 AIRWAY INHALATION TREATMENT: CPT

## 2024-12-10 RX ORDER — SODIUM CHLORIDE 0.9 % (FLUSH) 0.9 %
10 SYRINGE (ML) INJECTION PRN
Status: COMPLETED | OUTPATIENT
Start: 2024-12-10 | End: 2024-12-10

## 2024-12-10 RX ORDER — IPRATROPIUM BROMIDE AND ALBUTEROL SULFATE 2.5; .5 MG/3ML; MG/3ML
1 SOLUTION RESPIRATORY (INHALATION)
Status: DISCONTINUED | OUTPATIENT
Start: 2024-12-10 | End: 2024-12-11

## 2024-12-10 RX ORDER — QUETIAPINE FUMARATE 25 MG/1
25 TABLET, FILM COATED ORAL NIGHTLY
Status: DISCONTINUED | OUTPATIENT
Start: 2024-12-10 | End: 2024-12-11

## 2024-12-10 RX ORDER — REGADENOSON 0.08 MG/ML
0.4 INJECTION, SOLUTION INTRAVENOUS
Status: COMPLETED | OUTPATIENT
Start: 2024-12-10 | End: 2024-12-10

## 2024-12-10 RX ADMIN — REGADENOSON 0.4 MG: 0.08 INJECTION, SOLUTION INTRAVENOUS at 11:43

## 2024-12-10 RX ADMIN — Medication 10 ML: at 10:20

## 2024-12-10 RX ADMIN — QUETIAPINE FUMARATE 25 MG: 25 TABLET ORAL at 21:21

## 2024-12-10 RX ADMIN — Medication 10 ML: at 21:21

## 2024-12-10 RX ADMIN — TETROFOSMIN 11.4 MILLICURIE: 1.38 INJECTION, POWDER, LYOPHILIZED, FOR SOLUTION INTRAVENOUS at 10:20

## 2024-12-10 RX ADMIN — ENOXAPARIN SODIUM 30 MG: 100 INJECTION SUBCUTANEOUS at 08:43

## 2024-12-10 RX ADMIN — LOSARTAN POTASSIUM 25 MG: 25 TABLET, FILM COATED ORAL at 08:42

## 2024-12-10 RX ADMIN — ATORVASTATIN CALCIUM 40 MG: 40 TABLET, FILM COATED ORAL at 21:21

## 2024-12-10 RX ADMIN — IPRATROPIUM BROMIDE AND ALBUTEROL SULFATE 1 DOSE: 2.5; .5 SOLUTION RESPIRATORY (INHALATION) at 18:38

## 2024-12-10 RX ADMIN — Medication 10 ML: at 11:44

## 2024-12-10 RX ADMIN — ACETAMINOPHEN 650 MG: 325 TABLET ORAL at 08:45

## 2024-12-10 RX ADMIN — CARVEDILOL 3.12 MG: 3.12 TABLET, FILM COATED ORAL at 08:42

## 2024-12-10 RX ADMIN — IPRATROPIUM BROMIDE AND ALBUTEROL SULFATE 1 DOSE: 2.5; .5 SOLUTION RESPIRATORY (INHALATION) at 07:25

## 2024-12-10 RX ADMIN — Medication 10 ML: at 08:43

## 2024-12-10 RX ADMIN — CARVEDILOL 3.12 MG: 3.12 TABLET, FILM COATED ORAL at 16:58

## 2024-12-10 RX ADMIN — Medication 10 ML: at 11:43

## 2024-12-10 RX ADMIN — TETROFOSMIN 33.6 MILLICURIE: 1.38 INJECTION, POWDER, LYOPHILIZED, FOR SOLUTION INTRAVENOUS at 11:43

## 2024-12-10 ASSESSMENT — PAIN DESCRIPTION - LOCATION: LOCATION: CHEST

## 2024-12-10 ASSESSMENT — PAIN SCALES - GENERAL
PAINLEVEL_OUTOF10: 4
PAINLEVEL_OUTOF10: 4

## 2024-12-10 NOTE — CARE COORDINATION
CTN attempted to see patient for COPD, patient was off unit at stress test. CTN to see patient at a later time once available.

## 2024-12-10 NOTE — CARE COORDINATION
ATTEMPT TO MEET WITH PT AT BEDSIDE. PER NURSING, PT CONFUSED. CALL PLACED TO PT GF, MARIA DEL ROSARIO, BY PHONE. MARIA DEL ROSARIO STATES PT HAS HAD DIFFICULTY AMBULATING AT HOME RECENTLY AND HAS HAD SOME PERIODS OF CONFUSION OVER THE LAST FEW WEEKS. DR. HSIEH UPDATED, PT/OT ADDED FOR HOME GOING SAFETY.

## 2024-12-11 PROBLEM — R41.0 DELIRIUM: Status: ACTIVE | Noted: 2024-12-11

## 2024-12-11 LAB
ANION GAP SERPL CALCULATED.3IONS-SCNC: 13 MEQ/L (ref 9–15)
BASE EXCESS ARTERIAL: -7 (ref -3–3)
BASOPHILS # BLD: 0 K/UL (ref 0–0.2)
BASOPHILS NFR BLD: 0.3 %
BUN SERPL-MCNC: 40 MG/DL (ref 8–23)
CALCIUM IONIZED: 1.26 MMOL/L (ref 1.12–1.32)
CALCIUM SERPL-MCNC: 8.7 MG/DL (ref 8.5–9.9)
CHLORIDE SERPL-SCNC: 105 MEQ/L (ref 95–107)
CO2 SERPL-SCNC: 18 MEQ/L (ref 20–31)
CREAT SERPL-MCNC: 2.04 MG/DL (ref 0.7–1.2)
EKG ATRIAL RATE: 78 BPM
EKG P AXIS: 63 DEGREES
EKG P-R INTERVAL: 182 MS
EKG Q-T INTERVAL: 386 MS
EKG QRS DURATION: 84 MS
EKG QTC CALCULATION (BAZETT): 440 MS
EKG R AXIS: -4 DEGREES
EKG T AXIS: 11 DEGREES
EKG VENTRICULAR RATE: 78 BPM
EOSINOPHIL # BLD: 0 K/UL (ref 0–0.7)
EOSINOPHIL NFR BLD: 0.2 %
ERYTHROCYTE [DISTWIDTH] IN BLOOD BY AUTOMATED COUNT: 13.8 % (ref 11.5–14.5)
GLUCOSE BLD-MCNC: 150 MG/DL (ref 70–99)
GLUCOSE BLD-MCNC: 165 MG/DL (ref 70–99)
GLUCOSE BLD-MCNC: 174 MG/DL (ref 70–99)
GLUCOSE BLD-MCNC: 237 MG/DL (ref 70–99)
GLUCOSE BLD-MCNC: 284 MG/DL (ref 70–99)
GLUCOSE SERPL-MCNC: 145 MG/DL (ref 70–99)
HCO3 ARTERIAL: 17.8 MMOL/L (ref 21–29)
HCT VFR BLD AUTO: 24.4 % (ref 42–52)
HCT VFR BLD AUTO: 26 % (ref 41–53)
HGB BLD CALC-MCNC: 8.9 GM/DL (ref 13.5–17.5)
HGB BLD-MCNC: 8.2 G/DL (ref 14–18)
LACTATE: 0.94 MMOL/L (ref 0.4–2)
LACTIC ACID, SEPSIS: 1 MMOL/L (ref 0.5–1.9)
LYMPHOCYTES # BLD: 1.1 K/UL (ref 1–4.8)
LYMPHOCYTES NFR BLD: 9.7 %
MCH RBC QN AUTO: 30 PG (ref 27–31.3)
MCHC RBC AUTO-ENTMCNC: 33.6 % (ref 33–37)
MCV RBC AUTO: 89.4 FL (ref 79–92.2)
MONOCYTES # BLD: 1 K/UL (ref 0.2–0.8)
MONOCYTES NFR BLD: 8.5 %
NEUTROPHILS # BLD: 9 K/UL (ref 1.4–6.5)
NEUTS SEG NFR BLD: 80.3 %
O2 SAT, ARTERIAL: 95 % (ref 93–100)
PCO2 ARTERIAL: 27 MM HG (ref 35–45)
PERFORMED ON: ABNORMAL
PH ARTERIAL: 7.42 (ref 7.35–7.45)
PLATELET # BLD AUTO: 231 K/UL (ref 130–400)
PO2 ARTERIAL: 70 MM HG (ref 75–108)
POC CHLORIDE: 105 MEQ/L (ref 99–110)
POC CREATININE: 1.9 MG/DL (ref 0.8–1.3)
POC FIO2: 2
POC SAMPLE TYPE: ABNORMAL
POTASSIUM SERPL-SCNC: 4.4 MEQ/L (ref 3.4–4.9)
POTASSIUM SERPL-SCNC: 4.4 MEQ/L (ref 3.5–5.1)
PROCALCITONIN SERPL IA-MCNC: 1.62 NG/ML (ref 0–0.15)
RBC # BLD AUTO: 2.73 M/UL (ref 4.7–6.1)
SODIUM BLD-SCNC: 135 MEQ/L (ref 136–145)
SODIUM SERPL-SCNC: 136 MEQ/L (ref 135–144)
TCO2 ARTERIAL: 19 MMOL/L (ref 21–32)
WBC # BLD AUTO: 11.2 K/UL (ref 4.8–10.8)

## 2024-12-11 PROCEDURE — 6370000000 HC RX 637 (ALT 250 FOR IP): Performed by: INTERNAL MEDICINE

## 2024-12-11 PROCEDURE — 83605 ASSAY OF LACTIC ACID: CPT

## 2024-12-11 PROCEDURE — 96372 THER/PROPH/DIAG INJ SC/IM: CPT

## 2024-12-11 PROCEDURE — 36600 WITHDRAWAL OF ARTERIAL BLOOD: CPT

## 2024-12-11 PROCEDURE — 82948 REAGENT STRIP/BLOOD GLUCOSE: CPT

## 2024-12-11 PROCEDURE — 2700000000 HC OXYGEN THERAPY PER DAY

## 2024-12-11 PROCEDURE — 85025 COMPLETE CBC W/AUTO DIFF WBC: CPT

## 2024-12-11 PROCEDURE — 80048 BASIC METABOLIC PNL TOTAL CA: CPT

## 2024-12-11 PROCEDURE — 2580000003 HC RX 258: Performed by: INTERNAL MEDICINE

## 2024-12-11 PROCEDURE — 99233 SBSQ HOSP IP/OBS HIGH 50: CPT | Performed by: INTERNAL MEDICINE

## 2024-12-11 PROCEDURE — 94660 CPAP INITIATION&MGMT: CPT

## 2024-12-11 PROCEDURE — 82803 BLOOD GASES ANY COMBINATION: CPT

## 2024-12-11 PROCEDURE — 82435 ASSAY OF BLOOD CHLORIDE: CPT

## 2024-12-11 PROCEDURE — 2500000003 HC RX 250 WO HCPCS: Performed by: INTERNAL MEDICINE

## 2024-12-11 PROCEDURE — 97166 OT EVAL MOD COMPLEX 45 MIN: CPT

## 2024-12-11 PROCEDURE — 90792 PSYCH DIAG EVAL W/MED SRVCS: CPT | Performed by: PSYCHIATRY & NEUROLOGY

## 2024-12-11 PROCEDURE — 84295 ASSAY OF SERUM SODIUM: CPT

## 2024-12-11 PROCEDURE — 94760 N-INVAS EAR/PLS OXIMETRY 1: CPT

## 2024-12-11 PROCEDURE — 1210000000 HC MED SURG R&B

## 2024-12-11 PROCEDURE — 84132 ASSAY OF SERUM POTASSIUM: CPT

## 2024-12-11 PROCEDURE — 82330 ASSAY OF CALCIUM: CPT

## 2024-12-11 PROCEDURE — 99232 SBSQ HOSP IP/OBS MODERATE 35: CPT | Performed by: INTERNAL MEDICINE

## 2024-12-11 PROCEDURE — 94640 AIRWAY INHALATION TREATMENT: CPT

## 2024-12-11 PROCEDURE — 84145 PROCALCITONIN (PCT): CPT

## 2024-12-11 PROCEDURE — 6370000000 HC RX 637 (ALT 250 FOR IP): Performed by: PHYSICIAN ASSISTANT

## 2024-12-11 PROCEDURE — 6360000002 HC RX W HCPCS: Performed by: INTERNAL MEDICINE

## 2024-12-11 PROCEDURE — 36415 COLL VENOUS BLD VENIPUNCTURE: CPT

## 2024-12-11 PROCEDURE — 93010 ELECTROCARDIOGRAM REPORT: CPT | Performed by: INTERNAL MEDICINE

## 2024-12-11 PROCEDURE — 94761 N-INVAS EAR/PLS OXIMETRY MLT: CPT

## 2024-12-11 PROCEDURE — 82565 ASSAY OF CREATININE: CPT

## 2024-12-11 PROCEDURE — 85014 HEMATOCRIT: CPT

## 2024-12-11 RX ORDER — IPRATROPIUM BROMIDE AND ALBUTEROL SULFATE 2.5; .5 MG/3ML; MG/3ML
1 SOLUTION RESPIRATORY (INHALATION)
Status: DISCONTINUED | OUTPATIENT
Start: 2024-12-11 | End: 2024-12-13

## 2024-12-11 RX ORDER — LEVALBUTEROL INHALATION SOLUTION 0.63 MG/3ML
1.25 SOLUTION RESPIRATORY (INHALATION)
Status: DISCONTINUED | OUTPATIENT
Start: 2024-12-11 | End: 2024-12-11

## 2024-12-11 RX ORDER — QUETIAPINE FUMARATE 50 MG/1
50 TABLET, FILM COATED ORAL NIGHTLY
Status: DISCONTINUED | OUTPATIENT
Start: 2024-12-11 | End: 2024-12-30 | Stop reason: HOSPADM

## 2024-12-11 RX ADMIN — LOSARTAN POTASSIUM 25 MG: 25 TABLET, FILM COATED ORAL at 08:44

## 2024-12-11 RX ADMIN — ENOXAPARIN SODIUM 30 MG: 100 INJECTION SUBCUTANEOUS at 08:44

## 2024-12-11 RX ADMIN — IPRATROPIUM BROMIDE AND ALBUTEROL SULFATE 1 DOSE: 2.5; .5 SOLUTION RESPIRATORY (INHALATION) at 15:43

## 2024-12-11 RX ADMIN — IPRATROPIUM BROMIDE AND ALBUTEROL SULFATE 1 DOSE: 2.5; .5 SOLUTION RESPIRATORY (INHALATION) at 11:25

## 2024-12-11 RX ADMIN — INSULIN LISPRO 2 UNITS: 100 INJECTION, SOLUTION INTRAVENOUS; SUBCUTANEOUS at 17:04

## 2024-12-11 RX ADMIN — Medication 10 ML: at 08:45

## 2024-12-11 RX ADMIN — ACETAMINOPHEN 650 MG: 325 TABLET ORAL at 09:30

## 2024-12-11 RX ADMIN — INSULIN LISPRO 4 UNITS: 100 INJECTION, SOLUTION INTRAVENOUS; SUBCUTANEOUS at 21:58

## 2024-12-11 RX ADMIN — IPRATROPIUM BROMIDE AND ALBUTEROL SULFATE 1 DOSE: 2.5; .5 SOLUTION RESPIRATORY (INHALATION) at 20:04

## 2024-12-11 RX ADMIN — Medication 10 ML: at 19:51

## 2024-12-11 RX ADMIN — ACETAMINOPHEN 650 MG: 325 TABLET ORAL at 19:51

## 2024-12-11 RX ADMIN — QUETIAPINE FUMARATE 50 MG: 50 TABLET ORAL at 19:51

## 2024-12-11 RX ADMIN — ATORVASTATIN CALCIUM 40 MG: 40 TABLET, FILM COATED ORAL at 19:51

## 2024-12-11 RX ADMIN — METHYLPREDNISOLONE SODIUM SUCCINATE 40 MG: 40 INJECTION INTRAMUSCULAR; INTRAVENOUS at 10:33

## 2024-12-11 RX ADMIN — CARVEDILOL 3.12 MG: 3.12 TABLET, FILM COATED ORAL at 08:44

## 2024-12-11 RX ADMIN — CARVEDILOL 3.12 MG: 3.12 TABLET, FILM COATED ORAL at 17:05

## 2024-12-11 RX ADMIN — IPRATROPIUM BROMIDE AND ALBUTEROL SULFATE 1 DOSE: 2.5; .5 SOLUTION RESPIRATORY (INHALATION) at 07:50

## 2024-12-11 ASSESSMENT — PAIN SCALES - GENERAL
PAINLEVEL_OUTOF10: 4
PAINLEVEL_OUTOF10: 6
PAINLEVEL_OUTOF10: 4
PAINLEVEL_OUTOF10: 6

## 2024-12-11 ASSESSMENT — PAIN DESCRIPTION - DESCRIPTORS
DESCRIPTORS: ACHING
DESCRIPTORS: ACHING
DESCRIPTORS: ACHING;DISCOMFORT

## 2024-12-11 ASSESSMENT — PAIN DESCRIPTION - PAIN TYPE
TYPE: ACUTE PAIN
TYPE: ACUTE PAIN

## 2024-12-11 ASSESSMENT — PAIN DESCRIPTION - LOCATION
LOCATION: CHEST;RIB CAGE
LOCATION: THROAT
LOCATION: STERNUM

## 2024-12-11 ASSESSMENT — PAIN DESCRIPTION - ORIENTATION
ORIENTATION: MID
ORIENTATION: MID
ORIENTATION: RIGHT;LEFT;MID

## 2024-12-11 NOTE — CARE COORDINATION
Definition of COPD discussed. Patient denies any issues obtaining their medications or supplies. Patient also denies any issues getting to follow up appointments.   Lung function explained.  Symptoms discussed including: difficulty breathing, SOB, coughing, excess mucous, weakness and fatigue, or wheezing.  Pt denies smoking, and has been exposed to air pollution or dust. Smoking cessation booklet was provided.   Different testing for COPD and most common treatments reviewed.  Importance of preventing infection including hand hygiene, keeping inhaler mouthpieces clean, and getting the flu and pneumonia vaccinations.  Ways to ease SOB explained including pursed lip breathing and diaphragmatic breathing.  Energy conservation discussed.  Possible medications that may be ordered were reviewed. I stressed that their physician would make that decision and explained the importance of taking the medication as directed.  COPD booklet and zone pamphlet left for patient review.  Patient denies any further questions at this time.]

## 2024-12-11 NOTE — CARE COORDINATION
Pt not medically cleared for dc, has BIPAP on, IV SM per nursing rounds. Dc TBD pending therapy evals.

## 2024-12-12 PROBLEM — F43.20 ADJUSTMENT DISORDER: Status: ACTIVE | Noted: 2024-12-12

## 2024-12-12 LAB
GLUCOSE BLD-MCNC: 204 MG/DL (ref 70–99)
GLUCOSE BLD-MCNC: 240 MG/DL (ref 70–99)
GLUCOSE BLD-MCNC: 253 MG/DL (ref 70–99)
GLUCOSE BLD-MCNC: 282 MG/DL (ref 70–99)
PERFORMED ON: ABNORMAL

## 2024-12-12 PROCEDURE — 6370000000 HC RX 637 (ALT 250 FOR IP): Performed by: PHYSICIAN ASSISTANT

## 2024-12-12 PROCEDURE — 2580000003 HC RX 258: Performed by: INTERNAL MEDICINE

## 2024-12-12 PROCEDURE — 6370000000 HC RX 637 (ALT 250 FOR IP): Performed by: INTERNAL MEDICINE

## 2024-12-12 PROCEDURE — 94761 N-INVAS EAR/PLS OXIMETRY MLT: CPT

## 2024-12-12 PROCEDURE — 99232 SBSQ HOSP IP/OBS MODERATE 35: CPT | Performed by: INTERNAL MEDICINE

## 2024-12-12 PROCEDURE — 6360000002 HC RX W HCPCS: Performed by: INTERNAL MEDICINE

## 2024-12-12 PROCEDURE — 94640 AIRWAY INHALATION TREATMENT: CPT

## 2024-12-12 PROCEDURE — 2700000000 HC OXYGEN THERAPY PER DAY

## 2024-12-12 PROCEDURE — 90792 PSYCH DIAG EVAL W/MED SRVCS: CPT | Performed by: PSYCHIATRY & NEUROLOGY

## 2024-12-12 PROCEDURE — 2500000003 HC RX 250 WO HCPCS: Performed by: INTERNAL MEDICINE

## 2024-12-12 PROCEDURE — 97162 PT EVAL MOD COMPLEX 30 MIN: CPT

## 2024-12-12 PROCEDURE — 1210000000 HC MED SURG R&B

## 2024-12-12 RX ORDER — FERROUS SULFATE 325(65) MG
325 TABLET ORAL
Qty: 30 TABLET | Refills: 5 | Status: SHIPPED | OUTPATIENT
Start: 2024-12-12 | End: 2024-12-30

## 2024-12-12 RX ORDER — IPRATROPIUM BROMIDE AND ALBUTEROL SULFATE 2.5; .5 MG/3ML; MG/3ML
3 SOLUTION RESPIRATORY (INHALATION) EVERY 4 HOURS PRN
Qty: 360 ML | Refills: 2 | Status: ON HOLD | OUTPATIENT
Start: 2024-12-12

## 2024-12-12 RX ORDER — NYSTATIN 100000 [USP'U]/ML
5 SUSPENSION ORAL 4 TIMES DAILY
Status: DISCONTINUED | OUTPATIENT
Start: 2024-12-12 | End: 2024-12-15

## 2024-12-12 RX ORDER — CARVEDILOL 3.12 MG/1
3.12 TABLET ORAL 2 TIMES DAILY WITH MEALS
Qty: 60 TABLET | Refills: 3 | Status: SHIPPED | OUTPATIENT
Start: 2024-12-12 | End: 2024-12-30

## 2024-12-12 RX ORDER — PREDNISONE 10 MG/1
TABLET ORAL
Qty: 30 TABLET | Refills: 0 | Status: SHIPPED | OUTPATIENT
Start: 2024-12-12 | End: 2024-12-30 | Stop reason: HOSPADM

## 2024-12-12 RX ORDER — ASPIRIN 81 MG/1
81 TABLET, CHEWABLE ORAL DAILY
Status: DISCONTINUED | OUTPATIENT
Start: 2024-12-12 | End: 2024-12-29

## 2024-12-12 RX ORDER — IPRATROPIUM BROMIDE AND ALBUTEROL SULFATE 2.5; .5 MG/3ML; MG/3ML
3 SOLUTION RESPIRATORY (INHALATION)
Qty: 360 ML | Refills: 1 | Status: ON HOLD | OUTPATIENT
Start: 2024-12-12

## 2024-12-12 RX ORDER — QUETIAPINE FUMARATE 50 MG/1
50 TABLET, FILM COATED ORAL NIGHTLY
Qty: 60 TABLET | Refills: 3 | Status: ON HOLD | OUTPATIENT
Start: 2024-12-12

## 2024-12-12 RX ORDER — PANTOPRAZOLE SODIUM 40 MG/1
40 TABLET, DELAYED RELEASE ORAL
Status: DISCONTINUED | OUTPATIENT
Start: 2024-12-13 | End: 2024-12-15

## 2024-12-12 RX ORDER — AZITHROMYCIN 250 MG/1
500 TABLET, FILM COATED ORAL DAILY
Status: COMPLETED | OUTPATIENT
Start: 2024-12-12 | End: 2024-12-16

## 2024-12-12 RX ADMIN — IPRATROPIUM BROMIDE AND ALBUTEROL SULFATE 1 DOSE: 2.5; .5 SOLUTION RESPIRATORY (INHALATION) at 19:44

## 2024-12-12 RX ADMIN — ATORVASTATIN CALCIUM 40 MG: 40 TABLET, FILM COATED ORAL at 20:51

## 2024-12-12 RX ADMIN — AZITHROMYCIN 500 MG: 250 TABLET, FILM COATED ORAL at 13:37

## 2024-12-12 RX ADMIN — NYSTATIN 500000 UNITS: 100000 SUSPENSION ORAL at 16:15

## 2024-12-12 RX ADMIN — INSULIN LISPRO 2 UNITS: 100 INJECTION, SOLUTION INTRAVENOUS; SUBCUTANEOUS at 08:04

## 2024-12-12 RX ADMIN — BENZOCAINE AND MENTHOL 1 LOZENGE: 15; 3.6 LOZENGE ORAL at 13:36

## 2024-12-12 RX ADMIN — ENOXAPARIN SODIUM 30 MG: 100 INJECTION SUBCUTANEOUS at 08:04

## 2024-12-12 RX ADMIN — IPRATROPIUM BROMIDE AND ALBUTEROL SULFATE 1 DOSE: 2.5; .5 SOLUTION RESPIRATORY (INHALATION) at 14:47

## 2024-12-12 RX ADMIN — INSULIN LISPRO 4 UNITS: 100 INJECTION, SOLUTION INTRAVENOUS; SUBCUTANEOUS at 16:15

## 2024-12-12 RX ADMIN — ONDANSETRON 4 MG: 2 INJECTION, SOLUTION INTRAMUSCULAR; INTRAVENOUS at 23:41

## 2024-12-12 RX ADMIN — ASPIRIN 81 MG: 81 TABLET, CHEWABLE ORAL at 16:51

## 2024-12-12 RX ADMIN — BENZOCAINE AND MENTHOL 1 LOZENGE: 15; 3.6 LOZENGE ORAL at 16:15

## 2024-12-12 RX ADMIN — INSULIN LISPRO 4 UNITS: 100 INJECTION, SOLUTION INTRAVENOUS; SUBCUTANEOUS at 20:51

## 2024-12-12 RX ADMIN — Medication 10 ML: at 08:04

## 2024-12-12 RX ADMIN — INSULIN LISPRO 2 UNITS: 100 INJECTION, SOLUTION INTRAVENOUS; SUBCUTANEOUS at 11:09

## 2024-12-12 RX ADMIN — METHYLPREDNISOLONE SODIUM SUCCINATE 40 MG: 40 INJECTION INTRAMUSCULAR; INTRAVENOUS at 08:04

## 2024-12-12 RX ADMIN — CARVEDILOL 3.12 MG: 3.12 TABLET, FILM COATED ORAL at 08:03

## 2024-12-12 RX ADMIN — QUETIAPINE FUMARATE 50 MG: 50 TABLET ORAL at 20:51

## 2024-12-12 RX ADMIN — LOSARTAN POTASSIUM 25 MG: 25 TABLET, FILM COATED ORAL at 08:04

## 2024-12-12 RX ADMIN — NYSTATIN 500000 UNITS: 100000 SUSPENSION ORAL at 20:51

## 2024-12-12 RX ADMIN — IPRATROPIUM BROMIDE AND ALBUTEROL SULFATE 1 DOSE: 2.5; .5 SOLUTION RESPIRATORY (INHALATION) at 07:58

## 2024-12-12 RX ADMIN — IPRATROPIUM BROMIDE AND ALBUTEROL SULFATE 1 DOSE: 2.5; .5 SOLUTION RESPIRATORY (INHALATION) at 11:25

## 2024-12-12 RX ADMIN — Medication 10 ML: at 20:52

## 2024-12-12 RX ADMIN — PAROXETINE HYDROCHLORIDE 40 MG: 30 TABLET, FILM COATED ORAL at 16:51

## 2024-12-12 RX ADMIN — CARVEDILOL 3.12 MG: 3.12 TABLET, FILM COATED ORAL at 16:13

## 2024-12-12 ASSESSMENT — PAIN DESCRIPTION - LOCATION: LOCATION: THROAT

## 2024-12-12 ASSESSMENT — PAIN SCALES - GENERAL
PAINLEVEL_OUTOF10: 2
PAINLEVEL_OUTOF10: 3
PAINLEVEL_OUTOF10: 0
PAINLEVEL_OUTOF10: 3

## 2024-12-12 ASSESSMENT — PAIN DESCRIPTION - DESCRIPTORS
DESCRIPTORS: SORE
DESCRIPTORS: SORE

## 2024-12-12 NOTE — DISCHARGE INSTR - COC
Continuity of Care Form    Patient Name: Isaak Mitchell   :  1950  MRN:  [id number]    Admit date:  2024  Discharge date:  2024    Code Status Order: Full Code   Advance Directives:   Advance Care Flowsheet Documentation             Admitting Physician:  Alicia Zavala MD  PCP: Keith Yu MD    Discharging Nurse: Trang DOUGLAS  Discharging Hospital Unit/Room#: W288/W288-01  Discharging Unit Phone Number: [phone]    Emergency Contact:   Extended Emergency Contact Information  Primary Emergency Contact: wicho leon  Address: [address] [place] [address] [facility]  Home Phone: [phone]  Mobile Phone: [phone]  Relation: Girlfriend   needed? No  Secondary Emergency Contact: URIEL MITCHELL  Home Phone: [phone]  Relation: Child  Preferred language: English   needed? No    Past Surgical History:  Past Surgical History:   Procedure Laterality Date    CARDIAC SURGERY      stent    SKIN BIOPSY      left neck       Immunization History:   Immunization History   Administered Date(s) Administered    COVID-19, MODERNA BLUE border, Primary or Immunocompromised, (age 12y+), IM, 100 mcg/0.5mL 2021, 2021    COVID-19, MODERNA Bivalent, (age 12y+), IM, 50 mcg/0.5 mL 2022    COVID-19, MODERNA Booster BLUE border, (age 18y+), IM, 50mcg/0.25mL 2022    COVID-19, MODERNA, (age 12y+), IM, 50mcg/0.5mL 10/11/2023    Influenza A (T7Q0-77) Vaccine PF IM 2010    Influenza A (P3g4-19),all Formulations 2010    Influenza Virus Vaccine 11/10/2009, 12/15/2010, 2011, 10/17/2012, 2012, 2014, 2014, 10/15/2015, 2015, 10/17/2016    Influenza, FLUAD, (age 65 y+), IM, Quadv, 0.5mL 10/07/2021, 2022    Influenza, FLUARIX, FLULAVAL, FLUZONE (age 6 mo+) and AFLURIA, (age 3 y+), Quadv PF, 0.5mL 2015    Influenza, FLUZONE High Dose (age 65 y+), IM, Quadv, 0.7mL 2022, 10/11/2023

## 2024-12-12 NOTE — CARE COORDINATION
Met with pt at bedside to discuss therapy recommendations of SNF vs Rehab for dc. Pt [facility] with PT today. [place] of choice given and pt would like [facility] vs home with [facility]. 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 Pt has access to a walker at home if needed.   Referral called to Haydee Manciaor at [facility], insurance will require a precert.   Per Haydee Reeves Supervisor precert was started today.

## 2024-12-12 NOTE — CARE COORDINATION
PT'S SON URIEL CALLED TO SAY THAT THEY WOULD LIKE PASTORAL CARE TO COMPLETE A HCPOA WHILE HE IS HERE.  URIEL WOULD LIKE TO SPEAK WITH THE PHYSICIAN IF POSSIBLE.  PERFECT SERVE SENT.

## 2024-12-12 NOTE — CARE COORDINATION
[facility] Medicare benefits obtained, and clinicals faxed for insurance review.  Call reference # [id number].  Pending auth # [id number], awaiting their review.  DELFINO Fuentes is aware precert has been sent to [facility].

## 2024-12-13 LAB
ANION GAP SERPL CALCULATED.3IONS-SCNC: 14 MEQ/L (ref 9–15)
BASOPHILS # BLD: 0 K/UL (ref 0–0.2)
BASOPHILS NFR BLD: 0 %
BUN SERPL-MCNC: 55 MG/DL (ref 8–23)
CALCIUM SERPL-MCNC: 9 MG/DL (ref 8.5–9.9)
CHLORIDE SERPL-SCNC: 101 MEQ/L (ref 95–107)
CO2 SERPL-SCNC: 19 MEQ/L (ref 20–31)
CREAT SERPL-MCNC: 1.91 MG/DL (ref 0.7–1.2)
EOSINOPHIL # BLD: 0 K/UL (ref 0–0.7)
EOSINOPHIL NFR BLD: 0 %
ERYTHROCYTE [DISTWIDTH] IN BLOOD BY AUTOMATED COUNT: 13.5 % (ref 11.5–14.5)
GLUCOSE BLD-MCNC: 256 MG/DL (ref 70–99)
GLUCOSE BLD-MCNC: 268 MG/DL (ref 70–99)
GLUCOSE BLD-MCNC: 274 MG/DL (ref 70–99)
GLUCOSE BLD-MCNC: 276 MG/DL (ref 70–99)
GLUCOSE BLD-MCNC: 320 MG/DL (ref 70–99)
GLUCOSE SERPL-MCNC: 255 MG/DL (ref 70–99)
HCT VFR BLD AUTO: 22.7 % (ref 42–52)
HGB BLD-MCNC: 7.8 G/DL (ref 14–18)
LYMPHOCYTES # BLD: 0.4 K/UL (ref 1–4.8)
LYMPHOCYTES NFR BLD: 5 %
MCH RBC QN AUTO: 30.4 PG (ref 27–31.3)
MCHC RBC AUTO-ENTMCNC: 34.4 % (ref 33–37)
MCV RBC AUTO: 88.3 FL (ref 79–92.2)
MONOCYTES # BLD: 0.3 K/UL (ref 0.2–0.8)
MONOCYTES NFR BLD: 3.6 %
NEUTROPHILS # BLD: 8 K/UL (ref 1.4–6.5)
NEUTS SEG NFR BLD: 90.5 %
PERFORMED ON: ABNORMAL
PLATELET # BLD AUTO: 297 K/UL (ref 130–400)
POTASSIUM SERPL-SCNC: 4.6 MEQ/L (ref 3.4–4.9)
PROCALCITONIN SERPL IA-MCNC: 1.06 NG/ML (ref 0–0.15)
RBC # BLD AUTO: 2.57 M/UL (ref 4.7–6.1)
SODIUM SERPL-SCNC: 134 MEQ/L (ref 135–144)
WBC # BLD AUTO: 8.9 K/UL (ref 4.8–10.8)

## 2024-12-13 PROCEDURE — 94640 AIRWAY INHALATION TREATMENT: CPT

## 2024-12-13 PROCEDURE — 2700000000 HC OXYGEN THERAPY PER DAY

## 2024-12-13 PROCEDURE — 6370000000 HC RX 637 (ALT 250 FOR IP): Performed by: INTERNAL MEDICINE

## 2024-12-13 PROCEDURE — 6370000000 HC RX 637 (ALT 250 FOR IP): Performed by: PHYSICIAN ASSISTANT

## 2024-12-13 PROCEDURE — 6360000002 HC RX W HCPCS: Performed by: INTERNAL MEDICINE

## 2024-12-13 PROCEDURE — 93005 ELECTROCARDIOGRAM TRACING: CPT | Performed by: INTERNAL MEDICINE

## 2024-12-13 PROCEDURE — 80048 BASIC METABOLIC PNL TOTAL CA: CPT

## 2024-12-13 PROCEDURE — 2580000003 HC RX 258: Performed by: INTERNAL MEDICINE

## 2024-12-13 PROCEDURE — 84145 PROCALCITONIN (PCT): CPT

## 2024-12-13 PROCEDURE — 94761 N-INVAS EAR/PLS OXIMETRY MLT: CPT

## 2024-12-13 PROCEDURE — 99232 SBSQ HOSP IP/OBS MODERATE 35: CPT | Performed by: INTERNAL MEDICINE

## 2024-12-13 PROCEDURE — 2500000003 HC RX 250 WO HCPCS: Performed by: INTERNAL MEDICINE

## 2024-12-13 PROCEDURE — 99233 SBSQ HOSP IP/OBS HIGH 50: CPT | Performed by: INTERNAL MEDICINE

## 2024-12-13 PROCEDURE — 97116 GAIT TRAINING THERAPY: CPT

## 2024-12-13 PROCEDURE — 36415 COLL VENOUS BLD VENIPUNCTURE: CPT

## 2024-12-13 PROCEDURE — 85025 COMPLETE CBC W/AUTO DIFF WBC: CPT

## 2024-12-13 PROCEDURE — 1210000000 HC MED SURG R&B

## 2024-12-13 PROCEDURE — 6370000000 HC RX 637 (ALT 250 FOR IP): Performed by: NURSE PRACTITIONER

## 2024-12-13 RX ORDER — CALCIUM CARBONATE 500 MG/1
500 TABLET, CHEWABLE ORAL 3 TIMES DAILY PRN
Status: DISCONTINUED | OUTPATIENT
Start: 2024-12-13 | End: 2024-12-30 | Stop reason: HOSPADM

## 2024-12-13 RX ORDER — LEVALBUTEROL 1.25 MG/.5ML
1.25 SOLUTION, CONCENTRATE RESPIRATORY (INHALATION)
Status: DISCONTINUED | OUTPATIENT
Start: 2024-12-13 | End: 2024-12-29

## 2024-12-13 RX ORDER — ENOXAPARIN SODIUM 100 MG/ML
1 INJECTION SUBCUTANEOUS DAILY
Status: DISCONTINUED | OUTPATIENT
Start: 2024-12-13 | End: 2024-12-26

## 2024-12-13 RX ORDER — METOPROLOL TARTRATE 1 MG/ML
5 INJECTION, SOLUTION INTRAVENOUS EVERY 6 HOURS PRN
Status: DISCONTINUED | OUTPATIENT
Start: 2024-12-13 | End: 2024-12-30 | Stop reason: HOSPADM

## 2024-12-13 RX ADMIN — IPRATROPIUM BROMIDE 0.5 MG: 0.5 SOLUTION RESPIRATORY (INHALATION) at 19:13

## 2024-12-13 RX ADMIN — AZITHROMYCIN 500 MG: 250 TABLET, FILM COATED ORAL at 09:05

## 2024-12-13 RX ADMIN — LOSARTAN POTASSIUM 25 MG: 25 TABLET, FILM COATED ORAL at 09:06

## 2024-12-13 RX ADMIN — ENOXAPARIN SODIUM 70 MG: 100 INJECTION SUBCUTANEOUS at 08:57

## 2024-12-13 RX ADMIN — QUETIAPINE FUMARATE 50 MG: 50 TABLET ORAL at 20:39

## 2024-12-13 RX ADMIN — INSULIN LISPRO 4 UNITS: 100 INJECTION, SOLUTION INTRAVENOUS; SUBCUTANEOUS at 08:59

## 2024-12-13 RX ADMIN — INSULIN LISPRO 4 UNITS: 100 INJECTION, SOLUTION INTRAVENOUS; SUBCUTANEOUS at 12:01

## 2024-12-13 RX ADMIN — NYSTATIN 500000 UNITS: 100000 SUSPENSION ORAL at 12:01

## 2024-12-13 RX ADMIN — INSULIN LISPRO 6 UNITS: 100 INJECTION, SOLUTION INTRAVENOUS; SUBCUTANEOUS at 20:39

## 2024-12-13 RX ADMIN — NYSTATIN 500000 UNITS: 100000 SUSPENSION ORAL at 16:46

## 2024-12-13 RX ADMIN — ONDANSETRON 4 MG: 4 TABLET, ORALLY DISINTEGRATING ORAL at 20:47

## 2024-12-13 RX ADMIN — CARVEDILOL 3.12 MG: 3.12 TABLET, FILM COATED ORAL at 09:05

## 2024-12-13 RX ADMIN — IPRATROPIUM BROMIDE AND ALBUTEROL SULFATE 1 DOSE: 2.5; .5 SOLUTION RESPIRATORY (INHALATION) at 10:54

## 2024-12-13 RX ADMIN — CARVEDILOL 3.12 MG: 3.12 TABLET, FILM COATED ORAL at 16:46

## 2024-12-13 RX ADMIN — Medication 10 ML: at 09:06

## 2024-12-13 RX ADMIN — IPRATROPIUM BROMIDE AND ALBUTEROL SULFATE 1 DOSE: 2.5; .5 SOLUTION RESPIRATORY (INHALATION) at 15:45

## 2024-12-13 RX ADMIN — Medication 10 ML: at 20:40

## 2024-12-13 RX ADMIN — ASPIRIN 81 MG: 81 TABLET, CHEWABLE ORAL at 09:05

## 2024-12-13 RX ADMIN — ANTACID TABLETS 500 MG: 500 TABLET, CHEWABLE ORAL at 02:01

## 2024-12-13 RX ADMIN — NYSTATIN 500000 UNITS: 100000 SUSPENSION ORAL at 09:03

## 2024-12-13 RX ADMIN — BENZOCAINE AND MENTHOL 1 LOZENGE: 15; 3.6 LOZENGE ORAL at 14:43

## 2024-12-13 RX ADMIN — ANTACID TABLETS 500 MG: 500 TABLET, CHEWABLE ORAL at 20:47

## 2024-12-13 RX ADMIN — LEVALBUTEROL 1.25 MG: 1.25 SOLUTION, CONCENTRATE RESPIRATORY (INHALATION) at 19:13

## 2024-12-13 RX ADMIN — PANTOPRAZOLE SODIUM 40 MG: 40 TABLET, DELAYED RELEASE ORAL at 06:11

## 2024-12-13 RX ADMIN — ONDANSETRON 4 MG: 4 TABLET, ORALLY DISINTEGRATING ORAL at 09:06

## 2024-12-13 RX ADMIN — INSULIN LISPRO 4 UNITS: 100 INJECTION, SOLUTION INTRAVENOUS; SUBCUTANEOUS at 16:50

## 2024-12-13 RX ADMIN — ATORVASTATIN CALCIUM 40 MG: 40 TABLET, FILM COATED ORAL at 20:39

## 2024-12-13 RX ADMIN — NYSTATIN 500000 UNITS: 100000 SUSPENSION ORAL at 20:39

## 2024-12-13 RX ADMIN — PAROXETINE HYDROCHLORIDE 40 MG: 30 TABLET, FILM COATED ORAL at 09:05

## 2024-12-13 RX ADMIN — IPRATROPIUM BROMIDE AND ALBUTEROL SULFATE 1 DOSE: 2.5; .5 SOLUTION RESPIRATORY (INHALATION) at 07:44

## 2024-12-13 RX ADMIN — METHYLPREDNISOLONE SODIUM SUCCINATE 40 MG: 40 INJECTION INTRAMUSCULAR; INTRAVENOUS at 09:00

## 2024-12-13 NOTE — DISCHARGE SUMMARY
diabetes mellitus without complication, without long-term current use of insulin (HCC)          Current Discharge Medication List    STOP taking these medications    amLODIPine (NORVASC) 10 MG tablet  Comments:  Reason for Stopping:          Time Spent on Discharge:  minutes were spent in patient examination, evaluation, counseling as well as medication reconciliation, prescriptions for required medications, discharge plan, and follow up.    Electronically signed by Alicia Zavala MD on 12/14/24 at 3:17 PM EST   Anemia roa as outpt  Overtime on dc summary was 45min

## 2024-12-13 NOTE — ACP (ADVANCE CARE PLANNING)
Advance Care Planning   Healthcare Decision Maker:    Primary Decision Maker: MITCHELLURIEL GARCIA - Child - [phone]    Primary Decision Maker: DIMAS MITCHELLIN - Child - [phone]    Primary Decision Maker: Shelbie Mitchell - Child    Supplemental (Other) Decision Maker: wicho leon - Girlfriend - [phone]    Click here to complete Healthcare Decision Makers including selection of the Healthcare Decision Maker Relationship (ie \"Primary\").  Today we documented Decision Maker(s) consistent with Legal Next of Kin hierarchy.       Patient was coherent and not under distress during conversation. Patient wanted to document kids in accordance with next of kin law in [place].

## 2024-12-13 NOTE — SIGNIFICANT EVENT
Notified by room nurse that patient converted into atrial fibrillation with variable rate.  Occasionally rapid, then became normal in 70s.  Review of records does not seem to indicate any prior history of atrial fibrillation.  Will change Lovenox from prophylactics once per day to treatment dose 1 mg/kg twice daily and place cardiology consultation.  Patient to remain on cardiac telemetry.    Electronically signed by Kareen Barrios DO on 12/13/2024 at 4:44 AM

## 2024-12-14 LAB
ANION GAP SERPL CALCULATED.3IONS-SCNC: 14 MEQ/L (ref 9–15)
BACTERIA BLD CULT ORG #2: NORMAL
BACTERIA BLD CULT: NORMAL
BACTERIA BLD CULT: NORMAL
BASOPHILS # BLD: 0 K/UL (ref 0–0.2)
BASOPHILS NFR BLD: 0.1 %
BUN SERPL-MCNC: 59 MG/DL (ref 8–23)
CALCIUM SERPL-MCNC: 8.8 MG/DL (ref 8.5–9.9)
CHLORIDE SERPL-SCNC: 102 MEQ/L (ref 95–107)
CO2 SERPL-SCNC: 19 MEQ/L (ref 20–31)
CREAT SERPL-MCNC: 2.16 MG/DL (ref 0.7–1.2)
EOSINOPHIL # BLD: 0 K/UL (ref 0–0.7)
EOSINOPHIL NFR BLD: 0 %
ERYTHROCYTE [DISTWIDTH] IN BLOOD BY AUTOMATED COUNT: 13.6 % (ref 11.5–14.5)
GLUCOSE BLD-MCNC: 215 MG/DL (ref 70–99)
GLUCOSE BLD-MCNC: 240 MG/DL (ref 70–99)
GLUCOSE BLD-MCNC: 275 MG/DL (ref 70–99)
GLUCOSE BLD-MCNC: 343 MG/DL (ref 70–99)
GLUCOSE SERPL-MCNC: 208 MG/DL (ref 70–99)
HCT VFR BLD AUTO: 22.3 % (ref 42–52)
HGB BLD-MCNC: 7.2 G/DL (ref 14–18)
LYMPHOCYTES # BLD: 0.9 K/UL (ref 1–4.8)
LYMPHOCYTES NFR BLD: 8 %
MCH RBC QN AUTO: 29.1 PG (ref 27–31.3)
MCHC RBC AUTO-ENTMCNC: 32.3 % (ref 33–37)
MCV RBC AUTO: 90.3 FL (ref 79–92.2)
MONOCYTES # BLD: 0.9 K/UL (ref 0.2–0.8)
MONOCYTES NFR BLD: 8.3 %
NEUTROPHILS # BLD: 9.3 K/UL (ref 1.4–6.5)
NEUTS SEG NFR BLD: 82.4 %
PERFORMED ON: ABNORMAL
PLATELET # BLD AUTO: 348 K/UL (ref 130–400)
POTASSIUM SERPL-SCNC: 4.8 MEQ/L (ref 3.4–4.9)
PROCALCITONIN SERPL IA-MCNC: 0.76 NG/ML (ref 0–0.15)
RBC # BLD AUTO: 2.47 M/UL (ref 4.7–6.1)
SODIUM SERPL-SCNC: 135 MEQ/L (ref 135–144)
WBC # BLD AUTO: 11.3 K/UL (ref 4.8–10.8)

## 2024-12-14 PROCEDURE — 84145 PROCALCITONIN (PCT): CPT

## 2024-12-14 PROCEDURE — 36415 COLL VENOUS BLD VENIPUNCTURE: CPT

## 2024-12-14 PROCEDURE — 1210000000 HC MED SURG R&B

## 2024-12-14 PROCEDURE — 83540 ASSAY OF IRON: CPT

## 2024-12-14 PROCEDURE — 6370000000 HC RX 637 (ALT 250 FOR IP): Performed by: INTERNAL MEDICINE

## 2024-12-14 PROCEDURE — 97535 SELF CARE MNGMENT TRAINING: CPT

## 2024-12-14 PROCEDURE — 99232 SBSQ HOSP IP/OBS MODERATE 35: CPT | Performed by: INTERNAL MEDICINE

## 2024-12-14 PROCEDURE — 2580000003 HC RX 258: Performed by: INTERNAL MEDICINE

## 2024-12-14 PROCEDURE — 82607 VITAMIN B-12: CPT

## 2024-12-14 PROCEDURE — 82728 ASSAY OF FERRITIN: CPT

## 2024-12-14 PROCEDURE — 6370000000 HC RX 637 (ALT 250 FOR IP): Performed by: PHYSICIAN ASSISTANT

## 2024-12-14 PROCEDURE — 82746 ASSAY OF FOLIC ACID SERUM: CPT

## 2024-12-14 PROCEDURE — 85025 COMPLETE CBC W/AUTO DIFF WBC: CPT

## 2024-12-14 PROCEDURE — 97116 GAIT TRAINING THERAPY: CPT

## 2024-12-14 PROCEDURE — 94761 N-INVAS EAR/PLS OXIMETRY MLT: CPT

## 2024-12-14 PROCEDURE — 83550 IRON BINDING TEST: CPT

## 2024-12-14 PROCEDURE — 2500000003 HC RX 250 WO HCPCS: Performed by: INTERNAL MEDICINE

## 2024-12-14 PROCEDURE — 94640 AIRWAY INHALATION TREATMENT: CPT

## 2024-12-14 PROCEDURE — 80048 BASIC METABOLIC PNL TOTAL CA: CPT

## 2024-12-14 PROCEDURE — 6360000002 HC RX W HCPCS: Performed by: INTERNAL MEDICINE

## 2024-12-14 PROCEDURE — 2700000000 HC OXYGEN THERAPY PER DAY

## 2024-12-14 RX ORDER — PREDNISONE 20 MG/1
40 TABLET ORAL DAILY
Status: DISCONTINUED | OUTPATIENT
Start: 2024-12-15 | End: 2024-12-17

## 2024-12-14 RX ADMIN — LEVALBUTEROL 1.25 MG: 1.25 SOLUTION, CONCENTRATE RESPIRATORY (INHALATION) at 15:45

## 2024-12-14 RX ADMIN — LEVALBUTEROL 1.25 MG: 1.25 SOLUTION, CONCENTRATE RESPIRATORY (INHALATION) at 23:46

## 2024-12-14 RX ADMIN — IPRATROPIUM BROMIDE 0.5 MG: 0.5 SOLUTION RESPIRATORY (INHALATION) at 19:58

## 2024-12-14 RX ADMIN — NYSTATIN 500000 UNITS: 100000 SUSPENSION ORAL at 17:07

## 2024-12-14 RX ADMIN — INSULIN LISPRO 2 UNITS: 100 INJECTION, SOLUTION INTRAVENOUS; SUBCUTANEOUS at 08:26

## 2024-12-14 RX ADMIN — NYSTATIN 500000 UNITS: 100000 SUSPENSION ORAL at 21:06

## 2024-12-14 RX ADMIN — METHYLPREDNISOLONE SODIUM SUCCINATE 40 MG: 40 INJECTION INTRAMUSCULAR; INTRAVENOUS at 08:31

## 2024-12-14 RX ADMIN — AZITHROMYCIN 500 MG: 250 TABLET, FILM COATED ORAL at 08:35

## 2024-12-14 RX ADMIN — NYSTATIN 500000 UNITS: 100000 SUSPENSION ORAL at 08:30

## 2024-12-14 RX ADMIN — CARVEDILOL 3.12 MG: 3.12 TABLET, FILM COATED ORAL at 17:07

## 2024-12-14 RX ADMIN — PAROXETINE HYDROCHLORIDE 40 MG: 30 TABLET, FILM COATED ORAL at 08:35

## 2024-12-14 RX ADMIN — CARVEDILOL 3.12 MG: 3.12 TABLET, FILM COATED ORAL at 08:27

## 2024-12-14 RX ADMIN — QUETIAPINE FUMARATE 50 MG: 50 TABLET ORAL at 21:05

## 2024-12-14 RX ADMIN — Medication 10 ML: at 08:35

## 2024-12-14 RX ADMIN — ENOXAPARIN SODIUM 70 MG: 100 INJECTION SUBCUTANEOUS at 08:31

## 2024-12-14 RX ADMIN — Medication 5 ML: at 21:06

## 2024-12-14 RX ADMIN — IPRATROPIUM BROMIDE 0.5 MG: 0.5 SOLUTION RESPIRATORY (INHALATION) at 11:37

## 2024-12-14 RX ADMIN — NYSTATIN 500000 UNITS: 100000 SUSPENSION ORAL at 14:05

## 2024-12-14 RX ADMIN — IPRATROPIUM BROMIDE 0.5 MG: 0.5 SOLUTION RESPIRATORY (INHALATION) at 15:46

## 2024-12-14 RX ADMIN — ATORVASTATIN CALCIUM 40 MG: 40 TABLET, FILM COATED ORAL at 21:06

## 2024-12-14 RX ADMIN — PANTOPRAZOLE SODIUM 40 MG: 40 TABLET, DELAYED RELEASE ORAL at 05:02

## 2024-12-14 RX ADMIN — LEVALBUTEROL 1.25 MG: 1.25 SOLUTION, CONCENTRATE RESPIRATORY (INHALATION) at 11:38

## 2024-12-14 RX ADMIN — LEVALBUTEROL 1.25 MG: 1.25 SOLUTION, CONCENTRATE RESPIRATORY (INHALATION) at 19:58

## 2024-12-14 RX ADMIN — LEVALBUTEROL 1.25 MG: 1.25 SOLUTION, CONCENTRATE RESPIRATORY (INHALATION) at 08:02

## 2024-12-14 RX ADMIN — ASPIRIN 81 MG: 81 TABLET, CHEWABLE ORAL at 08:35

## 2024-12-14 RX ADMIN — BENZOCAINE AND MENTHOL 1 LOZENGE: 15; 3.6 LOZENGE ORAL at 21:06

## 2024-12-14 RX ADMIN — INSULIN LISPRO 4 UNITS: 100 INJECTION, SOLUTION INTRAVENOUS; SUBCUTANEOUS at 12:11

## 2024-12-14 RX ADMIN — INSULIN LISPRO 6 UNITS: 100 INJECTION, SOLUTION INTRAVENOUS; SUBCUTANEOUS at 17:07

## 2024-12-14 RX ADMIN — IPRATROPIUM BROMIDE 0.5 MG: 0.5 SOLUTION RESPIRATORY (INHALATION) at 08:03

## 2024-12-14 RX ADMIN — INSULIN LISPRO 2 UNITS: 100 INJECTION, SOLUTION INTRAVENOUS; SUBCUTANEOUS at 21:06

## 2024-12-14 RX ADMIN — LOSARTAN POTASSIUM 25 MG: 25 TABLET, FILM COATED ORAL at 08:35

## 2024-12-14 ASSESSMENT — PAIN SCALES - GENERAL: PAINLEVEL_OUTOF10: 0

## 2024-12-14 NOTE — CARE COORDINATION
DC PLAN REMAINS [facility]. SPOKE WITH JEFFREY, NURSING SUPERVISOR AT [facility]. NO PRECERT AT THIS TIME, SHE WILL UPDATE THIS CM IF SHE RECEIVES AUTH.    Name band;

## 2024-12-15 LAB
ABO/RH: NORMAL
ANION GAP SERPL CALCULATED.3IONS-SCNC: 12 MEQ/L (ref 9–15)
ANISOCYTOSIS BLD QL SMEAR: ABNORMAL
ANTIBODY SCREEN: NORMAL
BASOPHILS # BLD: 0 K/UL (ref 0–0.2)
BASOPHILS NFR BLD: 0.1 %
BLASTS NFR BLD MANUAL: 1 %
BLOOD BANK DISPENSE STATUS: NORMAL
BLOOD BANK PRODUCT CODE: NORMAL
BPU ID: NORMAL
BUN SERPL-MCNC: 54 MG/DL (ref 8–23)
CALCIUM SERPL-MCNC: 8.4 MG/DL (ref 8.5–9.9)
CHLORIDE SERPL-SCNC: 103 MEQ/L (ref 95–107)
CO2 SERPL-SCNC: 20 MEQ/L (ref 20–31)
CREAT SERPL-MCNC: 2.27 MG/DL (ref 0.7–1.2)
DESCRIPTION BLOOD BANK: NORMAL
EOSINOPHIL # BLD: 0 K/UL (ref 0–0.7)
EOSINOPHIL NFR BLD: 0.2 %
ERYTHROCYTE [DISTWIDTH] IN BLOOD BY AUTOMATED COUNT: 13.7 % (ref 11.5–14.5)
FERRITIN: 699 NG/ML
FOLATE: 3.6 NG/ML (ref 4.8–24.2)
GLUCOSE BLD-MCNC: 150 MG/DL (ref 70–99)
GLUCOSE BLD-MCNC: 180 MG/DL (ref 70–99)
GLUCOSE BLD-MCNC: 208 MG/DL (ref 70–99)
GLUCOSE BLD-MCNC: 409 MG/DL (ref 70–99)
GLUCOSE SERPL-MCNC: 180 MG/DL (ref 70–99)
HCT VFR BLD AUTO: 18.6 % (ref 42–52)
HCT VFR BLD AUTO: 23.9 % (ref 42–52)
HGB BLD-MCNC: 6.4 G/DL (ref 14–18)
HGB BLD-MCNC: 8.2 G/DL (ref 14–18)
HYPOCHROMIA BLD QL SMEAR: ABNORMAL
IRON % SATURATION: 22 % (ref 20–55)
IRON: 47 UG/DL (ref 61–157)
LYMPHOCYTES # BLD: 1.1 K/UL (ref 1–4.8)
LYMPHOCYTES NFR BLD: 12 %
MCH RBC QN AUTO: 30.8 PG (ref 27–31.3)
MCHC RBC AUTO-ENTMCNC: 34.4 % (ref 33–37)
MCV RBC AUTO: 89.4 FL (ref 79–92.2)
METAMYELOCYTES NFR BLD MANUAL: 1 %
MONOCYTES # BLD: 0.4 K/UL (ref 0.2–0.8)
MONOCYTES NFR BLD: 4.8 %
MYELOCYTES NFR BLD MANUAL: 2 %
NEUTROPHILS # BLD: 7.5 K/UL (ref 1.4–6.5)
NEUTS BAND NFR BLD MANUAL: 1 %
NEUTS SEG NFR BLD: 79 %
OVALOCYTES BLD QL SMEAR: ABNORMAL
PERFORMED ON: ABNORMAL
PLATELET # BLD AUTO: 282 K/UL (ref 130–400)
PLATELET BLD QL SMEAR: ADEQUATE
POIKILOCYTOSIS BLD QL SMEAR: ABNORMAL
POTASSIUM SERPL-SCNC: 4.7 MEQ/L (ref 3.4–4.9)
PROCALCITONIN SERPL IA-MCNC: 0.51 NG/ML (ref 0–0.15)
RBC # BLD AUTO: 2.08 M/UL (ref 4.7–6.1)
SLIDE REVIEW: ABNORMAL
SMUDGE CELLS BLD QL SMEAR: 9.6
SODIUM SERPL-SCNC: 135 MEQ/L (ref 135–144)
TOTAL IRON BINDING CAPACITY: 215 UG/DL (ref 250–450)
UNSATURATED IRON BINDING CAPACITY: 168 UG/DL (ref 112–347)
VITAMIN B-12: 293 PG/ML (ref 232–1245)
WBC # BLD AUTO: 9 K/UL (ref 4.8–10.8)

## 2024-12-15 PROCEDURE — 85018 HEMOGLOBIN: CPT

## 2024-12-15 PROCEDURE — 2700000000 HC OXYGEN THERAPY PER DAY

## 2024-12-15 PROCEDURE — 6370000000 HC RX 637 (ALT 250 FOR IP): Performed by: INTERNAL MEDICINE

## 2024-12-15 PROCEDURE — 84145 PROCALCITONIN (PCT): CPT

## 2024-12-15 PROCEDURE — 99232 SBSQ HOSP IP/OBS MODERATE 35: CPT | Performed by: INTERNAL MEDICINE

## 2024-12-15 PROCEDURE — 85014 HEMATOCRIT: CPT

## 2024-12-15 PROCEDURE — 86901 BLOOD TYPING SEROLOGIC RH(D): CPT

## 2024-12-15 PROCEDURE — P9016 RBC LEUKOCYTES REDUCED: HCPCS

## 2024-12-15 PROCEDURE — 2580000003 HC RX 258: Performed by: INTERNAL MEDICINE

## 2024-12-15 PROCEDURE — 85025 COMPLETE CBC W/AUTO DIFF WBC: CPT

## 2024-12-15 PROCEDURE — 86900 BLOOD TYPING SEROLOGIC ABO: CPT

## 2024-12-15 PROCEDURE — 6360000002 HC RX W HCPCS: Performed by: INTERNAL MEDICINE

## 2024-12-15 PROCEDURE — 36430 TRANSFUSION BLD/BLD COMPNT: CPT

## 2024-12-15 PROCEDURE — 80048 BASIC METABOLIC PNL TOTAL CA: CPT

## 2024-12-15 PROCEDURE — 1210000000 HC MED SURG R&B

## 2024-12-15 PROCEDURE — 94761 N-INVAS EAR/PLS OXIMETRY MLT: CPT

## 2024-12-15 PROCEDURE — 86923 COMPATIBILITY TEST ELECTRIC: CPT

## 2024-12-15 PROCEDURE — 6370000000 HC RX 637 (ALT 250 FOR IP): Performed by: PHYSICIAN ASSISTANT

## 2024-12-15 PROCEDURE — 94640 AIRWAY INHALATION TREATMENT: CPT

## 2024-12-15 PROCEDURE — 36415 COLL VENOUS BLD VENIPUNCTURE: CPT

## 2024-12-15 PROCEDURE — 86850 RBC ANTIBODY SCREEN: CPT

## 2024-12-15 PROCEDURE — 94760 N-INVAS EAR/PLS OXIMETRY 1: CPT

## 2024-12-15 RX ORDER — DOCUSATE SODIUM 100 MG/1
100 CAPSULE, LIQUID FILLED ORAL 2 TIMES DAILY
Status: DISCONTINUED | OUTPATIENT
Start: 2024-12-15 | End: 2024-12-30 | Stop reason: HOSPADM

## 2024-12-15 RX ORDER — SODIUM CHLORIDE 9 MG/ML
INJECTION, SOLUTION INTRAVENOUS PRN
Status: DISCONTINUED | OUTPATIENT
Start: 2024-12-15 | End: 2024-12-27

## 2024-12-15 RX ORDER — ENOXAPARIN SODIUM 100 MG/ML
1 INJECTION SUBCUTANEOUS DAILY
Status: CANCELLED | OUTPATIENT
Start: 2024-12-16

## 2024-12-15 RX ORDER — PANTOPRAZOLE SODIUM 40 MG/10ML
40 INJECTION, POWDER, LYOPHILIZED, FOR SOLUTION INTRAVENOUS 2 TIMES DAILY
Status: DISCONTINUED | OUTPATIENT
Start: 2024-12-15 | End: 2024-12-27

## 2024-12-15 RX ORDER — INSULIN LISPRO 100 [IU]/ML
5 INJECTION, SOLUTION INTRAVENOUS; SUBCUTANEOUS ONCE
Status: COMPLETED | OUTPATIENT
Start: 2024-12-15 | End: 2024-12-15

## 2024-12-15 RX ORDER — ASPIRIN 81 MG/1
81 TABLET, CHEWABLE ORAL DAILY
Status: CANCELLED | OUTPATIENT
Start: 2024-12-16

## 2024-12-15 RX ADMIN — INSULIN LISPRO 2 UNITS: 100 INJECTION, SOLUTION INTRAVENOUS; SUBCUTANEOUS at 08:47

## 2024-12-15 RX ADMIN — PANTOPRAZOLE SODIUM 40 MG: 40 INJECTION, POWDER, FOR SOLUTION INTRAVENOUS at 12:28

## 2024-12-15 RX ADMIN — NYSTATIN 500000 UNITS: 100000 SUSPENSION ORAL at 14:34

## 2024-12-15 RX ADMIN — IPRATROPIUM BROMIDE 0.5 MG: 0.5 SOLUTION RESPIRATORY (INHALATION) at 11:00

## 2024-12-15 RX ADMIN — PANTOPRAZOLE SODIUM 40 MG: 40 TABLET, DELAYED RELEASE ORAL at 06:16

## 2024-12-15 RX ADMIN — CARVEDILOL 3.12 MG: 3.12 TABLET, FILM COATED ORAL at 08:47

## 2024-12-15 RX ADMIN — LEVALBUTEROL 1.25 MG: 1.25 SOLUTION, CONCENTRATE RESPIRATORY (INHALATION) at 19:44

## 2024-12-15 RX ADMIN — IPRATROPIUM BROMIDE 0.5 MG: 0.5 SOLUTION RESPIRATORY (INHALATION) at 14:54

## 2024-12-15 RX ADMIN — LEVALBUTEROL 1.25 MG: 1.25 SOLUTION, CONCENTRATE RESPIRATORY (INHALATION) at 14:54

## 2024-12-15 RX ADMIN — PANTOPRAZOLE SODIUM 40 MG: 40 INJECTION, POWDER, FOR SOLUTION INTRAVENOUS at 19:57

## 2024-12-15 RX ADMIN — BENZOCAINE AND MENTHOL 1 LOZENGE: 15; 3.6 LOZENGE ORAL at 01:47

## 2024-12-15 RX ADMIN — QUETIAPINE FUMARATE 50 MG: 50 TABLET ORAL at 19:57

## 2024-12-15 RX ADMIN — ASPIRIN 81 MG: 81 TABLET, CHEWABLE ORAL at 10:13

## 2024-12-15 RX ADMIN — IPRATROPIUM BROMIDE 0.5 MG: 0.5 SOLUTION RESPIRATORY (INHALATION) at 07:03

## 2024-12-15 RX ADMIN — BENZOCAINE AND MENTHOL 1 LOZENGE: 15; 3.6 LOZENGE ORAL at 04:27

## 2024-12-15 RX ADMIN — PAROXETINE HYDROCHLORIDE 40 MG: 30 TABLET, FILM COATED ORAL at 10:13

## 2024-12-15 RX ADMIN — LEVALBUTEROL 1.25 MG: 1.25 SOLUTION, CONCENTRATE RESPIRATORY (INHALATION) at 07:03

## 2024-12-15 RX ADMIN — LOSARTAN POTASSIUM 25 MG: 25 TABLET, FILM COATED ORAL at 10:09

## 2024-12-15 RX ADMIN — LEVALBUTEROL 1.25 MG: 1.25 SOLUTION, CONCENTRATE RESPIRATORY (INHALATION) at 04:25

## 2024-12-15 RX ADMIN — INSULIN LISPRO 2 UNITS: 100 INJECTION, SOLUTION INTRAVENOUS; SUBCUTANEOUS at 21:09

## 2024-12-15 RX ADMIN — ATORVASTATIN CALCIUM 40 MG: 40 TABLET, FILM COATED ORAL at 19:57

## 2024-12-15 RX ADMIN — LEVALBUTEROL 1.25 MG: 1.25 SOLUTION, CONCENTRATE RESPIRATORY (INHALATION) at 11:00

## 2024-12-15 RX ADMIN — DOCUSATE SODIUM 100 MG: 100 CAPSULE, LIQUID FILLED ORAL at 12:28

## 2024-12-15 RX ADMIN — DOCUSATE SODIUM 100 MG: 100 CAPSULE, LIQUID FILLED ORAL at 19:57

## 2024-12-15 RX ADMIN — PREDNISONE 40 MG: 20 TABLET ORAL at 10:09

## 2024-12-15 RX ADMIN — Medication 10 ML: at 20:02

## 2024-12-15 RX ADMIN — BENZOCAINE AND MENTHOL 1 LOZENGE: 15; 3.6 LOZENGE ORAL at 19:56

## 2024-12-15 RX ADMIN — INSULIN LISPRO 5 UNITS: 100 INJECTION, SOLUTION INTRAVENOUS; SUBCUTANEOUS at 17:22

## 2024-12-15 RX ADMIN — IPRATROPIUM BROMIDE 0.5 MG: 0.5 SOLUTION RESPIRATORY (INHALATION) at 19:44

## 2024-12-15 RX ADMIN — ENOXAPARIN SODIUM 70 MG: 100 INJECTION SUBCUTANEOUS at 10:12

## 2024-12-15 RX ADMIN — Medication 5 ML: at 08:50

## 2024-12-15 RX ADMIN — CARVEDILOL 3.12 MG: 3.12 TABLET, FILM COATED ORAL at 17:22

## 2024-12-15 RX ADMIN — NYSTATIN 500000 UNITS: 100000 SUSPENSION ORAL at 10:13

## 2024-12-15 RX ADMIN — INSULIN LISPRO 8 UNITS: 100 INJECTION, SOLUTION INTRAVENOUS; SUBCUTANEOUS at 17:22

## 2024-12-15 RX ADMIN — AZITHROMYCIN 500 MG: 250 TABLET, FILM COATED ORAL at 10:13

## 2024-12-15 ASSESSMENT — PAIN SCALES - GENERAL: PAINLEVEL_OUTOF10: 0

## 2024-12-15 NOTE — CONSENT
Informed Consent for Blood Component Transfusion Note    I have discussed with the patient the rationale for blood component transfusion; its benefits in treating or preventing fatigue, organ damage, or death; and its risk which includes mild transfusion reactions, rare risk of blood borne infection, or more serious but rare reactions. I have discussed the alternatives to transfusion, including the risk and consequences of not receiving transfusion. The patient had an opportunity to ask questions and had agreed to proceed with transfusion of blood components.    Electronically signed by Alicia Zavala MD on 12/15/24 at 11:22 AM EST

## 2024-12-16 ENCOUNTER — ANESTHESIA EVENT (OUTPATIENT)
Dept: ENDOSCOPY | Age: 74
End: 2024-12-16
Payer: MEDICARE

## 2024-12-16 PROBLEM — D50.9 IDA (IRON DEFICIENCY ANEMIA): Status: ACTIVE | Noted: 2024-12-08

## 2024-12-16 LAB
ANION GAP SERPL CALCULATED.3IONS-SCNC: 12 MEQ/L (ref 9–15)
ANISOCYTOSIS BLD QL SMEAR: ABNORMAL
BASOPHILS # BLD: 0 K/UL (ref 0–0.2)
BASOPHILS NFR BLD: 0.1 %
BUN SERPL-MCNC: 49 MG/DL (ref 8–23)
BURR CELLS: ABNORMAL
CALCIUM SERPL-MCNC: 8.1 MG/DL (ref 8.5–9.9)
CHLORIDE SERPL-SCNC: 103 MEQ/L (ref 95–107)
CO2 SERPL-SCNC: 20 MEQ/L (ref 20–31)
CREAT SERPL-MCNC: 2.12 MG/DL (ref 0.7–1.2)
EOSINOPHIL # BLD: 0 K/UL (ref 0–0.7)
EOSINOPHIL NFR BLD: 0.3 %
ERYTHROCYTE [DISTWIDTH] IN BLOOD BY AUTOMATED COUNT: 14.2 % (ref 11.5–14.5)
GLUCOSE BLD-MCNC: 149 MG/DL (ref 70–99)
GLUCOSE BLD-MCNC: 150 MG/DL (ref 70–99)
GLUCOSE BLD-MCNC: 265 MG/DL (ref 70–99)
GLUCOSE BLD-MCNC: 288 MG/DL (ref 70–99)
GLUCOSE SERPL-MCNC: 171 MG/DL (ref 70–99)
HCT VFR BLD AUTO: 22.5 % (ref 42–52)
HCT VFR BLD AUTO: 22.7 % (ref 42–52)
HCT VFR BLD AUTO: 23.5 % (ref 42–52)
HCT VFR BLD AUTO: 25.6 % (ref 42–52)
HGB BLD-MCNC: 7.7 G/DL (ref 14–18)
HGB BLD-MCNC: 7.8 G/DL (ref 14–18)
HGB BLD-MCNC: 8 G/DL (ref 14–18)
HGB BLD-MCNC: 8.6 G/DL (ref 14–18)
HYPOCHROMIA BLD QL SMEAR: ABNORMAL
LYMPHOCYTES # BLD: 0.8 K/UL (ref 1–4.8)
LYMPHOCYTES NFR BLD: 8 %
MCH RBC QN AUTO: 30 PG (ref 27–31.3)
MCHC RBC AUTO-ENTMCNC: 33.9 % (ref 33–37)
MCV RBC AUTO: 88.3 FL (ref 79–92.2)
METAMYELOCYTES NFR BLD MANUAL: 2 %
MONOCYTES # BLD: 0.4 K/UL (ref 0.2–0.8)
MONOCYTES NFR BLD: 3.8 %
MYELOCYTES NFR BLD MANUAL: 1 %
NEUTROPHILS # BLD: 8.4 K/UL (ref 1.4–6.5)
NEUTS SEG NFR BLD: 86 %
PERFORMED ON: ABNORMAL
PLATELET # BLD AUTO: 321 K/UL (ref 130–400)
PLATELET BLD QL SMEAR: ADEQUATE
POIKILOCYTOSIS BLD QL SMEAR: ABNORMAL
POLYCHROMASIA BLD QL SMEAR: ABNORMAL
POTASSIUM SERPL-SCNC: 4.4 MEQ/L (ref 3.4–4.9)
RBC # BLD AUTO: 2.57 M/UL (ref 4.7–6.1)
SLIDE REVIEW: ABNORMAL
SMUDGE CELLS BLD QL SMEAR: 7.6
SODIUM SERPL-SCNC: 135 MEQ/L (ref 135–144)
WBC # BLD AUTO: 9.4 K/UL (ref 4.8–10.8)

## 2024-12-16 PROCEDURE — 6370000000 HC RX 637 (ALT 250 FOR IP): Performed by: INTERNAL MEDICINE

## 2024-12-16 PROCEDURE — 85014 HEMATOCRIT: CPT

## 2024-12-16 PROCEDURE — 94640 AIRWAY INHALATION TREATMENT: CPT

## 2024-12-16 PROCEDURE — 85025 COMPLETE CBC W/AUTO DIFF WBC: CPT

## 2024-12-16 PROCEDURE — 6360000002 HC RX W HCPCS: Performed by: INTERNAL MEDICINE

## 2024-12-16 PROCEDURE — 2580000003 HC RX 258: Performed by: INTERNAL MEDICINE

## 2024-12-16 PROCEDURE — 6370000000 HC RX 637 (ALT 250 FOR IP): Performed by: NURSE PRACTITIONER

## 2024-12-16 PROCEDURE — 97535 SELF CARE MNGMENT TRAINING: CPT

## 2024-12-16 PROCEDURE — 99223 1ST HOSP IP/OBS HIGH 75: CPT | Performed by: NURSE PRACTITIONER

## 2024-12-16 PROCEDURE — 99232 SBSQ HOSP IP/OBS MODERATE 35: CPT | Performed by: INTERNAL MEDICINE

## 2024-12-16 PROCEDURE — 36415 COLL VENOUS BLD VENIPUNCTURE: CPT

## 2024-12-16 PROCEDURE — 1210000000 HC MED SURG R&B

## 2024-12-16 PROCEDURE — 97116 GAIT TRAINING THERAPY: CPT

## 2024-12-16 PROCEDURE — 94761 N-INVAS EAR/PLS OXIMETRY MLT: CPT

## 2024-12-16 PROCEDURE — 6370000000 HC RX 637 (ALT 250 FOR IP): Performed by: PHYSICIAN ASSISTANT

## 2024-12-16 PROCEDURE — 85018 HEMOGLOBIN: CPT

## 2024-12-16 PROCEDURE — 80048 BASIC METABOLIC PNL TOTAL CA: CPT

## 2024-12-16 PROCEDURE — 2700000000 HC OXYGEN THERAPY PER DAY

## 2024-12-16 RX ORDER — PEG-3350, SODIUM SULFATE, SODIUM CHLORIDE, POTASSIUM CHLORIDE, SODIUM ASCORBATE AND ASCORBIC ACID 7.5-2.691G
100 KIT ORAL ONCE
Status: COMPLETED | OUTPATIENT
Start: 2024-12-17 | End: 2024-12-17

## 2024-12-16 RX ORDER — PEG-3350, SODIUM SULFATE, SODIUM CHLORIDE, POTASSIUM CHLORIDE, SODIUM ASCORBATE AND ASCORBIC ACID 7.5-2.691G
100 KIT ORAL ONCE
Status: COMPLETED | OUTPATIENT
Start: 2024-12-16 | End: 2024-12-16

## 2024-12-16 RX ADMIN — INSULIN LISPRO 4 UNITS: 100 INJECTION, SOLUTION INTRAVENOUS; SUBCUTANEOUS at 18:09

## 2024-12-16 RX ADMIN — Medication 10 ML: at 09:48

## 2024-12-16 RX ADMIN — PANTOPRAZOLE SODIUM 40 MG: 40 INJECTION, POWDER, FOR SOLUTION INTRAVENOUS at 22:06

## 2024-12-16 RX ADMIN — PAROXETINE HYDROCHLORIDE 40 MG: 30 TABLET, FILM COATED ORAL at 09:48

## 2024-12-16 RX ADMIN — LOSARTAN POTASSIUM 25 MG: 25 TABLET, FILM COATED ORAL at 09:48

## 2024-12-16 RX ADMIN — LEVALBUTEROL 1.25 MG: 1.25 SOLUTION, CONCENTRATE RESPIRATORY (INHALATION) at 07:17

## 2024-12-16 RX ADMIN — IPRATROPIUM BROMIDE 0.5 MG: 0.5 SOLUTION RESPIRATORY (INHALATION) at 21:24

## 2024-12-16 RX ADMIN — LEVALBUTEROL 1.25 MG: 1.25 SOLUTION, CONCENTRATE RESPIRATORY (INHALATION) at 12:35

## 2024-12-16 RX ADMIN — CARVEDILOL 3.12 MG: 3.12 TABLET, FILM COATED ORAL at 18:08

## 2024-12-16 RX ADMIN — AZITHROMYCIN 500 MG: 250 TABLET, FILM COATED ORAL at 09:52

## 2024-12-16 RX ADMIN — PEG-3350, SODIUM SULFATE, SODIUM CHLORIDE, POTASSIUM CHLORIDE, SODIUM ASCORBATE AND ASCORBIC ACID 100 G: KIT at 18:10

## 2024-12-16 RX ADMIN — IPRATROPIUM BROMIDE 0.5 MG: 0.5 SOLUTION RESPIRATORY (INHALATION) at 12:35

## 2024-12-16 RX ADMIN — DOCUSATE SODIUM 100 MG: 100 CAPSULE, LIQUID FILLED ORAL at 22:06

## 2024-12-16 RX ADMIN — QUETIAPINE FUMARATE 50 MG: 50 TABLET ORAL at 22:08

## 2024-12-16 RX ADMIN — CARVEDILOL 3.12 MG: 3.12 TABLET, FILM COATED ORAL at 09:53

## 2024-12-16 RX ADMIN — IPRATROPIUM BROMIDE 0.5 MG: 0.5 SOLUTION RESPIRATORY (INHALATION) at 07:17

## 2024-12-16 RX ADMIN — ATORVASTATIN CALCIUM 40 MG: 40 TABLET, FILM COATED ORAL at 22:06

## 2024-12-16 RX ADMIN — DOCUSATE SODIUM 100 MG: 100 CAPSULE, LIQUID FILLED ORAL at 09:47

## 2024-12-16 RX ADMIN — Medication 10 ML: at 22:06

## 2024-12-16 RX ADMIN — LEVALBUTEROL 1.25 MG: 1.25 SOLUTION, CONCENTRATE RESPIRATORY (INHALATION) at 21:47

## 2024-12-16 RX ADMIN — PANTOPRAZOLE SODIUM 40 MG: 40 INJECTION, POWDER, FOR SOLUTION INTRAVENOUS at 09:48

## 2024-12-16 RX ADMIN — PREDNISONE 40 MG: 20 TABLET ORAL at 09:48

## 2024-12-16 RX ADMIN — INSULIN LISPRO 4 UNITS: 100 INJECTION, SOLUTION INTRAVENOUS; SUBCUTANEOUS at 22:14

## 2024-12-16 ASSESSMENT — ENCOUNTER SYMPTOMS: SHORTNESS OF BREATH: 1

## 2024-12-16 ASSESSMENT — PAIN SCALES - GENERAL: PAINLEVEL_OUTOF10: 0

## 2024-12-16 NOTE — ANESTHESIA PRE PROCEDURE
Department of Anesthesiology  Preprocedure Note       Name:  Isaak Mitchell   Age:  74 y.o.  :  1950                                          MRN:  [id number]         Date:  2024      Surgeon: Surgeon(s):  Shani Benjamin MD    Procedure: Procedure(s):  ESOPHAGOGASTRODUODENOSCOPY  COLONOSCOPY DIAGNOSTIC    Medications prior to admission:   Prior to Admission medications    Medication Sig Start Date End Date Taking? Authorizing Provider   QUEtiapine (SEROQUEL) 50 MG tablet Take 1 tablet by mouth nightly 24  Yes Alicia Zavala MD   carvedilol (COREG) 3.125 MG tablet Take 1 tablet by mouth 2 times daily (with meals) 24  Yes Alicia Zavala MD   ferrous sulfate (IRON 325) 325 (65 Fe) MG tablet Take 1 tablet by mouth daily (with breakfast) 24  Yes Alicia Zavala MD   ipratropium 0.5 mg-albuterol 2.5 mg (DUONEB) 0.5-2.5 (3) MG/3ML SOLN nebulizer solution Inhale 3 mLs into the lungs every 4 hours as needed for Shortness of Breath 24  Yes Alicia Zavala MD   ipratropium 0.5 mg-albuterol 2.5 mg (DUONEB) 0.5-2.5 (3) MG/3ML SOLN nebulizer solution Inhale 3 mLs into the lungs every 4 hours (while awake) 24  Yes Alicia Zavala MD   predniSONE (DELTASONE) 10 MG tablet Please take 40 mg po daily x 3 days, then 30 mg po daily x 3 days, then 20 mg po daily x 3 days then 10 mg po daily x 3 days then stop 24  Yes Alicia Zavala MD   atorvastatin (LIPITOR) 40 MG tablet Take 1 tablet by mouth every evening 24  Yes Karon Burnett PA   hydrALAZINE (APRESOLINE) 100 MG tablet Take 1 tablet by mouth in the morning and at bedtime 24  Yes Karon Burnett PA   PARoxetine (PAXIL) 40 MG tablet Take 1 tablet by mouth daily 10/3/24  Yes Keith Yu MD   pantoprazole (PROTONIX) 40 MG tablet Take 1 tablet by mouth every morning (before breakfast) 24  Yes Leidy Schaffer DO   metFORMIN (GLUCOPHAGE-XR) 500 MG extended release tablet Take 2 tablets by mouth in the

## 2024-12-17 ENCOUNTER — ANESTHESIA (OUTPATIENT)
Dept: ENDOSCOPY | Age: 74
End: 2024-12-17
Payer: MEDICARE

## 2024-12-17 LAB
ANION GAP SERPL CALCULATED.3IONS-SCNC: 13 MEQ/L (ref 9–15)
ANISOCYTOSIS BLD QL SMEAR: ABNORMAL
BASOPHILS # BLD: 0 K/UL (ref 0–0.2)
BASOPHILS NFR BLD: 0.1 %
BUN SERPL-MCNC: 42 MG/DL (ref 8–23)
CALCIUM SERPL-MCNC: 8.1 MG/DL (ref 8.5–9.9)
CHLORIDE SERPL-SCNC: 104 MEQ/L (ref 95–107)
CO2 SERPL-SCNC: 20 MEQ/L (ref 20–31)
CREAT SERPL-MCNC: 1.78 MG/DL (ref 0.7–1.2)
EOSINOPHIL # BLD: 0 K/UL (ref 0–0.7)
EOSINOPHIL NFR BLD: 0.4 %
ERYTHROCYTE [DISTWIDTH] IN BLOOD BY AUTOMATED COUNT: 14.1 % (ref 11.5–14.5)
GLUCOSE BLD-MCNC: 124 MG/DL (ref 70–99)
GLUCOSE BLD-MCNC: 172 MG/DL (ref 70–99)
GLUCOSE BLD-MCNC: 277 MG/DL (ref 70–99)
GLUCOSE BLD-MCNC: 290 MG/DL (ref 70–99)
GLUCOSE SERPL-MCNC: 119 MG/DL (ref 70–99)
HCT VFR BLD AUTO: 22.2 % (ref 42–52)
HCT VFR BLD AUTO: 22.2 % (ref 42–52)
HCT VFR BLD AUTO: 22.7 % (ref 42–52)
HCT VFR BLD AUTO: 24.7 % (ref 42–52)
HGB BLD-MCNC: 7.5 G/DL (ref 14–18)
HGB BLD-MCNC: 7.6 G/DL (ref 14–18)
HGB BLD-MCNC: 7.7 G/DL (ref 14–18)
HGB BLD-MCNC: 8.3 G/DL (ref 14–18)
HYPOCHROMIA BLD QL SMEAR: ABNORMAL
LYMPHOCYTES # BLD: 0.4 K/UL (ref 1–4.8)
LYMPHOCYTES NFR BLD: 3 %
MCH RBC QN AUTO: 30 PG (ref 27–31.3)
MCHC RBC AUTO-ENTMCNC: 34.2 % (ref 33–37)
MCV RBC AUTO: 87.7 FL (ref 79–92.2)
METAMYELOCYTES NFR BLD MANUAL: 2 %
MICROCYTES BLD QL SMEAR: ABNORMAL
MONOCYTES # BLD: 0.5 K/UL (ref 0.2–0.8)
MONOCYTES NFR BLD: 5.8 %
MYELOCYTES NFR BLD MANUAL: 2 %
NEUTROPHILS # BLD: 8.2 K/UL (ref 1.4–6.5)
NEUTS SEG NFR BLD: 87 %
OVALOCYTES BLD QL SMEAR: ABNORMAL
PERFORMED ON: ABNORMAL
PLATELET # BLD AUTO: 314 K/UL (ref 130–400)
PLATELET BLD QL SMEAR: ADEQUATE
POIKILOCYTOSIS BLD QL SMEAR: ABNORMAL
POTASSIUM SERPL-SCNC: 3.9 MEQ/L (ref 3.4–4.9)
RBC # BLD AUTO: 2.53 M/UL (ref 4.7–6.1)
SLIDE REVIEW: ABNORMAL
SMUDGE CELLS BLD QL SMEAR: 17.3
SODIUM SERPL-SCNC: 137 MEQ/L (ref 135–144)
VARIANT LYMPHS NFR BLD: 1 %
WBC # BLD AUTO: 9 K/UL (ref 4.8–10.8)

## 2024-12-17 PROCEDURE — 7100000010 HC PHASE II RECOVERY - FIRST 15 MIN: Performed by: INTERNAL MEDICINE

## 2024-12-17 PROCEDURE — 94761 N-INVAS EAR/PLS OXIMETRY MLT: CPT

## 2024-12-17 PROCEDURE — 94640 AIRWAY INHALATION TREATMENT: CPT

## 2024-12-17 PROCEDURE — 6370000000 HC RX 637 (ALT 250 FOR IP): Performed by: INTERNAL MEDICINE

## 2024-12-17 PROCEDURE — 0DB68ZX EXCISION OF STOMACH, VIA NATURAL OR ARTIFICIAL OPENING ENDOSCOPIC, DIAGNOSTIC: ICD-10-PCS | Performed by: INTERNAL MEDICINE

## 2024-12-17 PROCEDURE — 85014 HEMATOCRIT: CPT

## 2024-12-17 PROCEDURE — 6370000000 HC RX 637 (ALT 250 FOR IP): Performed by: PHYSICIAN ASSISTANT

## 2024-12-17 PROCEDURE — 85025 COMPLETE CBC W/AUTO DIFF WBC: CPT

## 2024-12-17 PROCEDURE — 43239 EGD BIOPSY SINGLE/MULTIPLE: CPT | Performed by: INTERNAL MEDICINE

## 2024-12-17 PROCEDURE — 2720000010 HC SURG SUPPLY STERILE: Performed by: INTERNAL MEDICINE

## 2024-12-17 PROCEDURE — 1210000000 HC MED SURG R&B

## 2024-12-17 PROCEDURE — 97535 SELF CARE MNGMENT TRAINING: CPT

## 2024-12-17 PROCEDURE — 2500000003 HC RX 250 WO HCPCS: Performed by: INTERNAL MEDICINE

## 2024-12-17 PROCEDURE — 3700000001 HC ADD 15 MINUTES (ANESTHESIA): Performed by: INTERNAL MEDICINE

## 2024-12-17 PROCEDURE — 80048 BASIC METABOLIC PNL TOTAL CA: CPT

## 2024-12-17 PROCEDURE — 3609017100 HC EGD: Performed by: INTERNAL MEDICINE

## 2024-12-17 PROCEDURE — 36415 COLL VENOUS BLD VENIPUNCTURE: CPT

## 2024-12-17 PROCEDURE — 99232 SBSQ HOSP IP/OBS MODERATE 35: CPT | Performed by: INTERNAL MEDICINE

## 2024-12-17 PROCEDURE — 88342 IMHCHEM/IMCYTCHM 1ST ANTB: CPT

## 2024-12-17 PROCEDURE — 2580000003 HC RX 258: Performed by: INTERNAL MEDICINE

## 2024-12-17 PROCEDURE — 3700000000 HC ANESTHESIA ATTENDED CARE: Performed by: INTERNAL MEDICINE

## 2024-12-17 PROCEDURE — 7100000011 HC PHASE II RECOVERY - ADDTL 15 MIN: Performed by: INTERNAL MEDICINE

## 2024-12-17 PROCEDURE — 6360000002 HC RX W HCPCS: Performed by: REGISTERED NURSE

## 2024-12-17 PROCEDURE — 3609027000 HC COLONOSCOPY: Performed by: INTERNAL MEDICINE

## 2024-12-17 PROCEDURE — 85018 HEMOGLOBIN: CPT

## 2024-12-17 PROCEDURE — 6360000002 HC RX W HCPCS: Performed by: INTERNAL MEDICINE

## 2024-12-17 PROCEDURE — 43255 EGD CONTROL BLEEDING ANY: CPT | Performed by: INTERNAL MEDICINE

## 2024-12-17 PROCEDURE — 88305 TISSUE EXAM BY PATHOLOGIST: CPT

## 2024-12-17 PROCEDURE — 2700000000 HC OXYGEN THERAPY PER DAY

## 2024-12-17 PROCEDURE — 0DJD8ZZ INSPECTION OF LOWER INTESTINAL TRACT, VIA NATURAL OR ARTIFICIAL OPENING ENDOSCOPIC: ICD-10-PCS | Performed by: INTERNAL MEDICINE

## 2024-12-17 PROCEDURE — 97110 THERAPEUTIC EXERCISES: CPT

## 2024-12-17 PROCEDURE — 2709999900 HC NON-CHARGEABLE SUPPLY: Performed by: INTERNAL MEDICINE

## 2024-12-17 PROCEDURE — 45378 DIAGNOSTIC COLONOSCOPY: CPT | Performed by: INTERNAL MEDICINE

## 2024-12-17 RX ORDER — GLYCOPYRROLATE 1 MG/5 ML
SYRINGE (ML) INTRAVENOUS
Status: DISCONTINUED | OUTPATIENT
Start: 2024-12-17 | End: 2024-12-17 | Stop reason: SDUPTHER

## 2024-12-17 RX ORDER — SODIUM CHLORIDE 0.9 % (FLUSH) 0.9 %
5-40 SYRINGE (ML) INJECTION PRN
Status: DISCONTINUED | OUTPATIENT
Start: 2024-12-17 | End: 2024-12-17 | Stop reason: HOSPADM

## 2024-12-17 RX ORDER — SODIUM CHLORIDE 9 MG/ML
25 INJECTION, SOLUTION INTRAVENOUS PRN
Status: DISCONTINUED | OUTPATIENT
Start: 2024-12-17 | End: 2024-12-17 | Stop reason: HOSPADM

## 2024-12-17 RX ORDER — LIDOCAINE HYDROCHLORIDE 20 MG/ML
INJECTION, SOLUTION INFILTRATION; PERINEURAL
Status: DISCONTINUED | OUTPATIENT
Start: 2024-12-17 | End: 2024-12-17 | Stop reason: SDUPTHER

## 2024-12-17 RX ORDER — PROPOFOL 10 MG/ML
INJECTION, EMULSION INTRAVENOUS
Status: DISCONTINUED | OUTPATIENT
Start: 2024-12-17 | End: 2024-12-17 | Stop reason: SDUPTHER

## 2024-12-17 RX ORDER — SODIUM CHLORIDE 0.9 % (FLUSH) 0.9 %
5-40 SYRINGE (ML) INJECTION EVERY 12 HOURS SCHEDULED
Status: DISCONTINUED | OUTPATIENT
Start: 2024-12-17 | End: 2024-12-17 | Stop reason: HOSPADM

## 2024-12-17 RX ADMIN — DOCUSATE SODIUM 100 MG: 100 CAPSULE, LIQUID FILLED ORAL at 20:39

## 2024-12-17 RX ADMIN — PEG-3350, SODIUM SULFATE, SODIUM CHLORIDE, POTASSIUM CHLORIDE, SODIUM ASCORBATE AND ASCORBIC ACID 100 G: KIT at 05:11

## 2024-12-17 RX ADMIN — PREDNISONE 40 MG: 20 TABLET ORAL at 10:54

## 2024-12-17 RX ADMIN — PROPOFOL 50 MG: 10 INJECTION, EMULSION INTRAVENOUS at 09:14

## 2024-12-17 RX ADMIN — PROPOFOL 50 MG: 10 INJECTION, EMULSION INTRAVENOUS at 09:21

## 2024-12-17 RX ADMIN — PROPOFOL 150 MG: 10 INJECTION, EMULSION INTRAVENOUS at 08:59

## 2024-12-17 RX ADMIN — LEVALBUTEROL 1.25 MG: 1.25 SOLUTION, CONCENTRATE RESPIRATORY (INHALATION) at 15:45

## 2024-12-17 RX ADMIN — LEVALBUTEROL 1.25 MG: 1.25 SOLUTION, CONCENTRATE RESPIRATORY (INHALATION) at 08:05

## 2024-12-17 RX ADMIN — PANTOPRAZOLE SODIUM 40 MG: 40 INJECTION, POWDER, FOR SOLUTION INTRAVENOUS at 08:08

## 2024-12-17 RX ADMIN — ATORVASTATIN CALCIUM 40 MG: 40 TABLET, FILM COATED ORAL at 20:39

## 2024-12-17 RX ADMIN — INSULIN LISPRO 4 UNITS: 100 INJECTION, SOLUTION INTRAVENOUS; SUBCUTANEOUS at 18:07

## 2024-12-17 RX ADMIN — IPRATROPIUM BROMIDE 0.5 MG: 0.5 SOLUTION RESPIRATORY (INHALATION) at 08:05

## 2024-12-17 RX ADMIN — LEVALBUTEROL 1.25 MG: 1.25 SOLUTION, CONCENTRATE RESPIRATORY (INHALATION) at 20:11

## 2024-12-17 RX ADMIN — LOSARTAN POTASSIUM 25 MG: 25 TABLET, FILM COATED ORAL at 10:54

## 2024-12-17 RX ADMIN — PROPOFOL 20 MG: 10 INJECTION, EMULSION INTRAVENOUS at 09:03

## 2024-12-17 RX ADMIN — QUETIAPINE FUMARATE 50 MG: 50 TABLET ORAL at 20:39

## 2024-12-17 RX ADMIN — LIDOCAINE HYDROCHLORIDE 60 MG: 20 INJECTION, SOLUTION INFILTRATION; PERINEURAL at 08:59

## 2024-12-17 RX ADMIN — Medication 0.2 MG: at 08:59

## 2024-12-17 RX ADMIN — PAROXETINE HYDROCHLORIDE 40 MG: 30 TABLET, FILM COATED ORAL at 10:54

## 2024-12-17 RX ADMIN — PROPOFOL 50 MG: 10 INJECTION, EMULSION INTRAVENOUS at 09:26

## 2024-12-17 RX ADMIN — PROPOFOL 50 MG: 10 INJECTION, EMULSION INTRAVENOUS at 09:05

## 2024-12-17 RX ADMIN — CARVEDILOL 3.12 MG: 3.12 TABLET, FILM COATED ORAL at 18:07

## 2024-12-17 RX ADMIN — IPRATROPIUM BROMIDE 0.5 MG: 0.5 SOLUTION RESPIRATORY (INHALATION) at 20:11

## 2024-12-17 RX ADMIN — Medication 10 ML: at 20:39

## 2024-12-17 RX ADMIN — INSULIN LISPRO 4 UNITS: 100 INJECTION, SOLUTION INTRAVENOUS; SUBCUTANEOUS at 20:44

## 2024-12-17 RX ADMIN — IPRATROPIUM BROMIDE 0.5 MG: 0.5 SOLUTION RESPIRATORY (INHALATION) at 15:45

## 2024-12-17 RX ADMIN — PROPOFOL 50 MG: 10 INJECTION, EMULSION INTRAVENOUS at 09:09

## 2024-12-17 RX ADMIN — Medication 10 ML: at 08:08

## 2024-12-17 RX ADMIN — PANTOPRAZOLE SODIUM 40 MG: 40 INJECTION, POWDER, FOR SOLUTION INTRAVENOUS at 20:39

## 2024-12-17 ASSESSMENT — PAIN - FUNCTIONAL ASSESSMENT
PAIN_FUNCTIONAL_ASSESSMENT: NONE - DENIES PAIN

## 2024-12-17 ASSESSMENT — PAIN SCALES - GENERAL: PAINLEVEL_OUTOF10: 0

## 2024-12-17 NOTE — ANESTHESIA POSTPROCEDURE EVALUATION
Department of Anesthesiology  Postprocedure Note    Patient: Isaak Mitchell  MRN: [id number]  YOB: 1950  Date of evaluation: 12/17/2024    Procedure Summary       Date: 12/17/24 Room / Location: [facility] OR 02 / [facility]    Anesthesia Start: 0850 Anesthesia Stop: 0938    Procedures:       ESOPHAGOGASTRODUODENOSCOPY WITH BIOPSIES      COLONOSCOPY DIAGNOSTIC Diagnosis:       CINDY (iron deficiency anemia)      (CINDY (iron deficiency anemia) [D50.9])    Surgeons: Shani Benjamin MD Responsible Provider: Alex Burns APRN - CRNA    Anesthesia Type: MAC ASA Status: 3            Anesthesia Type: No value filed.    Man Phase I: Man Score: 9    Man Phase II:      Anesthesia Post Evaluation    Patient location during evaluation: bedside  Patient participation: complete - patient participated  Level of consciousness: awake and awake and alert  Airway patency: patent  Nausea & Vomiting: no nausea and no vomiting  Cardiovascular status: blood pressure returned to baseline and hemodynamically stable  Respiratory status: acceptable  Hydration status: euvolemic  Pain management: adequate        No notable events documented.

## 2024-12-18 LAB
ANION GAP SERPL CALCULATED.3IONS-SCNC: 13 MEQ/L (ref 9–15)
ANISOCYTOSIS BLD QL SMEAR: ABNORMAL
BASOPHILS # BLD: 0 K/UL (ref 0–0.2)
BASOPHILS NFR BLD: 0.1 %
BUN SERPL-MCNC: 33 MG/DL (ref 8–23)
CALCIUM SERPL-MCNC: 8 MG/DL (ref 8.5–9.9)
CHLORIDE SERPL-SCNC: 104 MEQ/L (ref 95–107)
CO2 SERPL-SCNC: 20 MEQ/L (ref 20–31)
CREAT SERPL-MCNC: 1.73 MG/DL (ref 0.7–1.2)
EKG ATRIAL RATE: 80 BPM
EKG P AXIS: 69 DEGREES
EKG P-R INTERVAL: 168 MS
EKG Q-T INTERVAL: 396 MS
EKG QRS DURATION: 86 MS
EKG QTC CALCULATION (BAZETT): 456 MS
EKG R AXIS: -20 DEGREES
EKG T AXIS: 55 DEGREES
EKG VENTRICULAR RATE: 80 BPM
EOSINOPHIL # BLD: 0 K/UL (ref 0–0.7)
EOSINOPHIL NFR BLD: 0.1 %
ERYTHROCYTE [DISTWIDTH] IN BLOOD BY AUTOMATED COUNT: 14.1 % (ref 11.5–14.5)
GLUCOSE BLD-MCNC: 134 MG/DL (ref 70–99)
GLUCOSE BLD-MCNC: 134 MG/DL (ref 70–99)
GLUCOSE BLD-MCNC: 226 MG/DL (ref 70–99)
GLUCOSE BLD-MCNC: 268 MG/DL (ref 70–99)
GLUCOSE SERPL-MCNC: 175 MG/DL (ref 70–99)
HCT VFR BLD AUTO: 21.5 % (ref 42–52)
HCT VFR BLD AUTO: 21.9 % (ref 42–52)
HCT VFR BLD AUTO: 22.4 % (ref 42–52)
HGB BLD-MCNC: 7.5 G/DL (ref 14–18)
HGB BLD-MCNC: 7.7 G/DL (ref 14–18)
HGB BLD-MCNC: 7.8 G/DL (ref 14–18)
HYPOCHROMIA BLD QL SMEAR: ABNORMAL
LYMPHOCYTES # BLD: 0.2 K/UL (ref 1–4.8)
LYMPHOCYTES NFR BLD: 2 %
MCH RBC QN AUTO: 30.8 PG (ref 27–31.3)
MCHC RBC AUTO-ENTMCNC: 35.2 % (ref 33–37)
MCV RBC AUTO: 87.6 FL (ref 79–92.2)
METAMYELOCYTES NFR BLD MANUAL: 2 %
MICROCYTES BLD QL SMEAR: ABNORMAL
MONOCYTES # BLD: 0.2 K/UL (ref 0.2–0.8)
MONOCYTES NFR BLD: 1.9 %
NEUTROPHILS # BLD: 9.2 K/UL (ref 1.4–6.5)
NEUTS SEG NFR BLD: 94 %
NRBC BLD-RTO: 1 /100 WBC
PERFORMED ON: ABNORMAL
PLATELET # BLD AUTO: 322 K/UL (ref 130–400)
PLATELET BLD QL SMEAR: ADEQUATE
POIKILOCYTOSIS BLD QL SMEAR: ABNORMAL
POLYCHROMASIA BLD QL SMEAR: ABNORMAL
POTASSIUM SERPL-SCNC: 4.3 MEQ/L (ref 3.4–4.9)
RBC # BLD AUTO: 2.5 M/UL (ref 4.7–6.1)
SLIDE REVIEW: ABNORMAL
SMUDGE CELLS BLD QL SMEAR: 3.8
SODIUM SERPL-SCNC: 137 MEQ/L (ref 135–144)
WBC # BLD AUTO: 9.6 K/UL (ref 4.8–10.8)

## 2024-12-18 PROCEDURE — 6370000000 HC RX 637 (ALT 250 FOR IP): Performed by: INTERNAL MEDICINE

## 2024-12-18 PROCEDURE — 94640 AIRWAY INHALATION TREATMENT: CPT

## 2024-12-18 PROCEDURE — 2700000000 HC OXYGEN THERAPY PER DAY

## 2024-12-18 PROCEDURE — 6370000000 HC RX 637 (ALT 250 FOR IP): Performed by: PHYSICIAN ASSISTANT

## 2024-12-18 PROCEDURE — 99222 1ST HOSP IP/OBS MODERATE 55: CPT | Performed by: RADIOLOGY

## 2024-12-18 PROCEDURE — 6360000002 HC RX W HCPCS: Performed by: INTERNAL MEDICINE

## 2024-12-18 PROCEDURE — 2500000003 HC RX 250 WO HCPCS: Performed by: INTERNAL MEDICINE

## 2024-12-18 PROCEDURE — 80048 BASIC METABOLIC PNL TOTAL CA: CPT

## 2024-12-18 PROCEDURE — 85025 COMPLETE CBC W/AUTO DIFF WBC: CPT

## 2024-12-18 PROCEDURE — 99232 SBSQ HOSP IP/OBS MODERATE 35: CPT | Performed by: INTERNAL MEDICINE

## 2024-12-18 PROCEDURE — 85014 HEMATOCRIT: CPT

## 2024-12-18 PROCEDURE — 94761 N-INVAS EAR/PLS OXIMETRY MLT: CPT

## 2024-12-18 PROCEDURE — 85018 HEMOGLOBIN: CPT

## 2024-12-18 PROCEDURE — APPSS45 APP SPLIT SHARED TIME 31-45 MINUTES: Performed by: NURSE PRACTITIONER

## 2024-12-18 PROCEDURE — 99232 SBSQ HOSP IP/OBS MODERATE 35: CPT | Performed by: NURSE PRACTITIONER

## 2024-12-18 PROCEDURE — 1210000000 HC MED SURG R&B

## 2024-12-18 PROCEDURE — 36415 COLL VENOUS BLD VENIPUNCTURE: CPT

## 2024-12-18 RX ORDER — FERROUS GLUCONATE 324(38)MG
324 TABLET ORAL EVERY OTHER DAY
Status: DISCONTINUED | OUTPATIENT
Start: 2024-12-18 | End: 2024-12-30 | Stop reason: HOSPADM

## 2024-12-18 RX ADMIN — FERROUS GLUCONATE 324 MG: 324 TABLET ORAL at 14:11

## 2024-12-18 RX ADMIN — IPRATROPIUM BROMIDE 0.5 MG: 0.5 SOLUTION RESPIRATORY (INHALATION) at 11:08

## 2024-12-18 RX ADMIN — CARVEDILOL 3.12 MG: 3.12 TABLET, FILM COATED ORAL at 16:50

## 2024-12-18 RX ADMIN — CARVEDILOL 3.12 MG: 3.12 TABLET, FILM COATED ORAL at 08:09

## 2024-12-18 RX ADMIN — IPRATROPIUM BROMIDE 0.5 MG: 0.5 SOLUTION RESPIRATORY (INHALATION) at 14:58

## 2024-12-18 RX ADMIN — PANTOPRAZOLE SODIUM 40 MG: 40 INJECTION, POWDER, FOR SOLUTION INTRAVENOUS at 20:34

## 2024-12-18 RX ADMIN — INSULIN LISPRO 4 UNITS: 100 INJECTION, SOLUTION INTRAVENOUS; SUBCUTANEOUS at 20:37

## 2024-12-18 RX ADMIN — LEVALBUTEROL 1.25 MG: 1.25 SOLUTION, CONCENTRATE RESPIRATORY (INHALATION) at 19:38

## 2024-12-18 RX ADMIN — LOSARTAN POTASSIUM 25 MG: 25 TABLET, FILM COATED ORAL at 08:09

## 2024-12-18 RX ADMIN — IPRATROPIUM BROMIDE 0.5 MG: 0.5 SOLUTION RESPIRATORY (INHALATION) at 07:03

## 2024-12-18 RX ADMIN — PAROXETINE HYDROCHLORIDE 40 MG: 30 TABLET, FILM COATED ORAL at 08:08

## 2024-12-18 RX ADMIN — QUETIAPINE FUMARATE 50 MG: 50 TABLET ORAL at 20:34

## 2024-12-18 RX ADMIN — LEVALBUTEROL 1.25 MG: 1.25 SOLUTION, CONCENTRATE RESPIRATORY (INHALATION) at 11:08

## 2024-12-18 RX ADMIN — Medication 10 ML: at 20:34

## 2024-12-18 RX ADMIN — DOCUSATE SODIUM 100 MG: 100 CAPSULE, LIQUID FILLED ORAL at 20:34

## 2024-12-18 RX ADMIN — PANTOPRAZOLE SODIUM 40 MG: 40 INJECTION, POWDER, FOR SOLUTION INTRAVENOUS at 08:10

## 2024-12-18 RX ADMIN — IPRATROPIUM BROMIDE 0.5 MG: 0.5 SOLUTION RESPIRATORY (INHALATION) at 19:38

## 2024-12-18 RX ADMIN — LEVALBUTEROL 1.25 MG: 1.25 SOLUTION, CONCENTRATE RESPIRATORY (INHALATION) at 07:03

## 2024-12-18 RX ADMIN — Medication 10 ML: at 08:09

## 2024-12-18 RX ADMIN — ATORVASTATIN CALCIUM 40 MG: 40 TABLET, FILM COATED ORAL at 20:34

## 2024-12-18 RX ADMIN — LEVALBUTEROL 1.25 MG: 1.25 SOLUTION, CONCENTRATE RESPIRATORY (INHALATION) at 14:58

## 2024-12-18 RX ADMIN — INSULIN LISPRO 2 UNITS: 100 INJECTION, SOLUTION INTRAVENOUS; SUBCUTANEOUS at 16:54

## 2024-12-18 NOTE — CARE COORDINATION
Shakila Easton with [facility], [employer] Medicare insurance. She gave tonight at 11:59pm, as expiration of current auth.  We will have to start a new precert 11/19/24, if medically stable.

## 2024-12-18 NOTE — CARE COORDINATION
HECTORW UPDATED SMOOTH AT [facility] THAT THE PT IS NOT MEDICALLY CLEARED FOR TRANSFER TODAY .  WE LOSE PRECERT AT 11:59 TONIGHT AND NEW PRECERT WILL BE NEEDED FOR HIS ADMISSION TO [facility].

## 2024-12-19 ENCOUNTER — APPOINTMENT (OUTPATIENT)
Dept: INTERVENTIONAL RADIOLOGY/VASCULAR | Age: 74
DRG: 987 | End: 2024-12-19
Payer: MEDICARE

## 2024-12-19 LAB
ABO/RH: NORMAL
ANION GAP SERPL CALCULATED.3IONS-SCNC: 9 MEQ/L (ref 9–15)
ANTIBODY SCREEN: NORMAL
B PARAP IS1001 DNA NPH QL NAA+NON-PROBE: NOT DETECTED
B PERT.PT PRMT NPH QL NAA+NON-PROBE: NOT DETECTED
BASOPHILS # BLD: 0 K/UL (ref 0–0.2)
BASOPHILS NFR BLD: 0 %
BLOOD BANK DISPENSE STATUS: NORMAL
BLOOD BANK DISPENSE STATUS: NORMAL
BLOOD BANK PRODUCT CODE: NORMAL
BLOOD BANK PRODUCT CODE: NORMAL
BPU ID: NORMAL
BPU ID: NORMAL
BUN SERPL-MCNC: 52 MG/DL (ref 8–23)
C PNEUM DNA NPH QL NAA+NON-PROBE: NOT DETECTED
CALCIUM SERPL-MCNC: 7.8 MG/DL (ref 8.5–9.9)
CHLORIDE SERPL-SCNC: 107 MEQ/L (ref 95–107)
CO2 SERPL-SCNC: 20 MEQ/L (ref 20–31)
CREAT SERPL-MCNC: 1.93 MG/DL (ref 0.7–1.2)
DESCRIPTION BLOOD BANK: NORMAL
DESCRIPTION BLOOD BANK: NORMAL
EOSINOPHIL # BLD: 0.1 K/UL (ref 0–0.7)
EOSINOPHIL NFR BLD: 1.3 %
ERYTHROCYTE [DISTWIDTH] IN BLOOD BY AUTOMATED COUNT: 14.4 % (ref 11.5–14.5)
FLUAV RNA NPH QL NAA+NON-PROBE: NOT DETECTED
FLUBV RNA NPH QL NAA+NON-PROBE: NOT DETECTED
GLUCOSE BLD-MCNC: 203 MG/DL (ref 70–99)
GLUCOSE BLD-MCNC: 227 MG/DL (ref 70–99)
GLUCOSE SERPL-MCNC: 152 MG/DL (ref 70–99)
HADV DNA NPH QL NAA+NON-PROBE: NOT DETECTED
HCOV 229E RNA NPH QL NAA+NON-PROBE: NOT DETECTED
HCOV HKU1 RNA NPH QL NAA+NON-PROBE: NOT DETECTED
HCOV NL63 RNA NPH QL NAA+NON-PROBE: NOT DETECTED
HCOV OC43 RNA NPH QL NAA+NON-PROBE: NOT DETECTED
HCT VFR BLD AUTO: 18.5 % (ref 42–52)
HCT VFR BLD AUTO: 19.4 % (ref 42–52)
HCT VFR BLD AUTO: 21.6 % (ref 42–52)
HGB BLD-MCNC: 6.4 G/DL (ref 14–18)
HGB BLD-MCNC: 6.7 G/DL (ref 14–18)
HGB BLD-MCNC: 7 G/DL (ref 14–18)
HMPV RNA NPH QL NAA+NON-PROBE: NOT DETECTED
HPIV1 RNA NPH QL NAA+NON-PROBE: NOT DETECTED
HPIV2 RNA NPH QL NAA+NON-PROBE: NOT DETECTED
HPIV3 RNA NPH QL NAA+NON-PROBE: NOT DETECTED
HPIV4 RNA NPH QL NAA+NON-PROBE: NOT DETECTED
LYMPHOCYTES # BLD: 1 K/UL (ref 1–4.8)
LYMPHOCYTES NFR BLD: 9.7 %
M PNEUMO DNA NPH QL NAA+NON-PROBE: NOT DETECTED
MCH RBC QN AUTO: 30.9 PG (ref 27–31.3)
MCHC RBC AUTO-ENTMCNC: 34.6 % (ref 33–37)
MCV RBC AUTO: 89.4 FL (ref 79–92.2)
MONOCYTES # BLD: 0.6 K/UL (ref 0.2–0.8)
MONOCYTES NFR BLD: 5.4 %
NEUTROPHILS # BLD: 8.1 K/UL (ref 1.4–6.5)
NEUTS SEG NFR BLD: 77.6 %
PERFORMED ON: ABNORMAL
PERFORMED ON: ABNORMAL
PLATELET # BLD AUTO: 334 K/UL (ref 130–400)
POTASSIUM SERPL-SCNC: 4.7 MEQ/L (ref 3.4–4.9)
PROCALCITONIN SERPL IA-MCNC: 0.42 NG/ML (ref 0–0.15)
RBC # BLD AUTO: 2.07 M/UL (ref 4.7–6.1)
RSV RNA NPH QL NAA+NON-PROBE: NOT DETECTED
RV+EV RNA NPH QL NAA+NON-PROBE: NOT DETECTED
SARS-COV-2 RNA NPH QL NAA+NON-PROBE: DETECTED
SODIUM SERPL-SCNC: 136 MEQ/L (ref 135–144)
WBC # BLD AUTO: 10.4 K/UL (ref 4.8–10.8)

## 2024-12-19 PROCEDURE — 99232 SBSQ HOSP IP/OBS MODERATE 35: CPT | Performed by: NURSE PRACTITIONER

## 2024-12-19 PROCEDURE — 30233N1 TRANSFUSION OF NONAUTOLOGOUS RED BLOOD CELLS INTO PERIPHERAL VEIN, PERCUTANEOUS APPROACH: ICD-10-PCS | Performed by: INTERNAL MEDICINE

## 2024-12-19 PROCEDURE — 1210000000 HC MED SURG R&B

## 2024-12-19 PROCEDURE — 75726 ARTERY X-RAYS ABDOMEN: CPT

## 2024-12-19 PROCEDURE — 2580000003 HC RX 258: Performed by: INTERNAL MEDICINE

## 2024-12-19 PROCEDURE — 6360000002 HC RX W HCPCS: Performed by: INTERNAL MEDICINE

## 2024-12-19 PROCEDURE — C1760 CLOSURE DEV, VASC: HCPCS | Performed by: RADIOLOGY

## 2024-12-19 PROCEDURE — 75774 ARTERY X-RAY EACH VESSEL: CPT | Performed by: RADIOLOGY

## 2024-12-19 PROCEDURE — 0202U NFCT DS 22 TRGT SARS-COV-2: CPT

## 2024-12-19 PROCEDURE — 36430 TRANSFUSION BLD/BLD COMPNT: CPT

## 2024-12-19 PROCEDURE — 2500000003 HC RX 250 WO HCPCS: Performed by: RADIOLOGY

## 2024-12-19 PROCEDURE — 2700000000 HC OXYGEN THERAPY PER DAY

## 2024-12-19 PROCEDURE — 85025 COMPLETE CBC W/AUTO DIFF WBC: CPT

## 2024-12-19 PROCEDURE — 6370000000 HC RX 637 (ALT 250 FOR IP): Performed by: INTERNAL MEDICINE

## 2024-12-19 PROCEDURE — 80048 BASIC METABOLIC PNL TOTAL CA: CPT

## 2024-12-19 PROCEDURE — 36415 COLL VENOUS BLD VENIPUNCTURE: CPT

## 2024-12-19 PROCEDURE — 85018 HEMOGLOBIN: CPT

## 2024-12-19 PROCEDURE — 37244 VASC EMBOLIZE/OCCLUDE BLEED: CPT | Performed by: RADIOLOGY

## 2024-12-19 PROCEDURE — C1889 IMPLANT/INSERT DEVICE, NOC: HCPCS | Performed by: RADIOLOGY

## 2024-12-19 PROCEDURE — 94761 N-INVAS EAR/PLS OXIMETRY MLT: CPT

## 2024-12-19 PROCEDURE — 75726 ARTERY X-RAYS ABDOMEN: CPT | Performed by: RADIOLOGY

## 2024-12-19 PROCEDURE — 36247 INS CATH ABD/L-EXT ART 3RD: CPT

## 2024-12-19 PROCEDURE — 86901 BLOOD TYPING SEROLOGIC RH(D): CPT

## 2024-12-19 PROCEDURE — 85014 HEMATOCRIT: CPT

## 2024-12-19 PROCEDURE — 36247 INS CATH ABD/L-EXT ART 3RD: CPT | Performed by: RADIOLOGY

## 2024-12-19 PROCEDURE — 86900 BLOOD TYPING SEROLOGIC ABO: CPT

## 2024-12-19 PROCEDURE — 75774 ARTERY X-RAY EACH VESSEL: CPT

## 2024-12-19 PROCEDURE — 94640 AIRWAY INHALATION TREATMENT: CPT

## 2024-12-19 PROCEDURE — 86850 RBC ANTIBODY SCREEN: CPT

## 2024-12-19 PROCEDURE — 2580000003 HC RX 258: Performed by: RADIOLOGY

## 2024-12-19 PROCEDURE — 2500000003 HC RX 250 WO HCPCS: Performed by: INTERNAL MEDICINE

## 2024-12-19 PROCEDURE — 86880 COOMBS TEST DIRECT: CPT

## 2024-12-19 PROCEDURE — 6360000004 HC RX CONTRAST MEDICATION: Performed by: RADIOLOGY

## 2024-12-19 PROCEDURE — P9016 RBC LEUKOCYTES REDUCED: HCPCS

## 2024-12-19 PROCEDURE — C1769 GUIDE WIRE: HCPCS

## 2024-12-19 PROCEDURE — 76937 US GUIDE VASCULAR ACCESS: CPT | Performed by: RADIOLOGY

## 2024-12-19 PROCEDURE — 84145 PROCALCITONIN (PCT): CPT

## 2024-12-19 PROCEDURE — 86923 COMPATIBILITY TEST ELECTRIC: CPT

## 2024-12-19 PROCEDURE — 6360000002 HC RX W HCPCS: Performed by: RADIOLOGY

## 2024-12-19 PROCEDURE — 6370000000 HC RX 637 (ALT 250 FOR IP): Performed by: PHYSICIAN ASSISTANT

## 2024-12-19 PROCEDURE — 37244 VASC EMBOLIZE/OCCLUDE BLEED: CPT

## 2024-12-19 PROCEDURE — 04L23DZ OCCLUSION OF GASTRIC ARTERY WITH INTRALUMINAL DEVICE, PERCUTANEOUS APPROACH: ICD-10-PCS | Performed by: INTERNAL MEDICINE

## 2024-12-19 PROCEDURE — APPSS60 APP SPLIT SHARED TIME 46-60 MINUTES: Performed by: NURSE PRACTITIONER

## 2024-12-19 RX ORDER — MAGNESIUM HYDROXIDE 1200 MG/15ML
LIQUID ORAL CONTINUOUS PRN
Status: COMPLETED | OUTPATIENT
Start: 2024-12-19 | End: 2024-12-19

## 2024-12-19 RX ORDER — SODIUM CHLORIDE 9 MG/ML
INJECTION, SOLUTION INTRAVENOUS
Status: COMPLETED
Start: 2024-12-19 | End: 2024-12-20

## 2024-12-19 RX ORDER — SODIUM CHLORIDE 9 MG/ML
INJECTION, SOLUTION INTRAVENOUS PRN
Status: COMPLETED | OUTPATIENT
Start: 2024-12-19 | End: 2024-12-20

## 2024-12-19 RX ORDER — SODIUM CHLORIDE 9 MG/ML
INJECTION, SOLUTION INTRAVENOUS PRN
Status: DISCONTINUED | OUTPATIENT
Start: 2024-12-19 | End: 2024-12-27

## 2024-12-19 RX ORDER — ONDANSETRON 2 MG/ML
INJECTION INTRAMUSCULAR; INTRAVENOUS PRN
Status: COMPLETED | OUTPATIENT
Start: 2024-12-19 | End: 2024-12-19

## 2024-12-19 RX ORDER — IOPAMIDOL 755 MG/ML
INJECTION, SOLUTION INTRAVASCULAR PRN
Status: COMPLETED | OUTPATIENT
Start: 2024-12-19 | End: 2024-12-19

## 2024-12-19 RX ORDER — SODIUM CHLORIDE 9 MG/ML
INJECTION, SOLUTION INTRAVENOUS CONTINUOUS PRN
Status: COMPLETED | OUTPATIENT
Start: 2024-12-19 | End: 2024-12-19

## 2024-12-19 RX ORDER — LIDOCAINE HYDROCHLORIDE 20 MG/ML
INJECTION, SOLUTION INFILTRATION; PERINEURAL PRN
Status: COMPLETED | OUTPATIENT
Start: 2024-12-19 | End: 2024-12-19

## 2024-12-19 RX ORDER — PROCHLORPERAZINE EDISYLATE 5 MG/ML
10 INJECTION INTRAMUSCULAR; INTRAVENOUS EVERY 6 HOURS PRN
Status: DISCONTINUED | OUTPATIENT
Start: 2024-12-19 | End: 2024-12-30 | Stop reason: HOSPADM

## 2024-12-19 RX ORDER — FENTANYL CITRATE 0.05 MG/ML
INJECTION, SOLUTION INTRAMUSCULAR; INTRAVENOUS PRN
Status: COMPLETED | OUTPATIENT
Start: 2024-12-19 | End: 2024-12-19

## 2024-12-19 RX ADMIN — ONDANSETRON 4 MG: 2 INJECTION INTRAMUSCULAR; INTRAVENOUS at 13:23

## 2024-12-19 RX ADMIN — LOSARTAN POTASSIUM 25 MG: 25 TABLET, FILM COATED ORAL at 15:56

## 2024-12-19 RX ADMIN — IOPAMIDOL 100 ML: 755 INJECTION, SOLUTION INTRAVENOUS at 12:01

## 2024-12-19 RX ADMIN — PROCHLORPERAZINE EDISYLATE 10 MG: 5 INJECTION INTRAMUSCULAR; INTRAVENOUS at 15:07

## 2024-12-19 RX ADMIN — LEVALBUTEROL 1.25 MG: 1.25 SOLUTION, CONCENTRATE RESPIRATORY (INHALATION) at 07:50

## 2024-12-19 RX ADMIN — LEVALBUTEROL 1.25 MG: 1.25 SOLUTION, CONCENTRATE RESPIRATORY (INHALATION) at 04:47

## 2024-12-19 RX ADMIN — PANTOPRAZOLE SODIUM 40 MG: 40 INJECTION, POWDER, FOR SOLUTION INTRAVENOUS at 22:28

## 2024-12-19 RX ADMIN — SODIUM CHLORIDE 250 ML: 9 INJECTION, SOLUTION INTRAVENOUS at 11:57

## 2024-12-19 RX ADMIN — FENTANYL CITRATE 50 MCG: 50 INJECTION INTRAMUSCULAR; INTRAVENOUS at 11:59

## 2024-12-19 RX ADMIN — IPRATROPIUM BROMIDE 0.5 MG: 0.5 SOLUTION RESPIRATORY (INHALATION) at 07:50

## 2024-12-19 RX ADMIN — PAROXETINE HYDROCHLORIDE 40 MG: 30 TABLET, FILM COATED ORAL at 15:55

## 2024-12-19 RX ADMIN — INSULIN LISPRO 2 UNITS: 100 INJECTION, SOLUTION INTRAVENOUS; SUBCUTANEOUS at 22:43

## 2024-12-19 RX ADMIN — Medication 10 ML: at 23:45

## 2024-12-19 RX ADMIN — FENTANYL CITRATE 50 MCG: 50 INJECTION INTRAMUSCULAR; INTRAVENOUS at 12:55

## 2024-12-19 RX ADMIN — LIDOCAINE HYDROCHLORIDE 16 ML: 20 INJECTION, SOLUTION INFILTRATION; PERINEURAL at 11:57

## 2024-12-19 RX ADMIN — SODIUM CHLORIDE 1000 ML: 900 IRRIGANT IRRIGATION at 11:57

## 2024-12-19 RX ADMIN — PANTOPRAZOLE SODIUM 40 MG: 40 INJECTION, POWDER, FOR SOLUTION INTRAVENOUS at 15:55

## 2024-12-19 RX ADMIN — LEVALBUTEROL 1.25 MG: 1.25 SOLUTION, CONCENTRATE RESPIRATORY (INHALATION) at 00:38

## 2024-12-19 RX ADMIN — LEVALBUTEROL 1.25 MG: 1.25 SOLUTION, CONCENTRATE RESPIRATORY (INHALATION) at 15:16

## 2024-12-19 RX ADMIN — IPRATROPIUM BROMIDE 0.5 MG: 0.5 SOLUTION RESPIRATORY (INHALATION) at 15:16

## 2024-12-19 RX ADMIN — DOCUSATE SODIUM 100 MG: 100 CAPSULE, LIQUID FILLED ORAL at 15:55

## 2024-12-19 RX ADMIN — CARVEDILOL 3.12 MG: 3.12 TABLET, FILM COATED ORAL at 15:55

## 2024-12-19 RX ADMIN — SODIUM CHLORIDE 125 MG: 9 INJECTION, SOLUTION INTRAVENOUS at 16:47

## 2024-12-19 ASSESSMENT — PAIN - FUNCTIONAL ASSESSMENT: PAIN_FUNCTIONAL_ASSESSMENT: NONE - DENIES PAIN

## 2024-12-19 ASSESSMENT — PAIN SCALES - GENERAL: PAINLEVEL_OUTOF10: 0

## 2024-12-19 NOTE — CARE COORDINATION
YOVANI spoke with Haydee at [facility] to give updates.  Pt is not medically stable for DC at this time.  She will update the insurance and restart the precert tomorrow if the pt is more stable.

## 2024-12-19 NOTE — BRIEF OP NOTE
Preliminary  Procedure Note, Full Note To Follow in PACS  Vascular and Interventional Radiology      Isaak Mitchell  1950  [id number]    Date of Procedure: 12/19/24    Physician: Serafin Mckee MD, DABR    Pre-Op Diagnosis: Upper GI bleed    Post-Op Diagnosis: Same       Procedure: Coil embolization of gastroduodenal artery and small duodenal branch from the GDA.     Estimated Blood Loss (mL): Minimal    Complications: None    Specimens: None    Implants: Terumo embolization coils.     Drains: None    Findings: No active bleeding was seen, though based on endoscopy findings, the GDA and small duodenal branch of the GDA were successfully embolized.     Electronically signed by SERAFIN MCKEE MD on 12/19/2024 at 1:31 PM

## 2024-12-19 NOTE — OR NURSING
NON SEDATION CASE     1137 Pt brought to Cath lab room 3 via cart. Pt transferred onto procedure table and positioned supine on table. Connected to radiology monitors and O2 at 2L NC/end tidal CO2. Bilateral groin hairs clipped in preparation for procedure.     1147 [facility]-tech scanned right femoral artery to assess area and then prepped right groin with large tinted chloraprep. After 3 minute dry time, draped with full body drape using sterile technique.     1155 Dr. Leblanc scrubbed in.      1156 Timeout complete for image guided angiogram with possible intervention  No conscious sedation ordered as pt ate breakfast.      1157 Using ultrasound guidance, Dr. Leblanc numbed right groin site with 2% lidocaine, see eMar.     1158 Using ultrasound guidance, Dr. Leblanc obtained right femoral artery access with MP Introducer kit 5fr.      1159 Using fluoro guidance, Dr. Leblanc placed Bentson guidewire.  5 F Terumo sheath placed. Pt having pain to groin site despite lidocaine, Dr. Key gave verbal order for fentanyl. 50 mcg of fent administered by RN circulator RH.      1200 Using fluoro guidance, Dr. Leblanc placed tempo5 PIG TR angiographic catheter 5F with blood aspirated and flushed well. Contrast injector set up per [facility]-rad tech.     1201 Dr. Leblanc instructed pt to hold breath and contrast injected via injector and fluoro imaging obtained.       1202 Dr. Leblanc exchanged tempo catheter for Soft-Vu Sos Omni Selective 2 angiographic catheter.      1205 Dr. Leblanc instructed pt to hold breath again and contrast injected via injector and fluoro imaging obtained. Pt tolerated well this time.      1207 Dr. Leblanc exchanged Bentson wire for Glidewire 260 cm non-stiff.    1210 Sos catheter exchanged for Glidecath  cm non-taper angle angiographic catheter.     1213 Dr. Leblanc reviewing imaging at this time.     1214 Glidewire reinserted and manipulated into desired location.      1215 Dr. Leblanc again instructed pt to hold breath and

## 2024-12-19 NOTE — FLOWSHEET NOTE
1200 spoke with Olinda and Jewell, informed that pts temp was elevated in holding room prior to GI bleed angiogram, blood transfusion stopped at that time.  Post blood transfusion protocol initiated. (See Shakila Forbes RN note)

## 2024-12-20 ENCOUNTER — APPOINTMENT (OUTPATIENT)
Dept: GENERAL RADIOLOGY | Age: 74
DRG: 987 | End: 2024-12-20
Payer: MEDICARE

## 2024-12-20 LAB
ALBUMIN SERPL-MCNC: 2.7 G/DL (ref 3.5–4.6)
ALP SERPL-CCNC: 44 U/L (ref 35–104)
ALT SERPL-CCNC: 30 U/L (ref 0–41)
ANION GAP SERPL CALCULATED.3IONS-SCNC: 11 MEQ/L (ref 9–15)
AST SERPL-CCNC: 19 U/L (ref 0–40)
BACTERIA URNS QL MICRO: NEGATIVE /HPF
BASOPHILS # BLD: 0 K/UL (ref 0–0.2)
BASOPHILS NFR BLD: 0.1 %
BILIRUB SERPL-MCNC: 0.7 MG/DL (ref 0.2–0.7)
BILIRUB UR QL STRIP: NEGATIVE
BUN SERPL-MCNC: 33 MG/DL (ref 8–23)
CALCIUM SERPL-MCNC: 7.7 MG/DL (ref 8.5–9.9)
CHLORIDE SERPL-SCNC: 107 MEQ/L (ref 95–107)
CLARITY UR: CLEAR
CO2 SERPL-SCNC: 19 MEQ/L (ref 20–31)
COLOR UR: YELLOW
CREAT SERPL-MCNC: 2 MG/DL (ref 0.7–1.2)
CRP SERPL HS-MCNC: 116.1 MG/L (ref 0–5)
EOSINOPHIL # BLD: 0.1 K/UL (ref 0–0.7)
EOSINOPHIL NFR BLD: 0.7 %
EPI CELLS #/AREA URNS AUTO: NORMAL /HPF (ref 0–5)
ERYTHROCYTE [DISTWIDTH] IN BLOOD BY AUTOMATED COUNT: 15 % (ref 11.5–14.5)
GLOBULIN SER CALC-MCNC: 1.8 G/DL (ref 2.3–3.5)
GLUCOSE BLD-MCNC: 131 MG/DL (ref 70–99)
GLUCOSE BLD-MCNC: 149 MG/DL (ref 70–99)
GLUCOSE BLD-MCNC: 157 MG/DL (ref 70–99)
GLUCOSE BLD-MCNC: 187 MG/DL (ref 70–99)
GLUCOSE SERPL-MCNC: 168 MG/DL (ref 70–99)
GLUCOSE UR STRIP-MCNC: NEGATIVE MG/DL
HCT VFR BLD AUTO: 21.2 % (ref 42–52)
HCT VFR BLD AUTO: 21.3 % (ref 42–52)
HCT VFR BLD AUTO: 22.6 % (ref 42–52)
HCT VFR BLD AUTO: 24.2 % (ref 42–52)
HCT VFR BLD AUTO: 25.7 % (ref 42–52)
HGB BLD-MCNC: 7.3 G/DL (ref 14–18)
HGB BLD-MCNC: 7.5 G/DL (ref 14–18)
HGB BLD-MCNC: 8 G/DL (ref 14–18)
HGB BLD-MCNC: 8.5 G/DL (ref 14–18)
HGB BLD-MCNC: 9.1 G/DL (ref 14–18)
HGB UR QL STRIP: NEGATIVE
HYALINE CASTS #/AREA URNS AUTO: NORMAL /HPF (ref 0–5)
KETONES UR STRIP-MCNC: NEGATIVE MG/DL
LEUKOCYTE ESTERASE UR QL STRIP: NEGATIVE
LYMPHOCYTES # BLD: 0.8 K/UL (ref 1–4.8)
LYMPHOCYTES NFR BLD: 7.4 %
MCH RBC QN AUTO: 30.4 PG (ref 27–31.3)
MCHC RBC AUTO-ENTMCNC: 34.3 % (ref 33–37)
MCV RBC AUTO: 88.8 FL (ref 79–92.2)
MONOCYTES # BLD: 0.5 K/UL (ref 0.2–0.8)
MONOCYTES NFR BLD: 4.5 %
NEUTROPHILS # BLD: 8.9 K/UL (ref 1.4–6.5)
NEUTS SEG NFR BLD: 85.1 %
NITRITE UR QL STRIP: NEGATIVE
PERFORMED ON: ABNORMAL
PH UR STRIP: 5 [PH] (ref 5–9)
PLATELET # BLD AUTO: 245 K/UL (ref 130–400)
POTASSIUM SERPL-SCNC: 4.7 MEQ/L (ref 3.4–4.9)
PROCALCITONIN SERPL IA-MCNC: 0.62 NG/ML (ref 0–0.15)
PROT SERPL-MCNC: 4.5 G/DL (ref 6.3–8)
PROT UR STRIP-MCNC: 30 MG/DL
RBC # BLD AUTO: 2.4 M/UL (ref 4.7–6.1)
RBC #/AREA URNS HPF: NORMAL /HPF (ref 0–2)
SODIUM SERPL-SCNC: 137 MEQ/L (ref 135–144)
SP GR UR STRIP: 1.02 (ref 1–1.03)
URINE REFLEX TO CULTURE: ABNORMAL
UROBILINOGEN UR STRIP-ACNC: 0.2 E.U./DL
WBC # BLD AUTO: 10.4 K/UL (ref 4.8–10.8)
WBC #/AREA URNS AUTO: NORMAL /HPF (ref 0–5)

## 2024-12-20 PROCEDURE — 2500000003 HC RX 250 WO HCPCS: Performed by: INTERNAL MEDICINE

## 2024-12-20 PROCEDURE — 84145 PROCALCITONIN (PCT): CPT

## 2024-12-20 PROCEDURE — 99222 1ST HOSP IP/OBS MODERATE 55: CPT | Performed by: INTERNAL MEDICINE

## 2024-12-20 PROCEDURE — 36415 COLL VENOUS BLD VENIPUNCTURE: CPT

## 2024-12-20 PROCEDURE — 85025 COMPLETE CBC W/AUTO DIFF WBC: CPT

## 2024-12-20 PROCEDURE — 6360000002 HC RX W HCPCS: Performed by: INTERNAL MEDICINE

## 2024-12-20 PROCEDURE — 6370000000 HC RX 637 (ALT 250 FOR IP): Performed by: INTERNAL MEDICINE

## 2024-12-20 PROCEDURE — 1210000000 HC MED SURG R&B

## 2024-12-20 PROCEDURE — 99232 SBSQ HOSP IP/OBS MODERATE 35: CPT | Performed by: NURSE PRACTITIONER

## 2024-12-20 PROCEDURE — 99232 SBSQ HOSP IP/OBS MODERATE 35: CPT | Performed by: INTERNAL MEDICINE

## 2024-12-20 PROCEDURE — 85018 HEMOGLOBIN: CPT

## 2024-12-20 PROCEDURE — 81001 URINALYSIS AUTO W/SCOPE: CPT

## 2024-12-20 PROCEDURE — 86140 C-REACTIVE PROTEIN: CPT

## 2024-12-20 PROCEDURE — 2580000003 HC RX 258

## 2024-12-20 PROCEDURE — 85014 HEMATOCRIT: CPT

## 2024-12-20 PROCEDURE — 6360000002 HC RX W HCPCS

## 2024-12-20 PROCEDURE — 93005 ELECTROCARDIOGRAM TRACING: CPT | Performed by: INTERNAL MEDICINE

## 2024-12-20 PROCEDURE — 36430 TRANSFUSION BLD/BLD COMPNT: CPT

## 2024-12-20 PROCEDURE — 80053 COMPREHEN METABOLIC PANEL: CPT

## 2024-12-20 PROCEDURE — 74022 RADEX COMPL AQT ABD SERIES: CPT

## 2024-12-20 PROCEDURE — 6370000000 HC RX 637 (ALT 250 FOR IP): Performed by: PHYSICIAN ASSISTANT

## 2024-12-20 PROCEDURE — 2700000000 HC OXYGEN THERAPY PER DAY

## 2024-12-20 RX ORDER — HYDRALAZINE HYDROCHLORIDE 20 MG/ML
10 INJECTION INTRAMUSCULAR; INTRAVENOUS EVERY 4 HOURS PRN
Status: DISCONTINUED | OUTPATIENT
Start: 2024-12-20 | End: 2024-12-29

## 2024-12-20 RX ORDER — MORPHINE SULFATE 2 MG/ML
2 INJECTION, SOLUTION INTRAMUSCULAR; INTRAVENOUS ONCE
Status: COMPLETED | OUTPATIENT
Start: 2024-12-20 | End: 2024-12-20

## 2024-12-20 RX ORDER — DEXAMETHASONE 4 MG/1
6 TABLET ORAL DAILY
Status: DISCONTINUED | OUTPATIENT
Start: 2024-12-20 | End: 2024-12-25

## 2024-12-20 RX ADMIN — INSULIN LISPRO 2 UNITS: 100 INJECTION, SOLUTION INTRAVENOUS; SUBCUTANEOUS at 17:17

## 2024-12-20 RX ADMIN — HYDRALAZINE HYDROCHLORIDE 10 MG: 20 INJECTION INTRAMUSCULAR; INTRAVENOUS at 00:43

## 2024-12-20 RX ADMIN — QUETIAPINE FUMARATE 50 MG: 50 TABLET ORAL at 21:30

## 2024-12-20 RX ADMIN — SODIUM CHLORIDE: 9 INJECTION, SOLUTION INTRAVENOUS at 02:26

## 2024-12-20 RX ADMIN — Medication 10 ML: at 08:31

## 2024-12-20 RX ADMIN — FERROUS GLUCONATE 324 MG: 324 TABLET ORAL at 09:15

## 2024-12-20 RX ADMIN — Medication 10 ML: at 21:31

## 2024-12-20 RX ADMIN — LOSARTAN POTASSIUM 25 MG: 25 TABLET, FILM COATED ORAL at 09:07

## 2024-12-20 RX ADMIN — CARVEDILOL 3.12 MG: 3.12 TABLET, FILM COATED ORAL at 17:14

## 2024-12-20 RX ADMIN — DEXAMETHASONE 6 MG: 4 TABLET ORAL at 17:14

## 2024-12-20 RX ADMIN — MORPHINE SULFATE 2 MG: 2 INJECTION, SOLUTION INTRAMUSCULAR; INTRAVENOUS at 05:20

## 2024-12-20 RX ADMIN — PAROXETINE HYDROCHLORIDE 40 MG: 30 TABLET, FILM COATED ORAL at 09:07

## 2024-12-20 RX ADMIN — PANTOPRAZOLE SODIUM 40 MG: 40 INJECTION, POWDER, FOR SOLUTION INTRAVENOUS at 21:30

## 2024-12-20 RX ADMIN — HYDRALAZINE HYDROCHLORIDE 10 MG: 20 INJECTION INTRAMUSCULAR; INTRAVENOUS at 02:48

## 2024-12-20 RX ADMIN — CARVEDILOL 3.12 MG: 3.12 TABLET, FILM COATED ORAL at 09:04

## 2024-12-20 RX ADMIN — PANTOPRAZOLE SODIUM 40 MG: 40 INJECTION, POWDER, FOR SOLUTION INTRAVENOUS at 08:31

## 2024-12-20 RX ADMIN — ATORVASTATIN CALCIUM 40 MG: 40 TABLET, FILM COATED ORAL at 21:30

## 2024-12-20 RX ADMIN — DOCUSATE SODIUM 100 MG: 100 CAPSULE, LIQUID FILLED ORAL at 21:30

## 2024-12-20 RX ADMIN — DOCUSATE SODIUM 100 MG: 100 CAPSULE, LIQUID FILLED ORAL at 09:07

## 2024-12-20 RX ADMIN — SODIUM CHLORIDE, PRESERVATIVE FREE 10 ML: 5 INJECTION INTRAVENOUS at 21:30

## 2024-12-20 ASSESSMENT — PAIN DESCRIPTION - LOCATION
LOCATION: BACK
LOCATION: CHEST

## 2024-12-20 ASSESSMENT — PAIN SCALES - GENERAL
PAINLEVEL_OUTOF10: 8
PAINLEVEL_OUTOF10: 0

## 2024-12-20 ASSESSMENT — PAIN DESCRIPTION - ORIENTATION: ORIENTATION: UPPER

## 2024-12-20 ASSESSMENT — PAIN DESCRIPTION - DESCRIPTORS: DESCRIPTORS: ACHING

## 2024-12-20 NOTE — CARE COORDINATION
YOVANI SPOKE WITH PITER AT [facility].  SHE SAID THAT SHE AND RANDEE  HAVE BEEN MONITORING THE CHART AND FEEL THAT A NEW PRECERT SHOULD BE HELD UNTIL MONDAY.  NO PRECERT STARTED TODAY.

## 2024-12-20 NOTE — FLOWSHEET NOTE
Respiratory panel positive for Covid 19.Droplet plus precautions initiated..Electronically signed by Jewell Ford RN on 12/19/2024 at 7:15 PM

## 2024-12-21 PROBLEM — U07.1 COVID-19 VIRUS INFECTION: Status: ACTIVE | Noted: 2024-12-21

## 2024-12-21 LAB
ALBUMIN SERPL-MCNC: 2.4 G/DL (ref 3.5–4.6)
ALP SERPL-CCNC: 40 U/L (ref 35–104)
ALT SERPL-CCNC: 28 U/L (ref 0–41)
ANION GAP SERPL CALCULATED.3IONS-SCNC: 9 MEQ/L (ref 9–15)
AST SERPL-CCNC: 21 U/L (ref 0–40)
BASOPHILS # BLD: 0 K/UL (ref 0–0.2)
BASOPHILS NFR BLD: 0.1 %
BILIRUB SERPL-MCNC: 0.7 MG/DL (ref 0.2–0.7)
BLOOD BANK DISPENSE STATUS: NORMAL
BLOOD BANK PRODUCT CODE: NORMAL
BPU ID: NORMAL
BUN SERPL-MCNC: 34 MG/DL (ref 8–23)
CALCIUM SERPL-MCNC: 7.6 MG/DL (ref 8.5–9.9)
CHLORIDE SERPL-SCNC: 104 MEQ/L (ref 95–107)
CO2 SERPL-SCNC: 20 MEQ/L (ref 20–31)
CREAT SERPL-MCNC: 1.86 MG/DL (ref 0.7–1.2)
DESCRIPTION BLOOD BANK: NORMAL
EOSINOPHIL # BLD: 0 K/UL (ref 0–0.7)
EOSINOPHIL NFR BLD: 0 %
ERYTHROCYTE [DISTWIDTH] IN BLOOD BY AUTOMATED COUNT: 14.7 % (ref 11.5–14.5)
GLOBULIN SER CALC-MCNC: 1.9 G/DL (ref 2.3–3.5)
GLUCOSE BLD-MCNC: 138 MG/DL (ref 70–99)
GLUCOSE BLD-MCNC: 191 MG/DL (ref 70–99)
GLUCOSE BLD-MCNC: 260 MG/DL (ref 70–99)
GLUCOSE BLD-MCNC: 376 MG/DL (ref 70–99)
GLUCOSE SERPL-MCNC: 156 MG/DL (ref 70–99)
HCT VFR BLD AUTO: 19.3 % (ref 42–52)
HCT VFR BLD AUTO: 19.8 % (ref 42–52)
HCT VFR BLD AUTO: 21.5 % (ref 42–52)
HCT VFR BLD AUTO: 23.6 % (ref 42–52)
HCT VFR BLD AUTO: 24 % (ref 42–52)
HGB BLD-MCNC: 6.5 G/DL (ref 14–18)
HGB BLD-MCNC: 7 G/DL (ref 14–18)
HGB BLD-MCNC: 7.4 G/DL (ref 14–18)
HGB BLD-MCNC: 8.4 G/DL (ref 14–18)
HGB BLD-MCNC: 8.6 G/DL (ref 14–18)
LYMPHOCYTES # BLD: 0.6 K/UL (ref 1–4.8)
LYMPHOCYTES NFR BLD: 6.9 %
MCH RBC QN AUTO: 30 PG (ref 27–31.3)
MCHC RBC AUTO-ENTMCNC: 33.7 % (ref 33–37)
MCV RBC AUTO: 88.9 FL (ref 79–92.2)
MONOCYTES # BLD: 0.3 K/UL (ref 0.2–0.8)
MONOCYTES NFR BLD: 3.9 %
NEUTROPHILS # BLD: 7.1 K/UL (ref 1.4–6.5)
NEUTS SEG NFR BLD: 87.1 %
PERFORMED ON: ABNORMAL
PLATELET # BLD AUTO: 211 K/UL (ref 130–400)
POTASSIUM SERPL-SCNC: 4.8 MEQ/L (ref 3.4–4.9)
PROT SERPL-MCNC: 4.3 G/DL (ref 6.3–8)
RBC # BLD AUTO: 2.17 M/UL (ref 4.7–6.1)
SODIUM SERPL-SCNC: 133 MEQ/L (ref 135–144)
WBC # BLD AUTO: 8.2 K/UL (ref 4.8–10.8)

## 2024-12-21 PROCEDURE — 6370000000 HC RX 637 (ALT 250 FOR IP): Performed by: INTERNAL MEDICINE

## 2024-12-21 PROCEDURE — 85018 HEMOGLOBIN: CPT

## 2024-12-21 PROCEDURE — 36415 COLL VENOUS BLD VENIPUNCTURE: CPT

## 2024-12-21 PROCEDURE — 6360000002 HC RX W HCPCS: Performed by: INTERNAL MEDICINE

## 2024-12-21 PROCEDURE — 80053 COMPREHEN METABOLIC PANEL: CPT

## 2024-12-21 PROCEDURE — 85025 COMPLETE CBC W/AUTO DIFF WBC: CPT

## 2024-12-21 PROCEDURE — 85014 HEMATOCRIT: CPT

## 2024-12-21 PROCEDURE — 99232 SBSQ HOSP IP/OBS MODERATE 35: CPT | Performed by: INTERNAL MEDICINE

## 2024-12-21 PROCEDURE — 36430 TRANSFUSION BLD/BLD COMPNT: CPT

## 2024-12-21 PROCEDURE — 1210000000 HC MED SURG R&B

## 2024-12-21 PROCEDURE — 2500000003 HC RX 250 WO HCPCS: Performed by: INTERNAL MEDICINE

## 2024-12-21 PROCEDURE — 6370000000 HC RX 637 (ALT 250 FOR IP): Performed by: PHYSICIAN ASSISTANT

## 2024-12-21 RX ORDER — SODIUM CHLORIDE 9 MG/ML
INJECTION, SOLUTION INTRAVENOUS PRN
Status: DISCONTINUED | OUTPATIENT
Start: 2024-12-21 | End: 2024-12-27

## 2024-12-21 RX ORDER — SODIUM CHLORIDE 9 MG/ML
INJECTION, SOLUTION INTRAVENOUS
Status: DISPENSED
Start: 2024-12-21 | End: 2024-12-21

## 2024-12-21 RX ADMIN — Medication 10 ML: at 21:34

## 2024-12-21 RX ADMIN — INSULIN LISPRO 2 UNITS: 100 INJECTION, SOLUTION INTRAVENOUS; SUBCUTANEOUS at 12:32

## 2024-12-21 RX ADMIN — DOCUSATE SODIUM 100 MG: 100 CAPSULE, LIQUID FILLED ORAL at 09:24

## 2024-12-21 RX ADMIN — ATORVASTATIN CALCIUM 40 MG: 40 TABLET, FILM COATED ORAL at 21:33

## 2024-12-21 RX ADMIN — Medication 10 ML: at 09:24

## 2024-12-21 RX ADMIN — CARVEDILOL 3.12 MG: 3.12 TABLET, FILM COATED ORAL at 09:24

## 2024-12-21 RX ADMIN — DEXAMETHASONE 6 MG: 4 TABLET ORAL at 09:24

## 2024-12-21 RX ADMIN — PANTOPRAZOLE SODIUM 40 MG: 40 INJECTION, POWDER, FOR SOLUTION INTRAVENOUS at 21:33

## 2024-12-21 RX ADMIN — LOSARTAN POTASSIUM 25 MG: 25 TABLET, FILM COATED ORAL at 09:24

## 2024-12-21 RX ADMIN — CARVEDILOL 3.12 MG: 3.12 TABLET, FILM COATED ORAL at 17:25

## 2024-12-21 RX ADMIN — INSULIN LISPRO 8 UNITS: 100 INJECTION, SOLUTION INTRAVENOUS; SUBCUTANEOUS at 17:26

## 2024-12-21 RX ADMIN — PAROXETINE HYDROCHLORIDE 40 MG: 30 TABLET, FILM COATED ORAL at 09:24

## 2024-12-21 RX ADMIN — QUETIAPINE FUMARATE 50 MG: 50 TABLET ORAL at 21:33

## 2024-12-21 RX ADMIN — DOCUSATE SODIUM 100 MG: 100 CAPSULE, LIQUID FILLED ORAL at 21:33

## 2024-12-21 RX ADMIN — INSULIN LISPRO 4 UNITS: 100 INJECTION, SOLUTION INTRAVENOUS; SUBCUTANEOUS at 21:33

## 2024-12-21 RX ADMIN — PANTOPRAZOLE SODIUM 40 MG: 40 INJECTION, POWDER, FOR SOLUTION INTRAVENOUS at 09:24

## 2024-12-21 ASSESSMENT — PAIN SCALES - GENERAL
PAINLEVEL_OUTOF10: 0
PAINLEVEL_OUTOF10: 0

## 2024-12-21 ASSESSMENT — PAIN SCALES - WONG BAKER: WONGBAKER_NUMERICALRESPONSE: NO HURT

## 2024-12-22 ENCOUNTER — APPOINTMENT (OUTPATIENT)
Dept: GENERAL RADIOLOGY | Age: 74
DRG: 987 | End: 2024-12-22
Payer: MEDICARE

## 2024-12-22 LAB
ALBUMIN SERPL-MCNC: 2.8 G/DL (ref 3.5–4.6)
ALP SERPL-CCNC: 54 U/L (ref 35–104)
ALT SERPL-CCNC: 56 U/L (ref 0–41)
ANION GAP SERPL CALCULATED.3IONS-SCNC: 12 MEQ/L (ref 9–15)
AST SERPL-CCNC: 52 U/L (ref 0–40)
BACTERIA URNS QL MICRO: NEGATIVE /HPF
BASOPHILS # BLD: 0 K/UL (ref 0–0.2)
BASOPHILS NFR BLD: 0.1 %
BILIRUB SERPL-MCNC: 0.9 MG/DL (ref 0.2–0.7)
BILIRUB UR QL STRIP: NEGATIVE
BUN SERPL-MCNC: 33 MG/DL (ref 8–23)
CALCIUM SERPL-MCNC: 7.9 MG/DL (ref 8.5–9.9)
CHLORIDE SERPL-SCNC: 99 MEQ/L (ref 95–107)
CLARITY UR: CLEAR
CO2 SERPL-SCNC: 19 MEQ/L (ref 20–31)
COLOR UR: YELLOW
CREAT SERPL-MCNC: 1.87 MG/DL (ref 0.7–1.2)
EOSINOPHIL # BLD: 0.1 K/UL (ref 0–0.7)
EOSINOPHIL NFR BLD: 1.3 %
EPI CELLS #/AREA URNS AUTO: ABNORMAL /HPF (ref 0–5)
ERYTHROCYTE [DISTWIDTH] IN BLOOD BY AUTOMATED COUNT: 13.9 % (ref 11.5–14.5)
GLOBULIN SER CALC-MCNC: 2.1 G/DL (ref 2.3–3.5)
GLUCOSE BLD-MCNC: 128 MG/DL (ref 70–99)
GLUCOSE BLD-MCNC: 155 MG/DL (ref 70–99)
GLUCOSE BLD-MCNC: 290 MG/DL (ref 70–99)
GLUCOSE BLD-MCNC: 593 MG/DL (ref 70–99)
GLUCOSE SERPL-MCNC: 120 MG/DL (ref 70–99)
GLUCOSE UR STRIP-MCNC: >=1000 MG/DL
HCT VFR BLD AUTO: 25.6 % (ref 42–52)
HCT VFR BLD AUTO: 25.8 % (ref 42–52)
HCT VFR BLD AUTO: 26.2 % (ref 42–52)
HGB BLD-MCNC: 9 G/DL (ref 14–18)
HGB BLD-MCNC: 9.1 G/DL (ref 14–18)
HGB BLD-MCNC: 9.2 G/DL (ref 14–18)
HGB UR QL STRIP: NEGATIVE
HYALINE CASTS #/AREA URNS AUTO: ABNORMAL /HPF (ref 0–5)
KETONES UR STRIP-MCNC: NEGATIVE MG/DL
LEUKOCYTE ESTERASE UR QL STRIP: NEGATIVE
LYMPHOCYTES # BLD: 0.6 K/UL (ref 1–4.8)
LYMPHOCYTES NFR BLD: 6.5 %
MCH RBC QN AUTO: 30.5 PG (ref 27–31.3)
MCHC RBC AUTO-ENTMCNC: 35.3 % (ref 33–37)
MCV RBC AUTO: 86.6 FL (ref 79–92.2)
MONOCYTES # BLD: 0.4 K/UL (ref 0.2–0.8)
MONOCYTES NFR BLD: 4.2 %
NEUTROPHILS # BLD: 8.4 K/UL (ref 1.4–6.5)
NEUTS SEG NFR BLD: 87.3 %
NITRITE UR QL STRIP: NEGATIVE
PERFORMED ON: ABNORMAL
PH UR STRIP: 5 [PH] (ref 5–9)
PLATELET # BLD AUTO: 243 K/UL (ref 130–400)
POTASSIUM SERPL-SCNC: 4.4 MEQ/L (ref 3.4–4.9)
PROCALCITONIN SERPL IA-MCNC: 0.41 NG/ML (ref 0–0.15)
PROT SERPL-MCNC: 4.9 G/DL (ref 6.3–8)
PROT UR STRIP-MCNC: 30 MG/DL
RBC # BLD AUTO: 2.98 M/UL (ref 4.7–6.1)
RBC #/AREA URNS AUTO: ABNORMAL /HPF (ref 0–5)
SODIUM SERPL-SCNC: 130 MEQ/L (ref 135–144)
SP GR UR STRIP: 1.02 (ref 1–1.03)
URINE REFLEX TO CULTURE: ABNORMAL
UROBILINOGEN UR STRIP-ACNC: 0.2 E.U./DL
WBC # BLD AUTO: 9.6 K/UL (ref 4.8–10.8)
WBC #/AREA URNS AUTO: ABNORMAL /HPF (ref 0–5)

## 2024-12-22 PROCEDURE — 6370000000 HC RX 637 (ALT 250 FOR IP): Performed by: PHYSICIAN ASSISTANT

## 2024-12-22 PROCEDURE — 6360000002 HC RX W HCPCS: Performed by: INTERNAL MEDICINE

## 2024-12-22 PROCEDURE — 87040 BLOOD CULTURE FOR BACTERIA: CPT

## 2024-12-22 PROCEDURE — 6370000000 HC RX 637 (ALT 250 FOR IP): Performed by: INTERNAL MEDICINE

## 2024-12-22 PROCEDURE — 1210000000 HC MED SURG R&B

## 2024-12-22 PROCEDURE — 2500000003 HC RX 250 WO HCPCS: Performed by: INTERNAL MEDICINE

## 2024-12-22 PROCEDURE — 81001 URINALYSIS AUTO W/SCOPE: CPT

## 2024-12-22 PROCEDURE — 99232 SBSQ HOSP IP/OBS MODERATE 35: CPT | Performed by: INTERNAL MEDICINE

## 2024-12-22 PROCEDURE — 85025 COMPLETE CBC W/AUTO DIFF WBC: CPT

## 2024-12-22 PROCEDURE — 2700000000 HC OXYGEN THERAPY PER DAY

## 2024-12-22 PROCEDURE — 85014 HEMATOCRIT: CPT

## 2024-12-22 PROCEDURE — 84145 PROCALCITONIN (PCT): CPT

## 2024-12-22 PROCEDURE — 80053 COMPREHEN METABOLIC PANEL: CPT

## 2024-12-22 PROCEDURE — 85018 HEMOGLOBIN: CPT

## 2024-12-22 PROCEDURE — 71045 X-RAY EXAM CHEST 1 VIEW: CPT

## 2024-12-22 PROCEDURE — 36415 COLL VENOUS BLD VENIPUNCTURE: CPT

## 2024-12-22 PROCEDURE — 97110 THERAPEUTIC EXERCISES: CPT

## 2024-12-22 RX ORDER — INSULIN LISPRO 100 [IU]/ML
10 INJECTION, SOLUTION INTRAVENOUS; SUBCUTANEOUS ONCE
Status: COMPLETED | OUTPATIENT
Start: 2024-12-22 | End: 2024-12-22

## 2024-12-22 RX ORDER — GUAIFENESIN/DEXTROMETHORPHAN 100-10MG/5
5 SYRUP ORAL EVERY 4 HOURS PRN
Status: DISCONTINUED | OUTPATIENT
Start: 2024-12-22 | End: 2024-12-30 | Stop reason: HOSPADM

## 2024-12-22 RX ORDER — INSULIN GLARGINE 100 [IU]/ML
10 INJECTION, SOLUTION SUBCUTANEOUS NIGHTLY
Status: DISCONTINUED | OUTPATIENT
Start: 2024-12-22 | End: 2024-12-23

## 2024-12-22 RX ORDER — INSULIN LISPRO 100 [IU]/ML
INJECTION, SOLUTION INTRAVENOUS; SUBCUTANEOUS
Status: CANCELLED | OUTPATIENT
Start: 2024-12-22

## 2024-12-22 RX ADMIN — PANTOPRAZOLE SODIUM 40 MG: 40 INJECTION, POWDER, FOR SOLUTION INTRAVENOUS at 20:18

## 2024-12-22 RX ADMIN — LOSARTAN POTASSIUM 25 MG: 25 TABLET, FILM COATED ORAL at 09:26

## 2024-12-22 RX ADMIN — QUETIAPINE FUMARATE 50 MG: 50 TABLET ORAL at 20:18

## 2024-12-22 RX ADMIN — DOCUSATE SODIUM 100 MG: 100 CAPSULE, LIQUID FILLED ORAL at 20:18

## 2024-12-22 RX ADMIN — INSULIN LISPRO 4 UNITS: 100 INJECTION, SOLUTION INTRAVENOUS; SUBCUTANEOUS at 21:22

## 2024-12-22 RX ADMIN — PAROXETINE HYDROCHLORIDE 40 MG: 30 TABLET, FILM COATED ORAL at 09:25

## 2024-12-22 RX ADMIN — INSULIN GLARGINE 10 UNITS: 100 INJECTION, SOLUTION SUBCUTANEOUS at 21:22

## 2024-12-22 RX ADMIN — WATER 1000 MG: 1 INJECTION INTRAMUSCULAR; INTRAVENOUS; SUBCUTANEOUS at 17:18

## 2024-12-22 RX ADMIN — DEXAMETHASONE 6 MG: 4 TABLET ORAL at 09:24

## 2024-12-22 RX ADMIN — BENZOCAINE AND MENTHOL 1 LOZENGE: 15; 3.6 LOZENGE ORAL at 14:32

## 2024-12-22 RX ADMIN — INSULIN LISPRO 8 UNITS: 100 INJECTION, SOLUTION INTRAVENOUS; SUBCUTANEOUS at 17:17

## 2024-12-22 RX ADMIN — Medication 10 ML: at 20:18

## 2024-12-22 RX ADMIN — FERROUS GLUCONATE 324 MG: 324 TABLET ORAL at 09:26

## 2024-12-22 RX ADMIN — INSULIN LISPRO 10 UNITS: 100 INJECTION, SOLUTION INTRAVENOUS; SUBCUTANEOUS at 17:18

## 2024-12-22 RX ADMIN — PANTOPRAZOLE SODIUM 40 MG: 40 INJECTION, POWDER, FOR SOLUTION INTRAVENOUS at 09:26

## 2024-12-22 RX ADMIN — CARVEDILOL 3.12 MG: 3.12 TABLET, FILM COATED ORAL at 09:26

## 2024-12-22 RX ADMIN — Medication 10 ML: at 09:26

## 2024-12-22 RX ADMIN — ACETAMINOPHEN 650 MG: 325 TABLET ORAL at 07:49

## 2024-12-22 RX ADMIN — CARVEDILOL 3.12 MG: 3.12 TABLET, FILM COATED ORAL at 17:18

## 2024-12-22 RX ADMIN — ATORVASTATIN CALCIUM 40 MG: 40 TABLET, FILM COATED ORAL at 20:18

## 2024-12-22 ASSESSMENT — PAIN SCALES - WONG BAKER: WONGBAKER_NUMERICALRESPONSE: NO HURT

## 2024-12-22 ASSESSMENT — PAIN SCALES - GENERAL
PAINLEVEL_OUTOF10: 0

## 2024-12-23 PROBLEM — E11.65 POORLY CONTROLLED DIABETES MELLITUS (HCC): Status: ACTIVE | Noted: 2024-12-23

## 2024-12-23 LAB
ALBUMIN SERPL-MCNC: 2.7 G/DL (ref 3.5–4.6)
ALP SERPL-CCNC: 54 U/L (ref 35–104)
ALT SERPL-CCNC: 61 U/L (ref 0–41)
ANION GAP SERPL CALCULATED.3IONS-SCNC: 12 MEQ/L (ref 9–15)
AST SERPL-CCNC: 47 U/L (ref 0–40)
BASOPHILS # BLD: 0 K/UL (ref 0–0.2)
BASOPHILS NFR BLD: 0.1 %
BILIRUB SERPL-MCNC: 0.5 MG/DL (ref 0.2–0.7)
BUN SERPL-MCNC: 32 MG/DL (ref 8–23)
CALCIUM SERPL-MCNC: 7.5 MG/DL (ref 8.5–9.9)
CHLORIDE SERPL-SCNC: 102 MEQ/L (ref 95–107)
CO2 SERPL-SCNC: 18 MEQ/L (ref 20–31)
CREAT SERPL-MCNC: 1.72 MG/DL (ref 0.7–1.2)
EOSINOPHIL # BLD: 0.1 K/UL (ref 0–0.7)
EOSINOPHIL NFR BLD: 1.7 %
ERYTHROCYTE [DISTWIDTH] IN BLOOD BY AUTOMATED COUNT: 13.9 % (ref 11.5–14.5)
GLOBULIN SER CALC-MCNC: 1.9 G/DL (ref 2.3–3.5)
GLUCOSE BLD-MCNC: 107 MG/DL (ref 70–99)
GLUCOSE BLD-MCNC: 243 MG/DL (ref 70–99)
GLUCOSE BLD-MCNC: 257 MG/DL (ref 70–99)
GLUCOSE BLD-MCNC: 418 MG/DL (ref 70–99)
GLUCOSE SERPL-MCNC: 230 MG/DL (ref 70–99)
HCT VFR BLD AUTO: 25.9 % (ref 42–52)
HGB BLD-MCNC: 9.1 G/DL (ref 14–18)
LYMPHOCYTES # BLD: 0.4 K/UL (ref 1–4.8)
LYMPHOCYTES NFR BLD: 5.4 %
MCH RBC QN AUTO: 31.1 PG (ref 27–31.3)
MCHC RBC AUTO-ENTMCNC: 35.1 % (ref 33–37)
MCV RBC AUTO: 88.4 FL (ref 79–92.2)
MONOCYTES # BLD: 0.4 K/UL (ref 0.2–0.8)
MONOCYTES NFR BLD: 6 %
NEUTROPHILS # BLD: 6 K/UL (ref 1.4–6.5)
NEUTS SEG NFR BLD: 86.1 %
PERFORMED ON: ABNORMAL
PLATELET # BLD AUTO: 201 K/UL (ref 130–400)
POTASSIUM SERPL-SCNC: 4.3 MEQ/L (ref 3.4–4.9)
PROCALCITONIN SERPL IA-MCNC: 0.36 NG/ML (ref 0–0.15)
PROT SERPL-MCNC: 4.6 G/DL (ref 6.3–8)
RBC # BLD AUTO: 2.93 M/UL (ref 4.7–6.1)
SODIUM SERPL-SCNC: 132 MEQ/L (ref 135–144)
WBC # BLD AUTO: 7 K/UL (ref 4.8–10.8)

## 2024-12-23 PROCEDURE — 2500000003 HC RX 250 WO HCPCS: Performed by: INTERNAL MEDICINE

## 2024-12-23 PROCEDURE — 84145 PROCALCITONIN (PCT): CPT

## 2024-12-23 PROCEDURE — 6370000000 HC RX 637 (ALT 250 FOR IP): Performed by: INTERNAL MEDICINE

## 2024-12-23 PROCEDURE — 1210000000 HC MED SURG R&B

## 2024-12-23 PROCEDURE — 80053 COMPREHEN METABOLIC PANEL: CPT

## 2024-12-23 PROCEDURE — 6360000002 HC RX W HCPCS: Performed by: INTERNAL MEDICINE

## 2024-12-23 PROCEDURE — 97116 GAIT TRAINING THERAPY: CPT

## 2024-12-23 PROCEDURE — 6370000000 HC RX 637 (ALT 250 FOR IP): Performed by: NURSE PRACTITIONER

## 2024-12-23 PROCEDURE — 36415 COLL VENOUS BLD VENIPUNCTURE: CPT

## 2024-12-23 PROCEDURE — 99222 1ST HOSP IP/OBS MODERATE 55: CPT | Performed by: INTERNAL MEDICINE

## 2024-12-23 PROCEDURE — 83036 HEMOGLOBIN GLYCOSYLATED A1C: CPT

## 2024-12-23 PROCEDURE — 99232 SBSQ HOSP IP/OBS MODERATE 35: CPT | Performed by: INTERNAL MEDICINE

## 2024-12-23 PROCEDURE — 6370000000 HC RX 637 (ALT 250 FOR IP): Performed by: PHYSICIAN ASSISTANT

## 2024-12-23 PROCEDURE — 85025 COMPLETE CBC W/AUTO DIFF WBC: CPT

## 2024-12-23 RX ORDER — INSULIN GLARGINE 100 [IU]/ML
10 INJECTION, SOLUTION SUBCUTANEOUS 2 TIMES DAILY
Status: DISCONTINUED | OUTPATIENT
Start: 2024-12-23 | End: 2024-12-27

## 2024-12-23 RX ORDER — INSULIN LISPRO 100 [IU]/ML
3 INJECTION, SOLUTION INTRAVENOUS; SUBCUTANEOUS
Status: DISCONTINUED | OUTPATIENT
Start: 2024-12-24 | End: 2024-12-27

## 2024-12-23 RX ORDER — INSULIN GLARGINE 100 [IU]/ML
10 INJECTION, SOLUTION SUBCUTANEOUS 2 TIMES DAILY
Status: DISCONTINUED | OUTPATIENT
Start: 2024-12-24 | End: 2024-12-23

## 2024-12-23 RX ADMIN — INSULIN GLARGINE 10 UNITS: 100 INJECTION, SOLUTION SUBCUTANEOUS at 22:15

## 2024-12-23 RX ADMIN — PANTOPRAZOLE SODIUM 40 MG: 40 INJECTION, POWDER, FOR SOLUTION INTRAVENOUS at 21:01

## 2024-12-23 RX ADMIN — DEXAMETHASONE 6 MG: 4 TABLET ORAL at 09:59

## 2024-12-23 RX ADMIN — PAROXETINE HYDROCHLORIDE 40 MG: 30 TABLET, FILM COATED ORAL at 09:59

## 2024-12-23 RX ADMIN — INSULIN LISPRO 2 UNITS: 100 INJECTION, SOLUTION INTRAVENOUS; SUBCUTANEOUS at 11:46

## 2024-12-23 RX ADMIN — CARVEDILOL 3.12 MG: 3.12 TABLET, FILM COATED ORAL at 16:31

## 2024-12-23 RX ADMIN — INSULIN LISPRO 4 UNITS: 100 INJECTION, SOLUTION INTRAVENOUS; SUBCUTANEOUS at 16:31

## 2024-12-23 RX ADMIN — ATORVASTATIN CALCIUM 40 MG: 40 TABLET, FILM COATED ORAL at 21:01

## 2024-12-23 RX ADMIN — LOSARTAN POTASSIUM 25 MG: 25 TABLET, FILM COATED ORAL at 09:59

## 2024-12-23 RX ADMIN — DOCUSATE SODIUM 100 MG: 100 CAPSULE, LIQUID FILLED ORAL at 21:01

## 2024-12-23 RX ADMIN — WATER 1000 MG: 1 INJECTION INTRAMUSCULAR; INTRAVENOUS; SUBCUTANEOUS at 16:32

## 2024-12-23 RX ADMIN — QUETIAPINE FUMARATE 50 MG: 50 TABLET ORAL at 21:01

## 2024-12-23 RX ADMIN — Medication 10 ML: at 10:00

## 2024-12-23 RX ADMIN — ACETAMINOPHEN 650 MG: 325 TABLET ORAL at 14:11

## 2024-12-23 RX ADMIN — INSULIN LISPRO 8 UNITS: 100 INJECTION, SOLUTION INTRAVENOUS; SUBCUTANEOUS at 21:40

## 2024-12-23 RX ADMIN — Medication 10 ML: at 21:01

## 2024-12-23 RX ADMIN — CARVEDILOL 3.12 MG: 3.12 TABLET, FILM COATED ORAL at 09:58

## 2024-12-23 RX ADMIN — PANTOPRAZOLE SODIUM 40 MG: 40 INJECTION, POWDER, FOR SOLUTION INTRAVENOUS at 09:59

## 2024-12-23 NOTE — CARE COORDINATION
Quality round completed with care management team. Plan remain to Alicia Reeves. Will need pre-cert restarted when medically stable.   [facility] Supervisor aware.     Electronically signed by YOVANI Cotto on 12/23/2024 at 9:13 AM    HECTORW notify Alicia Reeves supervisor to restart pre-cert at this time.   [facility] Aware.   Pending pre-cert to Alicia Reeves at this time.     Electronically signed by YOVANI Cotto on 12/23/2024 at 9:29 AM    PENDING AUTH # [id number]    Electronically signed by YOVANI Cotto on 12/23/2024 at 1:46 PM

## 2024-12-23 NOTE — CARE COORDINATION
New precert submitted to [employer] for approval.  Awaiting their review of clinical, and their decision. YOVANI Cardoso aware.

## 2024-12-24 PROBLEM — J18.9 PNEUMONIA OF LEFT LOWER LOBE DUE TO INFECTIOUS ORGANISM: Status: ACTIVE | Noted: 2024-12-24

## 2024-12-24 LAB
ANION GAP SERPL CALCULATED.3IONS-SCNC: 11 MEQ/L (ref 9–15)
BASOPHILS # BLD: 0 K/UL (ref 0–0.2)
BASOPHILS NFR BLD: 0 %
BUN SERPL-MCNC: 31 MG/DL (ref 8–23)
CALCIUM SERPL-MCNC: 7.6 MG/DL (ref 8.5–9.9)
CHLORIDE SERPL-SCNC: 105 MEQ/L (ref 95–107)
CO2 SERPL-SCNC: 19 MEQ/L (ref 20–31)
CREAT SERPL-MCNC: 1.8 MG/DL (ref 0.7–1.2)
EKG ATRIAL RATE: 83 BPM
EKG ATRIAL RATE: 84 BPM
EKG P AXIS: 77 DEGREES
EKG P AXIS: 82 DEGREES
EKG P-R INTERVAL: 164 MS
EKG P-R INTERVAL: 166 MS
EKG Q-T INTERVAL: 378 MS
EKG Q-T INTERVAL: 380 MS
EKG QRS DURATION: 96 MS
EKG QRS DURATION: 98 MS
EKG QTC CALCULATION (BAZETT): 446 MS
EKG QTC CALCULATION (BAZETT): 446 MS
EKG R AXIS: -12 DEGREES
EKG R AXIS: -15 DEGREES
EKG T AXIS: 75 DEGREES
EKG T AXIS: 81 DEGREES
EKG VENTRICULAR RATE: 83 BPM
EKG VENTRICULAR RATE: 84 BPM
EOSINOPHIL # BLD: 0 K/UL (ref 0–0.7)
EOSINOPHIL NFR BLD: 0.3 %
ERYTHROCYTE [DISTWIDTH] IN BLOOD BY AUTOMATED COUNT: 14 % (ref 11.5–14.5)
ESTIMATED AVERAGE GLUCOSE: 123 MG/DL
GLUCOSE BLD-MCNC: 115 MG/DL (ref 70–99)
GLUCOSE BLD-MCNC: 180 MG/DL (ref 70–99)
GLUCOSE BLD-MCNC: 240 MG/DL (ref 70–99)
GLUCOSE BLD-MCNC: 415 MG/DL (ref 70–99)
GLUCOSE SERPL-MCNC: 144 MG/DL (ref 70–99)
HBA1C MFR BLD: 5.9 % (ref 4–6)
HCT VFR BLD AUTO: 24.6 % (ref 42–52)
HGB BLD-MCNC: 8.4 G/DL (ref 14–18)
LYMPHOCYTES # BLD: 0.6 K/UL (ref 1–4.8)
LYMPHOCYTES NFR BLD: 9.4 %
MCH RBC QN AUTO: 29.9 PG (ref 27–31.3)
MCHC RBC AUTO-ENTMCNC: 34.1 % (ref 33–37)
MCV RBC AUTO: 87.5 FL (ref 79–92.2)
MONOCYTES # BLD: 0.4 K/UL (ref 0.2–0.8)
MONOCYTES NFR BLD: 6.3 %
NEUTROPHILS # BLD: 5.3 K/UL (ref 1.4–6.5)
NEUTS SEG NFR BLD: 82.7 %
PERFORMED ON: ABNORMAL
PLATELET # BLD AUTO: 200 K/UL (ref 130–400)
POTASSIUM SERPL-SCNC: 4.2 MEQ/L (ref 3.4–4.9)
RBC # BLD AUTO: 2.81 M/UL (ref 4.7–6.1)
SODIUM SERPL-SCNC: 135 MEQ/L (ref 135–144)
WBC # BLD AUTO: 6.4 K/UL (ref 4.8–10.8)

## 2024-12-24 PROCEDURE — 93010 ELECTROCARDIOGRAM REPORT: CPT | Performed by: INTERNAL MEDICINE

## 2024-12-24 PROCEDURE — 6360000002 HC RX W HCPCS: Performed by: INTERNAL MEDICINE

## 2024-12-24 PROCEDURE — 6370000000 HC RX 637 (ALT 250 FOR IP): Performed by: NURSE PRACTITIONER

## 2024-12-24 PROCEDURE — 36415 COLL VENOUS BLD VENIPUNCTURE: CPT

## 2024-12-24 PROCEDURE — 80048 BASIC METABOLIC PNL TOTAL CA: CPT

## 2024-12-24 PROCEDURE — 99232 SBSQ HOSP IP/OBS MODERATE 35: CPT | Performed by: PHYSICIAN ASSISTANT

## 2024-12-24 PROCEDURE — 6370000000 HC RX 637 (ALT 250 FOR IP): Performed by: INTERNAL MEDICINE

## 2024-12-24 PROCEDURE — 99232 SBSQ HOSP IP/OBS MODERATE 35: CPT | Performed by: INTERNAL MEDICINE

## 2024-12-24 PROCEDURE — 2500000003 HC RX 250 WO HCPCS: Performed by: INTERNAL MEDICINE

## 2024-12-24 PROCEDURE — 85025 COMPLETE CBC W/AUTO DIFF WBC: CPT

## 2024-12-24 PROCEDURE — 1210000000 HC MED SURG R&B

## 2024-12-24 PROCEDURE — 6370000000 HC RX 637 (ALT 250 FOR IP): Performed by: PHYSICIAN ASSISTANT

## 2024-12-24 RX ORDER — SODIUM CHLORIDE 9 MG/ML
INJECTION, SOLUTION INTRAVENOUS PRN
Status: CANCELLED | OUTPATIENT
Start: 2024-12-24

## 2024-12-24 RX ORDER — ASPIRIN 81 MG/1
81 TABLET, CHEWABLE ORAL DAILY
Status: CANCELLED | OUTPATIENT
Start: 2024-12-25

## 2024-12-24 RX ORDER — INSULIN LISPRO 100 [IU]/ML
3 INJECTION, SOLUTION INTRAVENOUS; SUBCUTANEOUS
Status: CANCELLED | OUTPATIENT
Start: 2024-12-24

## 2024-12-24 RX ORDER — SODIUM CHLORIDE 0.9 % (FLUSH) 0.9 %
5-40 SYRINGE (ML) INJECTION EVERY 12 HOURS SCHEDULED
Status: CANCELLED | OUTPATIENT
Start: 2024-12-24

## 2024-12-24 RX ORDER — ENOXAPARIN SODIUM 100 MG/ML
1 INJECTION SUBCUTANEOUS DAILY
Status: CANCELLED | OUTPATIENT
Start: 2024-12-25

## 2024-12-24 RX ORDER — IPRATROPIUM BROMIDE AND ALBUTEROL SULFATE 2.5; .5 MG/3ML; MG/3ML
1 SOLUTION RESPIRATORY (INHALATION) EVERY 4 HOURS PRN
Status: CANCELLED | OUTPATIENT
Start: 2024-12-24

## 2024-12-24 RX ORDER — GLUCAGON 1 MG/ML
1 KIT INJECTION PRN
Status: CANCELLED | OUTPATIENT
Start: 2024-12-24

## 2024-12-24 RX ORDER — POLYETHYLENE GLYCOL 3350 17 G/17G
17 POWDER, FOR SOLUTION ORAL DAILY PRN
Status: CANCELLED | OUTPATIENT
Start: 2024-12-24

## 2024-12-24 RX ORDER — ACETAMINOPHEN 650 MG/1
650 SUPPOSITORY RECTAL EVERY 6 HOURS PRN
Status: CANCELLED | OUTPATIENT
Start: 2024-12-24

## 2024-12-24 RX ORDER — DEXAMETHASONE 4 MG/1
6 TABLET ORAL DAILY
Status: CANCELLED | OUTPATIENT
Start: 2024-12-25 | End: 2024-12-30

## 2024-12-24 RX ORDER — INSULIN GLARGINE 100 [IU]/ML
10 INJECTION, SOLUTION SUBCUTANEOUS 2 TIMES DAILY
Status: CANCELLED | OUTPATIENT
Start: 2024-12-24

## 2024-12-24 RX ORDER — QUETIAPINE FUMARATE 50 MG/1
50 TABLET, FILM COATED ORAL NIGHTLY
Status: CANCELLED | OUTPATIENT
Start: 2024-12-24

## 2024-12-24 RX ORDER — CALCIUM CARBONATE 500 MG/1
500 TABLET, CHEWABLE ORAL 3 TIMES DAILY PRN
Status: CANCELLED | OUTPATIENT
Start: 2024-12-24

## 2024-12-24 RX ORDER — DEXTROSE MONOHYDRATE 100 MG/ML
INJECTION, SOLUTION INTRAVENOUS CONTINUOUS PRN
Status: CANCELLED | OUTPATIENT
Start: 2024-12-24

## 2024-12-24 RX ORDER — HYDRALAZINE HYDROCHLORIDE 20 MG/ML
10 INJECTION INTRAMUSCULAR; INTRAVENOUS EVERY 4 HOURS PRN
Status: CANCELLED | OUTPATIENT
Start: 2024-12-24

## 2024-12-24 RX ORDER — INSULIN LISPRO 100 [IU]/ML
0-8 INJECTION, SOLUTION INTRAVENOUS; SUBCUTANEOUS
Status: CANCELLED | OUTPATIENT
Start: 2024-12-24

## 2024-12-24 RX ORDER — ACETAMINOPHEN 325 MG/1
650 TABLET ORAL EVERY 6 HOURS PRN
Status: CANCELLED | OUTPATIENT
Start: 2024-12-24

## 2024-12-24 RX ORDER — LOSARTAN POTASSIUM 25 MG/1
25 TABLET ORAL DAILY
Status: CANCELLED | OUTPATIENT
Start: 2024-12-25

## 2024-12-24 RX ORDER — ATORVASTATIN CALCIUM 40 MG/1
40 TABLET, FILM COATED ORAL NIGHTLY
Status: CANCELLED | OUTPATIENT
Start: 2024-12-24

## 2024-12-24 RX ORDER — DOCUSATE SODIUM 100 MG/1
100 CAPSULE, LIQUID FILLED ORAL 2 TIMES DAILY
Status: CANCELLED | OUTPATIENT
Start: 2024-12-24

## 2024-12-24 RX ORDER — FERROUS GLUCONATE 324(38)MG
324 TABLET ORAL EVERY OTHER DAY
Status: CANCELLED | OUTPATIENT
Start: 2024-12-26

## 2024-12-24 RX ORDER — PANTOPRAZOLE SODIUM 40 MG/10ML
40 INJECTION, POWDER, LYOPHILIZED, FOR SOLUTION INTRAVENOUS 2 TIMES DAILY
Status: CANCELLED | OUTPATIENT
Start: 2024-12-24

## 2024-12-24 RX ORDER — PROCHLORPERAZINE EDISYLATE 5 MG/ML
10 INJECTION INTRAMUSCULAR; INTRAVENOUS EVERY 6 HOURS PRN
Status: CANCELLED | OUTPATIENT
Start: 2024-12-24

## 2024-12-24 RX ORDER — METOPROLOL TARTRATE 1 MG/ML
5 INJECTION, SOLUTION INTRAVENOUS EVERY 6 HOURS PRN
Status: CANCELLED | OUTPATIENT
Start: 2024-12-24

## 2024-12-24 RX ORDER — SODIUM CHLORIDE 0.9 % (FLUSH) 0.9 %
5-40 SYRINGE (ML) INJECTION PRN
Status: CANCELLED | OUTPATIENT
Start: 2024-12-24

## 2024-12-24 RX ORDER — CARVEDILOL 3.12 MG/1
3.12 TABLET ORAL 2 TIMES DAILY WITH MEALS
Status: CANCELLED | OUTPATIENT
Start: 2024-12-24

## 2024-12-24 RX ORDER — GUAIFENESIN/DEXTROMETHORPHAN 100-10MG/5
5 SYRUP ORAL EVERY 4 HOURS PRN
Status: CANCELLED | OUTPATIENT
Start: 2024-12-24

## 2024-12-24 RX ADMIN — FERROUS GLUCONATE 324 MG: 324 TABLET ORAL at 11:59

## 2024-12-24 RX ADMIN — INSULIN LISPRO 3 UNITS: 100 INJECTION, SOLUTION INTRAVENOUS; SUBCUTANEOUS at 17:10

## 2024-12-24 RX ADMIN — PANTOPRAZOLE SODIUM 40 MG: 40 INJECTION, POWDER, FOR SOLUTION INTRAVENOUS at 09:19

## 2024-12-24 RX ADMIN — Medication 10 ML: at 09:19

## 2024-12-24 RX ADMIN — ATORVASTATIN CALCIUM 40 MG: 40 TABLET, FILM COATED ORAL at 20:30

## 2024-12-24 RX ADMIN — CARVEDILOL 3.12 MG: 3.12 TABLET, FILM COATED ORAL at 17:10

## 2024-12-24 RX ADMIN — CARVEDILOL 3.12 MG: 3.12 TABLET, FILM COATED ORAL at 09:19

## 2024-12-24 RX ADMIN — Medication 10 ML: at 20:33

## 2024-12-24 RX ADMIN — DEXAMETHASONE 6 MG: 4 TABLET ORAL at 09:20

## 2024-12-24 RX ADMIN — PAROXETINE HYDROCHLORIDE 40 MG: 30 TABLET, FILM COATED ORAL at 09:20

## 2024-12-24 RX ADMIN — INSULIN GLARGINE 10 UNITS: 100 INJECTION, SOLUTION SUBCUTANEOUS at 09:21

## 2024-12-24 RX ADMIN — DOCUSATE SODIUM 100 MG: 100 CAPSULE, LIQUID FILLED ORAL at 20:30

## 2024-12-24 RX ADMIN — QUETIAPINE FUMARATE 50 MG: 50 TABLET ORAL at 20:29

## 2024-12-24 RX ADMIN — DOCUSATE SODIUM 100 MG: 100 CAPSULE, LIQUID FILLED ORAL at 09:20

## 2024-12-24 RX ADMIN — ACETAMINOPHEN 650 MG: 325 TABLET ORAL at 15:07

## 2024-12-24 RX ADMIN — PANTOPRAZOLE SODIUM 40 MG: 40 INJECTION, POWDER, FOR SOLUTION INTRAVENOUS at 20:29

## 2024-12-24 RX ADMIN — INSULIN LISPRO 2 UNITS: 100 INJECTION, SOLUTION INTRAVENOUS; SUBCUTANEOUS at 17:11

## 2024-12-24 RX ADMIN — WATER 1000 MG: 1 INJECTION INTRAMUSCULAR; INTRAVENOUS; SUBCUTANEOUS at 17:10

## 2024-12-24 RX ADMIN — INSULIN LISPRO 3 UNITS: 100 INJECTION, SOLUTION INTRAVENOUS; SUBCUTANEOUS at 09:20

## 2024-12-24 RX ADMIN — INSULIN LISPRO 2 UNITS: 100 INJECTION, SOLUTION INTRAVENOUS; SUBCUTANEOUS at 12:00

## 2024-12-24 RX ADMIN — INSULIN LISPRO 3 UNITS: 100 INJECTION, SOLUTION INTRAVENOUS; SUBCUTANEOUS at 11:59

## 2024-12-24 RX ADMIN — INSULIN GLARGINE 10 UNITS: 100 INJECTION, SOLUTION SUBCUTANEOUS at 21:36

## 2024-12-24 RX ADMIN — LOSARTAN POTASSIUM 25 MG: 25 TABLET, FILM COATED ORAL at 09:20

## 2024-12-24 RX ADMIN — INSULIN LISPRO 8 UNITS: 100 INJECTION, SOLUTION INTRAVENOUS; SUBCUTANEOUS at 21:11

## 2024-12-24 ASSESSMENT — PAIN SCALES - GENERAL
PAINLEVEL_OUTOF10: 0
PAINLEVEL_OUTOF10: 0

## 2024-12-24 ASSESSMENT — PAIN SCALES - WONG BAKER
WONGBAKER_NUMERICALRESPONSE: NO HURT
WONGBAKER_NUMERICALRESPONSE: NO HURT

## 2024-12-24 NOTE — DISCHARGE SUMMARY
PARoxetine 40 MG tablet  Commonly known as: PAXIL  Take 1 tablet by mouth daily     triamcinolone 0.1 % cream  Commonly known as: KENALOG  Apply topically 2 times daily.     vitamin B-12 500 MCG tablet  Commonly known as: CYANOCOBALAMIN  Take 1 tablet by mouth daily            STOP taking these medications      amLODIPine 10 MG tablet  Commonly known as: NORVASC               Where to Get Your Medications        You can get these medications from any pharmacy    Bring a paper prescription for each of these medications  carvedilol 3.125 MG tablet  ferrous sulfate 325 (65 Fe) MG tablet  ipratropium 0.5 mg-albuterol 2.5 mg 0.5-2.5 (3) MG/3ML Soln nebulizer solution  ipratropium 0.5 mg-albuterol 2.5 mg 0.5-2.5 (3) MG/3ML Soln nebulizer solution  predniSONE 10 MG tablet  QUEtiapine 50 MG tablet         Disposition:   If discharged to Home, Any [facility] needs that were indicated and/or required as been addressed and set up by Social Work.     Condition at discharge: good     Activity: activity as tolerated    Total time taken for discharging this patient: 40 minutes. Greater than 70% of time was spent focused exclusively on this patient. Time was taken to review chart, discuss plans with consultants, reconciling medications, discussing plan answering questions with patient.     Signed:  Romero Altman MD  12/24/2024, 12:29 PM  ----------------------------------------------------------------------------------------------------------------------    Isaak Mitchell

## 2024-12-24 NOTE — CARE COORDINATION
Quality round completed with care management team. Plan remain to Alicia Reeves. Pending pre-cert at this time.     Electronically signed by YOVANI Cotto on 12/24/2024 at 9:25 AM

## 2024-12-24 NOTE — CONSULTS
[facility]                   [address] [place] [address]                              CONSULTATION      PATIENT NAME: JOSE MCCOY              : 1950  MED REC NO: [id number]                        ROOM: W288  ACCOUNT NO: [id number]                       ADMIT DATE: 2024  PROVIDER: Russell Zhou MD    ENDOCRINE CONSULT    CONSULT DATE: 2024    REFERRING PHYSICIAN:  Romero Altman MD      REASON FOR CONSULT:  Management of uncontrolled diabetes.    CHIEF COMPLAINT AND HISTORY OF PRESENT ILLNESS:  This is a virtual visit patient, prior H and P was reviewed.  Patient in isolation due to COVID-19.  The patient is a 74-year-old male with known history of diabetes, admitted to [facility] because of shortness of breath, dyspnea.  Also, was found to have moderate anemia.  Known history of congestive heart failure, tricuspid regurgitation, pulmonary hypertension.  Initial admitting diagnosis was acute on chronic dyspnea.  Known history of COPD, was found to be positive for COVID.  Patient started also on Decadron 6 mg daily.  Blood sugars initially were very labile.  Highest blood sugar was 593, this stabilized into 250 range, started on pureed soft diet.  Currently, on Lantus 10 units at night plus Humalog coverage.  Hemoglobin A1c was 6.9.  Chemistries were reviewed.  Sodium was 132, potassium 4.3, chloride was 102, CO2 was 18, BUN 32, creatinine 1.72.  Hemoglobin was 9.1.  The patient seen also here by Gastro, Infectious Disease.  Infectious Disease consult, impression COVID-19.  Obtain chest x-ray.    PAST MEDICAL HISTORY:  Significant for type 2 diabetes, history of coronary artery disease, history of COPD, anemia, blood transfusion, Meniere disease, vertigo.    PAST SURGICAL HISTORY:  Cardiac catheterization, cardiac surgery, upper GI endoscopy, skin biopsy.    FAMILY HISTORY:  Significant for diabetes and heart disease.    PERSONAL AND 
    Vascular Medicine and Interventional Radiology    Date of Consultation:2024    Isaak Mitchell  : 1950  MR #: [id number]    Consultant:TERRA Yap CNP  Consulting Physician: Dr. Serafin Leblanc, Vascular Medicine and Interventional Radiology     Consult Ordered By: TRICIA Herman            PCP:  Keith Yu MD     Attending Physician: Romero Altman MD     Date of Admission: 2024  7:32 PM     Chief Complaint:   Chief Complaint   Patient presents with    Chest Pain        Reason for Consultation: Large duodenal ulcer with adherent clot, not amenable to endoscopic clipping    History of Present Illness: 74 year old male admitted 10 days ago with symptomatic anemia, COPD exacerbation, acute on chronic diastolic heart failure.  He has had prolonged hospital course, developed new onset A-fib on 2024, anticoagulation started with subsequent drop in hemoglobin.  EGD 2024 with gastritis, erythema, and multiple duodenal ulcers with largest ulcer measuring 25 mm with adherent clot.  Per GI, not amenable to clip placement, Hemospray applied and no further bleeding noted at the time of endoscopy. IR consulted.   This morning, Hgb stable at 7.7 (was 7.7 yesterday evening as well).  He is tolerating clear liquid diet this morning, denies nausea, vomiting or abdominal pain.  He denies chest pain or shortness of breath.  States his last bowel movement was yesterday, states it was dark brown.  Denies hematochezia or melena.    Past Medical History:   has a past medical history of AMI (acute myocardial infarction) ([facility]), CAD S/P percutaneous coronary angioplasty, Cancer ([facility]), Chronic bronchitis ([facility]), COPD (chronic obstructive pulmonary disease) ([facility]), Diabetes mellitus (HCC), Diverticulitis, Emphysema of lung ([facility]), Heart attack ([facility]), History of blood transfusion, Hyperlipidemia, Hypertension, Meniere's disease, NSTEMI (non-ST elevated myocardial infarction) ([facility]), and 
Consult Note  Patient: Isaak Mitchell  Unit/Bed: W288/W288-01  YOB: 1950  MRN: [id number]  Acct: [id number]   Admitting Diagnosis: Dyspnea [R06.00]  Symptomatic anemia [D64.9]  Chest pain, unspecified type [R07.9]  Date:  12/8/2024  Hospital Day: 0      Chief Complaint:  Chest pain/SOB    History of Present Illness:    This is a pleasant 74-year-old  male with past medical history significant for coronary artery disease status post PCI of the RCA in February 2023 and subsequent PCI to the circumflex in March 2023, hypertension, dyslipidemia, diabetes, CKD, COPD not on home O2, former tobacco abuse and history of anemia who presented to [facility] ER on 12/8/2024 with chief complaint of chest pain and shortness of breath.  Patient reports that over the past several months he has been having intermittent chest pain and shortness of breath which is more pronounced with exertion.  He states that he is pretty sedentary and chest pain will occur when at rest and also if he exerts himself.  He describes his chest pain as a midsternal pressure and ache like discomfort which can last for days at a time and is exacerbated with movement.  He reports progressive shortness of breath with exertion over the past several months as well and does have some occasional shortness of breath at night when laying in bed.  He has a history of vertigo but no new dizziness or lightheadedness complaints.  He denies abdominal pain, nausea, vomiting, hematochezia or melena, dysuria hematuria, syncope, fever or chills.  Due to the chest pain and shortness of breath he presented to ER for further evaluation.    On presentation emergency room, blood pressure 146/71, heart rate 76, respiratory rate 24, pulse ox 96% on room air, temperature 98.7 °F.  Sodium 137, potassium 4.9, chloride 106, total CO2 16, BUN 19, creatinine 1.90, GFR low at 36.0, glucose elevated 238.  NT proBNP elevated at 2709.  High-sensitivity troponin elevated 
Department of Chronic Pain Managment  Attending Progress Note      SUBJECTIVE  CHEST PAIN    OBJECTIVE: Pt comes to The floor through ED for SOB has been coughing for few days with pain Parasternal areas both sides. Worse on coughing and deep breaths.    Medications  Current Facility-Administered Medications: traMADol (ULTRAM) tablet 50 mg, 50 mg, Oral, Q6H PRN  atorvastatin (LIPITOR) tablet 40 mg, 40 mg, Oral, Nightly  ipratropium 0.5 mg-albuterol 2.5 mg (DUONEB) nebulizer solution 1 Dose, 1 Dose, Inhalation, BID RT  ipratropium 0.5 mg-albuterol 2.5 mg (DUONEB) nebulizer solution 1 Dose, 1 Dose, Inhalation, Q4H PRN  sodium chloride flush 0.9 % injection 5-40 mL, 5-40 mL, IntraVENous, 2 times per day  sodium chloride flush 0.9 % injection 5-40 mL, 5-40 mL, IntraVENous, PRN  0.9 % sodium chloride infusion, , IntraVENous, PRN  enoxaparin Sodium (LOVENOX) injection 30 mg, 30 mg, SubCUTAneous, Daily  ondansetron (ZOFRAN-ODT) disintegrating tablet 4 mg, 4 mg, Oral, Q8H PRN **OR** ondansetron (ZOFRAN) injection 4 mg, 4 mg, IntraVENous, Q6H PRN  polyethylene glycol (GLYCOLAX) packet 17 g, 17 g, Oral, Daily PRN  acetaminophen (TYLENOL) tablet 650 mg, 650 mg, Oral, Q6H PRN **OR** acetaminophen (TYLENOL) suppository 650 mg, 650 mg, Rectal, Q6H PRN  glucose chewable tablet 16 g, 4 tablet, Oral, PRN  dextrose bolus 10% 125 mL, 125 mL, IntraVENous, PRN **OR** dextrose bolus 10% 250 mL, 250 mL, IntraVENous, PRN  glucagon injection 1 mg, 1 mg, SubCUTAneous, PRN  dextrose 10 % infusion, , IntraVENous, Continuous PRN  insulin lispro (HUMALOG,ADMELOG) injection vial 0-8 Units, 0-8 Units, SubCUTAneous, 4x Daily AC & HS  ROS:  Constitutional: Negative.    HENT: Negative.     Eyes: Negative.    Respiratory: Negative.     Cardiovascular: Negative.    Gastrointestinal: Negative.    Genitourinary: Negative.    Skin: Negative.    Psychiatric/Behavioral: Negative.     Physical  VITALS:  BP (!) 147/65   Pulse 87   Temp 99 °F (37.2 °C)   
Inpatient consult to GI  Consult performed by: Ambreen Cheung, TERRA - CNP  Consult ordered by: Alicia Zavala MD          Patient Name: Isaak Mitchell  Admit Date: 2024  7:32 PM  MR #: [id number]  : 1950    Attending Physician: Romero Altman MD  Reason for consult: anemia, r/o GI source    History of Presenting Illness:      Isaak Mitchell is a 74 y.o. male on hospital day 5 with a history of hypertension, dyslipidemia, diabetes, COPD, CINDY, diverticulitis, Ménière's disease, NSTEMI, coronary artery disease status post PCI of RCA in 2023 with subsequent PCI to circumflex in 2023.  Past surgical history of cardiac stents and skin biopsy.  Family history is negative for GI malignancy.  Social history reports former nicotine, no EtOH or illicit drug use.  History Obtained From:  patient, electronic medical record    GI consult for anemia r/o GI source: Patient was admitted with symptomatic anemia associated with chest pain and shortness of breath, had negative nuclear stress test on 12/10, developed new onset A-fib on  and anticoagulation was started with subsequent drop in hemoglobin to 6.4 from baseline of 10, carries a dx of CINDY. He di require one unit PRBC, hgb now 7.7. Most recent endoscopic hx was around , no records are available for review and patient cannot recall results.  He denies nausea or vomiting, abdominal pain, dysphagia, changes in appetite, unintentional weight loss, reports chronic altered bowel habits between diarrhea and constipation.  Denies family history of colorectal cancer or IBD.  He denies overt blood loss, no melena, or hematochezia.  Patient was previously seen in the outpatient setting by GI in  with recommendations to proceed with EGD and colonoscopy however he did not follow-up accordingly.      Prior GI office notes 23:  This is a very pleasant 73-year-old came for evaluation management of anemia.  She mentioned that she was told to 
[facility], Department of Psychiatry  Behavioral Health Consult    REASON FOR CONSULT: Hallucination    CONSULTING PHYSICIAN: Dr Zavala    History obtained from: Patient    HISTORY OF PRESENT ILLNESS:      The patient is a 74 y.o. male with no significant past psychiatric history of mental illness who presents with AMS  Pt was c/o VH past 6 months mostly in the evening and night not daily but on and off  Pt is not fearful of seeing the dog or cat in his room  Pt sleep is disturbed - have chronic insomnia  Appetite is good  Not depressed or anxious  Pt is a [place] , used to take Paxil many years ago for anxiety  Denies any PTSD symptoms  Not anxious  Pt was hypoxic yesterday which could have contributed to worsening confusion and hallucination  Pt memory seem to be reasonably intact  No delusions  Pt live with his partner at home.  Took seroquel last night with good effect      The patient is not currently receiving care for the above psychiatric illness.      Psychiatric Review of Systems       Depression: denies     Tea or Hypomania:  no     Panic Attacks:  no     Phobias:  no     Obsessions and Compulsions:  no     PTSD : no     Hallucinations:  yes - VH     Delusions:  no      Substance Abuse History:  ETOH: no  Marijuana: no  Opiates: no  Other Drugs: no      Past Medical History:        Diagnosis Date    AMI (acute myocardial infarction) ([facility]) 12/12/2020    CAD S/P percutaneous coronary angioplasty 2/7/2023    Cancer ([facility])     skin left neck    Chronic bronchitis (HCC)     COPD (chronic obstructive pulmonary disease) ([facility])     Diabetes mellitus (HCC)     Diverticulitis     Emphysema of lung (HCC)     Heart attack ([facility])     History of blood transfusion     Hyperlipidemia     Hypertension     Meniere's disease     NSTEMI (non-ST elevated myocardial infarction) ([facility]) 12/11/2020    Vertigo        Past Surgical History:        Procedure Laterality Date    CARDIAC SURGERY      stent    SKIN 
[facility], Department of Psychiatry  Behavioral Health Consult    REASON FOR CONSULT: Paty brito    CONSULTING PHYSICIAN: Dr Zavala    History obtained from: Patient    HISTORY OF PRESENT ILLNESS:      The patient is a 74 y.o. male with no significant past psychiatric history of  who presents with SOB. Pt was recommended to go to SNF which he declines  1. Able to understand the nature and purpose of the proposed treatment  2. Able understand the risk benefit alternative options to the proposed treatment  3. Decision making process is not affected by depression, psychosis  4. Alert and oriented x 3  5. Able to retain information given to him  6. Able to arrive at the decision based on the information available to him    The patient is not currently receiving care for the above psychiatric illness.      Psychiatric Review of Systems       Depression: denies     Tea or Hypomania:  no     Panic Attacks:  no     Phobias:  no     Obsessions and Compulsions:  no     PTSD : no     Hallucinations:  no     Delusions:  no      Substance Abuse History:  ETOH: no  Marijuana: no  Opiates: no  Other Drugs: no      Past Psychiatric History:  No past illness    Past Medical History:        Diagnosis Date    AMI (acute myocardial infarction) ([facility]) 12/12/2020    CAD S/P percutaneous coronary angioplasty 2/7/2023    Cancer ([facility])     skin left neck    Chronic bronchitis ([facility])     COPD (chronic obstructive pulmonary disease) ([facility])     Diabetes mellitus ([facility])     Diverticulitis     Emphysema of lung (HCC)     Heart attack ([facility])     History of blood transfusion     Hyperlipidemia     Hypertension     Meniere's disease     NSTEMI (non-ST elevated myocardial infarction) ([facility]) 12/11/2020    Vertigo        Past Surgical History:        Procedure Laterality Date    CARDIAC SURGERY      stent    SKIN BIOPSY      left neck       Medications Prior to Admission:   Medications Prior to Admission: atorvastatin (LIPITOR) 40 MG tablet, 
Patient to CT recovery post coil embolization of gastroduodenal artery and small duodenal branch from the GDA.  Notified per nursing patient with nausea postprocedure, was given Zofran IV per nursing prior to leaving Cath Lab.  Patient currently appears lethargic post procedure, however easily arousable to verbal stimuli.  He denies chest pain, shortness of breath, abdominal pain, or nausea at this time.  Temperature 99.1.  He is hypertensive with systolics in the 190s/200 range. Right groin site C/D/I, no hematoma. 2+pedal pulses noted. Able to wiggle his toes. Per nursing, he did not receive his Cozaar or Coreg this morning prior to procedure.  He is oriented to person and place at this time.  Notified Dr. Altman, patient's nausea improved at this point with zofran.  
Pulmonary Medicine  Consult Note      Reason for consultation:Shortness of breath    Consulting physician: ALLIE Tovar    HISTORY OF PRESENT ILLNESS:    This is a 74-year-old male presenting to the ED for the third time in approximately the past week complaining of shortness of breath, generalized pain, and subjective weakness.  He has history of COPD, diastolic heart failure  with RVSP 50, moderate tricuspid regurgitation, pulmonary hypertension (per echo 2023) ,Does have a history of a GI bleed .Patient has not reported any hemoptysis, hematemesis, melena, hematochezia, or hematuria. He denies abdominal pain.  As per RN he has been wheezing off-and-on.    Past Medical History:        Diagnosis Date    AMI (acute myocardial infarction) ([facility]) 12/12/2020    CAD S/P percutaneous coronary angioplasty 2/7/2023    Cancer ([facility])     skin left neck    Chronic bronchitis (HCC)     COPD (chronic obstructive pulmonary disease) (HCC)     Diabetes mellitus (HCC)     Diverticulitis     Emphysema of lung (HCC)     Heart attack (HCC)     History of blood transfusion     Hyperlipidemia     Hypertension     Meniere's disease     NSTEMI (non-ST elevated myocardial infarction) ([facility]) 12/11/2020    Vertigo        Past Surgical History:        Procedure Laterality Date    CARDIAC SURGERY      stent    SKIN BIOPSY      left neck       Social History:     reports that he quit smoking about 4 years ago. His smoking use included cigars and cigarettes. He started smoking about 44 years ago. He has a 40 pack-year smoking history. He has never used smokeless tobacco. He reports that he does not drink alcohol and does not use drugs.    Family History:       Problem Relation Age of Onset    Heart Disease Mother     Diabetes Mother        Allergies:  Patient has no known allergies.      MEDICATIONS during current hospitalization:    Continuous Infusions:   sodium chloride      dextrose         Scheduled Meds:   QUEtiapine  25 mg Oral 
Spiritual Health History and Assessment/Progress Note  [facility]    Advance Care Planning, Loneliness/Social Isolation,  ,  ,      Name: Isaak Mitchell MRN: [id number]    Age: 74 y.o.     Sex: male   Language: English   Bahai: Evangelical   Dyspnea     Date: 12/13/2024            Total Time Calculated: 15 min              Spiritual Assessment began in [facility] 2W ORTHO TELE        Referral/Consult From: Family   Encounter Overview/Reason: Advance Care Planning, Loneliness/Social Isolation  Service Provided For: Patient    Patient was awake alert upright in bed. Patient wanted to talk about ACP upon entrance. Patient stated that he did not want to make a commitment regarding a POA Living Will or Code status at this point. Patient said his son is the one initiating.  discussed primary decision maker and next of kin law. Patient was ok with naming his three kids as primary on his chart. Patient did state daughter Shelbie has not been around for quit awhile and could not provide a number to reach her.     Gem, Belief, Meaning:   Patient has beliefs or practices that help with coping during difficult times  Family/Friends No family/friends present      Importance and Influence:  Patient has spiritual/personal beliefs that influence decisions regarding their health  Family/Friends No family/friends present    Community:  Patient feels well-supported. Support system includes: Spouse/Partner, Children, and Extended family  Family/Friends No family/friends present    Assessment and Plan of Care:     Patient Interventions include: Facilitated expression of thoughts and feelings, Engaged in theological reflection, Affirmed coping skills/support systems, and Assisted in Advance Care Planning conversation  Family/Friends Interventions include: No family/friends present    Patient Plan of Care: Spiritual Care available upon further referral  Family/Friends Plan of Care: No family/friends 
chronic kidney disease, unspecified whether stage 3a or 3b CKD (HCC)    Type 2 diabetes mellitus with chronic kidney disease    Dyspnea    Symptomatic anemia    COPD exacerbation (HCC)    Acute on chronic diastolic heart failure (HCC)    Pulmonary hypertension (HCC)    Delirium    Adjustment disorder    CINDY (iron deficiency anemia)         PLAN:  COVID-19 infection    Obtain chest x-ray  Patient on steroids at baseline 2 L a minute  Had some exacerbation of COPD on initial admission to the hospital 10 days ago    Since patient slightly more hypoxic start patient on dexamethasone for the COVID-19

## 2024-12-25 LAB
ANION GAP SERPL CALCULATED.3IONS-SCNC: 10 MEQ/L (ref 9–15)
BASOPHILS # BLD: 0 K/UL (ref 0–0.2)
BASOPHILS NFR BLD: 0.1 %
BUN SERPL-MCNC: 32 MG/DL (ref 8–23)
CALCIUM SERPL-MCNC: 7.4 MG/DL (ref 8.5–9.9)
CHLORIDE SERPL-SCNC: 105 MEQ/L (ref 95–107)
CO2 SERPL-SCNC: 20 MEQ/L (ref 20–31)
CREAT SERPL-MCNC: 1.74 MG/DL (ref 0.7–1.2)
EOSINOPHIL # BLD: 0 K/UL (ref 0–0.7)
EOSINOPHIL NFR BLD: 0.3 %
ERYTHROCYTE [DISTWIDTH] IN BLOOD BY AUTOMATED COUNT: 13.7 % (ref 11.5–14.5)
GLUCOSE BLD-MCNC: 265 MG/DL (ref 70–99)
GLUCOSE BLD-MCNC: 338 MG/DL (ref 70–99)
GLUCOSE BLD-MCNC: 388 MG/DL (ref 70–99)
GLUCOSE BLD-MCNC: 94 MG/DL (ref 70–99)
GLUCOSE SERPL-MCNC: 139 MG/DL (ref 70–99)
HCT VFR BLD AUTO: 24.4 % (ref 42–52)
HGB BLD-MCNC: 8.5 G/DL (ref 14–18)
LYMPHOCYTES # BLD: 0.5 K/UL (ref 1–4.8)
LYMPHOCYTES NFR BLD: 7.8 %
MCH RBC QN AUTO: 30.7 PG (ref 27–31.3)
MCHC RBC AUTO-ENTMCNC: 34.8 % (ref 33–37)
MCV RBC AUTO: 88.1 FL (ref 79–92.2)
MONOCYTES # BLD: 0.3 K/UL (ref 0.2–0.8)
MONOCYTES NFR BLD: 4.6 %
NEUTROPHILS # BLD: 6 K/UL (ref 1.4–6.5)
NEUTS SEG NFR BLD: 86.5 %
PERFORMED ON: ABNORMAL
PERFORMED ON: NORMAL
PLATELET # BLD AUTO: 188 K/UL (ref 130–400)
POTASSIUM SERPL-SCNC: 4.4 MEQ/L (ref 3.4–4.9)
RBC # BLD AUTO: 2.77 M/UL (ref 4.7–6.1)
SODIUM SERPL-SCNC: 135 MEQ/L (ref 135–144)
WBC # BLD AUTO: 6.9 K/UL (ref 4.8–10.8)

## 2024-12-25 PROCEDURE — 2500000003 HC RX 250 WO HCPCS: Performed by: INTERNAL MEDICINE

## 2024-12-25 PROCEDURE — 6360000002 HC RX W HCPCS: Performed by: INTERNAL MEDICINE

## 2024-12-25 PROCEDURE — 6370000000 HC RX 637 (ALT 250 FOR IP): Performed by: INTERNAL MEDICINE

## 2024-12-25 PROCEDURE — 6370000000 HC RX 637 (ALT 250 FOR IP): Performed by: PHYSICIAN ASSISTANT

## 2024-12-25 PROCEDURE — 99232 SBSQ HOSP IP/OBS MODERATE 35: CPT | Performed by: INTERNAL MEDICINE

## 2024-12-25 PROCEDURE — 1210000000 HC MED SURG R&B

## 2024-12-25 PROCEDURE — 36415 COLL VENOUS BLD VENIPUNCTURE: CPT

## 2024-12-25 PROCEDURE — 6370000000 HC RX 637 (ALT 250 FOR IP): Performed by: NURSE PRACTITIONER

## 2024-12-25 PROCEDURE — 85025 COMPLETE CBC W/AUTO DIFF WBC: CPT

## 2024-12-25 PROCEDURE — 80048 BASIC METABOLIC PNL TOTAL CA: CPT

## 2024-12-25 PROCEDURE — 6360000002 HC RX W HCPCS

## 2024-12-25 RX ADMIN — INSULIN LISPRO 4 UNITS: 100 INJECTION, SOLUTION INTRAVENOUS; SUBCUTANEOUS at 11:48

## 2024-12-25 RX ADMIN — CARVEDILOL 3.12 MG: 3.12 TABLET, FILM COATED ORAL at 08:12

## 2024-12-25 RX ADMIN — PANTOPRAZOLE SODIUM 40 MG: 40 INJECTION, POWDER, FOR SOLUTION INTRAVENOUS at 08:10

## 2024-12-25 RX ADMIN — Medication 10 ML: at 08:10

## 2024-12-25 RX ADMIN — WATER 1000 MG: 1 INJECTION INTRAMUSCULAR; INTRAVENOUS; SUBCUTANEOUS at 17:14

## 2024-12-25 RX ADMIN — LOSARTAN POTASSIUM 25 MG: 25 TABLET, FILM COATED ORAL at 08:23

## 2024-12-25 RX ADMIN — Medication 10 ML: at 19:45

## 2024-12-25 RX ADMIN — INSULIN LISPRO 8 UNITS: 100 INJECTION, SOLUTION INTRAVENOUS; SUBCUTANEOUS at 17:16

## 2024-12-25 RX ADMIN — DEXAMETHASONE 6 MG: 4 TABLET ORAL at 08:13

## 2024-12-25 RX ADMIN — ATORVASTATIN CALCIUM 40 MG: 40 TABLET, FILM COATED ORAL at 19:34

## 2024-12-25 RX ADMIN — PANTOPRAZOLE SODIUM 40 MG: 40 INJECTION, POWDER, FOR SOLUTION INTRAVENOUS at 19:34

## 2024-12-25 RX ADMIN — PAROXETINE HYDROCHLORIDE 40 MG: 30 TABLET, FILM COATED ORAL at 08:11

## 2024-12-25 RX ADMIN — INSULIN GLARGINE 10 UNITS: 100 INJECTION, SOLUTION SUBCUTANEOUS at 08:10

## 2024-12-25 RX ADMIN — INSULIN LISPRO 6 UNITS: 100 INJECTION, SOLUTION INTRAVENOUS; SUBCUTANEOUS at 20:47

## 2024-12-25 RX ADMIN — DOCUSATE SODIUM 100 MG: 100 CAPSULE, LIQUID FILLED ORAL at 19:34

## 2024-12-25 RX ADMIN — INSULIN GLARGINE 10 UNITS: 100 INJECTION, SOLUTION SUBCUTANEOUS at 19:33

## 2024-12-25 RX ADMIN — INSULIN LISPRO 3 UNITS: 100 INJECTION, SOLUTION INTRAVENOUS; SUBCUTANEOUS at 17:17

## 2024-12-25 RX ADMIN — INSULIN LISPRO 3 UNITS: 100 INJECTION, SOLUTION INTRAVENOUS; SUBCUTANEOUS at 11:48

## 2024-12-25 RX ADMIN — HYDRALAZINE HYDROCHLORIDE 10 MG: 20 INJECTION INTRAMUSCULAR; INTRAVENOUS at 19:34

## 2024-12-25 RX ADMIN — CARVEDILOL 3.12 MG: 3.12 TABLET, FILM COATED ORAL at 17:22

## 2024-12-25 RX ADMIN — DOCUSATE SODIUM 100 MG: 100 CAPSULE, LIQUID FILLED ORAL at 08:12

## 2024-12-25 RX ADMIN — QUETIAPINE FUMARATE 50 MG: 50 TABLET ORAL at 19:34

## 2024-12-25 ASSESSMENT — PAIN SCALES - GENERAL: PAINLEVEL_OUTOF10: 0

## 2024-12-25 NOTE — FLOWSHEET NOTE
12/25/24 1727   Treatment Team Notification   Reason for Communication Evaluate   Type of Critical Result POC test  (bs 388)   Critical Lab Information bs 388   Person Result Received From PCA   Critical Lab Result Type Glucose  (bs 388)   Critical POC Result Type Glucose  (388)   Name of Team Member Notified Dr. Altman   Treatment Team Role Attending Provider   Method of Communication Secure Message   Notification Time [id number]

## 2024-12-26 ENCOUNTER — APPOINTMENT (OUTPATIENT)
Dept: CT IMAGING | Age: 74
DRG: 987 | End: 2024-12-26
Payer: MEDICARE

## 2024-12-26 LAB
ANION GAP SERPL CALCULATED.3IONS-SCNC: 14 MEQ/L (ref 9–15)
BASOPHILS # BLD: 0 K/UL (ref 0–0.2)
BASOPHILS NFR BLD: 0.1 %
BUN SERPL-MCNC: 26 MG/DL (ref 8–23)
CALCIUM SERPL-MCNC: 7.9 MG/DL (ref 8.5–9.9)
CHLORIDE SERPL-SCNC: 102 MEQ/L (ref 95–107)
CO2 SERPL-SCNC: 19 MEQ/L (ref 20–31)
CREAT SERPL-MCNC: 1.56 MG/DL (ref 0.7–1.2)
EOSINOPHIL # BLD: 0.1 K/UL (ref 0–0.7)
EOSINOPHIL NFR BLD: 1.1 %
ERYTHROCYTE [DISTWIDTH] IN BLOOD BY AUTOMATED COUNT: 13.9 % (ref 11.5–14.5)
GLUCOSE BLD-MCNC: 115 MG/DL (ref 70–99)
GLUCOSE BLD-MCNC: 131 MG/DL (ref 70–99)
GLUCOSE BLD-MCNC: 136 MG/DL (ref 70–99)
GLUCOSE BLD-MCNC: 195 MG/DL (ref 70–99)
GLUCOSE BLD-MCNC: 65 MG/DL (ref 70–99)
GLUCOSE SERPL-MCNC: 62 MG/DL (ref 70–99)
HCT VFR BLD AUTO: 26.9 % (ref 42–52)
HGB BLD-MCNC: 9.4 G/DL (ref 14–18)
LYMPHOCYTES # BLD: 0.7 K/UL (ref 1–4.8)
LYMPHOCYTES NFR BLD: 10.4 %
MCH RBC QN AUTO: 30.5 PG (ref 27–31.3)
MCHC RBC AUTO-ENTMCNC: 34.9 % (ref 33–37)
MCV RBC AUTO: 87.3 FL (ref 79–92.2)
MONOCYTES # BLD: 0.3 K/UL (ref 0.2–0.8)
MONOCYTES NFR BLD: 4.7 %
NEUTROPHILS # BLD: 5.8 K/UL (ref 1.4–6.5)
NEUTS SEG NFR BLD: 82.7 %
PERFORMED ON: ABNORMAL
PLATELET # BLD AUTO: 214 K/UL (ref 130–400)
POTASSIUM SERPL-SCNC: 4.2 MEQ/L (ref 3.4–4.9)
PROCALCITONIN SERPL IA-MCNC: 0.32 NG/ML (ref 0–0.15)
RBC # BLD AUTO: 3.08 M/UL (ref 4.7–6.1)
SODIUM SERPL-SCNC: 135 MEQ/L (ref 135–144)
TROPONIN, HIGH SENSITIVITY: 35 NG/L (ref 0–19)
TROPONIN, HIGH SENSITIVITY: 42 NG/L (ref 0–19)
WBC # BLD AUTO: 7 K/UL (ref 4.8–10.8)

## 2024-12-26 PROCEDURE — 6370000000 HC RX 637 (ALT 250 FOR IP): Performed by: INTERNAL MEDICINE

## 2024-12-26 PROCEDURE — 2500000003 HC RX 250 WO HCPCS: Performed by: INTERNAL MEDICINE

## 2024-12-26 PROCEDURE — 84484 ASSAY OF TROPONIN QUANT: CPT

## 2024-12-26 PROCEDURE — 99231 SBSQ HOSP IP/OBS SF/LOW 25: CPT | Performed by: INTERNAL MEDICINE

## 2024-12-26 PROCEDURE — 36415 COLL VENOUS BLD VENIPUNCTURE: CPT

## 2024-12-26 PROCEDURE — 1210000000 HC MED SURG R&B

## 2024-12-26 PROCEDURE — 97535 SELF CARE MNGMENT TRAINING: CPT

## 2024-12-26 PROCEDURE — 6360000004 HC RX CONTRAST MEDICATION: Performed by: INTERNAL MEDICINE

## 2024-12-26 PROCEDURE — 71275 CT ANGIOGRAPHY CHEST: CPT

## 2024-12-26 PROCEDURE — 85025 COMPLETE CBC W/AUTO DIFF WBC: CPT

## 2024-12-26 PROCEDURE — 6360000002 HC RX W HCPCS: Performed by: INTERNAL MEDICINE

## 2024-12-26 PROCEDURE — 6370000000 HC RX 637 (ALT 250 FOR IP): Performed by: PHYSICIAN ASSISTANT

## 2024-12-26 PROCEDURE — 93005 ELECTROCARDIOGRAM TRACING: CPT | Performed by: INTERNAL MEDICINE

## 2024-12-26 PROCEDURE — 80048 BASIC METABOLIC PNL TOTAL CA: CPT

## 2024-12-26 PROCEDURE — 99233 SBSQ HOSP IP/OBS HIGH 50: CPT | Performed by: INTERNAL MEDICINE

## 2024-12-26 PROCEDURE — 99232 SBSQ HOSP IP/OBS MODERATE 35: CPT | Performed by: INTERNAL MEDICINE

## 2024-12-26 PROCEDURE — 84145 PROCALCITONIN (PCT): CPT

## 2024-12-26 RX ORDER — INSULIN LISPRO 100 [IU]/ML
0-4 INJECTION, SOLUTION INTRAVENOUS; SUBCUTANEOUS
Status: DISCONTINUED | OUTPATIENT
Start: 2024-12-26 | End: 2024-12-29

## 2024-12-26 RX ORDER — ENOXAPARIN SODIUM 100 MG/ML
1 INJECTION SUBCUTANEOUS 2 TIMES DAILY
Status: CANCELLED | OUTPATIENT
Start: 2024-12-26

## 2024-12-26 RX ORDER — IOPAMIDOL 755 MG/ML
75 INJECTION, SOLUTION INTRAVASCULAR
Status: COMPLETED | OUTPATIENT
Start: 2024-12-26 | End: 2024-12-26

## 2024-12-26 RX ORDER — ENOXAPARIN SODIUM 100 MG/ML
1 INJECTION SUBCUTANEOUS 2 TIMES DAILY
Status: DISCONTINUED | OUTPATIENT
Start: 2024-12-26 | End: 2024-12-30 | Stop reason: HOSPADM

## 2024-12-26 RX ORDER — INSULIN LISPRO 100 [IU]/ML
0-4 INJECTION, SOLUTION INTRAVENOUS; SUBCUTANEOUS
Status: CANCELLED | OUTPATIENT
Start: 2024-12-26

## 2024-12-26 RX ADMIN — Medication 10 ML: at 21:41

## 2024-12-26 RX ADMIN — ATORVASTATIN CALCIUM 40 MG: 40 TABLET, FILM COATED ORAL at 21:29

## 2024-12-26 RX ADMIN — PANTOPRAZOLE SODIUM 40 MG: 40 INJECTION, POWDER, FOR SOLUTION INTRAVENOUS at 21:29

## 2024-12-26 RX ADMIN — PROCHLORPERAZINE EDISYLATE 10 MG: 5 INJECTION INTRAMUSCULAR; INTRAVENOUS at 21:29

## 2024-12-26 RX ADMIN — IOPAMIDOL 75 ML: 755 INJECTION, SOLUTION INTRAVENOUS at 16:57

## 2024-12-26 RX ADMIN — CARVEDILOL 3.12 MG: 3.12 TABLET, FILM COATED ORAL at 17:35

## 2024-12-26 RX ADMIN — PAROXETINE HYDROCHLORIDE 40 MG: 30 TABLET, FILM COATED ORAL at 08:12

## 2024-12-26 RX ADMIN — ACETAMINOPHEN 650 MG: 325 TABLET ORAL at 21:36

## 2024-12-26 RX ADMIN — DOCUSATE SODIUM 100 MG: 100 CAPSULE, LIQUID FILLED ORAL at 08:12

## 2024-12-26 RX ADMIN — PANTOPRAZOLE SODIUM 40 MG: 40 INJECTION, POWDER, FOR SOLUTION INTRAVENOUS at 08:14

## 2024-12-26 RX ADMIN — SODIUM CHLORIDE, PRESERVATIVE FREE 20 ML: 5 INJECTION INTRAVENOUS at 21:30

## 2024-12-26 RX ADMIN — ENOXAPARIN SODIUM 70 MG: 100 INJECTION SUBCUTANEOUS at 21:28

## 2024-12-26 RX ADMIN — Medication 16 G: at 08:13

## 2024-12-26 RX ADMIN — WATER 1000 MG: 1 INJECTION INTRAMUSCULAR; INTRAVENOUS; SUBCUTANEOUS at 17:28

## 2024-12-26 RX ADMIN — ENOXAPARIN SODIUM 70 MG: 100 INJECTION SUBCUTANEOUS at 12:59

## 2024-12-26 RX ADMIN — Medication 10 ML: at 08:19

## 2024-12-26 RX ADMIN — ACETAMINOPHEN 650 MG: 325 TABLET ORAL at 15:16

## 2024-12-26 RX ADMIN — QUETIAPINE FUMARATE 50 MG: 50 TABLET ORAL at 21:29

## 2024-12-26 RX ADMIN — CARVEDILOL 3.12 MG: 3.12 TABLET, FILM COATED ORAL at 08:12

## 2024-12-26 RX ADMIN — LOSARTAN POTASSIUM 25 MG: 25 TABLET, FILM COATED ORAL at 08:12

## 2024-12-26 RX ADMIN — FERROUS GLUCONATE 324 MG: 324 TABLET ORAL at 08:38

## 2024-12-26 ASSESSMENT — PAIN DESCRIPTION - DESCRIPTORS: DESCRIPTORS: POUNDING

## 2024-12-26 ASSESSMENT — PAIN SCALES - GENERAL: PAINLEVEL_OUTOF10: 0

## 2024-12-26 ASSESSMENT — PAIN DESCRIPTION - LOCATION: LOCATION: HEAD

## 2024-12-26 ASSESSMENT — PAIN SCALES - WONG BAKER: WONGBAKER_NUMERICALRESPONSE: NO HURT

## 2024-12-26 NOTE — CARE COORDINATION
P2P WAS COMPLETED AND OVERTURNED THE DENIAL.  PT WILL TRANSFER TO [facility] TODAY VIA [facility] AT 3:15PM

## 2024-12-26 NOTE — CARE COORDINATION
LSW SPOKE WITH PITER AT [facility] TO CHECK ON PRECERT.  PRECERT STILL PENDING.  UPDATED PT NOTE FOR TODAY IF NEEDED FOR APPROVAL.      PITER CALLED AND SAID THAT INSURANCE HAS DENIED [facility] SNF AND THEY FEEL THAT THE PT NEEDS A SLOWER PACED SNF.  PEER TO PEER OFFERED AND PHYSICIAN AWARE.

## 2024-12-26 NOTE — CARE COORDINATION
Chart reviewed . Pulmonology notes and new orders reviewed . Patient has a fever , ECG and CT order for this evening. Pulmonology did not sign off on the case. Patient was scheduled for transfer to skilled this afternoon.  notified of findings. Patient is not medically clear for skilled admission. Per  to hold of on transfer till tomorrow and reassess. CM notified SS and patient nurse. Continue to follow.

## 2024-12-26 NOTE — DISCHARGE INSTR - DIET
Good nutrition is important when healing from an illness, injury, or surgery.  Follow any nutrition recommendations given to you during your hospital stay.   If you were given an oral nutrition supplement while in the hospital, continue to take this supplement at home.  You can take it with meals, in-between meals, and/or before bedtime. These supplements can be purchased at most local grocery stores, pharmacies, and chain [employer]-stores.   If you have any questions about your diet or nutrition, call the hospital and ask for the dietitian.    Dysphagia soft and bite sized/ 5g carb choice, with Oral Nutritional Supplement

## 2024-12-26 NOTE — CARE COORDINATION
Call received from [facility]. Patient is more appropriate for slower SNF . P2P offered by 2 pm today 12/26/24. Phone # [phone] option 5. CM to follow.

## 2024-12-27 LAB
ANION GAP SERPL CALCULATED.3IONS-SCNC: 13 MEQ/L (ref 9–15)
BACTERIA BLD CULT ORG #2: NORMAL
BACTERIA BLD CULT: NORMAL
BASOPHILS # BLD: 0 K/UL (ref 0–0.2)
BASOPHILS NFR BLD: 0.1 %
BUN SERPL-MCNC: 35 MG/DL (ref 8–23)
CALCIUM SERPL-MCNC: 7.5 MG/DL (ref 8.5–9.9)
CHLORIDE SERPL-SCNC: 99 MEQ/L (ref 95–107)
CO2 SERPL-SCNC: 16 MEQ/L (ref 20–31)
CREAT SERPL-MCNC: 2.19 MG/DL (ref 0.7–1.2)
EKG ATRIAL RATE: 74 BPM
EKG P AXIS: 74 DEGREES
EKG P-R INTERVAL: 166 MS
EKG Q-T INTERVAL: 396 MS
EKG QRS DURATION: 88 MS
EKG QTC CALCULATION (BAZETT): 439 MS
EKG R AXIS: -29 DEGREES
EKG T AXIS: 62 DEGREES
EKG VENTRICULAR RATE: 74 BPM
EOSINOPHIL # BLD: 0.1 K/UL (ref 0–0.7)
EOSINOPHIL NFR BLD: 1.1 %
ERYTHROCYTE [DISTWIDTH] IN BLOOD BY AUTOMATED COUNT: 14.3 % (ref 11.5–14.5)
GLUCOSE BLD-MCNC: 104 MG/DL (ref 70–99)
GLUCOSE BLD-MCNC: 104 MG/DL (ref 70–99)
GLUCOSE BLD-MCNC: 115 MG/DL (ref 70–99)
GLUCOSE BLD-MCNC: 85 MG/DL (ref 70–99)
GLUCOSE SERPL-MCNC: 99 MG/DL (ref 70–99)
HCT VFR BLD AUTO: 27.3 % (ref 42–52)
HGB BLD-MCNC: 9.6 G/DL (ref 14–18)
LYMPHOCYTES # BLD: 0.9 K/UL (ref 1–4.8)
LYMPHOCYTES NFR BLD: 10 %
MCH RBC QN AUTO: 31 PG (ref 27–31.3)
MCHC RBC AUTO-ENTMCNC: 35.2 % (ref 33–37)
MCV RBC AUTO: 88.1 FL (ref 79–92.2)
MONOCYTES # BLD: 0.3 K/UL (ref 0.2–0.8)
MONOCYTES NFR BLD: 2.9 %
NEUTROPHILS # BLD: 7.4 K/UL (ref 1.4–6.5)
NEUTS SEG NFR BLD: 85 %
PERFORMED ON: ABNORMAL
PERFORMED ON: NORMAL
PLATELET # BLD AUTO: 186 K/UL (ref 130–400)
POTASSIUM SERPL-SCNC: 4.4 MEQ/L (ref 3.4–4.9)
PROCALCITONIN SERPL IA-MCNC: 0.59 NG/ML (ref 0–0.15)
RBC # BLD AUTO: 3.1 M/UL (ref 4.7–6.1)
SODIUM SERPL-SCNC: 128 MEQ/L (ref 135–144)
WBC # BLD AUTO: 8.8 K/UL (ref 4.8–10.8)

## 2024-12-27 PROCEDURE — 6370000000 HC RX 637 (ALT 250 FOR IP): Performed by: INTERNAL MEDICINE

## 2024-12-27 PROCEDURE — 80048 BASIC METABOLIC PNL TOTAL CA: CPT

## 2024-12-27 PROCEDURE — 84145 PROCALCITONIN (PCT): CPT

## 2024-12-27 PROCEDURE — 93010 ELECTROCARDIOGRAM REPORT: CPT | Performed by: INTERNAL MEDICINE

## 2024-12-27 PROCEDURE — 2500000003 HC RX 250 WO HCPCS: Performed by: INTERNAL MEDICINE

## 2024-12-27 PROCEDURE — 6370000000 HC RX 637 (ALT 250 FOR IP): Performed by: PHYSICIAN ASSISTANT

## 2024-12-27 PROCEDURE — 36415 COLL VENOUS BLD VENIPUNCTURE: CPT

## 2024-12-27 PROCEDURE — 6360000002 HC RX W HCPCS: Performed by: INTERNAL MEDICINE

## 2024-12-27 PROCEDURE — 85025 COMPLETE CBC W/AUTO DIFF WBC: CPT

## 2024-12-27 PROCEDURE — 1210000000 HC MED SURG R&B

## 2024-12-27 PROCEDURE — 99232 SBSQ HOSP IP/OBS MODERATE 35: CPT | Performed by: INTERNAL MEDICINE

## 2024-12-27 PROCEDURE — 6370000000 HC RX 637 (ALT 250 FOR IP)

## 2024-12-27 RX ORDER — PANTOPRAZOLE SODIUM 40 MG/1
40 TABLET, DELAYED RELEASE ORAL
Status: DISCONTINUED | OUTPATIENT
Start: 2024-12-27 | End: 2024-12-30 | Stop reason: HOSPADM

## 2024-12-27 RX ADMIN — Medication 10 ML: at 21:08

## 2024-12-27 RX ADMIN — LOSARTAN POTASSIUM 25 MG: 25 TABLET, FILM COATED ORAL at 10:11

## 2024-12-27 RX ADMIN — DOCUSATE SODIUM 100 MG: 100 CAPSULE, LIQUID FILLED ORAL at 10:11

## 2024-12-27 RX ADMIN — ATORVASTATIN CALCIUM 40 MG: 40 TABLET, FILM COATED ORAL at 20:53

## 2024-12-27 RX ADMIN — ACETAMINOPHEN 650 MG: 325 TABLET ORAL at 21:12

## 2024-12-27 RX ADMIN — CARVEDILOL 3.12 MG: 3.12 TABLET, FILM COATED ORAL at 10:33

## 2024-12-27 RX ADMIN — QUETIAPINE FUMARATE 50 MG: 50 TABLET ORAL at 20:53

## 2024-12-27 RX ADMIN — PANTOPRAZOLE SODIUM 40 MG: 40 INJECTION, POWDER, FOR SOLUTION INTRAVENOUS at 10:10

## 2024-12-27 RX ADMIN — ENOXAPARIN SODIUM 70 MG: 100 INJECTION SUBCUTANEOUS at 10:14

## 2024-12-27 RX ADMIN — PAROXETINE HYDROCHLORIDE 40 MG: 30 TABLET, FILM COATED ORAL at 10:11

## 2024-12-27 RX ADMIN — GUAIFENESIN SYRUP AND DEXTROMETHORPHAN 5 ML: 100; 10 SYRUP ORAL at 20:53

## 2024-12-27 RX ADMIN — ENOXAPARIN SODIUM 70 MG: 100 INJECTION SUBCUTANEOUS at 20:54

## 2024-12-27 RX ADMIN — PANTOPRAZOLE SODIUM 40 MG: 40 TABLET, DELAYED RELEASE ORAL at 15:38

## 2024-12-27 RX ADMIN — DOCUSATE SODIUM 100 MG: 100 CAPSULE, LIQUID FILLED ORAL at 20:53

## 2024-12-27 RX ADMIN — WATER 1000 MG: 1 INJECTION INTRAMUSCULAR; INTRAVENOUS; SUBCUTANEOUS at 18:42

## 2024-12-27 RX ADMIN — Medication 10 ML: at 10:11

## 2024-12-27 RX ADMIN — CARVEDILOL 3.12 MG: 3.12 TABLET, FILM COATED ORAL at 18:42

## 2024-12-27 ASSESSMENT — PAIN DESCRIPTION - LOCATION: LOCATION: GENERALIZED

## 2024-12-27 ASSESSMENT — PAIN DESCRIPTION - DESCRIPTORS: DESCRIPTORS: ACHING

## 2024-12-27 ASSESSMENT — PAIN SCALES - GENERAL: PAINLEVEL_OUTOF10: 3

## 2024-12-27 ASSESSMENT — PAIN SCALES - WONG BAKER: WONGBAKER_NUMERICALRESPONSE: NO HURT

## 2024-12-27 NOTE — CARE COORDINATION
LSW SPOKE WITH SUPERVISOR JEFFREY FROM [facility] REGARDING PT TRANSFER TO THEM.  HIS PRECERT HAS BEEN EXTENDED TO MIDNIGHT ON 12/31.  LSW TO FOLLOW FOR TRANSFER WHEN MEDICALLY CLEARED.

## 2024-12-28 LAB
ANION GAP SERPL CALCULATED.3IONS-SCNC: 15 MEQ/L (ref 9–15)
BASOPHILS # BLD: 0 K/UL (ref 0–0.2)
BASOPHILS NFR BLD: 0.3 %
BUN SERPL-MCNC: 42 MG/DL (ref 8–23)
CALCIUM SERPL-MCNC: 7.7 MG/DL (ref 8.5–9.9)
CHLORIDE SERPL-SCNC: 99 MEQ/L (ref 95–107)
CO2 SERPL-SCNC: 17 MEQ/L (ref 20–31)
CREAT SERPL-MCNC: 2.63 MG/DL (ref 0.7–1.2)
EOSINOPHIL # BLD: 0 K/UL (ref 0–0.7)
EOSINOPHIL NFR BLD: 0.3 %
ERYTHROCYTE [DISTWIDTH] IN BLOOD BY AUTOMATED COUNT: 14.2 % (ref 11.5–14.5)
GLUCOSE BLD-MCNC: 118 MG/DL (ref 70–99)
GLUCOSE BLD-MCNC: 132 MG/DL (ref 70–99)
GLUCOSE BLD-MCNC: 159 MG/DL (ref 70–99)
GLUCOSE BLD-MCNC: 94 MG/DL (ref 70–99)
GLUCOSE SERPL-MCNC: 73 MG/DL (ref 70–99)
HCT VFR BLD AUTO: 25.6 % (ref 42–52)
HGB BLD-MCNC: 8.8 G/DL (ref 14–18)
LYMPHOCYTES # BLD: 1.1 K/UL (ref 1–4.8)
LYMPHOCYTES NFR BLD: 17.4 %
MCH RBC QN AUTO: 30.1 PG (ref 27–31.3)
MCHC RBC AUTO-ENTMCNC: 34.4 % (ref 33–37)
MCV RBC AUTO: 87.7 FL (ref 79–92.2)
MONOCYTES # BLD: 0.2 K/UL (ref 0.2–0.8)
MONOCYTES NFR BLD: 3.6 %
NEUTROPHILS # BLD: 5 K/UL (ref 1.4–6.5)
NEUTS SEG NFR BLD: 77.6 %
PERFORMED ON: ABNORMAL
PERFORMED ON: NORMAL
PHOSPHATE SERPL-MCNC: 4.5 MG/DL (ref 2.3–4.8)
PLATELET # BLD AUTO: 161 K/UL (ref 130–400)
POTASSIUM SERPL-SCNC: 4.3 MEQ/L (ref 3.4–4.9)
RBC # BLD AUTO: 2.92 M/UL (ref 4.7–6.1)
SODIUM SERPL-SCNC: 131 MEQ/L (ref 135–144)
WBC # BLD AUTO: 6.5 K/UL (ref 4.8–10.8)

## 2024-12-28 PROCEDURE — 84100 ASSAY OF PHOSPHORUS: CPT

## 2024-12-28 PROCEDURE — 2500000003 HC RX 250 WO HCPCS: Performed by: INTERNAL MEDICINE

## 2024-12-28 PROCEDURE — 1210000000 HC MED SURG R&B

## 2024-12-28 PROCEDURE — 99232 SBSQ HOSP IP/OBS MODERATE 35: CPT | Performed by: INTERNAL MEDICINE

## 2024-12-28 PROCEDURE — 2700000000 HC OXYGEN THERAPY PER DAY

## 2024-12-28 PROCEDURE — 2580000003 HC RX 258: Performed by: INTERNAL MEDICINE

## 2024-12-28 PROCEDURE — 99231 SBSQ HOSP IP/OBS SF/LOW 25: CPT | Performed by: INTERNAL MEDICINE

## 2024-12-28 PROCEDURE — 6370000000 HC RX 637 (ALT 250 FOR IP): Performed by: INTERNAL MEDICINE

## 2024-12-28 PROCEDURE — 85025 COMPLETE CBC W/AUTO DIFF WBC: CPT

## 2024-12-28 PROCEDURE — 6370000000 HC RX 637 (ALT 250 FOR IP): Performed by: PHYSICIAN ASSISTANT

## 2024-12-28 PROCEDURE — 36415 COLL VENOUS BLD VENIPUNCTURE: CPT

## 2024-12-28 PROCEDURE — 80048 BASIC METABOLIC PNL TOTAL CA: CPT

## 2024-12-28 PROCEDURE — 51798 US URINE CAPACITY MEASURE: CPT

## 2024-12-28 PROCEDURE — 6360000002 HC RX W HCPCS: Performed by: INTERNAL MEDICINE

## 2024-12-28 RX ORDER — SODIUM CHLORIDE 9 MG/ML
INJECTION, SOLUTION INTRAVENOUS CONTINUOUS
Status: DISPENSED | OUTPATIENT
Start: 2024-12-28 | End: 2024-12-28

## 2024-12-28 RX ORDER — 0.9 % SODIUM CHLORIDE 0.9 %
500 INTRAVENOUS SOLUTION INTRAVENOUS ONCE
Status: COMPLETED | OUTPATIENT
Start: 2024-12-28 | End: 2024-12-28

## 2024-12-28 RX ORDER — GUAIFENESIN/DEXTROMETHORPHAN 100-10MG/5
5 SYRUP ORAL ONCE
Status: COMPLETED | OUTPATIENT
Start: 2024-12-28 | End: 2024-12-28

## 2024-12-28 RX ORDER — GUAIFENESIN 600 MG/1
600 TABLET, EXTENDED RELEASE ORAL 2 TIMES DAILY
Status: DISCONTINUED | OUTPATIENT
Start: 2024-12-28 | End: 2024-12-30 | Stop reason: HOSPADM

## 2024-12-28 RX ADMIN — GUAIFENESIN 600 MG: 600 TABLET ORAL at 20:38

## 2024-12-28 RX ADMIN — SODIUM CHLORIDE: 9 INJECTION, SOLUTION INTRAVENOUS at 11:50

## 2024-12-28 RX ADMIN — PANTOPRAZOLE SODIUM 40 MG: 40 TABLET, DELAYED RELEASE ORAL at 06:40

## 2024-12-28 RX ADMIN — CARVEDILOL 3.12 MG: 3.12 TABLET, FILM COATED ORAL at 16:44

## 2024-12-28 RX ADMIN — Medication 10 ML: at 09:11

## 2024-12-28 RX ADMIN — WATER 1000 MG: 1 INJECTION INTRAMUSCULAR; INTRAVENOUS; SUBCUTANEOUS at 16:41

## 2024-12-28 RX ADMIN — GUAIFENESIN 600 MG: 600 TABLET ORAL at 12:03

## 2024-12-28 RX ADMIN — CARVEDILOL 3.12 MG: 3.12 TABLET, FILM COATED ORAL at 09:17

## 2024-12-28 RX ADMIN — PANTOPRAZOLE SODIUM 40 MG: 40 TABLET, DELAYED RELEASE ORAL at 16:44

## 2024-12-28 RX ADMIN — DOCUSATE SODIUM 100 MG: 100 CAPSULE, LIQUID FILLED ORAL at 20:38

## 2024-12-28 RX ADMIN — QUETIAPINE FUMARATE 50 MG: 50 TABLET ORAL at 20:37

## 2024-12-28 RX ADMIN — DOCUSATE SODIUM 100 MG: 100 CAPSULE, LIQUID FILLED ORAL at 09:17

## 2024-12-28 RX ADMIN — GUAIFENESIN SYRUP AND DEXTROMETHORPHAN 5 ML: 100; 10 SYRUP ORAL at 11:49

## 2024-12-28 RX ADMIN — ENOXAPARIN SODIUM 70 MG: 100 INJECTION SUBCUTANEOUS at 20:44

## 2024-12-28 RX ADMIN — PAROXETINE HYDROCHLORIDE 40 MG: 30 TABLET, FILM COATED ORAL at 09:17

## 2024-12-28 RX ADMIN — Medication 10 ML: at 20:38

## 2024-12-28 RX ADMIN — SODIUM CHLORIDE 500 ML: 9 INJECTION, SOLUTION INTRAVENOUS at 09:21

## 2024-12-28 RX ADMIN — ENOXAPARIN SODIUM 70 MG: 100 INJECTION SUBCUTANEOUS at 09:18

## 2024-12-28 RX ADMIN — ATORVASTATIN CALCIUM 40 MG: 40 TABLET, FILM COATED ORAL at 20:38

## 2024-12-28 RX ADMIN — FERROUS GLUCONATE 324 MG: 324 TABLET ORAL at 09:20

## 2024-12-28 ASSESSMENT — PAIN SCALES - GENERAL: PAINLEVEL_OUTOF10: 3

## 2024-12-28 NOTE — CARE COORDINATION
DC PLAN REMAINS [facility] ONCE MEDICALLY CLEARED. PRECERT HAS BEEN EXTENDED TO MIDNIGHT ON 12/31

## 2024-12-29 ENCOUNTER — APPOINTMENT (OUTPATIENT)
Dept: GENERAL RADIOLOGY | Age: 74
DRG: 987 | End: 2024-12-29
Payer: MEDICARE

## 2024-12-29 LAB
ANION GAP SERPL CALCULATED.3IONS-SCNC: 11 MEQ/L (ref 9–15)
BASOPHILS # BLD: 0 K/UL (ref 0–0.2)
BASOPHILS NFR BLD: 0.2 %
BUN SERPL-MCNC: 35 MG/DL (ref 8–23)
CALCIUM SERPL-MCNC: 7.4 MG/DL (ref 8.5–9.9)
CHLORIDE SERPL-SCNC: 103 MEQ/L (ref 95–107)
CO2 SERPL-SCNC: 16 MEQ/L (ref 20–31)
CREAT SERPL-MCNC: 2.19 MG/DL (ref 0.7–1.2)
EOSINOPHIL # BLD: 0 K/UL (ref 0–0.7)
EOSINOPHIL NFR BLD: 0.2 %
ERYTHROCYTE [DISTWIDTH] IN BLOOD BY AUTOMATED COUNT: 14.2 % (ref 11.5–14.5)
GLUCOSE BLD-MCNC: 142 MG/DL (ref 70–99)
GLUCOSE BLD-MCNC: 151 MG/DL (ref 70–99)
GLUCOSE BLD-MCNC: 99 MG/DL (ref 70–99)
GLUCOSE SERPL-MCNC: 86 MG/DL (ref 70–99)
HCT VFR BLD AUTO: 23.8 % (ref 42–52)
HGB BLD-MCNC: 8 G/DL (ref 14–18)
LYMPHOCYTES # BLD: 0.8 K/UL (ref 1–4.8)
LYMPHOCYTES NFR BLD: 14.4 %
MCH RBC QN AUTO: 29.6 PG (ref 27–31.3)
MCHC RBC AUTO-ENTMCNC: 33.6 % (ref 33–37)
MCV RBC AUTO: 88.1 FL (ref 79–92.2)
MONOCYTES # BLD: 0.3 K/UL (ref 0.2–0.8)
MONOCYTES NFR BLD: 4.9 %
NEUTROPHILS # BLD: 4.4 K/UL (ref 1.4–6.5)
NEUTS SEG NFR BLD: 79.4 %
PERFORMED ON: ABNORMAL
PERFORMED ON: ABNORMAL
PERFORMED ON: NORMAL
PHOSPHATE SERPL-MCNC: 2.8 MG/DL (ref 2.3–4.8)
PLATELET # BLD AUTO: 145 K/UL (ref 130–400)
POTASSIUM SERPL-SCNC: 4.2 MEQ/L (ref 3.4–4.9)
RBC # BLD AUTO: 2.7 M/UL (ref 4.7–6.1)
SODIUM SERPL-SCNC: 130 MEQ/L (ref 135–144)
WBC # BLD AUTO: 5.6 K/UL (ref 4.8–10.8)

## 2024-12-29 PROCEDURE — 6370000000 HC RX 637 (ALT 250 FOR IP): Performed by: INTERNAL MEDICINE

## 2024-12-29 PROCEDURE — 99232 SBSQ HOSP IP/OBS MODERATE 35: CPT | Performed by: INTERNAL MEDICINE

## 2024-12-29 PROCEDURE — 84100 ASSAY OF PHOSPHORUS: CPT

## 2024-12-29 PROCEDURE — 6360000002 HC RX W HCPCS: Performed by: INTERNAL MEDICINE

## 2024-12-29 PROCEDURE — 71045 X-RAY EXAM CHEST 1 VIEW: CPT

## 2024-12-29 PROCEDURE — 1210000000 HC MED SURG R&B

## 2024-12-29 PROCEDURE — 2500000003 HC RX 250 WO HCPCS: Performed by: INTERNAL MEDICINE

## 2024-12-29 PROCEDURE — 85025 COMPLETE CBC W/AUTO DIFF WBC: CPT

## 2024-12-29 PROCEDURE — 6370000000 HC RX 637 (ALT 250 FOR IP)

## 2024-12-29 PROCEDURE — 2700000000 HC OXYGEN THERAPY PER DAY

## 2024-12-29 PROCEDURE — 36415 COLL VENOUS BLD VENIPUNCTURE: CPT

## 2024-12-29 PROCEDURE — 6370000000 HC RX 637 (ALT 250 FOR IP): Performed by: PHYSICIAN ASSISTANT

## 2024-12-29 PROCEDURE — 80048 BASIC METABOLIC PNL TOTAL CA: CPT

## 2024-12-29 RX ORDER — IPRATROPIUM BROMIDE AND ALBUTEROL SULFATE 2.5; .5 MG/3ML; MG/3ML
1 SOLUTION RESPIRATORY (INHALATION) 3 TIMES DAILY
Status: CANCELLED | OUTPATIENT
Start: 2024-12-29

## 2024-12-29 RX ORDER — PANTOPRAZOLE SODIUM 40 MG/1
40 TABLET, DELAYED RELEASE ORAL
Status: CANCELLED | OUTPATIENT
Start: 2024-12-29

## 2024-12-29 RX ORDER — GUAIFENESIN 600 MG/1
600 TABLET, EXTENDED RELEASE ORAL 2 TIMES DAILY
Status: CANCELLED | OUTPATIENT
Start: 2024-12-29

## 2024-12-29 RX ORDER — AMLODIPINE BESYLATE 2.5 MG/1
2.5 TABLET ORAL DAILY
Status: DISCONTINUED | OUTPATIENT
Start: 2024-12-29 | End: 2024-12-29

## 2024-12-29 RX ADMIN — ATORVASTATIN CALCIUM 40 MG: 40 TABLET, FILM COATED ORAL at 21:53

## 2024-12-29 RX ADMIN — ENOXAPARIN SODIUM 70 MG: 100 INJECTION SUBCUTANEOUS at 21:54

## 2024-12-29 RX ADMIN — Medication 10 ML: at 08:58

## 2024-12-29 RX ADMIN — ENOXAPARIN SODIUM 70 MG: 100 INJECTION SUBCUTANEOUS at 08:58

## 2024-12-29 RX ADMIN — DOCUSATE SODIUM 100 MG: 100 CAPSULE, LIQUID FILLED ORAL at 21:53

## 2024-12-29 RX ADMIN — PAROXETINE HYDROCHLORIDE 40 MG: 30 TABLET, FILM COATED ORAL at 08:58

## 2024-12-29 RX ADMIN — DOCUSATE SODIUM 100 MG: 100 CAPSULE, LIQUID FILLED ORAL at 08:58

## 2024-12-29 RX ADMIN — GUAIFENESIN 600 MG: 600 TABLET ORAL at 08:58

## 2024-12-29 RX ADMIN — GUAIFENESIN 600 MG: 600 TABLET ORAL at 21:53

## 2024-12-29 RX ADMIN — GUAIFENESIN SYRUP AND DEXTROMETHORPHAN 5 ML: 100; 10 SYRUP ORAL at 13:38

## 2024-12-29 RX ADMIN — CARVEDILOL 3.12 MG: 3.12 TABLET, FILM COATED ORAL at 08:58

## 2024-12-29 RX ADMIN — CARVEDILOL 3.12 MG: 3.12 TABLET, FILM COATED ORAL at 16:50

## 2024-12-29 RX ADMIN — PANTOPRAZOLE SODIUM 40 MG: 40 TABLET, DELAYED RELEASE ORAL at 08:58

## 2024-12-29 RX ADMIN — PANTOPRAZOLE SODIUM 40 MG: 40 TABLET, DELAYED RELEASE ORAL at 16:50

## 2024-12-29 RX ADMIN — WATER 1000 MG: 1 INJECTION INTRAMUSCULAR; INTRAVENOUS; SUBCUTANEOUS at 16:51

## 2024-12-29 RX ADMIN — QUETIAPINE FUMARATE 50 MG: 50 TABLET ORAL at 21:53

## 2024-12-29 RX ADMIN — Medication 10 ML: at 21:54

## 2024-12-29 ASSESSMENT — PAIN SCALES - GENERAL: PAINLEVEL_OUTOF10: 0

## 2024-12-29 NOTE — CARE COORDINATION
Update given to Haydee, supervisor at [facility]. States Dr Wallace would like to know if all consults have cleared patients.  placed call to Dr. Harrell, states pt has been cleared for dc and can be arranged for transportation. Haydee mayorga, RN updated.

## 2024-12-29 NOTE — CARE COORDINATION
CALL FROM RN, PATIENT STATES HE DOES NOT WANT TO DC TO [facility] TO ASSESS FUNCTIONAL STATUS. RN TO MESSAGE MD WILL FOLLOW

## 2024-12-29 NOTE — FLOWSHEET NOTE
Pt refusing DC to [facility] states \"does not want to go\" and that we \"cannot force him\". Perfect serve sent to MD and call placed to Care coordinator. Care Cord sent page out to therapy for assessment of functional status also this RN was informed by resp that pt will need home O2 eval. MD notified.

## 2024-12-29 NOTE — PLAN OF CARE
Nutrition Problem #1: Inadequate oral intake  Intervention: Food and/or Nutrient Delivery: Continue Current Diet, Continue Oral Nutrition Supplement         
  Problem: Chronic Conditions and Co-morbidities  Goal: Patient's chronic conditions and co-morbidity symptoms are monitored and maintained or improved  12/10/2024 1053 by Jennifer Rose RN  Outcome: Progressing  12/9/2024 2257 by Cynthia Marie RN  Outcome: Progressing     Problem: Discharge Planning  Goal: Discharge to home or other facility with appropriate resources  12/10/2024 1053 by Jennifer Rose RN  Outcome: Progressing  12/9/2024 2257 by Cynthia Marie RN  Outcome: Progressing     Problem: Pain  Goal: Verbalizes/displays adequate comfort level or baseline comfort level  12/10/2024 1053 by Jennifer Rose RN  Outcome: Progressing  12/9/2024 2257 by Cynthia Marie RN  Outcome: Progressing     Problem: Safety - Adult  Goal: Free from fall injury  12/10/2024 1053 by Jennifer Rose RN  Outcome: Progressing  12/9/2024 2257 by Cynthia Marie RN  Outcome: Progressing     Problem: Neurosensory - Adult  Goal: Achieves stable or improved neurological status  12/10/2024 1053 by Jennifer Rose RN  Outcome: Progressing  12/9/2024 2257 by Cynthia Marie RN  Outcome: Progressing  Goal: Absence of seizures  12/10/2024 1053 by Jennifer Rose RN  Outcome: Progressing  12/9/2024 2257 by Cynthia Marie RN  Outcome: Progressing  Goal: Remains free of injury related to seizures activity  12/10/2024 1053 by Jennifer Rose RN  Outcome: Progressing  12/9/2024 2257 by Cynthia Marie RN  Outcome: Progressing  Goal: Achieves maximal functionality and self care  12/10/2024 1053 by Jennifer Rose RN  Outcome: Progressing  12/9/2024 2257 by Cynthia Marie RN  Outcome: Progressing     Problem: Cardiovascular - Adult  Goal: Maintains optimal cardiac output and hemodynamic stability  12/10/2024 1053 by Jennifer Rose RN  Outcome: Progressing  12/9/2024 2257 by Cynthia Marie RN  Outcome: Progressing  Goal: Absence of cardiac dysrhythmias or at baseline  12/10/2024 1053 by Jennifer Rose RN  Outcome: Progressing  12/9/2024 
  Problem: Chronic Conditions and Co-morbidities  Goal: Patient's chronic conditions and co-morbidity symptoms are monitored and maintained or improved  12/10/2024 2152 by Ayse Mazariegos RN  Outcome: Progressing  12/10/2024 1053 by Jennifer Rose RN  Outcome: Progressing     Problem: Discharge Planning  Goal: Discharge to home or other facility with appropriate resources  12/10/2024 2152 by Ayse Mazariegos RN  Outcome: Progressing  12/10/2024 1053 by Jennifer Rose RN  Outcome: Progressing     Problem: Pain  Goal: Verbalizes/displays adequate comfort level or baseline comfort level  12/10/2024 2152 by Ayse Mazariegos RN  Outcome: Progressing  12/10/2024 1053 by Jennifer Rose RN  Outcome: Progressing     Problem: Safety - Adult  Goal: Free from fall injury  12/10/2024 2152 by Ayse Mazariegos RN  Outcome: Progressing  12/10/2024 1053 by Jennifer Rose RN  Outcome: Progressing     Problem: Neurosensory - Adult  Goal: Achieves stable or improved neurological status  12/10/2024 2152 by Ayse Mazariegos RN  Outcome: Progressing  12/10/2024 1053 by Jennifer Rose RN  Outcome: Progressing  Goal: Absence of seizures  12/10/2024 2152 by Ayse Mazariegos RN  Outcome: Progressing  12/10/2024 1053 by Jennifer Rose RN  Outcome: Progressing  Goal: Remains free of injury related to seizures activity  12/10/2024 2152 by Ayse Mazariegos RN  Outcome: Progressing  12/10/2024 1053 by Jennifer Rose RN  Outcome: Progressing  Goal: Achieves maximal functionality and self care  12/10/2024 2152 by Ayse Mazariegos RN  Outcome: Progressing  12/10/2024 1053 by Jennifer Rose RN  Outcome: Progressing     Problem: Cardiovascular - Adult  Goal: Maintains optimal cardiac output and hemodynamic stability  12/10/2024 2152 by Ayse Mazariegos RN  Outcome: Progressing  12/10/2024 1053 by Jennifer Rose RN  Outcome: Progressing  Goal: Absence of cardiac dysrhythmias or at baseline  12/10/2024 2152 by Ayse Mazariegos RN  Outcome: 
  Problem: Chronic Conditions and Co-morbidities  Goal: Patient's chronic conditions and co-morbidity symptoms are monitored and maintained or improved  12/12/2024 0955 by Miesha Hussein RN  Outcome: Progressing  12/12/2024 0026 by Melania Lo RN  Outcome: Progressing  12/11/2024 2248 by Sachi Owen RN  Outcome: Progressing     Problem: Discharge Planning  Goal: Discharge to home or other facility with appropriate resources  12/12/2024 0955 by Miesha Hussein RN  Outcome: Progressing  12/12/2024 0026 by Melania Lo RN  Outcome: Progressing  12/11/2024 2248 by Sachi Owen RN  Outcome: Progressing     Problem: Pain  Goal: Verbalizes/displays adequate comfort level or baseline comfort level  12/12/2024 0955 by Miesha Hussein RN  Outcome: Progressing  12/12/2024 0026 by Melania Lo RN  Outcome: Progressing  12/11/2024 2248 by Sachi Owen RN  Outcome: Progressing     Problem: Safety - Adult  Goal: Free from fall injury  12/12/2024 0955 by Miesha Hussein RN  Outcome: Progressing  12/12/2024 0026 by Melania Lo RN  Outcome: Progressing  12/11/2024 2248 by Sachi Owen RN  Outcome: Progressing     Problem: Neurosensory - Adult  Goal: Achieves stable or improved neurological status  12/12/2024 0955 by Miesha Hussein RN  Outcome: Progressing  12/12/2024 0026 by Melania Lo RN  Outcome: Progressing  12/11/2024 2248 by Sachi Owen RN  Outcome: Progressing  Goal: Absence of seizures  12/12/2024 0955 by Miesha Hussein RN  Outcome: Progressing  12/12/2024 0026 by Melania Lo RN  Outcome: Progressing  12/11/2024 2248 by Sachi Owen RN  Outcome: Progressing  Goal: Remains free of injury related to seizures activity  12/12/2024 0955 by Miesha Hussein RN  Outcome: Progressing  12/12/2024 0026 by Teresita, Melania R., RN  Outcome: Progressing  12/11/2024 2248 by Sachi Owen, RN  Outcome: Progressing  Goal: Achieves maximal functionality and self 
  Problem: Chronic Conditions and Co-morbidities  Goal: Patient's chronic conditions and co-morbidity symptoms are monitored and maintained or improved  12/14/2024 2159 by Devi Tony RN  Outcome: Progressing  12/14/2024 1805 by Delilah Moncada RN  Outcome: Progressing     Problem: Discharge Planning  Goal: Discharge to home or other facility with appropriate resources  Outcome: Progressing     Problem: Pain  Goal: Verbalizes/displays adequate comfort level or baseline comfort level  12/14/2024 2159 by Devi Tony RN  Outcome: Progressing  12/14/2024 1805 by Delilah Moncada RN  Outcome: Progressing     Problem: Safety - Adult  Goal: Free from fall injury  12/14/2024 2159 by Devi Tony RN  Outcome: Progressing  12/14/2024 1805 by Delilah Moncada RN  Outcome: Progressing     Problem: Neurosensory - Adult  Goal: Achieves stable or improved neurological status  Outcome: Progressing  Goal: Absence of seizures  12/14/2024 2159 by Devi Tony RN  Outcome: Progressing  12/14/2024 1805 by Delilah Moncada RN  Outcome: Progressing  Goal: Remains free of injury related to seizures activity  12/14/2024 2159 by Devi Tony RN  Outcome: Progressing  12/14/2024 1805 by Delilah Moncada RN  Outcome: Progressing  Goal: Achieves maximal functionality and self care  Outcome: Progressing     Problem: Cardiovascular - Adult  Goal: Maintains optimal cardiac output and hemodynamic stability  Outcome: Progressing  Goal: Absence of cardiac dysrhythmias or at baseline  12/14/2024 2159 by Devi Tony RN  Outcome: Progressing  12/14/2024 1805 by Delilah Moncada RN  Outcome: Progressing     Problem: Skin/Tissue Integrity - Adult  Goal: Skin integrity remains intact  Outcome: Progressing  Goal: Incisions, wounds, or drain sites healing without S/S of infection  Outcome: Progressing  Goal: Oral mucous membranes remain intact  Outcome: Progressing     Problem: Gastrointestinal - Adult  Goal: Minimal or absence of nausea and 
  Problem: Chronic Conditions and Co-morbidities  Goal: Patient's chronic conditions and co-morbidity symptoms are monitored and maintained or improved  12/19/2024 1127 by Jewell Ford RN  Outcome: Progressing     Problem: Discharge Planning  Goal: Discharge to home or other facility with appropriate resources  12/19/2024 1127 by Jewell Ford RN  Outcome: Progressing     Problem: Safety - Adult  Goal: Free from fall injury  12/19/2024 1127 by Jewell Ford RN  Outcome: Progressing     Problem: Neurosensory - Adult  Goal: Achieves stable or improved neurological status  12/19/2024 1127 by Jewell Ford RN  Outcome: Progressing     
  Problem: Chronic Conditions and Co-morbidities  Goal: Patient's chronic conditions and co-morbidity symptoms are monitored and maintained or improved  12/20/2024 2224 by Antione Agarwal RN  Outcome: Progressing  12/20/2024 1441 by Delilah Moncada RN  Outcome: Progressing     Problem: Discharge Planning  Goal: Discharge to home or other facility with appropriate resources  Outcome: Progressing     Problem: Safety - Adult  Goal: Free from fall injury  12/20/2024 2224 by Antione Agarwal RN  Outcome: Progressing  12/20/2024 1441 by Delilah Moncada RN  Outcome: Progressing     Problem: Neurosensory - Adult  Goal: Achieves stable or improved neurological status  Outcome: Progressing  Goal: Absence of seizures  Outcome: Progressing  Goal: Remains free of injury related to seizures activity  Outcome: Progressing  Goal: Achieves maximal functionality and self care  Outcome: Progressing     Problem: Cardiovascular - Adult  Goal: Maintains optimal cardiac output and hemodynamic stability  Outcome: Progressing  Goal: Absence of cardiac dysrhythmias or at baseline  Outcome: Progressing     Problem: Skin/Tissue Integrity - Adult  Goal: Skin integrity remains intact  12/20/2024 2224 by Antione Agarwal RN  Outcome: Progressing  12/20/2024 1441 by Delilah Moncada RN  Outcome: Progressing  Goal: Incisions, wounds, or drain sites healing without S/S of infection  12/20/2024 2224 by Antione Agarwal RN  Outcome: Progressing  12/20/2024 1441 by eDlilah Moncada RN  Outcome: Progressing  Goal: Oral mucous membranes remain intact  Outcome: Progressing     Problem: Gastrointestinal - Adult  Goal: Minimal or absence of nausea and vomiting  Outcome: Progressing  Goal: Maintains or returns to baseline bowel function  Outcome: Progressing  Goal: Maintains adequate nutritional intake  12/20/2024 2224 by Antione Agarwal RN  Outcome: Progressing  12/20/2024 1441 by Delilah Moncada RN  Outcome: Progressing  Goal: Establish and maintain optimal ostomy 
  Problem: Chronic Conditions and Co-morbidities  Goal: Patient's chronic conditions and co-morbidity symptoms are monitored and maintained or improved  12/22/2024 1112 by Calista Vazquez RN  Outcome: Progressing  12/22/2024 0154 by Zena Owen RN  Outcome: Progressing     Problem: Discharge Planning  Goal: Discharge to home or other facility with appropriate resources  12/22/2024 1112 by Calista Vazquez RN  Outcome: Progressing  12/22/2024 0154 by Zena Owen RN  Outcome: Progressing     Problem: Safety - Adult  Goal: Free from fall injury  12/22/2024 1112 by Calista Vazquez RN  Outcome: Progressing  12/22/2024 0154 by Zena Owen RN  Outcome: Progressing     Problem: Neurosensory - Adult  Goal: Achieves stable or improved neurological status  12/22/2024 1112 by Calista Vazquez RN  Outcome: Progressing  12/22/2024 0154 by Zena Owen RN  Outcome: Progressing  Goal: Absence of seizures  12/22/2024 1112 by Calista Vazquez RN  Outcome: Progressing  12/22/2024 0154 by Zena Owen RN  Outcome: Progressing  Goal: Remains free of injury related to seizures activity  12/22/2024 1112 by Calista Vazquez RN  Outcome: Progressing  12/22/2024 0154 by Zena Owen RN  Outcome: Progressing  Goal: Achieves maximal functionality and self care  12/22/2024 1112 by Calista Vazquez RN  Outcome: Progressing  12/22/2024 0154 by Zena Owen RN  Outcome: Progressing     Problem: Cardiovascular - Adult  Goal: Maintains optimal cardiac output and hemodynamic stability  12/22/2024 1112 by Calista Vazquez RN  Outcome: Progressing  12/22/2024 0154 by Zena Owen RN  Outcome: Progressing  Goal: Absence of cardiac dysrhythmias or at baseline  12/22/2024 1112 by Calista Vazquez RN  Outcome: Progressing  12/22/2024 0154 by Zena Owen RN  Outcome: Progressing     Problem: Skin/Tissue Integrity - Adult  Goal: Skin integrity remains intact  12/22/2024 1112 by George 
  Problem: Chronic Conditions and Co-morbidities  Goal: Patient's chronic conditions and co-morbidity symptoms are monitored and maintained or improved  12/23/2024 2341 by Abi Johnson RN  Outcome: Progressing  12/23/2024 1212 by Jovana Glass RN  Outcome: Progressing     Problem: Discharge Planning  Goal: Discharge to home or other facility with appropriate resources  12/23/2024 2341 by Abi Johnson RN  Outcome: Progressing  12/23/2024 1212 by Jovana Glass RN  Outcome: Progressing     Problem: Safety - Adult  Goal: Free from fall injury  12/23/2024 2341 by Abi Johnson RN  Outcome: Progressing  12/23/2024 1212 by Jovana Glass RN  Outcome: Progressing     Problem: Neurosensory - Adult  Goal: Achieves stable or improved neurological status  12/23/2024 2341 by Abi Johnson RN  Outcome: Progressing  12/23/2024 1212 by Jovana Glass RN  Outcome: Progressing  Goal: Absence of seizures  12/23/2024 2341 by Abi Johnson RN  Outcome: Progressing  12/23/2024 1212 by Jovana Glass RN  Outcome: Progressing  Goal: Remains free of injury related to seizures activity  12/23/2024 2341 by Abi Johnson RN  Outcome: Progressing  12/23/2024 1212 by Jovana Glass RN  Outcome: Progressing  Goal: Achieves maximal functionality and self care  12/23/2024 2341 by Abi Johnson RN  Outcome: Progressing  12/23/2024 1212 by Jovana Glass RN  Outcome: Progressing     Problem: Cardiovascular - Adult  Goal: Maintains optimal cardiac output and hemodynamic stability  12/23/2024 2341 by Abi Johnson RN  Outcome: Progressing  12/23/2024 1212 by Jovana Glass RN  Outcome: Progressing  Goal: Absence of cardiac dysrhythmias or at baseline  12/23/2024 2341 by Abi Johnson RN  Outcome: Progressing  12/23/2024 1212 by Jovana Glass RN  Outcome: Progressing     Problem: Skin/Tissue 
  Problem: Chronic Conditions and Co-morbidities  Goal: Patient's chronic conditions and co-morbidity symptoms are monitored and maintained or improved  12/24/2024 0947 by Calista Vazquez RN  Outcome: Progressing  12/23/2024 2341 by Abi Johnson RN  Outcome: Progressing     Problem: Discharge Planning  Goal: Discharge to home or other facility with appropriate resources  12/24/2024 0947 by Calista Vazquez RN  Outcome: Progressing  12/23/2024 2341 by Abi Johnson RN  Outcome: Progressing     Problem: Safety - Adult  Goal: Free from fall injury  12/24/2024 0947 by Calista Vazquez RN  Outcome: Progressing  12/23/2024 2341 by Abi Johnson RN  Outcome: Progressing     Problem: Safety - Adult  Goal: Free from fall injury  12/24/2024 0947 by Calista Vazquez RN  Outcome: Progressing  12/23/2024 2341 by Abi Johnson RN  Outcome: Progressing     Problem: Cardiovascular - Adult  Goal: Maintains optimal cardiac output and hemodynamic stability  12/24/2024 0947 by Calista Vazquez RN  Outcome: Progressing  12/23/2024 2341 by Abi Johnson RN  Outcome: Progressing  Goal: Absence of cardiac dysrhythmias or at baseline  12/24/2024 0947 by Calista Vazquez RN  Outcome: Progressing  12/23/2024 2341 by Abi Johnson RN  Outcome: Progressing     Problem: Gastrointestinal - Adult  Goal: Minimal or absence of nausea and vomiting  12/24/2024 0947 by Calista Vazquez RN  Outcome: Progressing  12/23/2024 2341 by Abi Johnson RN  Outcome: Progressing  Goal: Maintains or returns to baseline bowel function  12/24/2024 0947 by Calista Vazquez RN  Outcome: Progressing  12/23/2024 2341 by Abi Johnson RN  Outcome: Progressing  Goal: Maintains adequate nutritional intake  12/24/2024 0947 by Calista Vazquez RN  Outcome: Progressing  12/23/2024 2341 by Abi Johnson RN  Outcome: 
  Problem: Chronic Conditions and Co-morbidities  Goal: Patient's chronic conditions and co-morbidity symptoms are monitored and maintained or improved  12/24/2024 2215 by Abi Johnson RN  Outcome: Progressing  12/24/2024 0947 by Calista Vazquez RN  Outcome: Progressing     Problem: Discharge Planning  Goal: Discharge to home or other facility with appropriate resources  12/24/2024 2215 by Abi Johnson RN  Outcome: Progressing  12/24/2024 0947 by Calista Vazquez RN  Outcome: Progressing     Problem: Safety - Adult  Goal: Free from fall injury  12/24/2024 2215 by Abi Johnson RN  Outcome: Progressing  12/24/2024 0947 by Calista Vazquez RN  Outcome: Progressing     Problem: Neurosensory - Adult  Goal: Achieves stable or improved neurological status  12/24/2024 2215 by Abi Johnson RN  Outcome: Progressing  12/24/2024 0947 by Calista Vazquez RN  Outcome: Progressing  Goal: Absence of seizures  12/24/2024 2215 by Abi Johnson RN  Outcome: Progressing  12/24/2024 0947 by Calista Vazquez RN  Outcome: Progressing  Goal: Remains free of injury related to seizures activity  12/24/2024 2215 by Abi Johnson RN  Outcome: Progressing  12/24/2024 0947 by Calista Vazquez RN  Outcome: Progressing  Goal: Achieves maximal functionality and self care  12/24/2024 2215 by Abi Johnson RN  Outcome: Progressing  12/24/2024 0947 by Calista Vazquze RN  Outcome: Progressing     Problem: Cardiovascular - Adult  Goal: Maintains optimal cardiac output and hemodynamic stability  12/24/2024 2215 by Abi Johnson RN  Outcome: Progressing  12/24/2024 0947 by Calista Vazquez RN  Outcome: Progressing  Goal: Absence of cardiac dysrhythmias or at baseline  12/24/2024 2215 by Abi Johnson RN  Outcome: Progressing  12/24/2024 0947 by Calista Vazquez RN  Outcome: Progressing     Problem: 
  Problem: Chronic Conditions and Co-morbidities  Goal: Patient's chronic conditions and co-morbidity symptoms are monitored and maintained or improved  12/25/2024 2142 by Sachi Owen RN  Outcome: Progressing  12/25/2024 1525 by Olamide Duque RN  Outcome: Progressing     Problem: Discharge Planning  Goal: Discharge to home or other facility with appropriate resources  12/25/2024 2142 by Sachi Owen RN  Outcome: Progressing  12/25/2024 1525 by Olamide Duque RN  Outcome: Progressing     Problem: Safety - Adult  Goal: Free from fall injury  12/25/2024 2142 by Sachi Owen RN  Outcome: Progressing  12/25/2024 1525 by Olamide Duque RN  Outcome: Progressing     Problem: Neurosensory - Adult  Goal: Achieves stable or improved neurological status  Outcome: Progressing  Goal: Absence of seizures  Outcome: Progressing  Goal: Remains free of injury related to seizures activity  Outcome: Progressing  Goal: Achieves maximal functionality and self care  Outcome: Progressing     
  Problem: Chronic Conditions and Co-morbidities  Goal: Patient's chronic conditions and co-morbidity symptoms are monitored and maintained or improved  12/27/2024 0006 by Antione Agarwal RN  Outcome: Not Progressing  12/27/2024 0005 by Antione Agarwal RN  Outcome: Not Progressing  12/26/2024 1150 by Trang Gallegos RN  Outcome: Progressing     Problem: Safety - Adult  Goal: Free from fall injury  12/27/2024 0006 by Antione Agarwal RN  Outcome: Not Progressing  12/27/2024 0005 by Antione Agarwal RN  Outcome: Not Progressing  12/26/2024 1150 by Trang Gallegos RN  Outcome: Progressing     Problem: Neurosensory - Adult  Goal: Achieves maximal functionality and self care  12/27/2024 0006 by Antione Agarwal RN  Outcome: Not Progressing  12/27/2024 0005 by Antione Agarwal RN  Outcome: Not Progressing  12/26/2024 1150 by Trang Gallegos RN  Outcome: Progressing     Problem: Gastrointestinal - Adult  Goal: Minimal or absence of nausea and vomiting  12/27/2024 0006 by Antione Agarwal RN  Outcome: Not Progressing  12/27/2024 0005 by Antione Agarwal RN  Outcome: Not Progressing  12/26/2024 1150 by Trang Gallegos RN  Outcome: Progressing  Goal: Maintains adequate nutritional intake  12/27/2024 0006 by Antione Agarwal RN  Outcome: Not Progressing  12/26/2024 1150 by Trang Gallegos RN  Outcome: Progressing     Problem: Musculoskeletal - Adult  Goal: Return ADL status to a safe level of function  12/27/2024 0006 by Antione Agarwal RN  Outcome: Not Progressing  12/26/2024 1150 by Trang Gallegos RN  Outcome: Progressing     Problem: Infection - Adult  Goal: Absence of fever/infection during anticipated neutropenic period  12/27/2024 0006 by Antione Agarwal RN  Outcome: Not Progressing  12/26/2024 1150 by Trang Gallegos RN  Outcome: Progressing     Problem: Chronic Conditions and Co-morbidities  Goal: Patient's chronic conditions and co-morbidity symptoms are monitored and maintained or improved  12/27/2024 
  Problem: Chronic Conditions and Co-morbidities  Goal: Patient's chronic conditions and co-morbidity symptoms are monitored and maintained or improved  12/28/2024 1053 by Karon Suero RN  Outcome: Progressing     Problem: Discharge Planning  Goal: Discharge to home or other facility with appropriate resources  12/28/2024 1053 by Karon Suero RN  Outcome: Progressing     Problem: Safety - Adult  Goal: Free from fall injury  12/28/2024 1053 by Karon Suero RN  Outcome: Progressing     Problem: Neurosensory - Adult  Goal: Achieves stable or improved neurological status  12/28/2024 1053 by Karon Suero RN  Outcome: Progressing     Problem: Neurosensory - Adult  Goal: Absence of seizures  12/28/2024 1053 by Karon Suero RN  Outcome: Progressing     Problem: Neurosensory - Adult  Goal: Remains free of injury related to seizures activity  12/28/2024 1053 by Karon Suero RN  Outcome: Progressing     Problem: Neurosensory - Adult  Goal: Achieves maximal functionality and self care  12/28/2024 1053 by Karon Suero RN  Outcome: Progressing     
  Problem: Chronic Conditions and Co-morbidities  Goal: Patient's chronic conditions and co-morbidity symptoms are monitored and maintained or improved  12/28/2024 2220 by Abi Johnson RN  Outcome: Progressing  12/28/2024 1053 by Karon Suero RN  Outcome: Progressing     Problem: Discharge Planning  Goal: Discharge to home or other facility with appropriate resources  12/28/2024 2220 by bAi Johnson RN  Outcome: Progressing  12/28/2024 1053 by Karon Suero RN  Outcome: Progressing     Problem: Safety - Adult  Goal: Free from fall injury  12/28/2024 2220 by Abi Johnson RN  Outcome: Progressing  12/28/2024 1053 by Karon Suero RN  Outcome: Progressing     Problem: Neurosensory - Adult  Goal: Achieves stable or improved neurological status  12/28/2024 2220 by Abi Johnson RN  Outcome: Progressing  12/28/2024 1053 by Karon Suero RN  Outcome: Progressing  Goal: Absence of seizures  12/28/2024 2220 by Abi Johnson RN  Outcome: Progressing  12/28/2024 1053 by Karon Suero RN  Outcome: Progressing  Goal: Remains free of injury related to seizures activity  12/28/2024 2220 by Abi Johnson RN  Outcome: Progressing  12/28/2024 1053 by Karon Suero RN  Outcome: Progressing  Goal: Achieves maximal functionality and self care  12/28/2024 2220 by Abi Johnson RN  Outcome: Progressing  12/28/2024 1053 by Karon Suero RN  Outcome: Progressing     Problem: Cardiovascular - Adult  Goal: Maintains optimal cardiac output and hemodynamic stability  Outcome: Progressing  Goal: Absence of cardiac dysrhythmias or at baseline  Outcome: Progressing     Problem: Skin/Tissue Integrity - Adult  Goal: Skin integrity remains intact  Outcome: Progressing  Goal: Incisions, wounds, or drain sites healing without S/S of infection  Outcome: Progressing  Goal: Oral mucous membranes remain 
  Problem: Chronic Conditions and Co-morbidities  Goal: Patient's chronic conditions and co-morbidity symptoms are monitored and maintained or improved  12/29/2024 1043 by Karon Suero, RN  Outcome: Progressing     Problem: Discharge Planning  Goal: Discharge to home or other facility with appropriate resources  12/29/2024 1043 by Karon Suero, RN  Outcome: Progressing     Problem: Safety - Adult  Goal: Free from fall injury  12/29/2024 1043 by Karon Suero, RN  Outcome: Progressing     Problem: Neurosensory - Adult  Goal: Achieves stable or improved neurological status  12/29/2024 1043 by Karon Suero, RN  Outcome: Progressing     
  Problem: Chronic Conditions and Co-morbidities  Goal: Patient's chronic conditions and co-morbidity symptoms are monitored and maintained or improved  12/9/2024 1453 by Jennifer Rose RN  Outcome: Progressing  12/9/2024 0058 by Sachi Owen RN  Outcome: Progressing     Problem: Discharge Planning  Goal: Discharge to home or other facility with appropriate resources  12/9/2024 1453 by Jennifer Rose RN  Outcome: Progressing  12/9/2024 0058 by Sachi Owen RN  Outcome: Progressing     Problem: Pain  Goal: Verbalizes/displays adequate comfort level or baseline comfort level  12/9/2024 1453 by Jennifer Rose RN  Outcome: Progressing  12/9/2024 0058 by Sachi Owen RN  Outcome: Progressing     Problem: Safety - Adult  Goal: Free from fall injury  12/9/2024 1453 by Jennifer Rose RN  Outcome: Progressing  12/9/2024 0058 by Sachi Owen RN  Outcome: Progressing     Problem: Neurosensory - Adult  Goal: Achieves stable or improved neurological status  12/9/2024 1453 by Jennifer Rose RN  Outcome: Progressing  12/9/2024 0058 by Sachi Owen RN  Outcome: Progressing  Goal: Absence of seizures  12/9/2024 1453 by Jennifer Rose RN  Outcome: Progressing  12/9/2024 0058 by Sachi Owen RN  Outcome: Progressing  Goal: Remains free of injury related to seizures activity  12/9/2024 1453 by Jennifer Rose RN  Outcome: Progressing  12/9/2024 0058 by aSchi Owne RN  Outcome: Progressing  Goal: Achieves maximal functionality and self care  12/9/2024 1453 by Jennifer Rose RN  Outcome: Progressing  12/9/2024 0058 by Sachi Owen RN  Outcome: Progressing     Problem: Cardiovascular - Adult  Goal: Maintains optimal cardiac output and hemodynamic stability  12/9/2024 1453 by Jennifer Rose RN  Outcome: Progressing  12/9/2024 0058 by Sachi Owen RN  Outcome: Progressing  Goal: Absence of cardiac dysrhythmias or at baseline  12/9/2024 1453 by Jennifer Rose RN  Outcome: Progressing  12/9/2024 
  Problem: Chronic Conditions and Co-morbidities  Goal: Patient's chronic conditions and co-morbidity symptoms are monitored and maintained or improved  Outcome: Not Progressing     Problem: Neurosensory - Adult  Goal: Achieves maximal functionality and self care  Outcome: Not Progressing     Problem: Cardiovascular - Adult  Goal: Maintains optimal cardiac output and hemodynamic stability  Outcome: Not Progressing     Problem: Gastrointestinal - Adult  Goal: Maintains adequate nutritional intake  Outcome: Not Progressing     Problem: Hematologic - Adult  Goal: Maintains hematologic stability  Outcome: Not Progressing     Problem: Nutrition Deficit:  Goal: Optimize nutritional status  Outcome: Not Progressing     Problem: Chronic Conditions and Co-morbidities  Goal: Patient's chronic conditions and co-morbidity symptoms are monitored and maintained or improved  Outcome: Not Progressing     Problem: Neurosensory - Adult  Goal: Achieves maximal functionality and self care  Outcome: Not Progressing     Problem: Cardiovascular - Adult  Goal: Maintains optimal cardiac output and hemodynamic stability  Outcome: Not Progressing     Problem: Gastrointestinal - Adult  Goal: Maintains adequate nutritional intake  Outcome: Not Progressing     Problem: Hematologic - Adult  Goal: Maintains hematologic stability  Outcome: Not Progressing     Problem: Nutrition Deficit:  Goal: Optimize nutritional status  Outcome: Not Progressing     
  Problem: Chronic Conditions and Co-morbidities  Goal: Patient's chronic conditions and co-morbidity symptoms are monitored and maintained or improved  Outcome: Progressing     Problem: Discharge Planning  Goal: Discharge to home or other facility with appropriate resources  12/15/2024 2342 by Abi Johnson RN  Outcome: Progressing  12/15/2024 1832 by Delilah Moncada RN  Outcome: Progressing     Problem: Safety - Adult  Goal: Free from fall injury  12/15/2024 2342 by Abi Johnson RN  Outcome: Progressing  12/15/2024 1832 by Delilah Moncada RN  Outcome: Progressing     Problem: Neurosensory - Adult  Goal: Achieves stable or improved neurological status  Outcome: Progressing  Goal: Absence of seizures  12/15/2024 2342 by Abi Johnson RN  Outcome: Progressing  12/15/2024 1832 by Delilah Moncada RN  Outcome: Progressing  Goal: Remains free of injury related to seizures activity  Outcome: Progressing  Goal: Achieves maximal functionality and self care  12/15/2024 2342 by Abi Johnson RN  Outcome: Progressing  12/15/2024 1832 by Delilah Moncada RN  Outcome: Progressing     Problem: Cardiovascular - Adult  Goal: Maintains optimal cardiac output and hemodynamic stability  Outcome: Progressing  Goal: Absence of cardiac dysrhythmias or at baseline  Outcome: Progressing     Problem: Skin/Tissue Integrity - Adult  Goal: Skin integrity remains intact  Outcome: Progressing  Goal: Incisions, wounds, or drain sites healing without S/S of infection  Outcome: Progressing  Goal: Oral mucous membranes remain intact  Outcome: Progressing     Problem: Gastrointestinal - Adult  Goal: Minimal or absence of nausea and vomiting  Outcome: Progressing  Goal: Maintains or returns to baseline bowel function  Outcome: Progressing  Goal: Maintains adequate nutritional intake  Outcome: Progressing  Goal: Establish and maintain optimal ostomy function  Outcome: Progressing     Problem: 
  Problem: Chronic Conditions and Co-morbidities  Goal: Patient's chronic conditions and co-morbidity symptoms are monitored and maintained or improved  Outcome: Progressing     Problem: Discharge Planning  Goal: Discharge to home or other facility with appropriate resources  Outcome: Progressing     Problem: Pain  Goal: Verbalizes/displays adequate comfort level or baseline comfort level  12/13/2024 2216 by Devi Tony RN  Outcome: Progressing  12/13/2024 1846 by Delilah Moncada RN  Outcome: Progressing     Problem: Safety - Adult  Goal: Free from fall injury  12/13/2024 2216 by Devi Tony RN  Outcome: Progressing  12/13/2024 1846 by Delilah Moncada RN  Outcome: Progressing     Problem: Neurosensory - Adult  Goal: Achieves stable or improved neurological status  12/13/2024 2216 by Devi Tony RN  Outcome: Progressing  12/13/2024 1846 by Delilah Moncada RN  Outcome: Progressing  Goal: Absence of seizures  12/13/2024 2216 by Devi Tony RN  Outcome: Progressing  12/13/2024 1846 by Delilah Moncada RN  Outcome: Progressing  Goal: Remains free of injury related to seizures activity  Outcome: Progressing  Goal: Achieves maximal functionality and self care  Outcome: Progressing     Problem: Cardiovascular - Adult  Goal: Maintains optimal cardiac output and hemodynamic stability  Outcome: Progressing  Goal: Absence of cardiac dysrhythmias or at baseline  Outcome: Progressing     Problem: Skin/Tissue Integrity - Adult  Goal: Skin integrity remains intact  Outcome: Progressing  Goal: Incisions, wounds, or drain sites healing without S/S of infection  Outcome: Progressing  Goal: Oral mucous membranes remain intact  Outcome: Progressing     Problem: Gastrointestinal - Adult  Goal: Minimal or absence of nausea and vomiting  Outcome: Progressing  Goal: Maintains or returns to baseline bowel function  Outcome: Progressing  Goal: Maintains adequate nutritional intake  Outcome: Progressing  Goal: Establish and maintain 
  Problem: Chronic Conditions and Co-morbidities  Goal: Patient's chronic conditions and co-morbidity symptoms are monitored and maintained or improved  Outcome: Progressing     Problem: Discharge Planning  Goal: Discharge to home or other facility with appropriate resources  Outcome: Progressing     Problem: Pain  Goal: Verbalizes/displays adequate comfort level or baseline comfort level  Outcome: Progressing     Problem: Safety - Adult  Goal: Free from fall injury  Outcome: Progressing     Problem: Neurosensory - Adult  Goal: Achieves stable or improved neurological status  Outcome: Progressing  Goal: Absence of seizures  Outcome: Progressing  Goal: Remains free of injury related to seizures activity  Outcome: Progressing  Goal: Achieves maximal functionality and self care  Outcome: Progressing     Problem: Cardiovascular - Adult  Goal: Maintains optimal cardiac output and hemodynamic stability  Outcome: Progressing  Goal: Absence of cardiac dysrhythmias or at baseline  Outcome: Progressing     Problem: Skin/Tissue Integrity - Adult  Goal: Skin integrity remains intact  Outcome: Progressing  Goal: Incisions, wounds, or drain sites healing without S/S of infection  Outcome: Progressing  Goal: Oral mucous membranes remain intact  Outcome: Progressing     Problem: Gastrointestinal - Adult  Goal: Minimal or absence of nausea and vomiting  Outcome: Progressing  Goal: Maintains or returns to baseline bowel function  Outcome: Progressing  Goal: Maintains adequate nutritional intake  Outcome: Progressing  Goal: Establish and maintain optimal ostomy function  Outcome: Progressing     Problem: Hematologic - Adult  Goal: Maintains hematologic stability  Outcome: Progressing     
  Problem: Chronic Conditions and Co-morbidities  Goal: Patient's chronic conditions and co-morbidity symptoms are monitored and maintained or improved  Outcome: Progressing     Problem: Discharge Planning  Goal: Discharge to home or other facility with appropriate resources  Outcome: Progressing     Problem: Pain  Goal: Verbalizes/displays adequate comfort level or baseline comfort level  Outcome: Progressing     Problem: Safety - Adult  Goal: Free from fall injury  Outcome: Progressing     Problem: Neurosensory - Adult  Goal: Achieves stable or improved neurological status  Outcome: Progressing  Goal: Absence of seizures  Outcome: Progressing  Goal: Remains free of injury related to seizures activity  Outcome: Progressing  Goal: Achieves maximal functionality and self care  Outcome: Progressing     Problem: Cardiovascular - Adult  Goal: Maintains optimal cardiac output and hemodynamic stability  Outcome: Progressing  Goal: Absence of cardiac dysrhythmias or at baseline  Outcome: Progressing     Problem: Skin/Tissue Integrity - Adult  Goal: Skin integrity remains intact  Outcome: Progressing  Goal: Incisions, wounds, or drain sites healing without S/S of infection  Outcome: Progressing  Goal: Oral mucous membranes remain intact  Outcome: Progressing     Problem: Gastrointestinal - Adult  Goal: Minimal or absence of nausea and vomiting  Outcome: Progressing  Goal: Maintains or returns to baseline bowel function  Outcome: Progressing  Goal: Maintains adequate nutritional intake  Outcome: Progressing  Goal: Establish and maintain optimal ostomy function  Outcome: Progressing     Problem: Hematologic - Adult  Goal: Maintains hematologic stability  Outcome: Progressing     Problem: Respiratory - Adult  Goal: Achieves optimal ventilation and oxygenation  Outcome: Progressing     Problem: Musculoskeletal - Adult  Goal: Return mobility to safest level of function  Outcome: Progressing  Goal: Maintain proper alignment of 
  Problem: Chronic Conditions and Co-morbidities  Goal: Patient's chronic conditions and co-morbidity symptoms are monitored and maintained or improved  Outcome: Progressing     Problem: Discharge Planning  Goal: Discharge to home or other facility with appropriate resources  Outcome: Progressing     Problem: Safety - Adult  Goal: Free from fall injury  Outcome: Progressing     
  Problem: Chronic Conditions and Co-morbidities  Goal: Patient's chronic conditions and co-morbidity symptoms are monitored and maintained or improved  Outcome: Progressing     Problem: Discharge Planning  Goal: Discharge to home or other facility with appropriate resources  Outcome: Progressing     Problem: Safety - Adult  Goal: Free from fall injury  Outcome: Progressing     Problem: Neurosensory - Adult  Goal: Achieves stable or improved neurological status  Outcome: Progressing  Goal: Absence of seizures  Outcome: Progressing  Goal: Remains free of injury related to seizures activity  Outcome: Progressing  Goal: Achieves maximal functionality and self care  Outcome: Progressing     Problem: Cardiovascular - Adult  Goal: Maintains optimal cardiac output and hemodynamic stability  Outcome: Progressing  Goal: Absence of cardiac dysrhythmias or at baseline  Outcome: Progressing     Problem: Skin/Tissue Integrity - Adult  Goal: Skin integrity remains intact  Outcome: Progressing  Goal: Incisions, wounds, or drain sites healing without S/S of infection  Outcome: Progressing  Goal: Oral mucous membranes remain intact  Outcome: Progressing     Problem: Gastrointestinal - Adult  Goal: Minimal or absence of nausea and vomiting  Outcome: Progressing  Goal: Maintains or returns to baseline bowel function  Outcome: Progressing  Goal: Maintains adequate nutritional intake  Outcome: Progressing  Goal: Establish and maintain optimal ostomy function  Outcome: Progressing     Problem: Hematologic - Adult  Goal: Maintains hematologic stability  Outcome: Progressing     Problem: Respiratory - Adult  Goal: Achieves optimal ventilation and oxygenation  Outcome: Progressing     Problem: Musculoskeletal - Adult  Goal: Return mobility to safest level of function  Outcome: Progressing  Goal: Maintain proper alignment of affected body part  Outcome: Progressing  Goal: Return ADL status to a safe level of function  Outcome: Progressing   
  Problem: Chronic Conditions and Co-morbidities  Goal: Patient's chronic conditions and co-morbidity symptoms are monitored and maintained or improved  Outcome: Progressing     Problem: Pain  Goal: Verbalizes/displays adequate comfort level or baseline comfort level  Outcome: Progressing     Problem: Safety - Adult  Goal: Free from fall injury  Outcome: Progressing     Problem: Neurosensory - Adult  Goal: Absence of seizures  Outcome: Progressing  Goal: Remains free of injury related to seizures activity  Outcome: Progressing     Problem: Cardiovascular - Adult  Goal: Absence of cardiac dysrhythmias or at baseline  Outcome: Progressing     
  Problem: Discharge Planning  Goal: Discharge to home or other facility with appropriate resources  Outcome: Progressing     Problem: Safety - Adult  Goal: Free from fall injury  Outcome: Progressing     Problem: Neurosensory - Adult  Goal: Absence of seizures  Outcome: Progressing  Goal: Achieves maximal functionality and self care  Outcome: Progressing     
  Problem: Neurosensory - Adult  Goal: Achieves maximal functionality and self care  12/20/2024 0145 by Antione Agarwal RN  Outcome: Not Progressing     Problem: Cardiovascular - Adult  Goal: Maintains optimal cardiac output and hemodynamic stability  12/20/2024 0145 by Antione Agarwal RN  Outcome: Not Progressing     Problem: Hematologic - Adult  Goal: Maintains hematologic stability  12/20/2024 0145 by Antione Agarwal RN  Outcome: Not Progressing     Problem: Nutrition Deficit:  Goal: Optimize nutritional status  12/20/2024 0145 by Antione Agarwal RN  Outcome: Not Progressing     Problem: Chronic Conditions and Co-morbidities  Goal: Patient's chronic conditions and co-morbidity symptoms are monitored and maintained or improved  12/20/2024 1441 by Delilah Moncada RN  Outcome: Progressing  12/20/2024 0145 by Antione Agarwal RN  Outcome: Not Progressing     Problem: Neurosensory - Adult  Goal: Achieves maximal functionality and self care  12/20/2024 0145 by Antione Agarwal RN  Outcome: Not Progressing     Problem: Cardiovascular - Adult  Goal: Maintains optimal cardiac output and hemodynamic stability  12/20/2024 0145 by Antione Agarwal RN  Outcome: Not Progressing     Problem: Gastrointestinal - Adult  Goal: Maintains adequate nutritional intake  12/20/2024 1441 by Delilah Moncada RN  Outcome: Progressing  12/20/2024 0145 by Antione Agarwal RN  Outcome: Not Progressing     Problem: Hematologic - Adult  Goal: Maintains hematologic stability  12/20/2024 0145 by Antione Agarwal RN  Outcome: Not Progressing     Problem: Nutrition Deficit:  Goal: Optimize nutritional status  12/20/2024 0145 by Antione Agarwal RN  Outcome: Not Progressing     
  Problem: Pain  Goal: Verbalizes/displays adequate comfort level or baseline comfort level  Outcome: Progressing     Problem: Safety - Adult  Goal: Free from fall injury  Outcome: Progressing     Problem: Neurosensory - Adult  Goal: Achieves stable or improved neurological status  Outcome: Progressing  Goal: Absence of seizures  Outcome: Progressing     
Nutrition Problem #1: Unintended weight loss  Intervention: Food and/or Nutrient Delivery: Modify Current Diet (Diet advancement to Carb Control, 2gm Sodium after testing and begin diabetic supplement tid with meals)    
See OT evaluation for all goals and OT POC. Electronically signed by Kami Daniels OT on 12/11/2024 at 3:24 PM   
  Problem: Skin/Tissue Integrity  Goal: Absence of new skin breakdown  Description: 1.  Monitor for areas of redness and/or skin breakdown  2.  Assess vascular access sites hourly  3.  Every 4-6 hours minimum:  Change oxygen saturation probe site  4.  Every 4-6 hours:  If on nasal continuous positive airway pressure, respiratory therapy assess nares and determine need for appliance change or resting period.  Outcome: Progressing     Problem: Nutrition Deficit:  Goal: Optimize nutritional status  Outcome: Progressing     
Greely, Jennifer P, RN  Outcome: Progressing     Problem: Skin/Tissue Integrity - Adult  Goal: Skin integrity remains intact  12/9/2024 2257 by Cynthia Marie RN  Outcome: Progressing  12/9/2024 1453 by Jennifer Rose RN  Outcome: Progressing  Goal: Incisions, wounds, or drain sites healing without S/S of infection  12/9/2024 2257 by Cynthia Marie RN  Outcome: Progressing  12/9/2024 1453 by Jennifer Rose RN  Outcome: Progressing  Goal: Oral mucous membranes remain intact  12/9/2024 2257 by Cynthia Marie RN  Outcome: Progressing  12/9/2024 1453 by Jennifer Rose RN  Outcome: Progressing     Problem: Gastrointestinal - Adult  Goal: Minimal or absence of nausea and vomiting  12/9/2024 2257 by Cynthia Marie RN  Outcome: Progressing  12/9/2024 1453 by Jennifer Rose RN  Outcome: Progressing  Goal: Maintains or returns to baseline bowel function  12/9/2024 2257 by Cynthia Marie RN  Outcome: Progressing  12/9/2024 1453 by Jennifer Rose RN  Outcome: Progressing  Goal: Maintains adequate nutritional intake  12/9/2024 2257 by Cynthia Marie RN  Outcome: Progressing  12/9/2024 1453 by Jennifer Rose RN  Outcome: Progressing  Goal: Establish and maintain optimal ostomy function  12/9/2024 2257 by Cynthia Marie RN  Outcome: Progressing  12/9/2024 1453 by Jennifer Rose RN  Outcome: Progressing     Problem: Hematologic - Adult  Goal: Maintains hematologic stability  12/9/2024 2257 by Cynthia Marie RN  Outcome: Progressing  12/9/2024 1453 by Jennifer Rose RN  Outcome: Progressing     
Progressing  12/12/2024 0955 by Miesha Hussein RN  Outcome: Progressing     Problem: Skin/Tissue Integrity - Adult  Goal: Skin integrity remains intact  12/12/2024 2233 by Devi Tony RN  Outcome: Progressing  12/12/2024 0955 by Miesha Hussein RN  Outcome: Progressing  Goal: Incisions, wounds, or drain sites healing without S/S of infection  12/12/2024 2233 by Devi Tony RN  Outcome: Progressing  12/12/2024 0955 by Miesha Hussein RN  Outcome: Progressing  Goal: Oral mucous membranes remain intact  12/12/2024 2233 by Devi Tony RN  Outcome: Progressing  12/12/2024 0955 by Miesha Hussein RN  Outcome: Progressing     Problem: Gastrointestinal - Adult  Goal: Minimal or absence of nausea and vomiting  12/12/2024 2233 by Devi Tony RN  Outcome: Progressing  12/12/2024 0955 by Miesha Hussein RN  Outcome: Progressing  Goal: Maintains or returns to baseline bowel function  12/12/2024 2233 by Devi Tony RN  Outcome: Progressing  12/12/2024 0955 by Miesha Hussein RN  Outcome: Progressing  Goal: Maintains adequate nutritional intake  12/12/2024 2233 by Devi Tony RN  Outcome: Progressing  12/12/2024 0955 by Miesha Hussein RN  Outcome: Progressing  Goal: Establish and maintain optimal ostomy function  12/12/2024 2233 by Devi Tony RN  Outcome: Progressing  12/12/2024 0955 by Miesha Hussein RN  Outcome: Progressing     Problem: Hematologic - Adult  Goal: Maintains hematologic stability  12/12/2024 2233 by Devi Tony RN  Outcome: Progressing  12/12/2024 0955 by Miesha Hussein RN  Outcome: Progressing     Problem: Respiratory - Adult  Goal: Achieves optimal ventilation and oxygenation  12/12/2024 2233 by Devi Tony RN  Outcome: Progressing  12/12/2024 0955 by Miesha Hussein RN  Outcome: Progressing     Problem: Musculoskeletal - Adult  Goal: Return mobility to safest level of function  12/12/2024 2233 by Devi Tony RN  Outcome: Progressing  12/12/2024 0955 by Miesha Hussein, 
RN  Outcome: Progressing  12/21/2024 1014 by Calista Vazquez RN  Outcome: Progressing  12/20/2024 2224 by Antione Agarwal RN  Outcome: Progressing  Goal: Absence of cardiac dysrhythmias or at baseline  12/21/2024 1014 by Calista Vazquez RN  Outcome: Progressing  12/21/2024 1014 by Calista Vazquez RN  Outcome: Progressing  12/20/2024 2224 by Antione Agarwal RN  Outcome: Progressing     Problem: Skin/Tissue Integrity - Adult  Goal: Skin integrity remains intact  12/21/2024 1014 by Calista Vazquez RN  Outcome: Progressing  12/21/2024 1014 by Calista Vazquez RN  Outcome: Progressing  12/20/2024 2224 by Antione Agarwal RN  Outcome: Progressing  Goal: Incisions, wounds, or drain sites healing without S/S of infection  12/21/2024 1014 by Calista Vazquez RN  Outcome: Progressing  12/21/2024 1014 by Calista Vazquez RN  Outcome: Progressing  12/20/2024 2224 by Antione Agarwal RN  Outcome: Progressing  Goal: Oral mucous membranes remain intact  12/21/2024 1014 by Calista Vazquez RN  Outcome: Progressing  12/21/2024 1014 by Calista Vazquez RN  Outcome: Progressing  12/20/2024 2224 by Antione Agarwal RN  Outcome: Progressing     
STELLA Freitas  Outcome: Progressing  12/22/2024 1112 by Calista Vazquez RN  Outcome: Progressing  Goal: Incisions, wounds, or drain sites healing without S/S of infection  12/22/2024 2317 by Zena Owen RN  Outcome: Progressing  12/22/2024 1112 by Calista Vazquez RN  Outcome: Progressing  Goal: Oral mucous membranes remain intact  12/22/2024 2317 by Zena Owen RN  Outcome: Progressing  12/22/2024 1112 by Calista Vazquez RN  Outcome: Progressing     Problem: Gastrointestinal - Adult  Goal: Minimal or absence of nausea and vomiting  12/22/2024 2317 by Zena Owen RN  Outcome: Progressing  12/22/2024 1112 by Calista Vazquez RN  Outcome: Progressing  Goal: Maintains or returns to baseline bowel function  12/22/2024 2317 by Zena Owen RN  Outcome: Progressing  12/22/2024 1112 by Calista Vazquez RN  Outcome: Progressing  Goal: Maintains adequate nutritional intake  12/22/2024 2317 by Zena Owen RN  Outcome: Progressing  12/22/2024 1112 by Calista Vazquez RN  Outcome: Progressing  Goal: Establish and maintain optimal ostomy function  12/22/2024 2317 by Zena Owen RN  Outcome: Progressing  12/22/2024 1112 by Calista Vazquez RN  Outcome: Progressing     Problem: Hematologic - Adult  Goal: Maintains hematologic stability  12/22/2024 2317 by Zena Owen RN  Outcome: Progressing  12/22/2024 1112 by Calista Vazquez RN  Outcome: Progressing     Problem: Respiratory - Adult  Goal: Achieves optimal ventilation and oxygenation  12/22/2024 2317 by Zena Owen RN  Outcome: Progressing  12/22/2024 1112 by Calista Vazquez RN  Outcome: Progressing     Problem: Musculoskeletal - Adult  Goal: Return mobility to safest level of function  12/22/2024 2317 by Zena Owen RN  Outcome: Progressing  12/22/2024 1112 by Calista Vazquez RN  Outcome: Progressing  Goal: Maintain proper alignment of affected body part  12/22/2024 2317 by Zena Owen RN  Outcome: 
safest level of function  12/12/2024 0026 by Melania Lo RN  Outcome: Progressing  12/11/2024 2248 by Sachi Owen RN  Outcome: Progressing  Goal: Maintain proper alignment of affected body part  12/12/2024 0026 by Melania Lo RN  Outcome: Progressing  12/11/2024 2248 by Sachi Owen RN  Outcome: Progressing  Goal: Return ADL status to a safe level of function  12/12/2024 0026 by Melania Lo RN  Outcome: Progressing  12/11/2024 2248 by Sachi Owen RN  Outcome: Progressing     Problem: Skin/Tissue Integrity  Goal: Absence of new skin breakdown  Description: 1.  Monitor for areas of redness and/or skin breakdown  2.  Assess vascular access sites hourly  3.  Every 4-6 hours minimum:  Change oxygen saturation probe site  4.  Every 4-6 hours:  If on nasal continuous positive airway pressure, respiratory therapy assess nares and determine need for appliance change or resting period.  12/12/2024 0026 by Melania Lo RN  Outcome: Progressing  12/11/2024 2248 by Sachi Owen RN  Outcome: Progressing     Problem: Occupational Therapy - Adult  Goal: By Discharge: Performs self-care activities at highest level of function for planned discharge setting.  See evaluation for individualized goals.  12/11/2024 1524 by Kami Daniels OT  Outcome: Progressing     
Abi Lopez, RN  Outcome: Progressing     Problem: Respiratory - Adult  Goal: Achieves optimal ventilation and oxygenation  12/16/2024 1015 by Nai Yu RN  Outcome: Progressing  12/15/2024 2342 by Abi Johnson RN  Outcome: Progressing     Problem: Musculoskeletal - Adult  Goal: Return mobility to safest level of function  12/16/2024 1015 by Nai Yu, RN  Outcome: Progressing  Flowsheets (Taken 12/16/2024 0948)  Return Mobility to Safest Level of Function: Assess patient stability and activity tolerance for standing, transferring and ambulating with or without assistive devices  12/15/2024 2342 by Abi Johnson RN  Outcome: Progressing  Goal: Maintain proper alignment of affected body part  12/16/2024 1015 by Nai Yu RN  Outcome: Progressing  12/15/2024 2342 by Abi Johnson RN  Outcome: Progressing  Goal: Return ADL status to a safe level of function  12/16/2024 1015 by Nai Yu RN  Outcome: Progressing  Flowsheets (Taken 12/16/2024 0948)  Return ADL Status to a Safe Level of Function: Administer medication as ordered  12/15/2024 2342 by Abi Johnson RN  Outcome: Progressing     Problem: Skin/Tissue Integrity  Goal: Absence of new skin breakdown  Description: 1.  Monitor for areas of redness and/or skin breakdown  2.  Assess vascular access sites hourly  3.  Every 4-6 hours minimum:  Change oxygen saturation probe site  4.  Every 4-6 hours:  If on nasal continuous positive airway pressure, respiratory therapy assess nares and determine need for appliance change or resting period.  12/16/2024 1015 by Nai Yu, RN  Outcome: Progressing  12/15/2024 2342 by Abi Johnson RN  Outcome: Progressing

## 2024-12-29 NOTE — CARE COORDINATION
CALL TO [facility] SUPERVISOR TO NOTIFY OF DISCHARGE TODAY PER PRIMARY. SHE WOULD LIKE TO CONFIRM WITH MD REGARDING HGB OF 8.0 TODAY, RN NOTIFIED AND WILL SEND MESSAGE. PER SUPERVISOR SHE WILL LET HER MD KNOW AS WELL.

## 2024-12-29 NOTE — CARE COORDINATION
CALL FROM , SHE UPDATE SMOOTH AT [facility] ON PT DECLINING TO DC TO [facility], PER RN PT WILL STAY UNTIL TOMORROW, WILL NEED ASSESSED BY PHYSICAL THERAPY AND WILL NEED HOME 02 EVAL.

## 2024-12-29 NOTE — DISCHARGE SUMMARY
packet 17 g, 17 g, Oral, Daily PRN, Kareen Barrios DO    acetaminophen (TYLENOL) tablet 650 mg, 650 mg, Oral, Q6H PRN, 650 mg at 12/27/24 2112 **OR** acetaminophen (TYLENOL) suppository 650 mg, 650 mg, Rectal, Q6H PRN, Kareen Barrios,     glucose chewable tablet 16 g, 4 tablet, Oral, PRN, Kareen Barrios, , 16 g at 12/26/24 0813    dextrose bolus 10% 125 mL, 125 mL, IntraVENous, PRN **OR** dextrose bolus 10% 250 mL, 250 mL, IntraVENous, PRN, Kareen Barrios DO    glucagon injection 1 mg, 1 mg, SubCUTAneous, PRN, Kareen Barrios DO    dextrose 10 % infusion, , IntraVENous, Continuous PRN, Kareen Barrios DO      DC time 43 minutes    Signed:  Pritesh Harrell DO  12/29/2024, 1:24 PM

## 2024-12-30 ENCOUNTER — HOSPITAL ENCOUNTER (INPATIENT)
Age: 74
LOS: 5 days | Discharge: ANOTHER HOSPITAL | End: 2025-01-04
Attending: INTERNAL MEDICINE | Admitting: INTERNAL MEDICINE
Payer: MEDICARE

## 2024-12-30 VITALS
TEMPERATURE: 98.8 F | SYSTOLIC BLOOD PRESSURE: 160 MMHG | HEART RATE: 67 BPM | OXYGEN SATURATION: 96 % | RESPIRATION RATE: 18 BRPM | BODY MASS INDEX: 24.49 KG/M2 | DIASTOLIC BLOOD PRESSURE: 57 MMHG | HEIGHT: 65 IN | WEIGHT: 147 LBS

## 2024-12-30 LAB
ANION GAP SERPL CALCULATED.3IONS-SCNC: 15 MEQ/L (ref 9–15)
BASOPHILS # BLD: 0 K/UL (ref 0–0.2)
BASOPHILS NFR BLD: 0 %
BUN SERPL-MCNC: 30 MG/DL (ref 8–23)
CALCIUM SERPL-MCNC: 7.4 MG/DL (ref 8.5–9.9)
CHLORIDE SERPL-SCNC: 105 MEQ/L (ref 95–107)
CO2 SERPL-SCNC: 16 MEQ/L (ref 20–31)
CREAT SERPL-MCNC: 2.27 MG/DL (ref 0.7–1.2)
EOSINOPHIL # BLD: 0 K/UL (ref 0–0.7)
EOSINOPHIL NFR BLD: 0.3 %
ERYTHROCYTE [DISTWIDTH] IN BLOOD BY AUTOMATED COUNT: 14.2 % (ref 11.5–14.5)
GLUCOSE BLD-MCNC: 125 MG/DL (ref 70–99)
GLUCOSE BLD-MCNC: 143 MG/DL (ref 70–99)
GLUCOSE SERPL-MCNC: 96 MG/DL (ref 70–99)
HCT VFR BLD AUTO: 26.3 % (ref 42–52)
HGB BLD-MCNC: 8.8 G/DL (ref 14–18)
LYMPHOCYTES # BLD: 1.1 K/UL (ref 1–4.8)
LYMPHOCYTES NFR BLD: 15.4 %
MCH RBC QN AUTO: 29.8 PG (ref 27–31.3)
MCHC RBC AUTO-ENTMCNC: 33.5 % (ref 33–37)
MCV RBC AUTO: 89.2 FL (ref 79–92.2)
MONOCYTES # BLD: 0.4 K/UL (ref 0.2–0.8)
MONOCYTES NFR BLD: 5 %
NEUTROPHILS # BLD: 5.5 K/UL (ref 1.4–6.5)
NEUTS SEG NFR BLD: 78.2 %
PERFORMED ON: ABNORMAL
PERFORMED ON: ABNORMAL
PHOSPHATE SERPL-MCNC: 3 MG/DL (ref 2.3–4.8)
PLATELET # BLD AUTO: 176 K/UL (ref 130–400)
POTASSIUM SERPL-SCNC: 4.5 MEQ/L (ref 3.4–4.9)
RBC # BLD AUTO: 2.95 M/UL (ref 4.7–6.1)
SODIUM SERPL-SCNC: 136 MEQ/L (ref 135–144)
WBC # BLD AUTO: 7 K/UL (ref 4.8–10.8)

## 2024-12-30 PROCEDURE — 97535 SELF CARE MNGMENT TRAINING: CPT

## 2024-12-30 PROCEDURE — 6370000000 HC RX 637 (ALT 250 FOR IP): Performed by: INTERNAL MEDICINE

## 2024-12-30 PROCEDURE — 2500000003 HC RX 250 WO HCPCS: Performed by: INTERNAL MEDICINE

## 2024-12-30 PROCEDURE — 36415 COLL VENOUS BLD VENIPUNCTURE: CPT

## 2024-12-30 PROCEDURE — 97530 THERAPEUTIC ACTIVITIES: CPT

## 2024-12-30 PROCEDURE — 97116 GAIT TRAINING THERAPY: CPT

## 2024-12-30 PROCEDURE — 6370000000 HC RX 637 (ALT 250 FOR IP): Performed by: PHYSICIAN ASSISTANT

## 2024-12-30 PROCEDURE — 2700000000 HC OXYGEN THERAPY PER DAY

## 2024-12-30 PROCEDURE — 80048 BASIC METABOLIC PNL TOTAL CA: CPT

## 2024-12-30 PROCEDURE — 99232 SBSQ HOSP IP/OBS MODERATE 35: CPT | Performed by: INTERNAL MEDICINE

## 2024-12-30 PROCEDURE — 6360000002 HC RX W HCPCS: Performed by: INTERNAL MEDICINE

## 2024-12-30 PROCEDURE — 1200000002 HC SEMI PRIVATE SWING BED

## 2024-12-30 PROCEDURE — 85025 COMPLETE CBC W/AUTO DIFF WBC: CPT

## 2024-12-30 PROCEDURE — 84100 ASSAY OF PHOSPHORUS: CPT

## 2024-12-30 RX ORDER — POLYETHYLENE GLYCOL 3350 17 G/17G
17 POWDER, FOR SOLUTION ORAL DAILY PRN
Status: DISCONTINUED | OUTPATIENT
Start: 2024-12-30 | End: 2024-12-30 | Stop reason: SDUPTHER

## 2024-12-30 RX ORDER — PANTOPRAZOLE SODIUM 40 MG/1
40 TABLET, DELAYED RELEASE ORAL
Status: DISCONTINUED | OUTPATIENT
Start: 2024-12-31 | End: 2025-01-04 | Stop reason: HOSPADM

## 2024-12-30 RX ORDER — FERROUS SULFATE 325(65) MG
325 TABLET ORAL EVERY OTHER DAY
Status: ON HOLD | COMMUNITY
Start: 2024-12-30

## 2024-12-30 RX ORDER — IPRATROPIUM BROMIDE AND ALBUTEROL SULFATE 2.5; .5 MG/3ML; MG/3ML
1 SOLUTION RESPIRATORY (INHALATION) EVERY 4 HOURS PRN
Status: DISCONTINUED | OUTPATIENT
Start: 2024-12-30 | End: 2025-01-04 | Stop reason: HOSPADM

## 2024-12-30 RX ORDER — PROCHLORPERAZINE EDISYLATE 5 MG/ML
10 INJECTION INTRAMUSCULAR; INTRAVENOUS EVERY 6 HOURS PRN
Status: DISCONTINUED | OUTPATIENT
Start: 2024-12-30 | End: 2025-01-04 | Stop reason: HOSPADM

## 2024-12-30 RX ORDER — GLIPIZIDE 10 MG/1
5 TABLET ORAL DAILY
Status: ON HOLD | COMMUNITY
Start: 2024-12-30

## 2024-12-30 RX ORDER — CARVEDILOL 3.12 MG/1
3.12 TABLET ORAL 2 TIMES DAILY WITH MEALS
Status: DISCONTINUED | OUTPATIENT
Start: 2024-12-31 | End: 2025-01-01

## 2024-12-30 RX ORDER — PANTOPRAZOLE SODIUM 40 MG/1
40 TABLET, DELAYED RELEASE ORAL
Qty: 60 TABLET | Refills: 2 | Status: ON HOLD | OUTPATIENT
Start: 2024-12-30

## 2024-12-30 RX ORDER — ASPIRIN 81 MG/1
81 TABLET, CHEWABLE ORAL DAILY
Status: DISCONTINUED | OUTPATIENT
Start: 2024-12-31 | End: 2025-01-04 | Stop reason: HOSPADM

## 2024-12-30 RX ORDER — PAROXETINE 20 MG/1
40 TABLET, FILM COATED ORAL DAILY
Status: DISCONTINUED | OUTPATIENT
Start: 2024-12-31 | End: 2025-01-04 | Stop reason: HOSPADM

## 2024-12-30 RX ORDER — QUETIAPINE FUMARATE 25 MG/1
50 TABLET, FILM COATED ORAL NIGHTLY
Status: DISCONTINUED | OUTPATIENT
Start: 2024-12-31 | End: 2025-01-04 | Stop reason: HOSPADM

## 2024-12-30 RX ORDER — ACETAMINOPHEN 325 MG/1
650 TABLET ORAL EVERY 6 HOURS PRN
Status: DISCONTINUED | OUTPATIENT
Start: 2024-12-30 | End: 2025-01-04 | Stop reason: HOSPADM

## 2024-12-30 RX ORDER — GUAIFENESIN 600 MG/1
600 TABLET, EXTENDED RELEASE ORAL 2 TIMES DAILY
Status: DISCONTINUED | OUTPATIENT
Start: 2024-12-31 | End: 2025-01-04 | Stop reason: HOSPADM

## 2024-12-30 RX ORDER — QUETIAPINE FUMARATE 25 MG/1
50 TABLET, FILM COATED ORAL NIGHTLY
Status: DISCONTINUED | OUTPATIENT
Start: 2024-12-31 | End: 2024-12-30

## 2024-12-30 RX ORDER — POLYETHYLENE GLYCOL 3350 17 G/17G
17 POWDER, FOR SOLUTION ORAL DAILY PRN
Status: DISCONTINUED | OUTPATIENT
Start: 2024-12-30 | End: 2025-01-04 | Stop reason: HOSPADM

## 2024-12-30 RX ORDER — ENOXAPARIN SODIUM 100 MG/ML
1 INJECTION SUBCUTANEOUS DAILY
Status: DISCONTINUED | OUTPATIENT
Start: 2024-12-31 | End: 2025-01-04 | Stop reason: HOSPADM

## 2024-12-30 RX ORDER — ATORVASTATIN CALCIUM 40 MG/1
40 TABLET, FILM COATED ORAL NIGHTLY
Status: DISCONTINUED | OUTPATIENT
Start: 2024-12-31 | End: 2025-01-04 | Stop reason: HOSPADM

## 2024-12-30 RX ORDER — DOCUSATE SODIUM 100 MG/1
100 CAPSULE, LIQUID FILLED ORAL 2 TIMES DAILY
Status: DISCONTINUED | OUTPATIENT
Start: 2024-12-31 | End: 2025-01-04 | Stop reason: HOSPADM

## 2024-12-30 RX ORDER — DEXTROSE MONOHYDRATE 100 MG/ML
INJECTION, SOLUTION INTRAVENOUS CONTINUOUS PRN
Status: DISCONTINUED | OUTPATIENT
Start: 2024-12-30 | End: 2025-01-04 | Stop reason: HOSPADM

## 2024-12-30 RX ORDER — IPRATROPIUM BROMIDE AND ALBUTEROL SULFATE 2.5; .5 MG/3ML; MG/3ML
1 SOLUTION RESPIRATORY (INHALATION)
Status: DISCONTINUED | OUTPATIENT
Start: 2024-12-31 | End: 2025-01-04 | Stop reason: HOSPADM

## 2024-12-30 RX ORDER — CARVEDILOL 3.12 MG/1
3.12 TABLET ORAL 2 TIMES DAILY WITH MEALS
Qty: 60 TABLET | Refills: 0 | Status: ON HOLD | OUTPATIENT
Start: 2024-12-30

## 2024-12-30 RX ORDER — GUAIFENESIN 600 MG/1
600 TABLET, EXTENDED RELEASE ORAL 2 TIMES DAILY
Status: DISCONTINUED | OUTPATIENT
Start: 2024-12-31 | End: 2024-12-30

## 2024-12-30 RX ORDER — SODIUM CHLORIDE 0.9 % (FLUSH) 0.9 %
5-40 SYRINGE (ML) INJECTION EVERY 12 HOURS SCHEDULED
Status: DISCONTINUED | OUTPATIENT
Start: 2024-12-31 | End: 2024-12-31

## 2024-12-30 RX ORDER — SODIUM CHLORIDE 0.9 % (FLUSH) 0.9 %
5-40 SYRINGE (ML) INJECTION PRN
Status: DISCONTINUED | OUTPATIENT
Start: 2024-12-30 | End: 2025-01-04 | Stop reason: HOSPADM

## 2024-12-30 RX ORDER — SODIUM CHLORIDE 9 MG/ML
INJECTION, SOLUTION INTRAVENOUS PRN
Status: DISCONTINUED | OUTPATIENT
Start: 2024-12-30 | End: 2025-01-04 | Stop reason: HOSPADM

## 2024-12-30 RX ORDER — ONDANSETRON 2 MG/ML
4 INJECTION INTRAMUSCULAR; INTRAVENOUS EVERY 6 HOURS PRN
Status: DISCONTINUED | OUTPATIENT
Start: 2024-12-30 | End: 2025-01-04 | Stop reason: HOSPADM

## 2024-12-30 RX ORDER — METOPROLOL TARTRATE 1 MG/ML
5 INJECTION, SOLUTION INTRAVENOUS EVERY 6 HOURS PRN
Status: DISCONTINUED | OUTPATIENT
Start: 2024-12-30 | End: 2024-12-31

## 2024-12-30 RX ORDER — FERROUS GLUCONATE 324(38)MG
324 TABLET ORAL EVERY OTHER DAY
Status: DISCONTINUED | OUTPATIENT
Start: 2025-01-01 | End: 2025-01-04 | Stop reason: HOSPADM

## 2024-12-30 RX ORDER — ASPIRIN 81 MG/1
81 TABLET, CHEWABLE ORAL DAILY
Status: CANCELLED | OUTPATIENT
Start: 2024-12-31

## 2024-12-30 RX ORDER — ACETAMINOPHEN 325 MG/1
650 TABLET ORAL EVERY 6 HOURS PRN
Status: DISCONTINUED | OUTPATIENT
Start: 2024-12-30 | End: 2024-12-30 | Stop reason: SDUPTHER

## 2024-12-30 RX ORDER — DOCUSATE SODIUM 100 MG/1
100 CAPSULE, LIQUID FILLED ORAL 2 TIMES DAILY
Status: DISCONTINUED | OUTPATIENT
Start: 2024-12-31 | End: 2024-12-30

## 2024-12-30 RX ORDER — ACETAMINOPHEN 650 MG/1
650 SUPPOSITORY RECTAL EVERY 6 HOURS PRN
Status: DISCONTINUED | OUTPATIENT
Start: 2024-12-30 | End: 2024-12-30 | Stop reason: SDUPTHER

## 2024-12-30 RX ORDER — ONDANSETRON 4 MG/1
4 TABLET, ORALLY DISINTEGRATING ORAL EVERY 8 HOURS PRN
Status: DISCONTINUED | OUTPATIENT
Start: 2024-12-30 | End: 2025-01-04 | Stop reason: HOSPADM

## 2024-12-30 RX ORDER — ATORVASTATIN CALCIUM 40 MG/1
40 TABLET, FILM COATED ORAL NIGHTLY
Status: DISCONTINUED | OUTPATIENT
Start: 2024-12-31 | End: 2024-12-30

## 2024-12-30 RX ORDER — CALCIUM CARBONATE 500 MG/1
500 TABLET, CHEWABLE ORAL 3 TIMES DAILY PRN
Status: DISCONTINUED | OUTPATIENT
Start: 2024-12-30 | End: 2025-01-04 | Stop reason: HOSPADM

## 2024-12-30 RX ORDER — GUAIFENESIN/DEXTROMETHORPHAN 100-10MG/5
5 SYRUP ORAL EVERY 4 HOURS PRN
Status: DISCONTINUED | OUTPATIENT
Start: 2024-12-30 | End: 2025-01-04 | Stop reason: HOSPADM

## 2024-12-30 RX ORDER — ACETAMINOPHEN 650 MG/1
650 SUPPOSITORY RECTAL EVERY 6 HOURS PRN
Status: DISCONTINUED | OUTPATIENT
Start: 2024-12-30 | End: 2025-01-04 | Stop reason: HOSPADM

## 2024-12-30 RX ORDER — GLUCAGON 1 MG/ML
1 KIT INJECTION PRN
Status: DISCONTINUED | OUTPATIENT
Start: 2024-12-30 | End: 2025-01-04 | Stop reason: HOSPADM

## 2024-12-30 RX ORDER — CARVEDILOL 3.12 MG/1
3.12 TABLET ORAL 2 TIMES DAILY WITH MEALS
Status: DISCONTINUED | OUTPATIENT
Start: 2024-12-31 | End: 2024-12-30

## 2024-12-30 RX ORDER — CLONIDINE HYDROCHLORIDE 0.1 MG/1
0.1 TABLET ORAL EVERY 4 HOURS PRN
Status: DISCONTINUED | OUTPATIENT
Start: 2024-12-30 | End: 2024-12-31

## 2024-12-30 RX ADMIN — PAROXETINE HYDROCHLORIDE 40 MG: 30 TABLET, FILM COATED ORAL at 09:59

## 2024-12-30 RX ADMIN — Medication 10 ML: at 10:11

## 2024-12-30 RX ADMIN — PANTOPRAZOLE SODIUM 40 MG: 40 TABLET, DELAYED RELEASE ORAL at 06:19

## 2024-12-30 RX ADMIN — FERROUS GLUCONATE 324 MG: 324 TABLET ORAL at 11:44

## 2024-12-30 RX ADMIN — CARVEDILOL 3.12 MG: 3.12 TABLET, FILM COATED ORAL at 16:37

## 2024-12-30 RX ADMIN — ACETAMINOPHEN 650 MG: 325 TABLET ORAL at 23:12

## 2024-12-30 RX ADMIN — PROCHLORPERAZINE EDISYLATE 10 MG: 5 INJECTION INTRAMUSCULAR; INTRAVENOUS at 21:43

## 2024-12-30 RX ADMIN — PANTOPRAZOLE SODIUM 40 MG: 40 TABLET, DELAYED RELEASE ORAL at 16:37

## 2024-12-30 RX ADMIN — GUAIFENESIN 600 MG: 600 TABLET ORAL at 09:59

## 2024-12-30 RX ADMIN — BENZOCAINE AND MENTHOL 1 LOZENGE: 15; 3.6 LOZENGE ORAL at 16:38

## 2024-12-30 RX ADMIN — ENOXAPARIN SODIUM 70 MG: 100 INJECTION SUBCUTANEOUS at 09:59

## 2024-12-30 RX ADMIN — METOPROLOL TARTRATE 5 MG: 1 INJECTION, SOLUTION INTRAVENOUS at 23:18

## 2024-12-30 RX ADMIN — DOCUSATE SODIUM 100 MG: 100 CAPSULE, LIQUID FILLED ORAL at 09:59

## 2024-12-30 RX ADMIN — CARVEDILOL 3.12 MG: 3.12 TABLET, FILM COATED ORAL at 09:59

## 2024-12-30 ASSESSMENT — PAIN DESCRIPTION - FREQUENCY
FREQUENCY: CONTINUOUS
FREQUENCY: CONTINUOUS

## 2024-12-30 ASSESSMENT — PAIN SCALES - GENERAL: PAINLEVEL_OUTOF10: 10

## 2024-12-30 ASSESSMENT — PAIN DESCRIPTION - ONSET
ONSET: ON-GOING
ONSET: ON-GOING

## 2024-12-30 ASSESSMENT — PAIN - FUNCTIONAL ASSESSMENT
PAIN_FUNCTIONAL_ASSESSMENT: ACTIVITIES ARE NOT PREVENTED
PAIN_FUNCTIONAL_ASSESSMENT: ACTIVITIES ARE NOT PREVENTED

## 2024-12-30 ASSESSMENT — PAIN DESCRIPTION - PAIN TYPE
TYPE: ACUTE PAIN
TYPE: ACUTE PAIN

## 2024-12-30 ASSESSMENT — PAIN DESCRIPTION - DESCRIPTORS
DESCRIPTORS: ACHING
DESCRIPTORS: ACHING

## 2024-12-30 ASSESSMENT — PAIN DESCRIPTION - LOCATION
LOCATION: HEAD
LOCATION: HEAD

## 2024-12-30 ASSESSMENT — PAIN DESCRIPTION - ORIENTATION
ORIENTATION: ANTERIOR
ORIENTATION: LOWER

## 2024-12-30 NOTE — CARE COORDINATION
HECTORW  REACHED OUT TO PT'S SIGNIFICANT OTHER AND HIS KIDS TO SEE IF THEY CAN HELP TO CONVINCE THE PT TO DO A SHORT STAY AT [facility] TO HELP BUILD HIS STRENGTH.      PT'S GIRLFRIEND RETURNED THE CALL AND SAID THAT THE PT IS VERY STUBBORN AND SHE CANNOT CONVINCE HIM TO GO TO [facility] FOR REHAB.  NO RETURNED CALLS FROM SON OR DAUGHTER YET.      HECTORW MET WITH THE PT AT BEDSIDE TO EXPLAIN THE NEED FOR THERAPY PRIOR TO RETURNING HOME.  PT REFUSED TO LOOK AT LSW OR ANSWER ANY QUESTIONS AT THIS TIME.  HE IS STILL REFUSING TO GO TO [facility].  PRECERT IS GOOD UNTIL TOMORROW.

## 2024-12-30 NOTE — CARE COORDINATION
Met with pt at bedside to discuss discharge planning. Therapy recommending SNF as pt AMPAC is 15 and pt was only able to complete 8' x 2. Pt realizing he does not have the support at home and needs therapy prior to dc back home. [facility] is now agreeable to [facility].   LSW updated who will set up transport.

## 2024-12-31 PROBLEM — J96.00 ACUTE RESPIRATORY FAILURE: Status: ACTIVE | Noted: 2024-12-31

## 2024-12-31 LAB
ANION GAP SERPL CALCULATED.3IONS-SCNC: 12 MEQ/L (ref 9–15)
BASOPHILS # BLD: 0 K/UL (ref 0–0.1)
BASOPHILS NFR BLD: 0.2 % (ref 0.2–1.2)
BUN SERPL-MCNC: 29 MG/DL (ref 8–23)
CALCIUM SERPL-MCNC: 8.2 MG/DL (ref 8.5–9.9)
CHLORIDE SERPL-SCNC: 106 MEQ/L (ref 95–107)
CO2 SERPL-SCNC: 17 MEQ/L (ref 20–31)
CREAT SERPL-MCNC: 2.06 MG/DL (ref 0.7–1.2)
EOSINOPHIL # BLD: 0.1 K/UL (ref 0–0.5)
EOSINOPHIL NFR BLD: 0.9 % (ref 0.8–7)
ERYTHROCYTE [DISTWIDTH] IN BLOOD BY AUTOMATED COUNT: 14.2 % (ref 11.6–14.4)
GLUCOSE BLD-MCNC: 116 MG/DL (ref 70–99)
GLUCOSE BLD-MCNC: 168 MG/DL (ref 70–99)
GLUCOSE BLD-MCNC: 205 MG/DL (ref 70–99)
GLUCOSE BLD-MCNC: 345 MG/DL (ref 70–99)
GLUCOSE SERPL-MCNC: 129 MG/DL (ref 70–99)
HCT VFR BLD AUTO: 24 % (ref 42–52)
HGB BLD-MCNC: 7.9 G/DL (ref 13.7–17.5)
IMM GRANULOCYTES # BLD: 0.1 K/UL
IMM GRANULOCYTES NFR BLD: 1.1 %
LYMPHOCYTES # BLD: 0.7 K/UL (ref 1.3–3.6)
LYMPHOCYTES NFR BLD: 12.9 %
MCH RBC QN AUTO: 29.2 PG (ref 25.7–32.2)
MCHC RBC AUTO-ENTMCNC: 32.9 % (ref 32.3–36.5)
MCV RBC AUTO: 88.6 FL (ref 79–92.2)
MONOCYTES # BLD: 0.3 K/UL (ref 0.3–0.8)
MONOCYTES NFR BLD: 5.5 % (ref 5.3–12.2)
NEUTROPHILS # BLD: 4.2 K/UL (ref 1.8–5.4)
NEUTS SEG NFR BLD: 79.4 % (ref 34–67.9)
PATHOLOGIST REPORT, TRANSFUSION REACTION: NORMAL
PERFORMED ON: ABNORMAL
PLATELET # BLD AUTO: 162 K/UL (ref 163–337)
POTASSIUM SERPL-SCNC: 4.5 MEQ/L (ref 3.4–4.9)
RBC # BLD AUTO: 2.71 M/UL (ref 4.63–6.08)
SARS-COV-2 RDRP RESP QL NAA+PROBE: DETECTED
SODIUM SERPL-SCNC: 135 MEQ/L (ref 135–144)
WBC # BLD AUTO: 5.3 K/UL (ref 4.2–9)

## 2024-12-31 PROCEDURE — 1200000002 HC SEMI PRIVATE SWING BED

## 2024-12-31 PROCEDURE — 6370000000 HC RX 637 (ALT 250 FOR IP): Performed by: INTERNAL MEDICINE

## 2024-12-31 PROCEDURE — 6360000002 HC RX W HCPCS: Performed by: INTERNAL MEDICINE

## 2024-12-31 PROCEDURE — 36415 COLL VENOUS BLD VENIPUNCTURE: CPT

## 2024-12-31 PROCEDURE — 94640 AIRWAY INHALATION TREATMENT: CPT

## 2024-12-31 PROCEDURE — 2500000003 HC RX 250 WO HCPCS: Performed by: INTERNAL MEDICINE

## 2024-12-31 PROCEDURE — 2700000000 HC OXYGEN THERAPY PER DAY

## 2024-12-31 PROCEDURE — 80048 BASIC METABOLIC PNL TOTAL CA: CPT

## 2024-12-31 PROCEDURE — 97530 THERAPEUTIC ACTIVITIES: CPT

## 2024-12-31 PROCEDURE — 87635 SARS-COV-2 COVID-19 AMP PRB: CPT

## 2024-12-31 PROCEDURE — 85025 COMPLETE CBC W/AUTO DIFF WBC: CPT

## 2024-12-31 PROCEDURE — 94760 N-INVAS EAR/PLS OXIMETRY 1: CPT

## 2024-12-31 PROCEDURE — 97162 PT EVAL MOD COMPLEX 30 MIN: CPT

## 2024-12-31 PROCEDURE — 97166 OT EVAL MOD COMPLEX 45 MIN: CPT

## 2024-12-31 RX ORDER — LIDOCAINE HYDROCHLORIDE 20 MG/ML
15 SOLUTION OROPHARYNGEAL 4 TIMES DAILY
Status: DISCONTINUED | OUTPATIENT
Start: 2024-12-31 | End: 2025-01-04 | Stop reason: HOSPADM

## 2024-12-31 RX ORDER — FOLIC ACID 1 MG/1
1 TABLET ORAL DAILY
Status: DISCONTINUED | OUTPATIENT
Start: 2024-12-31 | End: 2025-01-04 | Stop reason: HOSPADM

## 2024-12-31 RX ORDER — LIDOCAINE HYDROCHLORIDE 20 MG/ML
15 SOLUTION OROPHARYNGEAL 4 TIMES DAILY
Status: DISCONTINUED | OUTPATIENT
Start: 2024-12-31 | End: 2024-12-31

## 2024-12-31 RX ADMIN — FOLIC ACID 1 MG: 1 TABLET ORAL at 11:24

## 2024-12-31 RX ADMIN — ATORVASTATIN CALCIUM 40 MG: 40 TABLET, FILM COATED ORAL at 20:52

## 2024-12-31 RX ADMIN — QUETIAPINE FUMARATE 50 MG: 25 TABLET ORAL at 20:52

## 2024-12-31 RX ADMIN — PAROXETINE HYDROCHLORIDE 40 MG: 20 TABLET, FILM COATED ORAL at 08:12

## 2024-12-31 RX ADMIN — SODIUM CHLORIDE, PRESERVATIVE FREE 10 ML: 5 INJECTION INTRAVENOUS at 08:17

## 2024-12-31 RX ADMIN — IPRATROPIUM BROMIDE AND ALBUTEROL SULFATE 1 DOSE: 2.5; .5 SOLUTION RESPIRATORY (INHALATION) at 11:06

## 2024-12-31 RX ADMIN — LIDOCAINE HYDROCHLORIDE 15 ML: 20 SOLUTION ORAL at 20:52

## 2024-12-31 RX ADMIN — ASPIRIN 81 MG: 81 TABLET, CHEWABLE ORAL at 08:13

## 2024-12-31 RX ADMIN — PANTOPRAZOLE SODIUM 40 MG: 40 TABLET, DELAYED RELEASE ORAL at 16:39

## 2024-12-31 RX ADMIN — ENOXAPARIN SODIUM 60 MG: 100 INJECTION SUBCUTANEOUS at 08:13

## 2024-12-31 RX ADMIN — CARVEDILOL 3.12 MG: 3.12 TABLET, FILM COATED ORAL at 08:13

## 2024-12-31 RX ADMIN — ACETAMINOPHEN 650 MG: 325 TABLET ORAL at 15:42

## 2024-12-31 RX ADMIN — GUAIFENESIN SYRUP AND DEXTROMETHORPHAN 5 ML: 100; 10 SYRUP ORAL at 21:23

## 2024-12-31 RX ADMIN — GUAIFENESIN 600 MG: 600 TABLET, EXTENDED RELEASE ORAL at 00:15

## 2024-12-31 RX ADMIN — IPRATROPIUM BROMIDE AND ALBUTEROL SULFATE 1 DOSE: 2.5; .5 SOLUTION RESPIRATORY (INHALATION) at 18:03

## 2024-12-31 RX ADMIN — PANTOPRAZOLE SODIUM 40 MG: 40 TABLET, DELAYED RELEASE ORAL at 05:30

## 2024-12-31 RX ADMIN — ATORVASTATIN CALCIUM 40 MG: 40 TABLET, FILM COATED ORAL at 00:15

## 2024-12-31 RX ADMIN — GUAIFENESIN 600 MG: 600 TABLET, EXTENDED RELEASE ORAL at 20:52

## 2024-12-31 RX ADMIN — QUETIAPINE FUMARATE 50 MG: 25 TABLET ORAL at 00:15

## 2024-12-31 RX ADMIN — CARVEDILOL 3.12 MG: 3.12 TABLET, FILM COATED ORAL at 16:40

## 2024-12-31 RX ADMIN — DOCUSATE SODIUM 100 MG: 100 CAPSULE, LIQUID FILLED ORAL at 00:15

## 2024-12-31 RX ADMIN — GUAIFENESIN 600 MG: 600 TABLET, EXTENDED RELEASE ORAL at 08:12

## 2024-12-31 ASSESSMENT — PAIN DESCRIPTION - ONSET
ONSET: ON-GOING
ONSET: ON-GOING

## 2024-12-31 ASSESSMENT — PAIN SCALES - GENERAL
PAINLEVEL_OUTOF10: 3
PAINLEVEL_OUTOF10: 2
PAINLEVEL_OUTOF10: 2
PAINLEVEL_OUTOF10: 3

## 2024-12-31 ASSESSMENT — PAIN DESCRIPTION - PAIN TYPE
TYPE: ACUTE PAIN
TYPE: ACUTE PAIN

## 2024-12-31 ASSESSMENT — PAIN DESCRIPTION - ORIENTATION
ORIENTATION: MID
ORIENTATION: MID

## 2024-12-31 ASSESSMENT — PAIN DESCRIPTION - DESCRIPTORS
DESCRIPTORS: ACHING
DESCRIPTORS: ACHING

## 2024-12-31 ASSESSMENT — PAIN DESCRIPTION - FREQUENCY
FREQUENCY: CONTINUOUS
FREQUENCY: CONTINUOUS

## 2024-12-31 ASSESSMENT — PAIN DESCRIPTION - LOCATION
LOCATION: THROAT
LOCATION: HEAD
LOCATION: THROAT

## 2024-12-31 NOTE — PROGRESS NOTES
12/09/24 2100   RT Protocol   History Pulmonary Disease 2   Respiratory pattern 0   Breath sounds 2   Cough 0   Indications for Bronchodilator Therapy Decreased or absent breath sounds   Bronchodilator Assessment Score 4       
   12/11/24 2100   RT Protocol   History Pulmonary Disease 2   Respiratory pattern 0   Breath sounds 2   Cough 0   Indications for Bronchodilator Therapy Decreased or absent breath sounds   Bronchodilator Assessment Score 4       
   12/13/24 0800   RT Protocol   History Pulmonary Disease 2   Respiratory pattern 0   Breath sounds 2   Cough 0   Indications for Bronchodilator Therapy Decreased or absent breath sounds   Bronchodilator Assessment Score 4       
   12/13/24 1900   RT Protocol   History Pulmonary Disease 2   Respiratory pattern 0   Breath sounds 2   Cough 0   Indications for Bronchodilator Therapy Decreased or absent breath sounds   Bronchodilator Assessment Score 4       
   12/16/24 2100   RT Protocol   History Pulmonary Disease 2   Respiratory pattern 0   Breath sounds 2   Cough 0   Indications for Bronchodilator Therapy Decreased or absent breath sounds   Bronchodilator Assessment Score 4       
   12/19/24 0800   RT Protocol   History Pulmonary Disease 2   Respiratory pattern 0   Breath sounds 2   Cough 0   Indications for Bronchodilator Therapy Decreased or absent breath sounds   Bronchodilator Assessment Score 4       
   12/30/24 1014   Resting (Room Air)   SpO2 92   HR 84   During Walk (Room Air)   SpO2 80      Walk/Assistance Device Walker   Symptoms Fatigue   During Walk (On O2)   SpO2 86      O2 Device Nasal cannula   O2 Flow Rate (l/min) 2 l/min   Need Additional O2 Flow Rate Rows Yes   O2 Flow Rate (l/min) 3 l/min   O2 Saturation 94   Walk/Assistance Device Walker   Rate of Dyspnea 0   Symptoms Fatigue   After Walk   SpO2 97   HR 78   O2 Device Nasal cannula   O2 Flow Rate (l/min) 3 l/min   Rate of Dyspnea 0   Symptoms Fatigue   Does the Patient Qualify for Home O2 Yes   Liter Flow on Exertion 3   Does the Patient Need Portable Oxygen Tanks Yes       
  Physician Progress Note      PATIENT:               JOSE MCCOY  [facility] #:                  [id number]  :                       1950  ADMIT DATE:       2024 7:32 PM  DISCH DATE:  RESPONDING  PROVIDER #:        Alicia HSIEH MD          QUERY TEXT:    Patient admitted with dyspnea. Noted documentation of acute on chronic   diastolic heart failure in attending  progress note. No IV diuretics   given. No overt HF decompensation per cardiology consult . In order to   support the diagnosis of acute on chronic HF, please include additional   clinical indicators in your documentation.  Or please document if the   diagnosis of acute on chronic HF has been ruled out after further study.    The medical record reflects the following:  Risk Factors: HTN, CKD, COPD, HLD, pulmonary HTN, 500 ml NS given on   Clinical Indicators: BNP 2708. No infiltrates, consolidation or pleural   effusions are seen on  CXR.  Echo showed EF of 55-60%. Diastolic   dysfunction.  Cardiology 12/10- \"Breath sounds: No wheezing, rhonchi or rales.\"    \"Lungs: Clear to auscultation bilaterally\". \"Elevated NTproBNP--? Etiology. No   overt HF decompensation\".  H&P \" The proBNP is noted to be somewhat elevated compared to the last value,   though it that is noted to be over a year ago at this time, and patient has no   clinical signs/symptoms consistent with an acute CHF exacerbation other than   the subjective dyspnea.\"  Treatment: Coreg, Cozaar, Lipitor, echo, cardiology consult    Thank you,  Gricel Aguilar   Clinical Documentation Improvement Specialist  W: [phone]  Options provided:  -- Chronic diastolic CHF only, acute decompensated CHF was ruled out  -- Acute on chronic diastolic CHF present as evidenced by, Please document   evidence of acute decompensated CHF.  -- Other - I will add my own diagnosis  -- Disagree - Not applicable / Not valid  -- Disagree - Clinically unable to determine / Unknown  -- Refer to 
  Pulmonary Progress Note    2024 4:32 PM    Subjective:   Admit Date: 2024  PCP: Keith Yu MD    Chief Complaint   Patient presents with    Chest Pain     Interval History: No major issues overnight.  Continues to be on oxygen between 2 and 3 L.  Hemoglobin has been stable.  Kidney function is stable.    12 points review of systems has been obtained and negative except to was mentioned in HPI.     Medications:   Scheduled Meds:   guaiFENesin  600 mg Oral BID    pantoprazole  40 mg Oral BID AC    enoxaparin  1 mg/kg SubCUTAneous BID    ferrous gluconate  324 mg Oral Every Other Day    docusate sodium  100 mg Oral BID    PARoxetine  40 mg Oral Daily    QUEtiapine  50 mg Oral Nightly    atorvastatin  40 mg Oral Nightly    carvedilol  3.125 mg Oral BID WC    sodium chloride flush  5-40 mL IntraVENous 2 times per day     Continuous Infusions:   sodium chloride      dextrose           Objective:   Vitals:   Temp (24hrs), Av.9 °F (37.2 °C), Min:98.1 °F (36.7 °C), Max:99.9 °F (37.7 °C)    /74   Pulse 69   Temp 98.6 °F (37 °C) (Oral)   Resp 18   Ht 1.651 m (5' 5\")   Wt 66.7 kg (147 lb)   SpO2 98%   BMI 24.46 kg/m²   I/O:24HR INTAKE/OUTPUT:    Intake/Output Summary (Last 24 hours) at 2024 1632  Last data filed at 2024 1808  Gross per 24 hour   Intake 120 ml   Output 200 ml   Net -80 ml      0701 -  0700  In: 240 [P.O.:240]  Out: 550 [Urine:550]  CVP:          Physical Exam:  General appearance - alert, ill appearing, and in no distress  Mental status - alert, oriented to person, place, and time  Eyes - pupils equal and reactive, extraocular eye movements intact  Nose - normal and patent, nasal cannula oxygen  Neck - supple, no significant adenopathy  Chest - clear to auscultation, no wheezes, rales or rhonchi, symmetric air entry  Heart - normal rate, regular rhythm, normal S1, S2.  Abdomen - soft, nontender, nondistended, no masses or organomegaly  Rectal - deferred, 
  Vascular Medicine and Interventional Radiology:    PROGRESS NOTE:     Isaak Mitchell  : 1950  MR #: [id number]       PCP:  Keith Yu MD     Attending Physician: Romero Altman MD     Date of Admission: 2024  7:32 PM     Chief Complaint:   Chief Complaint   Patient presents with    Chest Pain      SUBJECTIVE:   Seen and examined on medical surgical unit.  Patient with drop in hemoglobin this morning to 6.4.  Patient denies bowel movements during the night.  However, does report a few dark brown, tarry, sticky bowel movements yesterday evening.  Per EMR, patient with episode of hypoxia overnight, placed on O2.  O2 saturations upper 90s on 2 L currently.  Patient does report shortness of breath with exertion when getting up to the restroom this morning.  He denies any further bowel movements this morning.  Per nursing, he is to receive a unit of PRBCs this morning.  Patient reports he did not eat or drink anything for breakfast.     Past Medical History:   has a past medical history of AMI (acute myocardial infarction) ([facility]), CAD S/P percutaneous coronary angioplasty, Cancer ([facility]), Chronic bronchitis ([facility]), COPD (chronic obstructive pulmonary disease) ([facility]), Diabetes mellitus ([facility]), Diverticulitis, Emphysema of lung ([facility]), Heart attack ([facility]), History of blood transfusion, Hyperlipidemia, Hypertension, Meniere's disease, NSTEMI (non-ST elevated myocardial infarction) ([facility]), and Vertigo.    Past SurgicalHistory:   has a past surgical history that includes Cardiac surgery; skin biopsy; Upper gastrointestinal endoscopy (N/A, 2024); and Colonoscopy (N/A, 2024).     Allergies:Patient has no known allergies.    Home Medications:   Prior to Admission medications    Medication Sig Start Date End Date Taking? Authorizing Provider   QUEtiapine (SEROQUEL) 50 MG tablet Take 1 tablet by mouth nightly 24  Yes Alicia Zavala MD   carvedilol (COREG) 3.125 MG tablet Take 1 tablet by mouth 2 times 
0945- VSS. Pt alert et oriented no confusion noted at this time. Received order to Transfuse 1 unit PRBC for hgb 6.4    0951- Blood transfusion started at 60ml/hr #20gauge  left wrist    1015- Pt observed for greater than 15min for initial start of blood transfusion. No s/s of adverse reaction to blood administration.Infusion increased to 125ml/hr tolerating well    1035- Pt transferred off floor for procedure    1458- Returned to floor from procedure    1135- Received verification that pt had possible reaction to blood transfusion d/t increase in temp.Pt in cath lab to cont procedure.Electronically signed by Jewell Ford RN on 12/19/2024 at 12:26 PM       
0950-Pt to Stress Lab 1;1 with pt  1325-Pt returned from Stress Lab   
2320 Pt arrived to 2W Rm 288, pt guarding c/o CP    2335 Pt BP 68/36, 1x emesis, c/o increasing CP radiating to LUE and back    2335 RR called, RR team responded to 288    2340 NS wide open, zofran, nitro administered    2000 SBP 130s, Pt stable.    2008 RR complete      
Aurelia was approved 12/14 through 12/17. I spoke with Jessica at [facility] to let her know that patient is okay to come to [facility] for InPt Skilled care once medically stable. NRD 17th.   
Cardiology progress note  Patient: Isaak Mitchell  Unit/Bed: W288/W288-01  YOB: 1950  MRN: [id number]  Acct: [id number]   Admitting Diagnosis: Dyspnea [R06.00]  Symptomatic anemia [D64.9]  Chest pain, unspecified type [R07.9]  Date:  12/8/2024  Hospital Day: 5      Chief Complaint:  Chest pain/SOB    History of Present Illness:    This is a pleasant 74-year-old  male with past medical history significant for coronary artery disease status post PCI of the RCA in February 2023 and subsequent PCI to the circumflex in March 2023, hypertension, dyslipidemia, diabetes, CKD, COPD not on home O2, former tobacco abuse and history of anemia who presented to [facility] ER on 12/8/2024 with chief complaint of chest pain and shortness of breath.  Patient reports that over the past several months he has been having intermittent chest pain and shortness of breath which is more pronounced with exertion.  He states that he is pretty sedentary and chest pain will occur when at rest and also if he exerts himself.  He describes his chest pain as a midsternal pressure and ache like discomfort which can last for days at a time and is exacerbated with movement.  He reports progressive shortness of breath with exertion over the past several months as well and does have some occasional shortness of breath at night when laying in bed.  He has a history of vertigo but no new dizziness or lightheadedness complaints.  He denies abdominal pain, nausea, vomiting, hematochezia or melena, dysuria hematuria, syncope, fever or chills.  Due to the chest pain and shortness of breath he presented to ER for further evaluation.    On presentation emergency room, blood pressure 146/71, heart rate 76, respiratory rate 24, pulse ox 96% on room air, temperature 98.7 °F.  Sodium 137, potassium 4.9, chloride 106, total CO2 16, BUN 19, creatinine 1.90, GFR low at 36.0, glucose elevated 238.  NT proBNP elevated at 2709.  High-sensitivity 
Cardiology progress note  Patient: Isaak Mitchell  Unit/Bed: W288/W288-01  YOB: 1950  MRN: [id number]  Acct: [id number]   Admitting Diagnosis: Dyspnea [R06.00]  Symptomatic anemia [D64.9]  Chest pain, unspecified type [R07.9]  Date:  12/8/2024  Hospital Day: 1      Chief Complaint:  Chest pain/SOB    History of Present Illness:    This is a pleasant 74-year-old  male with past medical history significant for coronary artery disease status post PCI of the RCA in February 2023 and subsequent PCI to the circumflex in March 2023, hypertension, dyslipidemia, diabetes, CKD, COPD not on home O2, former tobacco abuse and history of anemia who presented to [facility] ER on 12/8/2024 with chief complaint of chest pain and shortness of breath.  Patient reports that over the past several months he has been having intermittent chest pain and shortness of breath which is more pronounced with exertion.  He states that he is pretty sedentary and chest pain will occur when at rest and also if he exerts himself.  He describes his chest pain as a midsternal pressure and ache like discomfort which can last for days at a time and is exacerbated with movement.  He reports progressive shortness of breath with exertion over the past several months as well and does have some occasional shortness of breath at night when laying in bed.  He has a history of vertigo but no new dizziness or lightheadedness complaints.  He denies abdominal pain, nausea, vomiting, hematochezia or melena, dysuria hematuria, syncope, fever or chills.  Due to the chest pain and shortness of breath he presented to ER for further evaluation.    On presentation emergency room, blood pressure 146/71, heart rate 76, respiratory rate 24, pulse ox 96% on room air, temperature 98.7 °F.  Sodium 137, potassium 4.9, chloride 106, total CO2 16, BUN 19, creatinine 1.90, GFR low at 36.0, glucose elevated 238.  NT proBNP elevated at 2709.  High-sensitivity 
Care transferred to STELLA Vel[facility]quez 2355.  
Comprehensive Nutrition Assessment    Type and Reason for Visit:  Initial, LOS    Nutrition Recommendations/Plan:   Modify Current Diet (Diet advancement to Carb Control, 2gm Sodium after testing and begin diabetic supplement tid with meals)     Malnutrition Assessment:  Malnutrition Status:  At risk for malnutrition (12/16/24 1549)    Context:  Acute Illness     Findings of the 6 clinical characteristics of malnutrition:  Energy Intake:  Mild decrease in energy intake  Weight Loss:  Unable to assess     Body Fat Loss:  No body fat loss     Muscle Mass Loss:  No muscle mass loss    Fluid Accumulation:  No fluid accumulation     Strength:  Not Performed    Nutrition Assessment:    Pt presents at nutritional risk d/t reported 'fair' appetite/intake currently and pta, with noted weight loss. Pt is agreeable to nutritional supplement. To add diabetic supplement tid with meals, when diet is resumed after testing. To continue to follow.    Nutrition Related Findings:    PMH-DM2, COPD, CHF, hld, htn, MI, Meniere's disease, CAD; admitted with Chest pain and Symptomatic anemia. Labs/meds reviewed. Gluc-149-271 x last 48hrs; noted elevated BUN/Cr., low H/H. Labs/meds reviewed. Pertinenet meds-colace, lipitor, insulin, steroid tx, PPI. Last BM noted 12/11. GI consult 12/16- EGD and colonoscopy scheduled for tomorrow. Wound Type: None       Current Nutrition Intake & Therapies:    Average Meal Intake: 51-75%, %  ADULT DIET; Clear Liquid  Diet NPO Exceptions are: Sips of Water with Meds (12/16 midnight)    Anthropometric Measures:  Height: 165.1 cm (5' 5\")  Ideal Body Weight (IBW): 136 lbs (62 kg)    Admission Body Weight: 71.4 kg (157 lb 6.5 oz) (bedscale)  Current Body Weight: 65.5 kg (144 lb 6.4 oz) (12/15; please re-weigh pt after bed is zeroed to confirm weight loss),   IBW. Weight Source: Bed scale  Current BMI (kg/m2): 24  Usual Body Weight:  (156-160lb (2024 per EMR))                          BMI Categories: 
Comprehensive Nutrition Assessment    Type and Reason for Visit:  Reassess    Nutrition Recommendations/Plan:   Advance diet as ok per GI. Add Clear supplement tid until diet is advanced, then change to Diabetic supplement tid.     Malnutrition Assessment:  Malnutrition Status:  At risk for malnutrition (12/16/24 1549)    Context:  Acute Illness       Nutrition Assessment:    Pt remains at nutritional risk d/t prolonged sub-optimal oral intake d/t altered GI function, with history of weight loss. Await advancement of diet as ok per GI. To add Clear supplement tid untid diet is advanced, then change to Diabetic supplement tid. To continue to follow.    Nutrition Related Findings:    PMH-DM2, COPD, CHF, hld, htn, MI, Meniere's disease, CAD; admitted with Chest pain and Symptomatic anemia. Labs/meds reviewed. Gluc-134-268 x last 48hrs; noted elevated BUN/Cr, low H/H. Labs/meds reviewed. Pertinent meds-colace, lipitor, SS insulin, fergon, PPI. Last BM noted 12/11. EGD and colonoscopy (12/17)- GIB secondary to large peptic ulcer; multiple ulcers-IV PPI. +COVID 12/19. Wound Type: None       Current Nutrition Intake & Therapies:    Average Meal Intake: Unable to assess  ADULT DIET; Clear Liquid    Anthropometric Measures:  Height: 165.1 cm (5' 5\")  Ideal Body Weight (IBW): 136 lbs (62 kg)    Admission Body Weight: 71.4 kg (157 lb 6.5 oz) ([facility])  Current Body Weight: 64.9 kg (143 lb 1.3 oz) (12/19),   Weight Source: Bed scale  Current BMI (kg/m2): 23.8  Usual Body Weight:  (156-160lb (2024 per EMR))                          BMI Categories: Normal Weight (BMI 22.0 to 24.9) age over 65    Estimated Daily Nutrient Needs:  Energy Requirements Based On: Kcal/kg  Weight Used for Energy Requirements: Current  Energy (kcal/day): 9284-4638 (kg x 26-28)  Weight Used for Protein Requirements: Current  Protein (g/day): 78-85 (kg x 1.2-1.3)  Method Used for Fluid Requirements: 1 ml/kcal  Fluid (ml/day): ~1800    Nutrition Diagnosis: 
Comprehensive Nutrition Assessment    Type and Reason for Visit:  Reassess    Nutrition Recommendations/Plan:   Continue Current Diet, Continue Oral Nutrition Supplement     Malnutrition Assessment:  Malnutrition Status:  At risk for malnutrition (12/16/24 8599)    Context:  Acute Illness     Findings of the 6 clinical characteristics of malnutrition:    Nutrition Assessment:    Pt remains at nutritional risk d/t prolonged sub-optimal oral intake d/t altered GI function, with history of weight loss. Unable to reach pt via phone after several attempts. Diabetic supplement provided tid. RD available if supplemental enteral nutritional support is opted. To continue to follow.    Nutrition Related Findings:    PMH-DM2, COPD, CHF, hld, htn, MI, Meniere's disease, CAD; admitted with Chest pain and Symptomatic anemia. Labs/meds reviewed. Gluc- x last 24hrs; noted elevated BUN/Cr, low H/H. Labs/meds reviewed. Pertinent meds-colace, lipitor, SS insulin, fergon, PPI. Last BM (loose) noted 12/27. EGD and colonoscopy (12/17)- GIB secondary to large peptic ulcer; multiple ulcers-PPI. +COVID 12/19. Wound Type: None       Current Nutrition Intake & Therapies:    Average Meal Intake: <50%  ADULT DIET; Dysphagia - Soft and Bite Sized; 5 carb choices (75 gm/meal)  ADULT ORAL NUTRITION SUPPLEMENT; Breakfast, Lunch, Dinner; Diabetic Oral Supplement    Anthropometric Measures:  Height: 165.1 cm (5' 5\")  Ideal Body Weight (IBW): 136 lbs (62 kg)    Admission Body Weight: 71.4 kg (157 lb 6.5 oz) ([facility])  Current Body Weight: 64.9 kg (143 lb 1.3 oz) (12/19),   Weight Source: Bed scale  Current BMI (kg/m2): 23.8  Usual Body Weight:  (156-160lb (2024 per EMR))                          BMI Categories: Normal Weight (BMI 22.0 to 24.9) age over 65    Estimated Daily Nutrient Needs:  Energy Requirements Based On: Kcal/kg  Weight Used for Energy Requirements: Current  Energy (kcal/day): 9170-4873 (kg x 26-28)  Weight Used for Protein 
Consult Note  Patient: Isaak Mitchell  Unit/Bed: W288/W288-01  YOB: 1950  MRN: [id number]  Acct: [id number]   Admitting Diagnosis: Dyspnea [R06.00]  Symptomatic anemia [D64.9]  Chest pain, unspecified type [R07.9]  Date:  12/8/2024  Hospital Day: 0      Chief Complaint:  Chest pain/SOB    History of Present Illness:    This is a pleasant 74-year-old  male with past medical history significant for coronary artery disease status post PCI of the RCA in February 2023 and subsequent PCI to the circumflex in March 2023, hypertension, dyslipidemia, diabetes, CKD, COPD not on home O2, former tobacco abuse and history of anemia who presented to [facility] ER on 12/8/2024 with chief complaint of chest pain and shortness of breath.  Patient reports that over the past several months he has been having intermittent chest pain and shortness of breath which is more pronounced with exertion.  He states that he is pretty sedentary and chest pain will occur when at rest and also if he exerts himself.  He describes his chest pain as a midsternal pressure and ache like discomfort which can last for days at a time and is exacerbated with movement.  He reports progressive shortness of breath with exertion over the past several months as well and does have some occasional shortness of breath at night when laying in bed.  He has a history of vertigo but no new dizziness or lightheadedness complaints.  He denies abdominal pain, nausea, vomiting, hematochezia or melena, dysuria hematuria, syncope, fever or chills.  Due to the chest pain and shortness of breath he presented to ER for further evaluation.    On presentation emergency room, blood pressure 146/71, heart rate 76, respiratory rate 24, pulse ox 96% on room air, temperature 98.7 °F.  Sodium 137, potassium 4.9, chloride 106, total CO2 16, BUN 19, creatinine 1.90, GFR low at 36.0, glucose elevated 238.  NT proBNP elevated at 2709.  High-sensitivity troponin elevated 
Enoxaparin Treatment Dose Evaluation      Recent Labs     12/24/24  0529 12/25/24  0545 12/26/24  0534   BUN 31* 32* 26*   CREATININE 1.80* 1.74* 1.56*    188 214   HGB 8.4* 8.5* 9.4*   HCT 24.6* 24.4* 26.9*     Current order:  Enoxaparin 1mg/kg SUBQ  daily       Height: 165.1 cm (5' 5\"), Weight - Scale: 66.7 kg (147 lb)  Estimated Creatinine Clearance: 35 mL/min (A) (based on SCr of 1.56 mg/dL (H)).    Plan:  monitor renal function     Patient Weight (kg)     50.9 and below .9 151-189.9 190 or greater   Estimated   CrCl  (ml/min) 30 or greater []   Recommend [facility] standardized UFH infusion, apixaban or rivaroxaban   [x]   1 mg/kg SUBQ BID [] 1 mg/kg SUBQ BID if anti-Xa levels are feasible per institution. Alternatively, recommend switch to [facility] standardized UFH infusion []  Recommend switch to [facility] standardized UFH infusion    15-29.9 []  Recommend [facility] standardized UFH infusion or apixaban []  1 mg/kg SUBQ daily [] Recommend switch to [facility] standardized UFH infusion     Less than 15 or dialysis []  Recommend switch to [facility] standardized UFH infusion          
Examined this am pt is not wheezing, called by RN than pt is wheezing, ordered Velvet,   
Gastroenterology Progress Note    Isaak Mitchell is a 74 y.o. male patient.  Hospitalization Day:7    Chief C/O: Anemia, peptic ulcer disease    SUBJECTIVE: Seen and examined, no acute events, no bleeding overnight, hemoglobin 7.5, status post EGD yesterday with findings of Multiple Ulcers noted in the duodenum ranging from 5 to 25 mm.  Areas of healed ulcers identified with reepithelialization of the duodenal mucosa. A large 25 mm ulcer with adherent clot was noted.  Extensive washing done given the size of the ulcer does not amenable to clip placement, Hemospray applied with no further bleeding noted    ROS:  Gastrointestinal ROS: no abdominal pain, change in bowel habits, or black or bloody stools    Physical    VITALS:  BP (!) 149/50   Pulse 63   Temp 97.9 °F (36.6 °C) (Axillary)   Resp 16   Ht 1.651 m (5' 5\")   Wt 65.1 kg (143 lb 8 oz)   SpO2 100%   BMI 23.88 kg/m²   TEMPERATURE:  Current - Temp: 97.9 °F (36.6 °C); Max - Temp  Av.9 °F (36.6 °C)  Min: 97.5 °F (36.4 °C)  Max: 98.2 °F (36.8 °C)    General:  Alert and oriented,  No apparent distress  Skin- without jaundice  Eyes: anicteric sclera  Cardiac: RRR, Nl s1s2, without murmurs  Lungs CTA Bilaterally, normal effort  Abdomen soft, ND, NT, no HSM, Bowel sounds normal  Ext: without edema  Neuro: no asterixis     Data    Data Review:    Recent Labs     24  0409 24  1132 24  0352 24  0353 24  1102 24  2026 24  0401   WBC 9.4  --  9.0  --   --   --  9.6   HGB 7.7*  7.8*   < > 7.6*   < > 8.3* 7.7* 7.7*  7.5*   HCT 22.7*  22.5*   < > 22.2*   < > 24.7* 22.7* 21.9*  21.5*   MCV 88.3  --  87.7  --   --   --  87.6     --  314  --   --   --  322    < > = values in this interval not displayed.     Recent Labs     24  0409 24  0352 24  0401    137 137   K 4.4 3.9 4.3    104 104   CO2 20 20 20   BUN 49* 42* 33*   CREATININE 2.12* 1.78* 1.73*     No results for input(s): \"AST\", 
Gastroenterology Progress Note    Isaak Mitchell is a 74 y.o. male patient.  Hospitalization Day:8    Chief C/O: GIB    SUBJECTIVE: Seen and examined, reportedly have multiple melanotic stools over the course of the last 24 hours, hemoglobin dropped to 6.4, has 1 unit of packed red blood cells ordered, has been made n.p.o. with plan for IR intervention    ROS:  Gastrointestinal ROS: no abdominal pain, change in bowel habits, or black or bloody stools    Physical    VITALS:  /60   Pulse 84   Temp 98.6 °F (37 °C) (Oral)   Resp 18   Ht 1.651 m (5' 5\")   Wt 64.9 kg (143 lb)   SpO2 100%   BMI 23.80 kg/m²   TEMPERATURE:  Current - Temp: 98.6 °F (37 °C); Max - Temp  Av °F (37.2 °C)  Min: 98.2 °F (36.8 °C)  Max: 99.7 °F (37.6 °C)    General:  Alert and oriented,  No apparent distress  Skin- without jaundice  Eyes: anicteric sclera  Cardiac: RRR, Nl s1s2, without murmurs  Lungs CTA Bilaterally, normal effort  Abdomen soft, ND, NT, no HSM, Bowel sounds normal  Ext: without edema  Neuro: no asterixis     Data    Data Review:    Recent Labs     24  0352 24  0353 24  0401 24  1130 24  0446   WBC 9.0  --  9.6  --  10.4   HGB 7.6*   < > 7.7*  7.5* 7.8* 6.4*   HCT 22.2*   < > 21.9*  21.5* 22.4* 18.5*   MCV 87.7  --  87.6  --  89.4     --  322  --  334    < > = values in this interval not displayed.     Recent Labs     24  0352 24  0401 24  0446    137 136   K 3.9 4.3 4.7    104 107   CO2 20 20 20   BUN 42* 33* 52*   CREATININE 1.78* 1.73* 1.93*     No results for input(s): \"AST\", \"ALT\", \"BILIDIR\", \"BILITOT\", \"ALKPHOS\" in the last 72 hours.    Invalid input(s): \"ALB\"  No results for input(s): \"LIPASE\", \"AMYLASE\" in the last 72 hours.  No results for input(s): \"PROTIME\", \"INR\" in the last 72 hours.    Endoscopic hx:  EGD Dr Benjamin 24  Impression:         -  Normal esophagus.         -  Widely patent and non-obstructing Schatzki ring.       
Gastroenterology Progress Note    Isaak Mitchell is a 74 y.o. male patient.  Hospitalization Day:9    Chief C/O: GIB    SUBJECTIVE: Seen and examined, overnight events noted, patient tested positive for COVID-19, hemoglobin 9.1, was started on clear liquid diet, he is status post IR intervention for coiling of GDA in the setting of duodenal ulcer and recurrent bleeding post endoscopic Hemospray.    ROS:  Gastrointestinal ROS: no abdominal pain, change in bowel habits, or black or bloody stools    Physical    VITALS:  BP (!) 144/58   Pulse 76   Temp 98.8 °F (37.1 °C) (Oral)   Resp 18   Ht 1.651 m (5' 5\")   Wt 64.9 kg (143 lb)   SpO2 98%   BMI 23.80 kg/m²   TEMPERATURE:  Current - Temp: 98.8 °F (37.1 °C); Max - Temp  Av.3 °F (36.8 °C)  Min: 97.4 °F (36.3 °C)  Max: 99 °F (37.2 °C)    General:  Alert and oriented,  No apparent distress  Skin- without jaundice  Eyes: anicteric sclera  Cardiac: RRR, Nl s1s2, without murmurs  Lungs CTA Bilaterally, normal effort  Abdomen soft, ND, NT, no HSM, Bowel sounds normal  Ext: without edema  Neuro: no asterixis     Data    Data Review:    Recent Labs     24  0401 24  1130 24  0446 24  1513 24  2042 24  0352 24  0821   WBC 9.6  --  10.4  --   --   --   --    HGB 7.7*  7.5*   < > 6.4*   < > 6.7* 9.1* 8.5*   HCT 21.9*  21.5*   < > 18.5*   < > 19.4* 25.7* 24.2*   MCV 87.6  --  89.4  --   --   --   --      --  334  --   --   --   --     < > = values in this interval not displayed.     Recent Labs     24  0401 24  0446    136   K 4.3 4.7    107   CO2 20 20   BUN 33* 52*   CREATININE 1.73* 1.93*     No results for input(s): \"AST\", \"ALT\", \"BILIDIR\", \"BILITOT\", \"ALKPHOS\" in the last 72 hours.    Invalid input(s): \"ALB\"  No results for input(s): \"LIPASE\", \"AMYLASE\" in the last 72 hours.  No results for input(s): \"PROTIME\", \"INR\" in the last 72 hours.  Endoscopic hx:  EGD Dr Benjamin 
Hospitalist Progress Note      PCP: Keith Yu MD    Date of Admission: 12/8/2024    Chief Complaint:    Chief Complaint   Patient presents with    Chest Pain     Subjective:  Patient denies fevers, chills, sweats, CP,  diarrhea or burning micturition.  Feels tired. 12 point ROS negative other than mentioned above     Medications:  Reviewed    Infusion Medications    sodium chloride      sodium chloride      sodium chloride      sodium chloride      dextrose       Scheduled Medications    insulin lispro  0-4 Units SubCUTAneous 4x Daily AC & HS    enoxaparin  1 mg/kg SubCUTAneous BID    insulin lispro  3 Units SubCUTAneous TID WC    insulin glargine  10 Units SubCUTAneous BID    cefTRIAXone (ROCEPHIN) 1,000 mg in sterile water 10 mL IV syringe  1,000 mg IntraVENous Q24H    ferrous gluconate  324 mg Oral Every Other Day    docusate sodium  100 mg Oral BID    pantoprazole  40 mg IntraVENous BID    [Held by provider] levalbuterol  1.25 mg Nebulization Q4H RT    [Held by provider] ipratropium  0.5 mg Nebulization 4x Daily RT    PARoxetine  40 mg Oral Daily    [Held by provider] aspirin  81 mg Oral Daily    QUEtiapine  50 mg Oral Nightly    atorvastatin  40 mg Oral Nightly    carvedilol  3.125 mg Oral BID WC    losartan  25 mg Oral Daily    sodium chloride flush  5-40 mL IntraVENous 2 times per day     PRN Meds: guaiFENesin-dextromethorphan, sodium chloride, hydrALAZINE, sodium chloride, prochlorperazine, sodium chloride, calcium carbonate, metoprolol, benzocaine-menthol, [Held by provider] traMADol, ipratropium 0.5 mg-albuterol 2.5 mg, sodium chloride flush, sodium chloride, polyethylene glycol, acetaminophen **OR** acetaminophen, glucose, dextrose bolus **OR** dextrose bolus, glucagon (rDNA), dextrose      Intake/Output Summary (Last 24 hours) at 12/26/2024 1340  Last data filed at 12/26/2024 1302  Gross per 24 hour   Intake 360 ml   Output 850 ml   Net -490 ml     Exam:    BP (!) 153/54   Pulse 77   Temp (!) 
Hospitalist Progress Note      PCP: Keith Yu MD    Date of Admission: 12/8/2024    Chief Complaint:    Chief Complaint   Patient presents with    Chest Pain     Subjective:  Patient denies fevers, chills, sweats, CP, SOB, diarrhea or burning micturition.  12 point ROS negative other than mentioned above     Medications:  Reviewed    Infusion Medications    sodium chloride      sod chloride IRR soln      sodium chloride      sodium chloride      sodium chloride      dextrose       Scheduled Medications    ferrous gluconate  324 mg Oral Every Other Day    docusate sodium  100 mg Oral BID    pantoprazole  40 mg IntraVENous BID    [Held by provider] enoxaparin  1 mg/kg SubCUTAneous Daily    levalbuterol  1.25 mg Nebulization Q4H RT    ipratropium  0.5 mg Nebulization 4x Daily RT    PARoxetine  40 mg Oral Daily    [Held by provider] aspirin  81 mg Oral Daily    QUEtiapine  50 mg Oral Nightly    atorvastatin  40 mg Oral Nightly    carvedilol  3.125 mg Oral BID WC    losartan  25 mg Oral Daily    sodium chloride flush  5-40 mL IntraVENous 2 times per day    insulin lispro  0-8 Units SubCUTAneous 4x Daily AC & HS     PRN Meds: sodium chloride, lidocaine, fentanNYL, sod chloride IRR soln, sodium chloride, iopamidol, sodium chloride, calcium carbonate, metoprolol, benzocaine-menthol, [Held by provider] traMADol, ipratropium 0.5 mg-albuterol 2.5 mg, sodium chloride flush, sodium chloride, ondansetron **OR** ondansetron, polyethylene glycol, acetaminophen **OR** acetaminophen, glucose, dextrose bolus **OR** dextrose bolus, glucagon (rDNA), dextrose      Intake/Output Summary (Last 24 hours) at 12/19/2024 1243  Last data filed at 12/19/2024 1052  Gross per 24 hour   Intake --   Output 575 ml   Net -575 ml       Exam:    BP (!) 149/67   Pulse 78   Temp (!) 101.4 °F (38.6 °C) (Oral)   Resp 14   Ht 1.651 m (5' 5\")   Wt 64.9 kg (143 lb)   SpO2 98%   BMI 23.80 kg/m²     General appearance: No apparent distress, 
Hospitalist Progress Note      PCP: Keith Yu MD    Date of Admission: 12/8/2024    Chief Complaint:    Chief Complaint   Patient presents with    Chest Pain     Subjective:  Patient denies fevers, chills, sweats, CP, SOB, diarrhea or burning micturition.  12 point ROS negative other than mentioned above     Medications:  Reviewed    Infusion Medications    sodium chloride      sodium chloride      dextrose       Scheduled Medications    docusate sodium  100 mg Oral BID    pantoprazole  40 mg IntraVENous BID    [Held by provider] enoxaparin  1 mg/kg SubCUTAneous Daily    levalbuterol  1.25 mg Nebulization Q4H RT    ipratropium  0.5 mg Nebulization 4x Daily RT    PARoxetine  40 mg Oral Daily    [Held by provider] aspirin  81 mg Oral Daily    QUEtiapine  50 mg Oral Nightly    atorvastatin  40 mg Oral Nightly    carvedilol  3.125 mg Oral BID WC    losartan  25 mg Oral Daily    sodium chloride flush  5-40 mL IntraVENous 2 times per day    insulin lispro  0-8 Units SubCUTAneous 4x Daily AC & HS     PRN Meds: sodium chloride, calcium carbonate, metoprolol, benzocaine-menthol, [Held by provider] traMADol, ipratropium 0.5 mg-albuterol 2.5 mg, sodium chloride flush, sodium chloride, ondansetron **OR** ondansetron, polyethylene glycol, acetaminophen **OR** acetaminophen, glucose, dextrose bolus **OR** dextrose bolus, glucagon (rDNA), dextrose      Intake/Output Summary (Last 24 hours) at 12/17/2024 1519  Last data filed at 12/17/2024 0938  Gross per 24 hour   Intake 42 ml   Output 1050 ml   Net -1008 ml     Exam:    BP (!) 167/68   Pulse 64   Temp 98.1 °F (36.7 °C) (Oral)   Resp 17   Ht 1.651 m (5' 5\")   Wt 65.5 kg (144 lb 8 oz)   SpO2 91%   BMI 24.05 kg/m²     General appearance: No apparent distress, appears stated age and cooperative.  HEENT:  Conjunctivae/corneas clear.  Neck: Trachea midline.  Respiratory:  Normal respiratory effort. Clear to auscultation  Cardiovascular: Regular rate and rhythm  Abdomen: 
I spoke with Kasie with home [facility] community [facility], patient's insurance. Approval is good until 11:59pm on 12/31/2024. I spoke with YOVANI Berger at [facility] to inform of approval length as patient is still not medically ready for transfer.   
INPATIENT PROGRESS NOTES    PATIENT NAME: Isaak Mitchell  MRN: [id number]  SERVICE DATE:  2024   SERVICE TIME:  11:55 AM      PRIMARY SERVICE: Pulmonary Disease    CHIEF COMPLAINTS: OPD  INTERVAL HPI: Patient seen and examined at bedside, Interval Notes, orders reviewed. Nursing notes noted    Complains of abdominal discomfort, shortness of breath improved, has dry cough, no fever, no nausea no vomiting.  He is on room air saturation 92%    New information updated in the note today, rest of the examination did not change compared to yesterday.    Review of system:     GI Abdominal pain No  Skin Rash No    Social History     Tobacco Use    Smoking status: Former     Current packs/day: 0.00     Average packs/day: 1 pack/day for 40.0 years (40.0 ttl pk-yrs)     Types: Cigars, Cigarettes     Start date: 12/10/1980     Quit date: 12/10/2020     Years since quittin.0    Smokeless tobacco: Never   Substance Use Topics    Alcohol use: Never         Problem Relation Age of Onset    Heart Disease Mother     Diabetes Mother          OBJECTIVE    Body mass index is 24.46 kg/m².    PHYSICAL EXAM:  Vitals:  BP (!) 130/48   Pulse 71   Temp 99.5 °F (37.5 °C) (Oral)   Resp 18   Ht 1.651 m (5' 5\")   Wt 66.7 kg (147 lb)   SpO2 92%   BMI 24.46 kg/m²     General: alert, no distress  Head: normocephalic, atraumatic  Eyes:No gross abnormalities.  ENT:  MMM no lesions  Neck:  supple and no masses  Chest : Good air movement, no wheezing, no rales, nontender, tympanic  Heart:: Heart sounds are normal.  Regular rate and rhythm without murmur, gallop or rub.  ABD:  symmetric, soft, non-tender, no guarding, or rebound  Musculoskeletal : no cyanosis, no clubbing, and no edema  Neuro:  Grossly normal  Skin: No rashes or nodules noted.  Lymph node:  no cervical nodes  Urology: No Jones   Psychiatric: appropriate    DATA:   Recent Labs     24  1352 24  0503   WBC 8.8 6.5   HGB 9.6* 8.8*   HCT 27.3* 25.6*   MCV 
INPATIENT PROGRESS NOTES    PATIENT NAME: Isaak Mitchell  MRN: [id number]  SERVICE DATE:  December 18, 2024   SERVICE TIME:  6:00 PM      PRIMARY SERVICE: Pulmonary Disease    CHIEF COMPLAIN: Shortness of breath      INTERVAL HPI: Patient seen and examined at bedside, Interval Notes, orders reviewed. Nursing notes noted  He is on 2 L O2 via nasal cannula.  O2 saturation 96%.  No shortness of breath.  No cough or sputum production..  No fever or chills.  No chest pain.  No vomiting or diarrhea .  He has a large duodenal ulcer with adherent clot not amenable to endoscopic clipping.  Seen by IR team.  Hemoglobin is stable and no plan for angiogram or embolization      OBJECTIVE   I/O:24HR INTAKE/OUTPUT:    Intake/Output Summary (Last 24 hours) at 12/18/2024 1800  Last data filed at 12/18/2024 1746  Gross per 24 hour   Intake --   Output 450 ml   Net -450 ml     12/17 0701 - 12/18 0700  In: 42 [I.V.:42]  Out: -   Body mass index is 23.88 kg/m².    PHYSICAL EXAM:  Vitals:  BP (!) 134/51   Pulse 88   Temp 99.1 °F (37.3 °C) (Oral)   Resp 16   Ht 1.651 m (5' 5\")   Wt 65.1 kg (143 lb 8 oz)   SpO2 96%   BMI 23.88 kg/m²     General: Alert, awake .comfortable in bed, No distress.  Head: Atraumatic , Normocephalic   Eyes: PERRL. No sclera icterus. No conjunctival injection. No discharge   ENT: No nasal  discharge. Pharynx clear.  Neck:  Trachea midline. No thyromegaly, no JVD, No cervical adenopathy.  Chest : Bilaterally symmetrical ,Normal effort,  No accessory muscle use  Lung : Diminished breath sound bilaterally No Rales. No wheezing. No rhonchi.   Heart:: Normal rate. Regular rhythm. No mumur ,  Rub or gallop  ABD: Non-tender. Non-distended. No masses. No organmegaly. Normal bowel sounds. No hernia.  Ext : No Pitting both leg , No Cyanosis No clubbing  Neuro: no focal weakness    Labs:  CBC:   Recent Labs     12/16/24  0409 12/16/24  1132 12/17/24  0352 12/17/24  0353 12/17/24 2026 12/18/24  0401 12/18/24  1130   WBC 
INPATIENT PROGRESS NOTES    PATIENT NAME: Isaak Mitchell  MRN: [id number]  SERVICE DATE:  2024   SERVICE TIME:  3:28 PM      PRIMARY SERVICE: Pulmonary Disease    CHIEF COMPLAINTS: OPD  INTERVAL HPI: Patient seen and examined at bedside, Interval Notes, orders reviewed. Nursing notes noted    Feels weak, complains of chest discomfort, anterior chest, no coughing, no fever, he has fever 101.5 °F, he is on room air saturation 92%    New information updated in the note today, rest of the examination did not change compared to yesterday.    Review of system:     GI Abdominal pain No  Skin Rash No    Social History     Tobacco Use    Smoking status: Former     Current packs/day: 0.00     Average packs/day: 1 pack/day for 40.0 years (40.0 ttl pk-yrs)     Types: Cigars, Cigarettes     Start date: 12/10/1980     Quit date: 12/10/2020     Years since quittin.0    Smokeless tobacco: Never   Substance Use Topics    Alcohol use: Never         Problem Relation Age of Onset    Heart Disease Mother     Diabetes Mother          OBJECTIVE    Body mass index is 24.46 kg/m².    PHYSICAL EXAM:  Vitals:  BP (!) 151/55   Pulse 72   Temp (!) 101.5 °F (38.6 °C) (Axillary)   Resp 19   Ht 1.651 m (5' 5\")   Wt 66.7 kg (147 lb)   SpO2 92%   BMI 24.46 kg/m²     General: alert, mild distress  Head: normocephalic, atraumatic  Eyes:No gross abnormalities.  ENT:  MMM no lesions  Neck:  supple and no masses  Chest : Good air movement, no wheezing, no rales, nontender, tympanic  Heart:: Heart sounds are normal.  Regular rate and rhythm without murmur, gallop or rub.  ABD:  symmetric, soft, non-tender, no guarding, or rebound  Musculoskeletal : no cyanosis, no clubbing, and no edema  Neuro:  Grossly normal  Skin: No rashes or nodules noted.  Lymph node:  no cervical nodes  Urology: No Jones   Psychiatric: appropriate    DATA:   Recent Labs     24  0545 24  0534   WBC 6.9 7.0   HGB 8.5* 9.4*   HCT 24.4* 26.9*   MCV 
INPATIENT PROGRESS NOTES    PATIENT NAME: Isaak Mitchell  MRN: [id number]  SERVICE DATE:  December 27, 2024   SERVICE TIME:  1:20 PM      PRIMARY SERVICE: Pulmonary Disease    CHIEF COMPLAIN: Shortness of breath, GI bleed      INTERVAL HPI: Patient seen and examined at bedside, Interval Notes, orders reviewed. Nursing notes noted    Patient is feeling tired very tired.  As per RN he was little confused earlier but at this time during my exam he appeared to be alert awake oriented x 3.  He has fever up to 101.5.  He is afebrile since then.  He is on 2 L O2 via nasal cannula O2 saturation is 95% he does have exertional shortness of breath and feeling tired.  No cough or sputum production.  No chest pain.  No nausea vomiting or diarrhea       OBJECTIVE   I/O:24HR INTAKE/OUTPUT:  No intake or output data in the 24 hours ending 12/27/24 1320    12/26 0701 - 12/27 0700  In: 240 [P.O.:240]  Out: 450 [Urine:450]  Body mass index is 24.46 kg/m².    PHYSICAL EXAM:  Vitals:  BP (!) 122/38   Pulse 88   Temp 98.6 °F (37 °C) (Oral)   Resp 18   Ht 1.651 m (5' 5\")   Wt 66.7 kg (147 lb)   SpO2 95%   BMI 24.46 kg/m²     General: Alert, awake .comfortable in bed, No distress.  Head: Atraumatic , Normocephalic   Eyes: PERRL. No sclera icterus. No conjunctival injection. No discharge   ENT: No nasal  discharge. Pharynx clear.  Neck:  Trachea midline. No thyromegaly, no JVD, No cervical adenopathy.  Chest : Bilaterally symmetrical ,Normal effort,  No accessory muscle use  Lung : Diminished breath sound bilaterally. No Rales. No wheezing. No rhonchi.   Heart:: Normal rate. Regular rhythm. No mumur ,  Rub or gallop  ABD: Non-tender. Non-distended. No masses. No organmegaly. Normal bowel sounds. No hernia.  Ext : No Pitting both leg , No Cyanosis No clubbing  Neuro: no focal weakness    Labs:  CBC:   Recent Labs     12/25/24  0545 12/26/24  0534   WBC 6.9 7.0   HGB 8.5* 9.4*   HCT 24.4* 26.9*    214     BMP:    Recent Labs     
INPATIENT PROGRESS NOTES    PATIENT NAME: Isaak Mitchell  MRN: [id number]  SERVICE DATE:  December 24, 2024   SERVICE TIME:  1:40 PM      PRIMARY SERVICE: Pulmonary Disease    CHIEF COMPLAIN: Shortness of breath, GI bleed      INTERVAL HPI: Patient seen and examined at bedside, Interval Notes, orders reviewed. Nursing notes noted    Patient is feeling tired.  He has no fever..  He is on room air saturation 94%.   He does have exertional shortness of breath and feeling tired.  No cough or sputum production.  No chest pain.  No nausea vomiting or diarrhea       OBJECTIVE   I/O:24HR INTAKE/OUTPUT:    Intake/Output Summary (Last 24 hours) at 12/24/2024 1340  Last data filed at 12/23/2024 1400  Gross per 24 hour   Intake --   Output 1000 ml   Net -1000 ml     12/23 0701 - 12/24 0700  In: -   Out: 1000 [Urine:1000]  Body mass index is 24.46 kg/m².    PHYSICAL EXAM:  Vitals:  BP (!) 123/90   Pulse 66   Temp 98.2 °F (36.8 °C) (Oral)   Resp 18   Ht 1.651 m (5' 5\")   Wt 66.7 kg (147 lb)   SpO2 94%   BMI 24.46 kg/m²     General: Alert, awake .comfortable in bed, No distress.  Head: Atraumatic , Normocephalic   Eyes: PERRL. No sclera icterus. No conjunctival injection. No discharge   ENT: No nasal  discharge. Pharynx clear.  Neck:  Trachea midline. No thyromegaly, no JVD, No cervical adenopathy.  Chest : Bilaterally symmetrical ,Normal effort,  No accessory muscle use  Lung : Diminished breath sound bilaterally. No Rales. No wheezing. No rhonchi.   Heart:: Normal rate. Regular rhythm. No mumur ,  Rub or gallop  ABD: Non-tender. Non-distended. No masses. No organmegaly. Normal bowel sounds. No hernia.  Ext : No Pitting both leg , No Cyanosis No clubbing  Neuro: no focal weakness    Labs:  CBC:   Recent Labs     12/22/24  0507 12/22/24  0904 12/23/24  1145 12/24/24  0529   WBC 9.6  --  7.0 6.4   HGB 9.1* 9.2* 9.1* 8.4*   HCT 25.8* 25.6* 25.9* 24.6*     --  201 200     BMP:    Recent Labs     12/22/24  7204 
INPATIENT PROGRESS NOTES    PATIENT NAME: Isaak Mitchell  MRN: [id number]  SERVICE DATE:  December 16, 2024   SERVICE TIME:  4:24 PM      PRIMARY SERVICE: Pulmonary Disease    CHIEF COMPLAIN: Shortness of breath      INTERVAL HPI: Patient seen and examined at bedside, Interval Notes, orders reviewed. Nursing notes noted  Patient has low hemoglobin he is going to get 1 unit PRBC.  Today hemoglobin is 8.6.  On 2 L O2 via nasal cannula.  O2 saturation 98%.  He denies having any active bleeding from anywhere.  No fever or chills.  No chest pain.  No coughing.  No shortness of breath .       OBJECTIVE   I/O:24HR INTAKE/OUTPUT:    Intake/Output Summary (Last 24 hours) at 12/16/2024 1624  Last data filed at 12/16/2024 1250  Gross per 24 hour   Intake 473 ml   Output 650 ml   Net -177 ml     12/15 0701 - 12/16 0700  In: 1573.8 [P.O.:933; I.V.:5]  Out: 950 [Urine:950]  Body mass index is 24.01 kg/m².    PHYSICAL EXAM:  Vitals:  BP (!) 144/59   Pulse 64   Temp 98.4 °F (36.9 °C) (Oral)   Resp 18   Ht 1.651 m (5' 5\")   Wt 65.5 kg (144 lb 4.8 oz)   SpO2 98%   BMI 24.01 kg/m²     General: Alert, awake .comfortable in bed, No distress.  Head: Atraumatic , Normocephalic   Eyes: PERRL. No sclera icterus. No conjunctival injection. No discharge   ENT: No nasal  discharge. Pharynx clear.  Neck:  Trachea midline. No thyromegaly, no JVD, No cervical adenopathy.  Chest : Bilaterally symmetrical ,Normal effort,  No accessory muscle use  Lung : Diminished breath sound bilaterally No Rales. No wheezing. No rhonchi.   Heart:: Normal rate. Regular rhythm. No mumur ,  Rub or gallop  ABD: Non-tender. Non-distended. No masses. No organmegaly. Normal bowel sounds. No hernia.  Ext : No Pitting both leg , No Cyanosis No clubbing  Neuro: no focal weakness    Labs:  CBC:   Recent Labs     12/14/24  0503 12/15/24  0508 12/15/24  1926 12/16/24  0409 12/16/24  1132   WBC 11.3* 9.0  --  9.4  --    HGB 7.2* 6.4* 8.2* 7.7*  7.8* 8.6*   HCT 22.3* 
INPATIENT PROGRESS NOTES    PATIENT NAME: Isaak Mitchell  MRN: [id number]  SERVICE DATE:  December 17, 2024   SERVICE TIME:  12:07 PM      PRIMARY SERVICE: Pulmonary Disease    CHIEF COMPLAIN: Shortness of breath      INTERVAL HPI: Patient seen and examined at bedside, Interval Notes, orders reviewed. Nursing notes noted  He is working with physical therapy.  On 2 L O2 via nasal cannula.  O2 saturation 96%.  No shortness of breath.  No cough or sputum production..  No fever or chills.  No chest pain.  No vomiting or diarrhea   .  Hemoglobin is 8.3      OBJECTIVE   I/O:24HR INTAKE/OUTPUT:    Intake/Output Summary (Last 24 hours) at 12/17/2024 1207  Last data filed at 12/17/2024 0938  Gross per 24 hour   Intake 42 ml   Output 1450 ml   Net -1408 ml     12/16 0701 - 12/17 0700  In: -   Out: 1450 [Urine:1450]  Body mass index is 24.05 kg/m².    PHYSICAL EXAM:  Vitals:  /61   Pulse 67   Temp 97.3 °F (36.3 °C) (Temporal)   Resp 16   Ht 1.651 m (5' 5\")   Wt 65.5 kg (144 lb 8 oz)   SpO2 96%   BMI 24.05 kg/m²     General: Alert, awake .comfortable in bed, No distress.  Head: Atraumatic , Normocephalic   Eyes: PERRL. No sclera icterus. No conjunctival injection. No discharge   ENT: No nasal  discharge. Pharynx clear.  Neck:  Trachea midline. No thyromegaly, no JVD, No cervical adenopathy.  Chest : Bilaterally symmetrical ,Normal effort,  No accessory muscle use  Lung : Diminished breath sound bilaterally No Rales. No wheezing. No rhonchi.   Heart:: Normal rate. Regular rhythm. No mumur ,  Rub or gallop  ABD: Non-tender. Non-distended. No masses. No organmegaly. Normal bowel sounds. No hernia.  Ext : No Pitting both leg , No Cyanosis No clubbing  Neuro: no focal weakness    Labs:  CBC:   Recent Labs     12/15/24  0508 12/15/24  1926 12/16/24  0409 12/16/24  1132 12/17/24  0352 12/17/24  0353 12/17/24  1102   WBC 9.0  --  9.4  --  9.0  --   --    HGB 6.4*   < > 7.7*  7.8*   < > 7.6* 7.5* 8.3*   HCT 18.6*   < > 
INPATIENT PROGRESS NOTES    PATIENT NAME: Isaak Mitchell  MRN: [id number]  SERVICE DATE:  2024   SERVICE TIME:  3:54 PM      PRIMARY SERVICE: Pulmonary Disease    CHIEF COMPLAINTS: Shortness of breath    INTERVAL HPI: Patient seen and examined at bedside, Interval Notes, orders reviewed. Nursing notes noted    Doing better, symptoms improved, on 2 L O2 saturation 96%, no coughing, no chest pain, no worsening lower extremity edema.    New information updated in the note today, rest of the examination did not change compared to yesterday.    Review of system:     GI Abdominal pain No  Skin Rash No    Social History     Tobacco Use    Smoking status: Former     Current packs/day: 0.00     Average packs/day: 1 pack/day for 40.0 years (40.0 ttl pk-yrs)     Types: Cigars, Cigarettes     Start date: 12/10/1980     Quit date: 12/10/2020     Years since quittin.0    Smokeless tobacco: Never   Substance Use Topics    Alcohol use: Never         Problem Relation Age of Onset    Heart Disease Mother     Diabetes Mother          OBJECTIVE    Body mass index is 26.13 kg/m².    PHYSICAL EXAM:  Vitals:  /61   Pulse 81   Temp 98.4 °F (36.9 °C)   Resp 20   Ht 1.651 m (5' 5\")   Wt 71.2 kg (157 lb)   SpO2 96%   BMI 26.13 kg/m²     General: alert, mild distress  Head: normocephalic, atraumatic  Eyes:No gross abnormalities.  ENT:  MMM no lesions  Neck:  supple and no masses  Chest : Improved air movement, mild wheezing, no rales, nontender, tympanic  Heart:: Heart sounds are normal.  Regular rate and rhythm without murmur, gallop or rub.  ABD:  symmetric, soft, non-tender, no guarding, or rebound  Musculoskeletal : no cyanosis, no clubbing, and no edema  Neuro:  Grossly normal  Skin: No rashes or nodules noted.  Lymph node:  no cervical nodes  Urology: No Jones   Psychiatric: appropriate    DATA:   Recent Labs     12/10/24  0547 24  0458 24  0812   WBC 14.9* 11.2*  --    HGB 8.3* 8.2* 8.9*   HCT 
INPATIENT PROGRESS NOTES    PATIENT NAME: Isaak Mitchell  MRN: [id number]  SERVICE DATE:  2024   SERVICE TIME:  10:18 AM      PRIMARY SERVICE: Pulmonary Disease    CHIEF COMPLAINTS: Shortness of breath    INTERVAL HPI: Patient seen and examined at bedside, Interval Notes, orders reviewed. Nursing notes noted    Patient report shortness of breath, with chest tightness, he has chest tenderness as well, he is on 2 L O2 saturation 94%, no fever, no worsening lower extremity edema.    New information updated in the note today, rest of the examination did not change compared to yesterday.    Review of system:     GI Abdominal pain No  Skin Rash No    Social History     Tobacco Use    Smoking status: Former     Current packs/day: 0.00     Average packs/day: 1 pack/day for 40.0 years (40.0 ttl pk-yrs)     Types: Cigars, Cigarettes     Start date: 12/10/1980     Quit date: 12/10/2020     Years since quittin.0    Smokeless tobacco: Never   Substance Use Topics    Alcohol use: Never         Problem Relation Age of Onset    Heart Disease Mother     Diabetes Mother          OBJECTIVE    Body mass index is 26.13 kg/m².    PHYSICAL EXAM:  Vitals:  BP (!) 159/67   Pulse 79   Temp 97.9 °F (36.6 °C) (Oral)   Resp 24   Ht 1.651 m (5' 5\")   Wt 71.2 kg (157 lb)   SpO2 94%   BMI 26.13 kg/m²     General: alert, mild distress  Head: normocephalic, atraumatic  Eyes:No gross abnormalities.  ENT:  MMM no lesions  Neck:  supple and no masses  Chest : Tight with wheezing, no rales, tender anterior chest, tympanic  Heart:: Heart sounds are normal.  Regular rate and rhythm without murmur, gallop or rub.  ABD:  symmetric, soft, non-tender, no guarding, or rebound  Musculoskeletal : no cyanosis, no clubbing, and no edema  Neuro:  Grossly normal  Skin: No rashes or nodules noted.  Lymph node:  no cervical nodes  Urology: No Jones   Psychiatric: appropriate    DATA:   Recent Labs     12/10/24  0547 24  0458 24  0812 
INPATIENT PROGRESS NOTES    PATIENT NAME: Isaak Mitchell  MRN: [id number]  SERVICE DATE:  December 22, 2024   SERVICE TIME:  1:13 PM      PRIMARY SERVICE: Pulmonary Disease    CHIEF COMPLAIN: Shortness of breath, GI bleed      INTERVAL HPI: Patient seen and examined at bedside, Interval Notes, orders reviewed. Nursing notes noted  He developed fever 103F.  Fever broke after Tylenol.  Currently temperature 98.1.  He is on 3 l O2 via nasal cannula, O2 saturation 97%.   He does have exertional shortness of breath and feeling tired.  No cough or sputum production.  No chest pain.       OBJECTIVE   I/O:24HR INTAKE/OUTPUT:    Intake/Output Summary (Last 24 hours) at 12/22/2024 1313  Last data filed at 12/22/2024 0152  Gross per 24 hour   Intake 750 ml   Output 1350 ml   Net -600 ml     12/21 0701 - 12/22 0700  In: 1106.3 [P.O.:750]  Out: 1350 [Urine:1350]  Body mass index is 25.48 kg/m².    PHYSICAL EXAM:  Vitals:  BP (!) 168/67   Pulse 78   Temp 98.1 °F (36.7 °C) (Oral)   Resp 19   Ht 1.651 m (5' 5\")   Wt 69.4 kg (153 lb 1.6 oz)   SpO2 97%   BMI 25.48 kg/m²     General: Alert, awake .comfortable in bed, No distress.  Head: Atraumatic , Normocephalic   Eyes: PERRL. No sclera icterus. No conjunctival injection. No discharge   ENT: No nasal  discharge. Pharynx clear.  Neck:  Trachea midline. No thyromegaly, no JVD, No cervical adenopathy.  Chest : Bilaterally symmetrical ,Normal effort,  No accessory muscle use  Lung : Diminished breath sound bilaterally. No Rales. No wheezing. No rhonchi.   Heart:: Normal rate. Regular rhythm. No mumur ,  Rub or gallop  ABD: Non-tender. Non-distended. No masses. No organmegaly. Normal bowel sounds. No hernia.  Ext : No Pitting both leg , No Cyanosis No clubbing  Neuro: no focal weakness    Labs:  CBC:   Recent Labs     12/20/24  1649 12/20/24  1957 12/21/24  0518 12/21/24  0846 12/22/24  0246 12/22/24  0507 12/22/24  0904   WBC 10.4  --  8.2  --   --  9.6  --    HGB 7.3*   < > 6.5*   
INPATIENT PROGRESS NOTES    PATIENT NAME: Isaak Mitchell  MRN: [id number]  SERVICE DATE:  December 14, 2024   SERVICE TIME:  2:27 PM      PRIMARY SERVICE: Pulmonary Disease    CHIEF COMPLAIN: Shortness of breath      INTERVAL HPI: Patient seen and examined at bedside, Interval Notes, orders reviewed. Nursing notes noted  Patient on 2 L O2 via nasal cannula.  O2 saturation 92%.  No new problem overnight.  He is not using BiPAP therapy.   No fever or chills.  No chest pain.  No coughing.  No shortness of breath at rest.  He said he wants to go home, he is working with physical therapy but he is not strong enough to go home.  Lives alone.         OBJECTIVE   I/O:24HR INTAKE/OUTPUT:    Intake/Output Summary (Last 24 hours) at 12/14/2024 1427  Last data filed at 12/14/2024 0843  Gross per 24 hour   Intake 370 ml   Output 1625 ml   Net -1255 ml     12/13 0701 - 12/14 0700  In: 410 [P.O.:400; I.V.:10]  Out: 1075 [Urine:1075]  Body mass index is 23.95 kg/m².    PHYSICAL EXAM:  Vitals:  /62   Pulse 69   Temp 98.1 °F (36.7 °C) (Oral)   Resp 18   Ht 1.651 m (5' 5\")   Wt 65.3 kg (143 lb 14.4 oz)   SpO2 92%   BMI 23.95 kg/m²     General: Alert, awake .comfortable in bed, No distress.  Head: Atraumatic , Normocephalic   Eyes: PERRL. No sclera icterus. No conjunctival injection. No discharge   ENT: No nasal  discharge. Pharynx clear.  Neck:  Trachea midline. No thyromegaly, no JVD, No cervical adenopathy.  Chest : Bilaterally symmetrical ,Normal effort,  No accessory muscle use  Lung : Diminished breath sound bilaterally No Rales. No wheezing. No rhonchi.   Heart:: Normal rate. Regular rhythm. No mumur ,  Rub or gallop  ABD: Non-tender. Non-distended. No masses. No organmegaly. Normal bowel sounds. No hernia.  Ext : No Pitting both leg , No Cyanosis No clubbing  Neuro: no focal weakness    Labs:  CBC:   Recent Labs     12/13/24  0512 12/14/24  0503   WBC 8.9 11.3*   HGB 7.8* 7.2*   HCT 22.7* 22.3*    348 
INPATIENT PROGRESS NOTES    PATIENT NAME: Isaak Mitchell  MRN: [id number]  SERVICE DATE:  December 15, 2024   SERVICE TIME:  3:03 PM      PRIMARY SERVICE: Pulmonary Disease    CHIEF COMPLAIN: Shortness of breath      INTERVAL HPI: Patient seen and examined at bedside, Interval Notes, orders reviewed. Nursing notes noted  Patient has low hemoglobin he is going to get 1 unit PRBC.  On 2 L O2 via nasal cannula.  O2 saturation 96%.  He denies having any active bleeding from anywhere.  No fever or chills.  No chest pain.  No coughing.  No shortness of breath .       OBJECTIVE   I/O:24HR INTAKE/OUTPUT:    Intake/Output Summary (Last 24 hours) at 12/15/2024 1503  Last data filed at 12/15/2024 1405  Gross per 24 hour   Intake 1415 ml   Output 2545 ml   Net -1130 ml     12/14 0701 - 12/15 0700  In: 1030 [P.O.:1020; I.V.:10]  Out: 2395 [Urine:2395]  Body mass index is 24.01 kg/m².    PHYSICAL EXAM:  Vitals:  BP (!) 152/54   Pulse 70   Temp 98.7 °F (37.1 °C)   Resp 16   Ht 1.651 m (5' 5\")   Wt 65.5 kg (144 lb 4.8 oz)   SpO2 96%   BMI 24.01 kg/m²     General: Alert, awake .comfortable in bed, No distress.  Head: Atraumatic , Normocephalic   Eyes: PERRL. No sclera icterus. No conjunctival injection. No discharge   ENT: No nasal  discharge. Pharynx clear.  Neck:  Trachea midline. No thyromegaly, no JVD, No cervical adenopathy.  Chest : Bilaterally symmetrical ,Normal effort,  No accessory muscle use  Lung : Diminished breath sound bilaterally No Rales. No wheezing. No rhonchi.   Heart:: Normal rate. Regular rhythm. No mumur ,  Rub or gallop  ABD: Non-tender. Non-distended. No masses. No organmegaly. Normal bowel sounds. No hernia.  Ext : No Pitting both leg , No Cyanosis No clubbing  Neuro: no focal weakness    Labs:  CBC:   Recent Labs     12/13/24  0512 12/14/24  0503 12/15/24  0508   WBC 8.9 11.3* 9.0   HGB 7.8* 7.2* 6.4*   HCT 22.7* 22.3* 18.6*    348 282     BMP:    Recent Labs     12/13/24  0512 12/14/24  0501 
Infectious Disease     Patient Name: Isaak Mitchell  Date: 12/21/2024  YOB: 1950  Medical Record Number: [id number]              History of Present Illness:  Coronary disease diabetes diverticulitis COPD hyperlipidemia hypertension    Patient admitted 12/8/2024 for shortness of breath wearing 2 L nasal cannula satting 93% on initial admission  COPD exacerbation bronchospasm on admission      Developed dropping hemoglobin hematocrit found to have upper GI bleed underwent coil embolization gastroduodenal artery on 12/19/2024      Began having fevers 1219/2024  Found to be COVID-19 positive  Some shortness of breath and cough      Patient required increase in O2 from 2 L to 3-1/2  Patient's baseline is 2 L            Review of Systems   Constitutional:  Positive for fatigue and fever. Negative for chills and diaphoresis.   HENT: Negative.     Respiratory:  Positive for cough and shortness of breath.    Cardiovascular: Negative.    Gastrointestinal:  Positive for diarrhea and nausea.   Genitourinary: Negative.    Musculoskeletal: Negative.    Skin: Negative.              Physical Exam:      Physical Exam  Cardiovascular:      Heart sounds: Normal heart sounds. No murmur heard.  Pulmonary:      Effort: Pulmonary effort is normal. No respiratory distress.      Breath sounds: Normal breath sounds. No wheezing, rhonchi or rales.   Abdominal:      General: Abdomen is flat. Bowel sounds are normal. There is no distension.      Palpations: There is mass.      Tenderness: There is no abdominal tenderness. There is no guarding.         Blood pressure (!) 129/49, pulse 74, temperature 98.2 °F (36.8 °C), temperature source Oral, resp. rate 16, height 1.651 m (5' 5\"), weight 64.9 kg (143 lb), SpO2 100%.      .   Lab Results   Component Value Date    WBC 8.2 12/21/2024    HGB 8.6 (L) 12/21/2024    HCT 24.0 (L) 12/21/2024    MCV 88.9 12/21/2024     12/21/2024     Lab Results   Component Value Date/Time     
Infectious Disease     Patient Name: Isaak Mitchell  Date: 12/23/2024  YOB: 1950  Medical Record Number: [id number]                Patient admitted 12/8/2024 for shortness of breath wearing 2 L nasal cannula satting 93% on initial admission  COPD exacerbation bronchospasm on admission      Developed dropping hemoglobin hematocrit found to have upper GI bleed underwent coil embolization gastroduodenal artery on 12/19/2024      Began having fevers 1219/2024  Found to be COVID-19 positive  Some shortness of breath and cough      Patient required increase in O2 from 2 L to 3-1/2  Patient's baseline is 2 L      12/22/2024  Increased fevers 103.1  White count not elevated  Increased creatinine  Increased LFTs      12/23/2024  Fevers continue 101    Review of Systems   Constitutional:  Positive for fatigue and fever. Negative for chills and diaphoresis.   HENT: Negative.     Respiratory:  Positive for cough and shortness of breath.    Cardiovascular: Negative.    Gastrointestinal:  Negative for diarrhea and nausea.   Genitourinary: Negative.    Musculoskeletal: Negative.    Skin: Negative.              Physical Exam:      Physical Exam  Cardiovascular:      Heart sounds: Normal heart sounds. No murmur heard.  Pulmonary:      Effort: Pulmonary effort is normal. No respiratory distress.      Breath sounds: Normal breath sounds. No wheezing, rhonchi or rales.   Abdominal:      General: Abdomen is flat. Bowel sounds are normal. There is no distension.      Palpations: There is mass.      Tenderness: There is no abdominal tenderness. There is no guarding.         Blood pressure 129/67, pulse 73, temperature (!) 101.5 °F (38.6 °C), temperature source Oral, resp. rate 18, height 1.651 m (5' 5\"), weight 66.7 kg (147 lb), SpO2 96%.      .   Lab Results   Component Value Date    WBC 7.0 12/23/2024    HGB 9.1 (L) 12/23/2024    HCT 25.9 (L) 12/23/2024    MCV 88.4 12/23/2024     12/23/2024     Lab Results 
Infectious Disease     Patient Name: Isaak Mitchell  Date: 12/25/2024  YOB: 1950  Medical Record Number: [id number]                Patient admitted 12/8/2024 for shortness of breath wearing 2 L nasal cannula satting 93% on initial admission  COPD exacerbation bronchospasm on admission      Developed dropping hemoglobin hematocrit found to have upper GI bleed underwent coil embolization gastroduodenal artery on 12/19/2024      Began having fevers 1219/2024  Found to be COVID-19 positive  Some shortness of breath and cough       on room air   Afebrile      Review of Systems   Constitutional:  Negative for chills, diaphoresis, fatigue and fever.   HENT: Negative.     Respiratory:  Positive for shortness of breath. Negative for cough.    Cardiovascular: Negative.    Gastrointestinal:  Negative for diarrhea and nausea.   Genitourinary: Negative.    Musculoskeletal: Negative.    Skin: Negative.              Physical Exam:      Physical Exam  Cardiovascular:      Heart sounds: Normal heart sounds. No murmur heard.  Pulmonary:      Effort: Pulmonary effort is normal. No respiratory distress.      Breath sounds: Normal breath sounds. No wheezing, rhonchi or rales.   Abdominal:      General: Abdomen is flat. Bowel sounds are normal. There is no distension.      Palpations: There is mass.      Tenderness: There is no abdominal tenderness. There is no guarding.         Blood pressure (!) 137/50, pulse 61, temperature 98.6 °F (37 °C), temperature source Oral, resp. rate 18, height 1.651 m (5' 5\"), weight 66.7 kg (147 lb), SpO2 96%.      .   Lab Results   Component Value Date    WBC 6.9 12/25/2024    HGB 8.5 (L) 12/25/2024    HCT 24.4 (L) 12/25/2024    MCV 88.1 12/25/2024     12/25/2024     Lab Results   Component Value Date/Time     12/25/2024 05:45 AM    K 4.4 12/25/2024 05:45 AM     12/25/2024 05:45 AM    CO2 20 12/25/2024 05:45 AM    BUN 32 12/25/2024 05:45 AM    CREATININE 1.74 12/25/2024 
Infectious Disease     Patient Name: Isaak Mitchell  Date: 12/26/2024  YOB: 1950  Medical Record Number: [id number]                Patient admitted 12/8/2024 for shortness of breath wearing 2 L nasal cannula satting 93% on initial admission  COPD exacerbation bronchospasm on admission      Developed dropping hemoglobin hematocrit found to have upper GI bleed underwent coil embolization gastroduodenal artery on 12/19/2024      Began having fevers 1219/2024  Found to be COVID-19 positive  Some shortness of breath and cough       on room air   Afebrile      Review of Systems   Constitutional:  Negative for chills, diaphoresis, fatigue and fever.   HENT: Negative.     Respiratory:  Positive for shortness of breath. Negative for cough.    Cardiovascular: Negative.    Gastrointestinal:  Negative for diarrhea and nausea.   Genitourinary: Negative.    Musculoskeletal: Negative.    Skin: Negative.              Physical Exam:      Physical Exam  Cardiovascular:      Heart sounds: Normal heart sounds. No murmur heard.  Pulmonary:      Effort: Pulmonary effort is normal. No respiratory distress.      Breath sounds: Normal breath sounds. No wheezing, rhonchi or rales.   Abdominal:      General: Abdomen is flat. Bowel sounds are normal. There is no distension.      Palpations: There is mass.      Tenderness: There is no abdominal tenderness. There is no guarding.         Blood pressure (!) 153/54, pulse 77, temperature (!) 100.6 °F (38.1 °C), temperature source Oral, resp. rate 19, height 1.651 m (5' 5\"), weight 66.7 kg (147 lb), SpO2 96%.      .   Lab Results   Component Value Date    WBC 7.0 12/26/2024    HGB 9.4 (L) 12/26/2024    HCT 26.9 (L) 12/26/2024    MCV 87.3 12/26/2024     12/26/2024     Lab Results   Component Value Date/Time     12/26/2024 05:34 AM    K 4.2 12/26/2024 05:34 AM     12/26/2024 05:34 AM    CO2 19 12/26/2024 05:34 AM    BUN 26 12/26/2024 05:34 AM    CREATININE 1.56 
Insurance confirmed a precert being good until 12/19/24 since patient has not admitted to our facility here at [facility]. On 12/20/24 patient would need a new precert. I spoke with Celine and informed of extension.   
[facility]  Occupational Therapy        NAME:  Isaak Mitchell  ROOM: W288/W288-01  :  1950  DATE: 2024    Attempted to see Isaak Mitchell at 1025 on this date for:   [x]  Initial Evaluation   []  Treatment       Patient was unable to be seen due to:   [] Off unit for testing/procedure    [x] Patient refused, given non-verbal cues for no (pt on bi-pap at time of attempt)   [] Therapy on hold due to     [] Nursing deferred due to    [] Other:      Discussed with STELLA Lee who was present for 1:1. RN confirms pt has been fatigued and at times having difficult time breathing. Will re-attempt at earliest convenience.     Electronically signed by Tamanna Terrell OT on 24 at 10:45 AM EST  
[facility] OCCUPATIONAL THERAPY MED SURG TREATMENT NOTE     Date: 2024  Patient Name: Isaak Mitchell        MRN: [id number]  Account: [id number]   : 1950  (74 y.o.)  Room: [place] [address]    Chart Review:    Restrictions  Restrictions/Precautions  Restrictions/Precautions: Fall Risk     Safety:  Safety Devices  Type of Devices: None (left with Eva PTA)    Patient's birthday verified: Yes    Subjective:     Pt agrees to taking a shower       Pain at start of treatment: No    Pain at end of treatment: No      Objective:    ADL Status:  ADL  Grooming: None  Grooming Skilled Clinical Factors: none-- PT completed as part of session in standing (observed while cleaning up linens- pt at Supervison assist)  UE Bathing: Supervision  UE Bathing Skilled Clinical Factors: seated  LE Bathing: Stand by assistance  LE Bathing Skilled Clinical Factors: SBA for standing with s/u to complete perineal care, vc's to complete posterior care. Pt completed seated legs/feet care with SBA.  UE Dressing Skilled Clinical Factors: none- hospital gown only  LE Dressing: Stand by assistance  LE Dressing Skilled Clinical Factors: underwear (hospital) only  Putting On/Taking Off Footwear: Supervision  Putting On/Taking Off Footwear Skilled Clinical Factors: fig 4 BLE  Toileting: Contact guard assistance  Toileting Skilled Clinical Factors: touching assist  Functional Mobility: Contact guard assistance  Additional Comments: touching assist  Toilet Transfers  Toilet - Technique: Stand step  Equipment Used: Standard toilet  Toilet Transfer: Contact guard assistance  Toilet Transfers Comments: touching assist      Therapy key for assistance levels -   Independent/Mod I = Pt. is able to perform task with no assistance but may require a device   Stand by assistance = Pt. does not perform task at an independent level but does not need physical assistance, requires verbal cues  Minimal, Moderate, Maximal Assistance = Pt. requires physical 
[facility] OCCUPATIONAL THERAPY MED SURG TREATMENT NOTE     Date: 2024  Patient Name: Isaak Mitchell        MRN: [id number]  Account: [id number]   : 1950  (74 y.o.)  Room: [place] [address]    Chart Review:    Restrictions  Restrictions/Precautions  Restrictions/Precautions: Fall Risk, Isolation     Safety:  Safety Devices  Type of Devices: Bed alarm in place;Call light within reach;Left in bed;Nurse notified    Patient's birthday verified: Yes    Subjective:  \"Are you all going to keep coming in here and doing this with me?\"    Pain at start of treatment: Yes: Pt. unable to reliably rate pain- 20/10 pain    Pain at end of treatment: Yes: Pt. unable to reliably rate pain- 20/10 pain    Location: Buttocks  Description pain, sore, sensitive  Nursing notified: Yes  RN: Shalonda  Intervention: Repositioned    Objective:  Pt seen in room for increased strength and IND with ADLs and functional transfers. Pt declined ADLs at time of tx but was agreeable to sitting EOB and engaging in BUE exercises.  Required significant time and rest breaks to complete.     Therapy key for assistance levels -   Independent/Mod I = Pt. is able to perform task with no assistance but may require a device   Stand by assistance = Pt. does not perform task at an independent level but does not need physical assistance, requires verbal cues  Minimal, Moderate, Maximal Assistance = Pt. requires physical assistance (25%, 50%, 75% assist from helper) for task but is able to actively participate in task   Dependent = Pt. requires total assistance with task and is not able to actively participate with task completion    Observation:  Observation/Palpation  Observation: Pt on 02 NC, needed encouragement to engage in therapeutic activities, redness on buttocks with c/o pain, IV RUE, 97% Sp02 while sitting EOB and completing light BUE exercise    Cognition Status:  Cognition  Safety Judgement: Decreased awareness of need for safety;Decreased 
[facility]  Occupational Therapy        NAME:  Isaak Mitchell  ROOM: W288/W288-01  :  1950  DATE: 2024    Attempted to see Isaak Mitchell at 1526 on this date for:   []  Initial Evaluation   [x]  Treatment       Patient was unable to be seen due to:   [] Off unit for testing/procedure    [x] Patient refused, stating \"not right now\"    [] Therapy on hold due to     [] Nursing deferred due to    [] Other:    Pt in bed sleeping soundly upon arrival. Introduced self, explained role of OT, and offered to assist pt with ADL completion. Pt declined reporting incresed fatigue and this time and that he would like to continue resting. Pt denies any needs at this time.     Will attempt again as able.   Notified STELLA Mazariegos     Electronically signed by MATTHEW Carrera on 24 at 3:35 PM EST  
Moviprep started for planned colonoscopy and EGD 12/17/24.  Pt remains on 2L of O2, dyspnea noted with ambulation.  Denied pain this shift.  Safety maintained.   
PT REFUSED BIPAP.   
PT declining BiPAP tonight and x2 nights. Removed BiPAP from room   
Perfect serve to update Dr Zavala on pts status, c/o continued chest pain, increased sob pulse ox 2l/nc appiled, pt with exp wheeze to right and deminished on the left. Dr Zavala ordered resp txs at this time.   
Physical Therapy  Facility/Department: [facility] MED SURG W288/W288-01  Physical Therapy Discharge      NAME: Isaak Mitchell    : 1950 (74 y.o.)  MRN: [id number]    Account: [id number]  Gender: male      Patient has been discharged from acute care hospital. DC patient from current PT program.      Electronically signed by Olinda Guzmán PT on 24 at 8:37 AM EST      
Physical Therapy  Facility/Department: [facility] MED SURG W288/W288-01  Physical Therapy Discharge      NAME: Isaak Mitchell    : 1950 (74 y.o.)  MRN: [id number]    Account: [id number]  Gender: male      Patient has been discharged from acute care hospital. DC patient from current PT program.      Electronically signed by Nikole Gayle PT on 24 at 8:50 AM EST    
Physical Therapy Med Surg Daily Treatment Note  Facility/Department: [address] ORTHO TELE  Room: [facility]/[address]       NAME: Isaak Mitchell  : 1950 (74 y.o.)  MRN: [id number]  CODE STATUS: Full Code    Date of Service: 2024    Patient Diagnosis(es): Dyspnea [R06.00]  Symptomatic anemia [D64.9]  Chest pain, unspecified type [R07.9]   Chief Complaint   Patient presents with    Chest Pain     Patient Active Problem List    Diagnosis Date Noted    Diarrhea 2023    Atherosclerosis of native coronary artery of native heart with angina pectoris (HCC) 2023    Chest pain 2023    Contusion of rib on left side 2022    Adjustment disorder 2024    Delirium 2024    Symptomatic anemia 12/10/2024    COPD exacerbation (HCC) 12/10/2024    Acute on chronic diastolic heart failure (HCC) 12/10/2024    Pulmonary hypertension (HCC) 12/10/2024    Dyspnea 2024    Type 2 diabetes mellitus with chronic kidney disease 2024    Stage 3 chronic kidney disease, unspecified whether stage 3a or 3b CKD ([facility]) 2024    Claudication ([facility]) 2024    Iron deficiency anemia, unspecified 2023    Esophageal reflux 2023    Rectal bleed 2020    Diverticulitis 2020    Type 2 diabetes mellitus, without long-term current use of insulin ([facility]) 2020    Uncontrolled hypertension 2020    Other hyperlipidemia 2020    Anxiety 2020    History of acute myocardial infarction 2020        Past Medical History:   Diagnosis Date    AMI (acute myocardial infarction) ([facility]) 2020    CAD S/P percutaneous coronary angioplasty 2023    Cancer (HCC)     skin left neck    Chronic bronchitis (HCC)     COPD (chronic obstructive pulmonary disease) (HCC)     Diabetes mellitus (HCC)     Diverticulitis     Emphysema of lung (HCC)     Heart attack ([facility])     History of blood transfusion     Hyperlipidemia     Hypertension     Meniere's disease     NSTEMI (non-ST 
Physical Therapy Med Surg Daily Treatment Note  Facility/Department: [address] ORTHO TELE  Room: [facility]       NAME: Isaak Mitchell  : 1950 (74 y.o.)  MRN: [id number]  CODE STATUS: Full Code    Date of Service: 2024    Patient Diagnosis(es): Dyspnea [R06.00]  Symptomatic anemia [D64.9]  Chest pain, unspecified type [R07.9]   Chief Complaint   Patient presents with    Chest Pain     Patient Active Problem List    Diagnosis Date Noted    Diarrhea 2023    Atherosclerosis of native coronary artery of native heart with angina pectoris (HCC) 2023    Chest pain 2023    Contusion of rib on left side 2022    Adjustment disorder 2024    Delirium 2024    Symptomatic anemia 12/10/2024    COPD exacerbation (HCC) 12/10/2024    Acute on chronic diastolic heart failure (HCC) 12/10/2024    Pulmonary hypertension (HCC) 12/10/2024    Dyspnea 2024    CINDY (iron deficiency anemia) 2024    Type 2 diabetes mellitus with chronic kidney disease 2024    Stage 3 chronic kidney disease, unspecified whether stage 3a or 3b CKD ([facility]) 2024    Claudication ([facility]) 2024    Iron deficiency anemia, unspecified 2023    Esophageal reflux 2023    Rectal bleed 2020    Diverticulitis 2020    Type 2 diabetes mellitus, without long-term current use of insulin ([facility]) 2020    Uncontrolled hypertension 2020    Other hyperlipidemia 2020    Anxiety 2020    History of acute myocardial infarction 2020        Past Medical History:   Diagnosis Date    AMI (acute myocardial infarction) ([facility]) 2020    CAD S/P percutaneous coronary angioplasty 2023    Cancer (HCC)     skin left neck    Chronic bronchitis (HCC)     COPD (chronic obstructive pulmonary disease) (HCC)     Diabetes mellitus (HCC)     Diverticulitis     Emphysema of lung (HCC)     Heart attack (HCC)     History of blood transfusion     Hyperlipidemia     Hypertension     
Physical Therapy Med Surg Daily Treatment Note  Facility/Department: [address] ORTHO TELE  Room: [facility]/88[facility]       NAME: Isaak Mitchell  : 1950 (74 y.o.)  MRN: [id number]  CODE STATUS: Full Code    Date of Service: 2024    Patient Diagnosis(es): Dyspnea [R06.00]  Symptomatic anemia [D64.9]  Chest pain, unspecified type [R07.9]   Chief Complaint   Patient presents with    Chest Pain     Patient Active Problem List    Diagnosis Date Noted    Diarrhea 2023    Atherosclerosis of native coronary artery of native heart with angina pectoris (HCC) 2023    Chest pain 2023    Contusion of rib on left side 2022    Adjustment disorder 2024    Delirium 2024    Symptomatic anemia 12/10/2024    COPD exacerbation (HCC) 12/10/2024    Acute on chronic diastolic heart failure (HCC) 12/10/2024    Pulmonary hypertension (HCC) 12/10/2024    Dyspnea 2024    CINDY (iron deficiency anemia) 2024    Type 2 diabetes mellitus with chronic kidney disease 2024    Stage 3 chronic kidney disease, unspecified whether stage 3a or 3b CKD ([facility]) 2024    Claudication ([facility]) 2024    Iron deficiency anemia, unspecified 2023    Esophageal reflux 2023    Rectal bleed 2020    Diverticulitis 2020    Type 2 diabetes mellitus, without long-term current use of insulin ([facility]) 2020    Uncontrolled hypertension 2020    Other hyperlipidemia 2020    Anxiety 2020    History of acute myocardial infarction 2020        Past Medical History:   Diagnosis Date    AMI (acute myocardial infarction) (HCC) 2020    CAD S/P percutaneous coronary angioplasty 2023    Cancer (HCC)     skin left neck    Chronic bronchitis (HCC)     COPD (chronic obstructive pulmonary disease) (HCC)     Diabetes mellitus (HCC)     Diverticulitis     Emphysema of lung (HCC)     Heart attack (HCC)     History of blood transfusion     Hyperlipidemia     Hypertension     
Physical Therapy Med Surg Daily Treatment Note  Facility/Department: [address] ORTHO TELE  Room: [place] [id number]       NAME: Isaak Mitchell  : 1950 (74 y.o.)  MRN: [id number]  CODE STATUS: Full Code    Date of Service: 2024    Patient Diagnosis(es): Dyspnea [R06.00]  Symptomatic anemia [D64.9]  Chest pain, unspecified type [R07.9]   Chief Complaint   Patient presents with    Chest Pain     Patient Active Problem List    Diagnosis Date Noted    Diarrhea 2023    Atherosclerosis of native coronary artery of native heart with angina pectoris (HCC) 2023    Chest pain 2023    Contusion of rib on left side 2022    Pneumonia of left lower lobe due to infectious organism 2024    Poorly controlled diabetes mellitus (HCC) 2024    COVID-19 virus infection 2024    Adjustment disorder 2024    Delirium 2024    Symptomatic anemia 12/10/2024    COPD exacerbation (HCC) 12/10/2024    Acute on chronic diastolic heart failure (HCC) 12/10/2024    Pulmonary hypertension (HCC) 12/10/2024    Dyspnea 2024    CINDY (iron deficiency anemia) 2024    Type 2 diabetes mellitus with chronic kidney disease 2024    Stage 3 chronic kidney disease, unspecified whether stage 3a or 3b CKD ([facility]) 2024    Claudication ([facility]) 2024    Iron deficiency anemia, unspecified 2023    Esophageal reflux 2023    Rectal bleed 2020    Diverticulitis 2020    Type 2 diabetes mellitus, without long-term current use of insulin ([facility]) 2020    Uncontrolled hypertension 2020    Other hyperlipidemia 2020    Anxiety 2020    History of acute myocardial infarction 2020        Past Medical History:   Diagnosis Date    AMI (acute myocardial infarction) ([facility]) 2020    CAD S/P percutaneous coronary angioplasty 2023    Cancer ([facility])     skin left neck    Chronic bronchitis (HCC)     COPD (chronic obstructive pulmonary disease) ([facility])     
Physical Therapy Med Surg Initial Assessment  Facility/Department: [address] ORTHO TELE  Room: [facility]/[place] [address]       NAME: Isaak Mitchell  : 1950 (74 y.o.)  MRN: [id number]  CODE STATUS: Full Code    Date of Service: 2024    Patient Diagnosis(es): Dyspnea [R06.00]  Symptomatic anemia [D64.9]  Chest pain, unspecified type [R07.9]   Chief Complaint   Patient presents with    Chest Pain     Patient Active Problem List    Diagnosis Date Noted    Diarrhea 2023    Atherosclerosis of native coronary artery of native heart with angina pectoris (HCC) 2023    Chest pain 2023    Contusion of rib on left side 2022    Delirium 2024    Symptomatic anemia 12/10/2024    COPD exacerbation (HCC) 12/10/2024    Acute on chronic diastolic heart failure (HCC) 12/10/2024    Pulmonary hypertension ([facility]) 12/10/2024    Dyspnea 2024    Type 2 diabetes mellitus with chronic kidney disease 2024    Stage 3 chronic kidney disease, unspecified whether stage 3a or 3b CKD ([facility]) 2024    Claudication ([facility]) 2024    Iron deficiency anemia, unspecified 2023    Esophageal reflux 2023    Rectal bleed 2020    Diverticulitis 2020    Type 2 diabetes mellitus, without long-term current use of insulin ([facility]) 2020    Uncontrolled hypertension 2020    Other hyperlipidemia 2020    Anxiety 2020    History of acute myocardial infarction 2020        Past Medical History:   Diagnosis Date    AMI (acute myocardial infarction) ([facility]) 2020    CAD S/P percutaneous coronary angioplasty 2023    Cancer ([facility])     skin left neck    Chronic bronchitis (HCC)     COPD (chronic obstructive pulmonary disease) ([facility])     Diabetes mellitus ([facility])     Diverticulitis     Emphysema of lung ([facility])     Heart attack ([facility])     History of blood transfusion     Hyperlipidemia     Hypertension     Meniere's disease     NSTEMI (non-ST elevated myocardial infarction) ([facility]) 
Physical Therapy Missed Treatment   Facility/Department: [facility] MED SURG W288/W288-01    NAME: Isaak Mitchell    : 1950 (74 y.o.)  MRN: [id number]    Account: [id number]  Gender: male    Chart reviewed, attempted PT at 0910. Patient unavailable 2° to:        [x] Pt declined despite multiple attempts to get pt to agree to therapy.  Pt educated on importance of getting OOB to get stronger and keep self from getting sicker.  .    [x] Nsg notified            Will attempt PT treatment again at earliest convenience.      Electronically signed by Ambreen Mcneil PTA on 24 at 9:12 AM EST    
Physical Therapy Missed Treatment   Facility/Department: [facility] MED SURG W288/W288-01    NAME: Isaak Mitchell    : 1950 (74 y.o.)  MRN: [id number]    Account: [id number]  Gender: male    Chart reviewed, attempted PT at 1145. Patient unavailable 2° to:        [x] Pt declined due to not feeling well,\"This COVID is kicking my butt\"    [x] Nsg notified   [] Other notified        Will attempt PT treatment again at earliest convenience.      Electronically signed by Queta Simmons PTA on 24 at 11:53 AM EST    
Physical Therapy Missed Treatment   Facility/Department: [facility] MED SURG W288/W288-01    NAME: Isaak Mitchell    : 1950 (74 y.o.)  MRN: [id number]    Account: [id number]  Gender: male    Chart reviewed, attempted PT at 10:23. Patient unavailable 2° to:      [x] Pt declined AM attempt stating he just had a colonoscopy yesterday and is still recovering.  Pt asked to try later today or tomorrow if possible.     Will attempt PT treatment again at earliest convenience.      Electronically signed by ROBERTO ALMAZAN PTA on 24 at 11:09 AM EST    
Physical Therapy Missed Treatment   Facility/Department: [facility] MED SURG W288/W288-01     NAME: Isaak Mitchell                     : 1950 (74 y.o.)  MRN: [id number]                       Account: [id number]  Gender: male     Chart reviewed, reattempted PT at 1450. Patient declined 2° to:     [] Hold per nsg request     [x] Pt declined               [x] Nsg notified              [] Other notified     [] Pt.. off floor for test/procedure.      [] Pt. Unavailable currently receiving RBC transfusion.      Pt states \"I'd like to put this off for tomorrow. I know I need the therapy but they have me exhausted. Pt reports weakness following  RBC transfusion this AM and blood draws this PM. Pt wishing to participate in PT tomorrow 24.     Will attempt PT treatment again at earliest convenience.     Electronically signed by Jerardo Yu PTA on 24 at 11:43 AM EST  
Physical Therapy Missed Treatment   Facility/Department: [facility] MED SURG W288/W288-01    NAME: Isaak Mitchell    : 1950 (74 y.o.)  MRN: [id number]    Account: [id number]  Gender: male      PT tx attempted. Pt resting in bed, sleeping. Awakens with effort. Declining OOB activity this AM. \"I'm just so tired. I can't right now.\" Nursing staff notified.      Will follow and attempt PT tx again at earliest availability.       Nikole Gayle, PT, 24 at 10:51 AM    
Physical Therapy Missed Treatment   Facility/Department: [facility] MED SURG W288/W288-01    NAME: Isaak Mitchell    : 1950 (74 y.o.)  MRN: [id number]    Account: [id number]  Gender: male    Chart reviewed, attempted PT at 1517. Patient unavailable 2° to:    [] Hold per nsg request    [] Pt declined    [] Nsg notified   [] Other notified    [] Pt.. off floor for test/procedure.     [x] Pt. Unavailable pt sleeping soundly upon entering room several attempts made to wake pt to participate in session, unsuccessful.       Will attempt PT treatment again at earliest convenience.      Electronically signed by Jerardo Yu PTA on 24 at 3:17 PM EST    
Physical Therapy Missed Treatment   Facility/Department: [facility] MED SURG W288/W288-01    NAME: Isaak Mitchell    : 1950 (74 y.o.)  MRN: [id number]    Account: [id number]  Gender: male      PT evaluation and treatment orders received. Chart reviewed. Pt declining mobility assessment at this time. CPAP donned. Nursing staff notified.      Will follow and attempt PT evaluation again at earliest availability.       Nikole Gayle, PT, 24 at 10:44 AM    
Physical Therapy Missed Treatment   Facility/Department: [facility] MED SURG W288/W288-01    NAME: Isaak Mitchell    : 1950 (74 y.o.)  MRN: [id number]    Account: [id number]  Gender: male    Chart reviewed, attempted PT at 09:36. Patient unavailable 2° to:       [x] Pt. Critical hgb this morning and just started transfusion. Will check back this PM.       Will attempt PT treatment again at earliest convenience.      Electronically signed by ROBERTO ALMAZAN PTA on 24 at 9:39 AM EST    
Physical Therapy Missed Treatment   Facility/Department: [facility] MED SURG W288/W288-01    NAME: Isaak Mitchell    : 1950 (74 y.o.)  MRN: [id number]    Account: [id number]  Gender: male    Chart reviewed, attempted PT at 1316. Patient unavailable 2° to:        [x] Pt declined. Pt slightly confused today.  PCA came out of room reporting pt wanted urinal emptied, but pt had peed in a cup instead.  When confronting pt about this, pt did not seem aware that he had not used his urinal.  Pt also not aware of which hospital he is in and thinks he is in [facility].  Pt declining to eat his lunch, as it sat there untouched.  Pt reports he's not hungry.   Pt declined to get OOB and participate with therapy despite encouragement.  Pt also now on O2, whereas previously he was on room air.      [x] Nsg notified   [] Other notified        Will attempt PT treatment again at earliest convenience.      Electronically signed by Ambreen Mcneil PTA on 24 at 1:16 PM EST    
Physical Therapy Missed Treatment   Facility/Department: [facility] MED SURG W288/W288-01    NAME: Isaak Mitchell    : 1950 (74 y.o.)  MRN: [id number]    Account: [id number]  Gender: male    Chart reviewed, attempted PT at 1143. Patient unavailable 2° to:    [] Hold per nsg request    [] Pt declined    [] Nsg notified   [] Other notified    [] Pt.. off floor for test/procedure.     [x] Pt. Unavailable currently receiving RBC transfusion.       Will attempt PT treatment again at earliest convenience.      Electronically signed by Jerardo Yu PTA on 24 at 11:43 AM EST    
Physical Therapy Missed Treatment   Facility/Department: [facility] MED SURG W288/W288-01    NAME: Isaak Mitchell    : 1950 (74 y.o.)  MRN: [id number]    Account: [id number]  Gender: male    Chart reviewed, attempted PT at 1312. Patient unavailable 2° to:      [x] Pt.. off floor for test/procedure.  Also blood transfusion needed to be stopped for medical reasons.  Will resume tomorrow.            Will attempt PT treatment again at earliest convenience.      Electronically signed by Ambreen Mcneil PTA on 24 at 1:12 PM EST    
Physician Progress Note    12/30/2024   4:31 PM    Name:  Isaak Mitchell  MRN:    [id number]     IP Day: 19     Admit Date: 12/8/2024  7:32 PM  PCP: Keith Yu MD    Code Status:  Full Code    Assessment and Plan:        74-year-old male with history of CAD, COPD, former tobacco use (~50PY, quit ~2019), diabetes, hypertension initially presented on 12/8 with 1 week history of weakness and dyspnea.  He was found to have acute blood loss anemia secondary to high risk duodenal ulcer; coil embolization of gastroduodenal artery and small duodenal branch from GDA was performed by interventional radiology on 12/19.  During this course, he was treated for COPD exacerbation however later his course was complicated by fevers and he was found to have COVID-19 infection with possible superimposed bacterial pneumonia, as well as small pulmonary embolism.    1.  Acute respiratory failure with hypoxia secondary to COVID-19 pneumonia, PE, COPD exacerbation  -Completed course of Decadron and ceftriaxone  -Continue therapeutic Lovenox  -Continue supportive respiratory care  -Pulmonology following  -Repeat chest x-ray in the morning    2.  Sepsis secondary to COVID-19 infection    3.  Acute blood loss anemia secondary to duodenal ulcers s/p arterial coil embolization by IR  -Hemoglobin has been stable while on therapeutic Lovenox.  Continue PPI twice daily    4.  JAEK due to above processes:  -Improved     Hypertension  CAD  Diverticulosis  Type 2 diabetes well-controlled last A1c 5.9    Diet: ADULT DIET; Dysphagia - Soft and Bite Sized; 5 carb choices (75 gm/meal)  ADULT ORAL NUTRITION SUPPLEMENT; Breakfast, Lunch, Dinner; Diabetic Oral Supplement  Full Code    Discharged to [facility] on 12/29.  Then patient refused and is agreeable 12/30    Electronically signed by Pritesh Harrell DO on 12/30/2024 at 4:31 PM    Subjective:     Now agreeable to [facility]    Current Facility-Administered Medications   Medication Dose Route Frequency 
Progress Note  Date:12/10/2024       Room:[place] [address]  Patient Name:Isaak Mitchell     YOB: 1950     Age:74 y.o.        Subjective    Subjective   Review of Systems  ROS: could not be obtained bc of encephalopathy  Objective         Vitals Last 24 Hours:  TEMPERATURE:  Temp  Av.5 °F (37.5 °C)  Min: 99 °F (37.2 °C)  Max: 99.9 °F (37.7 °C)  RESPIRATIONS RANGE: Resp  Av  Min: 18  Max: 28  PULSE OXIMETRY RANGE: SpO2  Av.3 %  Min: 88 %  Max: 97 %  PULSE RANGE: Pulse  Av.6  Min: 73  Max: 88  BLOOD PRESSURE RANGE: Systolic (24hrs), Av , Min:128 , Max:162   ; Diastolic (24hrs), Av, Min:49, Max:69    I/O (24Hr):    Intake/Output Summary (Last 24 hours) at 12/10/2024 1429  Last data filed at 12/10/2024 0413  Gross per 24 hour   Intake --   Output 1050 ml   Net -1050 ml     Objective  Labs/Imaging/Diagnostics    Labs:  CBC:  Recent Labs     249 24  0518 12/10/24  0547   WBC 12.5* 13.2* 14.9*   RBC 2.59* 2.52* 2.77*   HGB 7.8* 7.7* 8.3*   HCT 24.3* 23.3* 25.4*   MCV 93.8* 92.5* 91.7   RDW 14.2 13.8 13.6    197 232     CHEMISTRIES:  Recent Labs     24  0518 12/10/24  0547    138 132*   K 4.9 4.9 4.9    108* 102   CO2 16* 17* 17*   BUN 19 21 27*   CREATININE 1.90* 1.95* 1.97*   GLUCOSE 238* 119* 160*     PT/INR:No results for input(s): \"PROTIME\", \"INR\" in the last 72 hours.  APTT:No results for input(s): \"APTT\" in the last 72 hours.  LIVER PROFILE:  Recent Labs     24  0518   AST 16 17   ALT 10 11   BILIDIR  --  <0.2   BILITOT 0.7 0.9*   ALKPHOS 63 50       Imaging Last 24 Hours:  Echo (TTE) complete (PRN contrast/bubble/strain/3D)    Result Date: 2024    Left Ventricle: Normal left ventricular systolic function with a visually estimated EF of 55 - 60%. Left ventricle size is normal. Normal wall thickness. Normal wall motion. Diastolic dysfunction present with normal LV EF.   Right Ventricle: Normal 
Progress Note  Date:12/15/2024       Room:[place] [id number]-  Patient Name:Isaak Mitchell     YOB: 1950     Age:74 y.o.        Subjective    Subjective:  Symptoms:  He reports cough and weakness.  No shortness of breath, malaise, chest pain, headache, chest pressure, anorexia or diarrhea.       Review of Systems   Respiratory:  Positive for cough. Negative for shortness of breath.    Cardiovascular:  Negative for chest pain.   Gastrointestinal:  Negative for anorexia and diarrhea.   Neurological:  Positive for weakness.     Objective         Vitals Last 24 Hours:  TEMPERATURE:  Temp  Av.5 °F (36.9 °C)  Min: 98.1 °F (36.7 °C)  Max: 98.8 °F (37.1 °C)  RESPIRATIONS RANGE: Resp  Av  Min: 18  Max: 18  PULSE OXIMETRY RANGE: SpO2  Av.4 %  Min: 81 %  Max: 96 %  PULSE RANGE: Pulse  Av.3  Min: 64  Max: 78  BLOOD PRESSURE RANGE: Systolic (24hrs), Av , Min:116 , Max:160   ; Diastolic (24hrs), Av, Min:49, Max:67    I/O (24Hr):    Intake/Output Summary (Last 24 hours) at 12/15/2024 1236  Last data filed at 12/15/2024 1229  Gross per 24 hour   Intake 1285 ml   Output 2545 ml   Net -1260 ml     Objective:  Vital signs: (most recent): Blood pressure (!) 128/51, pulse 78, temperature 98.6 °F (37 °C), temperature source Oral, resp. rate 18, height 1.651 m (5' 5\"), weight 65.5 kg (144 lb 4.8 oz), SpO2 96%.      Labs/Imaging/Diagnostics    Labs:  CBC:  Recent Labs     24  0512 24  0503 12/15/24  0508   WBC 8.9 11.3* 9.0   RBC 2.57* 2.47* 2.08*   HGB 7.8* 7.2* 6.4*   HCT 22.7* 22.3* 18.6*   MCV 88.3 90.3 89.4   RDW 13.5 13.6 13.7    348 282     CHEMISTRIES:  Recent Labs     24  0512 24  0503 12/15/24  0508   * 135 135   K 4.6 4.8 4.7    102 103   CO2 19* 19* 20   BUN 55* 59* 54*   CREATININE 1.91* 2.16* 2.27*   GLUCOSE 255* 208* 180*     PT/INR:No results for input(s): \"PROTIME\", \"INR\" in the last 72 hours.  APTT:No results for input(s): \"APTT\" in the last 
Progress Note  Date:2024       Room:[place] [id number]  Patient Name:Isaak Mitchell     YOB: 1950     Age:74 y.o.    Chief complaint uncontrolled type 2 diabetes    Subjective    Subjective   Review of Systems   Unable to perform ROS: Mental status change     Objective         Vitals Last 24 Hours:  TEMPERATURE:  Temp  Av.6 °F (37.6 °C)  Min: 97.7 °F (36.5 °C)  Max: 101.5 °F (38.6 °C)  RESPIRATIONS RANGE: Resp  Av.8  Min: 15  Max: 19  PULSE OXIMETRY RANGE: SpO2  Av.2 %  Min: 92 %  Max: 96 %  PULSE RANGE: Pulse  Av.7  Min: 57  Max: 77  BLOOD PRESSURE RANGE: Systolic (24hrs), Av , Min:81 , Max:153   ; Diastolic (24hrs), Av, Min:41, Max:82    I/O (24Hr):    Intake/Output Summary (Last 24 hours) at 2024 2137  Last data filed at 2024 1302  Gross per 24 hour   Intake 360 ml   Output 750 ml   Net -390 ml     Objective  Labs/Imaging/Diagnostics    Labs:  CBC:  Recent Labs     24  0529 24  0545 24  0534   WBC 6.4 6.9 7.0   RBC 2.81* 2.77* 3.08*   HGB 8.4* 8.5* 9.4*   HCT 24.6* 24.4* 26.9*   MCV 87.5 88.1 87.3   RDW 14.0 13.7 13.9    188 214     CHEMISTRIES:  Recent Labs     24  0529 24  0545 24  0534    135 135   K 4.2 4.4 4.2    105 102   CO2 19* 20 19*   BUN 31* 32* 26*   CREATININE 1.80* 1.74* 1.56*   GLUCOSE 144* 139* 62*     PT/INR:No results for input(s): \"PROTIME\", \"INR\" in the last 72 hours.  APTT:No results for input(s): \"APTT\" in the last 72 hours.  LIVER PROFILE:No results for input(s): \"AST\", \"ALT\", \"BILIDIR\", \"BILITOT\", \"ALKPHOS\" in the last 72 hours.    Imaging Last 24 Hours:  CTA CHEST W WO CONTRAST    Result Date: 2024  EXAMINATION: CTA OF THE CHEST WITH AND WITHOUT CONTRAST 2024 4:51 pm TECHNIQUE: CTA of the chest was performed before and after the administration of intravenous contrast.  Multiplanar reformatted images are provided for review.  MIP images are provided for review. 
Progress Note  Date:2024       [facility]  Patient Name:Isaak Mitchell     YOB: 1950     Age:74 y.o.        Subjective    Subjective:  Symptoms:  He reports cough and weakness.  No shortness of breath, malaise, chest pain, headache, chest pressure, anorexia or diarrhea.       Review of Systems   Respiratory:  Positive for cough. Negative for shortness of breath.    Cardiovascular:  Negative for chest pain.   Gastrointestinal:  Negative for anorexia and diarrhea.   Neurological:  Positive for weakness.     Objective         Vitals Last 24 Hours:  TEMPERATURE:  Temp  Av °F (36.7 °C)  Min: 97.7 °F (36.5 °C)  Max: 98.1 °F (36.7 °C)  RESPIRATIONS RANGE: Resp  Av.7  Min: 16  Max: 18  PULSE OXIMETRY RANGE: SpO2  Av.6 %  Min: 86 %  Max: 97 %  PULSE RANGE: Pulse  Av.8  Min: 68  Max: 142  BLOOD PRESSURE RANGE: Systolic (24hrs), Av , Min:109 , Max:148   ; Diastolic (24hrs), Av, Min:48, Max:67    I/O (24Hr):    Intake/Output Summary (Last 24 hours) at 2024 1519  Last data filed at 2024 0843  Gross per 24 hour   Intake 370 ml   Output 1100 ml   Net -730 ml     Objective:  Vital signs: (most recent): Blood pressure 134/62, pulse 69, temperature 98.1 °F (36.7 °C), temperature source Oral, resp. rate 18, height 1.651 m (5' 5\"), weight 65.3 kg (143 lb 14.4 oz), SpO2 92%.      Labs/Imaging/Diagnostics    Labs:  CBC:  Recent Labs     24  0512 24  0503   WBC 8.9 11.3*   RBC 2.57* 2.47*   HGB 7.8* 7.2*   HCT 22.7* 22.3*   MCV 88.3 90.3   RDW 13.5 13.6    348     CHEMISTRIES:  Recent Labs     24  0512 24  0503   * 135   K 4.6 4.8    102   CO2 19* 19*   BUN 55* 59*   CREATININE 1.91* 2.16*   GLUCOSE 255* 208*     PT/INR:No results for input(s): \"PROTIME\", \"INR\" in the last 72 hours.  APTT:No results for input(s): \"APTT\" in the last 72 hours.  LIVER PROFILE:  No results for input(s): \"AST\", \"ALT\", \"BILIDIR\", \"BILITOT\", \"ALKPHOS\" 
Progress Note  Date:2024       Room:W288/W288-01  Patient Name:Isaak Mitchell     YOB: 1950     Age:74 y.o.        Subjective    Subjective:  Symptoms:  No shortness of breath, malaise, cough, chest pain, weakness, headache, chest pressure, anorexia, diarrhea or anxiety.    Diet:  No nausea or vomiting.       Review of Systems   Respiratory:  Negative for cough and shortness of breath.    Cardiovascular:  Negative for chest pain.   Gastrointestinal:  Negative for anorexia, diarrhea, nausea and vomiting.   Neurological:  Negative for weakness.     Objective         Vitals Last 24 Hours:  TEMPERATURE:  Temp  Av.4 °F (37.4 °C)  Min: 98.7 °F (37.1 °C)  Max: 99.7 °F (37.6 °C)  RESPIRATIONS RANGE: Resp  Av.3  Min: 14  Max: 24  PULSE OXIMETRY RANGE: SpO2  Av.8 %  Min: 92 %  Max: 96 %  PULSE RANGE: Pulse  Av.1  Min: 72  Max: 83  BLOOD PRESSURE RANGE: Systolic (24hrs), Av , Min:68 , Max:149   ; Diastolic (24hrs), Av, Min:36, Max:71    I/O (24Hr):  No intake or output data in the 24 hours ending 24 1407  Objective:  General Appearance:  Comfortable, well-appearing and in no acute distress.    Vital signs: (most recent): Blood pressure (!) 126/40, pulse 82, temperature 99.7 °F (37.6 °C), resp. rate 20, height 1.651 m (5' 5\"), weight 71.4 kg (157 lb 6.4 oz), SpO2 93%.    HEENT: Normal HEENT exam.    Lungs:  There are decreased breath sounds.    Heart: S1 normal and S2 normal.    Abdomen: Abdomen is soft.  Bowel sounds are normal.   There is no epigastric area tenderness.     Pulses: Distal pulses are intact.    Neurological: Patient is alert.    Pupils:  Pupils are equal, round, and reactive to light.      Labs/Imaging/Diagnostics    Labs:  CBC:  Recent Labs     24  0029 24  0518   WBC 8.6 12.5* 13.2*   RBC 2.56* 2.59* 2.52*   HGB 7.8* 7.8* 7.7*   HCT 24.4* 24.3* 23.3*   MCV 95.3* 93.8* 92.5*   RDW 13.9 14.2 13.8    202 197 
Pt ambulated to the bathroom. DUTTON noted. Upon returning to bed, SpO2 low 68-low 70's on RA. 3 L NC applied with SpO2 improvement to 98%.  
Pt c/o chest  pain, Pt states 0008 dose of Tylenol was effective. Too soon for another dose per PRN order. Dr. Barrios notified via [employer]. Msg read, no response or NNO.  
Pt c/o pain throughout the shift , pain was all over cheat area and left rib cage area. Pt medicated per MAR with minimal releif through most of shift. Pt seen by pain management , no new orders at this time from that standpoint. Pt did c/o sob with exp wheezing and po reading 88% RA pt was placed on 2l/nc and Dr Zavala notified of pts status. Pt did feel better atter duoneb . Pt reassessed approx 5pm pt states pain much better 4/10, and po reading 91 RA (pt had taken o2 off). Pt resting in room with lights off in position of comfort.   
Pt declines  Bipap.  
Pt refusing to keep telemetry leads on. Pt is a&o x4, pt educated on importance of tele, but stated that he just wanted it off. Message sent to John WILLIAM. Call light remains in reach, care ongoing  
Pt returned to CT holding post procedure. Alert and oriented x4. Denies pain. Dressing to right groin with angioseal. Dsg CD&I, surrounding tissue soft with no redness, ecchymosis or drainage. +2 right pedal pulse. Right leg straight in bed, HOB 30 degrees. Norma Obregon, CNP at bedside to assess. Pt hypertensive, Norma states give him his coreg and cozaar, since he did not have any this am. Pt given sip of water, drinking without difficulty.  Pharmacy notified to send dosages.    1400 Pt resting in bed, eyes closed. Occasional moan noted, when asked, pt states \"my stomach.\" No emesis at this time. Norma Obregon aware. Dressing and site remain unchanged. Received coreg and cozaar from pharmacy, pt states he is too nauseated to take them at this time. BP improved, 166/72 at this time.    1416 No change in condition from previous, remains slightly nauseated when awake but falls back asleep easily. [employer] message sent to Dr. Altman for repeat H&H and notify of nausea despite zofran. Norma Obregon updated also and states patient may return to inpt room.    1422 Report called to [place] on 2WT. Pt to keep right leg straight x 2 more hours (total of 3 hours). Pt incontinent of urine, edna care performed and linen changed. Pt returning to inpt room via transport.      
Pt to CT holding via inpt bed. Alert to self, states he does not know where he is. Calm and cooperative. Blood transfusion running though #20 left wrist @ 125ml/hr. Site WNL, no redness, warmth or swelling. Pt denies pain. States he ate cream of wheat for breakfast this am. Pt slightly confused, giving the wrong birthday twice and unsure of where he is. Re-oriented by RN.   Temp taken orally twice, with first result 101.4, second 101.6. Blood transfusion stopped, NS running. Pt denies chest pain, back pain, chills or SOB. No rash or wheeze noted.   Norma Obregon CNP notified and states procedure will be on hold, pt to return to floor.    1109 Dr. Altman  notified of possible transfusion reaction via perfect [employer].      1118 Arrived back to 2WT and received call from Norma Obregon (after she spoke with Dr. Nixon) that we will proceed with procedure. Pt and RN returned to CT holding. Pt condition remains unchanged.    Lab and blood bank called by STELLA Ortega to inform them of reaction. They will send phleb for blood draw, will obtain post transfusion reaction form.    1123 Norma Obregon CNP bedside to assess.     1127 Phleb at bedside to draw post transfusion reaction labs.     1131 Pt to cath lab for procedure  
Received handoff from Enrique DOUGLAS 3:00 AM. VS at 2:08 AM HR 62 BP 93/44 MAP 56. John WILLIAM notified [employer] and message read. No action taken. RN reassessed BP at later time HR 64 /54 MAP 69. Spoke with John WILLIAM face to face after reassessment, NP has no concern.   
Secure message sent to Dr Benjamin to clarify Moviprep order.  Current order was for Moviprep 100mg once.  Order received for second Moviprep to be given in AM-order placed.    
[facility]  Occupational Therapy        NAME:  Isaak Mitchell  ROOM: W288/W288-01  :  1950  DATE: 2024    Attempted to see Isaak Mitchell at 1000 on this date for:   []  Initial Evaluation   [x]  Treatment       Patient was unable to be seen due to:   [] Off unit for testing/procedure    [x] Patient refused, stating \"I'm feeling too tired and weak right now and I'm trying to save myself for PT this afternoon because I've been putting them off\". Pt continues to decline sitting EOB, bed level ADLs, etc after education/encouragement.   [] Therapy on hold due to     [] Nursing deferred due to    [] Other:      Will re-attempt at earliest convenience.    Electronically signed by Tamanna Terrell OT on 24 at 10:07 AM EST  
Spoke with Chinmay about the care, he is working on getting POA paperwork for his dad.  
Spoke with the patient after PT declined by the patient, told him the importance of working with them so note can be obtained for his physical needs after discharged from the hospital, patient agreed, Purnima from PT made aware, will be working with him  
101   CO2 18*  --  19*   BUN 40*  --  55*   CREATININE 2.04* 1.9* 1.91*   GLUCOSE 145*  --  255*   CALCIUM 8.7  --  9.0     HEPATIC: No results for input(s): \"AST\", \"ALT\", \"BILITOT\", \"ALKPHOS\" in the last 72 hours.    Invalid input(s): \"ALB\"    LACTATE: No results for input(s): \"LACTA\" in the last 72 hours.  PROCALCITONIN:   Recent Labs     12/11/24  0458 12/13/24  0512   PROCAL 1.62* 1.06*       MV Settings:   FiO2 : 30 %    Recent Labs     12/11/24  0812   PHART 7.422   WNV0TRE 27*   PO2ART 70*   GSG2AGQ 17.8*   BEART -7*   B7CGZDXB 95       O2 Device: None (Room air)  O2 Flow Rate (L/min): 2 L/min       MEDICATIONS during current hospitalization:    Continuous Infusions:   sodium chloride      dextrose         Scheduled Meds:   enoxaparin  1 mg/kg SubCUTAneous Daily    azithromycin  500 mg Oral Daily    nystatin  5 mL Oral 4x Daily    pantoprazole  40 mg Oral QAM AC    PARoxetine  40 mg Oral Daily    aspirin  81 mg Oral Daily    methylPREDNISolone  40 mg IntraVENous Daily    ipratropium 0.5 mg-albuterol 2.5 mg  1 Dose Inhalation Q4H WA RT    QUEtiapine  50 mg Oral Nightly    atorvastatin  40 mg Oral Nightly    carvedilol  3.125 mg Oral BID WC    losartan  25 mg Oral Daily    sodium chloride flush  5-40 mL IntraVENous 2 times per day    insulin lispro  0-8 Units SubCUTAneous 4x Daily AC & HS       PRN Meds:calcium carbonate, benzocaine-menthol, [Held by provider] traMADol, ipratropium 0.5 mg-albuterol 2.5 mg, sodium chloride flush, sodium chloride, ondansetron **OR** ondansetron, polyethylene glycol, acetaminophen **OR** acetaminophen, glucose, dextrose bolus **OR** dextrose bolus, glucagon (rDNA), dextrose      Radiology Review:    Nuclear stress test with myocardial perfusion    Result Date: 12/10/2024    Stress Combined Conclusion: Normal pharmacological myocardial perfusion study. Findings suggest a low risk of cardiac events.   Stress Function: Left ventricular function post-stress is normal. Post-stress 
161 145     Recent Labs     12/28/24  0503 12/29/24  0455   * 130*   K 4.3 4.2   CL 99 103   CO2 17* 16*   BUN 42* 35*   CREATININE 2.63* 2.19*   GLUCOSE 73 86   CALCIUM 7.7* 7.4*   LABGLOM 24.4* 30.4*       MV Settings:     FiO2 : 30 %    No results for input(s): \"PHART\", \"SIA3QLH\", \"PO2ART\", \"LRI5AEN\", \"BEART\", \"L6AYMKPM\" in the last 72 hours.      O2 Device: Nasal cannula  O2 Flow Rate (L/min): 2 L/min    ADULT DIET; Dysphagia - Soft and Bite Sized; 5 carb choices (75 gm/meal)  ADULT ORAL NUTRITION SUPPLEMENT; Breakfast, Lunch, Dinner; Diabetic Oral Supplement     MEDICATIONS during current hospitalization:    Continuous Infusions:   sodium chloride      dextrose         Scheduled Meds:   guaiFENesin  600 mg Oral BID    cefTRIAXone (ROCEPHIN) 1,000 mg in sterile water 10 mL IV syringe  1,000 mg IntraVENous Q24H    pantoprazole  40 mg Oral BID AC    insulin lispro  0-4 Units SubCUTAneous 4x Daily AC & HS    enoxaparin  1 mg/kg SubCUTAneous BID    ferrous gluconate  324 mg Oral Every Other Day    docusate sodium  100 mg Oral BID    [Held by provider] levalbuterol  1.25 mg Nebulization Q4H RT    [Held by provider] ipratropium  0.5 mg Nebulization 4x Daily RT    PARoxetine  40 mg Oral Daily    [Held by provider] aspirin  81 mg Oral Daily    QUEtiapine  50 mg Oral Nightly    atorvastatin  40 mg Oral Nightly    carvedilol  3.125 mg Oral BID WC    [Held by provider] losartan  25 mg Oral Daily    sodium chloride flush  5-40 mL IntraVENous 2 times per day       PRN Meds:guaiFENesin-dextromethorphan, hydrALAZINE, prochlorperazine, calcium carbonate, metoprolol, benzocaine-menthol, [Held by provider] traMADol, ipratropium 0.5 mg-albuterol 2.5 mg, sodium chloride flush, sodium chloride, polyethylene glycol, acetaminophen **OR** acetaminophen, glucose, dextrose bolus **OR** dextrose bolus, glucagon (rDNA), dextrose    Radiology  CT chest shows emphysema, groundglass infiltrates, dense infiltrate right lower lobe, and 
200 188     Recent Labs     12/23/24  1145 12/24/24  0529 12/25/24  0545   * 135 135   K 4.3 4.2 4.4    105 105   CO2 18* 19* 20   BUN 32* 31* 32*   CREATININE 1.72* 1.80* 1.74*   GLUCOSE 230* 144* 139*   CALCIUM 7.5* 7.6* 7.4*   BILITOT 0.5  --   --    ALKPHOS 54  --   --    AST 47*  --   --    ALT 61*  --   --    LABGLOM 40.6* 38.4* 40.0*   GLOB 1.9*  --   --        MV Settings:     FiO2 : 30 %    No results for input(s): \"PHART\", \"UHA8DQV\", \"PO2ART\", \"VQA5VLX\", \"BEART\", \"V4AQMXKK\" in the last 72 hours.      O2 Device: None (Room air)  O2 Flow Rate (L/min): 0 L/min    ADULT ORAL NUTRITION SUPPLEMENT; Breakfast, Lunch, Dinner; Clear Liquid Oral Supplement  ADULT DIET; Dysphagia - Soft and Bite Sized; 5 carb choices (75 gm/meal)     MEDICATIONS during current hospitalization:    Continuous Infusions:   sodium chloride      sodium chloride      sodium chloride      sodium chloride      dextrose         Scheduled Meds:   insulin lispro  3 Units SubCUTAneous TID WC    insulin glargine  10 Units SubCUTAneous BID    cefTRIAXone (ROCEPHIN) 1,000 mg in sterile water 10 mL IV syringe  1,000 mg IntraVENous Q24H    dexAMETHasone  6 mg Oral Daily    ferrous gluconate  324 mg Oral Every Other Day    docusate sodium  100 mg Oral BID    pantoprazole  40 mg IntraVENous BID    enoxaparin  1 mg/kg SubCUTAneous Daily    [Held by provider] levalbuterol  1.25 mg Nebulization Q4H RT    [Held by provider] ipratropium  0.5 mg Nebulization 4x Daily RT    PARoxetine  40 mg Oral Daily    [Held by provider] aspirin  81 mg Oral Daily    QUEtiapine  50 mg Oral Nightly    atorvastatin  40 mg Oral Nightly    carvedilol  3.125 mg Oral BID WC    losartan  25 mg Oral Daily    sodium chloride flush  5-40 mL IntraVENous 2 times per day    insulin lispro  0-8 Units SubCUTAneous 4x Daily AC & HS       PRN Meds:guaiFENesin-dextromethorphan, sodium chloride, hydrALAZINE, sodium chloride, prochlorperazine, sodium chloride, calcium carbonate, 
elevated myocardial infarction) ([facility]) 12/11/2020    Vertigo      Past Surgical History:   Procedure Laterality Date    CARDIAC SURGERY      stent    SKIN BIOPSY      left neck       Chart Reviewed: Yes  Family/Caregiver Present: No    Restrictions:  Restrictions/Precautions: Fall Risk    SUBJECTIVE:   Subjective: \"I need to care for my significant other.\"    Pain  Pain  Pre-Pain: 0  Post-Pain: 0    OBJECTIVE:        Bed mobility  Supine to Sit: Modified independent  Sit to Supine: Modified independent  Bed Mobility Comments: HOB slightly elevated, pt with good technique and sequencing    Transfers  Sit to Stand: Stand by assistance  Stand to Sit: Stand by assistance (vc's needed for more controlled descent into chair, vc's for hand placement.)    Ambulation  Surface: Level tile  Device: Rolling Walker  Other Apparatus: O2  Assistance: Stand by assistance  Gait Deviations: Decreased step length;Decreased step height;Staggers;Slow Bea  Distance: 20' x2  Comments: Fatigued with gait.    Stairs/Curb  Stairs?: Yes  Stairs  # Steps : 8  Stairs Height: 6\"  Rails: Bilateral  Device: No Device  Other Apparatus:  (O2)  Assistance: Minimal assistance;Moderate assistance  Comment: LOB descending stairs with B rails Mod A to prevent fall. pt too fatigued to complete 12 stairs at this time. pt reports needing to assist his S/O up the stairs at baseline.                         Activity Tolerance  Activity Tolerance: Patient tolerated treatment well          ASSESSMENT   Assessment: pt able to initiate stair training however unable to complete 12 stairs needed to get to his bedroom/bathroom at home. pt SOB, SpO2 94% post session on 2L O2 which pt does not wear at baseline.     Discharge Recommendations:  Continue to assess pending progress         Goals  Long Term Goals  Long Term Goal 1: Bed mobility will be IND  Long Term Goal 2: Sit to stand to LRAD will be IND  Long Term Goal 3: Pt will amb 150ft w/ LRAD w/ IND  Long Term 
results for input(s): \"AST\", \"ALT\", \"BILIDIR\", \"BILITOT\", \"ALKPHOS\" in the last 72 hours.      Imaging Last 24 Hours:  Echo (TTE) complete (PRN contrast/bubble/strain/3D)    Result Date: 12/9/2024    Left Ventricle: Normal left ventricular systolic function with a visually estimated EF of 55 - 60%. Left ventricle size is normal. Normal wall thickness. Normal wall motion. Diastolic dysfunction present with normal LV EF.   Right Ventricle: Normal systolic function.   Aortic Valve: Mild regurgitation.   Tricuspid Valve: Mild regurgitation. Moderately elevated RVSP, consistent with moderate pulmonary hypertension. The estimated RVSP is 50 mmHg.   Pericardium: No pericardial effusion.   Image quality is good.     XR CHEST PORTABLE    Result Date: 12/8/2024  EXAMINATION: ONE XRAY VIEW OF THE CHEST 12/8/2024 8:35 pm COMPARISON: November 27 HISTORY: ORDERING SYSTEM PROVIDED HISTORY: chest pain TECHNOLOGIST PROVIDED HISTORY: Reason for exam:->chest pain What reading provider will be dictating this exam?->CRC     Lungs are normally expanded.  No infiltrates, consolidation or pleural effusions are seen.  Has normal size.  Mediastinum appears unremarkable. There is no perihilar vascular congestion. There is no pneumothorax. RECOMMENDATION: No acute cardiopulmonary process.     Assessment//Plan           Hospital Problems             Last Modified POA    * (Principal) Dyspnea 12/8/2024 Yes    Symptomatic anemia 12/10/2024 Yes    COPD exacerbation (HCC) 12/10/2024 Yes    Acute on chronic diastolic heart failure (HCC) 12/10/2024 Yes    Pulmonary hypertension (HCC) 12/10/2024 Yes    Delirium 12/11/2024 Yes   CP  Dyspnea  COPD exacerbation  Delirium/encephalopathy  Elevate procalcitonin  Assessment & Plan  12/10: Patient not wheezing.  COPD is baseline.  Pain is controlled but patient hallucinating.  Psych evaluation open the blinds.  Seroquel at night.   for discharge planning TCT 55 min  12/11: Patient started on 
/HPF       Hyaline Casts, UA 1-3 /HPF       WBC, UA 0-2 /HPF       Epithelial Cells, UA 0-2 /HPF    XR CHEST PORTABLE [[id number]]  Status: Final result     PACS Images     Show images for XR CHEST PORTABLE  XR CHEST PORTABLE  Order: [id number]  Status: Final result       Visible to patient: Yes (not seen)       Next appt: 01/07/2025 at 02:15 PM in Family Medicine (Keith Yu MD)    0 Result Notes  Details    Reading Physician Reading Date Result Priority   Leobardo Herrmann MD  [phone] 12/8/2024      Narrative & Impression  EXAMINATION:  ONE XRAY VIEW OF THE CHEST     12/8/2024 8:35 pm     COMPARISON:  November 27     HISTORY:  ORDERING SYSTEM PROVIDED HISTORY: chest pain  TECHNOLOGIST PROVIDED HISTORY:  Reason for exam:->chest pain  What reading provider will be dictating this exam?->CRC     IMPRESSION:  Lungs are normally expanded.  No infiltrates, consolidation or pleural  effusions are seen.  Has normal size.  Mediastinum appears unremarkable.  There is no perihilar vascular congestion.     There is no pneumothorax.     RECOMMENDATION:  No acute cardiopulmonary process.              Exam Ended: 12/08/24 20:35 EST Last Resulted: 12/08/24 21:17 EST        ASSESSMENT:      PLAN:  COVID-19 infection  Possible left lower lobe pneumonia    Discontinue Decadron Rocephin    Sign off  ID          
/HPF       Hyaline Casts, UA 1-3 /HPF       WBC, UA 0-2 /HPF       Epithelial Cells, UA 0-2 /HPF    XR CHEST PORTABLE [[id number]]  Status: Final result     PACS Images     Show images for XR CHEST PORTABLE  XR CHEST PORTABLE  Order: [id number]  Status: Final result       Visible to patient: Yes (not seen)       Next appt: 01/07/2025 at 02:15 PM in Family Medicine (Keith Yu MD)    0 Result Notes  Details    Reading Physician Reading Date Result Priority   Leobardo Herrmann MD  [phone] 12/8/2024      Narrative & Impression  EXAMINATION:  ONE XRAY VIEW OF THE CHEST     12/8/2024 8:35 pm     COMPARISON:  November 27     HISTORY:  ORDERING SYSTEM PROVIDED HISTORY: chest pain  TECHNOLOGIST PROVIDED HISTORY:  Reason for exam:->chest pain  What reading provider will be dictating this exam?->CRC     IMPRESSION:  Lungs are normally expanded.  No infiltrates, consolidation or pleural  effusions are seen.  Has normal size.  Mediastinum appears unremarkable.  There is no perihilar vascular congestion.     There is no pneumothorax.     RECOMMENDATION:  No acute cardiopulmonary process.              Exam Ended: 12/08/24 20:35 EST Last Resulted: 12/08/24 21:17 EST        ASSESSMENT:      PLAN:  COVID-19 infection  Possible left lower lobe pneumonia    Continue Decadron for total of 10 days continue Rocephin total 8 days              
2047    metoprolol (LOPRESSOR) injection 5 mg  5 mg IntraVENous Q6H PRN Alicia Zavala MD        [Held by provider] levalbuterol (XOPENEX) nebulizer solution 1.25 mg  1.25 mg Nebulization Q4H RT Alicia Zavala MD   1.25 mg at 12/19/24 1516    [Held by provider] ipratropium (ATROVENT) 0.02 % nebulizer solution 0.5 mg  0.5 mg Nebulization 4x Daily RT Alicia Zavala MD   0.5 mg at 12/19/24 1516    benzocaine-menthol (CEPACOL SORE THROAT) lozenge 1 lozenge  1 lozenge Oral Q2H PRN Alicia Zavala MD   1 lozenge at 12/22/24 1432    PARoxetine (PAXIL) tablet 40 mg  40 mg Oral Daily Alicia Zavala MD   40 mg at 12/28/24 0917    [Held by provider] aspirin chewable tablet 81 mg  81 mg Oral Daily Alicia Zavala MD   81 mg at 12/15/24 1013    QUEtiapine (SEROQUEL) tablet 50 mg  50 mg Oral Nightly Alicia Zavala MD   50 mg at 12/27/24 2053    [Held by provider] traMADol (ULTRAM) tablet 50 mg  50 mg Oral Q6H PRN Alicia Zavala MD   50 mg at 12/09/24 2136    atorvastatin (LIPITOR) tablet 40 mg  40 mg Oral Nightly Karon Burnett PA   40 mg at 12/27/24 2053    ipratropium 0.5 mg-albuterol 2.5 mg (DUONEB) nebulizer solution 1 Dose  1 Dose Inhalation Q4H PRN Alicia Zavala MD   1 Dose at 12/11/24 1543    carvedilol (COREG) tablet 3.125 mg  3.125 mg Oral BID  Karon Burnett PA   3.125 mg at 12/28/24 0917    [Held by provider] losartan (COZAAR) tablet 25 mg  25 mg Oral Daily Karon Burnett PA   25 mg at 12/27/24 1011    sodium chloride flush 0.9 % injection 5-40 mL  5-40 mL IntraVENous 2 times per day Kareen Barrios, DO   10 mL at 12/28/24 0911    sodium chloride flush 0.9 % injection 5-40 mL  5-40 mL IntraVENous PRN Kareen Barrios DO   20 mL at 12/26/24 2130    0.9 % sodium chloride infusion   IntraVENous PRN Kareen Barrios, DO        polyethylene glycol (GLYCOLAX) packet 17 g  17 g Oral Daily PRN Kareen Barrios DO        acetaminophen (TYLENOL) tablet 650 mg  650 mg Oral Q6H PRN Kareen Barrios DO   650 mg at 
Anxiety     Contusion of rib on left side     Chest pain     Atherosclerosis of native coronary artery of native heart with angina pectoris (HCC)     Diarrhea     Iron deficiency anemia, unspecified     Esophageal reflux     Claudication (HCC)     Stage 3 chronic kidney disease, unspecified whether stage 3a or 3b CKD (HCC)     Type 2 diabetes mellitus with chronic kidney disease     Dyspnea     Symptomatic anemia     COPD exacerbation (HCC)     Acute on chronic diastolic heart failure (HCC)     Pulmonary hypertension (HCC)     Delirium     Adjustment disorder     CINDY (iron deficiency anemia)     COVID-19 virus infection     Poorly controlled diabetes mellitus (HCC)        Electronically signed by ALLIE Desouza on 12/24/2024 at 12:25 PM  
Diagnosis Date Noted    Adjustment disorder [F43.20] 12/12/2024    Delirium [R41.0] 12/11/2024    Symptomatic anemia [D64.9] 12/10/2024    COPD exacerbation (HCC) [J44.1] 12/10/2024    Acute on chronic diastolic heart failure (HCC) [I50.33] 12/10/2024    Pulmonary hypertension (HCC) [I27.20] 12/10/2024    Dyspnea [R06.00] 12/08/2024      Additional work up or/and treatment plan may be added today or then after based on clinical progression. I am managing a portion of pt care. Some medical issues are handled by other specialists. Additional work up and treatment should be done in out pt setting by pt PCP and other out pt providers.     In addition to examining and evaluating pt, I spent additional time explaining care, normal and abnormal findings, and treatment plan. All of pt questions were answered. Counseling, diet and education were  provided. Case will be discussed with nursing staff when appropriate. Family will be updated if and when appropriate.      Diet: ADULT DIET; Full Liquid    Code Status: Full Code    PT/OT Eval     Electronically signed by Romero Altman MD on 12/18/2024 at 1:05 PM    
Follows one step commands consistently  Attention Span: Difficulty attending to directions  Memory: Appears intact  Safety Judgement: Decreased awareness of need for assistance  Problem Solving: Assistance required to generate solutions;Assistance required to identify errors made  Insights: Decreased awareness of deficits  Initiation: Requires cues for some  Sequencing: Requires cues for some    Perception Status:  Perception  Overall Perceptual Status: WFL    Vision and Hearing Status:  Vision  Vision: Within Functional Limits   Hearing  Hearing: Within functional limits    GROSS ASSESSMENT AROM/PROM:  AROM: Generally decreased, functional  PROM: Generally decreased, functional    ROM:   LUE AROM (degrees)  LUE AROM : WFL  Left Hand AROM (degrees)  Left Hand AROM: WFL  RUE AROM (degrees)  RUE AROM : WFL  Right Hand AROM (degrees)  Right Hand AROM: WFL    UE STRENGTH:  Strength: Generally decreased, functional    UE COORDINATION:  Coordination: Generally decreased, functional    UE TONE:  Tone: Normal    UE SENSATION:  Sensation: Intact    Hand Dominance:  Hand Dominance  Hand Dominance: Right    ADL Status:  ADL  Feeding: Setup  Grooming: Minimal assistance  UE Bathing: Moderate assistance  LE Bathing: Moderate assistance  UE Dressing: Moderate assistance  LE Dressing: Moderate assistance  Putting On/Taking Off Footwear: Maximum assistance  Toileting: Moderate assistance  Functional Mobility: Maximum assistance  Additional Comments: simulated ADLs as stated above. Pt demonstrated decreased endurance, balance, and state of arousal, anticipate MOD A    Functional Mobility:    Transfers  Stand Step Transfers: Minimal assistance    Patient ambulated to head of bed with Bed rail at Min A level. Required cues for sequencing and initiation.     Bed Mobility  Bed mobility  Supine to Sit: Moderate assistance  Sit to Supine: Minimal assistance  Bed Mobility Comments: pt required assist to manage BLE out of bed, able to 
Nightly    atorvastatin  40 mg Oral Nightly    carvedilol  3.125 mg Oral BID WC    losartan  25 mg Oral Daily    sodium chloride flush  5-40 mL IntraVENous 2 times per day    insulin lispro  0-8 Units SubCUTAneous 4x Daily AC & HS       PRN Meds:guaiFENesin-dextromethorphan, sodium chloride, hydrALAZINE, sodium chloride, prochlorperazine, sodium chloride, calcium carbonate, metoprolol, benzocaine-menthol, [Held by provider] traMADol, ipratropium 0.5 mg-albuterol 2.5 mg, sodium chloride flush, sodium chloride, polyethylene glycol, acetaminophen **OR** acetaminophen, glucose, dextrose bolus **OR** dextrose bolus, glucagon (rDNA), dextrose      Radiology Review:    XR ACUTE ABD SERIES CHEST 1 VW    Result Date: 2024  EXAMINATION: TWO XRAY VIEWS OF THE ABDOMEN AND SINGLE  XRAY VIEW OF THE CHEST 2024 6:13 pm COMPARISON: 2024 HISTORY: ORDERING SYSTEM PROVIDED HISTORY: COVID TECHNOLOGIST PROVIDED HISTORY: Reason for exam:->COVID What reading provider will be dictating this exam?->CRC FINDINGS: Chest: The lungs appear clear.  The contour of the mediastinum heart size are within normal range.  There are no signs of pneumothorax Images of the abdomen demonstrates signs of prior vascular embolization. There are multiple scattered gas fluid levels within loops of large and small bowel that may represent an underlying ileus or enteritis.  There are no signs of gaseous distension of the bowel however.  No free air is observed. There are no signs of abnormal calcifications.  There is intravenous contrast noted within the urinary bladder.     1. No signs of an acute cardiopulmonary process. 2. Nonobstructive bowel pattern. 3. Scattered gas fluid levels concerning for underlying ileus or enteritis     Colonoscopy    Result Date: 2024  [facility] Patient: JOSE MCCOY MRN: [id number] : 1950 Account: [id number] Sex at Birth: Male Age: 74 Years Procedure: Colonoscopy Date: 2024 Attending 
anemia [D64.9] 12/10/2024    COPD exacerbation (HCC) [J44.1] 12/10/2024    Acute on chronic diastolic heart failure (HCC) [I50.33] 12/10/2024    Pulmonary hypertension (HCC) [I27.20] 12/10/2024    Dyspnea [R06.00] 12/08/2024      Additional work up or/and treatment plan may be added today or then after based on clinical progression. I am managing a portion of pt care. Some medical issues are handled by other specialists. Additional work up and treatment should be done in out pt setting by pt PCP and other out pt providers.     In addition to examining and evaluating pt, I spent additional time explaining care, normal and abnormal findings, and treatment plan. All of pt questions were answered. Counseling, diet and education were  provided. Case will be discussed with nursing staff when appropriate. Family will be updated if and when appropriate.      Diet: ADULT DIET; Clear Liquid  Diet NPO Exceptions are: Sips of Water with Meds    Code Status: Full Code    PT/OT Eval     Electronically signed by Romero Altman MD on 12/16/2024 at 12:45 PM    
endoscopy. No active bleeding was seen at the time of the   angiogram.      Procedures:   1. Ultrasound evaluation of the right common femoral artery. There was no   thrombus, significant stenosis, or other findings to prevent access. Ultrasound   was used to access the right common femoral artery. Ultrasound images of the   blood vessel are saved to PACS.   2. Angiogram of the abdominal aorta with a pigtail catheter in the upper   abdominal aorta.   3. Angiogram of the celiac artery with the catheter in the celiac artery.   4. Angiogram of the gastroduodenal and gastroepiploic artery with the catheter   in the gastroepiploic artery, fourth order branch.   5. Coil in position of the gastroduodenal artery and small duodenal branch off   of the gastroduodenal artery.      Radiation dose to the patient was: 1034 mGy      Following the discussion of the procedure, alternatives, risks versus benefits,   informed consent was obtained from the patient.  Specifically, risks of damage   to the blood vessels, hemorrhage, infection, injury to the adjacent organs such   as stroke, heart attack, or pulmonary embolus were discussed and the patient   verbalized understanding.      Conscious sedation was not given since patient had eaten breakfast.      Following universal protocol, patient and site verification was performed with a   \"timeout\" prior to the procedure.      The patient was placed supine on the angiography table. The right groin was   prepped and draped in sterile fashion. Ultrasound was used to study the right   common femoral artery. The artery did not demonstrate any evidence of stenosis,   or thrombus. Ultrasound images of the blood vessel are saved to PACS. The artery   was accessed using a micropuncture set. A 5 Burmese sheath was placed.      Through the sheath, a pigtail catheter was advanced into the upper abdominal   aorta, and angiogram of the abdominal aorta was performed. This angiogram   demonstrated 
glycol, acetaminophen **OR** acetaminophen, glucose, dextrose bolus **OR** dextrose bolus, glucagon (rDNA), dextrose      Radiology Review:    Nuclear stress test with myocardial perfusion    Result Date: 12/10/2024    Stress Combined Conclusion: Normal pharmacological myocardial perfusion study. Findings suggest a low risk of cardiac events.   Stress Function: Left ventricular function post-stress is normal. Post-stress ejection fraction is 67%. The stress end diastolic cavity size is normal.   Perfusion Comments: LV perfusion is normal.   Perfusion Conclusion: There is no evidence of transient ischemic dilation (TID). TID ratio is 1.14.   Image quality is good.   ECG: Resting ECG demonstrates normal sinus rhythm.   ECG: The stress ECG was negative for ischemia. The stress ECG was not diagnostic due to pharmacologic protocol.   Stress Test: A pharmacological stress test was performed using regadenoson (Lexiscan).     Echo (TTE) complete (PRN contrast/bubble/strain/3D)    Result Date: 12/9/2024    Left Ventricle: Normal left ventricular systolic function with a visually estimated EF of 55 - 60%. Left ventricle size is normal. Normal wall thickness. Normal wall motion. Diastolic dysfunction present with normal LV EF.   Right Ventricle: Normal systolic function.   Aortic Valve: Mild regurgitation.   Tricuspid Valve: Mild regurgitation. Moderately elevated RVSP, consistent with moderate pulmonary hypertension. The estimated RVSP is 50 mmHg.   Pericardium: No pericardial effusion.   Image quality is good.     XR CHEST PORTABLE    Result Date: 12/8/2024  EXAMINATION: ONE XRAY VIEW OF THE CHEST 12/8/2024 8:35 pm COMPARISON: November 27 HISTORY: ORDERING SYSTEM PROVIDED HISTORY: chest pain TECHNOLOGIST PROVIDED HISTORY: Reason for exam:->chest pain What reading provider will be dictating this exam?->CRC     Lungs are normally expanded.  No infiltrates, consolidation or pleural effusions are seen.  Has normal size.  
limited d/t isolation.     Discharge Recommendations:  Continue to assess pending progress         Goals  Long Term Goals  Long Term Goal 1: Bed mobility will be IND  Long Term Goal 2: Sit to stand to LRAD will be IND  Long Term Goal 3: Pt will amb 150ft w/ LRAD w/ IND  Long Term Goal 4: Pt will negotiate 12 steps w/ SUP    PLAN    General Plan: 1 time a day 3-6 times a week  Safety Devices  Type of Devices: All fall risk precautions in place, Call light within reach, Left in bed, Bed alarm in place     AMPAC (6 CLICK) BASIC MOBILITY  AM-PAC Inpatient Mobility Raw Score : 17     Therapy Time   Individual   Time In 1419   Time Out 1438   Minutes 19      BM/ transfers: 9   Gait: 10       ROBERTO ALMAZAN PTA, 12/23/24 at 3:23 PM         Definitions for assistance levels  Independent = pt does not require any physical supervision or assistance from another person for activity completion. Device may be needed.  Stand by assistance = pt requires verbal cues or instructions from another person, close to but not touching, to perform the activity  Minimal assistance= pt performs 75% or more of the activity; assistance is required to complete the activity  Moderate assistance= pt performs 50% of the activity; assistance is required to complete the activity  Maximal assistance = pt performs 25% of the activity; assistance is required to complete the activity  Dependent = pt requires total physical assistance to accomplish the task  
mobility will be IND  Long Term Goal 2: Sit to stand to LRAD will be IND  Long Term Goal 3: Pt will amb 150ft w/ LRAD w/ IND  Long Term Goal 4: Pt will negotiate 12 steps w/ SUP    PLAN    General Plan: 1 time a day 3-6 times a week        AMPAC (6 CLICK) BASIC MOBILITY  AM-PAC Inpatient Mobility Raw Score : 17     Therapy Time   Individual   Time In 1415   Time Out 1423   Minutes 8     Timed Code Treatment Minutes: 8 Minutes    There ex 8    Queta Simmons PTA, 12/22/24 at 2:30 PM     Electronically signed by Queta Simmons PTA on 12/22/2024 at 2:30 PM      Definitions for assistance levels  Independent = pt does not require any physical supervision or assistance from another person for activity completion. Device may be needed.  Stand by assistance = pt requires verbal cues or instructions from another person, close to but not touching, to perform the activity  Minimal assistance= pt performs 75% or more of the activity; assistance is required to complete the activity  Moderate assistance= pt performs 50% of the activity; assistance is required to complete the activity  Maximal assistance = pt performs 25% of the activity; assistance is required to complete the activity  Dependent = pt requires total physical assistance to accomplish the task  
recommendation, discharge planning, FU psych, pt does not want to go anywhere, he has no insight of his condition TCT 55 min  
  Through the sheath, a pigtail catheter was advanced into the upper abdominal   aorta, and angiogram of the abdominal aorta was performed. This angiogram   demonstrated more appearing abdominal aorta with normal patent branches.   A Sos Omni catheter was advanced into the celiac artery, and angiogram of the   celiac artery was performed. This angiogram demonstrated a patent celiac artery   and patent splenic and common hepatic, gastroduodenal artery, and proper hepatic   artery with right and left patent arterial branches of the liver.   A XP glide catheter was advanced into the common hepatic artery at the opening   of the gastroduodenal artery, and angiogram of the gastroduodenal artery was   performed. This angiogram demonstrated a slightly irregular gastroduodenal   artery which may be secondary to the ulcer seen, though no active bleeding was   seen. A small branch of of the gastroduodenal artery proximally was seen   supplying a small portion of the duodenum as well.      A Renegade microcatheter with a fathom wire were advanced through the   gastroduodenal artery into the gastroepiploic artery and angiogram was performed   again demonstrating slight irregularity of the gastroduodenal artery, although   no active bleeding was seen. The gastroepiploic artery was normal. Terumo   hydrogel embolization coils were deployed into the proximal gastroepiploic   artery and back throughout the entire gastroduodenal artery. 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 A small branch off of   the proximal gastroduodenal artery which is likely a superior   pancreaticoduodenal branch was seen. The microcatheter was redirected into this   branch and angiogram was performed which demonstrated opacification of part of   the duodenum. Embolization with Terumo hydrogel coils was performed of this   artery. The catheter was pulled back and repeat angiogram demonstrated total   occlusion of the 
diabetes mellitus with chronic kidney disease    Dyspnea    Symptomatic anemia    COPD exacerbation (HCC)    Acute on chronic diastolic heart failure (HCC)    Pulmonary hypertension (HCC)    Delirium    Adjustment disorder    CINDY (iron deficiency anemia)    COVID-19 virus infection         PLAN:  COVID-19 infection    Obtain chest x-ray  Patient on steroids at baseline 2 L a minute      Since patient slightly more hypoxic start patient on dexamethasone for the COVID-19    Increase in fevers 103.1    Chest x-ray  Blood cultures  UA CNS    Add Rocephin  
physical assistance to accomplish the task  
unspecified whether stage 3a or 3b CKD (HCC)    Type 2 diabetes mellitus with chronic kidney disease    Dyspnea    Symptomatic anemia    COPD exacerbation (HCC)    Acute on chronic diastolic heart failure (HCC)    Pulmonary hypertension (HCC)    Delirium    Adjustment disorder    CINDY (iron deficiency anemia)    COVID-19 virus infection    Poorly controlled diabetes mellitus (HCC)         PLAN:  COVID-19 infection  Possible left lower lobe pneumonia    Continue Decadron for total of 10 days continue Rocephin total 8 days    Okay to transfer to [place]          
and neck supple.   Lymphadenopathy:      Cervical: No cervical adenopathy.   Skin:     General: Skin is warm and dry.      Coloration: Skin is pale. Skin is not jaundiced.   Neurological:      General: No focal deficit present.      Mental Status: He is alert and oriented to person, place, and time. Mental status is at baseline.   Psychiatric:         Mood and Affect: Mood normal.         Behavior: Behavior normal.         Thought Content: Thought content normal.         Judgment: Judgment normal.       Recent Labs     12/20/24  0821 12/20/24  0352 12/19/24  2042 12/19/24  1513 12/19/24  0446 12/18/24  1130 12/18/24  0401 12/17/24  0353 12/17/24  0352   WBC  --   --   --   --  10.4  --  9.6  --  9.0   HGB 8.5* 9.1* 6.7*   < > 6.4*   < > 7.7*  7.5*   < > 7.6*   HCT 24.2* 25.7* 19.4*   < > 18.5*   < > 21.9*  21.5*   < > 22.2*   MCV  --   --   --   --  89.4  --  87.6  --  87.7   PLT  --   --   --   --  334  --  322  --  314    < > = values in this interval not displayed.     Lab Results   Component Value Date     12/19/2024    K 4.7 12/19/2024     12/19/2024    CO2 20 12/19/2024    BUN 52 (H) 12/19/2024    CREATININE 1.93 (H) 12/19/2024    GLUCOSE 152 (H) 12/19/2024    CALCIUM 7.8 (L) 12/19/2024    BILITOT 0.9 (H) 12/09/2024    ALKPHOS 50 12/09/2024    AST 17 12/09/2024    ALT 11 12/09/2024    LABGLOM 35.3 (L) 12/19/2024    GFRAA >60.0 12/18/2020    GLOB 1.9 (L) 12/08/2024       Lab Results   Component Value Date    INR 1.0 11/27/2024    INR 0.9 02/03/2023    INR 0.9 12/17/2020    PROTIME 13.3 11/27/2024    PROTIME 12.7 02/03/2023    PROTIME 11.8 (L) 12/17/2020       ASSESSMENT:  74 year old male admitted w/symptomatic anemia, COPD exacerbation, acute on chronic diastolic heart failure, new onset A-fib anticoagulation started with subsequent drop in hemoglobin.  EGD 12/17/2024 with gastritis, erythema, and multiple duodenal ulcers with largest ulcer measuring 25 mm with adherent clot.  Per GI, not 
sterile fashion. Ultrasound was used to study the right   common femoral artery. The artery did not demonstrate any evidence of stenosis,   or thrombus. Ultrasound images of the blood vessel are saved to PACS. The artery   was accessed using a micropuncture set. A 5 Macanese sheath was placed.      Through the sheath, a pigtail catheter was advanced into the upper abdominal   aorta, and angiogram of the abdominal aorta was performed. This angiogram   demonstrated more appearing abdominal aorta with normal patent branches.   A Sos Omni catheter was advanced into the celiac artery, and angiogram of the   celiac artery was performed. This angiogram demonstrated a patent celiac artery   and patent splenic and common hepatic, gastroduodenal artery, and proper hepatic   artery with right and left patent arterial branches of the liver.   A XP glide catheter was advanced into the common hepatic artery at the opening   of the gastroduodenal artery, and angiogram of the gastroduodenal artery was   performed. This angiogram demonstrated a slightly irregular gastroduodenal   artery which may be secondary to the ulcer seen, though no active bleeding was   seen. A small branch of of the gastroduodenal artery proximally was seen   supplying a small portion of the duodenum as well.      A Renegade microcatheter with a fathom wire were advanced through the   gastroduodenal artery into the gastroepiploic artery and angiogram was performed   again demonstrating slight irregularity of the gastroduodenal artery, although   no active bleeding was seen. The gastroepiploic artery was normal. Terumo   hydrogel embolization coils were deployed into the proximal gastroepiploic   artery and back throughout the entire gastroduodenal artery. 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 A small branch off of   the proximal gastroduodenal artery which is likely a superior   pancreaticoduodenal branch was seen. The 
Date/Time     12/11/2024 04:58 AM    K 4.4 12/11/2024 04:58 AM     12/11/2024 04:58 AM    CO2 18 12/11/2024 04:58 AM    BUN 40 12/11/2024 04:58 AM    CREATININE 1.9 12/11/2024 08:12 AM    CREATININE 2.04 12/11/2024 04:58 AM    CALCIUM 8.7 12/11/2024 04:58 AM    GFRAA >60.0 12/18/2020 07:09 AM    LABGLOM 36 12/11/2024 08:12 AM    LABGLOM 30.3 03/26/2024 11:32 AM    GLUCOSE 145 12/11/2024 04:58 AM    GLUCOSE 152 04/11/2012 07:55 PM     Magnesium:    Lab Results   Component Value Date/Time    MG 1.5 11/27/2024 03:44 PM    MG 1.9 04/11/2012 07:55 PM     Troponin:    Lab Results   Component Value Date/Time    TROPONINI 0.024 02/04/2023 04:51 AM       Radiology:  XR CHEST PORTABLE    Result Date: 12/8/2024  EXAMINATION: ONE XRAY VIEW OF THE CHEST 12/8/2024 8:35 pm COMPARISON: November 27 HISTORY: ORDERING SYSTEM PROVIDED HISTORY: chest pain TECHNOLOGIST PROVIDED HISTORY: Reason for exam:->chest pain What reading provider will be dictating this exam?->CRC     Lungs are normally expanded.  No infiltrates, consolidation or pleural effusions are seen.  Has normal size.  Mediastinum appears unremarkable. There is no perihilar vascular congestion. There is no pneumothorax. RECOMMENDATION: No acute cardiopulmonary process.        Echocardiogram 2/3/23:  Conclusions   Summary   Normal left ventricular systolic function, no regional wall motion   abnormalities, estimated ejection fraction of 60%.   Mild concentric left ventricular hypertrophy.   Pseudonormal filling pattern noted.   Normal right ventricular chamber size and function.   Mild aortic regurgitation is noted.   There is moderate tricuspid regurgitation with estimated RVSP of 50 mm Hg.   Right ventricular systolic pressure of 50 mm Hg consistent with moderate   pulmonary hypertension.   Signature   ----------------------------------------------------------------   Electronically signed by Tg CODY DO(Interpreting   physician) on 02/03/2023 02:52 
Results   Component Value Date/Time    MG 1.5 11/27/2024 03:44 PM    MG 1.9 04/11/2012 07:55 PM     Troponin:    Lab Results   Component Value Date/Time    TROPONINI 0.024 02/04/2023 04:51 AM       Radiology:  XR CHEST PORTABLE    Result Date: 12/8/2024  EXAMINATION: ONE XRAY VIEW OF THE CHEST 12/8/2024 8:35 pm COMPARISON: November 27 HISTORY: ORDERING SYSTEM PROVIDED HISTORY: chest pain TECHNOLOGIST PROVIDED HISTORY: Reason for exam:->chest pain What reading provider will be dictating this exam?->CRC     Lungs are normally expanded.  No infiltrates, consolidation or pleural effusions are seen.  Has normal size.  Mediastinum appears unremarkable. There is no perihilar vascular congestion. There is no pneumothorax. RECOMMENDATION: No acute cardiopulmonary process.        Echocardiogram 2/3/23:  Conclusions   Summary   Normal left ventricular systolic function, no regional wall motion   abnormalities, estimated ejection fraction of 60%.   Mild concentric left ventricular hypertrophy.   Pseudonormal filling pattern noted.   Normal right ventricular chamber size and function.   Mild aortic regurgitation is noted.   There is moderate tricuspid regurgitation with estimated RVSP of 50 mm Hg.   Right ventricular systolic pressure of 50 mm Hg consistent with moderate   pulmonary hypertension.   Signature   ----------------------------------------------------------------   Electronically signed by Tg CODY DO(Interpreting   physician) on 02/03/2023 02:52 PM    [facility] 2/3/23:  Angiographic Findings   Cardiac Arteries and Lesion Findings  LMCA: Big size vessel mild disease  LAD: Big size vessel mild disease  LCx: Proximal 70% stenosis, mid segment 80% stenosis    Lesion on Mid CX: 80% stenosis . Pre procedure FANG III flow was noted.  RCA: Proximal 99% in-stent restenosis    Lesion on Prox RCA: 99% stenosis reduced to 0%. Pre procedure FANG III    flow was noted. Post Procedure FANG III flow was present. The guidewire    
Ultrasound was used to study the right common femoral artery. The artery did not demonstrate any evidence of stenosis, or thrombus. Ultrasound images of the blood vessel are saved to PACS. The artery was accessed using a micropuncture set. A 5 Yoruba sheath was placed. Through the sheath, a pigtail catheter was advanced into the upper abdominal aorta, and angiogram of the abdominal aorta was performed. This angiogram demonstrated more appearing abdominal aorta with normal patent branches. A Sos Omni catheter was advanced into the celiac artery, and angiogram of the celiac artery was performed. This angiogram demonstrated a patent celiac artery and patent splenic and common hepatic, gastroduodenal artery, and proper hepatic artery with right and left patent arterial branches of the liver. A XP glide catheter was advanced into the common hepatic artery at the opening of the gastroduodenal artery, and angiogram of the gastroduodenal artery was performed. This angiogram demonstrated a slightly irregular gastroduodenal artery which may be secondary to the ulcer seen, though no active bleeding was seen. A small branch of of the gastroduodenal artery proximally was seen supplying a small portion of the duodenum as well. A Renegade microcatheter with a fathom wire were advanced through the gastroduodenal artery into the gastroepiploic artery and angiogram was performed again demonstrating slight irregularity of the gastroduodenal artery, although no active bleeding was seen. The gastroepiploic artery was normal. Terumo hydrogel embolization coils were deployed into the proximal gastroepiploic artery and back throughout the entire gastroduodenal artery. Repeat angiogram demonstrated total occlusion of the gastroduodenal artery. A small branch off of the proximal gastroduodenal artery which is likely a superior pancreaticoduodenal branch was seen. The microcatheter was redirected into this branch and angiogram was performed 
congestion. There is no pneumothorax. RECOMMENDATION: No acute cardiopulmonary process.        Echocardiogram 2/3/23:  Conclusions   Summary   Normal left ventricular systolic function, no regional wall motion   abnormalities, estimated ejection fraction of 60%.   Mild concentric left ventricular hypertrophy.   Pseudonormal filling pattern noted.   Normal right ventricular chamber size and function.   Mild aortic regurgitation is noted.   There is moderate tricuspid regurgitation with estimated RVSP of 50 mm Hg.   Right ventricular systolic pressure of 50 mm Hg consistent with moderate   pulmonary hypertension.   Signature   ----------------------------------------------------------------   Electronically signed by Tg CODY DO(Interpreting   physician) on 02/03/2023 02:52 PM    [facility] 2/3/23:  Angiographic Findings   Cardiac Arteries and Lesion Findings  LMCA: Big size vessel mild disease  LAD: Big size vessel mild disease  LCx: Proximal 70% stenosis, mid segment 80% stenosis    Lesion on Mid CX: 80% stenosis . Pre procedure FANG III flow was noted.  RCA: Proximal 99% in-stent restenosis    Lesion on Prox RCA: 99% stenosis reduced to 0%. Pre procedure FANG III    flow was noted. Post Procedure FANG III flow was present. The guidewire    cross was successful.Good runoff was present. The lesion was diagnosed as    Moderate Risk (B).    Devices used    - Abbott BMW Universal I with \"j\" .014 190 cm. Number of passes: 1.    - EUPHORA 2.0X12MM . 4 inflation(s) to a max pressure of: 10 sheng.    - 3.00 X 22 FRONTIER RENNY STENT. 1 inflation(s) to a max pressure of:    30 sheng.    - NC EUPHORA BALLOON 3.0MM X 15MM. 2 inflation(s) to a max pressure of:    20 sheng.   Coronary Tree   Dominance: Right   [facility]  LV function assessed as:Normal.      [facility] 3/9/23:  Angiographic Findings   Cardiac Arteries and Lesion Findings  LMCA: Big size vessel mild disease  LAD: Big size vessel mild disease  LCx: Medium size vessel proximal 80% 
sheng.    - 3.00 X 22 FRONTIER RENNY STENT. 1 inflation(s) to a max pressure of:    30 sheng.    - NC EUPHORA BALLOON 3.0MM X 15MM. 2 inflation(s) to a max pressure of:    20 sheng.   Coronary Tree   Dominance: Right   VA  LV function assessed as:Normal.      [facility] 3/9/23:  Angiographic Findings   Cardiac Arteries and Lesion Findings  LMCA: Big size vessel mild disease  LAD: Big size vessel mild disease  LCx: Medium size vessel proximal 80% stenosis mid 80% stenosis status post  successful PCI    Lesion on Prox CX: 80% stenosis . Pre procedure FANG III flow was noted.    Devices used    - EUPHORA BALLOON 2.0MM X 15MM. 2 inflation(s) to a max pressure of: 10    sheng.    Lesion on Mid CX: 80% stenosis . Pre procedure FANG III flow was noted.    Devices used    - Abbott BMW Universal I with \"j\" .014 190 cm. Number of passes: 2.    - EUPHORA BALLOON 2.0MM X 15MM. 1 inflation(s) to a max pressure of: 10    sheng.    - 2.25 X 34 FRONTIER RENNY STENT. 1 inflation(s) to a max pressure of:    35 sheng.  RCA: Not engaged known to be patent   Coronary Tree   Dominance: Right   [facility]  LV function assessed as:Normal.      EKG 12/8/24: SR 79, Q wave and T wave inversion lead III, QTc 442ms    Telemetry 12/9/24: SR 70s      Assessment:      New onset atrial fibrillation during this admission  Currently rate controlled in the 70s  Continue Lovenox full dose  Please monitor hemoglobin daily, if hemoglobin remains stable okay to switch to Eliquis currently hemoglobin 7.8  Continue Coreg 3.125 mg p.o. twice daily    Chest pain  Sounds atypical  Patient reports that it is reproducible and worse when taking deep breaths  However patient with multiple risk factors  Nuclear stress test 12/10/2024 no ischemia  TTE 12/9/2024 EF 55 to 60%     CAD status post PCI of the pRCA DESX1 with a 3.0 x 22 mm on 2/3/2023, RCA PCI 2020. 3/9/2023 PCI of proximal mid circumflex RED x1 (2.25 x 34 mm [place])   Continue aspirin and atorvastatin    From cardiology 
artery was   performed. This angiogram demonstrated a slightly irregular gastroduodenal   artery which may be secondary to the ulcer seen, though no active bleeding was   seen. A small branch of of the gastroduodenal artery proximally was seen   supplying a small portion of the duodenum as well.      A Renegade microcatheter with a fathom wire were advanced through the   gastroduodenal artery into the gastroepiploic artery and angiogram was performed   again demonstrating slight irregularity of the gastroduodenal artery, although   no active bleeding was seen. The gastroepiploic artery was normal. Terumo   hydrogel embolization coils were deployed into the proximal gastroepiploic   artery and back throughout the entire gastroduodenal artery. 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 A small branch off of   the proximal gastroduodenal artery which is likely a superior   pancreaticoduodenal branch was seen. The microcatheter was redirected into this   branch and angiogram was performed which demonstrated opacification of part of   the duodenum. Embolization with Terumo hydrogel coils was performed of this   artery. The catheter was pulled back and repeat angiogram demonstrated total   occlusion of the gastroduodenal artery and a superior pancreaticoduodenal   branch. The common and proper hepatic arteries are widely patent. The catheters   were removed.      Angio-Seal device was used for hemostasis at the access site. The patient   tolerated the procedure well without complications. Patient was transferred to   recovery area in normal and stable condition.                Electronically signed by Serafin CHILD CHEST PORTABLE   Final Result   Lungs are normally expanded.  No infiltrates, consolidation or pleural   effusions are seen.  Has normal size.  Mediastinum appears unremarkable.   There is no perihilar vascular congestion.      There is no pneumothorax.    
bleeding was   seen. A small branch of of the gastroduodenal artery proximally was seen   supplying a small portion of the duodenum as well.      A Renegade microcatheter with a fathom wire were advanced through the   gastroduodenal artery into the gastroepiploic artery and angiogram was performed   again demonstrating slight irregularity of the gastroduodenal artery, although   no active bleeding was seen. The gastroepiploic artery was normal. Terumo   hydrogel embolization coils were deployed into the proximal gastroepiploic   artery and back throughout the entire gastroduodenal artery. 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 A small branch off of   the proximal gastroduodenal artery which is likely a superior   pancreaticoduodenal branch was seen. The microcatheter was redirected into this   branch and angiogram was performed which demonstrated opacification of part of   the duodenum. Embolization with Terumo hydrogel coils was performed of this   artery. The catheter was pulled back and repeat angiogram demonstrated total   occlusion of the gastroduodenal artery and a superior pancreaticoduodenal   branch. The common and proper hepatic arteries are widely patent. The catheters   were removed.      Angio-Seal device was used for hemostasis at the access site. The patient   tolerated the procedure well without complications. Patient was transferred to   recovery area in normal and stable condition.                Electronically signed by Serafin CHILD CHEST PORTABLE   Final Result   Lungs are normally expanded.  No infiltrates, consolidation or pleural   effusions are seen.  Has normal size.  Mediastinum appears unremarkable.   There is no perihilar vascular congestion.      There is no pneumothorax.      RECOMMENDATION:   No acute cardiopulmonary process.           Assessment/Plan:    #GIB secondary to large peptic ulcer; multiple ulcers    - IV PPI; s/p IR coiling    
patent arterial branches of the liver.   A XP glide catheter was advanced into the common hepatic artery at the opening   of the gastroduodenal artery, and angiogram of the gastroduodenal artery was   performed. This angiogram demonstrated a slightly irregular gastroduodenal   artery which may be secondary to the ulcer seen, though no active bleeding was   seen. A small branch of of the gastroduodenal artery proximally was seen   supplying a small portion of the duodenum as well.      A Renegade microcatheter with a fathom wire were advanced through the   gastroduodenal artery into the gastroepiploic artery and angiogram was performed   again demonstrating slight irregularity of the gastroduodenal artery, although   no active bleeding was seen. The gastroepiploic artery was normal. Terumo   hydrogel embolization coils were deployed into the proximal gastroepiploic   artery and back throughout the entire gastroduodenal artery. 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 A small branch off of   the proximal gastroduodenal artery which is likely a superior   pancreaticoduodenal branch was seen. The microcatheter was redirected into this   branch and angiogram was performed which demonstrated opacification of part of   the duodenum. Embolization with Terumo hydrogel coils was performed of this   artery. The catheter was pulled back and repeat angiogram demonstrated total   occlusion of the gastroduodenal artery and a superior pancreaticoduodenal   branch. The common and proper hepatic arteries are widely patent. The catheters   were removed.      Angio-Seal device was used for hemostasis at the access site. The patient   tolerated the procedure well without complications. Patient was transferred to   recovery area in normal and stable condition.                Electronically signed by Serafin CHILD CHEST PORTABLE   Final Result   Lungs are normally expanded.  No infiltrates, 
point    #Acute exacerbation of COPD   - resolved; d/c prednisone    #CKD3    - renally dose meds    #A Fib    - in sinus now; cardiology is managing; holding lovenox for now    #Delirium    - resolved    Active Hospital Problems    Diagnosis Date Noted    Adjustment disorder [F43.20] 12/12/2024    Delirium [R41.0] 12/11/2024    Symptomatic anemia [D64.9] 12/10/2024    COPD exacerbation (HCC) [J44.1] 12/10/2024    Acute on chronic diastolic heart failure (HCC) [I50.33] 12/10/2024    Pulmonary hypertension (HCC) [I27.20] 12/10/2024    Dyspnea [R06.00] 12/08/2024      Additional work up or/and treatment plan may be added today or then after based on clinical progression. I am managing a portion of pt care. Some medical issues are handled by other specialists. Additional work up and treatment should be done in out pt setting by pt PCP and other out pt providers.     In addition to examining and evaluating pt, I spent additional time explaining care, normal and abnormal findings, and treatment plan. All of pt questions were answered. Counseling, diet and education were  provided. Case will be discussed with nursing staff when appropriate. Family will be updated if and when appropriate.      Diet: ADULT DIET; Clear Liquid    Code Status: Full Code    PT/OT Eval     Electronically signed by Romero Altman MD on 12/20/2024 at 3:00 PM

## 2024-12-31 NOTE — PLAN OF CARE
Problem: Chronic Conditions and Co-morbidities  Goal: Patient's chronic conditions and co-morbidity symptoms are monitored and maintained or improved  Outcome: Progressing     Problem: Discharge Planning  Goal: Discharge to home or other facility with appropriate resources  Outcome: Progressing  Flowsheets (Taken 12/30/2024 3374)  Discharge to home or other facility with appropriate resources:   Identify barriers to discharge with patient and caregiver   Refer to discharge planning if patient needs post-hospital services based on physician order or complex needs related to functional status, cognitive ability or social support system     Problem: Pain  Goal: Verbalizes/displays adequate comfort level or baseline comfort level  Outcome: Progressing     Problem: Safety - Adult  Goal: Free from fall injury  Outcome: Progressing

## 2024-12-31 NOTE — PROGRESS NOTES
Comprehensive Nutrition Assessment    Type and Reason for Visit:  Initial, Consult    Nutrition Recommendations/Plan:   Continue current diet- Easy to chew 5 Carb/meal,   PO >75% Meals  Continue Diabetic ONS TID as ordered (glucerna)     Malnutrition Assessment:  Malnutrition Status:  Moderate malnutrition (12/31/24 0844)    Context:  Acute Illness     Findings of the 6 clinical characteristics of malnutrition:  Energy Intake:  75% or less of estimated energy requirements for 7 or more days  Weight Loss:  Greater than 5% over 1 month     Body Fat Loss:  Unable to assess     Muscle Mass Loss:  Unable to assess    Fluid Accumulation:  No fluid accumulation     Strength:  Not Performed    Nutrition Assessment:    New admit to sub acute unit. Currently Covid +. Diet is appropriate with diabetic oral nutritional supplement in place TID for sub optimal PO intake during acute hospitalization. Goal to optimize PO intake. Monitor acceptance. Triggers for moderal malnutrition. Will follow.    Nutrition Related Findings:    Admit to sub acute unit related to dyspnea. COVID +, PMH includes DM2, COPD, CHF, HLD, HTN, MI, Menieres disease, CAD. Labs reviewed 12/31 elevated BUN/creatinine, low GFR. elevated glucose. Diet is Carb Controlled 5 carb/meal for DM management, easy to chew texture. acute hospitalization intake ~ 50-75% Meals. Skin- impaired integrity noted- nursing notes moderate redness and lesion to buttocks. meds reviewed. Last BM noted 12/31/24. Wound Type: Open Wounds (area to buttocks (lesion))       Current Nutrition Intake & Therapies:    Average Meal Intake: 51-75%  Average Supplements Intake: Unable to assess  ADULT ORAL NUTRITION SUPPLEMENT; Breakfast, Lunch, Dinner; Diabetic Oral Supplement  ADULT DIET; Easy to Chew; 5 carb choices (75 gm/meal)    Anthropometric Measures:  Height: 165.1 cm (5' 5\")  Ideal Body Weight (IBW): 136 lbs (62 kg)    Admission Body Weight: 61.3 kg (135 lb 3 oz) (12/30/24)  Current

## 2024-12-31 NOTE — FLOWSHEET NOTE
Pt girlfriend and granddaughter called about getting patient's belongings, I spoke to pt to make sure he was ok with them picking up his belongings . Pt states it is ok for his girlfriends to get his wallet and keys from security.   Security brought belongings to the patient and pt girlfriend got them from the patient

## 2024-12-31 NOTE — PROGRESS NOTES
Enoxaparin Treatment Dose Evaluation      Recent Labs     12/28/24  0503 12/29/24  0455 12/30/24  0541   BUN 42* 35* 30*   CREATININE 2.63* 2.19* 2.27*    145 176   HGB 8.8* 8.0* 8.8*   HCT 25.6* 23.8* 26.3*     ADMITTING DX OR CHIEF COMPLAINT? SNF  WARFARIN? DOAC'S? Therapeutic UFH? no  ANY APPARENT BLEEDING? no  SCHEDULED SURGERY? no     Current order:  Enoxaparin 1mg/kg SUBQ BID       Height: 165.1 cm (5' 5\"), Weight - Scale: 61.3 kg (135 lb 3.2 oz)  Estimated Creatinine Clearance: 24 mL/min (A) (based on SCr of 2.27 mg/dL (H)).    Plan:  Change to enoxaparin 1 mg/kg subcut daily     Patient Weight (kg)     50.9 and below .9 151-189.9 190 or greater   Estimated   CrCl  (ml/min) 30 or greater []   Recommend [facility] standardized UFH infusion, apixaban or rivaroxaban   []   1 mg/kg SUBQ BID [] 1 mg/kg SUBQ BID if anti-Xa levels are feasible per institution. Alternatively, recommend switch to [facility] standardized UFH infusion []  Recommend switch to [facility] standardized UFH infusion    15-29.9 []  Recommend [facility] standardized UFH infusion or apixaban [x]  1 mg/kg SUBQ daily [] Recommend switch to [facility] standardized UFH infusion     Less than 15 or dialysis []  Recommend switch to [facility] standardized UFH infusion       Girma Fontaine, [email or web address].  12/30/2024  10:51 PM

## 2024-12-31 NOTE — PROGRESS NOTES
Spoke to Jorge, pts next review date is Friday 1-3-25. Fax clinical info to [phone]. Also discussed with pt the importance of participating with physical and occupational therapy to progress towards gaining strength to return to his home.

## 2024-12-31 NOTE — PROGRESS NOTES
Therapy manager talked with patient 5 minutes to encourage subacute rehab participation. Pt deferred OOB with both PT and OT evaluations this AM. Educated pt if not participating , insurance will not cover and pt would need to have 24 hour family care or choose a facility that could provide 24 hour care. Educated pt that he would have PT tomorrow on the New Year's Day Holiday and needs to get OOB and walk if wanting to stay in rehab and get stronger and mobile. At end of talk, pt gave a thumbs up , but never talked to PT. Pt often closing eyes to not participate with subacute rehab education and encouragement.     Cat Cao, PT [facility] Therapy Manager.  [phone]= 5 minutes no charge.

## 2024-12-31 NOTE — PROGRESS NOTES
unit  Bathroom Toilet: Standard  Bathroom Equipment: None  Bathroom Accessibility: Not accessible  Home Equipment: Cane - Quad  Has the patient had two or more falls in the past year or any fall with injury in the past year?: No  Prior Level of Assist for ADLs: Independent  Prior Level of Assist for Homemaking: Independent  Homemaking Responsibilities: Yes (Reports he does most of the errands)  Prior Level of Assist for Ambulation: Independent household ambulator, with or without device  Prior Level of Assist for Transfers: Independent  Active : Yes  Mode of Transportation: Car  Occupation: Retired  Type of Occupation:   Additional Comments: Pt does not use O2 at home    Objective  Temp: 98.2 °F (36.8 °C)  Pulse: 77  Heart Rate Source: Monitor  Respirations: 20  SpO2: 95 %  O2 Device: Nasal cannula  BP: (!) 151/84  MAP (Calculated): 106  BP Location: Left upper arm  BP Method: Automatic  Patient Position: Supine  Comment: 2L 97%  Vision  Vision: Within Functional Limits  Hearing  Hearing: Within functional limits       Observation/Palpation  Posture: Fair  Observation: NC  Safety Devices  Type of Devices: Bed alarm in place;Call light within reach;Left in bed;All fall risk precautions in place  Bed Mobility Training  Bed Mobility Training: Yes  Overall Level of Assistance: Moderate assistance;Maximum assistance;Additional time  Interventions: Safety awareness training;Verbal cues  Supine to Sit: Moderate assistance;Maximum assistance  Sit to Supine: Maximum assistance  Scooting: Maximum assistance  Balance  Sitting: Impaired  Standing: Impaired;With support (FWW Mod A)  Transfer Training  Transfer Training: Yes  Overall Level of Assistance: Moderate assistance  Interventions: Safety awareness training;Verbal cues  Sit to Stand: Moderate assistance  Stand to Sit: Moderate assistance     AROM: Generally decreased, functional  PROM: Generally decreased, functional  Strength: Generally decreased,  Concurrent Group Co-treatment   Time In 0915         Time Out 0950         Minutes 35                 Melania Aguilar, [id number]

## 2024-12-31 NOTE — PROGRESS NOTES
current situation. Plan to continue to try to work with pt as he reports that he cares for his wife and he has a flight of stairs to the restroom and bedroom which is on the 2nd floor.  Treatment Diagnosis: decreased strength, ROM, balance, SOB, transfer ability, functional mobility, amb ability.  Therapy Prognosis: Good  Decision Making: Medium Complexity  Requires PT Follow-Up: Yes    Plan  Physical Therapy Plan  General Plan: 5-7 times per week (Qd to BID)  Current Treatment Recommendations: Strengthening, ROM, Balance training, Functional mobility training, Transfer training, ADL/Self-care training, Gait training, Stair training, Neuromuscular re-education, Manual, Pain management, Return to work related activity, Safety education & training, Patient/Caregiver education & training, Home exercise program, Modalities, Therapeutic activities    Restrictions  Restrictions/Precautions  Restrictions/Precautions: Fall Risk  Activity Level: Up with Assist  Required Braces or Orthoses?: No     Subjective  General  Chart Reviewed: Yes  Patient assessed for rehabilitation services?: Yes  Family/Caregiver Present: No  Referring Practitioner: Rodrigo  Referral Date : 12/31/24  Diagnosis: SOB and dyspnea  Subjective  Subjective: Pt reports having 8/10 mouth pain and found to have ulcer in his stomach per pt         Social/Functional History  Social/Functional History  Lives With: Spouse  Type of Home: Apartment  Home Layout: Two level, Bed/Bath upstairs (Bedroom and bathrooms on the 2nd floor)  Home Access: Stairs to enter with rails  Entrance Stairs - Number of Steps: 12  Entrance Stairs - Rails: Both  Bathroom Shower/Tub: Tub/Shower unit  Bathroom Toilet: Standard  Bathroom Equipment: None  Bathroom Accessibility: Not accessible  Home Equipment: Cane - Quad  Has the patient had two or more falls in the past year or any fall with injury in the past year?: No  Prior Level of Assist for ADLs: Independent  Prior Level of Assist  for Homemaking: Independent  Homemaking Responsibilities: Yes (Reports he does most of the errands)  Prior Level of Assist for Ambulation: Independent household ambulator, with or without device  Prior Level of Assist for Transfers: Independent  Active : Yes  Mode of Transportation: Car  Occupation: Retired  Type of Occupation:   Additional Comments: Pt does not use O2 at home  Cognition   Orientation  Orientation Level: Oriented X4        Bed mobility  Bed Mobility Comments: Refused to get out of bed wiht multiple request per PT  Transfers  Comment: Refused to do transfers after multiple request per PT  Ambulation  Comments: Refused to get out of bed and walk in room with PT after multiple request per PT           Goals  Short Term Goals  Time Frame for Short Term Goals: 1 week  Short Term Goal 1: Pt able to do bed mobility with Min A  Short Term Goal 2: Pt able to perform transfers with Min A  Short Term Goal 3: Pt able to ambulate with AD for 50 x 1 with CGA  Short Term Goal 4: Pt able to ambulate up/down 5 steps with CGA and safe technique  Short Term Goal 5: Pt able to tolerate 10 reps of B LE ther ex  Long Term Goals  Time Frame for Long Term Goals : 2-3 weeks RAINE  Long Term Goal 1: Bed mobility will be IND  Long Term Goal 2: Sit to stand independently  Long Term Goal 3: Pt will amb 125ft w/ LRAD mod indep  Long Term Goal 4: Pt will negotiate 12 steps with supervision  Long Term Goal 5: Pt indep with HEP to progress with his strength and mobility  Patient Goals   Patient Goals : Pt reports that he wants to go home                Therapy Time   Individual Concurrent Group Co-treatment   Time In  10:40am          Time Out  11:30am          Minutes  50 min                  Margarita Guan, PT

## 2024-12-31 NOTE — H&P
Hospital Medicine History & Physical      PCP: Keith Yu MD    Date of Admission: 12/30/2024    Date of Service: 12/31/24      Chief Complaint:  weakness/dyspnea/nausea       History Of Present Illness:  74 y.o. male who presented to [facility] after acute admission to [facility] 3 weeks ago. He has   history of CAD, COPD, former tobacco use (~50PY, quit ~2019), diabetes, hypertension initially presented on 12/8 with 1 week history of weakness and dyspnea.  He was found to have acute blood loss anemia secondary to high risk duodenal ulcer; coil embolization of gastroduodenal artery and small duodenal branch from GDA was performed by interventional radiology on 12/19.  During the hospital course, he was treated for COPD exacerbation however later his course was complicated by fevers and he was found to have COVID-19 infection with possible superimposed bacterial pneumonia, as well as small pulmonary embolism.  He completed a course of antibiotics and steroids and gradually improved.  He has been maintained on therapeutic Lovenox since 12/26 with no bleeding noted.   after completing his stay at [facility] he was transferred to [facility]       Past Medical History:          Diagnosis Date    AMI (acute myocardial infarction) ([facility]) 12/12/2020    CAD S/P percutaneous coronary angioplasty 2/7/2023    Cancer (HCC)     skin left neck    Chronic bronchitis (HCC)     COPD (chronic obstructive pulmonary disease) (HCC)     Diabetes mellitus (HCC)     Diverticulitis     Emphysema of lung (HCC)     Heart attack (HCC)     History of blood transfusion     Hyperlipidemia     Hypertension     Meniere's disease     NSTEMI (non-ST elevated myocardial infarction) ([facility]) 12/11/2020    Vertigo        Past Surgical History:          Procedure Laterality Date    CARDIAC SURGERY      stent    COLONOSCOPY N/A 12/17/2024    COLONOSCOPY DIAGNOSTIC performed by Shani Benjamin MD at [facility]    IR EMBOLIZATION HEMORRHAGE

## 2025-01-01 LAB
GLUCOSE BLD-MCNC: 140 MG/DL (ref 70–99)
GLUCOSE BLD-MCNC: 146 MG/DL (ref 70–99)
GLUCOSE BLD-MCNC: 156 MG/DL (ref 70–99)
GLUCOSE BLD-MCNC: 166 MG/DL (ref 70–99)
PERFORMED ON: ABNORMAL

## 2025-01-01 PROCEDURE — 6370000000 HC RX 637 (ALT 250 FOR IP): Performed by: INTERNAL MEDICINE

## 2025-01-01 PROCEDURE — 97116 GAIT TRAINING THERAPY: CPT

## 2025-01-01 PROCEDURE — 94761 N-INVAS EAR/PLS OXIMETRY MLT: CPT

## 2025-01-01 PROCEDURE — 94760 N-INVAS EAR/PLS OXIMETRY 1: CPT

## 2025-01-01 PROCEDURE — 1200000002 HC SEMI PRIVATE SWING BED

## 2025-01-01 PROCEDURE — 2700000000 HC OXYGEN THERAPY PER DAY

## 2025-01-01 PROCEDURE — 94640 AIRWAY INHALATION TREATMENT: CPT

## 2025-01-01 PROCEDURE — 6360000002 HC RX W HCPCS: Performed by: INTERNAL MEDICINE

## 2025-01-01 PROCEDURE — 97110 THERAPEUTIC EXERCISES: CPT

## 2025-01-01 RX ORDER — POLYETHYLENE GLYCOL 3350 17 G/17G
17 POWDER, FOR SOLUTION ORAL 2 TIMES DAILY
Status: DISCONTINUED | OUTPATIENT
Start: 2025-01-01 | End: 2025-01-04 | Stop reason: HOSPADM

## 2025-01-01 RX ORDER — METHYLPREDNISOLONE SODIUM SUCCINATE 125 MG/2ML
60 INJECTION INTRAMUSCULAR; INTRAVENOUS ONCE
Status: COMPLETED | OUTPATIENT
Start: 2025-01-02 | End: 2025-01-02

## 2025-01-01 RX ORDER — FUROSEMIDE 10 MG/ML
30 INJECTION INTRAMUSCULAR; INTRAVENOUS ONCE
Status: COMPLETED | OUTPATIENT
Start: 2025-01-02 | End: 2025-01-02

## 2025-01-01 RX ORDER — CARVEDILOL 12.5 MG/1
12.5 TABLET ORAL 2 TIMES DAILY WITH MEALS
Status: DISCONTINUED | OUTPATIENT
Start: 2025-01-01 | End: 2025-01-04 | Stop reason: HOSPADM

## 2025-01-01 RX ADMIN — GUAIFENESIN 600 MG: 600 TABLET, EXTENDED RELEASE ORAL at 20:50

## 2025-01-01 RX ADMIN — ENOXAPARIN SODIUM 60 MG: 100 INJECTION SUBCUTANEOUS at 08:16

## 2025-01-01 RX ADMIN — IPRATROPIUM BROMIDE AND ALBUTEROL SULFATE 1 DOSE: 2.5; .5 SOLUTION RESPIRATORY (INHALATION) at 05:57

## 2025-01-01 RX ADMIN — IPRATROPIUM BROMIDE AND ALBUTEROL SULFATE 1 DOSE: 2.5; .5 SOLUTION RESPIRATORY (INHALATION) at 23:08

## 2025-01-01 RX ADMIN — ASPIRIN 81 MG: 81 TABLET, CHEWABLE ORAL at 08:16

## 2025-01-01 RX ADMIN — ATORVASTATIN CALCIUM 40 MG: 40 TABLET, FILM COATED ORAL at 20:49

## 2025-01-01 RX ADMIN — CARVEDILOL 3.12 MG: 3.12 TABLET, FILM COATED ORAL at 08:16

## 2025-01-01 RX ADMIN — QUETIAPINE FUMARATE 50 MG: 25 TABLET ORAL at 20:50

## 2025-01-01 RX ADMIN — BENZOCAINE 6 MG-MENTHOL 10 MG LOZENGES 1 LOZENGE: at 10:19

## 2025-01-01 RX ADMIN — PAROXETINE HYDROCHLORIDE 40 MG: 20 TABLET, FILM COATED ORAL at 08:16

## 2025-01-01 RX ADMIN — GUAIFENESIN SYRUP AND DEXTROMETHORPHAN 5 ML: 100; 10 SYRUP ORAL at 06:38

## 2025-01-01 RX ADMIN — DOCUSATE SODIUM 100 MG: 100 CAPSULE, LIQUID FILLED ORAL at 20:49

## 2025-01-01 RX ADMIN — PANTOPRAZOLE SODIUM 40 MG: 40 TABLET, DELAYED RELEASE ORAL at 15:44

## 2025-01-01 RX ADMIN — FOLIC ACID 1 MG: 1 TABLET ORAL at 08:16

## 2025-01-01 RX ADMIN — IPRATROPIUM BROMIDE AND ALBUTEROL SULFATE 1 DOSE: 2.5; .5 SOLUTION RESPIRATORY (INHALATION) at 18:20

## 2025-01-01 RX ADMIN — GUAIFENESIN SYRUP AND DEXTROMETHORPHAN 5 ML: 100; 10 SYRUP ORAL at 01:13

## 2025-01-01 RX ADMIN — CARVEDILOL 12.5 MG: 12.5 TABLET, FILM COATED ORAL at 15:44

## 2025-01-01 RX ADMIN — LIDOCAINE HYDROCHLORIDE 15 ML: 20 SOLUTION ORAL at 20:49

## 2025-01-01 RX ADMIN — DOCUSATE SODIUM 100 MG: 100 CAPSULE, LIQUID FILLED ORAL at 08:16

## 2025-01-01 RX ADMIN — POLYETHYLENE GLYCOL 3350 17 G: 17 POWDER, FOR SOLUTION ORAL at 10:19

## 2025-01-01 RX ADMIN — PANTOPRAZOLE SODIUM 40 MG: 40 TABLET, DELAYED RELEASE ORAL at 06:36

## 2025-01-01 RX ADMIN — LIDOCAINE HYDROCHLORIDE 15 ML: 20 SOLUTION ORAL at 08:16

## 2025-01-01 RX ADMIN — FERROUS GLUCONATE 324 MG: 324 TABLET ORAL at 08:16

## 2025-01-01 RX ADMIN — IPRATROPIUM BROMIDE AND ALBUTEROL SULFATE 1 DOSE: 2.5; .5 SOLUTION RESPIRATORY (INHALATION) at 16:01

## 2025-01-01 RX ADMIN — GUAIFENESIN 600 MG: 600 TABLET, EXTENDED RELEASE ORAL at 08:16

## 2025-01-01 ASSESSMENT — PAIN SCALES - GENERAL
PAINLEVEL_OUTOF10: 6
PAINLEVEL_OUTOF10: 5

## 2025-01-01 ASSESSMENT — PAIN DESCRIPTION - LOCATION
LOCATION: THROAT

## 2025-01-01 NOTE — PROGRESS NOTES
Hospitalist Progress Note      PCP: Keith Yu MD    Date of Admission: 12/30/2024    Chief Complaint: weakness/back pain/constipation    Subjective: patient in chair, awake/alert     Medications:  Reviewed    Infusion Medications    sodium chloride      dextrose       Scheduled Medications    carvedilol  12.5 mg Oral BID WC    polyethylene glycol  17 g Oral BID    folic acid  1 mg Oral Daily    lidocaine viscous hcl  15 mL Mouth/Throat 4x daily    PARoxetine  40 mg Oral Daily    ferrous gluconate  324 mg Oral Every Other Day    enoxaparin  1 mg/kg SubCUTAneous Daily    pantoprazole  40 mg Oral BID AC    ipratropium 0.5 mg-albuterol 2.5 mg  1 Dose Inhalation TID RT    aspirin  81 mg Oral Daily    atorvastatin  40 mg Oral Nightly    docusate sodium  100 mg Oral BID    guaiFENesin  600 mg Oral BID    QUEtiapine  50 mg Oral Nightly     PRN Meds: sodium chloride flush, sodium chloride, glucose, dextrose bolus **OR** dextrose bolus, glucagon (rDNA), dextrose, ipratropium 0.5 mg-albuterol 2.5 mg, Benzocaine-Menthol, calcium carbonate, prochlorperazine, guaiFENesin-dextromethorphan, ondansetron **OR** ondansetron, polyethylene glycol, acetaminophen **OR** acetaminophen      Intake/Output Summary (Last 24 hours) at 1/1/2025 0915  Last data filed at 1/1/2025 0346  Gross per 24 hour   Intake 420 ml   Output 250 ml   Net 170 ml       Exam:    BP (!) 150/56   Pulse 81   Temp 98.1 °F (36.7 °C) (Oral)   Resp 20   Ht 1.651 m (5' 5\")   Wt 61.3 kg (135 lb 3.2 oz)   SpO2 93%   BMI 22.50 kg/m²     General appearance: No apparent distress, appears stated age and cooperative.  HEENT: Pupils equal, round, and reactive to light. Conjunctivae/corneas clear.  Neck: Supple, with full range of motion. No jugular venous distention. Trachea midline.  Respiratory:  Normal respiratory effort. Clear to auscultation, bilaterally without Rales/Wheezes/Rhonchi.  Cardiovascular: Regular rate and rhythm with normal S1/S2 without  murmurs, rubs or gallops.  Abdomen: Soft, non-tender, non-distended with normal bowel sounds.  Musculoskeletal: No clubbing, cyanosis or edema bilaterally.  Full range of motion without deformity.  Skin: Skin color, texture, turgor normal.  No rashes or lesions.  Neurologic:  Neurovascularly intact without any focal sensory/motor deficits. Cranial nerves: II-XII intact, grossly non-focal.  Psychiatric: Alert and oriented, thought content appropriate, normal insight  Capillary Refill: Brisk,< 3 seconds   Peripheral Pulses: +2 palpable, equal bilaterally       Labs:   Recent Labs     12/30/24  0541 12/31/24  0540   WBC 7.0 5.3   HGB 8.8* 7.9*   HCT 26.3* 24.0*    162*     Recent Labs     12/30/24  0541 12/31/24  0540    135   K 4.5 4.5    106   CO2 16* 17*   BUN 30* 29*   CREATININE 2.27* 2.06*   CALCIUM 7.4* 8.2*   PHOS 3.0  --      No results for input(s): \"AST\", \"ALT\", \"BILIDIR\", \"BILITOT\", \"ALKPHOS\" in the last 72 hours.  No results for input(s): \"INR\" in the last 72 hours.  No results for input(s): \"CKTOTAL\", \"TROPONINI\" in the last 72 hours.    Urinalysis:      Lab Results   Component Value Date/Time    NITRU Negative 12/22/2024 06:15 PM    WBCUA 0-2 12/22/2024 06:15 PM    BACTERIA Negative 12/22/2024 06:15 PM    RBCUA 3-5 12/22/2024 06:15 PM    BLOODU Negative 12/22/2024 06:15 PM    GLUCOSEU >=1000 12/22/2024 06:15 PM    GLUCOSEU NEG 04/11/2012 06:43 PM       Radiology:  No orders to display           Assessment/Plan:    Active Hospital Problems    Diagnosis Date Noted    Acute respiratory failure [J96.00] 12/31/2024         DVT Prophylaxis: lovenox BID  Diet: ADULT ORAL NUTRITION SUPPLEMENT; Breakfast, Lunch, Dinner; Diabetic Oral Supplement  ADULT DIET; Easy to Chew; 5 carb choices (75 gm/meal)  Code Status: Full Code    PT/OT Eval Status: done    Dispo - COPD/COVID pneumonia- treated, off atbs/steroids, continue O2  GI bleeding/anemia- post transfusion, post EGD and coiling procedure,

## 2025-01-01 NOTE — PROGRESS NOTES
Spiritual Health History and Assessment/Progress Note  [facility]    Initial Encounter,  ,  ,      Name: Isaak Mitchell MRN: [id number]    Age: 74 y.o.     Sex: male   Language: English   Hoah[place]ism: Anglican   Acute respiratory failure     Date: 1/1/2025            Total Time Calculated: 10 min              Spiritual Assessment began in [facility] SURG UNIT        Referral/Consult From: Rounding   Encounter Overview/Reason: Initial Encounter  Service Provided For: Patient  Met with patient. Patient could not talk much due to cough.    Gem, Belief, Meaning:   Patient Other: patient unable to talk much due to cough  Family/Friends No family/friends present      Importance and Influence:  Patient Other: patientunable to talk much due to cough  Family/Friends No family/friends present    Community:  Patient Other: children listed in the chart  Family/Friends No family/friends present    Assessment and Plan of Care:     Patient Interventions include: Other: patient unable to talk much due to cough  Family/Friends Interventions include: No family/friends present    Patient Plan of Care: Spiritual Care available upon further referral  Family/Friends Plan of Care: No family/friends present    Electronically signed by Max Knightlain Intern on 1/1/2025 at 10:39 AM

## 2025-01-01 NOTE — CARE COORDINATION
Patient giving permission for verbal medical updates to me given to Emerita Asif, girlfriend.    yes

## 2025-01-01 NOTE — PLAN OF CARE
Problem: Chronic Conditions and Co-morbidities  Goal: Patient's chronic conditions and co-morbidity symptoms are monitored and maintained or improved  1/1/2025 0259 by Leobardo Post RN  Outcome: Progressing  1/1/2025 0258 by Leobardo Post RN  Outcome: Progressing     Problem: Discharge Planning  Goal: Discharge to home or other facility with appropriate resources  1/1/2025 0259 by Leobardo Post RN  Outcome: Progressing  1/1/2025 0258 by Leobardo Post RN  Outcome: Progressing     Problem: Pain  Goal: Verbalizes/displays adequate comfort level or baseline comfort level  1/1/2025 0259 by Leobardo Post RN  Outcome: Progressing  1/1/2025 0258 by Leobardo oPst RN  Outcome: Progressing     Problem: Safety - Adult  Goal: Free from fall injury  1/1/2025 0259 by Leobardo Post RN  Outcome: Progressing  1/1/2025 0258 by Leobardo Post RN  Outcome: Progressing     Problem: Nutrition Deficit:  Goal: Optimize nutritional status  1/1/2025 0259 by Leobardo Post RN  Outcome: Progressing  1/1/2025 0258 by Leobardo Post RN  Outcome: Progressing

## 2025-01-01 NOTE — PROGRESS NOTES
Physical Therapy  Facility/Department: [facility] MED SURG UNIT  Daily Treatment Note  NAME: Isaak Mitchell  : 1950  MRN: [id number]    Date of Service: 2025    Discharge Recommendations:  Continue to assess pending progress, Home with assist PRN        Patient Diagnosis(es):     Assessment  Assessment: Patient able to decrease level of assistance today although is somewhat resistance to cues (likes to hold front of walker rather than sidebars).  Activity Tolerance: Patient limited by fatigue;Patient limited by endurance    Plan  Physical Therapy Plan  General Plan: 5-7 times per week  Current Treatment Recommendations: Strengthening;ROM;Balance training;Functional mobility training;Transfer training;ADL/Self-care training;Gait training;Stair training;Neuromuscular re-education;Manual;Pain management;Return to work related activity;Safety education & training;Patient/Caregiver education & training;Home exercise program;Modalities;Therapeutic activities    Restrictions  Restrictions/Precautions  Restrictions/Precautions: Fall Risk  Activity Level: Up with Assist  Required Braces or Orthoses?: No     Subjective        Objective  Vitals     Bed Mobility Training  Bed Mobility Training: Yes  Overall Level of Assistance: Minimum assistance  Sit to Supine: Minimum assistance  Balance  Sitting: Impaired  Sitting - Static: Good (unsupported)  Sitting - Dynamic: Fair (occasional)  Standing: Impaired;With support  Standing - Static: Constant support  Transfer Training  Transfer Training: Yes  Overall Level of Assistance: Minimum assistance  Interventions: Safety awareness training;Verbal cues  Sit to Stand: Contact-guard assistance  Gait Training  Gait Training: Yes  Right Side Weight Bearing: As tolerated  Left Side Weight Bearing: As tolerated  Gait  Gait Training: Yes  Left Side Weight Bearing: As tolerated  Right Side Weight Bearing: As tolerated  Overall Level of Assistance: Contact-guard assistance;Minimum  assistance  Distance (ft): 16 Feet  Assistive Device: Walker, rolling     PT Exercises  A/AROM Exercises: SLR, hip extension x 10  Breathing Techniques: Inhale hold exhale hold repeat x 5            Goals  Short Term Goals  Time Frame for Short Term Goals: 1 week  Short Term Goal 1: Pt able to do bed mobility with Min A  Short Term Goal 2: Pt able to perform transfers with Min A  Short Term Goal 3: Pt able to ambulate with AD for 50 x 1 with CGA  Short Term Goal 4: Pt able to ambulate up/down 5 steps with CGA and safe technique  Short Term Goal 5: Pt able to tolerate 10 reps of B LE ther ex  Long Term Goals  Time Frame for Long Term Goals : 2-3 weeks RAINE  Long Term Goal 1: Bed mobility will be IND  Long Term Goal 2: Sit to stand independently  Long Term Goal 3: Pt will amb 125ft w/ LRAD mod indep  Long Term Goal 4: Pt will negotiate 12 steps with supervision  Long Term Goal 5: Pt indep with HEP to progress with his strength and mobility  Patient Goals   Patient Goals : Pt reports that he wants to go home    Education  Patient Education  Education Given To: Patient  Education Provided: Role of Therapy;Plan of Care;Mobility Training  Education Provided Comments: Breathing, movement  Education Method: Teach Back  Barriers to Learning: None  Education Outcome: Verbalized understanding;Demonstrated understanding    AM-PAC - Mobility              Therapy Time   Individual Concurrent Group Co-treatment   Time In 0945         Time Out 1015         Minutes 30                 Romel Manuel PTA

## 2025-01-02 ENCOUNTER — APPOINTMENT (OUTPATIENT)
Dept: GENERAL RADIOLOGY | Age: 75
End: 2025-01-02
Attending: INTERNAL MEDICINE
Payer: MEDICARE

## 2025-01-02 LAB
ANION GAP SERPL CALCULATED.3IONS-SCNC: 11 MEQ/L (ref 9–15)
BASOPHILS # BLD: 0 K/UL (ref 0–0.1)
BASOPHILS NFR BLD: 0 % (ref 0.2–1.2)
BUN SERPL-MCNC: 25 MG/DL (ref 8–23)
CALCIUM SERPL-MCNC: 7.7 MG/DL (ref 8.5–9.9)
CHLORIDE SERPL-SCNC: 100 MEQ/L (ref 95–107)
CO2 SERPL-SCNC: 15 MEQ/L (ref 20–31)
CREAT SERPL-MCNC: 2.06 MG/DL (ref 0.7–1.2)
EOSINOPHIL # BLD: 0 K/UL (ref 0–0.5)
EOSINOPHIL NFR BLD: 0.4 % (ref 0.8–7)
ERYTHROCYTE [DISTWIDTH] IN BLOOD BY AUTOMATED COUNT: 14 % (ref 11.6–14.4)
GLUCOSE BLD-MCNC: 128 MG/DL (ref 70–99)
GLUCOSE BLD-MCNC: 262 MG/DL (ref 70–99)
GLUCOSE BLD-MCNC: 272 MG/DL (ref 70–99)
GLUCOSE BLD-MCNC: 275 MG/DL (ref 70–99)
GLUCOSE BLD-MCNC: 361 MG/DL (ref 70–99)
GLUCOSE SERPL-MCNC: 251 MG/DL (ref 70–99)
HCT VFR BLD AUTO: 21.5 % (ref 42–52)
HGB BLD-MCNC: 7.3 G/DL (ref 13.7–17.5)
IMM GRANULOCYTES # BLD: 0.1 K/UL
IMM GRANULOCYTES NFR BLD: 1.3 %
LYMPHOCYTES # BLD: 0.3 K/UL (ref 1.3–3.6)
LYMPHOCYTES NFR BLD: 6 %
MCH RBC QN AUTO: 29.7 PG (ref 25.7–32.2)
MCHC RBC AUTO-ENTMCNC: 34 % (ref 32.3–36.5)
MCV RBC AUTO: 87.4 FL (ref 79–92.2)
MONOCYTES # BLD: 0.2 K/UL (ref 0.3–0.8)
MONOCYTES NFR BLD: 2.8 % (ref 5.3–12.2)
NEUTROPHILS # BLD: 4.7 K/UL (ref 1.8–5.4)
NEUTS SEG NFR BLD: 89.5 % (ref 34–67.9)
PERFORMED ON: ABNORMAL
PLATELET # BLD AUTO: 202 K/UL (ref 163–337)
POTASSIUM SERPL-SCNC: 4.2 MEQ/L (ref 3.4–4.9)
RBC # BLD AUTO: 2.46 M/UL (ref 4.63–6.08)
SODIUM SERPL-SCNC: 126 MEQ/L (ref 135–144)
WBC # BLD AUTO: 5.3 K/UL (ref 4.2–9)

## 2025-01-02 PROCEDURE — 6370000000 HC RX 637 (ALT 250 FOR IP): Performed by: INTERNAL MEDICINE

## 2025-01-02 PROCEDURE — 94761 N-INVAS EAR/PLS OXIMETRY MLT: CPT

## 2025-01-02 PROCEDURE — 94640 AIRWAY INHALATION TREATMENT: CPT

## 2025-01-02 PROCEDURE — 97530 THERAPEUTIC ACTIVITIES: CPT

## 2025-01-02 PROCEDURE — 6360000002 HC RX W HCPCS: Performed by: INTERNAL MEDICINE

## 2025-01-02 PROCEDURE — 80048 BASIC METABOLIC PNL TOTAL CA: CPT

## 2025-01-02 PROCEDURE — 2700000000 HC OXYGEN THERAPY PER DAY

## 2025-01-02 PROCEDURE — 97110 THERAPEUTIC EXERCISES: CPT

## 2025-01-02 PROCEDURE — 2580000003 HC RX 258: Performed by: INTERNAL MEDICINE

## 2025-01-02 PROCEDURE — 71045 X-RAY EXAM CHEST 1 VIEW: CPT

## 2025-01-02 PROCEDURE — 85025 COMPLETE CBC W/AUTO DIFF WBC: CPT

## 2025-01-02 PROCEDURE — 1200000002 HC SEMI PRIVATE SWING BED

## 2025-01-02 PROCEDURE — 36415 COLL VENOUS BLD VENIPUNCTURE: CPT

## 2025-01-02 PROCEDURE — 94760 N-INVAS EAR/PLS OXIMETRY 1: CPT

## 2025-01-02 PROCEDURE — 97535 SELF CARE MNGMENT TRAINING: CPT

## 2025-01-02 RX ORDER — GLIPIZIDE 5 MG/1
5 TABLET ORAL
Status: DISCONTINUED | OUTPATIENT
Start: 2025-01-02 | End: 2025-01-04 | Stop reason: HOSPADM

## 2025-01-02 RX ORDER — INSULIN LISPRO 100 [IU]/ML
0-8 INJECTION, SOLUTION INTRAVENOUS; SUBCUTANEOUS
Status: DISCONTINUED | OUTPATIENT
Start: 2025-01-02 | End: 2025-01-04 | Stop reason: HOSPADM

## 2025-01-02 RX ADMIN — ENOXAPARIN SODIUM 60 MG: 100 INJECTION SUBCUTANEOUS at 08:10

## 2025-01-02 RX ADMIN — BENZOCAINE 6 MG-MENTHOL 10 MG LOZENGES 1 LOZENGE: at 16:03

## 2025-01-02 RX ADMIN — ASPIRIN 81 MG: 81 TABLET, CHEWABLE ORAL at 08:09

## 2025-01-02 RX ADMIN — ATORVASTATIN CALCIUM 40 MG: 40 TABLET, FILM COATED ORAL at 20:11

## 2025-01-02 RX ADMIN — CARVEDILOL 12.5 MG: 12.5 TABLET, FILM COATED ORAL at 16:49

## 2025-01-02 RX ADMIN — IPRATROPIUM BROMIDE AND ALBUTEROL SULFATE 1 DOSE: 2.5; .5 SOLUTION RESPIRATORY (INHALATION) at 18:18

## 2025-01-02 RX ADMIN — QUETIAPINE FUMARATE 50 MG: 25 TABLET ORAL at 20:11

## 2025-01-02 RX ADMIN — IPRATROPIUM BROMIDE AND ALBUTEROL SULFATE 1 DOSE: 2.5; .5 SOLUTION RESPIRATORY (INHALATION) at 11:06

## 2025-01-02 RX ADMIN — POLYETHYLENE GLYCOL 3350 17 G: 17 POWDER, FOR SOLUTION ORAL at 08:10

## 2025-01-02 RX ADMIN — DOCUSATE SODIUM 100 MG: 100 CAPSULE, LIQUID FILLED ORAL at 20:11

## 2025-01-02 RX ADMIN — PIPERACILLIN AND TAZOBACTAM 3375 MG: 3; .375 INJECTION, POWDER, LYOPHILIZED, FOR SOLUTION INTRAVENOUS at 09:20

## 2025-01-02 RX ADMIN — CARVEDILOL 12.5 MG: 12.5 TABLET, FILM COATED ORAL at 08:10

## 2025-01-02 RX ADMIN — INSULIN LISPRO 8 UNITS: 100 INJECTION, SOLUTION INTRAVENOUS; SUBCUTANEOUS at 11:54

## 2025-01-02 RX ADMIN — GUAIFENESIN 600 MG: 600 TABLET, EXTENDED RELEASE ORAL at 20:11

## 2025-01-02 RX ADMIN — PIPERACILLIN AND TAZOBACTAM 3375 MG: 3; .375 INJECTION, POWDER, LYOPHILIZED, FOR SOLUTION INTRAVENOUS at 15:55

## 2025-01-02 RX ADMIN — INSULIN LISPRO 4 UNITS: 100 INJECTION, SOLUTION INTRAVENOUS; SUBCUTANEOUS at 16:49

## 2025-01-02 RX ADMIN — METHYLPREDNISOLONE SODIUM SUCCINATE 60 MG: 125 INJECTION INTRAMUSCULAR; INTRAVENOUS at 00:18

## 2025-01-02 RX ADMIN — PANTOPRAZOLE SODIUM 40 MG: 40 TABLET, DELAYED RELEASE ORAL at 06:19

## 2025-01-02 RX ADMIN — PANTOPRAZOLE SODIUM 40 MG: 40 TABLET, DELAYED RELEASE ORAL at 15:56

## 2025-01-02 RX ADMIN — GLIPIZIDE 5 MG: 5 TABLET ORAL at 09:20

## 2025-01-02 RX ADMIN — FUROSEMIDE 30 MG: 10 INJECTION, SOLUTION INTRAMUSCULAR; INTRAVENOUS at 00:18

## 2025-01-02 RX ADMIN — IPRATROPIUM BROMIDE AND ALBUTEROL SULFATE 1 DOSE: 2.5; .5 SOLUTION RESPIRATORY (INHALATION) at 05:51

## 2025-01-02 RX ADMIN — LIDOCAINE HYDROCHLORIDE 15 ML: 20 SOLUTION ORAL at 08:12

## 2025-01-02 RX ADMIN — INSULIN LISPRO 4 UNITS: 100 INJECTION, SOLUTION INTRAVENOUS; SUBCUTANEOUS at 20:11

## 2025-01-02 RX ADMIN — PAROXETINE HYDROCHLORIDE 40 MG: 20 TABLET, FILM COATED ORAL at 08:10

## 2025-01-02 RX ADMIN — GUAIFENESIN 600 MG: 600 TABLET, EXTENDED RELEASE ORAL at 08:10

## 2025-01-02 RX ADMIN — DOCUSATE SODIUM 100 MG: 100 CAPSULE, LIQUID FILLED ORAL at 08:09

## 2025-01-02 RX ADMIN — FOLIC ACID 1 MG: 1 TABLET ORAL at 08:10

## 2025-01-02 NOTE — PROGRESS NOTES
Hospitalist Progress Note      PCP: Keith Yu MD    Date of Admission: 12/30/2024    Chief Complaint: dyspnea/SOB    Subjective: awake/alert, eating breakfast     Medications:  Reviewed    Infusion Medications    sodium chloride      dextrose       Scheduled Medications    insulin lispro  0-8 Units SubCUTAneous 4x Daily AC & HS    glipiZIDE  5 mg Oral QAM AC    piperacillin-tazobactam  3,375 mg IntraVENous Q8H    carvedilol  12.5 mg Oral BID WC    polyethylene glycol  17 g Oral BID    folic acid  1 mg Oral Daily    lidocaine viscous hcl  15 mL Mouth/Throat 4x daily    PARoxetine  40 mg Oral Daily    ferrous gluconate  324 mg Oral Every Other Day    enoxaparin  1 mg/kg SubCUTAneous Daily    pantoprazole  40 mg Oral BID AC    ipratropium 0.5 mg-albuterol 2.5 mg  1 Dose Inhalation TID RT    aspirin  81 mg Oral Daily    atorvastatin  40 mg Oral Nightly    docusate sodium  100 mg Oral BID    guaiFENesin  600 mg Oral BID    QUEtiapine  50 mg Oral Nightly     PRN Meds: nystatin, stomahesive in petrolatum, sodium chloride flush, sodium chloride, glucose, dextrose bolus **OR** dextrose bolus, glucagon (rDNA), dextrose, ipratropium 0.5 mg-albuterol 2.5 mg, Benzocaine-Menthol, calcium carbonate, prochlorperazine, guaiFENesin-dextromethorphan, ondansetron **OR** ondansetron, polyethylene glycol, acetaminophen **OR** acetaminophen    No intake or output data in the 24 hours ending 01/02/25 0835    Exam:    /60   Pulse 64   Temp 98.6 °F (37 °C) (Oral)   Resp 16   Ht 1.651 m (5' 5\")   Wt 61.3 kg (135 lb 3.2 oz)   SpO2 96%   BMI 22.50 kg/m²     General appearance: No apparent distress, appears stated age and cooperative.  HEENT: Pupils equal, round, and reactive to light. Conjunctivae/corneas clear.  Neck: Supple, with full range of motion. No jugular venous distention. Trachea midline.  Respiratory:  Normal respiratory effort. Clear to auscultation, bilaterally without

## 2025-01-02 NOTE — PROGRESS NOTES
Facility/Department: [facility] MED SURG UNIT  Daily Treatment Note  NAME: Isaak Mitchell  : 1950  MRN: [id number]    Date of Service: 2025    Discharge Recommendations:  Continue to assess pending progress, Home with assist PRN        Patient Diagnosis(es): Dyspnea, weak    Assessment: Pt lying in bed sleeping this AM, able to alert with cues-  assisted pt with donning NC, O2 running @ 4L, Sp02 85% on room air, increasing to 94% within 1 minute using O2. Pt mostly non verbal during session, easily agitiated, time spent on pt education in regards to benefits of PT, pt passively agreeable to sit up in recliner. Assist for management of O2 and IV lines, pt upset about chair alarm, difficulty calling out to significant other. Time spent assisting pt with making phone calls, no answer, pt agitated, assisted pt with gown change. Pt declined use of AD with transfers, deferred participation with further gait trials and LE exercises. Pt requested to return to bed at end of session, attempts made to encourage pt to sit up for a longer duration, benefits of being OOB in order to facilitate strength and activity tolerance- pt staring straight ahead \"I want to go back to bed\". Assisted pt back to bed, no adverse reaction noted.   Activity Tolerance: Patient limited by fatigue;Patient limited by endurance;Other (comment) (decreased motivaion, participation)    Plan  Physical Therapy Plan  General Plan: 5-7 times per week  Current Treatment Recommendations: Strengthening;ROM;Balance training;Functional mobility training;Transfer training;ADL/Self-care training;Gait training;Stair training;Neuromuscular re-education;Manual;Pain management;Return to work related activity;Safety education & training;Patient/Caregiver education & training;Home exercise program;Modalities;Therapeutic activities    Restrictions  Restrictions/Precautions  Restrictions/Precautions: Fall Risk  Activity Level: Up with Assist  Required Braces or  understanding    AM-PAC - Mobility              Therapy Time   Individual Concurrent Group Co-treatment   Time In  10:00 am         Time Out  10:50 am         Minutes  50                 Allegra Streeter, PTA

## 2025-01-02 NOTE — PLAN OF CARE
Problem: Chronic Conditions and Co-morbidities  Goal: Patient's chronic conditions and co-morbidity symptoms are monitored and maintained or improved  Outcome: Progressing     Problem: Discharge Planning  Goal: Discharge to home or other facility with appropriate resources  Outcome: Progressing     Problem: Pain  Goal: Verbalizes/displays adequate comfort level or baseline comfort level  Outcome: Progressing     Problem: Safety - Adult  Goal: Free from fall injury  Outcome: Progressing     Problem: Nutrition Deficit:  Goal: Optimize nutritional status  Outcome: Progressing     Problem: Neurosensory - Adult  Goal: Achieves stable or improved neurological status  Outcome: Progressing     Problem: Neurosensory - Adult  Goal: Achieves maximal functionality and self care  Outcome: Progressing     Problem: Respiratory - Adult  Goal: Achieves optimal ventilation and oxygenation  Outcome: Progressing     Problem: Musculoskeletal - Adult  Goal: Return mobility to safest level of function  Outcome: Progressing     Problem: Musculoskeletal - Adult  Goal: Return ADL status to a safe level of function  Outcome: Progressing     Problem: Skin/Tissue Integrity  Goal: Absence of new skin breakdown  Description: 1.  Monitor for areas of redness and/or skin breakdown  2.  Assess vascular access sites hourly  3.  Every 4-6 hours minimum:  Change oxygen saturation probe site  4.  Every 4-6 hours:  If on nasal continuous positive airway pressure, respiratory therapy assess nares and determine need for appliance change or resting period.  Outcome: Progressing

## 2025-01-02 NOTE — PROGRESS NOTES
Facility/Department: [facility] SUBACUTE UNIT  Daily Treatment Note  NAME: Isaak Mitchell  : 1950  MRN: [id number]    Date of Service: 2025    Discharge Recommendations:  Continue to assess pending progress, Subacute/Skilled Nursing Facility, 24 hour supervision or assist         Patient Diagnosis(es): Weakness, acute respiratory failure    Assessment   Assessment: Pt presents in bed, reports he doesn't really want to participate but will because \"you're good looking\", pt redirected back to therapy and able to tolerate increased standing trials and ther ex today. Pt limited by SOB, weakness, and decreased balance. Pt refused to sit in chair at end of session and was assisted back to bed with all needs. Pt is making slow progress towards goals.  Activity Tolerance: Patient limited by fatigue;Patient limited by endurance;Treatment limited secondary to decreased cognition  Discharge Recommendations: Continue to assess pending progress;Subacute/Skilled Nursing Facility;24 hour supervision or assist     Plan  Occupational Therapy Plan  Times Per Week: 4-7  Times Per Day: Once a day  Current Treatment Recommendations: Strengthening;ROM;Balance training;Functional mobility training;Endurance training;Pain management;Safety education & training;Patient/Caregiver education & training;Self-Care / ADL;Home management training;Equipment evaluation, education, & procurement;Positioning      Subjective  Subjective  Subjective: \"I've been walking a little bit more.\"  Pain: Buttocks 7/10  Orientation  Overall Orientation Status: Within Functional Limits  Orientation Level: Oriented X4  Pain: 7/10 buttocks  Cognition  Overall Cognitive Status: WFL  Cognition Comment: Alert, cooperative.       Objective  Vitals  O2 Device: Nasal cannula  Comment: 4L 97% at rest  Bed Mobility Training  Bed Mobility Training: Yes  Overall Level of Assistance: Stand-by assistance;Additional time (HOB elevated and pt used bed rails.)  Interventions:  needed    Goals  Short Term Goals  Time Frame for Short Term Goals: 7-14 days  Short Term Goal 1: Doff/johanna UB/LB clothing Min A  Short Term Goal 2: Bathe UB/LB Min A  Short Term Goal 3: Complete toileting Min A  Short Term Goal 4: Complete functional mobility and transfers Min A  Short Term Goal 5: Demo BUE HEP with mod vc  Long Term Goals  Time Frame for Long Term Goals : 14-21 days  Long Term Goal 1: Doff/johanna UB/LB clothing Mod I  Long Term Goal 2: Bathe UB/LB Mod I  Long Term Goal 3: Complete toileting Mod I  Long Term Goal 4: Complete functional mobility and transfers Mod I  Long Term Goal 5: Demo BUE HEP I  Patient Goals   Patient goals : \"I want to go home as soon as possible.\"        Therapy Time   Individual Concurrent Group Co-treatment   Time In 1405         Time Out 1445         Minutes 40                 Melania Aguilar, [id number]

## 2025-01-02 NOTE — PROGRESS NOTES
Pt c/o SOB O2 85% on 3L, pt stable. PRN duoneb given by respiratory, O2 sat to 86%. O2 increased to 4L and O2 sat went up to 95%. MD notified, orders given for solu-medrol, Lasix IV push and portable chest x-ray. Pt in bed, vitals stable compared to baseline, at this time.

## 2025-01-03 LAB
ABO + RH BLD: NORMAL
ABO/RH: NORMAL
ANTIBODY SCREEN: NORMAL
BLOOD BANK DISPENSE STATUS: NORMAL
BLOOD BANK PRODUCT CODE: NORMAL
BPU ID: NORMAL
DESCRIPTION BLOOD BANK: NORMAL
GLUCOSE BLD-MCNC: 170 MG/DL (ref 70–99)
GLUCOSE BLD-MCNC: 173 MG/DL (ref 70–99)
GLUCOSE BLD-MCNC: 218 MG/DL (ref 70–99)
GLUCOSE BLD-MCNC: 246 MG/DL (ref 70–99)
HCT VFR BLD AUTO: 20.8 % (ref 42–52)
HCT VFR BLD AUTO: 24 % (ref 42–52)
HGB BLD-MCNC: 7 G/DL (ref 13.7–17.5)
HGB BLD-MCNC: 8.2 G/DL (ref 13.7–17.5)
PERFORMED ON: ABNORMAL
PROCALCITONIN SERPL IA-MCNC: 0.53 NG/ML (ref 0–0.15)

## 2025-01-03 PROCEDURE — 97530 THERAPEUTIC ACTIVITIES: CPT

## 2025-01-03 PROCEDURE — 86850 RBC ANTIBODY SCREEN: CPT

## 2025-01-03 PROCEDURE — 2500000003 HC RX 250 WO HCPCS: Performed by: INTERNAL MEDICINE

## 2025-01-03 PROCEDURE — 86900 BLOOD TYPING SEROLOGIC ABO: CPT

## 2025-01-03 PROCEDURE — 2580000003 HC RX 258: Performed by: INTERNAL MEDICINE

## 2025-01-03 PROCEDURE — 94760 N-INVAS EAR/PLS OXIMETRY 1: CPT

## 2025-01-03 PROCEDURE — 85014 HEMATOCRIT: CPT

## 2025-01-03 PROCEDURE — P9016 RBC LEUKOCYTES REDUCED: HCPCS

## 2025-01-03 PROCEDURE — 86920 COMPATIBILITY TEST SPIN: CPT

## 2025-01-03 PROCEDURE — 1200000002 HC SEMI PRIVATE SWING BED

## 2025-01-03 PROCEDURE — 6360000002 HC RX W HCPCS: Performed by: INTERNAL MEDICINE

## 2025-01-03 PROCEDURE — 36430 TRANSFUSION BLD/BLD COMPNT: CPT

## 2025-01-03 PROCEDURE — 86901 BLOOD TYPING SEROLOGIC RH(D): CPT

## 2025-01-03 PROCEDURE — 6370000000 HC RX 637 (ALT 250 FOR IP): Performed by: INTERNAL MEDICINE

## 2025-01-03 PROCEDURE — 2700000000 HC OXYGEN THERAPY PER DAY

## 2025-01-03 PROCEDURE — 84145 PROCALCITONIN (PCT): CPT

## 2025-01-03 PROCEDURE — 85018 HEMOGLOBIN: CPT

## 2025-01-03 PROCEDURE — 30233N1 TRANSFUSION OF NONAUTOLOGOUS RED BLOOD CELLS INTO PERIPHERAL VEIN, PERCUTANEOUS APPROACH: ICD-10-PCS | Performed by: INTERNAL MEDICINE

## 2025-01-03 PROCEDURE — 36415 COLL VENOUS BLD VENIPUNCTURE: CPT

## 2025-01-03 PROCEDURE — 94640 AIRWAY INHALATION TREATMENT: CPT

## 2025-01-03 RX ORDER — SODIUM CHLORIDE 9 MG/ML
INJECTION, SOLUTION INTRAVENOUS PRN
Status: DISCONTINUED | OUTPATIENT
Start: 2025-01-03 | End: 2025-01-04 | Stop reason: HOSPADM

## 2025-01-03 RX ORDER — FLUTICASONE PROPIONATE 50 MCG
1 SPRAY, SUSPENSION (ML) NASAL 2 TIMES DAILY
Status: DISCONTINUED | OUTPATIENT
Start: 2025-01-03 | End: 2025-01-04 | Stop reason: HOSPADM

## 2025-01-03 RX ORDER — ALPRAZOLAM 0.25 MG/1
0.25 TABLET ORAL 4 TIMES DAILY PRN
Status: DISCONTINUED | OUTPATIENT
Start: 2025-01-03 | End: 2025-01-04 | Stop reason: HOSPADM

## 2025-01-03 RX ORDER — MICONAZOLE NITRATE 20 MG/G
CREAM TOPICAL 2 TIMES DAILY
Status: DISCONTINUED | OUTPATIENT
Start: 2025-01-03 | End: 2025-01-04 | Stop reason: HOSPADM

## 2025-01-03 RX ADMIN — LIDOCAINE HYDROCHLORIDE 15 ML: 20 SOLUTION ORAL at 20:11

## 2025-01-03 RX ADMIN — POLYETHYLENE GLYCOL 3350 17 G: 17 POWDER, FOR SOLUTION ORAL at 09:18

## 2025-01-03 RX ADMIN — CARVEDILOL 12.5 MG: 12.5 TABLET, FILM COATED ORAL at 09:10

## 2025-01-03 RX ADMIN — ALPRAZOLAM 0.25 MG: 0.25 TABLET ORAL at 13:47

## 2025-01-03 RX ADMIN — DOCUSATE SODIUM 100 MG: 100 CAPSULE, LIQUID FILLED ORAL at 09:13

## 2025-01-03 RX ADMIN — Medication: at 20:10

## 2025-01-03 RX ADMIN — FLUTICASONE PROPIONATE 1 SPRAY: 50 SPRAY, METERED NASAL at 13:47

## 2025-01-03 RX ADMIN — PIPERACILLIN AND TAZOBACTAM 3375 MG: 3; .375 INJECTION, POWDER, LYOPHILIZED, FOR SOLUTION INTRAVENOUS at 16:31

## 2025-01-03 RX ADMIN — BENZOCAINE 6 MG-MENTHOL 10 MG LOZENGES 1 LOZENGE: at 10:46

## 2025-01-03 RX ADMIN — PIPERACILLIN AND TAZOBACTAM 3375 MG: 3; .375 INJECTION, POWDER, LYOPHILIZED, FOR SOLUTION INTRAVENOUS at 00:36

## 2025-01-03 RX ADMIN — DOCUSATE SODIUM 100 MG: 100 CAPSULE, LIQUID FILLED ORAL at 20:11

## 2025-01-03 RX ADMIN — IPRATROPIUM BROMIDE AND ALBUTEROL SULFATE 1 DOSE: 2.5; .5 SOLUTION RESPIRATORY (INHALATION) at 06:27

## 2025-01-03 RX ADMIN — FOLIC ACID 1 MG: 1 TABLET ORAL at 09:10

## 2025-01-03 RX ADMIN — FLUTICASONE PROPIONATE 1 SPRAY: 50 SPRAY, METERED NASAL at 20:13

## 2025-01-03 RX ADMIN — ASPIRIN 81 MG: 81 TABLET, CHEWABLE ORAL at 09:13

## 2025-01-03 RX ADMIN — GLIPIZIDE 5 MG: 5 TABLET ORAL at 09:13

## 2025-01-03 RX ADMIN — IPRATROPIUM BROMIDE AND ALBUTEROL SULFATE 1 DOSE: 2.5; .5 SOLUTION RESPIRATORY (INHALATION) at 11:57

## 2025-01-03 RX ADMIN — PIPERACILLIN AND TAZOBACTAM 3375 MG: 3; .375 INJECTION, POWDER, LYOPHILIZED, FOR SOLUTION INTRAVENOUS at 09:22

## 2025-01-03 RX ADMIN — IPRATROPIUM BROMIDE AND ALBUTEROL SULFATE 1 DOSE: 2.5; .5 SOLUTION RESPIRATORY (INHALATION) at 18:25

## 2025-01-03 RX ADMIN — PANTOPRAZOLE SODIUM 40 MG: 40 TABLET, DELAYED RELEASE ORAL at 05:45

## 2025-01-03 RX ADMIN — SODIUM CHLORIDE, PRESERVATIVE FREE 10 ML: 5 INJECTION INTRAVENOUS at 20:11

## 2025-01-03 RX ADMIN — INSULIN LISPRO 2 UNITS: 100 INJECTION, SOLUTION INTRAVENOUS; SUBCUTANEOUS at 12:25

## 2025-01-03 RX ADMIN — GUAIFENESIN 600 MG: 600 TABLET, EXTENDED RELEASE ORAL at 09:10

## 2025-01-03 RX ADMIN — ATORVASTATIN CALCIUM 40 MG: 40 TABLET, FILM COATED ORAL at 20:11

## 2025-01-03 RX ADMIN — CARVEDILOL 12.5 MG: 12.5 TABLET, FILM COATED ORAL at 16:28

## 2025-01-03 RX ADMIN — PAROXETINE HYDROCHLORIDE 40 MG: 20 TABLET, FILM COATED ORAL at 09:12

## 2025-01-03 RX ADMIN — PANTOPRAZOLE SODIUM 40 MG: 40 TABLET, DELAYED RELEASE ORAL at 16:28

## 2025-01-03 RX ADMIN — INSULIN LISPRO 2 UNITS: 100 INJECTION, SOLUTION INTRAVENOUS; SUBCUTANEOUS at 21:10

## 2025-01-03 RX ADMIN — ENOXAPARIN SODIUM 60 MG: 100 INJECTION SUBCUTANEOUS at 09:26

## 2025-01-03 RX ADMIN — FERROUS GLUCONATE 324 MG: 324 TABLET ORAL at 09:13

## 2025-01-03 RX ADMIN — QUETIAPINE FUMARATE 50 MG: 25 TABLET ORAL at 20:10

## 2025-01-03 RX ADMIN — ALPRAZOLAM 0.25 MG: 0.25 TABLET ORAL at 20:10

## 2025-01-03 RX ADMIN — GUAIFENESIN 600 MG: 600 TABLET, EXTENDED RELEASE ORAL at 20:11

## 2025-01-03 ASSESSMENT — PAIN DESCRIPTION - PAIN TYPE
TYPE: ACUTE PAIN
TYPE: ACUTE PAIN

## 2025-01-03 ASSESSMENT — PAIN DESCRIPTION - ONSET
ONSET: ON-GOING
ONSET: ON-GOING

## 2025-01-03 ASSESSMENT — PAIN SCALES - GENERAL
PAINLEVEL_OUTOF10: 2
PAINLEVEL_OUTOF10: 2
PAINLEVEL_OUTOF10: 3

## 2025-01-03 ASSESSMENT — PAIN DESCRIPTION - DESCRIPTORS
DESCRIPTORS: ACHING
DESCRIPTORS: ACHING

## 2025-01-03 ASSESSMENT — PAIN DESCRIPTION - FREQUENCY
FREQUENCY: CONTINUOUS
FREQUENCY: CONTINUOUS

## 2025-01-03 ASSESSMENT — PAIN DESCRIPTION - LOCATION
LOCATION: THROAT
LOCATION: MOUTH
LOCATION: THROAT

## 2025-01-03 ASSESSMENT — PAIN DESCRIPTION - ORIENTATION
ORIENTATION: MID
ORIENTATION: MID

## 2025-01-03 NOTE — PROGRESS NOTES
Patient called RN to room. Patient reports he feels as if he is having trouble breathing. States he feels chest feels full. Denies chest pain. Patients SPO2 on 4 liters 93 percent. Patients lungs clear. Patient states he feels congested in his nose as well. Patient does report he is having anxiety related to conversation earlier regarding his health status. RN reported to Dr. Wallace. Verbal order given for Flonase. Dr. Wallace states will write something for anxiety. Patient was started on IV antibiotics yesterday for pneumonia. RN will continue to monitor. Patient will report any worsening symptoms.

## 2025-01-03 NOTE — PLAN OF CARE
Problem: Chronic Conditions and Co-morbidities  Goal: Patient's chronic conditions and co-morbidity symptoms are monitored and maintained or improved  1/2/2025 2148 by Elvira Best RN  Outcome: Progressing  1/2/2025 1145 by Maureen Thakur RN  Outcome: Progressing     Problem: Discharge Planning  Goal: Discharge to home or other facility with appropriate resources  1/2/2025 1145 by Maureen Thakur RN  Outcome: Progressing     Problem: Pain  Goal: Verbalizes/displays adequate comfort level or baseline comfort level  1/2/2025 1145 by Maureen Thakur RN  Outcome: Progressing     Problem: Safety - Adult  Goal: Free from fall injury  1/2/2025 1145 by Maureen Thakur RN  Outcome: Progressing     Problem: Nutrition Deficit:  Goal: Optimize nutritional status  1/2/2025 1145 by Maureen Thakur RN  Outcome: Progressing     Problem: Neurosensory - Adult  Goal: Achieves stable or improved neurological status  1/2/2025 1145 by Maureen Thakur RN  Outcome: Progressing  Goal: Achieves maximal functionality and self care  1/2/2025 1145 by Maureen Thakur RN  Outcome: Progressing     Problem: Respiratory - Adult  Goal: Achieves optimal ventilation and oxygenation  1/2/2025 1145 by Maureen Thakur RN  Outcome: Progressing     Problem: Musculoskeletal - Adult  Goal: Return mobility to safest level of function  1/2/2025 1145 by Maureen Thakur RN  Outcome: Progressing  Goal: Return ADL status to a safe level of function  1/2/2025 1145 by Maureen Thakur RN  Outcome: Progressing     Problem: Skin/Tissue Integrity  Goal: Absence of new skin breakdown  Description: 1.  Monitor for areas of redness and/or skin breakdown  2.  Assess vascular access sites hourly  3.  Every 4-6 hours minimum:  Change oxygen saturation probe site  4.  Every 4-6 hours:  If on nasal continuous positive airway pressure, respiratory therapy assess nares and determine need for appliance change or resting

## 2025-01-03 NOTE — PROGRESS NOTES
Comprehensive Nutrition Assessment    Type and Reason for Visit:  Reassess    Nutrition Recommendations/Plan:   Change carb controlled 5 carb/meal to 4carb/meal to better meet patient's kcal needs  Change supplement to Standard high calorie high protein oral nutritional supplement once daily for improved supplement acceptance (dislike to Glucerna)  PO >75% Meals      Malnutrition Assessment:  Malnutrition Status:  Moderate malnutrition (12/31/24 0844)    Context:  Acute Illness     Findings of the 6 clinical characteristics of malnutrition:  Energy Intake:  75% or less of estimated energy requirements for 7 or more days  Weight Loss:  Greater than 5% over 1 month     Body Fat Loss:  Unable to assess     Muscle Mass Loss:  Unable to assess    Fluid Accumulation:  No fluid accumulation     Strength:  Not Performed    Nutrition Assessment:    Sub acute patient. Previously Covid +, now out of precautions. Diet is slightly high for carbs- will change to 4carbs/meal. with diabetic oral nutritional supplement in place TID for sub optimal PO intake during acute hospitalization. Pt reports dislike to diabetic supplement, but willing to accept ensure plus HP- will change to once daily and monitor accuchecks.  Goal to optimize PO intake- pt reports improving appetite. Triggers for moderate malnutrition. Will follow. Declines diet education for home going.    Nutrition Related Findings:    Admit to sub acute unit related to dyspnea. COVID + (previously), PMH includes DM2, COPD, CHF, HLD, HTN, MI, Menieres disease, CAD. Labs reviewed 12/31 elevated BUN/creatinine, low GFR. elevated glucose. Diet is Carb Controlled 5 carb/meal for DM management, easy to chew texture. acute hospitalization intake ~ 50-75% Meals, currently consuming % meals. Skin- impaired integrity noted- nursing notes moderate redness and lesion to buttocks. meds reviewed. Last BM noted 12/31/24. Wound Type: Open Wounds (area to buttocks (lesion))

## 2025-01-03 NOTE — PROGRESS NOTES
Physical Therapy  Facility/Department: [facility] MED SURG UNIT  Daily Treatment Note  NAME: Isaak Mitchell  : 1950  MRN: [id number]    Date of Service: 1/3/2025    Discharge Recommendations:  (P) Continue to assess pending progress, Home with assist PRN        Patient Diagnosis(es): Dyspnea, weak     Assessment  Assessment: (P) Pt. continues to refuse therapy session this morning with pt education and redirection. Pt. in bed in R sidelying. Low tolerance and endurance indicated.  Activity Tolerance: (P) Patient limited by fatigue;Patient limited by endurance;Treatment limited secondary to decreased cognition    Plan  Physical Therapy Plan  General Plan: (P) 5-7 times per week (1-2)  Current Treatment Recommendations: (P) Strengthening;ROM;Balance training;Functional mobility training;Transfer training;ADL/Self-care training;Gait training;Stair training;Neuromuscular re-education;Manual;Pain management;Return to work related activity;Safety education & training;Patient/Caregiver education & training;Home exercise program;Modalities;Therapeutic activities    Restrictions  Restrictions/Precautions  Restrictions/Precautions: Fall Risk  Activity Level: Up with Assist  Required Braces or Orthoses?: No     Subjective        Objective  Vitals              Other Specialty Interventions  Other Treatments/Modalities: (P) Pt. education provided to encourage pt participation. Pt. contnues to report he is not in the mood today and he might feel more up to doing something tomorrow. Pt. education on plan of care and the importance to increase participation as tolerated to benefit with improving strength and reduce risk of decline.         Goals  Short Term Goals  Time Frame for Short Term Goals: 1 week  Short Term Goal 1: Pt able to do bed mobility with Min A  Short Term Goal 2: Pt able to perform transfers with Min A  Short Term Goal 3: Pt able to ambulate with AD for 50 x 1 with CGA  Short Term Goal 4: Pt able to ambulate up/down

## 2025-01-03 NOTE — PLAN OF CARE
Nutrition Problem #1: Inadequate oral intake  Intervention: Food and/or Nutrient Delivery: Continue Current Diet, Modify Oral Nutrition Supplement

## 2025-01-03 NOTE — PROGRESS NOTES
Chart reviewed.  Patient admitted to [facility] unit from [facility] for inpatient skilled therapies.  Patient's care discussed in daily quality rounds.  Patient is reported to be on 4 liters of O2, receiving antibiotics for PNA, and receiving anticoagulation for PE.  Patient is also reported to have had a steady drop in H/H and plan is for patient to receive transfusion today.  SS collaborated with Dr. Wallace and nursing regarding patient's current medical symptoms and plans.  Dr. Wallace recommending meeting with patient and family to discuss patient's current health status and patient's wishes.    This  met with patient to discuss meeting.  Patient agreeable with meeting and requesting significant other Emerita be present via phone.  SS contacted Emerita per patient's request.    Dr. Wallace, ALBER RN and this  met with patient in hospital room along with patient's S.O. Emerita via phone.  Dr. Wallace reviewed patient's current medical status.  Dr. Wallace and ALBER RN assisted in answering questions.  Patient reports that it is up to Emerita, patient's son and daughter to make medical decisions.  Patient reports not wanting to make any medical decisions until further discussing with family.  Patient requesting that Emerita contact patient's son Chinmay to review information and have Chinmay advise daughter.  Patient gives verbal permission for son Chinmay and daughter to receive any information regarding patient's medical condition and care. Emerita reports plan to contact patient's son as requested.  It is worth noting that patient reports not having any advanced directives in place. Patient's adult children are reported to be next of kin.    This  received phone call from patient's son Chinmay.  SS advised Chinmay that patient wishes to further discuss current status with him and sister before making any medical decisions.  Chinmay voices understanding and thanks this  for update. Chinmay  plans to speak with patient's RN at [facility] to obtain more information before speaking with patient.  This  transferred Chinmay to patient's RN Wild as requested.  SS to continue to follow

## 2025-01-03 NOTE — PROGRESS NOTES
Facility/Department: [facility] SUBACUTE UNIT  Daily Treatment Note  NAME: Isaak Mitchell  : 1950  MRN: [id number]    Date of Service: 1/3/2025    Pt on hold per RN d/t need for blood transfusion and illness from newly dx pneumonia, pt's H&H is low 7; Hbg, 20.8 HcT, pt refusing to attempt anything d/t fatigue. Will re-attempt tomorrow post transfusion.    Therapy Time   Individual Concurrent Group Co-treatment   Time In  12:05pm         Time Out  12:10pm         Minutes  5                 Melania Aguilar, [id number]

## 2025-01-04 ENCOUNTER — APPOINTMENT (OUTPATIENT)
Dept: CT IMAGING | Age: 75
End: 2025-01-04
Attending: INTERNAL MEDICINE
Payer: MEDICARE

## 2025-01-04 ENCOUNTER — HOSPITAL ENCOUNTER (INPATIENT)
Age: 75
LOS: 11 days | DRG: 207 | End: 2025-01-15
Attending: INTERNAL MEDICINE | Admitting: INTERNAL MEDICINE
Payer: MEDICARE

## 2025-01-04 VITALS
RESPIRATION RATE: 26 BRPM | TEMPERATURE: 99 F | OXYGEN SATURATION: 93 % | HEIGHT: 65 IN | WEIGHT: 135.6 LBS | HEART RATE: 76 BPM | DIASTOLIC BLOOD PRESSURE: 67 MMHG | SYSTOLIC BLOOD PRESSURE: 152 MMHG | BODY MASS INDEX: 22.59 KG/M2

## 2025-01-04 DIAGNOSIS — R06.02 SHORTNESS OF BREATH: Primary | ICD-10-CM

## 2025-01-04 DIAGNOSIS — I24.9 ACUTE CORONARY SYNDROME (HCC): ICD-10-CM

## 2025-01-04 PROBLEM — J96.00 ACUTE RESPIRATORY FAILURE, UNSPECIFIED WHETHER WITH HYPOXIA OR HYPERCAPNIA: Status: ACTIVE | Noted: 2025-01-04

## 2025-01-04 LAB
ALBUMIN SERPL-MCNC: 2.4 G/DL (ref 3.5–4.6)
ALP SERPL-CCNC: 112 U/L (ref 35–104)
ALT SERPL-CCNC: 89 U/L (ref 0–41)
AMMONIA PLAS-SCNC: 24 UMOL/L (ref 16–60)
ANION GAP SERPL CALCULATED.3IONS-SCNC: 13 MEQ/L (ref 9–15)
AST SERPL-CCNC: 57 U/L (ref 0–40)
BASE EXCESS ARTERIAL: -6 (ref -3–3)
BASE EXCESS ARTERIAL: -7
BASOPHILS # BLD: 0 K/UL (ref 0–0.2)
BASOPHILS NFR BLD: 0.3 %
BILIRUB SERPL-MCNC: 0.4 MG/DL (ref 0.2–0.7)
BUN SERPL-MCNC: 31 MG/DL (ref 8–23)
CALCIUM IONIZED: 1.2 MMOL/L (ref 1.12–1.32)
CALCIUM SERPL-MCNC: 7.9 MG/DL (ref 8.5–9.9)
CHLORIDE SERPL-SCNC: 105 MEQ/L (ref 95–107)
CO2 SERPL-SCNC: 18 MEQ/L (ref 20–31)
CREAT SERPL-MCNC: 2.04 MG/DL (ref 0.7–1.2)
EOSINOPHIL # BLD: 0.3 K/UL (ref 0–0.7)
EOSINOPHIL NFR BLD: 3.5 %
ERYTHROCYTE [DISTWIDTH] IN BLOOD BY AUTOMATED COUNT: 14.4 % (ref 11.5–14.5)
GLOBULIN SER CALC-MCNC: 2.5 G/DL (ref 2.3–3.5)
GLUCOSE BLD-MCNC: 147 MG/DL (ref 70–99)
GLUCOSE BLD-MCNC: 153 MG/DL (ref 70–99)
GLUCOSE BLD-MCNC: 155 MG/DL (ref 70–99)
GLUCOSE BLD-MCNC: 158 MG/DL (ref 70–99)
GLUCOSE BLD-MCNC: 177 MG/DL (ref 70–99)
GLUCOSE BLD-MCNC: 191 MG/DL (ref 70–99)
GLUCOSE SERPL-MCNC: 174 MG/DL (ref 70–99)
HCO3 ARTERIAL: 16.8 MMOL/L (ref 21–29)
HCO3 ARTERIAL: 18.1 MMOL/L (ref 21–29)
HCT VFR BLD AUTO: 21 % (ref 41–53)
HCT VFR BLD AUTO: 24.6 % (ref 42–52)
HGB BLD CALC-MCNC: 7 GM/DL (ref 13.5–17.5)
HGB BLD-MCNC: 8.3 G/DL (ref 14–18)
LACTATE BLDV-SCNC: 1.6 MMOL/L (ref 0.5–2.2)
LACTATE BLDV-SCNC: 1.8 MMOL/L (ref 0.5–2.2)
LACTATE: 1.66 MMOL/L (ref 0.4–2)
LYMPHOCYTES # BLD: 0.7 K/UL (ref 1–4.8)
LYMPHOCYTES NFR BLD: 10.2 %
MAGNESIUM SERPL-MCNC: 1.6 MG/DL (ref 1.7–2.4)
MCH RBC QN AUTO: 29.6 PG (ref 27–31.3)
MCHC RBC AUTO-ENTMCNC: 33.7 % (ref 33–37)
MCV RBC AUTO: 87.9 FL (ref 79–92.2)
MONOCYTES # BLD: 0.4 K/UL (ref 0.2–0.8)
MONOCYTES NFR BLD: 5.6 %
NEUTROPHILS # BLD: 5.6 K/UL (ref 1.4–6.5)
NEUTS SEG NFR BLD: 78.3 %
O2 SAT, ARTERIAL: 90 % (ref 93–100)
O2 SAT, ARTERIAL: 92 % (ref 93–101)
PCO2 ARTERIAL: 24 MM HG (ref 35–45)
PCO2 ARTERIAL: 25 MM HG (ref 35–45)
PERFORMED ON: ABNORMAL
PH ARTERIAL: 7.45 (ref 7.35–7.45)
PH ARTERIAL: 7.46 (ref 7.35–7.45)
PLATELET # BLD AUTO: 343 K/UL (ref 130–400)
PO2 ARTERIAL: 53 MM HG (ref 75–108)
PO2 ARTERIAL: 58 MM HG (ref 75–108)
POC CHLORIDE: 107 MEQ/L (ref 99–110)
POC CREATININE: 1.9 MG/DL (ref 0.8–1.3)
POC FIO2: 6
POC SAMPLE TYPE: ABNORMAL
POC SAMPLE TYPE: ABNORMAL
POTASSIUM SERPL-SCNC: 4.4 MEQ/L (ref 3.4–4.9)
POTASSIUM SERPL-SCNC: 4.5 MEQ/L (ref 3.5–5.1)
PROCALCITONIN SERPL IA-MCNC: 0.53 NG/ML (ref 0–0.15)
PROCALCITONIN SERPL IA-MCNC: 0.62 NG/ML (ref 0–0.15)
PROT SERPL-MCNC: 4.9 G/DL (ref 6.3–8)
RBC # BLD AUTO: 2.8 M/UL (ref 4.7–6.1)
SODIUM BLD-SCNC: 135 MEQ/L (ref 136–145)
SODIUM SERPL-SCNC: 136 MEQ/L (ref 135–144)
TCO2 ARTERIAL: 18 MMOL/L (ref 70–99)
TCO2 ARTERIAL: 19 MMOL/L (ref 21–32)
WBC # BLD AUTO: 7.1 K/UL (ref 4.8–10.8)

## 2025-01-04 PROCEDURE — 84145 PROCALCITONIN (PCT): CPT

## 2025-01-04 PROCEDURE — 82565 ASSAY OF CREATININE: CPT

## 2025-01-04 PROCEDURE — 83605 ASSAY OF LACTIC ACID: CPT

## 2025-01-04 PROCEDURE — 6370000000 HC RX 637 (ALT 250 FOR IP): Performed by: INTERNAL MEDICINE

## 2025-01-04 PROCEDURE — 80053 COMPREHEN METABOLIC PANEL: CPT

## 2025-01-04 PROCEDURE — 94760 N-INVAS EAR/PLS OXIMETRY 1: CPT

## 2025-01-04 PROCEDURE — 94640 AIRWAY INHALATION TREATMENT: CPT

## 2025-01-04 PROCEDURE — 36600 WITHDRAWAL OF ARTERIAL BLOOD: CPT

## 2025-01-04 PROCEDURE — 70450 CT HEAD/BRAIN W/O DYE: CPT

## 2025-01-04 PROCEDURE — 6360000002 HC RX W HCPCS: Performed by: INTERNAL MEDICINE

## 2025-01-04 PROCEDURE — 82435 ASSAY OF BLOOD CHLORIDE: CPT

## 2025-01-04 PROCEDURE — 36415 COLL VENOUS BLD VENIPUNCTURE: CPT

## 2025-01-04 PROCEDURE — 2580000003 HC RX 258: Performed by: INTERNAL MEDICINE

## 2025-01-04 PROCEDURE — 82140 ASSAY OF AMMONIA: CPT

## 2025-01-04 PROCEDURE — 2580000003 HC RX 258

## 2025-01-04 PROCEDURE — 2700000000 HC OXYGEN THERAPY PER DAY

## 2025-01-04 PROCEDURE — 82948 REAGENT STRIP/BLOOD GLUCOSE: CPT

## 2025-01-04 PROCEDURE — 85014 HEMATOCRIT: CPT

## 2025-01-04 PROCEDURE — 2060000000 HC ICU INTERMEDIATE R&B

## 2025-01-04 PROCEDURE — 84295 ASSAY OF SERUM SODIUM: CPT

## 2025-01-04 PROCEDURE — 82803 BLOOD GASES ANY COMBINATION: CPT

## 2025-01-04 PROCEDURE — 85025 COMPLETE CBC W/AUTO DIFF WBC: CPT

## 2025-01-04 PROCEDURE — 84132 ASSAY OF SERUM POTASSIUM: CPT

## 2025-01-04 PROCEDURE — 97110 THERAPEUTIC EXERCISES: CPT

## 2025-01-04 PROCEDURE — 82330 ASSAY OF CALCIUM: CPT

## 2025-01-04 PROCEDURE — 2500000003 HC RX 250 WO HCPCS: Performed by: INTERNAL MEDICINE

## 2025-01-04 PROCEDURE — 83735 ASSAY OF MAGNESIUM: CPT

## 2025-01-04 RX ORDER — ONDANSETRON 2 MG/ML
4 INJECTION INTRAMUSCULAR; INTRAVENOUS EVERY 6 HOURS PRN
Status: CANCELLED | OUTPATIENT
Start: 2025-01-04

## 2025-01-04 RX ORDER — PAROXETINE 20 MG/1
40 TABLET, FILM COATED ORAL DAILY
Status: DISCONTINUED | OUTPATIENT
Start: 2025-01-05 | End: 2025-01-15 | Stop reason: HOSPADM

## 2025-01-04 RX ORDER — ACETAMINOPHEN 650 MG/1
650 SUPPOSITORY RECTAL EVERY 6 HOURS PRN
Status: DISCONTINUED | OUTPATIENT
Start: 2025-01-04 | End: 2025-01-15 | Stop reason: SDUPTHER

## 2025-01-04 RX ORDER — ENOXAPARIN SODIUM 100 MG/ML
1 INJECTION SUBCUTANEOUS DAILY
Status: CANCELLED | OUTPATIENT
Start: 2025-01-05

## 2025-01-04 RX ORDER — GUAIFENESIN 600 MG/1
600 TABLET, EXTENDED RELEASE ORAL 2 TIMES DAILY
Status: DISCONTINUED | OUTPATIENT
Start: 2025-01-04 | End: 2025-01-09

## 2025-01-04 RX ORDER — QUETIAPINE FUMARATE 25 MG/1
50 TABLET, FILM COATED ORAL NIGHTLY
Status: CANCELLED | OUTPATIENT
Start: 2025-01-04

## 2025-01-04 RX ORDER — LIDOCAINE HYDROCHLORIDE 20 MG/ML
15 SOLUTION OROPHARYNGEAL 4 TIMES DAILY
Status: DISCONTINUED | OUTPATIENT
Start: 2025-01-04 | End: 2025-01-09

## 2025-01-04 RX ORDER — GLIPIZIDE 5 MG/1
5 TABLET ORAL
Status: CANCELLED | OUTPATIENT
Start: 2025-01-05

## 2025-01-04 RX ORDER — CALCIUM CARBONATE 500 MG/1
500 TABLET, CHEWABLE ORAL 3 TIMES DAILY PRN
Status: CANCELLED | OUTPATIENT
Start: 2025-01-04

## 2025-01-04 RX ORDER — DEXTROSE MONOHYDRATE 100 MG/ML
INJECTION, SOLUTION INTRAVENOUS CONTINUOUS PRN
Status: DISCONTINUED | OUTPATIENT
Start: 2025-01-04 | End: 2025-01-15 | Stop reason: HOSPADM

## 2025-01-04 RX ORDER — FLUCONAZOLE, SODIUM CHLORIDE 2 MG/ML
100 INJECTION INTRAVENOUS EVERY 24 HOURS
Status: DISCONTINUED | OUTPATIENT
Start: 2025-01-05 | End: 2025-01-06

## 2025-01-04 RX ORDER — SODIUM CHLORIDE 0.9 % (FLUSH) 0.9 %
5-40 SYRINGE (ML) INJECTION PRN
Status: CANCELLED | OUTPATIENT
Start: 2025-01-04

## 2025-01-04 RX ORDER — ENOXAPARIN SODIUM 100 MG/ML
1 INJECTION SUBCUTANEOUS DAILY
Status: DISCONTINUED | OUTPATIENT
Start: 2025-01-05 | End: 2025-01-09

## 2025-01-04 RX ORDER — FOLIC ACID 1 MG/1
1 TABLET ORAL DAILY
Status: DISCONTINUED | OUTPATIENT
Start: 2025-01-05 | End: 2025-01-15 | Stop reason: HOSPADM

## 2025-01-04 RX ORDER — CARVEDILOL 12.5 MG/1
12.5 TABLET ORAL 2 TIMES DAILY WITH MEALS
Status: DISCONTINUED | OUTPATIENT
Start: 2025-01-05 | End: 2025-01-15 | Stop reason: HOSPADM

## 2025-01-04 RX ORDER — SODIUM CHLORIDE 9 MG/ML
INJECTION, SOLUTION INTRAVENOUS PRN
OUTPATIENT
Start: 2025-01-04

## 2025-01-04 RX ORDER — FERROUS GLUCONATE 324(38)MG
324 TABLET ORAL EVERY OTHER DAY
Status: DISCONTINUED | OUTPATIENT
Start: 2025-01-05 | End: 2025-01-09

## 2025-01-04 RX ORDER — PROCHLORPERAZINE EDISYLATE 5 MG/ML
10 INJECTION INTRAMUSCULAR; INTRAVENOUS EVERY 6 HOURS PRN
Status: CANCELLED | OUTPATIENT
Start: 2025-01-04

## 2025-01-04 RX ORDER — PANTOPRAZOLE SODIUM 40 MG/1
40 TABLET, DELAYED RELEASE ORAL
Status: DISCONTINUED | OUTPATIENT
Start: 2025-01-05 | End: 2025-01-06

## 2025-01-04 RX ORDER — POLYETHYLENE GLYCOL 3350 17 G/17G
17 POWDER, FOR SOLUTION ORAL DAILY PRN
Status: DISCONTINUED | OUTPATIENT
Start: 2025-01-04 | End: 2025-01-15 | Stop reason: HOSPADM

## 2025-01-04 RX ORDER — ONDANSETRON 4 MG/1
4 TABLET, ORALLY DISINTEGRATING ORAL EVERY 8 HOURS PRN
Status: DISCONTINUED | OUTPATIENT
Start: 2025-01-04 | End: 2025-01-15 | Stop reason: HOSPADM

## 2025-01-04 RX ORDER — FERROUS GLUCONATE 324(38)MG
324 TABLET ORAL EVERY OTHER DAY
Status: CANCELLED | OUTPATIENT
Start: 2025-01-05

## 2025-01-04 RX ORDER — GLUCAGON 1 MG/ML
1 KIT INJECTION PRN
Status: CANCELLED | OUTPATIENT
Start: 2025-01-04

## 2025-01-04 RX ORDER — QUETIAPINE FUMARATE 25 MG/1
50 TABLET, FILM COATED ORAL NIGHTLY
Status: DISCONTINUED | OUTPATIENT
Start: 2025-01-04 | End: 2025-01-15 | Stop reason: HOSPADM

## 2025-01-04 RX ORDER — LIDOCAINE HYDROCHLORIDE 20 MG/ML
15 SOLUTION OROPHARYNGEAL 4 TIMES DAILY
Status: CANCELLED | OUTPATIENT
Start: 2025-01-04

## 2025-01-04 RX ORDER — DEXTROSE MONOHYDRATE 100 MG/ML
INJECTION, SOLUTION INTRAVENOUS CONTINUOUS PRN
Status: CANCELLED | OUTPATIENT
Start: 2025-01-04

## 2025-01-04 RX ORDER — DOCUSATE SODIUM 100 MG/1
100 CAPSULE, LIQUID FILLED ORAL 2 TIMES DAILY
Status: CANCELLED | OUTPATIENT
Start: 2025-01-04

## 2025-01-04 RX ORDER — POLYETHYLENE GLYCOL 3350 17 G/17G
17 POWDER, FOR SOLUTION ORAL 2 TIMES DAILY
Status: DISCONTINUED | OUTPATIENT
Start: 2025-01-04 | End: 2025-01-15 | Stop reason: HOSPADM

## 2025-01-04 RX ORDER — ATORVASTATIN CALCIUM 40 MG/1
40 TABLET, FILM COATED ORAL NIGHTLY
Status: DISCONTINUED | OUTPATIENT
Start: 2025-01-04 | End: 2025-01-15 | Stop reason: HOSPADM

## 2025-01-04 RX ORDER — GLUCAGON 1 MG/ML
1 KIT INJECTION PRN
Status: DISCONTINUED | OUTPATIENT
Start: 2025-01-04 | End: 2025-01-15 | Stop reason: HOSPADM

## 2025-01-04 RX ORDER — ATORVASTATIN CALCIUM 40 MG/1
40 TABLET, FILM COATED ORAL NIGHTLY
Status: CANCELLED | OUTPATIENT
Start: 2025-01-04

## 2025-01-04 RX ORDER — SODIUM CHLORIDE 9 MG/ML
INJECTION, SOLUTION INTRAVENOUS PRN
Status: DISCONTINUED | OUTPATIENT
Start: 2025-01-04 | End: 2025-01-15 | Stop reason: HOSPADM

## 2025-01-04 RX ORDER — IPRATROPIUM BROMIDE AND ALBUTEROL SULFATE 2.5; .5 MG/3ML; MG/3ML
1 SOLUTION RESPIRATORY (INHALATION) EVERY 4 HOURS PRN
Status: DISCONTINUED | OUTPATIENT
Start: 2025-01-04 | End: 2025-01-15 | Stop reason: HOSPADM

## 2025-01-04 RX ORDER — ALPRAZOLAM 0.5 MG
0.25 TABLET ORAL 4 TIMES DAILY PRN
Status: DISCONTINUED | OUTPATIENT
Start: 2025-01-04 | End: 2025-01-15 | Stop reason: HOSPADM

## 2025-01-04 RX ORDER — POLYETHYLENE GLYCOL 3350 17 G/17G
17 POWDER, FOR SOLUTION ORAL DAILY PRN
Status: CANCELLED | OUTPATIENT
Start: 2025-01-04

## 2025-01-04 RX ORDER — GUAIFENESIN/DEXTROMETHORPHAN 100-10MG/5
5 SYRUP ORAL EVERY 4 HOURS PRN
Status: DISCONTINUED | OUTPATIENT
Start: 2025-01-04 | End: 2025-01-15 | Stop reason: HOSPADM

## 2025-01-04 RX ORDER — ACETAMINOPHEN 650 MG/1
650 SUPPOSITORY RECTAL EVERY 6 HOURS PRN
Status: CANCELLED | OUTPATIENT
Start: 2025-01-04

## 2025-01-04 RX ORDER — IPRATROPIUM BROMIDE AND ALBUTEROL SULFATE 2.5; .5 MG/3ML; MG/3ML
1 SOLUTION RESPIRATORY (INHALATION)
Status: CANCELLED | OUTPATIENT
Start: 2025-01-04

## 2025-01-04 RX ORDER — POLYETHYLENE GLYCOL 3350 17 G/17G
17 POWDER, FOR SOLUTION ORAL 2 TIMES DAILY
Status: CANCELLED | OUTPATIENT
Start: 2025-01-04

## 2025-01-04 RX ORDER — FLUTICASONE PROPIONATE 50 MCG
1 SPRAY, SUSPENSION (ML) NASAL 2 TIMES DAILY
Status: CANCELLED | OUTPATIENT
Start: 2025-01-04

## 2025-01-04 RX ORDER — IPRATROPIUM BROMIDE AND ALBUTEROL SULFATE 2.5; .5 MG/3ML; MG/3ML
1 SOLUTION RESPIRATORY (INHALATION) EVERY 4 HOURS PRN
Status: CANCELLED | OUTPATIENT
Start: 2025-01-04

## 2025-01-04 RX ORDER — PROCHLORPERAZINE EDISYLATE 5 MG/ML
10 INJECTION INTRAMUSCULAR; INTRAVENOUS EVERY 6 HOURS PRN
Status: DISCONTINUED | OUTPATIENT
Start: 2025-01-04 | End: 2025-01-15 | Stop reason: HOSPADM

## 2025-01-04 RX ORDER — MICONAZOLE NITRATE 20 MG/G
CREAM TOPICAL 2 TIMES DAILY
Status: CANCELLED | OUTPATIENT
Start: 2025-01-04

## 2025-01-04 RX ORDER — IPRATROPIUM BROMIDE AND ALBUTEROL SULFATE 2.5; .5 MG/3ML; MG/3ML
1 SOLUTION RESPIRATORY (INHALATION)
Status: DISCONTINUED | OUTPATIENT
Start: 2025-01-05 | End: 2025-01-05

## 2025-01-04 RX ORDER — SODIUM CHLORIDE 9 MG/ML
INJECTION, SOLUTION INTRAVENOUS CONTINUOUS
Status: DISCONTINUED | OUTPATIENT
Start: 2025-01-04 | End: 2025-01-06

## 2025-01-04 RX ORDER — CARVEDILOL 12.5 MG/1
12.5 TABLET ORAL 2 TIMES DAILY WITH MEALS
Status: CANCELLED | OUTPATIENT
Start: 2025-01-05

## 2025-01-04 RX ORDER — INSULIN LISPRO 100 [IU]/ML
0-8 INJECTION, SOLUTION INTRAVENOUS; SUBCUTANEOUS
Status: DISCONTINUED | OUTPATIENT
Start: 2025-01-04 | End: 2025-01-09

## 2025-01-04 RX ORDER — DOCUSATE SODIUM 100 MG/1
100 CAPSULE, LIQUID FILLED ORAL 2 TIMES DAILY
Status: DISCONTINUED | OUTPATIENT
Start: 2025-01-04 | End: 2025-01-09

## 2025-01-04 RX ORDER — GLIPIZIDE 5 MG/1
5 TABLET ORAL
Status: DISCONTINUED | OUTPATIENT
Start: 2025-01-05 | End: 2025-01-04

## 2025-01-04 RX ORDER — ONDANSETRON 4 MG/1
4 TABLET, ORALLY DISINTEGRATING ORAL EVERY 8 HOURS PRN
Status: CANCELLED | OUTPATIENT
Start: 2025-01-04

## 2025-01-04 RX ORDER — FOLIC ACID 1 MG/1
1 TABLET ORAL DAILY
Status: CANCELLED | OUTPATIENT
Start: 2025-01-05

## 2025-01-04 RX ORDER — CALCIUM CARBONATE 500 MG/1
500 TABLET, CHEWABLE ORAL 3 TIMES DAILY PRN
Status: DISCONTINUED | OUTPATIENT
Start: 2025-01-04 | End: 2025-01-15 | Stop reason: HOSPADM

## 2025-01-04 RX ORDER — FLUTICASONE PROPIONATE 50 MCG
1 SPRAY, SUSPENSION (ML) NASAL DAILY PRN
Status: DISCONTINUED | OUTPATIENT
Start: 2025-01-04 | End: 2025-01-15 | Stop reason: HOSPADM

## 2025-01-04 RX ORDER — ACETAMINOPHEN 325 MG/1
650 TABLET ORAL EVERY 6 HOURS PRN
Status: DISCONTINUED | OUTPATIENT
Start: 2025-01-04 | End: 2025-01-15 | Stop reason: SDUPTHER

## 2025-01-04 RX ORDER — ASPIRIN 81 MG/1
81 TABLET, CHEWABLE ORAL DAILY
Status: CANCELLED | OUTPATIENT
Start: 2025-01-05

## 2025-01-04 RX ORDER — SODIUM CHLORIDE 0.9 % (FLUSH) 0.9 %
5-40 SYRINGE (ML) INJECTION PRN
Status: DISCONTINUED | OUTPATIENT
Start: 2025-01-04 | End: 2025-01-15 | Stop reason: HOSPADM

## 2025-01-04 RX ORDER — PANTOPRAZOLE SODIUM 40 MG/1
40 TABLET, DELAYED RELEASE ORAL
Status: CANCELLED | OUTPATIENT
Start: 2025-01-05

## 2025-01-04 RX ORDER — MICONAZOLE NITRATE 20 MG/G
CREAM TOPICAL 2 TIMES DAILY
Status: DISCONTINUED | OUTPATIENT
Start: 2025-01-04 | End: 2025-01-15 | Stop reason: HOSPADM

## 2025-01-04 RX ORDER — PAROXETINE 20 MG/1
40 TABLET, FILM COATED ORAL DAILY
Status: CANCELLED | OUTPATIENT
Start: 2025-01-05

## 2025-01-04 RX ORDER — GUAIFENESIN 600 MG/1
600 TABLET, EXTENDED RELEASE ORAL 2 TIMES DAILY
Status: CANCELLED | OUTPATIENT
Start: 2025-01-04

## 2025-01-04 RX ORDER — ACETAMINOPHEN 325 MG/1
650 TABLET ORAL EVERY 6 HOURS PRN
Status: CANCELLED | OUTPATIENT
Start: 2025-01-04

## 2025-01-04 RX ORDER — ASPIRIN 81 MG/1
81 TABLET, CHEWABLE ORAL DAILY
Status: DISCONTINUED | OUTPATIENT
Start: 2025-01-05 | End: 2025-01-08

## 2025-01-04 RX ORDER — ONDANSETRON 2 MG/ML
4 INJECTION INTRAMUSCULAR; INTRAVENOUS EVERY 6 HOURS PRN
Status: DISCONTINUED | OUTPATIENT
Start: 2025-01-04 | End: 2025-01-15 | Stop reason: HOSPADM

## 2025-01-04 RX ORDER — ALPRAZOLAM 0.25 MG/1
0.25 TABLET ORAL 4 TIMES DAILY PRN
Status: CANCELLED | OUTPATIENT
Start: 2025-01-04

## 2025-01-04 RX ORDER — GUAIFENESIN/DEXTROMETHORPHAN 100-10MG/5
5 SYRUP ORAL EVERY 4 HOURS PRN
Status: CANCELLED | OUTPATIENT
Start: 2025-01-04

## 2025-01-04 RX ORDER — INSULIN LISPRO 100 [IU]/ML
0-8 INJECTION, SOLUTION INTRAVENOUS; SUBCUTANEOUS
Status: CANCELLED | OUTPATIENT
Start: 2025-01-04

## 2025-01-04 RX ORDER — SODIUM CHLORIDE 9 MG/ML
INJECTION, SOLUTION INTRAVENOUS PRN
Status: CANCELLED | OUTPATIENT
Start: 2025-01-04

## 2025-01-04 RX ADMIN — GLIPIZIDE 5 MG: 5 TABLET ORAL at 05:40

## 2025-01-04 RX ADMIN — POLYETHYLENE GLYCOL 3350 17 G: 17 POWDER, FOR SOLUTION ORAL at 10:11

## 2025-01-04 RX ADMIN — PAROXETINE HYDROCHLORIDE 40 MG: 20 TABLET, FILM COATED ORAL at 10:10

## 2025-01-04 RX ADMIN — FOLIC ACID 1 MG: 1 TABLET ORAL at 10:10

## 2025-01-04 RX ADMIN — PANTOPRAZOLE SODIUM 40 MG: 40 TABLET, DELAYED RELEASE ORAL at 05:40

## 2025-01-04 RX ADMIN — PANTOPRAZOLE SODIUM 40 MG: 40 TABLET, DELAYED RELEASE ORAL at 17:33

## 2025-01-04 RX ADMIN — MICONAZOLE NITRATE: 2 CREAM TOPICAL at 09:00

## 2025-01-04 RX ADMIN — POLYETHYLENE GLYCOL 3350 17 G: 17 POWDER, FOR SOLUTION ORAL at 22:24

## 2025-01-04 RX ADMIN — ALPRAZOLAM 0.25 MG: 0.25 TABLET ORAL at 02:16

## 2025-01-04 RX ADMIN — FLUTICASONE PROPIONATE 1 SPRAY: 50 SPRAY, METERED NASAL at 10:10

## 2025-01-04 RX ADMIN — PIPERACILLIN AND TAZOBACTAM 3375 MG: 3; .375 INJECTION, POWDER, LYOPHILIZED, FOR SOLUTION INTRAVENOUS at 10:56

## 2025-01-04 RX ADMIN — ATORVASTATIN CALCIUM 40 MG: 40 TABLET, FILM COATED ORAL at 22:23

## 2025-01-04 RX ADMIN — ENOXAPARIN SODIUM 60 MG: 100 INJECTION SUBCUTANEOUS at 11:34

## 2025-01-04 RX ADMIN — IPRATROPIUM BROMIDE AND ALBUTEROL SULFATE 1 DOSE: 2.5; .5 SOLUTION RESPIRATORY (INHALATION) at 06:09

## 2025-01-04 RX ADMIN — QUETIAPINE FUMARATE 50 MG: 50 TABLET ORAL at 22:23

## 2025-01-04 RX ADMIN — IPRATROPIUM BROMIDE AND ALBUTEROL SULFATE 1 DOSE: 2.5; .5 SOLUTION RESPIRATORY (INHALATION) at 18:00

## 2025-01-04 RX ADMIN — SODIUM CHLORIDE: 9 INJECTION, SOLUTION INTRAVENOUS at 22:34

## 2025-01-04 RX ADMIN — IPRATROPIUM BROMIDE AND ALBUTEROL SULFATE 1 DOSE: 2.5; .5 SOLUTION RESPIRATORY (INHALATION) at 10:59

## 2025-01-04 RX ADMIN — GUAIFENESIN SYRUP AND DEXTROMETHORPHAN 5 ML: 100; 10 SYRUP ORAL at 02:16

## 2025-01-04 RX ADMIN — DOCUSATE SODIUM 100 MG: 100 CAPSULE, LIQUID FILLED ORAL at 10:10

## 2025-01-04 RX ADMIN — MICONAZOLE NITRATE: 20 CREAM TOPICAL at 23:15

## 2025-01-04 RX ADMIN — FLUCONAZOLE 100 MG: 200 INJECTION, SOLUTION INTRAVENOUS at 10:02

## 2025-01-04 RX ADMIN — GUAIFENESIN 600 MG: 600 TABLET ORAL at 22:23

## 2025-01-04 RX ADMIN — GUAIFENESIN 600 MG: 600 TABLET, EXTENDED RELEASE ORAL at 10:10

## 2025-01-04 RX ADMIN — CARVEDILOL 12.5 MG: 12.5 TABLET, FILM COATED ORAL at 11:34

## 2025-01-04 RX ADMIN — ASPIRIN 81 MG: 81 TABLET, CHEWABLE ORAL at 11:33

## 2025-01-04 RX ADMIN — CARVEDILOL 12.5 MG: 12.5 TABLET, FILM COATED ORAL at 17:34

## 2025-01-04 RX ADMIN — PIPERACILLIN AND TAZOBACTAM 3375 MG: 3; .375 INJECTION, POWDER, LYOPHILIZED, FOR SOLUTION INTRAVENOUS at 22:35

## 2025-01-04 RX ADMIN — DOCUSATE SODIUM 100 MG: 100 CAPSULE, LIQUID FILLED ORAL at 22:23

## 2025-01-04 RX ADMIN — PIPERACILLIN AND TAZOBACTAM 3375 MG: 3; .375 INJECTION, POWDER, LYOPHILIZED, FOR SOLUTION INTRAVENOUS at 00:51

## 2025-01-04 RX ADMIN — Medication: at 22:38

## 2025-01-04 ASSESSMENT — PAIN SCALES - WONG BAKER: WONGBAKER_NUMERICALRESPONSE: NO HURT

## 2025-01-04 ASSESSMENT — PAIN SCALES - GENERAL
PAINLEVEL_OUTOF10: 0
PAINLEVEL_OUTOF10: 0

## 2025-01-04 NOTE — PLAN OF CARE
Problem: Chronic Conditions and Co-morbidities  Goal: Patient's chronic conditions and co-morbidity symptoms are monitored and maintained or improved  Outcome: Progressing     Problem: Discharge Planning  Goal: Discharge to home or other facility with appropriate resources  Outcome: Progressing     Problem: Pain  Goal: Verbalizes/displays adequate comfort level or baseline comfort level  Outcome: Progressing     Problem: Safety - Adult  Goal: Free from fall injury  Outcome: Progressing     Problem: Nutrition Deficit:  Goal: Optimize nutritional status  Outcome: Progressing     Problem: Neurosensory - Adult  Goal: Achieves stable or improved neurological status  Outcome: Progressing  Goal: Achieves maximal functionality and self care  Outcome: Progressing     Problem: Respiratory - Adult  Goal: Achieves optimal ventilation and oxygenation  Outcome: Progressing     Problem: Musculoskeletal - Adult  Goal: Return mobility to safest level of function  Outcome: Progressing  Goal: Return ADL status to a safe level of function  Outcome: Progressing     Problem: Skin/Tissue Integrity  Goal: Absence of new skin breakdown  Description: 1.  Monitor for areas of redness and/or skin breakdown  2.  Assess vascular access sites hourly  3.  Every 4-6 hours minimum:  Change oxygen saturation probe site  4.  Every 4-6 hours:  If on nasal continuous positive airway pressure, respiratory therapy assess nares and determine need for appliance change or resting period.  Outcome: Progressing

## 2025-01-04 NOTE — PLAN OF CARE
Problem: Chronic Conditions and Co-morbidities  Goal: Patient's chronic conditions and co-morbidity symptoms are monitored and maintained or improved  Outcome: Progressing     Problem: Discharge Planning  Goal: Discharge to home or other facility with appropriate resources  Outcome: Progressing     Problem: Pain  Goal: Verbalizes/displays adequate comfort level or baseline comfort level  Outcome: Progressing     Problem: Safety - Adult  Goal: Free from fall injury  Outcome: Progressing     Problem: Nutrition Deficit:  Goal: Optimize nutritional status  1/3/2025 2154 by Leobardo Post RN  Outcome: Progressing  1/3/2025 1144 by Za Teixeira RD  Outcome: Progressing  Flowsheets (Taken 12/31/2024 0801)  Nutrient intake appropriate for improving, restoring, or maintaining nutritional needs:   Assess nutritional status and recommend course of action   Monitor oral intake, labs, and treatment plans   Recommend appropriate diets, oral nutritional supplements, and vitamin/mineral supplements     Problem: Neurosensory - Adult  Goal: Achieves stable or improved neurological status  Outcome: Progressing  Goal: Achieves maximal functionality and self care  Outcome: Progressing     Problem: Respiratory - Adult  Goal: Achieves optimal ventilation and oxygenation  Outcome: Progressing     Problem: Musculoskeletal - Adult  Goal: Return mobility to safest level of function  Outcome: Progressing  Goal: Return ADL status to a safe level of function  Outcome: Progressing     Problem: Skin/Tissue Integrity  Goal: Absence of new skin breakdown  Description: 1.  Monitor for areas of redness and/or skin breakdown  2.  Assess vascular access sites hourly  3.  Every 4-6 hours minimum:  Change oxygen saturation probe site  4.  Every 4-6 hours:  If on nasal continuous positive airway pressure, respiratory therapy assess nares and determine need for appliance change or resting period.  Outcome: Progressing

## 2025-01-04 NOTE — DISCHARGE SUMMARY
Discharge Summary    Patient:  Isaak Mitchell  YOB: 1950    MRN: [id number]   Acct: [id number]    Primary Care Physician: Keith Yu MD    Admit date:  12/30/2024    Discharge date:   01/04/25      Discharge Diagnoses:   Acute respiratory failure  Principal Problem:    Acute respiratory failure  Resolved Problems:    * No resolved hospital problems. *      Admitted for: (HPI)above    Hospital Course: patient was admitted for acute rehab after prolong stay at [facility]. Due to worsening of anemia RBC were transfused, has coiling procedure for duodenal ulcer. Due to PE continue lovenox. After admission his breathing was compromised and treatment for HCAP initiated. Despite this his breathing was getting worse and he required 10 L high flow O2. At this point his condition clamed unstable for further management at [facility] and due to Full code of patient he will be transferred to [facility] ICU for further management/treatment      Consultants:      Discharge Medications:       Medication List        CONTINUE taking these medications      blood glucose monitor kit and supplies  tEST 2 TIMES A DAY            ASK your doctor about these medications      apixaban 5 MG Tabs tablet  Commonly known as: Eliquis  Take 1 tablet by mouth 2 times daily     aspirin 81 MG chewable tablet  CHEW AND SWALLOW 1 TABLET BY MOUTH DAILY     atorvastatin 40 MG tablet  Commonly known as: LIPITOR  Take 1 tablet by mouth every evening     blood glucose test strips  Test 2 times a day & as needed for symptoms of irregular blood glucose. Dispense sufficient amount for indicated testing frequency plus additional to accommodate PRN testing needs.     carvedilol 3.125 MG tablet  Commonly known as: COREG  Take 1 tablet by mouth 2 times daily (with meals)     Clenpiq 10-3.5-12 MG-GM -GM/160ML Soln solution  Generic drug: Sod Picosulfate-Mag Ox-Cit Acd  Take as directed     ferrous sulfate 325 (65 Fe) MG tablet  Commonly known as: IRON     MCH 29.7 25.7 - 32.2 pg    MCHC 34.0 32.3 - 36.5 %    RDW 14.0 11.6 - 14.4 %    Platelets 202 163 - 337 K/uL    Neutrophils % 89.5 (H) 34.0 - 67.9 %    Immature Granulocytes % 1.3 %    Lymphocytes % 6.0 %    Monocytes % 2.8 (L) 5.3 - 12.2 %    Eosinophils % 0.4 (L) 0.8 - 7.0 %    Basophils % 0.0 (L) 0.2 - 1.2 %    Neutrophils Absolute 4.7 1.8 - 5.4 K/uL    Immature Granulocytes # 0.1 K/uL    Lymphocytes Absolute 0.3 (L) 1.3 - 3.6 K/uL    Monocytes Absolute 0.2 (L) 0.3 - 0.8 K/uL    Eosinophils Absolute 0.0 0.0 - 0.5 K/uL    Basophils Absolute 0.0 0.0 - 0.1 K/uL   Procalcitonin   Result Value Ref Range    Procalcitonin 0.53 (H) 0.00 - 0.15 ng/mL   Hemoglobin and Hematocrit   Result Value Ref Range    Hemoglobin 7.0 (LL) 13.7 - 17.5 g/dL    Hematocrit 20.8 (LL) 42.0 - 52.0 %   Hemoglobin and Hematocrit   Result Value Ref Range    Hemoglobin 8.2 (L) 13.7 - 17.5 g/dL    Hematocrit 24.0 (L) 42.0 - 52.0 %   Lactic Acid   Result Value Ref Range    Lactic Acid 1.6 0.5 - 2.2 mmol/L   Ammonia   Result Value Ref Range    Ammonia 24 16 - 60 umol/L   Procalcitonin   Result Value Ref Range    Procalcitonin 0.62 (H) 0.00 - 0.15 ng/mL   POCT Glucose   Result Value Ref Range    POC Glucose 116 (H) 70 - 99 mg/dl    Performed on ACCU-CHEK    POCT Glucose   Result Value Ref Range    POC Glucose 168 (H) 70 - 99 mg/dl    Performed on ACCU-CHEK    POCT Glucose   Result Value Ref Range    POC Glucose 345 (H) 70 - 99 mg/dl    Performed on ACCU-CHEK    POCT Glucose   Result Value Ref Range    POC Glucose 205 (H) 70 - 99 mg/dl    Performed on ACCU-CHEK    POCT Glucose   Result Value Ref Range    POC Glucose 156 (H) 70 - 99 mg/dl    Performed on ACCU-CHEK    POCT Glucose   Result Value Ref Range    POC Glucose 140 (H) 70 - 99 mg/dl    Performed on ACCU-CHEK    POCT Glucose   Result Value Ref Range    POC Glucose 166 (H) 70 - 99 mg/dl    Performed on ACCU-CHEK    POCT Glucose   Result Value Ref Range    POC Glucose 146 (H) 70 - 99 mg/dl

## 2025-01-04 NOTE — PROGRESS NOTES
Facility/Department: [facility] MED SURG UNIT  Daily Treatment Note  NAME: Isaak Mitchell  : 1950  MRN: [id number]    Date of Service: 2025    Discharge Recommendations:  Continue to assess pending progress        Patient Diagnosis(es): ARF    Assessment  Assessment: Pt with poor overall level of cooperation with therapy.  Pt also demos more confusion and fatigue this date, but did participate in LE ther-ex.  Activity Tolerance: Patient limited by fatigue;Patient limited by endurance;Treatment limited secondary to decreased cognition    Plan  Physical Therapy Plan  General Plan: 5-7 times per week  Current Treatment Recommendations: Strengthening;ROM;Balance training;Functional mobility training;Transfer training;ADL/Self-care training;Gait training;Stair training;Neuromuscular re-education;Manual;Pain management;Return to work related activity;Safety education & training;Patient/Caregiver education & training;Home exercise program;Modalities;Therapeutic activities  Additional Comments: Pt with low level of cooperation.    Restrictions  Restrictions/Precautions  Restrictions/Precautions: Fall Risk  Activity Level: Up with Assist  Required Braces or Orthoses?: No     Subjective   Orientation  Overall Orientation Status: Within Functional Limits  Orientation Level: Oriented to person;Disoriented to place;Oriented to time  Cognition  Arousal/Alertness: Impaired  Following Commands: Impaired  Attention Span: Impaired  Memory: Impaired  Safety Judgement: Impaired  Initiation: Requires cues for some  Sequencing: Requires cues for some  Cognition Comment: confused and tired    Objective  Vitals     Bed Mobility Training  Bed Mobility Training: Yes  Transfer Training  Transfer Training: No  Gait  Gait Training: No  Wheelchair Management  Wheelchair Management: No  Neuromuscular Education  Neuromuscular Education: No  PT Exercises  Exercise Treatment: ankle pumps, quad sets, heel slides, resisted abd.add, supine hip  abd, supine marches 1x20 each  Other Specialty Interventions  Other Treatments/Modalities: Educated pt on importance of therapy and participation to enable him to get stronger to go home.  Pt did participate in LE bed exercises, but refused EOB, transfer, and gait.  Safety Devices  Type of Devices: Bed alarm in place;Call light within reach;Left in bed;All fall risk precautions in place;Nurse notified  Restraints  Restraints Initially in Place: No      Goals  Short Term Goals  Time Frame for Short Term Goals: 1 week  Short Term Goal 1: Pt able to do bed mobility with Min A  Short Term Goal 2: Pt able to perform transfers with Min A  Short Term Goal 3: Pt able to ambulate with AD for 50 x 1 with CGA  Short Term Goal 4: Pt able to ambulate up/down 5 steps with CGA and safe technique  Short Term Goal 5: Pt able to tolerate 10 reps of B LE ther ex  Long Term Goals  Time Frame for Long Term Goals : 2-3 weeks RAINE  Long Term Goal 1: Bed mobility will be IND  Long Term Goal 2: Sit to stand independently  Long Term Goal 3: Pt will amb 125ft w/ LRAD mod indep  Long Term Goal 4: Pt will negotiate 12 steps with supervision  Long Term Goal 5: Pt indep with HEP to progress with his strength and mobility  Patient Goals   Patient Goals : Pt reports that he wants to go home    Education  Patient Education  Education Given To: Patient  Education Provided: Role of Therapy;Plan of Care;Mobility Training  Education Provided Comments: Importance of getting OOB, participating with therapy  Education Method: Verbal  Barriers to Learning: Cognition  Education Outcome: Unable to demonstrate understanding    AM-PAC - Mobility              Therapy Time   Individual Concurrent Group Co-treatment   Time In 1130          Time Out  1200         Minutes           Timed Code Treatment Minutes: 30 Minutes       Edna Benz, [place] [id number]

## 2025-01-04 NOTE — PROGRESS NOTES
Cardio completed ABG. ER notified for additional lab work and IV placement. Radiology notified for CT head.

## 2025-01-04 NOTE — PROGRESS NOTES
Patient was reassessed. SPO2 is 97-99 percent on 10 liters Hi flow. Patient is resting comfortably. Awaiting transfer.

## 2025-01-04 NOTE — PROGRESS NOTES
Patients mental status has improved since starting Hi Flow O2. Patient is alert to Person and Place. Patient desires to continue to to receive ASA and Lovenox. This was given at this time. Patient denies any further needs.

## 2025-01-04 NOTE — PROGRESS NOTES
Dr. Wallace notified of patients SPO2 on 10 liters Hi flow being 88-91 percent. Dr. Wallace ordered patient to be transferred. Patient was repositioned and SPO2 is now above 90 percent. Respiratory was called and will come to reassess patient.

## 2025-01-04 NOTE — PROGRESS NOTES
Hospitalist Progress Note      PCP: Keith Yu MD    Date of Admission: 12/30/2024    Chief Complaint: confusion/agitation over night     Subjective: patient awake/alert     Medications:  Reviewed    Infusion Medications    sodium chloride      sodium chloride      dextrose       Scheduled Medications    fluconazole  100 mg IntraVENous Q24H    miconazole   Topical BID    fluticasone  1 spray Each Nostril BID    insulin lispro  0-8 Units SubCUTAneous 4x Daily AC & HS    glipiZIDE  5 mg Oral QAM AC    piperacillin-tazobactam  3,375 mg IntraVENous Q8H    carvedilol  12.5 mg Oral BID WC    polyethylene glycol  17 g Oral BID    folic acid  1 mg Oral Daily    lidocaine viscous hcl  15 mL Mouth/Throat 4x daily    PARoxetine  40 mg Oral Daily    ferrous gluconate  324 mg Oral Every Other Day    enoxaparin  1 mg/kg SubCUTAneous Daily    pantoprazole  40 mg Oral BID AC    ipratropium 0.5 mg-albuterol 2.5 mg  1 Dose Inhalation TID RT    aspirin  81 mg Oral Daily    atorvastatin  40 mg Oral Nightly    docusate sodium  100 mg Oral BID    guaiFENesin  600 mg Oral BID    QUEtiapine  50 mg Oral Nightly     PRN Meds: sodium chloride, ALPRAZolam, nystatin, stomahesive in petrolatum, sodium chloride flush, sodium chloride, glucose, dextrose bolus **OR** dextrose bolus, glucagon (rDNA), dextrose, ipratropium 0.5 mg-albuterol 2.5 mg, Benzocaine-Menthol, calcium carbonate, prochlorperazine, guaiFENesin-dextromethorphan, ondansetron **OR** ondansetron, polyethylene glycol, acetaminophen **OR** acetaminophen      Intake/Output Summary (Last 24 hours) at 1/4/2025 0747  Last data filed at 1/3/2025 2025  Gross per 24 hour   Intake 580 ml   Output 200 ml   Net 380 ml       Exam:    /71   Pulse 82   Temp 97.9 °F (36.6 °C) (Oral)   Resp 20   Ht 1.651 m (5' 5\")   Wt 61.5 kg (135 lb 9.6 oz)   SpO2 92%   BMI 22.57 kg/m²     General appearance: No apparent distress, appears stated age and cooperative.  HEENT: Pupils equal,    worsened in the interval.                 Assessment/Plan:    Active Hospital Problems    Diagnosis Date Noted    Acute respiratory failure [J96.00] 12/31/2024         DVT Prophylaxis: lovenox  Diet: ADULT DIET; Easy to Chew; 4 carb choices (60 gm/meal)  ADULT ORAL NUTRITION SUPPLEMENT; Lunch; Standard High Calorie/High Protein Oral Supplement  Code Status: Full Code    PT/OT Eval Status: done    Dispo -  COPD/COVID pneumonia- worsening breathing  - increased O2 flow to 5L,   procalcitotin elevated, atbs initiating   Due to confusion/desaturation checking ABG/lactic acid/ammonia level now, CT head   Low back large area suspected for candida dermatitis- added IV diflucan    GI bleeding/anemia- post transfusion, post EGD and coiling procedure,   RBC transfusion done yesterday, follow up with CBC    PE- full anticoagulation with lovenox, not able to transitioning to PO anticoagulation due to persistent/worsening in anemia   Nausea/poor appetite- continue zofran/PPI   Constipation- added scheduled miralax BID, follow up clinically   Weakness/prolong hospital stay- PT on case   History DM- stable accu check, HBA1c was done   Called to patient SonChinmay  [phone]- message left, will try to discuss with him later per patient request regarding goal of care, worsening of patient condition/deterioration, possibility DC lovenox, change code status        TTS: 45 minutes where I focused more than 75% of my attention on rendering care, and planning treatment course for this patient, in addition to talking to RN team, mid levels, consulting with other physicians and following up on labs and imaging.    Electronically signed by Tai Wallace MD on 1/4/2025 at 7:47 AM      Late entry- discussed with patients son Alessandra, explained situation- he will discuss with his sister and father today for further management/code status changing/adjustment

## 2025-01-05 ENCOUNTER — APPOINTMENT (OUTPATIENT)
Dept: GENERAL RADIOLOGY | Age: 75
DRG: 207 | End: 2025-01-05
Attending: INTERNAL MEDICINE
Payer: MEDICARE

## 2025-01-05 LAB
ALBUMIN SERPL-MCNC: 2.2 G/DL (ref 3.5–4.6)
ALP SERPL-CCNC: 101 U/L (ref 35–104)
ALT SERPL-CCNC: 72 U/L (ref 0–41)
ANION GAP SERPL CALCULATED.3IONS-SCNC: 11 MEQ/L (ref 9–15)
ANION GAP SERPL CALCULATED.3IONS-SCNC: 12 MEQ/L (ref 9–15)
ANION GAP SERPL CALCULATED.3IONS-SCNC: 13 MEQ/L (ref 9–15)
AST SERPL-CCNC: 40 U/L (ref 0–40)
BASOPHILS # BLD: 0 K/UL (ref 0–0.2)
BASOPHILS NFR BLD: 0.2 %
BILIRUB SERPL-MCNC: 0.4 MG/DL (ref 0.2–0.7)
BUN SERPL-MCNC: 27 MG/DL (ref 8–23)
BUN SERPL-MCNC: 29 MG/DL (ref 8–23)
BUN SERPL-MCNC: 32 MG/DL (ref 8–23)
BUN SERPL-MCNC: 34 MG/DL (ref 8–23)
BUN SERPL-MCNC: 34 MG/DL (ref 8–23)
CALCIUM SERPL-MCNC: 7.5 MG/DL (ref 8.5–9.9)
CALCIUM SERPL-MCNC: 7.6 MG/DL (ref 8.5–9.9)
CALCIUM SERPL-MCNC: 7.7 MG/DL (ref 8.5–9.9)
CALCIUM SERPL-MCNC: 7.7 MG/DL (ref 8.5–9.9)
CALCIUM SERPL-MCNC: 7.8 MG/DL (ref 8.5–9.9)
CHLORIDE SERPL-SCNC: 104 MEQ/L (ref 95–107)
CHLORIDE SERPL-SCNC: 105 MEQ/L (ref 95–107)
CHLORIDE SERPL-SCNC: 106 MEQ/L (ref 95–107)
CHLORIDE SERPL-SCNC: 108 MEQ/L (ref 95–107)
CHLORIDE SERPL-SCNC: 108 MEQ/L (ref 95–107)
CO2 SERPL-SCNC: 18 MEQ/L (ref 20–31)
CREAT SERPL-MCNC: 1.83 MG/DL (ref 0.7–1.2)
CREAT SERPL-MCNC: 1.85 MG/DL (ref 0.7–1.2)
CREAT SERPL-MCNC: 1.87 MG/DL (ref 0.7–1.2)
CREAT SERPL-MCNC: 1.9 MG/DL (ref 0.7–1.2)
CREAT SERPL-MCNC: 1.94 MG/DL (ref 0.7–1.2)
EOSINOPHIL # BLD: 0.2 K/UL (ref 0–0.7)
EOSINOPHIL NFR BLD: 3 %
ERYTHROCYTE [DISTWIDTH] IN BLOOD BY AUTOMATED COUNT: 14.5 % (ref 11.5–14.5)
GLOBULIN SER CALC-MCNC: 2.3 G/DL (ref 2.3–3.5)
GLUCOSE BLD-MCNC: 143 MG/DL (ref 70–99)
GLUCOSE BLD-MCNC: 179 MG/DL (ref 70–99)
GLUCOSE BLD-MCNC: 228 MG/DL (ref 70–99)
GLUCOSE BLD-MCNC: 91 MG/DL (ref 70–99)
GLUCOSE SERPL-MCNC: 121 MG/DL (ref 70–99)
GLUCOSE SERPL-MCNC: 182 MG/DL (ref 70–99)
GLUCOSE SERPL-MCNC: 183 MG/DL (ref 70–99)
GLUCOSE SERPL-MCNC: 208 MG/DL (ref 70–99)
GLUCOSE SERPL-MCNC: 92 MG/DL (ref 70–99)
HCT VFR BLD AUTO: 21.5 % (ref 42–52)
HGB BLD-MCNC: 7.2 G/DL (ref 14–18)
LYMPHOCYTES # BLD: 0.8 K/UL (ref 1–4.8)
LYMPHOCYTES NFR BLD: 14.1 %
MAGNESIUM SERPL-MCNC: 1.6 MG/DL (ref 1.7–2.4)
MCH RBC QN AUTO: 29.5 PG (ref 27–31.3)
MCHC RBC AUTO-ENTMCNC: 33.5 % (ref 33–37)
MCV RBC AUTO: 88.1 FL (ref 79–92.2)
MONOCYTES # BLD: 0.3 K/UL (ref 0.2–0.8)
MONOCYTES NFR BLD: 5.8 %
NEUTROPHILS # BLD: 4.3 K/UL (ref 1.4–6.5)
NEUTS SEG NFR BLD: 74.6 %
PERFORMED ON: ABNORMAL
PERFORMED ON: NORMAL
PLATELET # BLD AUTO: 304 K/UL (ref 130–400)
POTASSIUM SERPL-SCNC: 4.1 MEQ/L (ref 3.4–4.9)
POTASSIUM SERPL-SCNC: 4.2 MEQ/L (ref 3.4–4.9)
POTASSIUM SERPL-SCNC: 4.3 MEQ/L (ref 3.4–4.9)
POTASSIUM SERPL-SCNC: 4.4 MEQ/L (ref 3.4–4.9)
POTASSIUM SERPL-SCNC: 4.4 MEQ/L (ref 3.4–4.9)
PROCALCITONIN SERPL IA-MCNC: 0.53 NG/ML (ref 0–0.15)
PROT SERPL-MCNC: 4.5 G/DL (ref 6.3–8)
RBC # BLD AUTO: 2.44 M/UL (ref 4.7–6.1)
SODIUM SERPL-SCNC: 134 MEQ/L (ref 135–144)
SODIUM SERPL-SCNC: 135 MEQ/L (ref 135–144)
SODIUM SERPL-SCNC: 135 MEQ/L (ref 135–144)
SODIUM SERPL-SCNC: 138 MEQ/L (ref 135–144)
SODIUM SERPL-SCNC: 139 MEQ/L (ref 135–144)
WBC # BLD AUTO: 5.7 K/UL (ref 4.8–10.8)

## 2025-01-05 PROCEDURE — 80053 COMPREHEN METABOLIC PANEL: CPT

## 2025-01-05 PROCEDURE — 6370000000 HC RX 637 (ALT 250 FOR IP): Performed by: INTERNAL MEDICINE

## 2025-01-05 PROCEDURE — 2580000003 HC RX 258: Performed by: INTERNAL MEDICINE

## 2025-01-05 PROCEDURE — 84145 PROCALCITONIN (PCT): CPT

## 2025-01-05 PROCEDURE — 85025 COMPLETE CBC W/AUTO DIFF WBC: CPT

## 2025-01-05 PROCEDURE — 94761 N-INVAS EAR/PLS OXIMETRY MLT: CPT

## 2025-01-05 PROCEDURE — 2500000003 HC RX 250 WO HCPCS: Performed by: INTERNAL MEDICINE

## 2025-01-05 PROCEDURE — 2060000000 HC ICU INTERMEDIATE R&B

## 2025-01-05 PROCEDURE — 83735 ASSAY OF MAGNESIUM: CPT

## 2025-01-05 PROCEDURE — 6360000002 HC RX W HCPCS: Performed by: INTERNAL MEDICINE

## 2025-01-05 PROCEDURE — 2580000003 HC RX 258

## 2025-01-05 PROCEDURE — 36415 COLL VENOUS BLD VENIPUNCTURE: CPT

## 2025-01-05 PROCEDURE — 94640 AIRWAY INHALATION TREATMENT: CPT

## 2025-01-05 PROCEDURE — 2700000000 HC OXYGEN THERAPY PER DAY

## 2025-01-05 PROCEDURE — 71046 X-RAY EXAM CHEST 2 VIEWS: CPT

## 2025-01-05 RX ORDER — IPRATROPIUM BROMIDE AND ALBUTEROL SULFATE 2.5; .5 MG/3ML; MG/3ML
1 SOLUTION RESPIRATORY (INHALATION) 2 TIMES DAILY
Status: DISCONTINUED | OUTPATIENT
Start: 2025-01-05 | End: 2025-01-06

## 2025-01-05 RX ADMIN — SODIUM CHLORIDE: 9 INJECTION, SOLUTION INTRAVENOUS at 11:29

## 2025-01-05 RX ADMIN — PIPERACILLIN AND TAZOBACTAM 3375 MG: 3; .375 INJECTION, POWDER, LYOPHILIZED, FOR SOLUTION INTRAVENOUS at 05:41

## 2025-01-05 RX ADMIN — ENOXAPARIN SODIUM 60 MG: 100 INJECTION SUBCUTANEOUS at 10:01

## 2025-01-05 RX ADMIN — CARVEDILOL 12.5 MG: 12.5 TABLET, FILM COATED ORAL at 10:02

## 2025-01-05 RX ADMIN — FERROUS GLUCONATE 324 MG: 324 TABLET ORAL at 10:39

## 2025-01-05 RX ADMIN — Medication: at 12:11

## 2025-01-05 RX ADMIN — POLYETHYLENE GLYCOL 3350 17 G: 17 POWDER, FOR SOLUTION ORAL at 22:45

## 2025-01-05 RX ADMIN — INSULIN LISPRO 2 UNITS: 100 INJECTION, SOLUTION INTRAVENOUS; SUBCUTANEOUS at 12:11

## 2025-01-05 RX ADMIN — DOXYCYCLINE 100 MG: 100 INJECTION, POWDER, LYOPHILIZED, FOR SOLUTION INTRAVENOUS at 18:09

## 2025-01-05 RX ADMIN — ATORVASTATIN CALCIUM 40 MG: 40 TABLET, FILM COATED ORAL at 22:46

## 2025-01-05 RX ADMIN — GUAIFENESIN 600 MG: 600 TABLET ORAL at 10:01

## 2025-01-05 RX ADMIN — PANTOPRAZOLE SODIUM 40 MG: 40 TABLET, DELAYED RELEASE ORAL at 05:41

## 2025-01-05 RX ADMIN — PAROXETINE HYDROCHLORIDE 40 MG: 20 TABLET, FILM COATED ORAL at 10:01

## 2025-01-05 RX ADMIN — POLYETHYLENE GLYCOL 3350 17 G: 17 POWDER, FOR SOLUTION ORAL at 10:00

## 2025-01-05 RX ADMIN — SODIUM CHLORIDE, PRESERVATIVE FREE 10 ML: 5 INJECTION INTRAVENOUS at 22:49

## 2025-01-05 RX ADMIN — MICONAZOLE NITRATE: 20 CREAM TOPICAL at 22:51

## 2025-01-05 RX ADMIN — MICONAZOLE NITRATE: 20 CREAM TOPICAL at 10:40

## 2025-01-05 RX ADMIN — ASPIRIN 81 MG: 81 TABLET, CHEWABLE ORAL at 10:01

## 2025-01-05 RX ADMIN — PANTOPRAZOLE SODIUM 40 MG: 40 TABLET, DELAYED RELEASE ORAL at 16:15

## 2025-01-05 RX ADMIN — Medication 100 MG: at 10:44

## 2025-01-05 RX ADMIN — FOLIC ACID 1 MG: 1 TABLET ORAL at 10:01

## 2025-01-05 RX ADMIN — IPRATROPIUM BROMIDE AND ALBUTEROL SULFATE 1 DOSE: 2.5; .5 SOLUTION RESPIRATORY (INHALATION) at 20:27

## 2025-01-05 RX ADMIN — CARVEDILOL 12.5 MG: 12.5 TABLET, FILM COATED ORAL at 16:15

## 2025-01-05 RX ADMIN — PIPERACILLIN AND TAZOBACTAM 3375 MG: 3; .375 INJECTION, POWDER, LYOPHILIZED, FOR SOLUTION INTRAVENOUS at 13:57

## 2025-01-05 RX ADMIN — IPRATROPIUM BROMIDE AND ALBUTEROL SULFATE 1 DOSE: 2.5; .5 SOLUTION RESPIRATORY (INHALATION) at 06:50

## 2025-01-05 RX ADMIN — PIPERACILLIN AND TAZOBACTAM 3375 MG: 3; .375 INJECTION, POWDER, LYOPHILIZED, FOR SOLUTION INTRAVENOUS at 23:03

## 2025-01-05 RX ADMIN — QUETIAPINE FUMARATE 50 MG: 50 TABLET ORAL at 22:46

## 2025-01-05 RX ADMIN — DOCUSATE SODIUM 100 MG: 100 CAPSULE, LIQUID FILLED ORAL at 10:01

## 2025-01-05 ASSESSMENT — PAIN SCALES - GENERAL
PAINLEVEL_OUTOF10: 0

## 2025-01-05 NOTE — ACP (ADVANCE CARE PLANNING)
Advance Care Planning   Healthcare Decision Maker:    Primary Decision Maker: MITCHELLURIEL GARCIA - Child - [phone]    Primary Decision Maker: CLEOPATRA MITCHELL - Child - [phone]    Primary Decision Maker: Shelbie Mitchell - Child    Supplemental (Other) Decision Maker: wicho leon - Girlfriend - [phone]    Click here to complete Healthcare Decision Makers including selection of the Healthcare Decision Maker Relationship (ie \"Primary\").  Today we documented Decision Maker(s) consistent with Legal Next of Kin hierarchy.

## 2025-01-05 NOTE — H&P
1-2-24: Underlying chronic interstitial changes seen throughout the lung fields bilaterally. Some increased markings identified at the right lung base in which superimposed infiltrate cannot be excluded. Findings have slightly worsened  Admit to intermediate care with continuous telemetry  Continue IV Zosyn  As needed DuoNebs  Mucinex  Incentive spirometry  As needed Cepacol and Robitussin  Check Pro-Regan and lactic acid  As needed oxygen therapy protocol  (Update: After new ABG, Pt currently on 9L bubbler)    GI bleed/anemia  S/p EGD and coiling procedure   Hgb 8.2 (received 2 units PRBC yesterday)  Check/monitor and trend CBC  Transfuse if hemoglobin drops below 7.0  Monitor for signs of bleeding    3.  Hyponatremia   (was 135 on 12-31-24)  Appears clinically dehydrated  Gentle IV hydration  Q6 BMP  Consider nephrology consult if no improvement    4.  PE  CTA Chest 12-26-24: Small subsegmental embolus in the left lower lobe.   Full dose anticoagulation with Lovenox  Unable to transition to p.o. due to fluctuating anemia  Continue Lovenox  Monitor for signs of bleeding  Monitor and trend CBC    5.  Dermatitis  Lower back  On Diflucan  Keep area dry  Continue Diflucan    6. DM2    Most recent A1C was 5.9 on 12-23-24  Takes glipizide at home  Hold oral antidiabetic/weekly injectable medications  Sliding scale insulin - maintain BG between 140-180 while inpatient  Hypoglycemia protocol  AC/HS POCT  Carb control diet    EKG: NSR  Code Status: Full  DVT/PE prophylaxis: NSR    All findings, assessment, and plan discussed with and confirmed by supervising physician.    Additional workup and/or treatment plan may be added today or then after based on clinical progression.  I am managing a portion of this patient's care.  Since some medical issues may be handled by other specialist.  Additional workup and treatment should be done in outpatient setting by patient's PCP and other outpatient providers.     In

## 2025-01-05 NOTE — PROGRESS NOTES
Patients significant other Emerita notified of change in status. Message left with patients maranda Moy asking for return phone call so we can update him on transfer as well. Both were done per patient wishes. Patients maranda Moy also updated with change in status.

## 2025-01-06 ENCOUNTER — APPOINTMENT (OUTPATIENT)
Dept: INTERVENTIONAL RADIOLOGY/VASCULAR | Age: 75
DRG: 207 | End: 2025-01-06
Attending: INTERNAL MEDICINE
Payer: MEDICARE

## 2025-01-06 ENCOUNTER — APPOINTMENT (OUTPATIENT)
Dept: CT IMAGING | Age: 75
DRG: 207 | End: 2025-01-06
Attending: INTERNAL MEDICINE
Payer: MEDICARE

## 2025-01-06 PROBLEM — R41.82 ACUTE ALTERATION IN MENTAL STATUS: Status: ACTIVE | Noted: 2025-01-06

## 2025-01-06 PROBLEM — K92.2 GI BLEEDING: Status: ACTIVE | Noted: 2025-01-04

## 2025-01-06 LAB
ABO + RH BLD: NORMAL
ABO/RH: NORMAL
ALBUMIN SERPL-MCNC: 1.9 G/DL (ref 3.5–4.6)
ALP SERPL-CCNC: 149 U/L (ref 35–104)
ALT SERPL-CCNC: 100 U/L (ref 0–41)
ANION GAP SERPL CALCULATED.3IONS-SCNC: 11 MEQ/L (ref 9–15)
ANTIBODY SCREEN: NORMAL
AST SERPL-CCNC: 89 U/L (ref 0–40)
BASOPHILS # BLD: 0 K/UL (ref 0–0.2)
BASOPHILS NFR BLD: 0.2 %
BILIRUB SERPL-MCNC: 0.6 MG/DL (ref 0.2–0.7)
BLOOD BANK DISPENSE STATUS: NORMAL
BLOOD BANK PRODUCT CODE: NORMAL
BPU ID: NORMAL
BUN SERPL-MCNC: 26 MG/DL (ref 8–23)
CALCIUM SERPL-MCNC: 7.7 MG/DL (ref 8.5–9.9)
CHLORIDE SERPL-SCNC: 108 MEQ/L (ref 95–107)
CO2 SERPL-SCNC: 16 MEQ/L (ref 20–31)
CREAT SERPL-MCNC: 1.94 MG/DL (ref 0.7–1.2)
DESCRIPTION BLOOD BANK: NORMAL
EOSINOPHIL # BLD: 0.1 K/UL (ref 0–0.7)
EOSINOPHIL NFR BLD: 2.3 %
ERYTHROCYTE [DISTWIDTH] IN BLOOD BY AUTOMATED COUNT: 15 % (ref 11.5–14.5)
GLOBULIN SER CALC-MCNC: 2.7 G/DL (ref 2.3–3.5)
GLUCOSE BLD-MCNC: 142 MG/DL (ref 70–99)
GLUCOSE BLD-MCNC: 143 MG/DL (ref 70–99)
GLUCOSE BLD-MCNC: 156 MG/DL (ref 70–99)
GLUCOSE BLD-MCNC: 171 MG/DL (ref 70–99)
GLUCOSE SERPL-MCNC: 138 MG/DL (ref 70–99)
HCT VFR BLD AUTO: 19.3 % (ref 42–52)
HCT VFR BLD AUTO: 23.9 % (ref 42–52)
HGB BLD-MCNC: 6.5 G/DL (ref 14–18)
HGB BLD-MCNC: 8.2 G/DL (ref 14–18)
LYMPHOCYTES # BLD: 0.6 K/UL (ref 1–4.8)
LYMPHOCYTES NFR BLD: 12.1 %
MAGNESIUM SERPL-MCNC: 1.5 MG/DL (ref 1.7–2.4)
MCH RBC QN AUTO: 30.2 PG (ref 27–31.3)
MCHC RBC AUTO-ENTMCNC: 33.7 % (ref 33–37)
MCV RBC AUTO: 89.8 FL (ref 79–92.2)
MONOCYTES # BLD: 0.4 K/UL (ref 0.2–0.8)
MONOCYTES NFR BLD: 6.9 %
NEUTROPHILS # BLD: 3.9 K/UL (ref 1.4–6.5)
NEUTS SEG NFR BLD: 74.3 %
PERFORMED ON: ABNORMAL
PLATELET # BLD AUTO: ABNORMAL K/UL (ref 130–400)
PLATELET BLD QL SMEAR: ABNORMAL
POTASSIUM SERPL-SCNC: 4.1 MEQ/L (ref 3.4–4.9)
PROCALCITONIN SERPL IA-MCNC: 0.46 NG/ML (ref 0–0.15)
PROT SERPL-MCNC: 4.6 G/DL (ref 6.3–8)
RBC # BLD AUTO: 2.15 M/UL (ref 4.7–6.1)
SLIDE REVIEW: ABNORMAL
SODIUM SERPL-SCNC: 135 MEQ/L (ref 135–144)
WBC # BLD AUTO: 5.2 K/UL (ref 4.8–10.8)

## 2025-01-06 PROCEDURE — 85018 HEMOGLOBIN: CPT

## 2025-01-06 PROCEDURE — 2580000003 HC RX 258: Performed by: INTERNAL MEDICINE

## 2025-01-06 PROCEDURE — 2500000003 HC RX 250 WO HCPCS: Performed by: INTERNAL MEDICINE

## 2025-01-06 PROCEDURE — 36573 INSJ PICC RS&I 5 YR+: CPT | Performed by: RADIOLOGY

## 2025-01-06 PROCEDURE — 99222 1ST HOSP IP/OBS MODERATE 55: CPT | Performed by: SPECIALIST

## 2025-01-06 PROCEDURE — 94667 MNPJ CHEST WALL 1ST: CPT

## 2025-01-06 PROCEDURE — 6370000000 HC RX 637 (ALT 250 FOR IP): Performed by: INTERNAL MEDICINE

## 2025-01-06 PROCEDURE — 36430 TRANSFUSION BLD/BLD COMPNT: CPT

## 2025-01-06 PROCEDURE — 2700000000 HC OXYGEN THERAPY PER DAY

## 2025-01-06 PROCEDURE — 85025 COMPLETE CBC W/AUTO DIFF WBC: CPT

## 2025-01-06 PROCEDURE — 36573 INSJ PICC RS&I 5 YR+: CPT

## 2025-01-06 PROCEDURE — C1751 CATH, INF, PER/CENT/MIDLINE: HCPCS

## 2025-01-06 PROCEDURE — 99222 1ST HOSP IP/OBS MODERATE 55: CPT | Performed by: INTERNAL MEDICINE

## 2025-01-06 PROCEDURE — 84145 PROCALCITONIN (PCT): CPT

## 2025-01-06 PROCEDURE — 99223 1ST HOSP IP/OBS HIGH 75: CPT | Performed by: INTERNAL MEDICINE

## 2025-01-06 PROCEDURE — 2060000000 HC ICU INTERMEDIATE R&B

## 2025-01-06 PROCEDURE — 80053 COMPREHEN METABOLIC PANEL: CPT

## 2025-01-06 PROCEDURE — 86900 BLOOD TYPING SEROLOGIC ABO: CPT

## 2025-01-06 PROCEDURE — 86923 COMPATIBILITY TEST ELECTRIC: CPT

## 2025-01-06 PROCEDURE — 74176 CT ABD & PELVIS W/O CONTRAST: CPT

## 2025-01-06 PROCEDURE — 36415 COLL VENOUS BLD VENIPUNCTURE: CPT

## 2025-01-06 PROCEDURE — 02HV33Z INSERTION OF INFUSION DEVICE INTO SUPERIOR VENA CAVA, PERCUTANEOUS APPROACH: ICD-10-PCS | Performed by: RADIOLOGY

## 2025-01-06 PROCEDURE — 86850 RBC ANTIBODY SCREEN: CPT

## 2025-01-06 PROCEDURE — 97166 OT EVAL MOD COMPLEX 45 MIN: CPT

## 2025-01-06 PROCEDURE — 6360000002 HC RX W HCPCS: Performed by: INTERNAL MEDICINE

## 2025-01-06 PROCEDURE — 97163 PT EVAL HIGH COMPLEX 45 MIN: CPT

## 2025-01-06 PROCEDURE — 86901 BLOOD TYPING SEROLOGIC RH(D): CPT

## 2025-01-06 PROCEDURE — 85014 HEMATOCRIT: CPT

## 2025-01-06 PROCEDURE — P9016 RBC LEUKOCYTES REDUCED: HCPCS

## 2025-01-06 PROCEDURE — 94640 AIRWAY INHALATION TREATMENT: CPT

## 2025-01-06 PROCEDURE — 94761 N-INVAS EAR/PLS OXIMETRY MLT: CPT

## 2025-01-06 PROCEDURE — 83735 ASSAY OF MAGNESIUM: CPT

## 2025-01-06 RX ORDER — BISACODYL 10 MG
10 SUPPOSITORY, RECTAL RECTAL ONCE
Status: COMPLETED | OUTPATIENT
Start: 2025-01-06 | End: 2025-01-06

## 2025-01-06 RX ORDER — IPRATROPIUM BROMIDE AND ALBUTEROL SULFATE 2.5; .5 MG/3ML; MG/3ML
1 SOLUTION RESPIRATORY (INHALATION)
Status: DISCONTINUED | OUTPATIENT
Start: 2025-01-07 | End: 2025-01-08

## 2025-01-06 RX ORDER — SODIUM CHLORIDE 9 MG/ML
INJECTION, SOLUTION INTRAVENOUS PRN
Status: COMPLETED | OUTPATIENT
Start: 2025-01-06 | End: 2025-01-06

## 2025-01-06 RX ORDER — SODIUM CHLORIDE 0.9 % (FLUSH) 0.9 %
5-40 SYRINGE (ML) INJECTION PRN
Status: DISCONTINUED | OUTPATIENT
Start: 2025-01-06 | End: 2025-01-15 | Stop reason: HOSPADM

## 2025-01-06 RX ORDER — SODIUM CHLORIDE 9 MG/ML
INJECTION, SOLUTION INTRAVENOUS PRN
Status: DISCONTINUED | OUTPATIENT
Start: 2025-01-06 | End: 2025-01-15 | Stop reason: HOSPADM

## 2025-01-06 RX ORDER — IPRATROPIUM BROMIDE AND ALBUTEROL SULFATE 2.5; .5 MG/3ML; MG/3ML
1 SOLUTION RESPIRATORY (INHALATION)
Status: DISCONTINUED | OUTPATIENT
Start: 2025-01-06 | End: 2025-01-06

## 2025-01-06 RX ORDER — LIDOCAINE HYDROCHLORIDE 10 MG/ML
50 INJECTION, SOLUTION EPIDURAL; INFILTRATION; INTRACAUDAL; PERINEURAL ONCE
Status: DISCONTINUED | OUTPATIENT
Start: 2025-01-06 | End: 2025-01-06

## 2025-01-06 RX ORDER — SODIUM CHLORIDE 9 MG/ML
INJECTION, SOLUTION INTRAVENOUS PRN
Status: DISCONTINUED | OUTPATIENT
Start: 2025-01-06 | End: 2025-01-06

## 2025-01-06 RX ORDER — LACTULOSE 10 G/15ML
30 SOLUTION ORAL EVERY 4 HOURS
Status: DISPENSED | OUTPATIENT
Start: 2025-01-06 | End: 2025-01-07

## 2025-01-06 RX ORDER — DOXYCYCLINE 100 MG/1
100 CAPSULE ORAL EVERY 12 HOURS SCHEDULED
Status: DISCONTINUED | OUTPATIENT
Start: 2025-01-06 | End: 2025-01-09

## 2025-01-06 RX ORDER — LIDOCAINE HYDROCHLORIDE 20 MG/ML
5 INJECTION, SOLUTION INFILTRATION; PERINEURAL ONCE
Status: DISCONTINUED | OUTPATIENT
Start: 2025-01-06 | End: 2025-01-15 | Stop reason: HOSPADM

## 2025-01-06 RX ORDER — SODIUM CHLORIDE 0.9 % (FLUSH) 0.9 %
5-40 SYRINGE (ML) INJECTION EVERY 12 HOURS SCHEDULED
Status: DISCONTINUED | OUTPATIENT
Start: 2025-01-06 | End: 2025-01-15 | Stop reason: HOSPADM

## 2025-01-06 RX ADMIN — PANTOPRAZOLE SODIUM 40 MG: 40 TABLET, DELAYED RELEASE ORAL at 06:18

## 2025-01-06 RX ADMIN — MICONAZOLE NITRATE: 20 CREAM TOPICAL at 09:47

## 2025-01-06 RX ADMIN — PAROXETINE HYDROCHLORIDE 40 MG: 20 TABLET, FILM COATED ORAL at 09:44

## 2025-01-06 RX ADMIN — GUAIFENESIN 600 MG: 600 TABLET ORAL at 20:34

## 2025-01-06 RX ADMIN — IPRATROPIUM BROMIDE AND ALBUTEROL SULFATE 1 DOSE: 2.5; .5 SOLUTION RESPIRATORY (INHALATION) at 07:34

## 2025-01-06 RX ADMIN — ACETAMINOPHEN 650 MG: 325 TABLET ORAL at 04:21

## 2025-01-06 RX ADMIN — BISACODYL 10 MG: 10 SUPPOSITORY RECTAL at 17:48

## 2025-01-06 RX ADMIN — DOXYCYCLINE HYCLATE 100 MG: 100 CAPSULE ORAL at 20:34

## 2025-01-06 RX ADMIN — Medication 100 MG: at 12:52

## 2025-01-06 RX ADMIN — PANTOPRAZOLE SODIUM 40 MG: 40 TABLET, DELAYED RELEASE ORAL at 18:05

## 2025-01-06 RX ADMIN — DOXYCYCLINE 100 MG: 100 INJECTION, POWDER, LYOPHILIZED, FOR SOLUTION INTRAVENOUS at 11:56

## 2025-01-06 RX ADMIN — SODIUM CHLORIDE: 9 INJECTION, SOLUTION INTRAVENOUS at 14:25

## 2025-01-06 RX ADMIN — CEFEPIME 1000 MG: 1 INJECTION, POWDER, FOR SOLUTION INTRAMUSCULAR; INTRAVENOUS at 21:47

## 2025-01-06 RX ADMIN — PIPERACILLIN AND TAZOBACTAM 3375 MG: 3; .375 INJECTION, POWDER, LYOPHILIZED, FOR SOLUTION INTRAVENOUS at 13:40

## 2025-01-06 RX ADMIN — CARVEDILOL 12.5 MG: 12.5 TABLET, FILM COATED ORAL at 17:47

## 2025-01-06 RX ADMIN — QUETIAPINE FUMARATE 50 MG: 50 TABLET ORAL at 20:34

## 2025-01-06 RX ADMIN — FOLIC ACID 1 MG: 1 TABLET ORAL at 09:44

## 2025-01-06 RX ADMIN — CARVEDILOL 12.5 MG: 12.5 TABLET, FILM COATED ORAL at 09:45

## 2025-01-06 RX ADMIN — METHYLPREDNISOLONE SODIUM SUCCINATE 40 MG: 40 INJECTION INTRAMUSCULAR; INTRAVENOUS at 18:39

## 2025-01-06 RX ADMIN — IPRATROPIUM BROMIDE AND ALBUTEROL SULFATE 1 DOSE: 2.5; .5 SOLUTION RESPIRATORY (INHALATION) at 19:47

## 2025-01-06 RX ADMIN — IPRATROPIUM BROMIDE AND ALBUTEROL SULFATE 1 DOSE: 2.5; .5 SOLUTION RESPIRATORY (INHALATION) at 14:00

## 2025-01-06 RX ADMIN — LIDOCAINE HYDROCHLORIDE 15 ML: 20 SOLUTION ORAL at 20:32

## 2025-01-06 RX ADMIN — ATORVASTATIN CALCIUM 40 MG: 40 TABLET, FILM COATED ORAL at 20:34

## 2025-01-06 RX ADMIN — DOCUSATE SODIUM 100 MG: 100 CAPSULE, LIQUID FILLED ORAL at 20:34

## 2025-01-06 RX ADMIN — Medication 10 ML: at 20:33

## 2025-01-06 ASSESSMENT — PAIN SCALES - GENERAL
PAINLEVEL_OUTOF10: 7
PAINLEVEL_OUTOF10: 0

## 2025-01-06 ASSESSMENT — PAIN DESCRIPTION - DESCRIPTORS: DESCRIPTORS: ACHING

## 2025-01-06 ASSESSMENT — PAIN DESCRIPTION - LOCATION: LOCATION: GENERALIZED

## 2025-01-06 NOTE — CONSENT
Informed Consent for Blood Component Transfusion Note    I have discussed with the patient the rationale for blood component transfusion; its benefits in treating or preventing fatigue, organ damage, or death; and its risk which includes mild transfusion reactions, rare risk of blood borne infection, or more serious but rare reactions. I have discussed the alternatives to transfusion, including the risk and consequences of not receiving transfusion. The patient had an opportunity to ask questions and had agreed to proceed with transfusion of blood components.    Electronically signed by Pritesh Harrell DO on 1/6/25 at 9:14 AM EST

## 2025-01-07 ENCOUNTER — ANESTHESIA EVENT (OUTPATIENT)
Dept: ENDOSCOPY | Age: 75
End: 2025-01-07
Payer: MEDICARE

## 2025-01-07 ENCOUNTER — ANESTHESIA (OUTPATIENT)
Dept: ENDOSCOPY | Age: 75
End: 2025-01-07
Payer: MEDICARE

## 2025-01-07 ENCOUNTER — PREP FOR PROCEDURE (OUTPATIENT)
Dept: GASTROENTEROLOGY | Age: 75
End: 2025-01-07

## 2025-01-07 ENCOUNTER — APPOINTMENT (OUTPATIENT)
Dept: GENERAL RADIOLOGY | Age: 75
DRG: 207 | End: 2025-01-07
Attending: INTERNAL MEDICINE
Payer: MEDICARE

## 2025-01-07 PROBLEM — K26.3 ACUTE DUODENAL ULCER: Status: ACTIVE | Noted: 2025-01-07

## 2025-01-07 PROBLEM — J12.82 PNEUMONIA DUE TO COVID-19 VIRUS: Status: ACTIVE | Noted: 2024-12-21

## 2025-01-07 LAB
ANION GAP SERPL CALCULATED.3IONS-SCNC: 8 MEQ/L (ref 9–15)
BASE EXCESS ARTERIAL: -10 (ref -3–3)
BASOPHILS # BLD: 0 K/UL (ref 0–0.2)
BASOPHILS NFR BLD: 0.2 %
BNP BLD-MCNC: 6293 PG/ML
BUN SERPL-MCNC: 27 MG/DL (ref 8–23)
CALCIUM IONIZED: 1.21 MMOL/L (ref 1.12–1.32)
CALCIUM SERPL-MCNC: 7.2 MG/DL (ref 8.5–9.9)
CHLORIDE SERPL-SCNC: 107 MEQ/L (ref 95–107)
CO2 SERPL-SCNC: 16 MEQ/L (ref 20–31)
CREAT SERPL-MCNC: 1.92 MG/DL (ref 0.7–1.2)
CRP SERPL HS-MCNC: 136.5 MG/L (ref 0–5)
EOSINOPHIL # BLD: 0 K/UL (ref 0–0.7)
EOSINOPHIL NFR BLD: 0 %
ERYTHROCYTE [DISTWIDTH] IN BLOOD BY AUTOMATED COUNT: 14.6 % (ref 11.5–14.5)
GLUCOSE BLD-MCNC: 213 MG/DL (ref 70–99)
GLUCOSE BLD-MCNC: 236 MG/DL (ref 70–99)
GLUCOSE BLD-MCNC: 274 MG/DL (ref 70–99)
GLUCOSE BLD-MCNC: 280 MG/DL (ref 70–99)
GLUCOSE SERPL-MCNC: 215 MG/DL (ref 70–99)
HCO3 ARTERIAL: 15 MMOL/L (ref 21–29)
HCT VFR BLD AUTO: 22.4 % (ref 42–52)
HCT VFR BLD AUTO: 23 % (ref 41–53)
HCT VFR BLD AUTO: 24.8 % (ref 42–52)
HCT VFR BLD AUTO: 25.5 % (ref 42–52)
HGB BLD CALC-MCNC: 7.9 GM/DL (ref 13.5–17.5)
HGB BLD-MCNC: 7.7 G/DL (ref 14–18)
HGB BLD-MCNC: 8.5 G/DL (ref 14–18)
HGB BLD-MCNC: 8.8 G/DL (ref 14–18)
LACTATE: 1.01 MMOL/L (ref 0.4–2)
LYMPHOCYTES # BLD: 0.3 K/UL (ref 1–4.8)
LYMPHOCYTES NFR BLD: 5 %
MAGNESIUM SERPL-MCNC: 1.6 MG/DL (ref 1.7–2.4)
MCH RBC QN AUTO: 30.4 PG (ref 27–31.3)
MCHC RBC AUTO-ENTMCNC: 34.4 % (ref 33–37)
MCV RBC AUTO: 88.5 FL (ref 79–92.2)
MONOCYTES # BLD: 0 K/UL (ref 0.2–0.8)
MONOCYTES NFR BLD: 4.2 %
MYELOCYTES NFR BLD MANUAL: 2 %
NEUTROPHILS # BLD: 4.2 K/UL (ref 1.4–6.5)
NEUTS SEG NFR BLD: 91 %
NRBC BLD-RTO: 1 /100 WBC
O2 SAT, ARTERIAL: 89 % (ref 93–100)
PATH INTERP BLD-IMP: NORMAL
PATH INTERP BLD-IMP: YES
PCO2 ARTERIAL: 23 MM HG (ref 35–45)
PERFORMED ON: ABNORMAL
PH ARTERIAL: 7.42 (ref 7.35–7.45)
PHOSPHATE SERPL-MCNC: 4.8 MG/DL (ref 2.3–4.8)
PLATELET # BLD AUTO: 253 K/UL (ref 130–400)
PLATELET BLD QL SMEAR: ADEQUATE
PO2 ARTERIAL: 55 MM HG (ref 75–108)
POC CHLORIDE: 109 MEQ/L (ref 99–110)
POC CREATININE: 2 MG/DL (ref 0.8–1.3)
POC FIO2: 35
POC SAMPLE TYPE: ABNORMAL
POTASSIUM SERPL-SCNC: 4.1 MEQ/L (ref 3.4–4.9)
POTASSIUM SERPL-SCNC: 4.3 MEQ/L (ref 3.5–5.1)
RBC # BLD AUTO: 2.53 M/UL (ref 4.7–6.1)
SARS-COV-2 RDRP RESP QL NAA+PROBE: DETECTED
SLIDE REVIEW: ABNORMAL
SMUDGE CELLS BLD QL SMEAR: 18.4
SODIUM BLD-SCNC: 138 MEQ/L (ref 136–145)
SODIUM SERPL-SCNC: 131 MEQ/L (ref 135–144)
TCO2 ARTERIAL: 16 MMOL/L (ref 21–32)
VARIANT LYMPHS NFR BLD: 2 %
WBC # BLD AUTO: 4.5 K/UL (ref 4.8–10.8)

## 2025-01-07 PROCEDURE — 6360000002 HC RX W HCPCS: Performed by: INTERNAL MEDICINE

## 2025-01-07 PROCEDURE — 3700000000 HC ANESTHESIA ATTENDED CARE: Performed by: INTERNAL MEDICINE

## 2025-01-07 PROCEDURE — 6370000000 HC RX 637 (ALT 250 FOR IP): Performed by: INTERNAL MEDICINE

## 2025-01-07 PROCEDURE — 92610 EVALUATE SWALLOWING FUNCTION: CPT

## 2025-01-07 PROCEDURE — 2580000003 HC RX 258: Performed by: INTERNAL MEDICINE

## 2025-01-07 PROCEDURE — 80048 BASIC METABOLIC PNL TOTAL CA: CPT

## 2025-01-07 PROCEDURE — 2580000003 HC RX 258: Performed by: SPECIALIST

## 2025-01-07 PROCEDURE — 2709999900 HC NON-CHARGEABLE SUPPLY: Performed by: INTERNAL MEDICINE

## 2025-01-07 PROCEDURE — 36600 WITHDRAWAL OF ARTERIAL BLOOD: CPT

## 2025-01-07 PROCEDURE — 82435 ASSAY OF BLOOD CHLORIDE: CPT

## 2025-01-07 PROCEDURE — 82565 ASSAY OF CREATININE: CPT

## 2025-01-07 PROCEDURE — 83735 ASSAY OF MAGNESIUM: CPT

## 2025-01-07 PROCEDURE — 99232 SBSQ HOSP IP/OBS MODERATE 35: CPT | Performed by: INTERNAL MEDICINE

## 2025-01-07 PROCEDURE — 84100 ASSAY OF PHOSPHORUS: CPT

## 2025-01-07 PROCEDURE — 82330 ASSAY OF CALCIUM: CPT

## 2025-01-07 PROCEDURE — 43235 EGD DIAGNOSTIC BRUSH WASH: CPT | Performed by: INTERNAL MEDICINE

## 2025-01-07 PROCEDURE — 84132 ASSAY OF SERUM POTASSIUM: CPT

## 2025-01-07 PROCEDURE — 85018 HEMOGLOBIN: CPT

## 2025-01-07 PROCEDURE — 82948 REAGENT STRIP/BLOOD GLUCOSE: CPT

## 2025-01-07 PROCEDURE — 2700000000 HC OXYGEN THERAPY PER DAY

## 2025-01-07 PROCEDURE — 6360000002 HC RX W HCPCS: Performed by: SPECIALIST

## 2025-01-07 PROCEDURE — 2500000003 HC RX 250 WO HCPCS: Performed by: INTERNAL MEDICINE

## 2025-01-07 PROCEDURE — 71045 X-RAY EXAM CHEST 1 VIEW: CPT

## 2025-01-07 PROCEDURE — 94640 AIRWAY INHALATION TREATMENT: CPT

## 2025-01-07 PROCEDURE — 2060000000 HC ICU INTERMEDIATE R&B

## 2025-01-07 PROCEDURE — 86140 C-REACTIVE PROTEIN: CPT

## 2025-01-07 PROCEDURE — 6360000002 HC RX W HCPCS: Performed by: REGISTERED NURSE

## 2025-01-07 PROCEDURE — 2500000003 HC RX 250 WO HCPCS: Performed by: SPECIALIST

## 2025-01-07 PROCEDURE — 82803 BLOOD GASES ANY COMBINATION: CPT

## 2025-01-07 PROCEDURE — 94761 N-INVAS EAR/PLS OXIMETRY MLT: CPT

## 2025-01-07 PROCEDURE — 83880 ASSAY OF NATRIURETIC PEPTIDE: CPT

## 2025-01-07 PROCEDURE — 3700000001 HC ADD 15 MINUTES (ANESTHESIA): Performed by: INTERNAL MEDICINE

## 2025-01-07 PROCEDURE — 0DJ08ZZ INSPECTION OF UPPER INTESTINAL TRACT, VIA NATURAL OR ARTIFICIAL OPENING ENDOSCOPIC: ICD-10-PCS | Performed by: INTERNAL MEDICINE

## 2025-01-07 PROCEDURE — 99291 CRITICAL CARE FIRST HOUR: CPT | Performed by: INTERNAL MEDICINE

## 2025-01-07 PROCEDURE — 87635 SARS-COV-2 COVID-19 AMP PRB: CPT

## 2025-01-07 PROCEDURE — 85025 COMPLETE CBC W/AUTO DIFF WBC: CPT

## 2025-01-07 PROCEDURE — 84295 ASSAY OF SERUM SODIUM: CPT

## 2025-01-07 PROCEDURE — 83605 ASSAY OF LACTIC ACID: CPT

## 2025-01-07 PROCEDURE — 6360000002 HC RX W HCPCS: Performed by: NURSE PRACTITIONER

## 2025-01-07 PROCEDURE — 85014 HEMATOCRIT: CPT

## 2025-01-07 PROCEDURE — 3609017100 HC EGD: Performed by: INTERNAL MEDICINE

## 2025-01-07 RX ORDER — SODIUM CHLORIDE 9 MG/ML
INJECTION, SOLUTION INTRAVENOUS PRN
Status: CANCELLED | OUTPATIENT
Start: 2025-01-07

## 2025-01-07 RX ORDER — SODIUM CHLORIDE 9 MG/ML
INJECTION, SOLUTION INTRAVENOUS CONTINUOUS
Status: CANCELLED | OUTPATIENT
Start: 2025-01-07

## 2025-01-07 RX ORDER — SODIUM CHLORIDE 0.9 % (FLUSH) 0.9 %
5-40 SYRINGE (ML) INJECTION EVERY 12 HOURS SCHEDULED
Status: CANCELLED | OUTPATIENT
Start: 2025-01-07

## 2025-01-07 RX ORDER — PROPOFOL 10 MG/ML
INJECTION, EMULSION INTRAVENOUS
Status: DISCONTINUED | OUTPATIENT
Start: 2025-01-07 | End: 2025-01-07 | Stop reason: SDUPTHER

## 2025-01-07 RX ORDER — SODIUM CHLORIDE 0.9 % (FLUSH) 0.9 %
5-40 SYRINGE (ML) INJECTION PRN
Status: DISCONTINUED | OUTPATIENT
Start: 2025-01-07 | End: 2025-01-07 | Stop reason: HOSPADM

## 2025-01-07 RX ORDER — SODIUM CHLORIDE 0.9 % (FLUSH) 0.9 %
5-40 SYRINGE (ML) INJECTION PRN
Status: CANCELLED | OUTPATIENT
Start: 2025-01-07

## 2025-01-07 RX ORDER — MAGNESIUM SULFATE IN WATER 40 MG/ML
2000 INJECTION, SOLUTION INTRAVENOUS ONCE
Status: COMPLETED | OUTPATIENT
Start: 2025-01-07 | End: 2025-01-07

## 2025-01-07 RX ORDER — LIDOCAINE HYDROCHLORIDE 20 MG/ML
INJECTION, SOLUTION INFILTRATION; PERINEURAL
Status: DISCONTINUED | OUTPATIENT
Start: 2025-01-07 | End: 2025-01-07 | Stop reason: SDUPTHER

## 2025-01-07 RX ORDER — SODIUM CHLORIDE 9 MG/ML
25 INJECTION, SOLUTION INTRAVENOUS PRN
Status: DISCONTINUED | OUTPATIENT
Start: 2025-01-07 | End: 2025-01-07 | Stop reason: HOSPADM

## 2025-01-07 RX ORDER — SODIUM CHLORIDE 0.9 % (FLUSH) 0.9 %
5-40 SYRINGE (ML) INJECTION EVERY 12 HOURS SCHEDULED
Status: DISCONTINUED | OUTPATIENT
Start: 2025-01-07 | End: 2025-01-07 | Stop reason: HOSPADM

## 2025-01-07 RX ADMIN — Medication 10 ML: at 12:25

## 2025-01-07 RX ADMIN — MAGNESIUM SULFATE HEPTAHYDRATE 2000 MG: 40 INJECTION, SOLUTION INTRAVENOUS at 12:41

## 2025-01-07 RX ADMIN — GUAIFENESIN 600 MG: 600 TABLET ORAL at 20:19

## 2025-01-07 RX ADMIN — MICONAZOLE NITRATE: 20 CREAM TOPICAL at 20:34

## 2025-01-07 RX ADMIN — CEFEPIME 1000 MG: 1 INJECTION, POWDER, FOR SOLUTION INTRAMUSCULAR; INTRAVENOUS at 12:39

## 2025-01-07 RX ADMIN — CARVEDILOL 12.5 MG: 12.5 TABLET, FILM COATED ORAL at 12:23

## 2025-01-07 RX ADMIN — FOLIC ACID 1 MG: 1 TABLET ORAL at 12:23

## 2025-01-07 RX ADMIN — DOXYCYCLINE HYCLATE 100 MG: 100 CAPSULE ORAL at 12:23

## 2025-01-07 RX ADMIN — PROPOFOL 50 MG: 10 INJECTION, EMULSION INTRAVENOUS at 09:41

## 2025-01-07 RX ADMIN — MICONAZOLE NITRATE: 20 CREAM TOPICAL at 12:44

## 2025-01-07 RX ADMIN — GUAIFENESIN 600 MG: 600 TABLET ORAL at 12:24

## 2025-01-07 RX ADMIN — QUETIAPINE FUMARATE 50 MG: 50 TABLET ORAL at 20:19

## 2025-01-07 RX ADMIN — DOXYCYCLINE HYCLATE 100 MG: 100 CAPSULE ORAL at 20:18

## 2025-01-07 RX ADMIN — SODIUM CHLORIDE, PRESERVATIVE FREE 40 MG: 5 INJECTION INTRAVENOUS at 05:59

## 2025-01-07 RX ADMIN — INSULIN LISPRO 2 UNITS: 100 INJECTION, SOLUTION INTRAVENOUS; SUBCUTANEOUS at 12:23

## 2025-01-07 RX ADMIN — GUAIFENESIN SYRUP AND DEXTROMETHORPHAN 5 ML: 100; 10 SYRUP ORAL at 18:34

## 2025-01-07 RX ADMIN — ATORVASTATIN CALCIUM 40 MG: 40 TABLET, FILM COATED ORAL at 20:18

## 2025-01-07 RX ADMIN — PAROXETINE HYDROCHLORIDE 40 MG: 20 TABLET, FILM COATED ORAL at 12:23

## 2025-01-07 RX ADMIN — BENZOCAINE 6 MG-MENTHOL 10 MG LOZENGES 1 LOZENGE: at 18:34

## 2025-01-07 RX ADMIN — INSULIN LISPRO 4 UNITS: 100 INJECTION, SOLUTION INTRAVENOUS; SUBCUTANEOUS at 16:19

## 2025-01-07 RX ADMIN — INSULIN LISPRO 4 UNITS: 100 INJECTION, SOLUTION INTRAVENOUS; SUBCUTANEOUS at 20:20

## 2025-01-07 RX ADMIN — CARVEDILOL 12.5 MG: 12.5 TABLET, FILM COATED ORAL at 20:29

## 2025-01-07 RX ADMIN — METHYLPREDNISOLONE SODIUM SUCCINATE 40 MG: 40 INJECTION INTRAMUSCULAR; INTRAVENOUS at 12:24

## 2025-01-07 RX ADMIN — LIDOCAINE HYDROCHLORIDE 15 ML: 20 SOLUTION ORAL at 12:23

## 2025-01-07 RX ADMIN — LIDOCAINE HYDROCHLORIDE 60 MG: 20 INJECTION, SOLUTION INFILTRATION; PERINEURAL at 09:41

## 2025-01-07 RX ADMIN — FERROUS GLUCONATE 324 MG: 324 TABLET ORAL at 12:23

## 2025-01-07 RX ADMIN — Medication 10 ML: at 20:35

## 2025-01-07 RX ADMIN — SODIUM CHLORIDE, PRESERVATIVE FREE 40 MG: 5 INJECTION INTRAVENOUS at 16:19

## 2025-01-07 RX ADMIN — CEFEPIME 1000 MG: 1 INJECTION, POWDER, FOR SOLUTION INTRAMUSCULAR; INTRAVENOUS at 20:40

## 2025-01-07 RX ADMIN — IPRATROPIUM BROMIDE AND ALBUTEROL SULFATE 1 DOSE: 2.5; .5 SOLUTION RESPIRATORY (INHALATION) at 08:19

## 2025-01-07 RX ADMIN — DOCUSATE SODIUM 100 MG: 100 CAPSULE, LIQUID FILLED ORAL at 20:19

## 2025-01-07 ASSESSMENT — PAIN SCALES - GENERAL
PAINLEVEL_OUTOF10: 1
PAINLEVEL_OUTOF10: 0
PAINLEVEL_OUTOF10: 1

## 2025-01-07 ASSESSMENT — PAIN DESCRIPTION - LOCATION
LOCATION: THROAT
LOCATION: THROAT

## 2025-01-07 ASSESSMENT — PAIN DESCRIPTION - DESCRIPTORS
DESCRIPTORS: ACHING
DESCRIPTORS: SORE

## 2025-01-07 ASSESSMENT — ENCOUNTER SYMPTOMS: SHORTNESS OF BREATH: 1

## 2025-01-07 NOTE — FLOWSHEET NOTE
Patient arrived from EGD at 1016.  VSS on non rebreather. Per EGD staff, O2 needs increasing. Placed patient on a 12L venti mask. Patient doing alright with O2 sats around 92%. Patient placed in COVID isolation due to testing positive again this morning. At 1040 Dr Harrell removed precautions.   Chest X-ray done stat at the bedside.   ABGs completed at 1045.   Patient alert and somewhat oriented. Aware of place and season. Time is off slightly but makes sense due to prolonged hospital stay. Patient fatigued.   EGD per staff was negative.   Patient rested in bed. Patient switched to high flow wall bubbler at 12 L NC and patient doing well.   Bedside swallow screen passed. Pills taken whole with apple sauce.   SLP visited. Cleared for diet, soft and chopped due to dentures.   Patient cleared to go back to 1w this evening. Spoke to girlfriend, updated.   Patient aware of moving back to 1w room 179- upset at the frequent movements but understanding. Thankful for care in the ICU.

## 2025-01-07 NOTE — ANESTHESIA POSTPROCEDURE EVALUATION
Department of Anesthesiology  Postprocedure Note    Patient: Isaak Mitchell  MRN: [id number]  YOB: 1950  Date of evaluation: 1/7/2025    Procedure Summary       Date: 01/07/25 Room / Location: [facility] OR 02 / [facility]    Anesthesia Start: 0930 Anesthesia Stop: 1021    Procedure: ESOPHAGOGASTRODUODENOSCOPY Diagnosis:       GI bleeding      (GI bleeding [K92.2])    Surgeons: Shani Benjamin MD Responsible Provider: Alex Burns APRN - CRNA    Anesthesia Type: MAC ASA Status: 4 - Emergent            Anesthesia Type: No value filed.    Man Phase I: Man Score: 10    Man Phase II:      Anesthesia Post Evaluation    Patient location during evaluation: ICU  Patient participation: complete - patient participated  Level of consciousness: awake and awake and alert  Airway patency: patent  Nausea & Vomiting: no nausea and no vomiting  Cardiovascular status: blood pressure returned to baseline and hemodynamically stable  Respiratory status: acceptable and face mask  Hydration status: euvolemic  Pain management: adequate        No notable events documented.

## 2025-01-07 NOTE — ANESTHESIA PRE PROCEDURE
infarction I25.2    Rectal bleed K62.5    Diverticulitis K57.92    Type 2 diabetes mellitus, without long-term current use of insulin ([facility]) E11.9    Uncontrolled hypertension I10    Other hyperlipidemia E78.49    Anxiety F41.9    Contusion of rib on left side S20.212A    Chest pain R07.9    Atherosclerosis of native coronary artery of native heart with angina pectoris ([facility]) I25.119    Diarrhea R19.7    Iron deficiency anemia, unspecified D50.9    Esophageal reflux K21.9    Claudication ([facility]) I73.9    Stage 3 chronic kidney disease, unspecified whether stage 3a or 3b CKD ([facility]) N18.30    Type 2 diabetes mellitus with chronic kidney disease E11.22    Dyspnea R06.00    Anemia D64.9    COPD exacerbation ([facility]) J44.1    Acute on chronic diastolic heart failure ([facility]) I50.33    Pulmonary hypertension ([facility]) I27.20    Delirium R41.0    Adjustment disorder F43.20    CINDY (iron deficiency anemia) D50.9    COVID-19 virus infection U07.1    Poorly controlled diabetes mellitus ([facility]) E11.65    Pneumonia due to infectious organism J18.9    Acute respiratory failure J96.00    Acute respiratory failure, unspecified whether with hypoxia or hypercapnia J96.00    GI bleeding K92.2    Acute alteration in mental status R41.82       Past Medical History:        Diagnosis Date    AMI (acute myocardial infarction) ([facility]) 12/12/2020    CAD S/P percutaneous coronary angioplasty 2/7/2023    Cancer ([facility])     skin left neck    Chronic bronchitis ([facility])     COPD (chronic obstructive pulmonary disease) ([facility])     Diabetes mellitus ([facility])     Diverticulitis     Emphysema of lung ([facility])     Heart attack ([facility])     History of blood transfusion     Hyperlipidemia     Hypertension     Meniere's disease     NSTEMI (non-ST elevated myocardial infarction) ([facility]) 12/11/2020    Vertigo        Past Surgical History:        Procedure Laterality Date    CARDIAC SURGERY      stent    COLONOSCOPY N/A 12/17/2024    COLONOSCOPY DIAGNOSTIC performed by Shani Benjamin MD at

## 2025-01-07 NOTE — WOUND CARE
Covering for wound/ostomy    Attempted to see patient for wound care referral. Patient was being transferred to endoscopy. Will follow up.     Alize Yeung RN (covering Wound/Ostomy Nurse)

## 2025-01-08 LAB
ANION GAP SERPL CALCULATED.3IONS-SCNC: 12 MEQ/L (ref 9–15)
BASOPHILS # BLD: 0 K/UL (ref 0–0.2)
BASOPHILS NFR BLD: 0.1 %
BUN SERPL-MCNC: 34 MG/DL (ref 8–23)
CALCIUM SERPL-MCNC: 7.6 MG/DL (ref 8.5–9.9)
CHLORIDE SERPL-SCNC: 103 MEQ/L (ref 95–107)
CO2 SERPL-SCNC: 16 MEQ/L (ref 20–31)
CREAT SERPL-MCNC: 1.91 MG/DL (ref 0.7–1.2)
EOSINOPHIL # BLD: 0 K/UL (ref 0–0.7)
EOSINOPHIL NFR BLD: 0.1 %
ERYTHROCYTE [DISTWIDTH] IN BLOOD BY AUTOMATED COUNT: 14.6 % (ref 11.5–14.5)
GLUCOSE BLD-MCNC: 169 MG/DL (ref 70–99)
GLUCOSE BLD-MCNC: 192 MG/DL (ref 70–99)
GLUCOSE BLD-MCNC: 235 MG/DL (ref 70–99)
GLUCOSE BLD-MCNC: 264 MG/DL (ref 70–99)
GLUCOSE SERPL-MCNC: 261 MG/DL (ref 70–99)
HCT VFR BLD AUTO: 22.6 % (ref 42–52)
HCT VFR BLD AUTO: 23.8 % (ref 42–52)
HCT VFR BLD AUTO: 24.3 % (ref 42–52)
HGB BLD-MCNC: 7.9 G/DL (ref 14–18)
HGB BLD-MCNC: 8.2 G/DL (ref 14–18)
HGB BLD-MCNC: 8.3 G/DL (ref 14–18)
LYMPHOCYTES # BLD: 0.9 K/UL (ref 1–4.8)
LYMPHOCYTES NFR BLD: 10 %
MAGNESIUM SERPL-MCNC: 2.2 MG/DL (ref 1.7–2.4)
MCH RBC QN AUTO: 29.5 PG (ref 27–31.3)
MCHC RBC AUTO-ENTMCNC: 33.7 % (ref 33–37)
MCV RBC AUTO: 87.4 FL (ref 79–92.2)
MONOCYTES # BLD: 0.5 K/UL (ref 0.2–0.8)
MONOCYTES NFR BLD: 5.9 %
NEUTROPHILS # BLD: 7 K/UL (ref 1.4–6.5)
NEUTS SEG NFR BLD: 79.1 %
PERFORMED ON: ABNORMAL
PHOSPHATE SERPL-MCNC: 4.3 MG/DL (ref 2.3–4.8)
PLATELET # BLD AUTO: 316 K/UL (ref 130–400)
POTASSIUM SERPL-SCNC: 4.5 MEQ/L (ref 3.4–4.9)
RBC # BLD AUTO: 2.78 M/UL (ref 4.7–6.1)
SODIUM SERPL-SCNC: 131 MEQ/L (ref 135–144)
WBC # BLD AUTO: 8.8 K/UL (ref 4.8–10.8)

## 2025-01-08 PROCEDURE — 6360000002 HC RX W HCPCS: Performed by: INTERNAL MEDICINE

## 2025-01-08 PROCEDURE — 6370000000 HC RX 637 (ALT 250 FOR IP): Performed by: INTERNAL MEDICINE

## 2025-01-08 PROCEDURE — 2700000000 HC OXYGEN THERAPY PER DAY

## 2025-01-08 PROCEDURE — 94669 MECHANICAL CHEST WALL OSCILL: CPT

## 2025-01-08 PROCEDURE — 2580000003 HC RX 258: Performed by: INTERNAL MEDICINE

## 2025-01-08 PROCEDURE — 85014 HEMATOCRIT: CPT

## 2025-01-08 PROCEDURE — 2500000003 HC RX 250 WO HCPCS: Performed by: INTERNAL MEDICINE

## 2025-01-08 PROCEDURE — 2060000000 HC ICU INTERMEDIATE R&B

## 2025-01-08 PROCEDURE — 2580000003 HC RX 258: Performed by: SPECIALIST

## 2025-01-08 PROCEDURE — 85025 COMPLETE CBC W/AUTO DIFF WBC: CPT

## 2025-01-08 PROCEDURE — 84100 ASSAY OF PHOSPHORUS: CPT

## 2025-01-08 PROCEDURE — 99232 SBSQ HOSP IP/OBS MODERATE 35: CPT | Performed by: INTERNAL MEDICINE

## 2025-01-08 PROCEDURE — 94150 VITAL CAPACITY TEST: CPT

## 2025-01-08 PROCEDURE — 80048 BASIC METABOLIC PNL TOTAL CA: CPT

## 2025-01-08 PROCEDURE — 83735 ASSAY OF MAGNESIUM: CPT

## 2025-01-08 PROCEDURE — 94640 AIRWAY INHALATION TREATMENT: CPT

## 2025-01-08 PROCEDURE — 6360000002 HC RX W HCPCS: Performed by: SPECIALIST

## 2025-01-08 PROCEDURE — 97535 SELF CARE MNGMENT TRAINING: CPT

## 2025-01-08 PROCEDURE — 85018 HEMOGLOBIN: CPT

## 2025-01-08 PROCEDURE — 94761 N-INVAS EAR/PLS OXIMETRY MLT: CPT

## 2025-01-08 RX ORDER — ASPIRIN 81 MG/1
81 TABLET, CHEWABLE ORAL DAILY
Status: DISCONTINUED | OUTPATIENT
Start: 2025-01-10 | End: 2025-01-15 | Stop reason: HOSPADM

## 2025-01-08 RX ORDER — BUDESONIDE 0.5 MG/2ML
0.5 INHALANT ORAL
Status: DISCONTINUED | OUTPATIENT
Start: 2025-01-08 | End: 2025-01-09

## 2025-01-08 RX ORDER — PANTOPRAZOLE SODIUM 40 MG/1
40 TABLET, DELAYED RELEASE ORAL
Status: DISCONTINUED | OUTPATIENT
Start: 2025-01-08 | End: 2025-01-09

## 2025-01-08 RX ORDER — INSULIN GLARGINE 100 [IU]/ML
5 INJECTION, SOLUTION SUBCUTANEOUS NIGHTLY
Status: DISCONTINUED | OUTPATIENT
Start: 2025-01-08 | End: 2025-01-10

## 2025-01-08 RX ORDER — IPRATROPIUM BROMIDE AND ALBUTEROL SULFATE 2.5; .5 MG/3ML; MG/3ML
1 SOLUTION RESPIRATORY (INHALATION) EVERY 4 HOURS PRN
Status: DISCONTINUED | OUTPATIENT
Start: 2025-01-08 | End: 2025-01-15 | Stop reason: HOSPADM

## 2025-01-08 RX ORDER — PREDNISONE 10 MG/1
40 TABLET ORAL DAILY
Status: DISCONTINUED | OUTPATIENT
Start: 2025-01-08 | End: 2025-01-09

## 2025-01-08 RX ADMIN — BUDESONIDE 500 MCG: 0.5 INHALANT RESPIRATORY (INHALATION) at 19:46

## 2025-01-08 RX ADMIN — MICONAZOLE NITRATE: 20 CREAM TOPICAL at 08:31

## 2025-01-08 RX ADMIN — MICONAZOLE NITRATE: 20 CREAM TOPICAL at 20:46

## 2025-01-08 RX ADMIN — QUETIAPINE FUMARATE 50 MG: 50 TABLET ORAL at 20:46

## 2025-01-08 RX ADMIN — IPRATROPIUM BROMIDE AND ALBUTEROL SULFATE 1 DOSE: 2.5; .5 SOLUTION RESPIRATORY (INHALATION) at 08:32

## 2025-01-08 RX ADMIN — INSULIN GLARGINE 5 UNITS: 100 INJECTION, SOLUTION SUBCUTANEOUS at 20:52

## 2025-01-08 RX ADMIN — BUDESONIDE 500 MCG: 0.5 INHALANT RESPIRATORY (INHALATION) at 08:32

## 2025-01-08 RX ADMIN — ATORVASTATIN CALCIUM 40 MG: 40 TABLET, FILM COATED ORAL at 20:45

## 2025-01-08 RX ADMIN — DOCUSATE SODIUM 100 MG: 100 CAPSULE, LIQUID FILLED ORAL at 20:46

## 2025-01-08 RX ADMIN — DOXYCYCLINE HYCLATE 100 MG: 100 CAPSULE ORAL at 20:46

## 2025-01-08 RX ADMIN — PANTOPRAZOLE SODIUM 40 MG: 40 TABLET, DELAYED RELEASE ORAL at 17:14

## 2025-01-08 RX ADMIN — FOLIC ACID 1 MG: 1 TABLET ORAL at 08:28

## 2025-01-08 RX ADMIN — SODIUM CHLORIDE, PRESERVATIVE FREE 40 MG: 5 INJECTION INTRAVENOUS at 05:29

## 2025-01-08 RX ADMIN — ENOXAPARIN SODIUM 60 MG: 100 INJECTION SUBCUTANEOUS at 08:30

## 2025-01-08 RX ADMIN — PREDNISONE 40 MG: 10 TABLET ORAL at 08:28

## 2025-01-08 RX ADMIN — DOCUSATE SODIUM 100 MG: 100 CAPSULE, LIQUID FILLED ORAL at 08:28

## 2025-01-08 RX ADMIN — CARVEDILOL 12.5 MG: 12.5 TABLET, FILM COATED ORAL at 08:28

## 2025-01-08 RX ADMIN — PAROXETINE HYDROCHLORIDE 40 MG: 20 TABLET, FILM COATED ORAL at 08:28

## 2025-01-08 RX ADMIN — INSULIN LISPRO 2 UNITS: 100 INJECTION, SOLUTION INTRAVENOUS; SUBCUTANEOUS at 12:42

## 2025-01-08 RX ADMIN — GUAIFENESIN 600 MG: 600 TABLET ORAL at 08:28

## 2025-01-08 RX ADMIN — Medication 10 ML: at 08:30

## 2025-01-08 RX ADMIN — INSULIN LISPRO 4 UNITS: 100 INJECTION, SOLUTION INTRAVENOUS; SUBCUTANEOUS at 08:28

## 2025-01-08 RX ADMIN — CEFEPIME 1000 MG: 1 INJECTION, POWDER, FOR SOLUTION INTRAMUSCULAR; INTRAVENOUS at 21:22

## 2025-01-08 RX ADMIN — CARVEDILOL 12.5 MG: 12.5 TABLET, FILM COATED ORAL at 17:14

## 2025-01-08 RX ADMIN — GUAIFENESIN 600 MG: 600 TABLET ORAL at 20:46

## 2025-01-08 RX ADMIN — DOXYCYCLINE HYCLATE 100 MG: 100 CAPSULE ORAL at 08:28

## 2025-01-08 RX ADMIN — CEFEPIME 1000 MG: 1 INJECTION, POWDER, FOR SOLUTION INTRAMUSCULAR; INTRAVENOUS at 08:39

## 2025-01-08 RX ADMIN — Medication 10 ML: at 20:46

## 2025-01-08 RX ADMIN — INSULIN LISPRO 4 UNITS: 100 INJECTION, SOLUTION INTRAVENOUS; SUBCUTANEOUS at 20:51

## 2025-01-08 RX ADMIN — TIOTROPIUM BROMIDE AND OLODATEROL 2 PUFF: 3.124; 2.736 SPRAY, METERED RESPIRATORY (INHALATION) at 08:32

## 2025-01-08 ASSESSMENT — PAIN SCALES - GENERAL: PAINLEVEL_OUTOF10: 0

## 2025-01-08 NOTE — FLOWSHEET NOTE
Covering for Wound/Ostomy    Attempted to see patient again today for wound care referral. Patient refusing assessment at this time. Felipa wound CNP present for this attempted assessment.     Alize Yeung RN (covering Wound/Ostomy Nurse)

## 2025-01-09 ENCOUNTER — APPOINTMENT (OUTPATIENT)
Dept: GENERAL RADIOLOGY | Age: 75
DRG: 207 | End: 2025-01-09
Attending: INTERNAL MEDICINE
Payer: MEDICARE

## 2025-01-09 PROBLEM — J80 ARDS (ADULT RESPIRATORY DISTRESS SYNDROME): Status: ACTIVE | Noted: 2025-01-09

## 2025-01-09 PROBLEM — E43 SEVERE MALNUTRITION (HCC): Status: ACTIVE | Noted: 2025-01-09

## 2025-01-09 LAB
ANION GAP SERPL CALCULATED.3IONS-SCNC: 11 MEQ/L (ref 9–15)
B PARAP IS1001 DNA NPH QL NAA+NON-PROBE: NOT DETECTED
B PERT.PT PRMT NPH QL NAA+NON-PROBE: NOT DETECTED
BASE EXCESS ARTERIAL: -2 (ref -3–3)
BASE EXCESS ARTERIAL: -6 (ref -3–3)
BASE EXCESS ARTERIAL: -7 (ref -3–3)
BASE EXCESS ARTERIAL: -7 (ref -3–3)
BASOPHILS # BLD: 0 K/UL (ref 0–0.2)
BASOPHILS NFR BLD: 0.1 %
BNP BLD-MCNC: NORMAL PG/ML
BUN SERPL-MCNC: 31 MG/DL (ref 8–23)
C PNEUM DNA NPH QL NAA+NON-PROBE: NOT DETECTED
CALCIUM IONIZED: 1.22 MMOL/L (ref 1.12–1.32)
CALCIUM IONIZED: 1.23 MMOL/L (ref 1.12–1.32)
CALCIUM IONIZED: 1.27 MMOL/L (ref 1.12–1.32)
CALCIUM IONIZED: 1.31 MMOL/L (ref 1.12–1.32)
CALCIUM SERPL-MCNC: 7.9 MG/DL (ref 8.5–9.9)
CHLORIDE SERPL-SCNC: 106 MEQ/L (ref 95–107)
CO2 SERPL-SCNC: 18 MEQ/L (ref 20–31)
CREAT SERPL-MCNC: 1.82 MG/DL (ref 0.7–1.2)
D DIMER PPP FEU-MCNC: 3.55 MG/L FEU (ref 0–0.5)
EOSINOPHIL # BLD: 0 K/UL (ref 0–0.7)
EOSINOPHIL NFR BLD: 0.2 %
ERYTHROCYTE [DISTWIDTH] IN BLOOD BY AUTOMATED COUNT: 14.3 % (ref 11.5–14.5)
FLUAV H1 2009 PAN RNA NPH NAA+NON-PROBE: DETECTED
FLUBV RNA NPH QL NAA+NON-PROBE: NOT DETECTED
GLUCOSE BLD-MCNC: 178 MG/DL (ref 70–99)
GLUCOSE BLD-MCNC: 189 MG/DL (ref 70–99)
GLUCOSE BLD-MCNC: 199 MG/DL (ref 70–99)
GLUCOSE BLD-MCNC: 210 MG/DL (ref 70–99)
GLUCOSE BLD-MCNC: 217 MG/DL (ref 70–99)
GLUCOSE BLD-MCNC: 218 MG/DL (ref 70–99)
GLUCOSE BLD-MCNC: 233 MG/DL (ref 70–99)
GLUCOSE BLD-MCNC: 263 MG/DL (ref 70–99)
GLUCOSE SERPL-MCNC: 153 MG/DL (ref 70–99)
HADV DNA NPH QL NAA+NON-PROBE: NOT DETECTED
HCO3 ARTERIAL: 18.1 MMOL/L (ref 21–29)
HCO3 ARTERIAL: 18.3 MMOL/L (ref 21–29)
HCO3 ARTERIAL: 21.7 MMOL/L (ref 21–29)
HCO3 ARTERIAL: 24 MMOL/L (ref 21–29)
HCOV 229E RNA NPH QL NAA+NON-PROBE: NOT DETECTED
HCOV HKU1 RNA NPH QL NAA+NON-PROBE: NOT DETECTED
HCOV NL63 RNA NPH QL NAA+NON-PROBE: NOT DETECTED
HCOV OC43 RNA NPH QL NAA+NON-PROBE: NOT DETECTED
HCT VFR BLD AUTO: 25 % (ref 41–53)
HCT VFR BLD AUTO: 27.9 % (ref 42–52)
HCT VFR BLD AUTO: 28 % (ref 41–53)
HCT VFR BLD AUTO: 29 % (ref 41–53)
HCT VFR BLD AUTO: 32 % (ref 41–53)
HGB BLD CALC-MCNC: 10.9 GM/DL (ref 13.5–17.5)
HGB BLD CALC-MCNC: 8.6 GM/DL (ref 13.5–17.5)
HGB BLD CALC-MCNC: 9.6 GM/DL (ref 13.5–17.5)
HGB BLD CALC-MCNC: 9.7 GM/DL (ref 13.5–17.5)
HGB BLD-MCNC: 9.5 G/DL (ref 14–18)
HMPV RNA NPH QL NAA+NON-PROBE: NOT DETECTED
HPIV1 RNA NPH QL NAA+NON-PROBE: NOT DETECTED
HPIV2 RNA NPH QL NAA+NON-PROBE: NOT DETECTED
HPIV3 RNA NPH QL NAA+NON-PROBE: NOT DETECTED
HPIV4 RNA NPH QL NAA+NON-PROBE: NOT DETECTED
LACTATE: 0.96 MMOL/L (ref 0.4–2)
LACTATE: 1 MMOL/L (ref 0.4–2)
LACTATE: 1.33 MMOL/L (ref 0.4–2)
LACTATE: 1.43 MMOL/L (ref 0.4–2)
LYMPHOCYTES # BLD: 0.8 K/UL (ref 1–4.8)
LYMPHOCYTES NFR BLD: 8.5 %
M PNEUMO DNA NPH QL NAA+NON-PROBE: NOT DETECTED
MAGNESIUM SERPL-MCNC: 1.9 MG/DL (ref 1.7–2.4)
MCH RBC QN AUTO: 29.4 PG (ref 27–31.3)
MCHC RBC AUTO-ENTMCNC: 34.1 % (ref 33–37)
MCV RBC AUTO: 86.4 FL (ref 79–92.2)
MONOCYTES # BLD: 0.4 K/UL (ref 0.2–0.8)
MONOCYTES NFR BLD: 3.9 %
NEUTROPHILS # BLD: 8 K/UL (ref 1.4–6.5)
NEUTS SEG NFR BLD: 83.5 %
O2 SAT, ARTERIAL: 89 % (ref 93–100)
O2 SAT, ARTERIAL: 91 % (ref 93–100)
O2 SAT, ARTERIAL: 96 % (ref 93–100)
O2 SAT, ARTERIAL: 98 % (ref 93–100)
PCO2 ARTERIAL: 25 MM HG (ref 35–45)
PCO2 ARTERIAL: 31 MM HG (ref 35–45)
PCO2 ARTERIAL: 45 MM HG (ref 35–45)
PCO2 ARTERIAL: 64 MM HG (ref 35–45)
PERFORMED ON: ABNORMAL
PH ARTERIAL: 7.14 (ref 7.35–7.45)
PH ARTERIAL: 7.34 (ref 7.35–7.45)
PH ARTERIAL: 7.38 (ref 7.35–7.45)
PH ARTERIAL: 7.47 (ref 7.35–7.45)
PHOSPHATE SERPL-MCNC: 2.6 MG/DL (ref 2.3–4.8)
PLATELET # BLD AUTO: 360 K/UL (ref 130–400)
PO2 ARTERIAL: 114 MM HG (ref 75–108)
PO2 ARTERIAL: 55 MM HG (ref 75–108)
PO2 ARTERIAL: 74 MM HG (ref 75–108)
PO2 ARTERIAL: 80 MM HG (ref 75–108)
POC CHLORIDE: 106 MEQ/L (ref 99–110)
POC CHLORIDE: 106 MEQ/L (ref 99–110)
POC CHLORIDE: 107 MEQ/L (ref 99–110)
POC CHLORIDE: 108 MEQ/L (ref 99–110)
POC CREATININE: 1.7 MG/DL (ref 0.8–1.3)
POC CREATININE: 1.8 MG/DL (ref 0.8–1.3)
POC CREATININE: 2.1 MG/DL (ref 0.8–1.3)
POC CREATININE: 2.2 MG/DL (ref 0.8–1.3)
POC FIO2: 100
POC FIO2: 100
POC FIO2: 80
POC FIO2: 85
POC SAMPLE TYPE: ABNORMAL
POTASSIUM SERPL-SCNC: 4 MEQ/L (ref 3.4–4.9)
POTASSIUM SERPL-SCNC: 4.1 MEQ/L (ref 3.5–5.1)
POTASSIUM SERPL-SCNC: 4.2 MEQ/L (ref 3.5–5.1)
POTASSIUM SERPL-SCNC: 4.6 MEQ/L (ref 3.5–5.1)
POTASSIUM SERPL-SCNC: 4.7 MEQ/L (ref 3.5–5.1)
PROCALCITONIN SERPL IA-MCNC: 0.29 NG/ML (ref 0–0.15)
RBC # BLD AUTO: 3.23 M/UL (ref 4.7–6.1)
RSV RNA NPH QL NAA+NON-PROBE: NOT DETECTED
RV+EV RNA NPH QL NAA+NON-PROBE: NOT DETECTED
SARS-COV-2 RNA NPH QL NAA+NON-PROBE: DETECTED
SODIUM BLD-SCNC: 135 MEQ/L (ref 136–145)
SODIUM BLD-SCNC: 136 MEQ/L (ref 136–145)
SODIUM BLD-SCNC: 136 MEQ/L (ref 136–145)
SODIUM BLD-SCNC: 137 MEQ/L (ref 136–145)
SODIUM SERPL-SCNC: 135 MEQ/L (ref 135–144)
TCO2 ARTERIAL: 19 MMOL/L (ref 21–32)
TCO2 ARTERIAL: 19 MMOL/L (ref 21–32)
TCO2 ARTERIAL: 24 MMOL/L (ref 21–32)
TCO2 ARTERIAL: 25 MMOL/L (ref 21–32)
WBC # BLD AUTO: 9.5 K/UL (ref 4.8–10.8)

## 2025-01-09 PROCEDURE — 94761 N-INVAS EAR/PLS OXIMETRY MLT: CPT

## 2025-01-09 PROCEDURE — 2500000003 HC RX 250 WO HCPCS: Performed by: NURSE PRACTITIONER

## 2025-01-09 PROCEDURE — 82803 BLOOD GASES ANY COMBINATION: CPT

## 2025-01-09 PROCEDURE — 6360000002 HC RX W HCPCS: Performed by: INTERNAL MEDICINE

## 2025-01-09 PROCEDURE — 31500 INSERT EMERGENCY AIRWAY: CPT

## 2025-01-09 PROCEDURE — 84145 PROCALCITONIN (PCT): CPT

## 2025-01-09 PROCEDURE — 0BH17EZ INSERTION OF ENDOTRACHEAL AIRWAY INTO TRACHEA, VIA NATURAL OR ARTIFICIAL OPENING: ICD-10-PCS | Performed by: INTERNAL MEDICINE

## 2025-01-09 PROCEDURE — 82565 ASSAY OF CREATININE: CPT

## 2025-01-09 PROCEDURE — 6360000002 HC RX W HCPCS: Performed by: NURSE PRACTITIONER

## 2025-01-09 PROCEDURE — 99232 SBSQ HOSP IP/OBS MODERATE 35: CPT | Performed by: SPECIALIST

## 2025-01-09 PROCEDURE — 2580000003 HC RX 258: Performed by: NURSE PRACTITIONER

## 2025-01-09 PROCEDURE — 83605 ASSAY OF LACTIC ACID: CPT

## 2025-01-09 PROCEDURE — 82948 REAGENT STRIP/BLOOD GLUCOSE: CPT

## 2025-01-09 PROCEDURE — 2500000003 HC RX 250 WO HCPCS: Performed by: INTERNAL MEDICINE

## 2025-01-09 PROCEDURE — 85014 HEMATOCRIT: CPT

## 2025-01-09 PROCEDURE — 80048 BASIC METABOLIC PNL TOTAL CA: CPT

## 2025-01-09 PROCEDURE — 83735 ASSAY OF MAGNESIUM: CPT

## 2025-01-09 PROCEDURE — 99291 CRITICAL CARE FIRST HOUR: CPT | Performed by: INTERNAL MEDICINE

## 2025-01-09 PROCEDURE — 71045 X-RAY EXAM CHEST 1 VIEW: CPT

## 2025-01-09 PROCEDURE — 84132 ASSAY OF SERUM POTASSIUM: CPT

## 2025-01-09 PROCEDURE — 2700000000 HC OXYGEN THERAPY PER DAY

## 2025-01-09 PROCEDURE — 2000000000 HC ICU R&B

## 2025-01-09 PROCEDURE — 6370000000 HC RX 637 (ALT 250 FOR IP): Performed by: INTERNAL MEDICINE

## 2025-01-09 PROCEDURE — 84295 ASSAY OF SERUM SODIUM: CPT

## 2025-01-09 PROCEDURE — 2500000003 HC RX 250 WO HCPCS: Performed by: STUDENT IN AN ORGANIZED HEALTH CARE EDUCATION/TRAINING PROGRAM

## 2025-01-09 PROCEDURE — 5A1955Z RESPIRATORY VENTILATION, GREATER THAN 96 CONSECUTIVE HOURS: ICD-10-PCS | Performed by: INTERNAL MEDICINE

## 2025-01-09 PROCEDURE — 2500000003 HC RX 250 WO HCPCS

## 2025-01-09 PROCEDURE — 94002 VENT MGMT INPAT INIT DAY: CPT

## 2025-01-09 PROCEDURE — 2580000003 HC RX 258: Performed by: INTERNAL MEDICINE

## 2025-01-09 PROCEDURE — 82435 ASSAY OF BLOOD CHLORIDE: CPT

## 2025-01-09 PROCEDURE — 99233 SBSQ HOSP IP/OBS HIGH 50: CPT | Performed by: INTERNAL MEDICINE

## 2025-01-09 PROCEDURE — 0202U NFCT DS 22 TRGT SARS-COV-2: CPT

## 2025-01-09 PROCEDURE — 36600 WITHDRAWAL OF ARTERIAL BLOOD: CPT

## 2025-01-09 PROCEDURE — 5A0945A ASSISTANCE WITH RESPIRATORY VENTILATION, 24-96 CONSECUTIVE HOURS, HIGH NASAL FLOW/VELOCITY: ICD-10-PCS | Performed by: INTERNAL MEDICINE

## 2025-01-09 PROCEDURE — 82330 ASSAY OF CALCIUM: CPT

## 2025-01-09 PROCEDURE — 85379 FIBRIN DEGRADATION QUANT: CPT

## 2025-01-09 PROCEDURE — 94660 CPAP INITIATION&MGMT: CPT

## 2025-01-09 PROCEDURE — 6370000000 HC RX 637 (ALT 250 FOR IP): Performed by: NURSE PRACTITIONER

## 2025-01-09 PROCEDURE — 85025 COMPLETE CBC W/AUTO DIFF WBC: CPT

## 2025-01-09 PROCEDURE — 6360000002 HC RX W HCPCS

## 2025-01-09 PROCEDURE — 84100 ASSAY OF PHOSPHORUS: CPT

## 2025-01-09 PROCEDURE — 83880 ASSAY OF NATRIURETIC PEPTIDE: CPT

## 2025-01-09 RX ORDER — INSULIN LISPRO 100 [IU]/ML
0-16 INJECTION, SOLUTION INTRAVENOUS; SUBCUTANEOUS EVERY 4 HOURS
Status: DISCONTINUED | OUTPATIENT
Start: 2025-01-09 | End: 2025-01-15 | Stop reason: HOSPADM

## 2025-01-09 RX ORDER — ENOXAPARIN SODIUM 100 MG/ML
1 INJECTION SUBCUTANEOUS 2 TIMES DAILY
Status: DISCONTINUED | OUTPATIENT
Start: 2025-01-09 | End: 2025-01-10

## 2025-01-09 RX ORDER — FERROUS SULFATE 300 MG/5ML
300 LIQUID (ML) ORAL EVERY OTHER DAY
Status: DISCONTINUED | OUTPATIENT
Start: 2025-01-11 | End: 2025-01-15 | Stop reason: HOSPADM

## 2025-01-09 RX ORDER — NOREPINEPHRINE BITARTRATE 0.06 MG/ML
1-100 INJECTION, SOLUTION INTRAVENOUS CONTINUOUS
Status: DISCONTINUED | OUTPATIENT
Start: 2025-01-09 | End: 2025-01-15 | Stop reason: HOSPADM

## 2025-01-09 RX ORDER — MAGNESIUM SULFATE IN WATER 40 MG/ML
2000 INJECTION, SOLUTION INTRAVENOUS ONCE
Status: COMPLETED | OUTPATIENT
Start: 2025-01-09 | End: 2025-01-09

## 2025-01-09 RX ORDER — BUDESONIDE 0.5 MG/2ML
0.5 INHALANT ORAL
Status: DISCONTINUED | OUTPATIENT
Start: 2025-01-09 | End: 2025-01-15 | Stop reason: HOSPADM

## 2025-01-09 RX ORDER — SUCCINYLCHOLINE CHLORIDE 20 MG/ML
INJECTION INTRAMUSCULAR; INTRAVENOUS
Status: COMPLETED | OUTPATIENT
Start: 2025-01-09 | End: 2025-01-09

## 2025-01-09 RX ORDER — DEXMEDETOMIDINE HYDROCHLORIDE 4 UG/ML
.1-1.5 INJECTION, SOLUTION INTRAVENOUS CONTINUOUS
Status: DISCONTINUED | OUTPATIENT
Start: 2025-01-09 | End: 2025-01-13

## 2025-01-09 RX ORDER — FENTANYL CITRATE-0.9 % NACL/PF 10 MCG/ML
25-300 PLASTIC BAG, INJECTION (ML) INTRAVENOUS CONTINUOUS
Status: DISCONTINUED | OUTPATIENT
Start: 2025-01-09 | End: 2025-01-15 | Stop reason: HOSPADM

## 2025-01-09 RX ORDER — ENOXAPARIN SODIUM 100 MG/ML
1 INJECTION SUBCUTANEOUS 2 TIMES DAILY
Status: DISCONTINUED | OUTPATIENT
Start: 2025-01-09 | End: 2025-01-09

## 2025-01-09 RX ORDER — CISATRACURIUM BESYLATE 2 MG/ML
10 INJECTION, SOLUTION INTRAVENOUS ONCE
Status: COMPLETED | OUTPATIENT
Start: 2025-01-09 | End: 2025-01-09

## 2025-01-09 RX ORDER — OSELTAMIVIR PHOSPHATE 6 MG/ML
30 FOR SUSPENSION ORAL DAILY
Status: DISCONTINUED | OUTPATIENT
Start: 2025-01-09 | End: 2025-01-09

## 2025-01-09 RX ORDER — HEPARIN SODIUM 1000 [USP'U]/ML
80 INJECTION, SOLUTION INTRAVENOUS; SUBCUTANEOUS ONCE
Status: DISCONTINUED | OUTPATIENT
Start: 2025-01-09 | End: 2025-01-09

## 2025-01-09 RX ORDER — FENTANYL CITRATE 50 UG/ML
100 INJECTION, SOLUTION INTRAMUSCULAR; INTRAVENOUS ONCE
Status: COMPLETED | OUTPATIENT
Start: 2025-01-09 | End: 2025-01-09

## 2025-01-09 RX ORDER — HYDRALAZINE HYDROCHLORIDE 20 MG/ML
10 INJECTION INTRAMUSCULAR; INTRAVENOUS EVERY 6 HOURS PRN
Status: DISCONTINUED | OUTPATIENT
Start: 2025-01-09 | End: 2025-01-15 | Stop reason: HOSPADM

## 2025-01-09 RX ORDER — LABETALOL HYDROCHLORIDE 5 MG/ML
10 INJECTION, SOLUTION INTRAVENOUS EVERY 4 HOURS PRN
Status: DISCONTINUED | OUTPATIENT
Start: 2025-01-09 | End: 2025-01-15 | Stop reason: HOSPADM

## 2025-01-09 RX ORDER — FUROSEMIDE 10 MG/ML
20 INJECTION INTRAMUSCULAR; INTRAVENOUS ONCE
Status: COMPLETED | OUTPATIENT
Start: 2025-01-09 | End: 2025-01-09

## 2025-01-09 RX ORDER — VANCOMYCIN 1.75 G/350ML
20 INJECTION, SOLUTION INTRAVENOUS ONCE
Status: COMPLETED | OUTPATIENT
Start: 2025-01-09 | End: 2025-01-09

## 2025-01-09 RX ORDER — CHLORHEXIDINE GLUCONATE ORAL RINSE 1.2 MG/ML
15 SOLUTION DENTAL 2 TIMES DAILY
Status: DISCONTINUED | OUTPATIENT
Start: 2025-01-09 | End: 2025-01-15 | Stop reason: HOSPADM

## 2025-01-09 RX ORDER — ETOMIDATE 2 MG/ML
INJECTION INTRAVENOUS
Status: COMPLETED | OUTPATIENT
Start: 2025-01-09 | End: 2025-01-09

## 2025-01-09 RX ORDER — HEPARIN SODIUM 1000 [USP'U]/ML
80 INJECTION, SOLUTION INTRAVENOUS; SUBCUTANEOUS PRN
Status: DISCONTINUED | OUTPATIENT
Start: 2025-01-09 | End: 2025-01-09

## 2025-01-09 RX ORDER — HEPARIN SODIUM 10000 [USP'U]/100ML
5-30 INJECTION, SOLUTION INTRAVENOUS CONTINUOUS
Status: DISCONTINUED | OUTPATIENT
Start: 2025-01-09 | End: 2025-01-09

## 2025-01-09 RX ORDER — INSULIN LISPRO 100 [IU]/ML
0-8 INJECTION, SOLUTION INTRAVENOUS; SUBCUTANEOUS EVERY 4 HOURS
Status: DISCONTINUED | OUTPATIENT
Start: 2025-01-09 | End: 2025-01-09

## 2025-01-09 RX ORDER — FENTANYL CITRATE-0.9 % NACL/PF 10 MCG/ML
PLASTIC BAG, INJECTION (ML) INTRAVENOUS
Status: COMPLETED
Start: 2025-01-09 | End: 2025-01-09

## 2025-01-09 RX ORDER — HEPARIN SODIUM 1000 [USP'U]/ML
40 INJECTION, SOLUTION INTRAVENOUS; SUBCUTANEOUS PRN
Status: DISCONTINUED | OUTPATIENT
Start: 2025-01-09 | End: 2025-01-09

## 2025-01-09 RX ORDER — PROPOFOL 10 MG/ML
5-50 INJECTION, EMULSION INTRAVENOUS CONTINUOUS
Status: DISCONTINUED | OUTPATIENT
Start: 2025-01-09 | End: 2025-01-15 | Stop reason: HOSPADM

## 2025-01-09 RX ORDER — OSELTAMIVIR PHOSPHATE 6 MG/ML
30 FOR SUSPENSION ORAL DAILY
Status: COMPLETED | OUTPATIENT
Start: 2025-01-09 | End: 2025-01-13

## 2025-01-09 RX ORDER — FENTANYL CITRATE 50 UG/ML
INJECTION, SOLUTION INTRAMUSCULAR; INTRAVENOUS
Status: COMPLETED
Start: 2025-01-09 | End: 2025-01-09

## 2025-01-09 RX ORDER — NOREPINEPHRINE BITARTRATE 0.06 MG/ML
INJECTION, SOLUTION INTRAVENOUS
Status: COMPLETED
Start: 2025-01-09 | End: 2025-01-09

## 2025-01-09 RX ADMIN — Medication 200 MCG/HR: at 18:48

## 2025-01-09 RX ADMIN — ENOXAPARIN SODIUM 60 MG: 100 INJECTION SUBCUTANEOUS at 20:38

## 2025-01-09 RX ADMIN — VANCOMYCIN 1250 MG: 1.75 INJECTION, SOLUTION INTRAVENOUS at 17:08

## 2025-01-09 RX ADMIN — PREDNISONE 40 MG: 10 TABLET ORAL at 08:55

## 2025-01-09 RX ADMIN — ENOXAPARIN SODIUM 60 MG: 100 INJECTION SUBCUTANEOUS at 08:56

## 2025-01-09 RX ADMIN — DEXMEDETOMIDINE HYDROCHLORIDE 0.2 MCG/KG/HR: 400 INJECTION, SOLUTION INTRAVENOUS at 12:23

## 2025-01-09 RX ADMIN — HYDRALAZINE HYDROCHLORIDE 10 MG: 20 INJECTION INTRAMUSCULAR; INTRAVENOUS at 05:05

## 2025-01-09 RX ADMIN — PROPOFOL 50 MCG/KG/MIN: 10 INJECTION, EMULSION INTRAVENOUS at 20:15

## 2025-01-09 RX ADMIN — INSULIN LISPRO 8 UNITS: 100 INJECTION, SOLUTION INTRAVENOUS; SUBCUTANEOUS at 15:09

## 2025-01-09 RX ADMIN — MICONAZOLE NITRATE: 20 CREAM TOPICAL at 10:32

## 2025-01-09 RX ADMIN — INSULIN GLARGINE 5 UNITS: 100 INJECTION, SOLUTION SUBCUTANEOUS at 20:38

## 2025-01-09 RX ADMIN — DOCUSATE SODIUM 100 MG: 50 LIQUID ORAL at 20:40

## 2025-01-09 RX ADMIN — CISATRACURIUM BESYLATE 10 MG: 10 INJECTION, SOLUTION INTRAVENOUS at 12:23

## 2025-01-09 RX ADMIN — ATORVASTATIN CALCIUM 40 MG: 40 TABLET, FILM COATED ORAL at 20:41

## 2025-01-09 RX ADMIN — INSULIN LISPRO 4 UNITS: 100 INJECTION, SOLUTION INTRAVENOUS; SUBCUTANEOUS at 18:03

## 2025-01-09 RX ADMIN — Medication 300 MCG/HR: at 15:01

## 2025-01-09 RX ADMIN — FOLIC ACID 1 MG: 1 TABLET ORAL at 08:55

## 2025-01-09 RX ADMIN — QUETIAPINE FUMARATE 50 MG: 50 TABLET ORAL at 20:41

## 2025-01-09 RX ADMIN — FENTANYL CITRATE 100 MCG: 50 INJECTION INTRAMUSCULAR; INTRAVENOUS at 06:30

## 2025-01-09 RX ADMIN — PAROXETINE HYDROCHLORIDE 40 MG: 20 TABLET, FILM COATED ORAL at 08:55

## 2025-01-09 RX ADMIN — NOREPINEPHRINE BITARTRATE 8 MCG/MIN: 64 SOLUTION INTRAVENOUS at 07:07

## 2025-01-09 RX ADMIN — Medication 200 MCG/HR: at 11:27

## 2025-01-09 RX ADMIN — CHLORHEXIDINE GLUCONATE 15 ML: 1.2 RINSE ORAL at 20:40

## 2025-01-09 RX ADMIN — MEROPENEM 1000 MG: 1 INJECTION INTRAVENOUS at 16:52

## 2025-01-09 RX ADMIN — PROPOFOL 15 MCG/KG/MIN: 10 INJECTION, EMULSION INTRAVENOUS at 06:21

## 2025-01-09 RX ADMIN — MICONAZOLE NITRATE: 20 CREAM TOPICAL at 20:42

## 2025-01-09 RX ADMIN — INSULIN LISPRO 2 UNITS: 100 INJECTION, SOLUTION INTRAVENOUS; SUBCUTANEOUS at 09:01

## 2025-01-09 RX ADMIN — POLYETHYLENE GLYCOL 3350 17 G: 17 POWDER, FOR SOLUTION ORAL at 08:56

## 2025-01-09 RX ADMIN — FERROUS GLUCONATE 324 MG: 324 TABLET ORAL at 08:55

## 2025-01-09 RX ADMIN — POLYETHYLENE GLYCOL 3350 17 G: 17 POWDER, FOR SOLUTION ORAL at 20:40

## 2025-01-09 RX ADMIN — FUROSEMIDE 20 MG: 10 INJECTION, SOLUTION INTRAMUSCULAR; INTRAVENOUS at 06:38

## 2025-01-09 RX ADMIN — SODIUM CHLORIDE, PRESERVATIVE FREE 40 MG: 5 INJECTION INTRAVENOUS at 20:41

## 2025-01-09 RX ADMIN — DOCUSATE SODIUM 100 MG: 50 LIQUID ORAL at 10:28

## 2025-01-09 RX ADMIN — ETOMIDATE 20 MG: 2 INJECTION, SOLUTION INTRAVENOUS at 06:08

## 2025-01-09 RX ADMIN — FENTANYL CITRATE 100 MCG: 50 INJECTION, SOLUTION INTRAMUSCULAR; INTRAVENOUS at 06:30

## 2025-01-09 RX ADMIN — PROPOFOL 50 MCG/KG/MIN: 10 INJECTION, EMULSION INTRAVENOUS at 15:01

## 2025-01-09 RX ADMIN — SODIUM CHLORIDE, PRESERVATIVE FREE 40 MG: 5 INJECTION INTRAVENOUS at 08:56

## 2025-01-09 RX ADMIN — Medication 25 MCG/HR: at 06:37

## 2025-01-09 RX ADMIN — SUCCINYLCHOLINE CHLORIDE 50 MG: 20 INJECTION INTRAMUSCULAR; INTRAVENOUS at 06:10

## 2025-01-09 RX ADMIN — SODIUM BICARBONATE 100 MEQ: 84 INJECTION INTRAVENOUS at 12:17

## 2025-01-09 RX ADMIN — NOREPINEPHRINE BITARTRATE 8 MCG/MIN: 64 SOLUTION INTRAVENOUS at 08:46

## 2025-01-09 RX ADMIN — Medication 10 ML: at 20:38

## 2025-01-09 RX ADMIN — PROPOFOL 50 MCG/KG/MIN: 10 INJECTION, EMULSION INTRAVENOUS at 09:57

## 2025-01-09 RX ADMIN — MAGNESIUM SULFATE HEPTAHYDRATE 2000 MG: 40 INJECTION, SOLUTION INTRAVENOUS at 13:43

## 2025-01-09 RX ADMIN — CHLORHEXIDINE GLUCONATE 15 ML: 1.2 RINSE ORAL at 08:56

## 2025-01-09 RX ADMIN — Medication 10 ML: at 08:56

## 2025-01-09 RX ADMIN — CEFEPIME 1000 MG: 1 INJECTION, POWDER, FOR SOLUTION INTRAMUSCULAR; INTRAVENOUS at 08:55

## 2025-01-09 RX ADMIN — DOXYCYCLINE HYCLATE 100 MG: 100 CAPSULE ORAL at 08:56

## 2025-01-09 ASSESSMENT — PULMONARY FUNCTION TESTS
PIF_VALUE: 9.8
PIF_VALUE: 12
PIF_VALUE: 25
PIF_VALUE: 12
PIF_VALUE: 25
PIF_VALUE: 23
PIF_VALUE: 9
PIF_VALUE: 9.9
PIF_VALUE: 23
PIF_VALUE: 21
PIF_VALUE: 25
PIF_VALUE: 17
PIF_VALUE: 18
PIF_VALUE: 25
PIF_VALUE: 20
PIF_VALUE: 10
PIF_VALUE: 8.4
PIF_VALUE: 22
PIF_VALUE: 13
PIF_VALUE: 23
PIF_VALUE: 7.8
PIF_VALUE: 25
PIF_VALUE: 25
PIF_VALUE: 26
PIF_VALUE: 24
PIF_VALUE: 26
PIF_VALUE: 23
PIF_VALUE: 25
PIF_VALUE: 23
PIF_VALUE: 19
PIF_VALUE: 21
PIF_VALUE: 8.3
PIF_VALUE: 25
PIF_VALUE: 15

## 2025-01-09 ASSESSMENT — PAIN SCALES - WONG BAKER: WONGBAKER_NUMERICALRESPONSE: NO HURT

## 2025-01-09 ASSESSMENT — PAIN SCALES - GENERAL
PAINLEVEL_OUTOF10: 0
PAINLEVEL_OUTOF10: 0

## 2025-01-09 NOTE — PROCEDURES
PROCEDURE NOTE  Patient Name: Isaak Mitchell   Medical Record Number: [id number]  Date: 1/9/2025   Time: 6:21 AM   Room/Bed: [place] [address]  Intubation Procedure Note  Indication: impending respiratory failure, hypoxia, and hypercarbia    Consent: The patient provided verbal consent for this procedure.    Medications Used: etomidate intravenously and succinycholine intravenously    Procedure: The patient was placed in the appropriate position.  Cricoid pressure was not required.  Intubation was performed by indirect laryngoscopy using a bronchoscope and a 7.5 cuffed endotracheal tube.  The cuff was then inflated and the tube was secured appropriately at a distance of 23 cm to the dental ridge.  Initial confirmation of placement included bilateral breath sounds, an end tidal CO2 detector, absence of sounds over the stomach, tube fogging, adequate chest rise, and adequate pulse oximetry reading.  A chest x-ray to verify correct placement of the tube showed appropriate tube position.    The patient tolerated the procedure well.     Complications: None    Supervised by Dr Beckie Simpson MD    Electronically Signed by: Electronically signed by TERRA Rao CNP on 1/9/2025 at 6:24 AM

## 2025-01-09 NOTE — SIGNIFICANT EVENT
Rapid Response Note    Patient in respiratory distress.  He was unable to tolerate BiPAP.  Agreeable to intubation.  He was intubated on the floor and transferred to ICU.  Patient was found to have a small PE earlier in the admission and he was started on Lovenox daily due to low creatinine clearance.  His creatinine clearance did improve and currently requires twice daily Lovenox.  Therefore, the frequency was adjusted.  Chest x-ray post intubation showed right-sided pleural effusion.  Possibly developing pulmonary edema.  Will give a dose of Lasix.  Continue monitoring patient in ICU    35 minutes of CC time    Electronically signed by Beckie Simpson MD on 1/9/2025 at 6:46 AM

## 2025-01-10 PROBLEM — J09.X1 INFLUENZA A WITH PNEUMONIA: Status: ACTIVE | Noted: 2025-01-10

## 2025-01-10 LAB
ALBUMIN SERPL-MCNC: 2.2 G/DL (ref 3.5–4.6)
ANION GAP SERPL CALCULATED.3IONS-SCNC: 12 MEQ/L (ref 9–15)
BASOPHILS # BLD: 0 K/UL (ref 0–0.2)
BASOPHILS NFR BLD: 0.1 %
BUN SERPL-MCNC: 37 MG/DL (ref 8–23)
CALCIUM SERPL-MCNC: 7.9 MG/DL (ref 8.5–9.9)
CHLORIDE SERPL-SCNC: 103 MEQ/L (ref 95–107)
CO2 SERPL-SCNC: 22 MEQ/L (ref 20–31)
CREAT SERPL-MCNC: 2.52 MG/DL (ref 0.7–1.2)
EOSINOPHIL # BLD: 0.1 K/UL (ref 0–0.7)
EOSINOPHIL NFR BLD: 0.6 %
ERYTHROCYTE [DISTWIDTH] IN BLOOD BY AUTOMATED COUNT: 15 % (ref 11.5–14.5)
GLUCOSE BLD-MCNC: 120 MG/DL (ref 70–99)
GLUCOSE BLD-MCNC: 123 MG/DL (ref 70–99)
GLUCOSE BLD-MCNC: 147 MG/DL (ref 70–99)
GLUCOSE BLD-MCNC: 165 MG/DL (ref 70–99)
GLUCOSE BLD-MCNC: 216 MG/DL (ref 70–99)
GLUCOSE BLD-MCNC: 252 MG/DL (ref 70–99)
GLUCOSE SERPL-MCNC: 135 MG/DL (ref 70–99)
HCT VFR BLD AUTO: 25.6 % (ref 42–52)
HGB BLD-MCNC: 8.6 G/DL (ref 14–18)
LYMPHOCYTES # BLD: 1 K/UL (ref 1–4.8)
LYMPHOCYTES NFR BLD: 11 %
MCH RBC QN AUTO: 30.4 PG (ref 27–31.3)
MCHC RBC AUTO-ENTMCNC: 33.6 % (ref 33–37)
MCV RBC AUTO: 90.5 FL (ref 79–92.2)
MONOCYTES # BLD: 0.5 K/UL (ref 0.2–0.8)
MONOCYTES NFR BLD: 5 %
NEUTROPHILS # BLD: 7 K/UL (ref 1.4–6.5)
NEUTS SEG NFR BLD: 78.8 %
PERFORMED ON: ABNORMAL
PHOSPHATE SERPL-MCNC: 6.2 MG/DL (ref 2.3–4.8)
PLATELET # BLD AUTO: 339 K/UL (ref 130–400)
POTASSIUM SERPL-SCNC: 4.2 MEQ/L (ref 3.4–4.9)
RBC # BLD AUTO: 2.83 M/UL (ref 4.7–6.1)
SODIUM SERPL-SCNC: 137 MEQ/L (ref 135–144)
VANCOMYCIN TROUGH SERPL-MCNC: 14.1 UG/ML (ref 10–20)
WBC # BLD AUTO: 8.9 K/UL (ref 4.8–10.8)

## 2025-01-10 PROCEDURE — 94003 VENT MGMT INPAT SUBQ DAY: CPT

## 2025-01-10 PROCEDURE — 2580000003 HC RX 258: Performed by: INTERNAL MEDICINE

## 2025-01-10 PROCEDURE — 99232 SBSQ HOSP IP/OBS MODERATE 35: CPT | Performed by: INTERNAL MEDICINE

## 2025-01-10 PROCEDURE — 80202 ASSAY OF VANCOMYCIN: CPT

## 2025-01-10 PROCEDURE — 2500000003 HC RX 250 WO HCPCS: Performed by: INTERNAL MEDICINE

## 2025-01-10 PROCEDURE — 2000000000 HC ICU R&B

## 2025-01-10 PROCEDURE — 2700000000 HC OXYGEN THERAPY PER DAY

## 2025-01-10 PROCEDURE — 6370000000 HC RX 637 (ALT 250 FOR IP): Performed by: NURSE PRACTITIONER

## 2025-01-10 PROCEDURE — 6370000000 HC RX 637 (ALT 250 FOR IP): Performed by: INTERNAL MEDICINE

## 2025-01-10 PROCEDURE — 94761 N-INVAS EAR/PLS OXIMETRY MLT: CPT

## 2025-01-10 PROCEDURE — 99231 SBSQ HOSP IP/OBS SF/LOW 25: CPT | Performed by: SPECIALIST

## 2025-01-10 PROCEDURE — 6360000002 HC RX W HCPCS: Performed by: NURSE PRACTITIONER

## 2025-01-10 PROCEDURE — 89220 SPUTUM SPECIMEN COLLECTION: CPT

## 2025-01-10 PROCEDURE — 2500000003 HC RX 250 WO HCPCS: Performed by: NURSE PRACTITIONER

## 2025-01-10 PROCEDURE — 99291 CRITICAL CARE FIRST HOUR: CPT | Performed by: INTERNAL MEDICINE

## 2025-01-10 PROCEDURE — 80069 RENAL FUNCTION PANEL: CPT

## 2025-01-10 PROCEDURE — 85025 COMPLETE CBC W/AUTO DIFF WBC: CPT

## 2025-01-10 PROCEDURE — 2580000003 HC RX 258: Performed by: NURSE PRACTITIONER

## 2025-01-10 PROCEDURE — 6360000002 HC RX W HCPCS: Performed by: INTERNAL MEDICINE

## 2025-01-10 RX ORDER — ENOXAPARIN SODIUM 100 MG/ML
1 INJECTION SUBCUTANEOUS DAILY
Status: DISCONTINUED | OUTPATIENT
Start: 2025-01-11 | End: 2025-01-14

## 2025-01-10 RX ORDER — FUROSEMIDE 10 MG/ML
40 INJECTION INTRAMUSCULAR; INTRAVENOUS ONCE
Status: COMPLETED | OUTPATIENT
Start: 2025-01-10 | End: 2025-01-10

## 2025-01-10 RX ORDER — INSULIN GLARGINE 100 [IU]/ML
10 INJECTION, SOLUTION SUBCUTANEOUS NIGHTLY
Status: DISCONTINUED | OUTPATIENT
Start: 2025-01-10 | End: 2025-01-12

## 2025-01-10 RX ADMIN — POLYETHYLENE GLYCOL 3350 17 G: 17 POWDER, FOR SOLUTION ORAL at 08:14

## 2025-01-10 RX ADMIN — Medication 10 ML: at 22:32

## 2025-01-10 RX ADMIN — POLYETHYLENE GLYCOL 3350 17 G: 17 POWDER, FOR SOLUTION ORAL at 22:31

## 2025-01-10 RX ADMIN — Medication 200 MCG/HR: at 05:01

## 2025-01-10 RX ADMIN — PROPOFOL 45 MCG/KG/MIN: 10 INJECTION, EMULSION INTRAVENOUS at 17:44

## 2025-01-10 RX ADMIN — PROPOFOL 50 MCG/KG/MIN: 10 INJECTION, EMULSION INTRAVENOUS at 02:00

## 2025-01-10 RX ADMIN — SODIUM CHLORIDE, PRESERVATIVE FREE 40 MG: 5 INJECTION INTRAVENOUS at 08:14

## 2025-01-10 RX ADMIN — OSELTAMIVIR PHOSPHATE 30 MG: 6 FOR SUSPENSION ORAL at 22:31

## 2025-01-10 RX ADMIN — ASPIRIN 81 MG: 81 TABLET, CHEWABLE ORAL at 08:13

## 2025-01-10 RX ADMIN — INSULIN LISPRO 8 UNITS: 100 INJECTION, SOLUTION INTRAVENOUS; SUBCUTANEOUS at 11:18

## 2025-01-10 RX ADMIN — MEROPENEM 1000 MG: 1 INJECTION INTRAVENOUS at 05:28

## 2025-01-10 RX ADMIN — MICONAZOLE NITRATE: 20 CREAM TOPICAL at 08:15

## 2025-01-10 RX ADMIN — PAROXETINE HYDROCHLORIDE 40 MG: 20 TABLET, FILM COATED ORAL at 08:14

## 2025-01-10 RX ADMIN — Medication 125 MCG/HR: at 20:59

## 2025-01-10 RX ADMIN — PROPOFOL 50 MCG/KG/MIN: 10 INJECTION, EMULSION INTRAVENOUS at 04:51

## 2025-01-10 RX ADMIN — INSULIN GLARGINE 10 UNITS: 100 INJECTION, SOLUTION SUBCUTANEOUS at 22:31

## 2025-01-10 RX ADMIN — ENOXAPARIN SODIUM 60 MG: 100 INJECTION SUBCUTANEOUS at 08:14

## 2025-01-10 RX ADMIN — INSULIN LISPRO 4 UNITS: 100 INJECTION, SOLUTION INTRAVENOUS; SUBCUTANEOUS at 15:08

## 2025-01-10 RX ADMIN — Medication 100 MCG/HR: at 11:03

## 2025-01-10 RX ADMIN — FOLIC ACID 1 MG: 1 TABLET ORAL at 08:14

## 2025-01-10 RX ADMIN — METHYLPREDNISOLONE SODIUM SUCCINATE 40 MG: 40 INJECTION, POWDER, LYOPHILIZED, FOR SOLUTION INTRAMUSCULAR; INTRAVENOUS at 08:14

## 2025-01-10 RX ADMIN — Medication 200 MCG/HR: at 00:08

## 2025-01-10 RX ADMIN — SODIUM CHLORIDE, PRESERVATIVE FREE 40 MG: 5 INJECTION INTRAVENOUS at 22:31

## 2025-01-10 RX ADMIN — QUETIAPINE FUMARATE 50 MG: 50 TABLET ORAL at 22:31

## 2025-01-10 RX ADMIN — CHLORHEXIDINE GLUCONATE 15 ML: 1.2 RINSE ORAL at 08:14

## 2025-01-10 RX ADMIN — DOCUSATE SODIUM 100 MG: 50 LIQUID ORAL at 08:14

## 2025-01-10 RX ADMIN — Medication 10 ML: at 08:14

## 2025-01-10 RX ADMIN — DOCUSATE SODIUM 100 MG: 50 LIQUID ORAL at 22:30

## 2025-01-10 RX ADMIN — CHLORHEXIDINE GLUCONATE 15 ML: 1.2 RINSE ORAL at 22:31

## 2025-01-10 RX ADMIN — DEXMEDETOMIDINE HYDROCHLORIDE 0.4 MCG/KG/HR: 400 INJECTION, SOLUTION INTRAVENOUS at 11:46

## 2025-01-10 RX ADMIN — OSELTAMIVIR PHOSPHATE 30 MG: 6 FOR SUSPENSION ORAL at 00:12

## 2025-01-10 RX ADMIN — ATORVASTATIN CALCIUM 40 MG: 40 TABLET, FILM COATED ORAL at 22:31

## 2025-01-10 RX ADMIN — PROPOFOL 40 MCG/KG/MIN: 10 INJECTION, EMULSION INTRAVENOUS at 11:19

## 2025-01-10 RX ADMIN — MICONAZOLE NITRATE: 20 CREAM TOPICAL at 22:31

## 2025-01-10 RX ADMIN — MEROPENEM 1000 MG: 1 INJECTION INTRAVENOUS at 16:46

## 2025-01-10 RX ADMIN — FUROSEMIDE 40 MG: 10 INJECTION, SOLUTION INTRAVENOUS at 11:44

## 2025-01-10 RX ADMIN — PROPOFOL 45 MCG/KG/MIN: 10 INJECTION, EMULSION INTRAVENOUS at 20:58

## 2025-01-10 ASSESSMENT — PULMONARY FUNCTION TESTS
PIF_VALUE: 25
PIF_VALUE: 25
PIF_VALUE: 24
PIF_VALUE: 22
PIF_VALUE: 25
PIF_VALUE: 24
PIF_VALUE: 24
PIF_VALUE: 23
PIF_VALUE: 23
PIF_VALUE: 22
PIF_VALUE: 25
PIF_VALUE: 24
PIF_VALUE: 26
PIF_VALUE: 23
PIF_VALUE: 28
PIF_VALUE: 25
PIF_VALUE: 27
PIF_VALUE: 24
PIF_VALUE: 27
PIF_VALUE: 24
PIF_VALUE: 23
PIF_VALUE: 24
PIF_VALUE: 25
PIF_VALUE: 23
PIF_VALUE: 24
PIF_VALUE: 23
PIF_VALUE: 23
PIF_VALUE: 24
PIF_VALUE: 25
PIF_VALUE: 25
PIF_VALUE: 28
PIF_VALUE: 24
PIF_VALUE: 24
PIF_VALUE: 25
PIF_VALUE: 24
PIF_VALUE: 25
PIF_VALUE: 23
PIF_VALUE: 25
PIF_VALUE: 28
PIF_VALUE: 24
PIF_VALUE: 23
PIF_VALUE: 25
PIF_VALUE: 23
PIF_VALUE: 26
PIF_VALUE: 23
PIF_VALUE: 24
PIF_VALUE: 21
PIF_VALUE: 24
PIF_VALUE: 25
PIF_VALUE: 22
PIF_VALUE: 25
PIF_VALUE: 22

## 2025-01-10 ASSESSMENT — PAIN SCALES - GENERAL
PAINLEVEL_OUTOF10: 0

## 2025-01-10 ASSESSMENT — PAIN SCALES - WONG BAKER: WONGBAKER_NUMERICALRESPONSE: NO HURT

## 2025-01-10 NOTE — FLOWSHEET NOTE
[id number]    Shift Summary    1900-  Bedside change of shift report received from Rafy DOUGLAS. ID band and skin check complete.     2000-  PM assessment complete, see flowsheet for details.     2100-  PM medications administered, see MAR for details.     0000-  No acute distress noted.     0200-  No acute distress noted.     0400-  No acute distress noted.     0600-  No acute distress noted    AM lab Obtained.    Daily weight obtained.     Pt BP slightly labile last night and required minimal titrations to Levo to maintain map >65,  see MAR for details.     Change of shift report given to Rafy DOUGLAS

## 2025-01-11 ENCOUNTER — APPOINTMENT (OUTPATIENT)
Dept: GENERAL RADIOLOGY | Age: 75
DRG: 207 | End: 2025-01-11
Attending: INTERNAL MEDICINE
Payer: MEDICARE

## 2025-01-11 ENCOUNTER — APPOINTMENT (OUTPATIENT)
Age: 75
DRG: 207 | End: 2025-01-11
Attending: INTERNAL MEDICINE
Payer: MEDICARE

## 2025-01-11 LAB
ALBUMIN SERPL-MCNC: 2.2 G/DL (ref 3.5–4.6)
ANION GAP SERPL CALCULATED.3IONS-SCNC: 12 MEQ/L (ref 9–15)
BASOPHILS # BLD: 0 K/UL (ref 0–0.2)
BASOPHILS NFR BLD: 0.1 %
BUN SERPL-MCNC: 43 MG/DL (ref 8–23)
CALCIUM SERPL-MCNC: 8 MG/DL (ref 8.5–9.9)
CHLORIDE SERPL-SCNC: 107 MEQ/L (ref 95–107)
CO2 SERPL-SCNC: 22 MEQ/L (ref 20–31)
CREAT SERPL-MCNC: 2.93 MG/DL (ref 0.7–1.2)
ECHO BSA: 1.72 M2
ECHO LA VOL A-L A2C: 51 ML (ref 18–58)
ECHO LA VOL A-L A4C: 47 ML (ref 18–58)
ECHO LA VOL MOD A2C: 48 ML (ref 18–58)
ECHO LA VOL MOD A4C: 42 ML (ref 18–58)
ECHO LA VOLUME AREA LENGTH: 50 ML
ECHO LA VOLUME INDEX A-L A2C: 30 ML/M2 (ref 16–34)
ECHO LA VOLUME INDEX A-L A4C: 28 ML/M2 (ref 16–34)
ECHO LA VOLUME INDEX AREA LENGTH: 29 ML/M2 (ref 16–34)
ECHO LA VOLUME INDEX MOD A2C: 28 ML/M2 (ref 16–34)
ECHO LA VOLUME INDEX MOD A4C: 25 ML/M2 (ref 16–34)
ECHO LV E' LATERAL VELOCITY: 9.97 CM/S
ECHO LV E' SEPTAL VELOCITY: 7.01 CM/S
ECHO LV EDV A2C: 65 ML
ECHO LV EDV A4C: 80 ML
ECHO LV EDV BP: 74 ML (ref 67–155)
ECHO LV EDV INDEX A4C: 47 ML/M2
ECHO LV EDV INDEX BP: 44 ML/M2
ECHO LV EDV NDEX A2C: 38 ML/M2
ECHO LV EJECTION FRACTION A2C: 55 %
ECHO LV EJECTION FRACTION A4C: 58 %
ECHO LV EJECTION FRACTION BIPLANE: 57 % (ref 55–100)
ECHO LV ESV A2C: 30 ML
ECHO LV ESV A4C: 34 ML
ECHO LV ESV BP: 32 ML (ref 22–58)
ECHO LV ESV INDEX A2C: 18 ML/M2
ECHO LV ESV INDEX A4C: 20 ML/M2
ECHO LV ESV INDEX BP: 19 ML/M2
ECHO LV FRACTIONAL SHORTENING: 41 % (ref 28–44)
ECHO LV INTERNAL DIMENSION DIASTOLE INDEX: 2.41 CM/M2
ECHO LV INTERNAL DIMENSION DIASTOLIC: 4.1 CM (ref 4.2–5.9)
ECHO LV INTERNAL DIMENSION SYSTOLIC INDEX: 1.41 CM/M2
ECHO LV INTERNAL DIMENSION SYSTOLIC: 2.4 CM
ECHO LV IVSD: 1 CM (ref 0.6–1)
ECHO LV IVSS: 1.3 CM
ECHO LV MASS 2D: 123 G (ref 88–224)
ECHO LV MASS INDEX 2D: 72.3 G/M2 (ref 49–115)
ECHO LV POSTERIOR WALL DIASTOLIC: 0.9 CM (ref 0.6–1)
ECHO LV POSTERIOR WALL SYSTOLIC: 1.5 CM
ECHO LV RELATIVE WALL THICKNESS RATIO: 0.44
ECHO MV A VELOCITY: 0.65 M/S
ECHO MV E DECELERATION TIME (DT): 213.4 MS
ECHO MV E VELOCITY: 0.53 M/S
ECHO MV E/A RATIO: 0.82
ECHO MV E/E' LATERAL: 5.32
ECHO MV E/E' RATIO (AVERAGED): 6.44
ECHO MV E/E' SEPTAL: 7.56
ECHO RV INTERNAL DIMENSION: 3.2 CM
ECHO RV TAPSE: 2.1 CM (ref 1.7–?)
ECHO TV REGURGITANT MAX VELOCITY: 3.89 M/S
ECHO TV REGURGITANT PEAK GRADIENT: 61 MMHG
EOSINOPHIL # BLD: 0.1 K/UL (ref 0–0.7)
EOSINOPHIL NFR BLD: 1.7 %
ERYTHROCYTE [DISTWIDTH] IN BLOOD BY AUTOMATED COUNT: 15.3 % (ref 11.5–14.5)
GLUCOSE BLD-MCNC: 112 MG/DL (ref 70–99)
GLUCOSE BLD-MCNC: 112 MG/DL (ref 70–99)
GLUCOSE BLD-MCNC: 175 MG/DL (ref 70–99)
GLUCOSE BLD-MCNC: 191 MG/DL (ref 70–99)
GLUCOSE BLD-MCNC: 97 MG/DL (ref 70–99)
GLUCOSE SERPL-MCNC: 95 MG/DL (ref 70–99)
HCT VFR BLD AUTO: 26 % (ref 42–52)
HCT VFR BLD AUTO: 26.3 % (ref 42–52)
HGB BLD-MCNC: 8.4 G/DL (ref 14–18)
LYMPHOCYTES # BLD: 0.9 K/UL (ref 1–4.8)
LYMPHOCYTES NFR BLD: 12.7 %
MCH RBC QN AUTO: 29.2 PG (ref 27–31.3)
MCHC RBC AUTO-ENTMCNC: 31.9 % (ref 33–37)
MCV RBC AUTO: 91.3 FL (ref 79–92.2)
MONOCYTES # BLD: 0.2 K/UL (ref 0.2–0.8)
MONOCYTES NFR BLD: 3.4 %
NEUTROPHILS # BLD: 5.5 K/UL (ref 1.4–6.5)
NEUTS SEG NFR BLD: 77 %
PERFORMED ON: ABNORMAL
PERFORMED ON: NORMAL
PHOSPHATE SERPL-MCNC: 5.8 MG/DL (ref 2.3–4.8)
PLATELET # BLD AUTO: 309 K/UL (ref 130–400)
PLATELET BLD QL SMEAR: ADEQUATE
POTASSIUM SERPL-SCNC: 3.8 MEQ/L (ref 3.4–4.9)
PTH-INTACT SERPL-MCNC: 90.2 PG/ML (ref 15–65)
RBC # BLD AUTO: 2.88 M/UL (ref 4.7–6.1)
RETICS # AUTO: 0.07 M/CUMM (ref 0.02–0.11)
RETICS/RBC NFR: 2.4 % (ref 0.6–2.2)
SLIDE REVIEW: ABNORMAL
SODIUM SERPL-SCNC: 141 MEQ/L (ref 135–144)
WBC # BLD AUTO: 7.1 K/UL (ref 4.8–10.8)

## 2025-01-11 PROCEDURE — 80069 RENAL FUNCTION PANEL: CPT

## 2025-01-11 PROCEDURE — 2000000000 HC ICU R&B

## 2025-01-11 PROCEDURE — 6370000000 HC RX 637 (ALT 250 FOR IP): Performed by: INTERNAL MEDICINE

## 2025-01-11 PROCEDURE — 2580000003 HC RX 258: Performed by: INTERNAL MEDICINE

## 2025-01-11 PROCEDURE — 2500000003 HC RX 250 WO HCPCS: Performed by: NURSE PRACTITIONER

## 2025-01-11 PROCEDURE — 93005 ELECTROCARDIOGRAM TRACING: CPT | Performed by: INTERNAL MEDICINE

## 2025-01-11 PROCEDURE — 83550 IRON BINDING TEST: CPT

## 2025-01-11 PROCEDURE — 99291 CRITICAL CARE FIRST HOUR: CPT | Performed by: INTERNAL MEDICINE

## 2025-01-11 PROCEDURE — 71045 X-RAY EXAM CHEST 1 VIEW: CPT

## 2025-01-11 PROCEDURE — 6370000000 HC RX 637 (ALT 250 FOR IP): Performed by: NURSE PRACTITIONER

## 2025-01-11 PROCEDURE — 83540 ASSAY OF IRON: CPT

## 2025-01-11 PROCEDURE — 99232 SBSQ HOSP IP/OBS MODERATE 35: CPT | Performed by: INTERNAL MEDICINE

## 2025-01-11 PROCEDURE — 2700000000 HC OXYGEN THERAPY PER DAY

## 2025-01-11 PROCEDURE — 6360000002 HC RX W HCPCS: Performed by: INTERNAL MEDICINE

## 2025-01-11 PROCEDURE — 85025 COMPLETE CBC W/AUTO DIFF WBC: CPT

## 2025-01-11 PROCEDURE — 85046 RETICYTE/HGB CONCENTRATE: CPT

## 2025-01-11 PROCEDURE — 93308 TTE F-UP OR LMTD: CPT

## 2025-01-11 PROCEDURE — 83970 ASSAY OF PARATHORMONE: CPT

## 2025-01-11 PROCEDURE — 82728 ASSAY OF FERRITIN: CPT

## 2025-01-11 PROCEDURE — 6360000002 HC RX W HCPCS: Performed by: NURSE PRACTITIONER

## 2025-01-11 PROCEDURE — 2580000003 HC RX 258: Performed by: NURSE PRACTITIONER

## 2025-01-11 PROCEDURE — 93308 TTE F-UP OR LMTD: CPT | Performed by: INTERNAL MEDICINE

## 2025-01-11 PROCEDURE — 93321 DOPPLER ECHO F-UP/LMTD STD: CPT | Performed by: INTERNAL MEDICINE

## 2025-01-11 PROCEDURE — 2500000003 HC RX 250 WO HCPCS: Performed by: INTERNAL MEDICINE

## 2025-01-11 PROCEDURE — 94003 VENT MGMT INPAT SUBQ DAY: CPT

## 2025-01-11 PROCEDURE — 99232 SBSQ HOSP IP/OBS MODERATE 35: CPT | Performed by: NURSE PRACTITIONER

## 2025-01-11 PROCEDURE — 93325 DOPPLER ECHO COLOR FLOW MAPG: CPT | Performed by: INTERNAL MEDICINE

## 2025-01-11 RX ADMIN — CHLORHEXIDINE GLUCONATE 15 ML: 1.2 RINSE ORAL at 10:52

## 2025-01-11 RX ADMIN — ENOXAPARIN SODIUM 60 MG: 100 INJECTION SUBCUTANEOUS at 10:55

## 2025-01-11 RX ADMIN — DOCUSATE SODIUM 100 MG: 50 LIQUID ORAL at 21:47

## 2025-01-11 RX ADMIN — Medication 10 ML: at 21:53

## 2025-01-11 RX ADMIN — MICONAZOLE NITRATE: 20 CREAM TOPICAL at 22:18

## 2025-01-11 RX ADMIN — OSELTAMIVIR PHOSPHATE 30 MG: 6 FOR SUSPENSION ORAL at 21:48

## 2025-01-11 RX ADMIN — PAROXETINE HYDROCHLORIDE 40 MG: 20 TABLET, FILM COATED ORAL at 10:56

## 2025-01-11 RX ADMIN — POLYETHYLENE GLYCOL 3350 17 G: 17 POWDER, FOR SOLUTION ORAL at 21:47

## 2025-01-11 RX ADMIN — ATORVASTATIN CALCIUM 40 MG: 40 TABLET, FILM COATED ORAL at 21:47

## 2025-01-11 RX ADMIN — CHLORHEXIDINE GLUCONATE 15 ML: 1.2 RINSE ORAL at 21:47

## 2025-01-11 RX ADMIN — PROPOFOL 20 MCG/KG/MIN: 10 INJECTION, EMULSION INTRAVENOUS at 15:02

## 2025-01-11 RX ADMIN — METHYLPREDNISOLONE SODIUM SUCCINATE 40 MG: 40 INJECTION, POWDER, LYOPHILIZED, FOR SOLUTION INTRAMUSCULAR; INTRAVENOUS at 10:56

## 2025-01-11 RX ADMIN — DOCUSATE SODIUM 100 MG: 50 LIQUID ORAL at 10:52

## 2025-01-11 RX ADMIN — INSULIN GLARGINE 10 UNITS: 100 INJECTION, SOLUTION SUBCUTANEOUS at 22:17

## 2025-01-11 RX ADMIN — Medication 175 MCG/HR: at 23:02

## 2025-01-11 RX ADMIN — ASPIRIN 81 MG: 81 TABLET, CHEWABLE ORAL at 10:52

## 2025-01-11 RX ADMIN — FOLIC ACID 1 MG: 1 TABLET ORAL at 10:52

## 2025-01-11 RX ADMIN — PROPOFOL 35 MCG/KG/MIN: 10 INJECTION, EMULSION INTRAVENOUS at 02:47

## 2025-01-11 RX ADMIN — MEROPENEM 1000 MG: 1 INJECTION INTRAVENOUS at 05:20

## 2025-01-11 RX ADMIN — Medication 10 ML: at 10:42

## 2025-01-11 RX ADMIN — QUETIAPINE FUMARATE 50 MG: 50 TABLET ORAL at 21:47

## 2025-01-11 RX ADMIN — Medication 100 MCG/HR: at 05:28

## 2025-01-11 RX ADMIN — MINERAL SUPPLEMENT IRON 300 MG / 5 ML STRENGTH LIQUID 100 PER BOX UNFLAVORED 300 MG: at 10:52

## 2025-01-11 RX ADMIN — SODIUM CHLORIDE, PRESERVATIVE FREE 40 MG: 5 INJECTION INTRAVENOUS at 10:52

## 2025-01-11 RX ADMIN — Medication 125 MCG/HR: at 17:10

## 2025-01-11 RX ADMIN — POLYETHYLENE GLYCOL 3350 17 G: 17 POWDER, FOR SOLUTION ORAL at 10:52

## 2025-01-11 RX ADMIN — MICONAZOLE NITRATE: 20 CREAM TOPICAL at 11:13

## 2025-01-11 RX ADMIN — MEROPENEM 500 MG: 500 INJECTION INTRAVENOUS at 17:14

## 2025-01-11 RX ADMIN — SODIUM CHLORIDE, PRESERVATIVE FREE 40 MG: 5 INJECTION INTRAVENOUS at 21:48

## 2025-01-11 RX ADMIN — INSULIN LISPRO 4 UNITS: 100 INJECTION, SOLUTION INTRAVENOUS; SUBCUTANEOUS at 17:20

## 2025-01-11 RX ADMIN — DEXMEDETOMIDINE HYDROCHLORIDE 0.6 MCG/KG/HR: 400 INJECTION, SOLUTION INTRAVENOUS at 02:50

## 2025-01-11 ASSESSMENT — PULMONARY FUNCTION TESTS
PIF_VALUE: 27
PIF_VALUE: 24
PIF_VALUE: 26
PIF_VALUE: 24
PIF_VALUE: 25
PIF_VALUE: 24
PIF_VALUE: 22
PIF_VALUE: 27
PIF_VALUE: 23
PIF_VALUE: 23
PIF_VALUE: 24
PIF_VALUE: 24
PIF_VALUE: 28
PIF_VALUE: 24
PIF_VALUE: 26
PIF_VALUE: 25
PIF_VALUE: 23
PIF_VALUE: 24
PIF_VALUE: 30
PIF_VALUE: 24
PIF_VALUE: 25
PIF_VALUE: 23
PIF_VALUE: 26
PIF_VALUE: 24
PIF_VALUE: 23
PIF_VALUE: 24
PIF_VALUE: 25
PIF_VALUE: 24
PIF_VALUE: 26
PIF_VALUE: 26
PIF_VALUE: 18
PIF_VALUE: 26
PIF_VALUE: 25
PIF_VALUE: 29
PIF_VALUE: 23
PIF_VALUE: 25
PIF_VALUE: 25
PIF_VALUE: 24
PIF_VALUE: 26
PIF_VALUE: 36
PIF_VALUE: 24
PIF_VALUE: 25
PIF_VALUE: 24
PIF_VALUE: 22
PIF_VALUE: 24
PIF_VALUE: 23
PIF_VALUE: 24
PIF_VALUE: 26
PIF_VALUE: 24
PIF_VALUE: 31
PIF_VALUE: 25
PIF_VALUE: 24
PIF_VALUE: 22
PIF_VALUE: 24
PIF_VALUE: 26
PIF_VALUE: 23
PIF_VALUE: 24
PIF_VALUE: 25
PIF_VALUE: 25
PIF_VALUE: 24
PIF_VALUE: 25
PIF_VALUE: 24
PIF_VALUE: 24
PIF_VALUE: 26
PIF_VALUE: 24
PIF_VALUE: 26
PIF_VALUE: 36
PIF_VALUE: 24
PIF_VALUE: 24
PIF_VALUE: 25
PIF_VALUE: 26
PIF_VALUE: 22
PIF_VALUE: 24
PIF_VALUE: 24
PIF_VALUE: 23
PIF_VALUE: 24
PIF_VALUE: 25
PIF_VALUE: 23
PIF_VALUE: 24
PIF_VALUE: 26

## 2025-01-11 ASSESSMENT — PAIN SCALES - GENERAL
PAINLEVEL_OUTOF10: 0

## 2025-01-12 ENCOUNTER — APPOINTMENT (OUTPATIENT)
Dept: GENERAL RADIOLOGY | Age: 75
DRG: 207 | End: 2025-01-12
Attending: INTERNAL MEDICINE
Payer: MEDICARE

## 2025-01-12 ENCOUNTER — APPOINTMENT (OUTPATIENT)
Dept: ULTRASOUND IMAGING | Age: 75
DRG: 207 | End: 2025-01-12
Attending: INTERNAL MEDICINE
Payer: MEDICARE

## 2025-01-12 LAB
ALBUMIN SERPL-MCNC: 2.4 G/DL (ref 3.5–4.6)
ANION GAP SERPL CALCULATED.3IONS-SCNC: 13 MEQ/L (ref 9–15)
BASOPHILS # BLD: 0 K/UL (ref 0–0.2)
BASOPHILS NFR BLD: 0.3 %
BUN SERPL-MCNC: 51 MG/DL (ref 8–23)
CALCIUM SERPL-MCNC: 8 MG/DL (ref 8.5–9.9)
CHLORIDE SERPL-SCNC: 105 MEQ/L (ref 95–107)
CO2 SERPL-SCNC: 21 MEQ/L (ref 20–31)
CREAT SERPL-MCNC: 2.93 MG/DL (ref 0.7–1.2)
EOSINOPHIL # BLD: 0.2 K/UL (ref 0–0.7)
EOSINOPHIL NFR BLD: 2.2 %
ERYTHROCYTE [DISTWIDTH] IN BLOOD BY AUTOMATED COUNT: 15.7 % (ref 11.5–14.5)
FERRITIN: 4351 NG/ML
GLUCOSE BLD-MCNC: 112 MG/DL (ref 70–99)
GLUCOSE BLD-MCNC: 118 MG/DL (ref 70–99)
GLUCOSE BLD-MCNC: 127 MG/DL (ref 70–99)
GLUCOSE BLD-MCNC: 159 MG/DL (ref 70–99)
GLUCOSE BLD-MCNC: 163 MG/DL (ref 70–99)
GLUCOSE BLD-MCNC: 87 MG/DL (ref 70–99)
GLUCOSE SERPL-MCNC: 105 MG/DL (ref 70–99)
HCT VFR BLD AUTO: 28.5 % (ref 42–52)
HGB BLD-MCNC: 9.2 G/DL (ref 14–18)
IRON % SATURATION: 20 % (ref 20–55)
IRON: 32 UG/DL (ref 61–157)
LYMPHOCYTES # BLD: 1.6 K/UL (ref 1–4.8)
LYMPHOCYTES NFR BLD: 15.1 %
MCH RBC QN AUTO: 30.7 PG (ref 27–31.3)
MCHC RBC AUTO-ENTMCNC: 32.3 % (ref 33–37)
MCV RBC AUTO: 95 FL (ref 79–92.2)
MONOCYTES # BLD: 0.3 K/UL (ref 0.2–0.8)
MONOCYTES NFR BLD: 3.2 %
NEUTROPHILS # BLD: 8 K/UL (ref 1.4–6.5)
NEUTS SEG NFR BLD: 75.2 %
PERFORMED ON: ABNORMAL
PERFORMED ON: NORMAL
PHOSPHATE SERPL-MCNC: 6.4 MG/DL (ref 2.3–4.8)
PLATELET # BLD AUTO: 336 K/UL (ref 130–400)
POTASSIUM SERPL-SCNC: 4.8 MEQ/L (ref 3.4–4.9)
RBC # BLD AUTO: 3 M/UL (ref 4.7–6.1)
SODIUM SERPL-SCNC: 139 MEQ/L (ref 135–144)
TOTAL IRON BINDING CAPACITY: 162 UG/DL (ref 250–450)
UNSATURATED IRON BINDING CAPACITY: 130 UG/DL (ref 112–347)
WBC # BLD AUTO: 10.7 K/UL (ref 4.8–10.8)

## 2025-01-12 PROCEDURE — 80069 RENAL FUNCTION PANEL: CPT

## 2025-01-12 PROCEDURE — 6360000002 HC RX W HCPCS: Performed by: INTERNAL MEDICINE

## 2025-01-12 PROCEDURE — 2500000003 HC RX 250 WO HCPCS: Performed by: NURSE PRACTITIONER

## 2025-01-12 PROCEDURE — 6370000000 HC RX 637 (ALT 250 FOR IP): Performed by: NURSE PRACTITIONER

## 2025-01-12 PROCEDURE — 2580000003 HC RX 258: Performed by: NURSE PRACTITIONER

## 2025-01-12 PROCEDURE — 71045 X-RAY EXAM CHEST 1 VIEW: CPT

## 2025-01-12 PROCEDURE — 94003 VENT MGMT INPAT SUBQ DAY: CPT

## 2025-01-12 PROCEDURE — 2580000003 HC RX 258: Performed by: INTERNAL MEDICINE

## 2025-01-12 PROCEDURE — 2000000000 HC ICU R&B

## 2025-01-12 PROCEDURE — 99291 CRITICAL CARE FIRST HOUR: CPT | Performed by: INTERNAL MEDICINE

## 2025-01-12 PROCEDURE — 2500000003 HC RX 250 WO HCPCS: Performed by: INTERNAL MEDICINE

## 2025-01-12 PROCEDURE — 99232 SBSQ HOSP IP/OBS MODERATE 35: CPT | Performed by: INTERNAL MEDICINE

## 2025-01-12 PROCEDURE — 6360000002 HC RX W HCPCS: Performed by: NURSE PRACTITIONER

## 2025-01-12 PROCEDURE — 36592 COLLECT BLOOD FROM PICC: CPT

## 2025-01-12 PROCEDURE — 85025 COMPLETE CBC W/AUTO DIFF WBC: CPT

## 2025-01-12 PROCEDURE — 6370000000 HC RX 637 (ALT 250 FOR IP): Performed by: INTERNAL MEDICINE

## 2025-01-12 PROCEDURE — 2700000000 HC OXYGEN THERAPY PER DAY

## 2025-01-12 PROCEDURE — 2500000003 HC RX 250 WO HCPCS: Performed by: STUDENT IN AN ORGANIZED HEALTH CARE EDUCATION/TRAINING PROGRAM

## 2025-01-12 RX ORDER — FUROSEMIDE 10 MG/ML
80 INJECTION INTRAMUSCULAR; INTRAVENOUS ONCE
Status: COMPLETED | OUTPATIENT
Start: 2025-01-12 | End: 2025-01-12

## 2025-01-12 RX ORDER — INSULIN GLARGINE 100 [IU]/ML
6 INJECTION, SOLUTION SUBCUTANEOUS NIGHTLY
Status: DISCONTINUED | OUTPATIENT
Start: 2025-01-12 | End: 2025-01-14

## 2025-01-12 RX ADMIN — Medication 10 ML: at 20:03

## 2025-01-12 RX ADMIN — MEROPENEM 500 MG: 500 INJECTION INTRAVENOUS at 05:16

## 2025-01-12 RX ADMIN — Medication 10 ML: at 08:31

## 2025-01-12 RX ADMIN — OSELTAMIVIR PHOSPHATE 30 MG: 6 FOR SUSPENSION ORAL at 20:43

## 2025-01-12 RX ADMIN — SODIUM CHLORIDE, PRESERVATIVE FREE 40 MG: 5 INJECTION INTRAVENOUS at 08:29

## 2025-01-12 RX ADMIN — QUETIAPINE FUMARATE 50 MG: 50 TABLET ORAL at 20:43

## 2025-01-12 RX ADMIN — ENOXAPARIN SODIUM 60 MG: 100 INJECTION SUBCUTANEOUS at 08:30

## 2025-01-12 RX ADMIN — DOCUSATE SODIUM 100 MG: 50 LIQUID ORAL at 08:29

## 2025-01-12 RX ADMIN — Medication 275 MCG/HR: at 20:39

## 2025-01-12 RX ADMIN — Medication 300 MCG/HR: at 09:35

## 2025-01-12 RX ADMIN — PROPOFOL 50 MCG/KG/MIN: 10 INJECTION, EMULSION INTRAVENOUS at 13:20

## 2025-01-12 RX ADMIN — PAROXETINE HYDROCHLORIDE 40 MG: 20 TABLET, FILM COATED ORAL at 08:29

## 2025-01-12 RX ADMIN — Medication 300 MCG/HR: at 13:15

## 2025-01-12 RX ADMIN — POLYETHYLENE GLYCOL 3350 17 G: 17 POWDER, FOR SOLUTION ORAL at 08:29

## 2025-01-12 RX ADMIN — CHLORHEXIDINE GLUCONATE 15 ML: 1.2 RINSE ORAL at 20:43

## 2025-01-12 RX ADMIN — PROPOFOL 35 MCG/KG/MIN: 10 INJECTION, EMULSION INTRAVENOUS at 09:15

## 2025-01-12 RX ADMIN — ASPIRIN 81 MG: 81 TABLET, CHEWABLE ORAL at 08:29

## 2025-01-12 RX ADMIN — Medication 175 MCG/HR: at 05:13

## 2025-01-12 RX ADMIN — SODIUM CHLORIDE, PRESERVATIVE FREE 40 MG: 5 INJECTION INTRAVENOUS at 20:44

## 2025-01-12 RX ADMIN — FUROSEMIDE 80 MG: 10 INJECTION, SOLUTION INTRAMUSCULAR; INTRAVENOUS at 17:56

## 2025-01-12 RX ADMIN — INSULIN GLARGINE 6 UNITS: 100 INJECTION, SOLUTION SUBCUTANEOUS at 22:46

## 2025-01-12 RX ADMIN — METHYLPREDNISOLONE SODIUM SUCCINATE 40 MG: 40 INJECTION, POWDER, LYOPHILIZED, FOR SOLUTION INTRAMUSCULAR; INTRAVENOUS at 08:30

## 2025-01-12 RX ADMIN — MICONAZOLE NITRATE: 20 CREAM TOPICAL at 20:44

## 2025-01-12 RX ADMIN — FOLIC ACID 1 MG: 1 TABLET ORAL at 08:29

## 2025-01-12 RX ADMIN — NOREPINEPHRINE BITARTRATE 2 MCG/MIN: 64 SOLUTION INTRAVENOUS at 02:52

## 2025-01-12 RX ADMIN — CHLORHEXIDINE GLUCONATE 15 ML: 1.2 RINSE ORAL at 08:29

## 2025-01-12 RX ADMIN — Medication 300 MCG/HR: at 16:30

## 2025-01-12 RX ADMIN — MICONAZOLE NITRATE: 20 CREAM TOPICAL at 08:30

## 2025-01-12 RX ADMIN — PROPOFOL 40 MCG/KG/MIN: 10 INJECTION, EMULSION INTRAVENOUS at 20:42

## 2025-01-12 RX ADMIN — MEROPENEM 500 MG: 500 INJECTION INTRAVENOUS at 15:50

## 2025-01-12 RX ADMIN — PROPOFOL 25 MCG/KG/MIN: 10 INJECTION, EMULSION INTRAVENOUS at 01:02

## 2025-01-12 RX ADMIN — POLYETHYLENE GLYCOL 3350 17 G: 17 POWDER, FOR SOLUTION ORAL at 20:43

## 2025-01-12 RX ADMIN — DOCUSATE SODIUM 100 MG: 50 LIQUID ORAL at 20:43

## 2025-01-12 RX ADMIN — EPOETIN ALFA-EPBX 14000 UNITS: 10000 INJECTION, SOLUTION INTRAVENOUS; SUBCUTANEOUS at 18:29

## 2025-01-12 RX ADMIN — ATORVASTATIN CALCIUM 40 MG: 40 TABLET, FILM COATED ORAL at 20:43

## 2025-01-12 ASSESSMENT — PULMONARY FUNCTION TESTS
PIF_VALUE: 39
PIF_VALUE: 39
PIF_VALUE: 34
PIF_VALUE: 33
PIF_VALUE: 38
PIF_VALUE: 34
PIF_VALUE: 29
PIF_VALUE: 33
PIF_VALUE: 38
PIF_VALUE: 38
PIF_VALUE: 30
PIF_VALUE: 30
PIF_VALUE: 33
PIF_VALUE: 29
PIF_VALUE: 38
PIF_VALUE: 38
PIF_VALUE: 33
PIF_VALUE: 33
PIF_VALUE: 35
PIF_VALUE: 34
PIF_VALUE: 29
PIF_VALUE: 29
PIF_VALUE: 38
PIF_VALUE: 39
PIF_VALUE: 39
PIF_VALUE: 33
PIF_VALUE: 29
PIF_VALUE: 33
PIF_VALUE: 37
PIF_VALUE: 34
PIF_VALUE: 34
PIF_VALUE: 35
PIF_VALUE: 35
PIF_VALUE: 33
PIF_VALUE: 27
PIF_VALUE: 38
PIF_VALUE: 38
PIF_VALUE: 30
PIF_VALUE: 29
PIF_VALUE: 38
PIF_VALUE: 35
PIF_VALUE: 33
PIF_VALUE: 34
PIF_VALUE: 34
PIF_VALUE: 39
PIF_VALUE: 34
PIF_VALUE: 33
PIF_VALUE: 34
PIF_VALUE: 32
PIF_VALUE: 33
PIF_VALUE: 34
PIF_VALUE: 33
PIF_VALUE: 33

## 2025-01-12 ASSESSMENT — PAIN SCALES - GENERAL: PAINLEVEL_OUTOF10: 0

## 2025-01-12 NOTE — FLOWSHEET NOTE
Patient resting on ventilator. Patient intubated. Sedated. PEEP 10, Fio2 85%.  Patient wakes up. RASS -1. Follows commands. Responds to name.   Patient Spo2 dropping into mid 80s. Not coming up. Had to increase fio2 and then PEEP. PEEP placed to 14.   Patient having a hard time being synchronus with the ventilator. Increased sedation.   Dr Gaston ordered to place patient prone.   Patient placed in prone position at 0934. Patient to be proned for 16 hours.   PIP and plateau pressures on the ventilator high   Increased and maxed sedation per Dr Gaston's verbal orders.   Weaned PEEP with respiratory therapy over the shift.   Patient tolerating tube feed.   Low urine output.   Dr Montes will address later in the day.

## 2025-01-12 NOTE — FLOWSHEET NOTE
Summary:  Assessment as documented. Attempts to open eyes when name called.  Oral care given.  Tolerating tube feeding well. Spoke with Dr. Montes concerning need for lasix.  Updated on patient's condition and will re-evaluate in am.  Slight increase in FIO2 to 85%.  No increased work of breathing noted.  Had 3 large soft stools this shift. Large reddened, purplish rash noted over buttocks.

## 2025-01-12 NOTE — FLOWSHEET NOTE
Shift Summary-    Patient having issues with oxygenation on the ventilator.   Sat patient up to a 30 degree angle. Seemed to help. Spo2 still fluctuated throughout the shift.   Dr Montes consulted.   Note in place from nephrology. Discussed if urine output is less than 300 cc per hour that the patient would need a PRN dose of lasix.   Tube feed restarted yesterday per Dr Gaston, not concerned about residuals.   Turned patient [id number].   Patient RASS -1, follows commands and responds to name.

## 2025-01-13 ENCOUNTER — APPOINTMENT (OUTPATIENT)
Dept: GENERAL RADIOLOGY | Age: 75
DRG: 207 | End: 2025-01-13
Attending: INTERNAL MEDICINE
Payer: MEDICARE

## 2025-01-13 PROBLEM — Z71.89 ENCOUNTER FOR HOSPICE CARE DISCUSSION: Status: ACTIVE | Noted: 2025-01-13

## 2025-01-13 PROBLEM — Z71.89 GOALS OF CARE, COUNSELING/DISCUSSION: Status: ACTIVE | Noted: 2025-01-13

## 2025-01-13 PROBLEM — Z71.89 ADVANCED CARE PLANNING/COUNSELING DISCUSSION: Status: ACTIVE | Noted: 2025-01-13

## 2025-01-13 PROBLEM — Z51.5 PALLIATIVE CARE ENCOUNTER: Status: ACTIVE | Noted: 2025-01-13

## 2025-01-13 PROBLEM — J11.00 INFLUENZAL PNEUMONIA: Status: ACTIVE | Noted: 2025-01-13

## 2025-01-13 LAB
ALBUMIN SERPL-MCNC: 2.2 G/DL (ref 3.5–4.6)
ANION GAP SERPL CALCULATED.3IONS-SCNC: 12 MEQ/L (ref 9–15)
BASE EXCESS ARTERIAL: -3 (ref -3–3)
BASOPHILS # BLD: 0 K/UL (ref 0–0.2)
BASOPHILS NFR BLD: 0.2 %
BUN SERPL-MCNC: 70 MG/DL (ref 8–23)
CALCIUM IONIZED: 1.23 MMOL/L (ref 1.12–1.32)
CALCIUM SERPL-MCNC: 8 MG/DL (ref 8.5–9.9)
CHLORIDE SERPL-SCNC: 105 MEQ/L (ref 95–107)
CO2 SERPL-SCNC: 22 MEQ/L (ref 20–31)
CREAT SERPL-MCNC: 3.26 MG/DL (ref 0.7–1.2)
EKG ATRIAL RATE: 64 BPM
EKG P AXIS: 93 DEGREES
EKG P-R INTERVAL: 168 MS
EKG Q-T INTERVAL: 530 MS
EKG QRS DURATION: 90 MS
EKG QTC CALCULATION (BAZETT): 546 MS
EKG R AXIS: 27 DEGREES
EKG T AXIS: 186 DEGREES
EKG VENTRICULAR RATE: 64 BPM
EOSINOPHIL # BLD: 0.2 K/UL (ref 0–0.7)
EOSINOPHIL NFR BLD: 1.3 %
ERYTHROCYTE [DISTWIDTH] IN BLOOD BY AUTOMATED COUNT: 15.7 % (ref 11.5–14.5)
GLUCOSE BLD-MCNC: 111 MG/DL (ref 70–99)
GLUCOSE BLD-MCNC: 122 MG/DL (ref 70–99)
GLUCOSE BLD-MCNC: 145 MG/DL (ref 70–99)
GLUCOSE BLD-MCNC: 151 MG/DL (ref 70–99)
GLUCOSE BLD-MCNC: 158 MG/DL (ref 70–99)
GLUCOSE BLD-MCNC: 176 MG/DL (ref 70–99)
GLUCOSE BLD-MCNC: 192 MG/DL (ref 70–99)
GLUCOSE SERPL-MCNC: 145 MG/DL (ref 70–99)
HCO3 ARTERIAL: 23.6 MMOL/L (ref 21–29)
HCT VFR BLD AUTO: 26 % (ref 42–52)
HCT VFR BLD AUTO: 27 % (ref 41–53)
HGB BLD CALC-MCNC: 9.3 GM/DL (ref 13.5–17.5)
HGB BLD-MCNC: 8.8 G/DL (ref 14–18)
LACTATE: 1.14 MMOL/L (ref 0.4–2)
LYMPHOCYTES # BLD: 1.5 K/UL (ref 1–4.8)
LYMPHOCYTES NFR BLD: 11 %
MCH RBC QN AUTO: 32.6 PG (ref 27–31.3)
MCHC RBC AUTO-ENTMCNC: 33.8 % (ref 33–37)
MCV RBC AUTO: 96.3 FL (ref 79–92.2)
MONOCYTES # BLD: 0.4 K/UL (ref 0.2–0.8)
MONOCYTES NFR BLD: 3.3 %
NEUTROPHILS # BLD: 11 K/UL (ref 1.4–6.5)
NEUTS SEG NFR BLD: 82.8 %
O2 SAT, ARTERIAL: 89 % (ref 93–100)
PCO2 ARTERIAL: 51 MM HG (ref 35–45)
PERFORMED ON: ABNORMAL
PH ARTERIAL: 7.27 (ref 7.35–7.45)
PHOSPHATE SERPL-MCNC: 7.6 MG/DL (ref 2.3–4.8)
PLATELET # BLD AUTO: 284 K/UL (ref 130–400)
PO2 ARTERIAL: 66 MM HG (ref 75–108)
POC CHLORIDE: 110 MEQ/L (ref 99–110)
POC CREATININE: 3.1 MG/DL (ref 0.8–1.3)
POC FIO2: 70
POC SAMPLE TYPE: ABNORMAL
POTASSIUM SERPL-SCNC: 4.6 MEQ/L (ref 3.4–4.9)
POTASSIUM SERPL-SCNC: 4.6 MEQ/L (ref 3.5–5.1)
RBC # BLD AUTO: 2.7 M/UL (ref 4.7–6.1)
SODIUM BLD-SCNC: 141 MEQ/L (ref 136–145)
SODIUM SERPL-SCNC: 139 MEQ/L (ref 135–144)
TCO2 ARTERIAL: 25 MMOL/L (ref 21–32)
WBC # BLD AUTO: 13.2 K/UL (ref 4.8–10.8)

## 2025-01-13 PROCEDURE — 85025 COMPLETE CBC W/AUTO DIFF WBC: CPT

## 2025-01-13 PROCEDURE — 2580000003 HC RX 258: Performed by: NURSE PRACTITIONER

## 2025-01-13 PROCEDURE — 99232 SBSQ HOSP IP/OBS MODERATE 35: CPT | Performed by: INTERNAL MEDICINE

## 2025-01-13 PROCEDURE — 94003 VENT MGMT INPAT SUBQ DAY: CPT

## 2025-01-13 PROCEDURE — 2500000003 HC RX 250 WO HCPCS: Performed by: NURSE PRACTITIONER

## 2025-01-13 PROCEDURE — 36592 COLLECT BLOOD FROM PICC: CPT

## 2025-01-13 PROCEDURE — 2000000000 HC ICU R&B

## 2025-01-13 PROCEDURE — 71045 X-RAY EXAM CHEST 1 VIEW: CPT

## 2025-01-13 PROCEDURE — 6370000000 HC RX 637 (ALT 250 FOR IP): Performed by: INTERNAL MEDICINE

## 2025-01-13 PROCEDURE — 99291 CRITICAL CARE FIRST HOUR: CPT | Performed by: INTERNAL MEDICINE

## 2025-01-13 PROCEDURE — 6360000002 HC RX W HCPCS: Performed by: INTERNAL MEDICINE

## 2025-01-13 PROCEDURE — 82435 ASSAY OF BLOOD CHLORIDE: CPT

## 2025-01-13 PROCEDURE — 2700000000 HC OXYGEN THERAPY PER DAY

## 2025-01-13 PROCEDURE — 93010 ELECTROCARDIOGRAM REPORT: CPT | Performed by: INTERNAL MEDICINE

## 2025-01-13 PROCEDURE — 74018 RADEX ABDOMEN 1 VIEW: CPT

## 2025-01-13 PROCEDURE — 82803 BLOOD GASES ANY COMBINATION: CPT

## 2025-01-13 PROCEDURE — 94761 N-INVAS EAR/PLS OXIMETRY MLT: CPT

## 2025-01-13 PROCEDURE — 84295 ASSAY OF SERUM SODIUM: CPT

## 2025-01-13 PROCEDURE — 85014 HEMATOCRIT: CPT

## 2025-01-13 PROCEDURE — 2500000003 HC RX 250 WO HCPCS: Performed by: INTERNAL MEDICINE

## 2025-01-13 PROCEDURE — 36600 WITHDRAWAL OF ARTERIAL BLOOD: CPT

## 2025-01-13 PROCEDURE — 84132 ASSAY OF SERUM POTASSIUM: CPT

## 2025-01-13 PROCEDURE — 82565 ASSAY OF CREATININE: CPT

## 2025-01-13 PROCEDURE — 6370000000 HC RX 637 (ALT 250 FOR IP): Performed by: NURSE PRACTITIONER

## 2025-01-13 PROCEDURE — 80069 RENAL FUNCTION PANEL: CPT

## 2025-01-13 PROCEDURE — 82330 ASSAY OF CALCIUM: CPT

## 2025-01-13 PROCEDURE — 83605 ASSAY OF LACTIC ACID: CPT

## 2025-01-13 PROCEDURE — 2580000003 HC RX 258: Performed by: INTERNAL MEDICINE

## 2025-01-13 PROCEDURE — 82948 REAGENT STRIP/BLOOD GLUCOSE: CPT

## 2025-01-13 PROCEDURE — 99223 1ST HOSP IP/OBS HIGH 75: CPT

## 2025-01-13 PROCEDURE — 6360000002 HC RX W HCPCS: Performed by: NURSE PRACTITIONER

## 2025-01-13 RX ORDER — FUROSEMIDE 10 MG/ML
60 INJECTION INTRAMUSCULAR; INTRAVENOUS DAILY
Status: DISCONTINUED | OUTPATIENT
Start: 2025-01-13 | End: 2025-01-15

## 2025-01-13 RX ORDER — BUDESONIDE 0.5 MG/2ML
INHALANT ORAL
Status: DISCONTINUED
Start: 2025-01-13 | End: 2025-01-13 | Stop reason: WASHOUT

## 2025-01-13 RX ORDER — METOCLOPRAMIDE HYDROCHLORIDE 5 MG/ML
10 INJECTION INTRAMUSCULAR; INTRAVENOUS 3 TIMES DAILY
Status: DISCONTINUED | OUTPATIENT
Start: 2025-01-13 | End: 2025-01-15 | Stop reason: HOSPADM

## 2025-01-13 RX ADMIN — METOCLOPRAMIDE 10 MG: 5 INJECTION, SOLUTION INTRAMUSCULAR; INTRAVENOUS at 21:21

## 2025-01-13 RX ADMIN — Medication 200 MCG/HR: at 14:40

## 2025-01-13 RX ADMIN — FUROSEMIDE 60 MG: 10 INJECTION, SOLUTION INTRAMUSCULAR; INTRAVENOUS at 09:30

## 2025-01-13 RX ADMIN — SODIUM CHLORIDE, PRESERVATIVE FREE 40 MG: 5 INJECTION INTRAVENOUS at 08:42

## 2025-01-13 RX ADMIN — INSULIN LISPRO 4 UNITS: 100 INJECTION, SOLUTION INTRAVENOUS; SUBCUTANEOUS at 19:23

## 2025-01-13 RX ADMIN — ASPIRIN 81 MG: 81 TABLET, CHEWABLE ORAL at 08:48

## 2025-01-13 RX ADMIN — CHLORHEXIDINE GLUCONATE 15 ML: 1.2 RINSE ORAL at 21:29

## 2025-01-13 RX ADMIN — PROPOFOL 25 MCG/KG/MIN: 10 INJECTION, EMULSION INTRAVENOUS at 09:00

## 2025-01-13 RX ADMIN — Medication 10 ML: at 21:18

## 2025-01-13 RX ADMIN — PAROXETINE HYDROCHLORIDE 40 MG: 20 TABLET, FILM COATED ORAL at 08:48

## 2025-01-13 RX ADMIN — ATORVASTATIN CALCIUM 40 MG: 40 TABLET, FILM COATED ORAL at 21:30

## 2025-01-13 RX ADMIN — QUETIAPINE FUMARATE 50 MG: 50 TABLET ORAL at 21:30

## 2025-01-13 RX ADMIN — INSULIN GLARGINE 6 UNITS: 100 INJECTION, SOLUTION SUBCUTANEOUS at 21:16

## 2025-01-13 RX ADMIN — MEROPENEM 500 MG: 500 INJECTION INTRAVENOUS at 05:07

## 2025-01-13 RX ADMIN — MEROPENEM 500 MG: 500 INJECTION INTRAVENOUS at 16:12

## 2025-01-13 RX ADMIN — PROPOFOL 40 MCG/KG/MIN: 10 INJECTION, EMULSION INTRAVENOUS at 02:19

## 2025-01-13 RX ADMIN — PROPOFOL 40 MCG/KG/MIN: 10 INJECTION, EMULSION INTRAVENOUS at 15:26

## 2025-01-13 RX ADMIN — Medication 275 MCG/HR: at 00:16

## 2025-01-13 RX ADMIN — MICONAZOLE NITRATE: 20 CREAM TOPICAL at 21:47

## 2025-01-13 RX ADMIN — ENOXAPARIN SODIUM 60 MG: 100 INJECTION SUBCUTANEOUS at 09:01

## 2025-01-13 RX ADMIN — FOLIC ACID 1 MG: 1 TABLET ORAL at 08:48

## 2025-01-13 RX ADMIN — METOCLOPRAMIDE 10 MG: 5 INJECTION, SOLUTION INTRAMUSCULAR; INTRAVENOUS at 15:27

## 2025-01-13 RX ADMIN — Medication 275 MCG/HR: at 03:43

## 2025-01-13 RX ADMIN — METHYLPREDNISOLONE SODIUM SUCCINATE 40 MG: 40 INJECTION, POWDER, LYOPHILIZED, FOR SOLUTION INTRAMUSCULAR; INTRAVENOUS at 08:43

## 2025-01-13 RX ADMIN — POLYETHYLENE GLYCOL 3350 17 G: 17 POWDER, FOR SOLUTION ORAL at 21:29

## 2025-01-13 RX ADMIN — OSELTAMIVIR PHOSPHATE 30 MG: 6 FOR SUSPENSION ORAL at 21:54

## 2025-01-13 RX ADMIN — CHLORHEXIDINE GLUCONATE 15 ML: 1.2 RINSE ORAL at 08:44

## 2025-01-13 RX ADMIN — Medication 200 MCG/HR: at 21:29

## 2025-01-13 RX ADMIN — METOCLOPRAMIDE 10 MG: 5 INJECTION, SOLUTION INTRAMUSCULAR; INTRAVENOUS at 10:26

## 2025-01-13 RX ADMIN — Medication 225 MCG/HR: at 08:39

## 2025-01-13 RX ADMIN — DOCUSATE SODIUM 100 MG: 50 LIQUID ORAL at 21:29

## 2025-01-13 RX ADMIN — PROPOFOL 40 MCG/KG/MIN: 10 INJECTION, EMULSION INTRAVENOUS at 22:58

## 2025-01-13 RX ADMIN — SODIUM CHLORIDE, PRESERVATIVE FREE 40 MG: 5 INJECTION INTRAVENOUS at 21:17

## 2025-01-13 RX ADMIN — MINERAL SUPPLEMENT IRON 300 MG / 5 ML STRENGTH LIQUID 100 PER BOX UNFLAVORED 300 MG: at 08:48

## 2025-01-13 RX ADMIN — Medication 10 ML: at 09:02

## 2025-01-13 RX ADMIN — Medication 275 MCG/HR: at 06:50

## 2025-01-13 ASSESSMENT — PAIN SCALES - GENERAL
PAINLEVEL_OUTOF10: 0
PAINLEVEL_OUTOF10: 0

## 2025-01-13 ASSESSMENT — PULMONARY FUNCTION TESTS
PIF_VALUE: 25
PIF_VALUE: 26
PIF_VALUE: 29
PIF_VALUE: 33
PIF_VALUE: 25
PIF_VALUE: 26
PIF_VALUE: 25
PIF_VALUE: 29
PIF_VALUE: 25
PIF_VALUE: 33
PIF_VALUE: 25
PIF_VALUE: 26
PIF_VALUE: 25
PIF_VALUE: 24
PIF_VALUE: 25

## 2025-01-13 NOTE — CONSULTS
Interventional Radiology Vascular Access Team   Consult Note    MRN: [id number]   Patient: Isaak Mitchell   : 1950   Age: 74 y.o.  Patient has no known allergies.        Patient is a 74 y.o. male admitted on 2025.   Vascular access team consulted due to potential need to transfuse blood and successfully placed PICC on 25.    Peripherally Inserted Central Catheter/ Midline Assessment:   PICC 25 Right Basilic (Active)   Criteria for Appropriate Use Long term IV/antibiotic administration 25 112   Site Assessment Clean, dry & intact 25   Phlebitis Assessment No symptoms 25   Extremity Circumference (cm) 26 cm 25   Lumen #1 Color/Status Red;Brisk blood return;Flushed;Alcohol cap applied;Normal saline locked 25   Lumen #2 Color/Status Purple;Brisk blood return;Flushed;Normal saline locked;Alcohol cap applied 25   Alcohol Cap Used Yes 25   Date of Last Dressing Change 25   Dressing Type Transparent w/CHG gel;Securing device 25   Dressing Status New dressing applied;Clean, dry & intact 25   Dressing Intervention New 25             Electronically signed by Shakila Forbes RN on 25 at 2:41 PM EST    
Interventional Radiology Vascular Access Team   Consult Note    MRN: [id number]   Patient: Isaak Mitchell   : 1950   Age: 74 y.o.  Patient has no known allergies.        Patient is a 74 y.o. male admitted on 2025.   Vascular access team consulted due to need for IV access for blood transfusion and successfully placed PICC on 25.    Peripherally Inserted Central Catheter/ Midline Assessment:   PICC 25 Right Basilic (Active)   Criteria for Appropriate Use Long term IV/antibiotic administration 25 112   Site Assessment Clean, dry & intact 25   Phlebitis Assessment No symptoms 25   Extremity Circumference (cm) 26 cm 25   Lumen #1 Color/Status Red;Brisk blood return;Flushed;Alcohol cap applied;Normal saline locked 25   Lumen #2 Color/Status Purple;Brisk blood return;Flushed;Normal saline locked;Alcohol cap applied 25   Alcohol Cap Used Yes 25   Date of Last Dressing Change 25   Dressing Type Transparent w/CHG gel;Securing device 25   Dressing Status New dressing applied;Clean, dry & intact 25   Dressing Intervention New 25             Electronically signed by Shakila Forbes RN on 25 at 3:52 PM EST    
Palliative Care Consult Note  Patient: Isaak Mitchell  Gender: male  YOB: 1950  Unit/Bed: IC07/IC07-01  CodeStatus: Full Code  Inpatient Treatment Team: Treatment Team:   John Manning DO Abboud, Rami, MD Abbud, Rita A, MD Armstrong, Bonnie, RN Melendez, Jacqueline, STELLA Montes, MD Gertrude Whitaker, Emerita Stover, STELLA Brody, STELLA Bess Semies, hospitals  Admit Date:  1/4/2025    Chief Complaint: Acute resp. failure    History of Presenting Illness:      Isaak Mitchell is a 74 y.o. male on hospital day 9 with a history of  MI, CAD, COPD, DM 2, diverticulitis, emphysema, hyperlipidemia, hypertension, and Ménière's disease per the patient and EMR review, presents with a chief complaint of acute respiratory failure with hypoxia. Patient was at [facility] for inpatient rehab.  Today, he developed hypoxia and AMS.  Required 10 L high flow O2 and was transferred to [facility].  He has been being treated for COPD and COVID pneumonia (tested positive on 12-19-24 - no longer in precautions).  Patient admitted for acute resp. Failure, gi bleed/anemia, hyponatremia, PE.     Palliative care was consulted by Dr. Manning for goals of care.     Upon entering the room I find the patient intubated and sedated, prone position. Stable vital signs.  No family at bedside. Will attempt to contact legal next of kin for overall goals of care and code status discussion.         Review of Systems:       Review of Systems   Unable to perform ROS: Intubated       Physical Examination:       BP (!) 142/50   Pulse (!) 102   Temp 98.6 °F (37 °C)   Resp 26   Ht 1.651 m (5' 5\")   Wt 64.6 kg (142 lb 6.7 oz)   SpO2 91%   BMI 23.70 kg/m²      Physical Exam  Constitutional:       General: He is not in acute distress.     Appearance: He is ill-appearing.      Interventions: He is sedated, intubated and restrained.   HENT:      Head: Atraumatic.   Cardiovascular:      Pulses: Normal pulses. 
    01/04/25 2141 01/04/25  2307 01/05/25 0350 01/05/25  0922 01/05/25  1544 01/05/25 2147 01/06/25  0546     --  139  134*   < > 135 138 135   K 4.4  --  4.4  4.3   < > 4.4 4.1 4.1     --  108*  104   < > 105 108* 108*   CO2 18*  --  18*  18*   < > 18* 18* 16*   BUN 31*  --  34*  34*   < > 29* 27* 26*   CREATININE 2.04*   < > 1.94*  1.90*   < > 1.87* 1.85* 1.94*   GLUCOSE 174*  --  183*  182*   < > 92 121* 138*   CALCIUM 7.9*  --  7.8*  7.7*   < > 7.6* 7.7* 7.7*   MG 1.6*  --  1.6*  --   --   --  1.5*    < > = values in this interval not displayed.     HEPATIC:   Recent Labs     01/04/25 2141 01/05/25 0350 01/06/25 0546   AST 57* 40 89*   ALT 89* 72* 100*   BILITOT 0.4 0.4 0.6   ALKPHOS 112* 101 149*     UA: No results for input(s): \"APPEARANCE\", \"COLORU\", \"LABSPEC\", \"LABPH\", \"NITRU\", \"LEUKOCYTESUR\", \"GLUCOSEU\", \"KETUA\", \"OCBU\", \"RBCUA\", \"WBCUA\", \"BACTERIA\" in the last 72 hours.  LACTATE:   Recent Labs     01/04/25 0849 01/04/25 2141   LACTA 1.6 1.8     PROCALCITONIN:   Recent Labs     01/04/25  0849 01/04/25 2141 01/05/25 0350 01/06/25  0546   PROCAL 0.62* 0.53* 0.53* 0.46*     ABGs:   Recent Labs     01/04/25 0810 01/04/25 2307   PHART 7.453* 7.461*   AAN0RUS 24* 25*   PO2ART 58* 53*   BFK1DXC 16.8* 18.1*   BEART -7 -6*   I2ICBFWA 92* 90*   MZO4ZZK 18* 19*     O2 Device: High flow nasal cannula  O2 Flow Rate (L/min): 8 L/min  Lab Results   Component Value Date/Time    LACTA 1.8 01/04/2025 09:41 PM    LACTA 1.6 01/04/2025 08:49 AM    LACTA 2.7 12/09/2024 09:22 AM         Radiology Review:  XR CHEST (2 VW)    Result Date: 1/5/2025  EXAMINATION: TWO XRAY VIEWS OF THE CHEST 1/5/2025 1:18 pm COMPARISON: 12/29/2024 HISTORY: ORDERING SYSTEM PROVIDED HISTORY: hypoxia TECHNOLOGIST PROVIDED HISTORY: Reason for exam:->hypoxia What reading provider will be dictating this exam?->CRC FINDINGS: Prominent reticular opacities in both lungs, increased compared to comparison studies of December 22nd 
thrush  Lungs: normal respiratory effort, bibasilar crackles, no rhonchi, no wheezes  Heart:RRR, nl S1/S2, no murmur, distant heart sounds  Abdomen: soft, no tenderness, no H-S-megaly, + BS  NEUROLOGICAL: alert and oriented x 3, no focal deficits  No leg edema  No erythema, no warmth, no tenderness        DATA:    Lab Results   Component Value Date    WBC 5.2 01/06/2025    HGB 6.5 (LL) 01/06/2025    HCT 19.3 (LL) 01/06/2025    MCV 89.8 01/06/2025    PLT Clumped 01/06/2025     Lab Results   Component Value Date    CREATININE 1.94 (H) 01/06/2025    BUN 26 (H) 01/06/2025     01/06/2025    K 4.1 01/06/2025     (H) 01/06/2025    CO2 16 (L) 01/06/2025       Hepatic Function Panel:   Lab Results   Component Value Date/Time    ALKPHOS 149 01/06/2025 05:46 AM     01/06/2025 05:46 AM    AST 89 01/06/2025 05:46 AM    BILITOT 0.6 01/06/2025 05:46 AM    BILIDIR <0.2 12/09/2024 05:18 AM    IBILI see below 12/09/2024 05:18 AM         Imaging:   CT of abdomen pelvis done today was reviewed  Positive fecal material fluid and air in colon, concern for ileus versus intermittent bowel obstruction  Severe emphysematous disease/fibrosis/bronchiectasis    Chest x-ray with prominent reticular opacities throughout bilateral lung fields    IMPRESSION:    Multilobar pneumonia, looks like COVID-19 pneumonia/cannot rule out  atypical pneumonia/aspiration pneumonitis/superimposed CHF/edema.  Tested positive for COVID-19 one week ago  Acute hypoxic respiratory failure  Recent pulmonary embolus on December 26  Anemia in the setting of GI bleeding  History of moderate pulmonary hypertension, COPD and diabetes mellitus 2 with chronic kidney disease, rule out superimposed heart failure    Patient Active Problem List   Diagnosis    History of acute myocardial infarction    Rectal bleed    Diverticulitis    Type 2 diabetes mellitus, without long-term current use of insulin (HCC)    Uncontrolled hypertension    Other hyperlipidemia    
reconstruction, and/or weight based adjustment of the mA/kV was utilized to reduce the radiation dose to as low as reasonably achievable. COMPARISON: CTA of the chest, 02/03/2023 HISTORY: ORDERING SYSTEM PROVIDED HISTORY: Chest pain and shortness of breath with COVID-19 TECHNOLOGIST PROVIDED HISTORY: Reason for exam:->Chest pain and shortness of breath with COVID-19 Additional Contrast?->1 What reading provider will be dictating this exam?->CRC FINDINGS: Pulmonary arteries CT the cysts CT: Small subsegmental embolus in the left lower lobe.  The main pulmonary artery is normal in caliber. Mediastinum: The heart is normal in size.  Moderate calcified coronary atherosclerosis.  No lymphadenopathy.  Mild-to-moderate atherosclerosis in the scratch the moderate atherosclerosis in the thoracic aorta.  No aneurysm or dissection. Lungs/Pleura: Prominent emphysematous changes are seen in the lungs. Fibrosis or bronchiectasis is noted in the periphery of the right middle and lower lobes, as well as left upper and lower lobes.  Calcified granuloma in the right lower lobe.  Small amount mucus in the trachea.  The central airway is otherwise clear. No pneumothorax or pleural effusion is seen. Upper Abdomen: Limited images of the upper abdomen are unremarkable. Soft Tissues/Bones: No acute bone or soft tissue abnormality.     1. Small subsegmental embolus in the left lower lobe. 2. Prominent emphysematous changes in the lungs. 3. Fibrosis or bronchiectasis in the periphery of the lungs bilaterally.     XR CHEST PORTABLE    Result Date: 12/22/2024  EXAMINATION: ONE XRAY VIEW OF THE CHEST 12/22/2024 5:02 pm COMPARISON: Chest and abdomen from 12/20/2024. HISTORY: ORDERING SYSTEM PROVIDED HISTORY: Pneumonia TECHNOLOGIST PROVIDED HISTORY: Reason for exam:->Pneumonia What reading provider will be dictating this exam?->CRC FINDINGS: There is a new moderate patchy retrocardiac infiltrate, worrisome for a new left lower lobe pneumonia. 
diverticular disease with GI bleed coronary artery disease s/p PTCA and stenting with remote history of MI    Patient admission creatinine 1.9 with a GFR of 36 reflecting chronic kidney disease stage IIIb during his hospital stay(almost 1 months plus) creatinine and GFR has been fluctuating from cris of 1.5 and GFR of 45 2 weeks ago 26/ 12/ 24 creatinine to a peak of 2.5 creatinine yesterday and 2.9 creatinine today    Today there was no associated electrolyte or acid-base imbalance no wide gap patient was constantly anemic today hemoglobin is 8.4 platelets were always adequate 309     today since admission patient is in a +2.7 L with no clinical evidence of increased extracellular fluid    hemodynamically at 110 which is yesterday there is a stretch of blood pressure that did reach 64/46 earlier today there are some reading of 68/31     there was no contrast exposure during this hospitalization    -Chronic kidney disease stage IIIa 2D the underlying etiology should include diabetic nephropathy plus or minus hypertensive glomerulosclerosis  -Acute kidney injury most likely multifactorial prerenal azotemia plus or minus known oliguric ATN with observed hypotensive stretch with decreased kidney perfusion  -Anemia that could be related to the patient chronic kidney disease  -No associated major electrolyte or acid-base imbalance  -No clinical evidence of increased extracellular fluid volume nonoliguric none polyuric respond to 40 mg of Lasix IV    Plan/  1-diuretic only as needed discussed with critical care nurse his daily fluid load is 50 cc/h 600 to 12 hours nutrition is not included so 40 mg of Lasix IV every 12 hours as needed defined as if urine output is less than 600 cc irrelevant to the fluid balance   2-avoid nephrotoxic exposure  3-Daily clinical and laboratory assessment today will order the regular acute kidney injury in-hospital workup including renal ultrasound anemia workup and metabolic bone

## 2025-01-13 NOTE — INTERDISCIPLINARY ROUNDS
Spiritual Care Services     Summary of Visit:    Attended ICU Rounds. Patient intubated, sleep and resting. Patient unable to follow or respond to attending nurse. No family present, darwin tradition is [employer].     Encounter Summary  Encounter Overview/Reason: Interdisciplinary rounds  Service Provided For: Patient  Referral/Consult From: Rounding  Support System: Unknown  Complexity of Encounter: Low  Begin Time: 1015  End Time : 1030  Total Time Calculated: 15 min        Spiritual/Emotional needs  Type: Spiritual Support                      Spiritual Assessment/Intervention/Outcomes:    Assessment: Unable to assess (Patient intubated)    Intervention: Sustaining Presence/Ministry of presence    Outcome: Receptive      Care Plan:    Plan and Referrals  Plan/Referrals: Continue Support (comment)          Spiritual Care Services   Electronically signed by Chaplain Leigha on 1/13/2025 at 11:51 AM.    To reach a  for emotional and spiritual support, place an EPIC consult request.   If a  is needed immediately, dial “0” and ask to page the on-call .

## 2025-01-13 NOTE — FLOWSHEET NOTE
Summary:    Assessment as documented.  Prone until 2:30 am and returned supine.  Tolerated fairly with slow to return to prone status with oxygenation and rate requirements.  Adjusted per Respiratory therapy with good tolerance.  Slight increase in edema. Small amount of mottling of knees noted at 0400. Repositioned slightly for comfort.

## 2025-01-14 ENCOUNTER — APPOINTMENT (OUTPATIENT)
Dept: ULTRASOUND IMAGING | Age: 75
DRG: 207 | End: 2025-01-14
Attending: INTERNAL MEDICINE
Payer: MEDICARE

## 2025-01-14 LAB
ALBUMIN SERPL-MCNC: 2.2 G/DL (ref 3.5–4.6)
ALBUMIN SERPL-MCNC: 2.3 G/DL (ref 3.5–4.6)
ANION GAP SERPL CALCULATED.3IONS-SCNC: 13 MEQ/L (ref 9–15)
ANION GAP SERPL CALCULATED.3IONS-SCNC: 15 MEQ/L (ref 9–15)
ANTI-XA UNFRAC HEPARIN: 1.1 IU/ML
ANTI-XA UNFRAC HEPARIN: 1.27 IU/ML
APTT PPP: 37.8 SEC (ref 24.4–36.8)
BASOPHILS # BLD: 0 K/UL (ref 0–0.2)
BASOPHILS NFR BLD: 0.2 %
BUN SERPL-MCNC: 73 MG/DL (ref 8–23)
BUN SERPL-MCNC: 74 MG/DL (ref 8–23)
CALCIUM SERPL-MCNC: 7.8 MG/DL (ref 8.5–9.9)
CALCIUM SERPL-MCNC: 8.1 MG/DL (ref 8.5–9.9)
CHLORIDE SERPL-SCNC: 106 MEQ/L (ref 95–107)
CHLORIDE SERPL-SCNC: 106 MEQ/L (ref 95–107)
CO2 SERPL-SCNC: 22 MEQ/L (ref 20–31)
CO2 SERPL-SCNC: 22 MEQ/L (ref 20–31)
CREAT SERPL-MCNC: 3.36 MG/DL (ref 0.7–1.2)
CREAT SERPL-MCNC: 3.61 MG/DL (ref 0.7–1.2)
EOSINOPHIL # BLD: 0.2 K/UL (ref 0–0.7)
EOSINOPHIL NFR BLD: 1.5 %
ERYTHROCYTE [DISTWIDTH] IN BLOOD BY AUTOMATED COUNT: 15.9 % (ref 11.5–14.5)
GLUCOSE BLD-MCNC: 101 MG/DL (ref 70–99)
GLUCOSE BLD-MCNC: 123 MG/DL (ref 70–99)
GLUCOSE BLD-MCNC: 149 MG/DL (ref 70–99)
GLUCOSE BLD-MCNC: 88 MG/DL (ref 70–99)
GLUCOSE BLD-MCNC: 89 MG/DL (ref 70–99)
GLUCOSE SERPL-MCNC: 127 MG/DL (ref 70–99)
GLUCOSE SERPL-MCNC: 87 MG/DL (ref 70–99)
HCT VFR BLD AUTO: 22.6 % (ref 42–52)
HGB BLD-MCNC: 8.3 G/DL (ref 14–18)
INR PPP: 1
LYMPHOCYTES # BLD: 1.7 K/UL (ref 1–4.8)
LYMPHOCYTES NFR BLD: 13.5 %
MAGNESIUM SERPL-MCNC: 2.5 MG/DL (ref 1.7–2.4)
MCH RBC QN AUTO: 35.6 PG (ref 27–31.3)
MCHC RBC AUTO-ENTMCNC: 36.7 % (ref 33–37)
MCV RBC AUTO: 97 FL (ref 79–92.2)
MONOCYTES # BLD: 0.6 K/UL (ref 0.2–0.8)
MONOCYTES NFR BLD: 4.8 %
NEUTROPHILS # BLD: 9.7 K/UL (ref 1.4–6.5)
NEUTS SEG NFR BLD: 76.9 %
PERFORMED ON: ABNORMAL
PERFORMED ON: NORMAL
PERFORMED ON: NORMAL
PHOSPHATE SERPL-MCNC: 7.6 MG/DL (ref 2.3–4.8)
PHOSPHATE SERPL-MCNC: 7.8 MG/DL (ref 2.3–4.8)
PLATELET # BLD AUTO: 278 K/UL (ref 130–400)
POTASSIUM SERPL-SCNC: 4.6 MEQ/L (ref 3.4–4.9)
POTASSIUM SERPL-SCNC: 4.9 MEQ/L (ref 3.4–4.9)
PROTHROMBIN TIME: 13.7 SEC (ref 12.3–14.9)
RBC # BLD AUTO: 2.33 M/UL (ref 4.7–6.1)
REASON FOR REJECTION: NORMAL
REJECTED TEST: NORMAL
SODIUM SERPL-SCNC: 141 MEQ/L (ref 135–144)
SODIUM SERPL-SCNC: 143 MEQ/L (ref 135–144)
WBC # BLD AUTO: 12.6 K/UL (ref 4.8–10.8)

## 2025-01-14 PROCEDURE — 6360000002 HC RX W HCPCS: Performed by: INTERNAL MEDICINE

## 2025-01-14 PROCEDURE — 30233N1 TRANSFUSION OF NONAUTOLOGOUS RED BLOOD CELLS INTO PERIPHERAL VEIN, PERCUTANEOUS APPROACH: ICD-10-PCS | Performed by: INTERNAL MEDICINE

## 2025-01-14 PROCEDURE — 99232 SBSQ HOSP IP/OBS MODERATE 35: CPT

## 2025-01-14 PROCEDURE — 83735 ASSAY OF MAGNESIUM: CPT

## 2025-01-14 PROCEDURE — 2500000003 HC RX 250 WO HCPCS: Performed by: NURSE PRACTITIONER

## 2025-01-14 PROCEDURE — 76770 US EXAM ABDO BACK WALL COMP: CPT

## 2025-01-14 PROCEDURE — 30233K1 TRANSFUSION OF NONAUTOLOGOUS FROZEN PLASMA INTO PERIPHERAL VEIN, PERCUTANEOUS APPROACH: ICD-10-PCS | Performed by: INTERNAL MEDICINE

## 2025-01-14 PROCEDURE — 80069 RENAL FUNCTION PANEL: CPT

## 2025-01-14 PROCEDURE — 2700000000 HC OXYGEN THERAPY PER DAY

## 2025-01-14 PROCEDURE — 06HY33Z INSERTION OF INFUSION DEVICE INTO LOWER VEIN, PERCUTANEOUS APPROACH: ICD-10-PCS | Performed by: INTERNAL MEDICINE

## 2025-01-14 PROCEDURE — 30233L1 TRANSFUSION OF NONAUTOLOGOUS FRESH PLASMA INTO PERIPHERAL VEIN, PERCUTANEOUS APPROACH: ICD-10-PCS | Performed by: INTERNAL MEDICINE

## 2025-01-14 PROCEDURE — 2580000003 HC RX 258: Performed by: INTERNAL MEDICINE

## 2025-01-14 PROCEDURE — 94761 N-INVAS EAR/PLS OXIMETRY MLT: CPT

## 2025-01-14 PROCEDURE — 6360000002 HC RX W HCPCS: Performed by: NURSE PRACTITIONER

## 2025-01-14 PROCEDURE — 85610 PROTHROMBIN TIME: CPT

## 2025-01-14 PROCEDURE — 2580000003 HC RX 258: Performed by: NURSE PRACTITIONER

## 2025-01-14 PROCEDURE — 2000000000 HC ICU R&B

## 2025-01-14 PROCEDURE — 2500000003 HC RX 250 WO HCPCS: Performed by: INTERNAL MEDICINE

## 2025-01-14 PROCEDURE — 36556 INSERT NON-TUNNEL CV CATH: CPT

## 2025-01-14 PROCEDURE — 85730 THROMBOPLASTIN TIME PARTIAL: CPT

## 2025-01-14 PROCEDURE — 85520 HEPARIN ASSAY: CPT

## 2025-01-14 PROCEDURE — 6370000000 HC RX 637 (ALT 250 FOR IP): Performed by: INTERNAL MEDICINE

## 2025-01-14 PROCEDURE — 6370000000 HC RX 637 (ALT 250 FOR IP): Performed by: NURSE PRACTITIONER

## 2025-01-14 PROCEDURE — 94003 VENT MGMT INPAT SUBQ DAY: CPT

## 2025-01-14 PROCEDURE — 85025 COMPLETE CBC W/AUTO DIFF WBC: CPT

## 2025-01-14 PROCEDURE — 2500000003 HC RX 250 WO HCPCS: Performed by: STUDENT IN AN ORGANIZED HEALTH CARE EDUCATION/TRAINING PROGRAM

## 2025-01-14 PROCEDURE — 99232 SBSQ HOSP IP/OBS MODERATE 35: CPT | Performed by: INTERNAL MEDICINE

## 2025-01-14 PROCEDURE — 3E033XZ INTRODUCTION OF VASOPRESSOR INTO PERIPHERAL VEIN, PERCUTANEOUS APPROACH: ICD-10-PCS | Performed by: INTERNAL MEDICINE

## 2025-01-14 RX ORDER — HEPARIN SODIUM 1000 [USP'U]/ML
40 INJECTION, SOLUTION INTRAVENOUS; SUBCUTANEOUS PRN
Status: DISCONTINUED | OUTPATIENT
Start: 2025-01-14 | End: 2025-01-14

## 2025-01-14 RX ORDER — HEPARIN SODIUM 10000 [USP'U]/100ML
5-30 INJECTION, SOLUTION INTRAVENOUS CONTINUOUS
Status: DISCONTINUED | OUTPATIENT
Start: 2025-01-14 | End: 2025-01-14

## 2025-01-14 RX ORDER — HEPARIN SODIUM 1000 [USP'U]/ML
80 INJECTION, SOLUTION INTRAVENOUS; SUBCUTANEOUS PRN
Status: DISCONTINUED | OUTPATIENT
Start: 2025-01-14 | End: 2025-01-14

## 2025-01-14 RX ORDER — PAROXETINE 40 MG/1
40 TABLET, FILM COATED ORAL DAILY
Qty: 90 TABLET | Refills: 0 | Status: SHIPPED | OUTPATIENT
Start: 2025-01-14 | End: 2025-01-16

## 2025-01-14 RX ORDER — HEPARIN SODIUM 1000 [USP'U]/ML
2500 INJECTION, SOLUTION INTRAVENOUS; SUBCUTANEOUS PRN
Status: DISCONTINUED | OUTPATIENT
Start: 2025-01-14 | End: 2025-01-15 | Stop reason: HOSPADM

## 2025-01-14 RX ORDER — HEPARIN SODIUM 1000 [USP'U]/ML
INJECTION, SOLUTION INTRAVENOUS; SUBCUTANEOUS PRN
Status: DISCONTINUED | OUTPATIENT
Start: 2025-01-14 | End: 2025-01-15

## 2025-01-14 RX ORDER — HEPARIN SODIUM 1000 [USP'U]/ML
80 INJECTION, SOLUTION INTRAVENOUS; SUBCUTANEOUS PRN
Status: DISCONTINUED | OUTPATIENT
Start: 2025-01-15 | End: 2025-01-15 | Stop reason: HOSPADM

## 2025-01-14 RX ORDER — HEPARIN SODIUM 10000 [USP'U]/100ML
5-30 INJECTION, SOLUTION INTRAVENOUS CONTINUOUS
Status: DISCONTINUED | OUTPATIENT
Start: 2025-01-15 | End: 2025-01-15 | Stop reason: HOSPADM

## 2025-01-14 RX ORDER — 0.9 % SODIUM CHLORIDE 0.9 %
2000 INTRAVENOUS SOLUTION INTRAVENOUS PRN
Status: DISCONTINUED | OUTPATIENT
Start: 2025-01-14 | End: 2025-01-15

## 2025-01-14 RX ORDER — MAGNESIUM SULFATE 1 G/100ML
1000 INJECTION INTRAVENOUS PRN
Status: DISCONTINUED | OUTPATIENT
Start: 2025-01-14 | End: 2025-01-15

## 2025-01-14 RX ORDER — HEPARIN SODIUM 1000 [USP'U]/ML
40 INJECTION, SOLUTION INTRAVENOUS; SUBCUTANEOUS PRN
Status: DISCONTINUED | OUTPATIENT
Start: 2025-01-15 | End: 2025-01-15 | Stop reason: HOSPADM

## 2025-01-14 RX ADMIN — POLYETHYLENE GLYCOL 3350 17 G: 17 POWDER, FOR SOLUTION ORAL at 21:37

## 2025-01-14 RX ADMIN — Medication: at 23:07

## 2025-01-14 RX ADMIN — Medication 200 MCG/HR: at 07:57

## 2025-01-14 RX ADMIN — Medication: at 12:36

## 2025-01-14 RX ADMIN — SODIUM CHLORIDE, PRESERVATIVE FREE 40 MG: 5 INJECTION INTRAVENOUS at 08:01

## 2025-01-14 RX ADMIN — ENOXAPARIN SODIUM 60 MG: 100 INJECTION SUBCUTANEOUS at 08:05

## 2025-01-14 RX ADMIN — PROPOFOL 40 MCG/KG/MIN: 10 INJECTION, EMULSION INTRAVENOUS at 04:15

## 2025-01-14 RX ADMIN — MICONAZOLE NITRATE: 20 CREAM TOPICAL at 08:04

## 2025-01-14 RX ADMIN — QUETIAPINE FUMARATE 50 MG: 50 TABLET ORAL at 21:14

## 2025-01-14 RX ADMIN — Medication 200 MCG/HR: at 18:57

## 2025-01-14 RX ADMIN — FUROSEMIDE 60 MG: 10 INJECTION, SOLUTION INTRAMUSCULAR; INTRAVENOUS at 08:01

## 2025-01-14 RX ADMIN — NOREPINEPHRINE BITARTRATE 5 MCG/MIN: 64 SOLUTION INTRAVENOUS at 13:02

## 2025-01-14 RX ADMIN — CHLORHEXIDINE GLUCONATE 15 ML: 1.2 RINSE ORAL at 08:15

## 2025-01-14 RX ADMIN — Medication 10 ML: at 21:23

## 2025-01-14 RX ADMIN — PAROXETINE HYDROCHLORIDE 40 MG: 20 TABLET, FILM COATED ORAL at 08:05

## 2025-01-14 RX ADMIN — METHYLPREDNISOLONE SODIUM SUCCINATE 40 MG: 40 INJECTION, POWDER, LYOPHILIZED, FOR SOLUTION INTRAMUSCULAR; INTRAVENOUS at 08:21

## 2025-01-14 RX ADMIN — DOCUSATE SODIUM 100 MG: 50 LIQUID ORAL at 21:36

## 2025-01-14 RX ADMIN — METOCLOPRAMIDE 10 MG: 5 INJECTION, SOLUTION INTRAMUSCULAR; INTRAVENOUS at 08:01

## 2025-01-14 RX ADMIN — Medication 10 ML: at 08:05

## 2025-01-14 RX ADMIN — Medication 200 MCG/HR: at 13:46

## 2025-01-14 RX ADMIN — PROPOFOL 13.5 MCG/KG/MIN: 10 INJECTION, EMULSION INTRAVENOUS at 10:38

## 2025-01-14 RX ADMIN — HEPARIN SODIUM 2500 UNITS: 1000 INJECTION INTRAVENOUS; SUBCUTANEOUS at 11:00

## 2025-01-14 RX ADMIN — CHLORHEXIDINE GLUCONATE 15 ML: 1.2 RINSE ORAL at 21:26

## 2025-01-14 RX ADMIN — SODIUM CHLORIDE, PRESERVATIVE FREE 40 MG: 5 INJECTION INTRAVENOUS at 21:36

## 2025-01-14 RX ADMIN — PROPOFOL 35 MCG/KG/MIN: 10 INJECTION, EMULSION INTRAVENOUS at 16:06

## 2025-01-14 RX ADMIN — FOLIC ACID 1 MG: 1 TABLET ORAL at 08:01

## 2025-01-14 RX ADMIN — MICONAZOLE NITRATE: 20 CREAM TOPICAL at 21:27

## 2025-01-14 RX ADMIN — MEROPENEM 1000 MG: 1 INJECTION INTRAVENOUS at 16:11

## 2025-01-14 RX ADMIN — ATORVASTATIN CALCIUM 40 MG: 40 TABLET, FILM COATED ORAL at 21:14

## 2025-01-14 RX ADMIN — MEROPENEM 500 MG: 500 INJECTION INTRAVENOUS at 04:40

## 2025-01-14 RX ADMIN — METOCLOPRAMIDE 10 MG: 5 INJECTION, SOLUTION INTRAMUSCULAR; INTRAVENOUS at 16:06

## 2025-01-14 RX ADMIN — ASPIRIN 81 MG: 81 TABLET, CHEWABLE ORAL at 08:01

## 2025-01-14 RX ADMIN — Medication: at 12:45

## 2025-01-14 RX ADMIN — METOCLOPRAMIDE 10 MG: 5 INJECTION, SOLUTION INTRAMUSCULAR; INTRAVENOUS at 21:36

## 2025-01-14 RX ADMIN — Medication 200 MCG/HR: at 03:06

## 2025-01-14 ASSESSMENT — PAIN SCALES - GENERAL
PAINLEVEL_OUTOF10: 0

## 2025-01-14 ASSESSMENT — PULMONARY FUNCTION TESTS
PIF_VALUE: 32
PIF_VALUE: 29
PIF_VALUE: 31
PIF_VALUE: 30
PIF_VALUE: 33
PIF_VALUE: 32
PIF_VALUE: 26
PIF_VALUE: 33
PIF_VALUE: 22
PIF_VALUE: 26
PIF_VALUE: 31
PIF_VALUE: 31
PIF_VALUE: 25
PIF_VALUE: 32
PIF_VALUE: 30
PIF_VALUE: 30
PIF_VALUE: 32
PIF_VALUE: 34
PIF_VALUE: 33
PIF_VALUE: 31
PIF_VALUE: 33
PIF_VALUE: 32
PIF_VALUE: 32
PIF_VALUE: 25
PIF_VALUE: 33
PIF_VALUE: 25
PIF_VALUE: 31
PIF_VALUE: 33
PIF_VALUE: 33
PIF_VALUE: 31
PIF_VALUE: 25
PIF_VALUE: 31
PIF_VALUE: 26
PIF_VALUE: 31
PIF_VALUE: 31
PIF_VALUE: 26
PIF_VALUE: 33
PIF_VALUE: 30
PIF_VALUE: 31
PIF_VALUE: 25
PIF_VALUE: 31
PIF_VALUE: 26
PIF_VALUE: 25
PIF_VALUE: 32
PIF_VALUE: 34
PIF_VALUE: 26
PIF_VALUE: 31
PIF_VALUE: 34
PIF_VALUE: 32
PIF_VALUE: 26
PIF_VALUE: 33
PIF_VALUE: 34
PIF_VALUE: 35
PIF_VALUE: 33
PIF_VALUE: 26
PIF_VALUE: 29
PIF_VALUE: 31
PIF_VALUE: 31
PIF_VALUE: 32
PIF_VALUE: 34
PIF_VALUE: 31
PIF_VALUE: 30
PIF_VALUE: 32
PIF_VALUE: 32
PIF_VALUE: 33
PIF_VALUE: 33
PIF_VALUE: 31
PIF_VALUE: 33
PIF_VALUE: 25
PIF_VALUE: 26
PIF_VALUE: 34
PIF_VALUE: 32
PIF_VALUE: 32
PIF_VALUE: 33

## 2025-01-14 ASSESSMENT — PAIN SCALES - WONG BAKER: WONGBAKER_NUMERICALRESPONSE: NO HURT

## 2025-01-14 NOTE — PROCEDURES
PROCEDURE NOTE  Date: 1/14/2025   Name: Isaak Mitchell  YOB: 1950    Procedures        Femoral central venous dialysis catheter    INDICATION:   Dialysis    CONSENT:   The family members were counseled regarding the procedure, it's indications, risks, potential complications and alternatives and any questions were answered. Consent was obtained.    PROCEDURE SUMMARY:   The patient was prepped and draped in the usual sterile manner using chlorhexidine scrub. 1% lidocaine was used to numb the region. The finder needle was used to locate the right femoral veinc medial to palpated artery under ultrasound guidance  . A central venous catheter  was inserted using the Seldinger technique. All ports aspirate and flushed without difficulty the line terminates in the central venous system  iliac vein/ inferior vena cava. The patient tolerated the procedure well without any immediate complications. The line was sutured into place and the area was cleaned and Tegaderm applied.   Guidewire removed and confirmed with RN   ESTIMATED BLOOD LOSS:   less than 5 cc    Complications   None        Olya Gaston MD, [facility]  1/14/2025 11:16 AM

## 2025-01-14 NOTE — FLOWSHEET NOTE
Shift summary    While supine, sats 88-92%. Able to place pt in reverse trendelenberg and even sit HOB  up slightly without any desaturations.  .  TF residual 1150, dark brown.- discarded per protocol, OG placed to LIWS. Per Dr. HAHN in rounds, to give reglan and then restart as ordered.     Sedation vacation given- pt went from RASS -4 to opening eyes, tracking, + cough, + gag. Unable to follow commands at this time, unable to further wean sedation as HR went to 150s-170s ? SVT. Went to get EKG, Hr had gone back down to SR with PVCs. Per Kanika NP to restart sedation.     Proned with RT at 1115.     Noted scant bloody oral secretions.     TF restarted at 1400.     Weaned levo as able.     1930 Report to STELLA Mendiola. Electronically signed by Leidy Stover RN on 1/13/2025 at 8:13 PM

## 2025-01-14 NOTE — TELEPHONE ENCOUNTER
Rx requested:  Requested Prescriptions     Pending Prescriptions Disp Refills    PARoxetine (PAXIL) 40 MG tablet [Pharmacy Med Name: PAROXETINE 40MG TABLETS] 90 tablet 0     Sig: TAKE 1 TABLET BY MOUTH DAILY         Last Office Visit:   10/3/2024      Next Visit Date:  No future appointments.

## 2025-01-14 NOTE — FLOWSHEET NOTE
[id number]    Shift Summary    1900-  Assumed care of this pt at this time. Bedside change of shift report received from Leidy DOUGLAS, skin check and ID band check completed.     2000-  PM assessment completed, see flowsheet for details. Pt remain ventilated, sedated and in prone position at his time.     2100-  PM medication administered, see MAR for details.  Pt found to have 350ml of residual in stomach, returned. STEWART Mantilla made aware face to face, new order received. TF on hold for two hours, then recheck residual and resume TF.     0000-  Pt has 450 ml residual in stomach at this time. STEWART Mantilla made aware via perfect serve, verbal order received to put 300ml back and hold TF for 4 hours via telephone.     0030-  Esophageal probe reading 96.6F.   Rectal probe placed, pt temp 98.2F.     0200-  Pt temp now 98.6F per rectal temp probe    0430-  AM labs drawn and sent to lab    0500-  Pt turned supine, pt SpO2 dropped into 80's, RT made vent setting changes, pt SpO2 recovered to high 90's.     0600-  Daily wt obtained, I/O's completed.     Pt BP was labile at times during the night and required slight titrations to levo, see MAR for details.   Pt's BP is more stable with him laying flat in reverse trend.   Pt produced 350ml of yellow cloudy urine throughout the night.     Bedside change of shift report given to Inga DOUGLAS

## 2025-01-15 ENCOUNTER — APPOINTMENT (OUTPATIENT)
Dept: GENERAL RADIOLOGY | Age: 75
DRG: 207 | End: 2025-01-15
Attending: INTERNAL MEDICINE
Payer: MEDICARE

## 2025-01-15 ENCOUNTER — APPOINTMENT (OUTPATIENT)
Age: 75
DRG: 207 | End: 2025-01-15
Attending: INTERNAL MEDICINE
Payer: MEDICARE

## 2025-01-15 ENCOUNTER — APPOINTMENT (OUTPATIENT)
Dept: CT IMAGING | Age: 75
DRG: 207 | End: 2025-01-15
Attending: INTERNAL MEDICINE
Payer: MEDICARE

## 2025-01-15 VITALS
OXYGEN SATURATION: 47 % | BODY MASS INDEX: 22.85 KG/M2 | WEIGHT: 137.13 LBS | DIASTOLIC BLOOD PRESSURE: 78 MMHG | SYSTOLIC BLOOD PRESSURE: 130 MMHG | HEIGHT: 65 IN | HEART RATE: 72 BPM | TEMPERATURE: 96.1 F | RESPIRATION RATE: 32 BRPM

## 2025-01-15 LAB
ABO + RH BLD: NORMAL
ABO/RH: NORMAL
ALBUMIN SERPL-MCNC: 1.4 G/DL (ref 3.5–4.6)
ALBUMIN SERPL-MCNC: 2.5 G/DL (ref 3.5–4.6)
ANION GAP SERPL CALCULATED.3IONS-SCNC: 16 MEQ/L (ref 9–15)
ANION GAP SERPL CALCULATED.3IONS-SCNC: 24 MEQ/L (ref 9–15)
ANTI-XA UNFRAC HEPARIN: 0.76 IU/ML
ANTIBODY SCREEN: NORMAL
BASE EXCESS ARTERIAL: -1 (ref -3–3)
BASE EXCESS ARTERIAL: -10 (ref -3–3)
BASE EXCESS ARTERIAL: -14 (ref -3–3)
BASE EXCESS ARTERIAL: -4 (ref -3–3)
BASE EXCESS ARTERIAL: 15 (ref -3–3)
BASOPHILS # BLD: 0.1 K/UL (ref 0–0.2)
BASOPHILS NFR BLD: 0.3 %
BLOOD BANK DISPENSE STATUS: NORMAL
BLOOD BANK DISPENSE STATUS: NORMAL
BLOOD BANK PRODUCT CODE: NORMAL
BLOOD BANK PRODUCT CODE: NORMAL
BPU ID: NORMAL
BPU ID: NORMAL
BUN SERPL-MCNC: 52 MG/DL (ref 8–23)
BUN SERPL-MCNC: 60 MG/DL (ref 8–23)
CALCIUM IONIZED: 0.92 MMOL/L (ref 1.12–1.32)
CALCIUM IONIZED: 1.15 MMOL/L (ref 1.12–1.32)
CALCIUM IONIZED: 1.21 MMOL/L (ref 1.12–1.32)
CALCIUM IONIZED: 1.29 MMOL/L (ref 1.12–1.32)
CALCIUM IONIZED: 1.42 MMOL/L (ref 1.12–1.32)
CALCIUM SERPL-MCNC: 10 MG/DL (ref 8.5–9.9)
CALCIUM SERPL-MCNC: 8 MG/DL (ref 8.5–9.9)
CHLORIDE SERPL-SCNC: 102 MEQ/L (ref 95–107)
CHLORIDE SERPL-SCNC: 104 MEQ/L (ref 95–107)
CO2 SERPL-SCNC: 11 MEQ/L (ref 20–31)
CO2 SERPL-SCNC: 20 MEQ/L (ref 20–31)
CREAT SERPL-MCNC: 2.85 MG/DL (ref 0.7–1.2)
CREAT SERPL-MCNC: 2.94 MG/DL (ref 0.7–1.2)
DESCRIPTION BLOOD BANK: NORMAL
DESCRIPTION BLOOD BANK: NORMAL
ECHO BSA: 1.72 M2
ECHO LV EDV A2C: 55 ML
ECHO LV EDV A4C: 66 ML
ECHO LV EDV BP: 59 ML (ref 67–155)
ECHO LV EDV INDEX A4C: 39 ML/M2
ECHO LV EDV INDEX BP: 35 ML/M2
ECHO LV EDV NDEX A2C: 33 ML/M2
ECHO LV EJECTION FRACTION A2C: 41 %
ECHO LV EJECTION FRACTION A4C: 41 %
ECHO LV EJECTION FRACTION BIPLANE: 39 % (ref 55–100)
ECHO LV ESV A2C: 33 ML
ECHO LV ESV A4C: 39 ML
ECHO LV ESV BP: 36 ML (ref 22–58)
ECHO LV ESV INDEX A2C: 20 ML/M2
ECHO LV ESV INDEX A4C: 23 ML/M2
ECHO LV ESV INDEX BP: 21 ML/M2
ECHO LV FRACTIONAL SHORTENING: 10 % (ref 28–44)
ECHO LV INTERNAL DIMENSION DIASTOLE INDEX: 2.5 CM/M2
ECHO LV INTERNAL DIMENSION DIASTOLIC: 4.2 CM (ref 4.2–5.9)
ECHO LV INTERNAL DIMENSION SYSTOLIC INDEX: 2.26 CM/M2
ECHO LV INTERNAL DIMENSION SYSTOLIC: 3.8 CM
ECHO LV IVSD: 1.3 CM (ref 0.6–1)
ECHO LV IVSS: 1.6 CM
ECHO LV MASS 2D: 200.6 G (ref 88–224)
ECHO LV MASS INDEX 2D: 119.4 G/M2 (ref 49–115)
ECHO LV POSTERIOR WALL DIASTOLIC: 1.3 CM (ref 0.6–1)
ECHO LV POSTERIOR WALL SYSTOLIC: 1.1 CM
ECHO LV RELATIVE WALL THICKNESS RATIO: 0.62
ECHO RV INTERNAL DIMENSION: 3 CM
ECHO RV TAPSE: 0.9 CM (ref 1.7–?)
EOSINOPHIL # BLD: 0.1 K/UL (ref 0–0.7)
EOSINOPHIL NFR BLD: 0.3 %
ERYTHROCYTE [DISTWIDTH] IN BLOOD BY AUTOMATED COUNT: 15.9 % (ref 11.5–14.5)
GLUCOSE BLD-MCNC: 107 MG/DL (ref 70–99)
GLUCOSE BLD-MCNC: 122 MG/DL (ref 70–99)
GLUCOSE BLD-MCNC: 123 MG/DL (ref 70–99)
GLUCOSE BLD-MCNC: 227 MG/DL (ref 70–99)
GLUCOSE BLD-MCNC: 315 MG/DL (ref 70–99)
GLUCOSE BLD-MCNC: 441 MG/DL (ref 70–99)
GLUCOSE BLD-MCNC: 456 MG/DL (ref 70–99)
GLUCOSE BLD-MCNC: 465 MG/DL (ref 70–99)
GLUCOSE BLD-MCNC: <20 MG/DL (ref 70–99)
GLUCOSE BLD-MCNC: >700 MG/DL (ref 70–99)
GLUCOSE SERPL-MCNC: 102 MG/DL (ref 70–99)
GLUCOSE SERPL-MCNC: 354 MG/DL (ref 70–99)
HCO3 ARTERIAL: 12 MMOL/L (ref 21–29)
HCO3 ARTERIAL: 20.3 MMOL/L (ref 21–29)
HCO3 ARTERIAL: 23.6 MMOL/L (ref 21–29)
HCO3 ARTERIAL: 25.3 MMOL/L (ref 21–29)
HCO3 ARTERIAL: 42.4 MMOL/L (ref 21–29)
HCT VFR BLD AUTO: 14 % (ref 41–53)
HCT VFR BLD AUTO: 14 % (ref 41–53)
HCT VFR BLD AUTO: 18 % (ref 41–53)
HCT VFR BLD AUTO: 18.4 % (ref 42–52)
HCT VFR BLD AUTO: 29.9 % (ref 42–52)
HCT VFR BLD AUTO: 30 % (ref 41–53)
HCT VFR BLD AUTO: 34 % (ref 41–53)
HGB BLD CALC-MCNC: 10.1 GM/DL (ref 13.5–17.5)
HGB BLD CALC-MCNC: 11.6 GM/DL (ref 13.5–17.5)
HGB BLD CALC-MCNC: 4.8 GM/DL (ref 13.5–17.5)
HGB BLD CALC-MCNC: 4.9 GM/DL (ref 13.5–17.5)
HGB BLD CALC-MCNC: 6 GM/DL (ref 13.5–17.5)
HGB BLD-MCNC: 5.5 G/DL (ref 14–18)
HGB BLD-MCNC: 9.9 G/DL (ref 14–18)
LACTATE: 15.64 MMOL/L (ref 0.4–2)
LACTATE: 16.1 MMOL/L (ref 0.4–2)
LACTATE: 2.18 MMOL/L (ref 0.4–2)
LACTATE: 6.01 MMOL/L (ref 0.4–2)
LACTATE: 9.07 MMOL/L (ref 0.4–2)
LYMPHOCYTES # BLD: 3.4 K/UL (ref 1–4.8)
LYMPHOCYTES NFR BLD: 15.3 %
MAGNESIUM SERPL-MCNC: 2.7 MG/DL (ref 1.7–2.4)
MCH RBC QN AUTO: 31.3 PG (ref 27–31.3)
MCHC RBC AUTO-ENTMCNC: 33.1 % (ref 33–37)
MCV RBC AUTO: 94.6 FL (ref 79–92.2)
MONOCYTES # BLD: 1.2 K/UL (ref 0.2–0.8)
MONOCYTES NFR BLD: 5.7 %
NEUTROPHILS # BLD: 16.3 K/UL (ref 1.4–6.5)
NEUTS SEG NFR BLD: 74.7 %
O2 SAT, ARTERIAL: 100 % (ref 93–100)
O2 SAT, ARTERIAL: 100 % (ref 93–100)
O2 SAT, ARTERIAL: 97 % (ref 93–100)
O2 SAT, ARTERIAL: 98 % (ref 93–100)
O2 SAT, ARTERIAL: 98 % (ref 93–100)
PCO2 ARTERIAL: 102 MM HG (ref 35–45)
PCO2 ARTERIAL: 24 MM HG (ref 35–45)
PCO2 ARTERIAL: 48 MM HG (ref 35–45)
PCO2 ARTERIAL: 53 MM HG (ref 35–45)
PCO2 ARTERIAL: 69 MM HG (ref 35–45)
PERFORMED ON: ABNORMAL
PH ARTERIAL: 7.08 (ref 7.35–7.45)
PH ARTERIAL: 7.23 (ref 7.35–7.45)
PH ARTERIAL: 7.25 (ref 7.35–7.45)
PH ARTERIAL: 7.3 (ref 7.35–7.45)
PH ARTERIAL: 7.33 (ref 7.35–7.45)
PHOSPHATE SERPL-MCNC: 14.9 MG/DL (ref 2.3–4.8)
PHOSPHATE SERPL-MCNC: 8 MG/DL (ref 2.3–4.8)
PLATELET # BLD AUTO: 400 K/UL (ref 130–400)
PO2 ARTERIAL: 109 MM HG (ref 75–108)
PO2 ARTERIAL: 121 MM HG (ref 75–108)
PO2 ARTERIAL: 126 MM HG (ref 75–108)
PO2 ARTERIAL: 283 MM HG (ref 75–108)
PO2 ARTERIAL: 391 MM HG (ref 75–108)
POC CHLORIDE: 102 MEQ/L (ref 99–110)
POC CHLORIDE: 105 MEQ/L (ref 99–110)
POC CHLORIDE: 108 MEQ/L (ref 99–110)
POC CHLORIDE: 109 MEQ/L (ref 99–110)
POC CHLORIDE: 113 MEQ/L (ref 99–110)
POC CREATININE: 2.5 MG/DL (ref 0.8–1.3)
POC CREATININE: 2.7 MG/DL (ref 0.8–1.3)
POC CREATININE: 3 MG/DL (ref 0.8–1.3)
POC CREATININE: 3.1 MG/DL (ref 0.8–1.3)
POC CREATININE: 3.1 MG/DL (ref 0.8–1.3)
POC FIO2: 100
POC FIO2: 15
POC SAMPLE TYPE: ABNORMAL
POTASSIUM SERPL-SCNC: 5.2 MEQ/L (ref 3.5–5.1)
POTASSIUM SERPL-SCNC: 5.7 MEQ/L (ref 3.4–4.9)
POTASSIUM SERPL-SCNC: 7.6 MEQ/L (ref 3.4–4.9)
POTASSIUM SERPL-SCNC: 7.8 MEQ/L (ref 3.5–5.1)
POTASSIUM SERPL-SCNC: 8 MEQ/L (ref 3.5–5.1)
POTASSIUM SERPL-SCNC: 8.2 MEQ/L (ref 3.5–5.1)
POTASSIUM SERPL-SCNC: 8.5 MEQ/L (ref 3.5–5.1)
RBC # BLD AUTO: 3.16 M/UL (ref 4.7–6.1)
SODIUM BLD-SCNC: 136 MEQ/L (ref 136–145)
SODIUM BLD-SCNC: 140 MEQ/L (ref 136–145)
SODIUM BLD-SCNC: 141 MEQ/L (ref 136–145)
SODIUM BLD-SCNC: 147 MEQ/L (ref 136–145)
SODIUM BLD-SCNC: 161 MEQ/L (ref 136–145)
SODIUM SERPL-SCNC: 138 MEQ/L (ref 135–144)
SODIUM SERPL-SCNC: 139 MEQ/L (ref 135–144)
TCO2 ARTERIAL: 13 MMOL/L (ref 21–32)
TCO2 ARTERIAL: 23 MMOL/L (ref 21–32)
TCO2 ARTERIAL: 25 MMOL/L (ref 21–32)
TCO2 ARTERIAL: 27 MMOL/L (ref 21–32)
TCO2 ARTERIAL: 46 MMOL/L (ref 21–32)
WBC # BLD AUTO: 21.9 K/UL (ref 4.8–10.8)

## 2025-01-15 PROCEDURE — 2580000003 HC RX 258: Performed by: INTERNAL MEDICINE

## 2025-01-15 PROCEDURE — P9059 PLASMA, FRZ BETWEEN 8-24HOUR: HCPCS

## 2025-01-15 PROCEDURE — 99232 SBSQ HOSP IP/OBS MODERATE 35: CPT | Performed by: INTERNAL MEDICINE

## 2025-01-15 PROCEDURE — 6360000002 HC RX W HCPCS: Performed by: INTERNAL MEDICINE

## 2025-01-15 PROCEDURE — P9016 RBC LEUKOCYTES REDUCED: HCPCS

## 2025-01-15 PROCEDURE — 94003 VENT MGMT INPAT SUBQ DAY: CPT

## 2025-01-15 PROCEDURE — 6370000000 HC RX 637 (ALT 250 FOR IP): Performed by: INTERNAL MEDICINE

## 2025-01-15 PROCEDURE — 2500000003 HC RX 250 WO HCPCS: Performed by: NURSE PRACTITIONER

## 2025-01-15 PROCEDURE — 82565 ASSAY OF CREATININE: CPT

## 2025-01-15 PROCEDURE — 86900 BLOOD TYPING SEROLOGIC ABO: CPT

## 2025-01-15 PROCEDURE — 82948 REAGENT STRIP/BLOOD GLUCOSE: CPT

## 2025-01-15 PROCEDURE — 85018 HEMOGLOBIN: CPT

## 2025-01-15 PROCEDURE — 04HL33Z INSERTION OF INFUSION DEVICE INTO LEFT FEMORAL ARTERY, PERCUTANEOUS APPROACH: ICD-10-PCS | Performed by: INTERNAL MEDICINE

## 2025-01-15 PROCEDURE — 85520 HEPARIN ASSAY: CPT

## 2025-01-15 PROCEDURE — 71250 CT THORAX DX C-: CPT

## 2025-01-15 PROCEDURE — 71045 X-RAY EXAM CHEST 1 VIEW: CPT

## 2025-01-15 PROCEDURE — 2700000000 HC OXYGEN THERAPY PER DAY

## 2025-01-15 PROCEDURE — 89220 SPUTUM SPECIMEN COLLECTION: CPT

## 2025-01-15 PROCEDURE — 86923 COMPATIBILITY TEST ELECTRIC: CPT

## 2025-01-15 PROCEDURE — 85025 COMPLETE CBC W/AUTO DIFF WBC: CPT

## 2025-01-15 PROCEDURE — 85014 HEMATOCRIT: CPT

## 2025-01-15 PROCEDURE — 82803 BLOOD GASES ANY COMBINATION: CPT

## 2025-01-15 PROCEDURE — 36600 WITHDRAWAL OF ARTERIAL BLOOD: CPT

## 2025-01-15 PROCEDURE — 2500000003 HC RX 250 WO HCPCS: Performed by: STUDENT IN AN ORGANIZED HEALTH CARE EDUCATION/TRAINING PROGRAM

## 2025-01-15 PROCEDURE — 86850 RBC ANTIBODY SCREEN: CPT

## 2025-01-15 PROCEDURE — 83735 ASSAY OF MAGNESIUM: CPT

## 2025-01-15 PROCEDURE — 70450 CT HEAD/BRAIN W/O DYE: CPT

## 2025-01-15 PROCEDURE — 83605 ASSAY OF LACTIC ACID: CPT

## 2025-01-15 PROCEDURE — 2500000003 HC RX 250 WO HCPCS: Performed by: INTERNAL MEDICINE

## 2025-01-15 PROCEDURE — 84132 ASSAY OF SERUM POTASSIUM: CPT

## 2025-01-15 PROCEDURE — 86901 BLOOD TYPING SEROLOGIC RH(D): CPT

## 2025-01-15 PROCEDURE — 6360000002 HC RX W HCPCS: Performed by: NURSE PRACTITIONER

## 2025-01-15 PROCEDURE — 37799 UNLISTED PX VASCULAR SURGERY: CPT

## 2025-01-15 PROCEDURE — 2500000003 HC RX 250 WO HCPCS

## 2025-01-15 PROCEDURE — 82330 ASSAY OF CALCIUM: CPT

## 2025-01-15 PROCEDURE — 93308 TTE F-UP OR LMTD: CPT

## 2025-01-15 PROCEDURE — 74176 CT ABD & PELVIS W/O CONTRAST: CPT

## 2025-01-15 PROCEDURE — 80069 RENAL FUNCTION PANEL: CPT

## 2025-01-15 PROCEDURE — 84295 ASSAY OF SERUM SODIUM: CPT

## 2025-01-15 PROCEDURE — 82435 ASSAY OF BLOOD CHLORIDE: CPT

## 2025-01-15 PROCEDURE — 92950 HEART/LUNG RESUSCITATION CPR: CPT

## 2025-01-15 RX ORDER — DEXTROSE MONOHYDRATE 50 MG/ML
INJECTION, SOLUTION INTRAVENOUS
Status: DISCONTINUED
Start: 2025-01-15 | End: 2025-01-15 | Stop reason: HOSPADM

## 2025-01-15 RX ORDER — ATROPINE SULFATE 0.1 MG/ML
INJECTION INTRAVENOUS
Status: COMPLETED | OUTPATIENT
Start: 2025-01-15 | End: 2025-01-15

## 2025-01-15 RX ORDER — EPINEPHRINE IN SOD CHLOR,ISO 1 MG/10 ML
SYRINGE (ML) INTRAVENOUS
Status: COMPLETED | OUTPATIENT
Start: 2025-01-15 | End: 2025-01-15

## 2025-01-15 RX ORDER — PROTAMINE SULFATE 10 MG/ML
INJECTION, SOLUTION INTRAVENOUS
Status: COMPLETED | OUTPATIENT
Start: 2025-01-15 | End: 2025-01-15

## 2025-01-15 RX ORDER — CALCIUM CHLORIDE, MAGNESIUM CHLORIDE, DEXTROSE MONOHYDRATE, LACTIC ACID, SODIUM CHLORIDE, SODIUM BICARBONATE AND POTASSIUM CHLORIDE 5.15; 2.03; 22; 5.4; 6.46; 3.09; .157 G/L; G/L; G/L; G/L; G/L; G/L; G/L
INJECTION INTRAVENOUS CONTINUOUS
Status: DISCONTINUED | OUTPATIENT
Start: 2025-01-15 | End: 2025-01-15 | Stop reason: HOSPADM

## 2025-01-15 RX ORDER — ACETAMINOPHEN 325 MG/1
650 TABLET ORAL EVERY 6 HOURS PRN
Status: DISCONTINUED | OUTPATIENT
Start: 2025-01-15 | End: 2025-01-15 | Stop reason: HOSPADM

## 2025-01-15 RX ORDER — ACETAMINOPHEN 650 MG/1
650 SUPPOSITORY RECTAL EVERY 6 HOURS PRN
Status: DISCONTINUED | OUTPATIENT
Start: 2025-01-15 | End: 2025-01-15 | Stop reason: HOSPADM

## 2025-01-15 RX ORDER — HYDROCORTISONE SODIUM SUCCINATE 100 MG/2ML
50 INJECTION INTRAMUSCULAR; INTRAVENOUS EVERY 6 HOURS
Status: DISCONTINUED | OUTPATIENT
Start: 2025-01-15 | End: 2025-01-15 | Stop reason: HOSPADM

## 2025-01-15 RX ORDER — SODIUM CHLORIDE 9 MG/ML
INJECTION, SOLUTION INTRAVENOUS PRN
Status: DISCONTINUED | OUTPATIENT
Start: 2025-01-15 | End: 2025-01-15 | Stop reason: HOSPADM

## 2025-01-15 RX ORDER — INSULIN LISPRO 100 [IU]/ML
INJECTION, SOLUTION INTRAVENOUS; SUBCUTANEOUS
Status: COMPLETED | OUTPATIENT
Start: 2025-01-15 | End: 2025-01-15

## 2025-01-15 RX ORDER — CALCIUM CHLORIDE 100 MG/ML
INJECTION INTRAVENOUS; INTRAVENTRICULAR
Status: COMPLETED | OUTPATIENT
Start: 2025-01-15 | End: 2025-01-15

## 2025-01-15 RX ORDER — HEPARIN SODIUM 1000 [USP'U]/ML
INJECTION, SOLUTION INTRAVENOUS; SUBCUTANEOUS PRN
Status: DISCONTINUED | OUTPATIENT
Start: 2025-01-15 | End: 2025-01-15 | Stop reason: HOSPADM

## 2025-01-15 RX ORDER — DEXTROSE MONOHYDRATE 25 G/50ML
INJECTION, SOLUTION INTRAVENOUS
Status: COMPLETED | OUTPATIENT
Start: 2025-01-15 | End: 2025-01-15

## 2025-01-15 RX ORDER — DEXTROSE MONOHYDRATE 25 G/50ML
INJECTION, SOLUTION INTRAVENOUS
Status: DISCONTINUED
Start: 2025-01-15 | End: 2025-01-15 | Stop reason: HOSPADM

## 2025-01-15 RX ADMIN — DEXTROSE MONOHYDRATE 25 G: 25 INJECTION, SOLUTION INTRAVENOUS at 11:59

## 2025-01-15 RX ADMIN — PROPOFOL 35 MCG/KG/MIN: 10 INJECTION, EMULSION INTRAVENOUS at 00:40

## 2025-01-15 RX ADMIN — HEPARIN SODIUM 1300 UNITS: 1000 INJECTION INTRAVENOUS; SUBCUTANEOUS at 11:28

## 2025-01-15 RX ADMIN — NOREPINEPHRINE BITARTRATE 60 MCG/MIN: 64 SOLUTION INTRAVENOUS at 08:06

## 2025-01-15 RX ADMIN — PROPOFOL 35 MCG/KG/MIN: 10 INJECTION, EMULSION INTRAVENOUS at 06:37

## 2025-01-15 RX ADMIN — ATROPINE SULFATE 1 MG: 0.1 INJECTION, SOLUTION ENDOTRACHEAL; INTRAMUSCULAR; INTRAVENOUS; SUBCUTANEOUS at 12:22

## 2025-01-15 RX ADMIN — Medication 200 MCG/HR: at 00:42

## 2025-01-15 RX ADMIN — Medication 200 MCG/HR: at 05:57

## 2025-01-15 RX ADMIN — Medication 1 MG: at 12:19

## 2025-01-15 RX ADMIN — PROTAMINE SULFATE 50 MG: 10 INJECTION, SOLUTION INTRAVENOUS at 12:23

## 2025-01-15 RX ADMIN — SODIUM BICARBONATE 50 MEQ: 84 INJECTION INTRAVENOUS at 12:21

## 2025-01-15 RX ADMIN — SODIUM CHLORIDE 7.6 UNITS/HR: 9 INJECTION, SOLUTION INTRAVENOUS at 10:11

## 2025-01-15 RX ADMIN — SODIUM BICARBONATE: 84 INJECTION INTRAVENOUS at 14:25

## 2025-01-15 RX ADMIN — SODIUM BICARBONATE 50 MEQ: 84 INJECTION INTRAVENOUS at 12:00

## 2025-01-15 RX ADMIN — Medication 1 MG: at 12:39

## 2025-01-15 RX ADMIN — Medication 1 MG: at 12:45

## 2025-01-15 RX ADMIN — MICONAZOLE NITRATE: 20 CREAM TOPICAL at 08:21

## 2025-01-15 RX ADMIN — SODIUM BICARBONATE: 84 INJECTION, SOLUTION INTRAVENOUS at 09:37

## 2025-01-15 RX ADMIN — NOREPINEPHRINE BITARTRATE 65 MCG/MIN: 64 SOLUTION INTRAVENOUS at 11:44

## 2025-01-15 RX ADMIN — INSULIN LISPRO 10 UNITS: 100 INJECTION, SOLUTION INTRAVENOUS; SUBCUTANEOUS at 11:58

## 2025-01-15 RX ADMIN — Medication 1 MG: at 12:48

## 2025-01-15 RX ADMIN — SODIUM BICARBONATE 50 MEQ: 84 INJECTION INTRAVENOUS at 12:10

## 2025-01-15 RX ADMIN — Medication 1 MG: at 12:42

## 2025-01-15 RX ADMIN — Medication 1 MG: at 11:56

## 2025-01-15 RX ADMIN — MEROPENEM 1000 MG: 1 INJECTION INTRAVENOUS at 04:59

## 2025-01-15 RX ADMIN — EPINEPHRINE 10 MCG/MIN: 1 INJECTION PARENTERAL at 09:39

## 2025-01-15 RX ADMIN — HEPARIN SODIUM 18 UNITS/KG/HR: 10000 INJECTION, SOLUTION INTRAVENOUS at 11:01

## 2025-01-15 RX ADMIN — HEPARIN SODIUM 1300 UNITS: 1000 INJECTION INTRAVENOUS; SUBCUTANEOUS at 11:27

## 2025-01-15 RX ADMIN — Medication 1 MG: at 12:11

## 2025-01-15 RX ADMIN — Medication 1 MG: at 12:36

## 2025-01-15 RX ADMIN — Medication 1 MG: at 11:59

## 2025-01-15 RX ADMIN — Medication 1 MG: at 12:08

## 2025-01-15 RX ADMIN — VASOPRESSIN 0.03 UNITS/MIN: 20 INJECTION INTRAVENOUS at 08:19

## 2025-01-15 RX ADMIN — HEPARIN SODIUM 18 UNITS/KG/HR: 10000 INJECTION, SOLUTION INTRAVENOUS at 06:55

## 2025-01-15 RX ADMIN — Medication 1 MG: at 12:51

## 2025-01-15 RX ADMIN — CALCIUM CHLORIDE 1000 MG: 100 INJECTION INTRAVENOUS; INTRAVENTRICULAR at 12:22

## 2025-01-15 RX ADMIN — SODIUM BICARBONATE: 84 INJECTION INTRAVENOUS at 14:26

## 2025-01-15 RX ADMIN — Medication 10 ML: at 14:26

## 2025-01-15 ASSESSMENT — PULMONARY FUNCTION TESTS
PIF_VALUE: 30
PIF_VALUE: 34
PIF_VALUE: 31
PIF_VALUE: 32
PIF_VALUE: 36
PIF_VALUE: 32
PIF_VALUE: 31
PIF_VALUE: 32
PIF_VALUE: 37
PIF_VALUE: 33
PIF_VALUE: 32
PIF_VALUE: 35
PIF_VALUE: 32
PIF_VALUE: 32
PIF_VALUE: 33
PIF_VALUE: 34
PIF_VALUE: 36
PIF_VALUE: 31
PIF_VALUE: 26
PIF_VALUE: 45
PIF_VALUE: 32
PIF_VALUE: 33
PIF_VALUE: 33
PIF_VALUE: 35
PIF_VALUE: 35
PIF_VALUE: 33
PIF_VALUE: 32
PIF_VALUE: 34
PIF_VALUE: 35
PIF_VALUE: 34
PIF_VALUE: 32
PIF_VALUE: 32
PIF_VALUE: 31
PIF_VALUE: 33
PIF_VALUE: 32
PIF_VALUE: 33
PIF_VALUE: 12
PIF_VALUE: 32
PIF_VALUE: 33
PIF_VALUE: 34
PIF_VALUE: 32
PIF_VALUE: 33
PIF_VALUE: 35
PIF_VALUE: 34
PIF_VALUE: 32
PIF_VALUE: 32
PIF_VALUE: 45

## 2025-01-15 ASSESSMENT — PAIN SCALES - GENERAL
PAINLEVEL_OUTOF10: 0

## 2025-01-15 NOTE — SIGNIFICANT EVENT
Patient CODE BLUE at 0925  Contacted Legal next of kin Maura (son and daughter)  They are in [place] and en route (roughly 8 hours)  Girlfriend contacted and made aware.   Explained poor prognosis.       ROSC achieved   Notified Maura of pulse achieved, advised patient is still extremely critical state.

## 2025-01-15 NOTE — PLAN OF CARE
Problem: Chronic Conditions and Co-morbidities  Goal: Patient's chronic conditions and co-morbidity symptoms are monitored and maintained or improved  1/10/2025 1028 by Chinmay Heaton, RN  Outcome: Progressing  Flowsheets (Taken 1/10/2025 0800)  Care Plan - Patient's Chronic Conditions and Co-Morbidity Symptoms are Monitored and Maintained or Improved:   Monitor and assess patient's chronic conditions and comorbid symptoms for stability, deterioration, or improvement   Collaborate with multidisciplinary team to address chronic and comorbid conditions and prevent exacerbation or deterioration  1/10/2025 0030 by Maury Kim, RN  Outcome: Progressing     Problem: Discharge Planning  Goal: Discharge to home or other facility with appropriate resources  1/10/2025 1028 by Chinmay Heaton, RN  Outcome: Progressing  Flowsheets (Taken 1/10/2025 0800)  Discharge to home or other facility with appropriate resources:   Identify barriers to discharge with patient and caregiver   Arrange for needed discharge resources and transportation as appropriate  1/10/2025 0030 by Maury Kim, RN  Outcome: Progressing     
  Problem: Chronic Conditions and Co-morbidities  Goal: Patient's chronic conditions and co-morbidity symptoms are monitored and maintained or improved  1/9/2025 1021 by Chinmay Heaton RN  Outcome: Progressing  Flowsheets (Taken 1/9/2025 0830)  Care Plan - Patient's Chronic Conditions and Co-Morbidity Symptoms are Monitored and Maintained or Improved:   Monitor and assess patient's chronic conditions and comorbid symptoms for stability, deterioration, or improvement   Collaborate with multidisciplinary team to address chronic and comorbid conditions and prevent exacerbation or deterioration   Update acute care plan with appropriate goals if chronic or comorbid symptoms are exacerbated and prevent overall improvement and discharge  1/8/2025 2324 by Vanessa Milan, RN  Outcome: Progressing     Problem: Discharge Planning  Goal: Discharge to home or other facility with appropriate resources  1/9/2025 1021 by Chinmay Heaton, RN  Outcome: Progressing  Flowsheets (Taken 1/9/2025 0830)  Discharge to home or other facility with appropriate resources:   Identify barriers to discharge with patient and caregiver   Arrange for needed discharge resources and transportation as appropriate  1/8/2025 2324 by Vanessa Milan, RN  Outcome: Progressing     
  Problem: Chronic Conditions and Co-morbidities  Goal: Patient's chronic conditions and co-morbidity symptoms are monitored and maintained or improved  Outcome: Not Progressing  Flowsheets (Taken 1/12/2025 2000)  Care Plan - Patient's Chronic Conditions and Co-Morbidity Symptoms are Monitored and Maintained or Improved:   Monitor and assess patient's chronic conditions and comorbid symptoms for stability, deterioration, or improvement   Collaborate with multidisciplinary team to address chronic and comorbid conditions and prevent exacerbation or deterioration   Update acute care plan with appropriate goals if chronic or comorbid symptoms are exacerbated and prevent overall improvement and discharge     Problem: Discharge Planning  Goal: Discharge to home or other facility with appropriate resources  Outcome: Not Progressing  Flowsheets (Taken 1/12/2025 2000)  Discharge to home or other facility with appropriate resources:   Identify barriers to discharge with patient and caregiver   Arrange for needed discharge resources and transportation as appropriate   Identify discharge learning needs (meds, wound care, etc)   Arrange for interpreters to assist at discharge as needed   Refer to discharge planning if patient needs post-hospital services based on physician order or complex needs related to functional status, cognitive ability or social support system     Problem: Safety - Adult  Goal: Free from fall injury  Outcome: Not Progressing     Problem: ABCDS Injury Assessment  Goal: Absence of physical injury  Outcome: Not Progressing     Problem: Pain  Goal: Verbalizes/displays adequate comfort level or baseline comfort level  Outcome: Not Progressing  Flowsheets (Taken 1/12/2025 1945)  Verbalizes/displays adequate comfort level or baseline comfort level:   Encourage patient to monitor pain and request assistance   Assess pain using appropriate pain scale   Administer analgesics based on type and severity of pain and 
  Problem: Chronic Conditions and Co-morbidities  Goal: Patient's chronic conditions and co-morbidity symptoms are monitored and maintained or improved  Outcome: Progressing     Problem: Discharge Planning  Goal: Discharge to home or other facility with appropriate resources  Outcome: Progressing     Problem: Safety - Adult  Goal: Free from fall injury  Outcome: Progressing     Problem: ABCDS Injury Assessment  Goal: Absence of physical injury  Outcome: Progressing     
  Problem: Chronic Conditions and Co-morbidities  Goal: Patient's chronic conditions and co-morbidity symptoms are monitored and maintained or improved  Outcome: Progressing     Problem: Discharge Planning  Goal: Discharge to home or other facility with appropriate resources  Outcome: Progressing     Problem: Safety - Adult  Goal: Free from fall injury  Outcome: Progressing     Problem: ABCDS Injury Assessment  Goal: Absence of physical injury  Outcome: Progressing     Problem: Pain  Goal: Verbalizes/displays adequate comfort level or baseline comfort level  Outcome: Progressing     Problem: Skin/Tissue Integrity  Goal: Absence of new skin breakdown  Description: 1.  Monitor for areas of redness and/or skin breakdown  2.  Assess vascular access sites hourly  3.  Every 4-6 hours minimum:  Change oxygen saturation probe site  4.  Every 4-6 hours:  If on nasal continuous positive airway pressure, respiratory therapy assess nares and determine need for appliance change or resting period.  Outcome: Progressing     
  Problem: Chronic Conditions and Co-morbidities  Goal: Patient's chronic conditions and co-morbidity symptoms are monitored and maintained or improved  Outcome: Progressing  Flowsheets (Taken 1/13/2025 2000)  Care Plan - Patient's Chronic Conditions and Co-Morbidity Symptoms are Monitored and Maintained or Improved: Monitor and assess patient's chronic conditions and comorbid symptoms for stability, deterioration, or improvement     Problem: Discharge Planning  Goal: Discharge to home or other facility with appropriate resources  Outcome: Progressing  Flowsheets (Taken 1/13/2025 2000)  Discharge to home or other facility with appropriate resources:   Identify barriers to discharge with patient and caregiver   Arrange for needed discharge resources and transportation as appropriate   Identify discharge learning needs (meds, wound care, etc)   Arrange for interpreters to assist at discharge as needed   Refer to discharge planning if patient needs post-hospital services based on physician order or complex needs related to functional status, cognitive ability or social support system     Problem: Safety - Adult  Goal: Free from fall injury  Outcome: Progressing     Problem: ABCDS Injury Assessment  Goal: Absence of physical injury  Outcome: Progressing     Problem: Pain  Goal: Verbalizes/displays adequate comfort level or baseline comfort level  Outcome: Progressing  Flowsheets (Taken 1/13/2025 2000)  Verbalizes/displays adequate comfort level or baseline comfort level: Assess pain using appropriate pain scale     Problem: Skin/Tissue Integrity  Goal: Absence of new skin breakdown  Description: 1.  Monitor for areas of redness and/or skin breakdown  2.  Assess vascular access sites hourly  3.  Every 4-6 hours minimum:  Change oxygen saturation probe site  4.  Every 4-6 hours:  If on nasal continuous positive airway pressure, respiratory therapy assess nares and determine need for appliance change or resting 
  Problem: Chronic Conditions and Co-morbidities  Goal: Patient's chronic conditions and co-morbidity symptoms are monitored and maintained or improved  Outcome: Progressing  Flowsheets (Taken 1/6/2025 2159)  Care Plan - Patient's Chronic Conditions and Co-Morbidity Symptoms are Monitored and Maintained or Improved: Monitor and assess patient's chronic conditions and comorbid symptoms for stability, deterioration, or improvement     Problem: Discharge Planning  Goal: Discharge to home or other facility with appropriate resources  Outcome: Progressing  Flowsheets (Taken 1/6/2025 2159)  Discharge to home or other facility with appropriate resources: Identify barriers to discharge with patient and caregiver     Problem: Safety - Adult  Goal: Free from fall injury  Outcome: Progressing     
  Problem: Nutrition Deficit:  Goal: Optimize nutritional status  1/10/2025 1201 by Rhina Hawthorne, ADIEL, LD  Outcome: Not Progressing  Flowsheets (Taken 1/9/2025 0930)  Nutrient intake appropriate for improving, restoring, or maintaining nutritional needs:   Assess nutritional status and recommend course of action   Monitor oral intake, labs, and treatment plans   Recommend appropriate diets, oral nutritional supplements, and vitamin/mineral supplements   Provide specific nutrition education to patient or family as appropriate  1/10/2025 1028 by Chinmay Heaton, RN  Outcome: Progressing  1/10/2025 0030 by Maury Kim, RN  Outcome: Progressing     
BSE completed  
Nutrition Problem #1: Severe malnutrition, in context of chronic illness  Intervention: Food and/or Nutrient Delivery: Start Tube Feeding  Nutritional      
Nutrition Problem #1: Severe malnutrition, in context of chronic illness  Intervention: Food and/or Nutrient Delivery: Start Tube Feeding  Nutritional      
INTERVENTIONS:  1. Determine that other, less restrictive measures have been tried or would not be effective before applying the restraint  2. Evaluate the patient's condition at the time of restraint application  3. Inform patient/family regarding the reason for restraint  4. Q2H: Monitor safety, psychosocial status, comfort, nutrition and hydration  Outcome: Progressing     Problem: Decision Making  Goal: Pt/Family able to effectively weigh alternatives and participate in decision making related to treatment and care  Description: INTERVENTIONS:  1. Determine when there are differences between patient's view, family's view, and healthcare provider's view of condition  2. Facilitate patient and family articulation of goals for care  3. Help patient and family identify pros/cons of alternative solutions  4. Provide information as requested by patient/family  5. Respect patient/family right to receive or not to receive information  6. Serve as a liaison between patient and family and health care team  7. Initiate Consults from Ethics, Palliative Care or initiate Family Care Conference as is appropriate  Outcome: Progressing     Problem: Nutrition Deficit:  Goal: Optimize nutritional status  1/10/2025 0030 by Maury Kim, RN  Outcome: Progressing  1/9/2025 1217 by Rhina Hawthorne, RD, LD  Flowsheets (Taken 1/9/2025 5300)  Nutrient intake appropriate for improving, restoring, or maintaining nutritional needs:   Assess nutritional status and recommend course of action   Monitor oral intake, labs, and treatment plans   Recommend appropriate diets, oral nutritional supplements, and vitamin/mineral supplements   Provide specific nutrition education to patient or family as appropriate     
Adult  Goal: Electrolytes maintained within normal limits  Outcome: Not Progressing  Goal: Hemodynamic stability and optimal renal function maintained  Outcome: Not Progressing  Goal: Glucose maintained within prescribed range  Outcome: Not Progressing     Problem: Hematologic - Adult  Goal: Maintains hematologic stability  Outcome: Not Progressing     Problem: Safety - Medical Restraint  Goal: Remains free of injury from restraints (Restraint for Interference with Medical Device)  Outcome: Not Progressing  Flowsheets (Taken 1/11/2025 2200)  Remains free of injury from restraints (restraint for interference with medical device):   Determine that other, less restrictive measures have been tried or would not be effective before applying the restraint   Evaluate the patient's condition at the time of restraint application   Inform patient/family regarding the reason for restraint   Every 2 hours: Monitor safety, psychosocial status, comfort, nutrition and hydration     Problem: Decision Making  Goal: Pt/Family able to effectively weigh alternatives and participate in decision making related to treatment and care  Outcome: Not Progressing     Problem: Nutrition Deficit:  Goal: Optimize nutritional status  Outcome: Not Progressing     
level or baseline comfort level  Outcome: Progressing     Problem: Skin/Tissue Integrity  Goal: Absence of new skin breakdown  Description: 1.  Monitor for areas of redness and/or skin breakdown  2.  Assess vascular access sites hourly  3.  Every 4-6 hours minimum:  Change oxygen saturation probe site  4.  Every 4-6 hours:  If on nasal continuous positive airway pressure, respiratory therapy assess nares and determine need for appliance change or resting period.  Outcome: Progressing     Problem: Neurosensory - Adult  Goal: Achieves stable or improved neurological status  Outcome: Progressing  Flowsheets (Taken 1/8/2025 0800)  Achieves stable or improved neurological status: Assess for and report changes in neurological status     Problem: Respiratory - Adult  Goal: Achieves optimal ventilation and oxygenation  Outcome: Progressing  Flowsheets (Taken 1/8/2025 0800)  Achieves optimal ventilation and oxygenation: Assess for changes in respiratory status     Problem: Cardiovascular - Adult  Goal: Maintains optimal cardiac output and hemodynamic stability  Outcome: Progressing  Flowsheets (Taken 1/8/2025 0800)  Maintains optimal cardiac output and hemodynamic stability: Monitor blood pressure and heart rate  Goal: Absence of cardiac dysrhythmias or at baseline  Outcome: Progressing  Flowsheets (Taken 1/8/2025 0800)  Absence of cardiac dysrhythmias or at baseline: Monitor cardiac rate and rhythm     Problem: Skin/Tissue Integrity - Adult  Goal: Skin integrity remains intact  Outcome: Progressing  Flowsheets (Taken 1/8/2025 0800)  Skin Integrity Remains Intact: Monitor for areas of redness and/or skin breakdown     Problem: Musculoskeletal - Adult  Goal: Return mobility to safest level of function  Outcome: Progressing  Flowsheets (Taken 1/8/2025 0800)  Return Mobility to Safest Level of Function: Assess patient stability and activity tolerance for standing, transferring and ambulating with or without assistive devices   
symptoms of bleeding or hemorrhage   Monitor labs for bleeding or clotting disorders   Administer blood products/factors as ordered  1/13/2025 2343 by Maury Kim RN  Outcome: Progressing  Flowsheets (Taken 1/13/2025 2000)  Maintains hematologic stability: Assess for signs and symptoms of bleeding or hemorrhage     Problem: Safety - Medical Restraint  Goal: Remains free of injury from restraints (Restraint for Interference with Medical Device)  Description: INTERVENTIONS:  1. Determine that other, less restrictive measures have been tried or would not be effective before applying the restraint  2. Evaluate the patient's condition at the time of restraint application  3. Inform patient/family regarding the reason for restraint  4. Q2H: Monitor safety, psychosocial status, comfort, nutrition and hydration  1/14/2025 1134 by Diana Dhaliwal RN  Outcome: Progressing  1/13/2025 2343 by Maury Kim RN  Outcome: Progressing     Problem: Decision Making  Goal: Pt/Family able to effectively weigh alternatives and participate in decision making related to treatment and care  Description: INTERVENTIONS:  1. Determine when there are differences between patient's view, family's view, and healthcare provider's view of condition  2. Facilitate patient and family articulation of goals for care  3. Help patient and family identify pros/cons of alternative solutions  4. Provide information as requested by patient/family  5. Respect patient/family right to receive or not to receive information  6. Serve as a liaison between patient and family and health care team  7. Initiate Consults from Ethics, Palliative Care or initiate Family Care Conference as is appropriate  1/14/2025 1134 by Diana Dhaliwal RN  Outcome: Progressing  Flowsheets (Taken 1/14/2025 0800)  Patient/family able to effectively weigh alternatives and participate in decision making related to treatment and care:   Determine when there are differences between 
as requested by patient/family  5. Respect patient/family right to receive or not to receive information  6. Serve as a liaison between patient and family and health care team  7. Initiate Consults from Ethics, Palliative Care or initiate Family Care Conference as is appropriate  1/14/2025 2055 by Maury Kim, RN  Outcome: Progressing  Flowsheets (Taken 1/14/2025 2000)  Patient/family able to effectively weigh alternatives and participate in decision making related to treatment and care: Determine when there are differences between patient's view, family's view, and healthcare provider's view of condition  1/14/2025 1134 by Diana Dhaliwal RN  Outcome: Progressing  Flowsheets (Taken 1/14/2025 0800)  Patient/family able to effectively weigh alternatives and participate in decision making related to treatment and care:   Determine when there are differences between patient's view, family's view, and healthcare provider's view of condition   Facilitate patient and family articulation of goals for care   Help patient and family identify pros/cons of alternative solutions   Provide information as requested by patient/family   Respect patient/family right to receive or not to receive information   Serve as a liaison between patient and family and health care team   Initiate Consults from Ethics, Palliative Care or initiate Family Care Conference as is appropriate     Problem: Nutrition Deficit:  Goal: Optimize nutritional status  1/14/2025 2055 by Maury Kim, RN  Outcome: Progressing  1/14/2025 1134 by Diana Dhaliwal RN  Outcome: Progressing

## 2025-01-15 NOTE — FLOWSHEET NOTE
[id number]    Shift Summary    1900-  Assumed care of this pt at this time. Bedside change of shift report received from Inga DOUGLAS, skin and ID band check complete.    2000-  PM assessment complete, see flowsheet for details.  Pt prone, receiving CRRT, no acute distress noted.   500ml residual at this time.     2100-  PM medication administered, see MAR for details.   Pt BP labile in the setting of CRRT, titrating levo per order, see MAR for details. Dr Corbett and STEWART Mantilla made aware face to face, no new orders.     0000-  No acute distress noted. CRRT on-going, pt remains prone.     0200-  Dr Corbett made ware face to face, Pt HR-120's and SpO2 decreased to low 80's, RT called to assess.     0210-  RT at bedside. STEWART Mantilla at bedside to assess.   Pt noted to have 800ml residual at this time, 200 ml returned, TF held until pt re-evaluated, per Dr Corbett.   Pt BP remains labile requiring titrations of levo, see MAR for details.  Vent settings change per RT, pt now at 100% FIO2. Pt SpO2 now sustaining in the mid 90's.     0500-  Pt continues to have labile VS, requiring titrations of Levo greater than 20mcg. Dr Crooks called and made aware, no new orders at this time.     0600-  Pt BP remains hypotensive with titrations of levo as high as 50mcg, see MAR for details.     AM lab obtained  Daily weight obtained    Change of shift report given to Maria C DOUGLAS

## 2025-01-15 NOTE — CODE DOCUMENTATION
0923 Code blue called and a dose of Epinephrine was given.    0925 Pulse check, no palpable pulse, CPR resumed. 1 amp of sodium bicarbonate given.     0926 1 amp of sodium bicarbonate, and a dose of epinephrine     0929 1 mg of Epinephrine given and 1 amp of D50.     0931 Calcium gluconate given. Pulse check, asystole on telemetry monitoring and no palpable pulse, CPR resumed. Another amp of D50 given.     0932 Epinephrine given    0933 BG= 465    0935 Epinephrine given    0936 Pulse check, asystole on tele. 10 units of insulin given IV    0938 Epinephrine given    0939 Epinephrine gtt initiated at 10 mcg/min    0941 ROSC achieved    0943 0.5 mg Atropine given.     Electronically signed by Nurys Yu RN on 1/15/2025 at 12:12 PM

## 2025-01-15 NOTE — CARE COORDINATION
ICU team rounds done and pt to start on CRRT today per report and remains on vent and requiring ICU care and monitoring. Pall care on consult .   
ICU team rounds done this am and per report pt had RRT and on vent now. Pt plan TBD and he requires ICU care and monitoring at present.   
Team ICU quality rounds done this am and pt is on vent still and requiring ICU care and monitoring.   
This LSW completed quality rounds with ICU team.  Patient is currently intubated and sedated.  Patients DC plan is TBD.  Remains in need of ICU care.  Electronically signed by YOVANI Azul on 1/10/25 at 11:47 AM EST    
This LSW met with patient at bedside.  Patient was agitated at first talking about DC planning.  Patient was addiment about not returning to [facility].  Patient stated he wanted to go home with [facility].  I explained that we are not at that point yet, but I will make his wishes known.  Patient has had [facility] in the past and would like to use them.  I asked him for a back up if they were unable to accept, he was not sure of another agency.  I provided a list of [facility] agencies to patient for freedom of choice.  He will review with his girlfriend.  Patient was much calmer at end of our conversation.  Wants to go home with girlfriend and on HHC.  Electronically signed by YOVANI Azul on 1/8/25 at 3:04 PM EST   
WHILE COMPLETING ICU ROUNDS, CODE BLUE WAS CALLED ON PT.  PT'S FAMILY WAS CALLED BY CHARLY CALDERON AND THEY WERE NOTIFIED ON STATUS.  FAMILY HAS AN [address] DRIVE.  PT DID HAVE FEM A-LINE INSERTED AND RETURNED TO FLOOR. STATUS GUARDED.   
other family members/significant others, and if so, who? No  Plans to Return to Present Housing: Unknown at present  Other Identified Issues/Barriers to RETURNING to current housing: none  Potential Assistance needed at discharge: Durable Medical Equipment            Potential DME: Oxygen Therapy (Comment) (Pt states he does not usually wear oxygen at home)  Patient expects to discharge to: Apartment  Plan for transportation at discharge:      Financial    Payor: [employer] MEDICARE / Plan: [employer] CHOICE-PPO MEDICARE / Product Type: *No Product type* /     Does insurance require precert for SNF: Yes    Potential assistance Purchasing Medications:    Meds-to-Beds request:        [id number] - [place], [place] - [address] [facility] [phone] - F [phone]  [address] [place] [id number]  Phone: [phone] Fax: [phone]    [facility] #[id number] - [place], [place] - [address] [facility] [phone] - F [phone]  [address] [place] [id number]  Phone: [phone] Fax: [phone]      Notes:    Factors facilitating achievement of predicted outcomes: Family support, Motivated, Cooperative, and Pleasant    Barriers to discharge: Medical complications    Additional Case Management Notes: Patient was admitted 12/8-12/30 then went to [facility] skilled unit for continued therapy. Patient readmitted on 1/4. Patient unsure at this time if he will return to [facility] again when discharged. He reported he was previously home with his girlfriend, Emerita. Patient said he was independent and an active . He denied having DME, O2, dialysis, or VA.     The Plan for Transition of Care is related to the following treatment goals of Acute respiratory failure, unspecified whether with hypoxia or hypercapnia [J96.00]    IF APPLICABLE: The Patient and/or patient representative Isaak and his family were provided with a choice of provider and agrees with the discharge plan. Freedom of choice list with basic

## 2025-01-15 NOTE — PROCEDURES
PROCEDURE NOTE  Date: 1/15/2025   Name: Isaak Mitchell  YOB: 1950    Procedures        Femoral artery line placement. (A-line)      INDICATION:   Shock      CONSENT: The risks and benefits of this procedure were explained to the   family. All questions were answered and alternative options explained. The risks and benefits of the alternative procedures were discussed as well.  The patient has been assessed and he/she is stable to undergo conscious sedation as well as the procedure itself.  the patient's  son have signed consent.      PROCEDURE SUMMARY:   The patient was prepped and draped in the usual sterile manner using chlorhexidine scrub. 1% lidocaine was used to numb the region. The left femoral artery was accessed using a needle. Pulsatile, arterial blood was visualized and the artery was then threaded using the Seldinger technique and a catheter was then sutured into place  . Good wave-form was obtained. The patient tolerated the procedure well without any immediate complications. The area was cleaned and Tegaderm was applied.    ESTIMATED BLOOD LOSS:   Less than 5 cc  COMPLICATIONS:  No immediate complication       Attempted brachial arterial line insertion, x 2, able to access the artery unable to thread the guidewire.  Procedure aborted and changed to left femoral A-line      Olya Gaston MD MD [facility]  10:21 AM

## 2025-01-15 NOTE — CODE DOCUMENTATION
Patient was in asystole and CODE BLUE was initiated.  Patient Routes receiving 2 units of packed red blood cells upon ABG potassium was found to be 7.8 see documentation for treatment.  Heparin drip was stopped insulin drip was stopped.  ROSC achieved for approximately about 5 minutes and patient coded again also asystole.  ROSC was achieved for approximately 10 minutes patient coded again at 1236 asystole.  Code was called at 1253 and that is documented time of death.  Family was notified.  See code documentation for further details

## 2025-01-17 NOTE — PROGRESS NOTES
PHARMACY NOTE:   Interdisciplinary Rounds Completed     Pt diagnosis: Respiratory failure    Follow up/Changes per Rounding Provider(s):       Discontinued Lantus 6 units daily  Home meds in need of review/reorder as appropriate: none    Renal:      Recent Labs     25  0308 25  0539 25  043   CREATININE 3.1* 3.26* 3.61*      Estimated Creatinine Clearance: 15 mL/min (A) (based on SCr of 3.61 mg/dL (H)).    Medications requiring renal adjustment at this time:  Lovenox and Merrem renally adjusted for CRRT     Additional Information/Core Measures:      DVT Prophylaxis/Anticoagulant Therapy:  Recent Labs     25  0546 25  0308 25  0539 25  043   HGB 9.2*   < > 8.8* 8.3*     --  284 278    < > = values in this interval not displayed.     No results for input(s): \"INR\" in the last 72 hours.  Heparin drip  Stress Ulcer Prophylaxis:   Pantoprazole 40 mg q12h  Steroid:  Solu-medrol 40 mg daily  Insulin Coverage (goal: 140-180):   Recent Labs     25  0543 25  0539 25  043   GLUCOSE 105* 145* 87     Lantus: ---  SSI: high  Humalo units required in the past 24 hours  Pressors:  Levophed  Sedation:  Fentanyl and Propofol  Fluids:  Prismasate CRRT  Drips:  Fentanyl, propofol, levophed, heparin  Antimicrobial Therapy:  Recent Labs     25  0546 25  0539 25  043   WBC 10.7 13.2* 12.6*     No results for input(s): \"PROCAL\" in the last 72 hours.    ID on consult: Yes  Antimicrobial agents:   Meropenem  Cultures:  Covid+, Flu A+  Bowel Regimen:  Miralax daily and Docusate BID  Core measures assessed/met    Gigi Valdivia, PharmD  PGY-1 Pharmacy Resident    2025 3:12 PM          
    PHARMACY NOTE:   Interdisciplinary Rounds Completed     Pt diagnosis: Respiratory failure    Follow up/Changes per Rounding Provider(s):       Discontinued Precedex drip (stopped since )  Added metoclopramide 10 mg TID  Home meds in need of review/reorder as appropriate: NPO    Renal:      Recent Labs     25  0543 25  0308 25  0539   CREATININE 2.93* 3.1* 3.26*      Estimated Creatinine Clearance: 17 mL/min (A) (based on SCr of 3.26 mg/dL (H)).    Medications requiring renal adjustment at this time:  Enoxaparin - borderline dosing (daily vs. BID) due to changing CrCl. Pharmacy will monitor and adjust as renally appropriate  Meropenem - borderline dosing due to changing CrCl. Pharmacy will monitor and adjust as renally appropriate     Additional Information/Core Measures:      DVT Prophylaxis/Anticoagulant Therapy:  Recent Labs     25  0541 25  0546 25  0308 25  0539   HGB 8.4* 9.2*   < > 8.8*    336  --  284    < > = values in this interval not displayed.     No results for input(s): \"INR\" in the last 72 hours.  Lovenox 1 mg/kg daily  Stress Ulcer Prophylaxis:   Pantoprazole 40 mg q12h  Steroid:  Solu-medrol 40 mg daily  Insulin Coverage (goal: 140-180):   Recent Labs     25  0541 25  0543 25  0539   GLUCOSE 95 105* 145*     Lantus: 6 units daily  SSI: high  Humalo units required in the past 24 hours  Pressors:  Levophed  Sedation:  Fentanyl and Propofol  Fluids:  ---  Drips:  Fentanyl, propofol, levophed  Antimicrobial Therapy:  Recent Labs     25  0541 25  0546 25  0539   WBC 7.1 10.7 13.2*     No results for input(s): \"PROCAL\" in the last 72 hours.    ID on consult: Yes  Antimicrobial agents:   Meropenem, Tamiflu  Cultures:  Covid+, Flu A+  Bowel Regimen:  Miralax daily and Docusate BID  Core measures assessed/met    Gigi Valdivia, PharmD  PGY-1 Pharmacy Resident    2025 3:17 PM        
    PHARMACY NOTE:   Interdisciplinary Rounds Completed     Pt diagnosis: Respiratory failure    Follow up/Changes per Rounding Provider(s):       Held all currently ordered non-nebulizer inhalers  Discontinued Mucinex  Held carvedilol  Increased medium sliding scale insulin to high sliding scale insulin  Switched the following do-not-crush medications to oral liquid:  Docusate  Ferrous sulfate  Home meds in need of review/reorder as appropriate: NPO    Renal:      Recent Labs     01/09/25  0522 01/09/25  0731 01/09/25  1154   CREATININE 1.82* 1.7* 2.2*      Estimated Creatinine Clearance: 24 mL/min (A) (based on SCr of 2.2 mg/dL (H)).    Medications requiring renal adjustment at this time:  Enoxaparin - borderline dosing (daily vs. BID due to rapidly fluctuating CrCl. Pharmacy will monitor and adjust as renally appropriate     Additional Information/Core Measures:      DVT Prophylaxis/Anticoagulant Therapy:  Recent Labs     01/07/25  0632 01/07/25  1043 01/08/25  0528 01/09/25  0338 01/09/25  0522 01/09/25  0731 01/09/25  1154   HGB 7.7*   < > 8.2*  8.3*   < > 9.5*   < > 10.9*     --  316  --  360  --   --     < > = values in this interval not displayed.     No results for input(s): \"INR\" in the last 72 hours.  Lovenox 1 mg/kg BID  Stress Ulcer Prophylaxis:   Pantoprazole 40 mg q12h  Steroid:  Solu-medrol 40 mg daily  Insulin Coverage (goal: 140-180):   Recent Labs     01/07/25  0632 01/08/25  0528 01/09/25 0522   GLUCOSE 215* 261* 153*     Lantus: 5 units daily  SSI: high  Humalog: 10 units required in the past 24 hours  Pressors:  Levophed  Sedation:  Precedex, Fentanyl, and Propofol  Fluids:  ---  Drips:  Precedex, fentanyl, propofol, levophed  Antimicrobial Therapy:  Recent Labs     01/07/25  0632 01/08/25  0528 01/09/25  0522   WBC 4.5* 8.8 9.5     Recent Labs     01/09/25 0522   PROCAL 0.29*     ID on consult: Yes  Antimicrobial agents:   Doxycycline, cefepime  Cultures:  Covid+  Bowel 
    PHARMACY NOTE:   Interdisciplinary Rounds Completed     Pt diagnosis: Respiratory failure    Follow up/Changes per Rounding Provider(s):       Held budesonide  Home meds in need of review/reorder as appropriate: NPO    Renal:      Recent Labs     01/09/25  1154 01/09/25  1528 01/10/25  0500   CREATININE 2.2* 2.1* 2.52*      Estimated Creatinine Clearance: 22 mL/min (A) (based on SCr of 2.52 mg/dL (H)).    Medications requiring renal adjustment at this time:  Enoxaparin - borderline dosing (daily vs. BID) due to changing CrCl. Pharmacy will monitor and adjust as renally appropriate  Meropenem - borderline dosing due to changing CrCl. Pharmacy will monitor and adjust as renally appropriate     Additional Information/Core Measures:      DVT Prophylaxis/Anticoagulant Therapy:  Recent Labs     01/08/25  0528 01/09/25  0338 01/09/25  0522 01/09/25  0731 01/10/25  0500   HGB 8.2*  8.3*   < > 9.5*   < > 8.6*     --  360  --  339    < > = values in this interval not displayed.     No results for input(s): \"INR\" in the last 72 hours.  Lovenox 1 mg/kg daily  Stress Ulcer Prophylaxis:   Pantoprazole 40 mg q12h  Steroid:  Solu-medrol 40 mg daily  Insulin Coverage (goal: 140-180):   Recent Labs     01/08/25  0528 01/09/25  0522 01/10/25  0500   GLUCOSE 261* 153* 135*     Lantus: 5 units daily  SSI: high  Humalog: 10 units required in the past 24 hours  Pressors:  Levophed  Sedation:  Precedex, Fentanyl, and Propofol  Fluids:  ---  Drips:  Precedex, fentanyl, propofol, levophed  Antimicrobial Therapy:  Recent Labs     01/08/25  0528 01/09/25  0522 01/10/25  0500   WBC 8.8 9.5 8.9     Recent Labs     01/09/25  0522   PROCAL 0.29*     ID on consult: Yes  Antimicrobial agents:   Meropenem, Tamiflu (vancomycin dc'd by ID)  Cultures:  Covid+, Flu A+  Bowel Regimen:  Miralax daily and Docusate BID  Core measures assessed/met    Gigi Valdivia, PharmD  PGY-1 Pharmacy Resident    1/10/2025 2:42 PM      
   01/05/25 0200   RT Protocol   History Pulmonary Disease 2   Respiratory pattern 0   Breath sounds 2   Cough 0   Indications for Bronchodilator Therapy Decreased or absent breath sounds   Bronchodilator Assessment Score 4       
   01/05/25 0650   RT Protocol   History Pulmonary Disease 2   Respiratory pattern 0   Breath sounds 4   Cough 0   Indications for Bronchodilator Therapy Wheezing associated with pulm disorder   Bronchodilator Assessment Score 6       
   01/06/25 0100   RT Protocol   History Pulmonary Disease 2   Respiratory pattern 0   Breath sounds 6   Cough 0   Indications for Bronchodilator Therapy Wheezing associated with pulm disorder   Bronchodilator Assessment Score 8       
   01/06/25 1900   RT Protocol   History Pulmonary Disease 2   Respiratory pattern 0   Breath sounds 2   Cough 0   Indications for Bronchodilator Therapy Decreased or absent breath sounds   Bronchodilator Assessment Score 4       
   01/08/25 0900   RT Protocol   History Pulmonary Disease 2   Respiratory pattern 0   Breath sounds 2   Cough 0   Indications for Bronchodilator Therapy Decreased or absent breath sounds   Bronchodilator Assessment Score 4       
  Meropenem 1000mg IV q12h was assessed and dose will be adjusted to meropenem 500mg IV q12h per [facility] renal protocol.       Recent Labs     01/09/25  1528 01/10/25  0500 01/11/25  0541   CREATININE 2.1* 2.52* 2.93*     Estimated Creatinine Clearance: 19 mL/min (A) (based on SCr of 2.93 mg/dL (H)).  .                 Meropenem - Extended Infusion (3-hour infusion) - Preferred Dosing Strategy    Renal function (CrCl mL/min)  >= 50  26 - 49  10 - 25   < 10, HD, PD  CRRT    All indications - Loading dose of 7561-3403 milligrams x 1 over 30 minutes or via IV push (based on indication). Maintenance dose should begin at the next regularly scheduled dosing interval based on indication/renal function.    Maintenance dosing for all indications except as outlined below  1000mg q8h ¨ 1000mg q12h ¨ [x]500 mg q12h ¨ 500 mg q24h ¨ 1000mg q12h^ ¨   CNS infections, Cystic fibrosis, ANAYELI > 4  2000mg q8h ¨ 2000mg q12h ¨ 1000mg q12h ¨ 1000mg q24h ¨ 2000mg q12h† ¨    ^Consider 1000mg q8h for CRRT effluent rates > 3L/h   †Consider 2000mg q8h for CRRT effluent rates >= 3L/h     Thank you,    John Dowling, PharmD  PGY-1 Pharmacy Resident  [facility]  [phone]    
  Physician Progress Note      PATIENT:               JOSE MCCOY  CSN #:                  [id number]  :                       1950  ADMIT DATE:       2025 8:27 PM  DISCH DATE:  RESPONDING  PROVIDER #:        Pritesh Harrell DO          QUERY TEXT:    Patient admitted with pneumonia. Per 25WOC RN: \"Patient admitted to [facility] with...a stage 2 pressure injury to the right buttocks.\" If   possible, please document in progress notes and discharge summary present on   admission status of the ulcer:    The medical record reflects the following:  Risk Factors: male age 74  PMHx of MI, CAD, COPD, DM 2, diverticulitis,   emphysema, hyperlipidemia, hypertension, and M?ni?re's disease  Clinical Indicators:   STELLA Wills: \"There is a stage 2 pressure injury (POA) to the right upper   buttocks - wound is clean, pink, and superficial. Protective barrier cream   with zinc applied.\"  Treatment: monitoring  Recommending continue pressure injury prevention   interventions  protective barrier cream with zinc for incontinence care and   for care of stage 2 buttock pressure injury  Options provided:  -- This patient has a Stage 2 pressure injury to the right buttock that was   present on admission.  -- Other - I will add my own diagnosis  -- Disagree - Not applicable / Not valid  -- Disagree - Clinically unable to determine / Unknown  -- Refer to Clinical Documentation Reviewer    PROVIDER RESPONSE TEXT:    This patient has a Stage 2 pressure injury to the right buttock that was   present on admission.    Query created by: Girma Tenorio on 2025 11:13 AM      Electronically signed by:  Pritesh Harrell DO 2025 1:12 PM          
  Physician Progress Note      PATIENT:               JOSE MCCOY  CSN #:                  [id number]  :                       1950  ADMIT DATE:       2025 8:27 PM  DISCH DATE:        1/15/2025 12:53 PM  RESPONDING  PROVIDER #:        John Manning DO          QUERY TEXT:    Patient noted to have septic shock in the 1/15/25 critical care PN and acute   organ dysfunction of acute hypoxic respiratory failure, ARDS and JAKE with   tubular necrosis. If possible, please document in progress notes and discharge   summary the relationship, if any, between sepsis and multiple acute organ   dysfunction.    The medical record reflects the following:  Risk Factors: male age 74  PMHx of MI, CAD, COPD, DM 2, diverticulitis,   emphysema, hyperlipidemia, hypertension  Clinical Indicators: WBC 21.9  1/15 Dr Gaston:\"Septic shock\"  Treatment: CT abdomen pelvis  CXR MV proning  IV Levophed  IV Merrem  IV   Solu-Medrol  critical care  Options provided:  -- This patient has acute multi-organ dysfunction associated with sepsis.  -- This patient has acute multi-organ dysfunction unrelated to sepsis.  -- Other - I will add my own diagnosis  -- Disagree - Not applicable / Not valid  -- Disagree - Clinically unable to determine / Unknown  -- Refer to Clinical Documentation Reviewer    PROVIDER RESPONSE TEXT:    This patient has acute multi-organ dysfunction associated with sepsis.    Query created by: Girma Tenorio on 1/15/2025 1:34 PM      Electronically signed by:  John Manning DO 2025 9:02 AM          
0548: Perfect[facility] was place to John WILLIAM. Pt was having anxiety and SOB. Pt stated he could not take any PO medications due to him being in distress.    
0830: morning assessment completed. Pt is resting quietly on the vent with stable vital signs, unlabored respirations.   Dr Alvarado spoke with pt's girlfriend on the phone to obtain consent for CRRT and temporary dialysis catheter inserted. Will DOUGLAS and tracie RN for witness.    1100: Temporary Dialysis catheter placed by Kanika WILLIAM without incident. Pt mello well.     1115: pt proned by Tracie RN, Will RN, Kanchan RN, and RT without incident. Mepalex placed on bony areas including toes for proning.     1130: dialysis tech at bedside to set up CRRT    1240: alarms sounding from dialysis machine. Tracie Rn, Will Rn, and manager Kayleen at bedside to trouble shoot alarms    1300: Dialysis tech at bedside to re-setup machine.   1330: Dialysis treatment restarted  1600; pt placed on india hugger for temp of 95.7    
1 unit of PRBCs transfusing. Observed for initial 15 min. Vitals stable. No signs/symptoms of reaction noted.  Electronically signed by Jazz Mcnally RN on 1/6/2025 at 3:13 PM    
Assessment completed this shift. Alert and oriented x 4.   Lungs diminished. BS active. Redness/rash bilateral buttocks. ET mix applied as ordered. Denies pain or nausea. 97% on 15 L high flow nasal cannula. Left floor for X ray and back in room. Resting with eyes closed.  Electronically signed by Jazz Mcnally RN on 1/5/2025 at 2:12 PM    
Assessment completed this shift. Opens eyes to voice and oriented x 4.. Denies pain or nausea. Dual PICC placed GRANT. Both lumens flushed and patent with brisk blood return.Type and screen sent.  Electronically signed by Jazz Mcnally RN on 1/6/2025 at 1:16 PM      
[facility]  Occupational Therapy        NAME:  Isaak Mitchell  ROOM: W191/W191-01  :  1950  DATE: 2025    Attempted to see Isaak Mitchell at 1400 on this date for:   [x]  Initial Evaluation   []  Treatment       Patient was unable to be seen due to:   [] Off unit for testing/procedure    [x] Patient refused, stating \" I am not doing it. I never want to see you again.\"   [] Therapy on hold due to     [] Nursing deferred due to    [] Other:      Discussed with STELLA Galeano    Electronically signed by KRISTY Morales/L on 25 at 2:13 PM EST  
[facility]   Pharmacy Dose Adjustment Per Protocol:  Meropenem Extended Interval Interchange    Isaak Mitchell is a 74 y.o. male.     The following ordered dose of Meropenem has been changed to optimize its pharmacodynamic profile per [facility] pharmacy policy approved by [employer].    Recent Labs     01/13/25  0539 01/14/25  0434   CREATININE 3.26* 3.61*   BUN 70* 73*   WBC 13.2* 12.6*     .  Height: 165.1 cm (5' 5\"), Weight - Scale: 63.1 kg (139 lb 1.8 oz), Body mass index is 23.15 kg/m².  Estimated Creatinine Clearance: 15 mL/min (A) (based on SCr of 3.61 mg/dL (H)).    Ordered Dose    500 mg IV every 24 hrs  (30 minute infusion)    New Dose    Meropenem - Extended Infusion (3-hour infusion) - Preferred Dosing Strategy    Renal function (CrCl mL/min)  >= 50  26 - 49  10 - 25   < 10, HD, PD  CRRT    All indications - Loading dose of 2662-7599 milligrams x 1 over 30 minutes or via IV push (based on indication). Maintenance dose should begin at the next regularly scheduled dosing interval based on indication/renal function.    Maintenance dosing for all indications except as outlined below  1000mg q8h ¨ 1000mg q12h ¨ 500 mg q12h ¨ 500 mg q24h ¨ 1000mg q12h^ X   CNS infections, Cystic fibrosis, ANAYELI > 4  2000mg q8h ¨ 2000mg q12h ¨ 1000mg q12h ¨ 1000mg q24h ¨ 2000mg q12h† ¨    ^Consider 1000mg q8h for CRRT effluent rates > 3L/h   †Consider 2000mg q8h for CRRT effluent rates >= 3L/h       Pharmacists should be contacted for issues concerning drug compatibility with multiple IV medications.  All doses will be prepared using 100ml bag to be infused over 3-hours at a rate of 33.3 ml/hr.    Thank You,  Gigi Valdivia [facility]  1/14/2025 12:58 PM   
[facility]   Pharmacy Pharmacokinetic Monitoring Service - Vancomycin     Isaak Mitchell is a 74 y.o. male starting on vancomycin therapy for pneumonia. Pharmacy consulted by Dr Florez for monitoring and adjustment.    Target Concentration: Dosing based on anticipated concentration <15 mg/L due to renal impairment/insufficiency    Additional Antimicrobials: merrem    Pertinent Laboratory Values:   Wt Readings from Last 1 Encounters:   01/09/25 60.5 kg (133 lb 6.1 oz)     Temp Readings from Last 1 Encounters:   01/09/25 99 °F (37.2 °C) (Oral)     Estimated Creatinine Clearance: 26 mL/min (A) (based on SCr of 2.1 mg/dL (H)).  Recent Labs     01/08/25  0528 01/09/25  0338 01/09/25  0522 01/09/25  0731 01/09/25  1154 01/09/25  1528   CREATININE 1.91*   < > 1.82*   < > 2.2* 2.1*   BUN 34*  --  31*  --   --   --    WBC 8.8  --  9.5  --   --   --     < > = values in this interval not displayed.       Pertinent Cultures:  Culture Date Source Results   01/09 blood pending   MRSA Nasal Swab: Not ordered pt is covid pos    Plan:  Concentration-guided dosing due to renal impairment/insufficiency  Start vancomycin 1250 mg x 1  Goal trough concentration of < 15   Renal labs as indicated   Vancomycin concentration ordered for 01/10 @ 1600   Pharmacy will continue to monitor patient and adjust therapy as indicated    Thank you for the consult,  Diana Nava RPH  1/9/2025 4:38 PM    
[facility]   Pharmacy Pharmacokinetic Monitoring Service - Vancomycin    Consulting Provider: Dr. Florez   Indication: pneumonia  Target Concentration: Dosing based on anticipated concentration <15 mg/L due to renal impairment/insufficiency  Day of Therapy: 2  Additional Antimicrobials: Merrem    Pertinent Laboratory Values:   Wt Readings from Last 1 Encounters:   01/10/25 63.6 kg (140 lb 3.4 oz)     Temp Readings from Last 1 Encounters:   01/10/25 (!) 95 °F (35 °C)     Estimated Creatinine Clearance: 22 mL/min (A) (based on SCr of 2.52 mg/dL (H)).  Recent Labs     01/09/25  0522 01/09/25  0731 01/09/25  1528 01/10/25  0500   CREATININE 1.82*   < > 2.1* 2.52*   BUN 31*  --   --  37*   WBC 9.5  --   --  8.9    < > = values in this interval not displayed.     Recent vancomycin administrations                     vancomycin (VANCOCIN) 1250 mg in 250 mL IVPB (mg) 1,250 mg New Bag 01/09/25 1708                  Assessment:  Date/Time Current Dose Concentration Timing of Concentration (h) AUC   01/10 Dose by level 14.1 ~12h N/A   Note: Serum concentrations collected for AUC dosing may appear elevated if collected in close proximity to the dose administered, this is not necessarily an indication of toxicity    Plan:  Current dosing regimen is sub-therapeutic  Continue current dose of 1250 mg daily dose by level  Trough lab draw of 14.1 is not a true trough at 12 hours  Drawn early, originally timed to be a ~23h trough  Subtherapeutic level would suggest a dose increase, however 1500 mg is 23.5 mg/kg and may be excessive for a maintenance dose  Continue with another 1250 mg and re-evaluate based on true trough collected tomorrow  Repeat vancomycin concentration ordered for 01/11 @ 1500   Pharmacy will continue to monitor patient and adjust therapy as indicated    Thank you for the consult,  Gigi Valdivia OSEI  1/10/2025 12:00 PM   
[facility]   Pharmacy Dose Adjustment Per Protocol:  Meropenem Extended Interval Interchange    Isaak Mitchell is a 74 y.o. male.     The following ordered dose of Meropenem has been changed to optimize its pharmacodynamic profile per [facility] pharmacy policy approved by [employer].    Recent Labs     01/09/25  0522 01/09/25  0731 01/09/25  1528 01/10/25  0500   CREATININE 1.82*   < > 2.1* 2.52*   BUN 31*  --   --  37*   WBC 9.5  --   --  8.9    < > = values in this interval not displayed.     .  Height: 165.1 cm (5' 5\"), Weight - Scale: 63.6 kg (140 lb 3.4 oz), Body mass index is 23.33 kg/m².  Estimated Creatinine Clearance: 22 mL/min (A) (based on SCr of 2.52 mg/dL (H)).      Meropenem - Extended Infusion (3-hour infusion) - Preferred Dosing Strategy    Renal function (CrCl mL/min)  >= 50  26 - 49  10 - 25   < 10, HD, PD  CRRT    All indications - Loading dose of 7740-2258 milligrams x 1 over 30 minutes or via IV push (based on indication). Maintenance dose should begin at the next regularly scheduled dosing interval based on indication/renal function.    Maintenance dosing for all indications except as outlined below  1000mg q8h ¨ 1000mg q12h ¨ 500 mg q12h ¨ 500 mg q24h ¨ 1000mg q12h^ ¨   CNS infections, Cystic fibrosis, ANAYELI > 4  2000mg q8h ¨ 2000mg q12h ¨ 1000mg q12h ¨ 1000mg q24h ¨ 2000mg q12h† ¨    ^Consider 1000mg q8h for CRRT effluent rates > 3L/h   †Consider 2000mg q8h for CRRT effluent rates >= 3L/h       Currently ordered 1000mg q12h dose if crcl remains < 26 tomorrow plan reduce to 500mg q12h per protocol    Pharmacists should be contacted for issues concerning drug compatibility with multiple IV medications.  All doses will be prepared using 100ml bag to be infused over 3-hours at a rate of 33.3 ml/hr.    Thank You,  Judy Malhotra [facility]  1/10/2025 7:57 AM    
Both ports flushed and positive blood return without any problems  
Checked on PT at 245 and found PT 83% on 15L Bubbler. Placed PT on HFNC 60L 85% and had the nurse message provider. John WILLIAM came down and ordered an ABG and BiPAP. Tried PT on BiPAP before PT removed BiPAP and refused to put it back on. John WILLIAM came in and convinced the PT to wear the BiPAP again. 1 on 1 placed with the PT. Nurse aware   
Comprehensive Nutrition Assessment    Type and Reason for Visit:  Initial, Consult (new vent, TF order and manage)    Nutrition Recommendations/Plan:   Begin peptide based tube feeding ( Vital 1.2) @ 20 ml/hr, continuous infusion via OG  Water flushes 50 ml every 4 hrs     Malnutrition Assessment:  Malnutrition Status:  Severe malnutrition (01/09/25 1216)    Context:  Chronic Illness     Findings of the 6 clinical characteristics of malnutrition:  Energy Intake:  75% or less estimated energy requirements for 1 month or longer  Weight Loss:  Greater than 7.5% over 3 months     Body Fat Loss:  Mild body fat loss Orbital   Muscle Mass Loss:  Mild muscle mass loss Clavicles (pectoralis & deltoids), Temples (temporalis)  Fluid Accumulation:  No fluid accumulation     Strength:  Not Performed    Nutrition Assessment:    Pt meets criteria for severe malnutrition, with current weight reflecting > 18% weight loss in < 3 months, pt has been hospitalized > 30 days with hx of poor oral intake/appetite, losses of both adipose and muscle stores observed as noted above. Trophic tube feeding to be started today    Nutrition Related Findings:    Original hospital admit ( 12/8/24), treated for COPD and COVID pneumonia (tested positive on 12-19-24),  anemia d/t duodenal ulcer, PE, Tx Leandro SubAcute (12/31) Re-admitted (1/4/25) acute respiratory failure with hypoxia. PMHx includes:CAD, COPD, DM 2, diverticulitis, emphysema, Required intubation ( 1/9) OG in place, sedated with fentanyl, propofol @ 19.3 ( ~ 500 kcals), + levophed @ 7.5 ml/hr,  No IVF, Abnormal labs noted ( BUN/Creat/gluc), Relevant meds reviewed ( folvite/prednisone), + loose stools, Appetite appeared to be improving per SubAcute notes Wound Type: Stage II, Pressure Injury, Skin Tears (buttocks, elbow)       Current Nutrition Intake & Therapies:    Average Meal Intake: NPO  Average Supplements Intake: NPO  Diet NPO  ADULT TUBE FEEDING; Orogastric; Peptide Based; 
Comprehensive Nutrition Assessment    Type and Reason for Visit:  Reassess    Nutrition Recommendations/Plan:   Continue with peptide based tube feeding ( Vital 1.2) @ 20 ml/hr via OG  Water flushes 50 ml every 4 hrs  Monitor residuals, once improved begin slow tube feeding progression to goal rate of 50 ml/hr     Malnutrition Assessment:  Malnutrition Status:  Severe malnutrition (01/09/25 1216)    Context:  Chronic Illness     Findings of the 6 clinical characteristics of malnutrition:  Energy Intake:  75% or less estimated energy requirements for 1 month or longer  Weight Loss:  Greater than 7.5% over 3 months     Body Fat Loss:  Mild body fat loss (visually obseved; droplet + isolation) Orbital   Muscle Mass Loss:  Mild muscle mass loss (visually observed, droplet plus isolation) Clavicles (pectoralis & deltoids), Temples (temporalis)  Fluid Accumulation:  No fluid accumulation     Strength:  Not Performed    Nutrition Assessment:    Severe malnutrition continues, pt remains intubated, trophic tube feeding initiated, pt having loose stoolls, but residuals are high, will continue trophic rate and continue to monitor for ability to advance to goal rate    Nutrition Related Findings:    Original admit ( 12/8/24)- COPD and COVID pneumonia (tested positive on 12-19-24), anemia d/t duodenal ulcer, PE, Tx Leandro SubAcute (12/31) Re-admitted (1/4/25) acute respiratory failure with hypoxia. PMHx includes:COPD, DM 2, diverticulitis, emphysema, Required intubation ( 1/9) OG in place, sedated with fentanyl, propofol @ 15.4 ( ~ 400 kcals), + levophed @ 2.8 ml/hr, No IVF, Abnormal labs noted ( BUN/Creat/gluc), Relevant meds reviewed ( folvite/prednisone), + loose stools, residuals 350 today Wound Type: Stage II, Pressure Injury, Skin Tears (buttocks, elbow)       Current Nutrition Intake & Therapies:    Average Meal Intake: NPO  Average Supplements Intake: NPO  Diet NPO  ADULT TUBE FEEDING; Orogastric; Peptide Based; 
Comprehensive Nutrition Assessment    Type and Reason for Visit:  Reassess    Nutrition Recommendations/Plan:   Resume trophic tube feeding, peptide based formula ( Vital 1.2) @ 20 ml/hr via OG  Water flushes 50 ml every 4hrs  Continue to monitor tube feeding tolerance, ability to advance to goal versus need for PN     Malnutrition Assessment:  Malnutrition Status:  Severe malnutrition (01/09/25 1216)    Context:  Chronic Illness     Findings of the 6 clinical characteristics of malnutrition:  Energy Intake:  75% or less estimated energy requirements for 1 month or longer  Weight Loss:  Greater than 7.5% over 3 months     Body Fat Loss:  Mild body fat loss (visually obseved; droplet + isolation) Orbital   Muscle Mass Loss:  Mild muscle mass loss (visually observed, droplet plus isolation) Clavicles (pectoralis & deltoids), Temples (temporalis)  Fluid Accumulation:  No fluid accumulation     Strength:  Not Performed    Nutrition Assessment:    No progress towards nutrition related goals, trophic tube feeding initated (1/9), tolerated until last night, pt required pronong briefly, this morning resiudals > 1150 ml, tube feeding was held- KUB unremarkable. Reglan added and trophic tube feeding to be resumed per rounds    Nutrition Related Findings:    Original admit ( 12/8/24)- COPD and COVID pneumonia , anemia d/t duodenal ulcer,Leandro SubAcute (12/31) Re-admitted (1/4/25) respiratory failure. PMHx includes:COPD, DM 2,  Intubated ( 1/9) OG in place, trophic tube feeding started  ( 1/10), Sedated with fentanyl, propofol @ 9.6 ( ~ 250 kcals), + levophed @ 3.8 ml/hr, No IVF, Abnormal labs noted ( elevated BUN/Creat/phos), Relevant meds reviewed ( folvite/prednisone), + loose stools, poor urine output, no plans for RRT yet per nephrology notes, nursing reports generalized edema Wound Type: Stage II, Pressure Injury, Skin Tears (buttocks, elbow)       Current Nutrition Intake & Therapies:    Average Meal Intake: 
Dr Harrell notified about continued need for IV access by Cathy RN, . Replied that he would look into it.  Electronically signed by Jazz Mcnally RN on 1/6/2025 at 8:09 AM    
Dr. Melania Barfield MD notified via perfect serve of the following: Patient has a critical hgb of 6.5 and Hct 19.3. Patient removed IV access, multiple attempts have been made by RN staff without success. Nursing Supervisor notified of need for US guided placement. No one was available. Patient remains without IV access. Nursing supervisor updated as well.  
Enoxaparin Treatment Dose Evaluation      Recent Labs     01/02/25  0553 01/02/25  0617 01/03/25  0602 01/03/25  2242   BUN  --  25*  --   --    CREATININE  --  2.06*  --   --      --   --   --    HGB 7.3*  --    < > 8.2*   HCT 21.5*  --    < > 24.0*    < > = values in this interval not displayed.     ADMITTING DX OR CHIEF COMPLAINT? ARF  WARFARIN? DOAC'S? Therapeutic UFH? eliquis  ANY APPARENT BLEEDING? no  SCHEDULED SURGERY? no     Current order:  Enoxaparin 1mg/kg SUBQ  daily       Height: 165.1 cm (5' 5\"), Weight - Scale: 64.6 kg (142 lb 8 oz)  Estimated Creatinine Clearance: 27 mL/min (A) (based on SCr of 2.06 mg/dL (H)).    Plan:       Patient Weight (kg)     50.9 and below .9 151-189.9 190 or greater   Estimated   CrCl  (ml/min) 30 or greater []   Recommend [facility] standardized UFH infusion, apixaban or rivaroxaban   []   1 mg/kg SUBQ BID [] 1 mg/kg SUBQ BID if anti-Xa levels are feasible per institution. Alternatively, recommend switch to [facility] standardized UFH infusion []  Recommend switch to [facility] standardized UFH infusion    15-29.9 []  Recommend BS standardized UFH infusion or apixaban [x]  1 mg/kg SUBQ daily [] Recommend switch to [facility] standardized UFH infusion     Less than 15 or dialysis []  Recommend switch to [facility] standardized UFH infusion     Monitor renal function and hgb    Judy Malhotra, [facility] PharmD  
Enoxaparin Treatment Dose Evaluation      Recent Labs     01/12/25  0543 01/12/25  0546 01/13/25  0539 01/14/25  0434   BUN 51*  --  70* 73*   CREATININE 2.93*   < > 3.26* 3.61*   PLT  --    < > 284 278   HGB  --    < > 8.8* 8.3*   HCT  --    < > 26.0* 22.6*    < > = values in this interval not displayed.     Height: 165.1 cm (5' 5\"), Weight - Scale: 63.1 kg (139 lb 1.8 oz)  Estimated Creatinine Clearance: 15 mL/min (A) (based on SCr of 3.61 mg/dL (H)).    Plan:       Patient Weight (kg)     50.9 and below .9 151-189.9 190 or greater   Estimated   CrCl  (ml/min) 30 or greater []   Recommend BS standardized UFH infusion, apixaban or rivaroxaban   []   1 mg/kg SUBQ BID [] 1 mg/kg SUBQ BID if anti-Xa levels are feasible per institution. Alternatively, recommend switch to [facility] standardized UFH infusion []  Recommend switch to [facility] standardized UFH infusion    15-29.9 []  Recommend [facility] standardized UFH infusion or apixaban []  1 mg/kg SUBQ daily [] Recommend switch to [facility] standardized UFH infusion     Less than 15 or dialysis [x]  Recommend switch to [facility] standardized UFH infusion     Heparin drip started per intensivist    Gigi Valdivia, PharmD  PGY-1 Pharmacy Resident       
Enoxaparin Treatment Dose Evaluation    Recent Labs     01/08/25  0528 01/09/25  0338 01/09/25  0522 01/09/25  0731 01/10/25  0500   BUN 34*  --  31*  --  37*   CREATININE 1.91*   < > 1.82*   < > 2.52*     --  360  --  339   HGB 8.2*  8.3*   < > 9.5*   < > 8.6*   HCT 24.3*  23.8*  --  27.9*  --  25.6*    < > = values in this interval not displayed.     Height: 165.1 cm (5' 5\"), Weight - Scale: 63.6 kg (140 lb 3.4 oz)  Estimated Creatinine Clearance: 22 mL/min (A) (based on SCr of 2.52 mg/dL (H)).     Patient Weight (kg)     50.9 and below .9 151-189.9 190 or greater   Estimated   CrCl  (ml/min) 30 or greater []   Recommend BS standardized UFH infusion, apixaban or rivaroxaban   []   1 mg/kg SUBQ BID [] 1 mg/kg SUBQ BID if anti-Xa levels are feasible per institution. Alternatively, recommend switch to [facility] standardized UFH infusion []  Recommend switch to [facility] standardized UFH infusion    15-29.9 []  Recommend [facility] standardized UFH infusion or apixaban [x]  1 mg/kg SUBQ daily [] Recommend switch to [facility] standardized UFH infusion     Less than 15 or dialysis []  Recommend switch to [facility] standardized UFH infusion          
Events noted  Pt is unstable but k is too high to not do dialysis  Dont think he would tolerate regular HD  Will change to CVVHD with 2k  D/w   
Gastroenterology Progress Note    Isaak Mitchell is a 74 y.o. male patient.  Hospitalization Day:7    Chief C/O: anemia    SUBJECTIVE: Seen and examined, remains in the intensive care unit, intubated, sedated, on Levophed, hemoglobin 8.6, plan of care discussed with nursing he is had no overt bleeding, has NG to intermittent suction with biliary drainage, no melena, no hematochezia.    ROS:  Gastrointestinal ROS: Deferred    Physical    VITALS:  BP (!) 154/66   Pulse 67   Temp 97.7 °F (36.5 °C)   Resp 13   Ht 1.651 m (5' 5\")   Wt 63.6 kg (140 lb 3.4 oz)   SpO2 94%   BMI 23.33 kg/m²   TEMPERATURE:  Current - Temp: 97.7 °F (36.5 °C); Max - Temp  Av °F (36.7 °C)  Min: 94.6 °F (34.8 °C)  Max: 99 °F (37.2 °C)    General: Critically ill sedated  Skin- without jaundice  Eyes: anicteric sclera  Cardiac: RRR, Nl s1s2, without murmurs  Lungs intubated and sedated CTA Bilaterally, normal effort  Abdomen soft, ND, NT, no HSM, Bowel sounds normal, OG to suction  Ext: without edema  Neuro: Sedated    Data    Data Review:    Recent Labs     2531 01/09/25  1154 01/09/25  1528 01/10/25  0500   WBC 8.8  --  9.5  --   --   --  8.9   HGB 8.2*  8.3*   < > 9.5*   < > 10.9* 9.6* 8.6*   HCT 24.3*  23.8*  --  27.9*  --   --   --  25.6*   MCV 87.4  --  86.4  --   --   --  90.5     --  360  --   --   --  339    < > = values in this interval not displayed.     Recent Labs     2531 25  11525  1528 01/10/25  0500   *  --  135  --   --   --  137   K 4.5  --  4.0  --   --   --  4.2     --  106  --   --   --  103   CO2 16*  --  18*  --   --   --  22   PHOS 4.3  --  2.6  --   --   --  6.2*   BUN 34*  --  31*  --   --   --  37*   CREATININE 1.91*   < > 1.82*   < > 2.2* 2.1* 2.52*    < > = values in this interval not displayed.     No results for input(s): \"AST\", \"ALT\", \"BILIDIR\", \"BILITOT\", 
Gastroenterology Progress Note    Isaak Mitchell is a 74 y.o. male patient.  Hospitalization Day:8    Chief C/O: anemia    SUBJECTIVE: Seen and examined, plan of care discussed with bedside nurse, no overt bleeding, hemoglobin 9.2, he remains intubated sedated on pressors, noted patient has had worsening respiratory status overnight, and is about to be placed in prone position.     ROS:  Gastrointestinal ROS: deferred    Physical    VITALS:  BP (!) 103/52   Pulse 96   Temp 99 °F (37.2 °C)   Resp 26   Ht 1.651 m (5' 5\")   Wt 64.7 kg (142 lb 10.2 oz)   SpO2 (!) 88%   BMI 23.74 kg/m²   TEMPERATURE:  Current - Temp: 99 °F (37.2 °C); Max - Temp  Av.2 °F (36.8 °C)  Min: 96.1 °F (35.6 °C)  Max: 99.1 °F (37.3 °C)    General:  intubated and sedated  Skin- without jaundice  Eyes: anicteric sclera  Cardiac: RRR, Nl s1s2, without murmurs  Lungs intubated CTA Bilaterally, normal effort  Abdomen soft, ND, NT, no HSM, Bowel sounds normal, OG to lims  Ext: without edema  Neuro: sedated    Data    Data Review:    Recent Labs     01/10/25  0500 25  0541 25  1457 25  0546   WBC 8.9 7.1  --  10.7   HGB 8.6* 8.4*  --  9.2*   HCT 25.6* 26.3* 26.0* 28.5*   MCV 90.5 91.3  --  95.0*    309  --  336     Recent Labs     01/10/25  0500 25  0541 25  0543    141 139   K 4.2 3.8 4.8    107 105   CO2 22 22 21   PHOS 6.2* 5.8* 6.4*   BUN 37* 43* 51*   CREATININE 2.52* 2.93* 2.93*     No results for input(s): \"AST\", \"ALT\", \"BILIDIR\", \"BILITOT\", \"ALKPHOS\" in the last 72 hours.    Invalid input(s): \"ALB\"  No results for input(s): \"LIPASE\", \"AMYLASE\" in the last 72 hours.  No results for input(s): \"PROTIME\", \"INR\" in the last 72 hours.    Endoscopic hx:  EGD Dr Benjamin 25  Impression:         -  Normal esophagus.         -  Widely patent and non-obstructing Schatzki ring.         -  Normal stomach.         -  Non-bleeding duodenal ulcer with a clean ulcer base (Sravan Class III).       
I notified TERRA De Leon CNP and Tal Corbett MD  via perfect serve the following: Patient removed his IV. Multiple attempts have been made by 3 RN's without success. Nursing Supervisor contacted for assistance in placing an US guided IV. At this time the patient will be missing a partial dose of Zosyn and a full dose of doxycycline.     Will update day shift nursing staff of progress. Patient resting in bed with no complaints at this time.  
INPATIENT PROGRESS NOTES    PATIENT NAME: Isaak Mitchell  MRN: [id number]  SERVICE DATE:  2025   SERVICE TIME:  7:23 AM      PRIMARY SERVICE: Pulmonary Disease    CHIEF COMPLAINTS: COPD exacerbation    INTERVAL HPI: Patient seen and examined at bedside, Interval Notes, orders reviewed. Nursing notes noted    Patient report shortness of breath improved, no chest pain, no coughing, he is on 10 L high flow, saturation 94%, no nausea or vomiting, no worsening lower extremity edema    New information updated in the note today, rest of the examination did not change compared to yesterday.    Review of system:     GI Abdominal pain No  Skin Rash No    Social History     Tobacco Use    Smoking status: Former     Current packs/day: 0.00     Average packs/day: 1 pack/day for 40.0 years (40.0 ttl pk-yrs)     Types: Cigars, Cigarettes     Start date: 12/10/1980     Quit date: 12/10/2020     Years since quittin.0    Smokeless tobacco: Never   Substance Use Topics    Alcohol use: Never         Problem Relation Age of Onset    Heart Disease Mother     Diabetes Mother          OBJECTIVE    Body mass index is 23.59 kg/m².    PHYSICAL EXAM:  Vitals:  BP (!) 147/53   Pulse 58   Temp 98.4 °F (36.9 °C) (Oral)   Resp 18   Ht 1.651 m (5' 5\")   Wt 64.3 kg (141 lb 12.1 oz)   SpO2 94%   BMI 23.59 kg/m²     General: alert, cooperative, no distress  Head: normocephalic, atraumatic  Eyes:No gross abnormalities.  ENT:  MMM no lesions  Neck:  supple and no masses  Chest : clear to auscultation bilaterally- no wheezes, rales or rhonchi, normal air movement, no respiratory distress  Heart:: Heart sounds are normal.  Regular rate and rhythm without murmur, gallop or rub.  ABD:  symmetric, soft, non-tender  Musculoskeletal : no cyanosis, no clubbing, and no edema  Neuro:  Grossly normal  Skin: No rashes or nodules noted.  Lymph node:  no cervical nodes  Urology: No Jones   Psychiatric: appropriate    DATA:   Recent Labs     
Infectious Disease     Patient Name: Isaak Mitchell  Date: 1/14/2025  YOB: 1950  Medical Record Number: [id number]      Influenza A pneumonia  COVID-19 pneumonia          Remains intubated prone ventilation 80% 10 of PEEP  Levophed              Review of Systems   Unable to perform ROS: Intubated    Physical Exam  Cardiovascular:      Heart sounds: Normal heart sounds. No murmur heard.  Pulmonary:      Breath sounds: No wheezing or rales.      Comments: Oral intubation  Abdominal:      General: Abdomen is flat. Bowel sounds are normal. There is no distension.      Palpations: There is no mass.      Tenderness: There is no abdominal tenderness. There is no guarding or rebound.      Hernia: No hernia is present.       Blood pressure (!) 149/56, pulse 100, temperature 99 °F (37.2 °C), resp. rate 26, height 1.651 m (5' 5\"), weight 63.1 kg (139 lb 1.8 oz), SpO2 94%.      .   Lab Results   Component Value Date    WBC 12.6 (H) 01/14/2025    HGB 8.3 (L) 01/14/2025    HCT 22.6 (L) 01/14/2025    MCV 97.0 (H) 01/14/2025     01/14/2025     Lab Results   Component Value Date/Time     01/14/2025 04:34 AM    K 4.6 01/14/2025 04:34 AM    K 4.1 01/06/2025 05:46 AM     01/14/2025 04:34 AM    CO2 22 01/14/2025 04:34 AM    BUN 73 01/14/2025 04:34 AM    CREATININE 3.61 01/14/2025 04:34 AM    GLUCOSE 87 01/14/2025 04:34 AM    GLUCOSE 152 04/11/2012 07:55 PM    CALCIUM 8.1 01/14/2025 04:34 AM    LABGLOM 16.7 01/14/2025 04:34 AM    LABGLOM 30.3 03/26/2024 11:32 AM          Micro results (current encounter only)    Procedure Component Value Units Date/Time   Respiratory Panel, Molecular, with COVID-19 (Restricted: peds pts or suitable admitted adults) [[id number]] (Abnormal) Collected: 01/09/25 1716   Order Status: Completed Specimen: Nasopharyngeal Swab Updated: 01/09/25 1837    Adenovirus by PCR Not Detected    Bordetella parapertussis by PCR Not Detected    Bordetella pertussis by PCR Not Detected    
Infectious Disease     Patient Name: Isaak Mitchell  Date: 2025  YOB: 1950  Medical Record Number: [id number]      Influenza A pneumonia  COVID-19 pneumonia          Remains intubated prone ventilation 70% 10 of PEEP  Levophed          Micro results (current encounter only)    Procedure Component Value Units Date/Time   Respiratory Panel, Molecular, with COVID-19 (Restricted: peds pts or suitable admitted adults) [[id number]] (Abnormal) Collected: 25 171   Order Status: Completed Specimen: Nasopharyngeal Swab Updated: 25    Adenovirus by PCR Not Detected    Bordetella parapertussis by PCR Not Detected    Bordetella pertussis by PCR Not Detected    Chlamydophilia pneumoniae by PCR Not Detected    Coronavirus 229E by PCR Not Detected    Coronavirus HKU1 by PCR Not Detected    Coronavirus NL63 by PCR Not Detected    Coronavirus OC43 by PCR Not Detected    SARS-CoV-2, PCR DETECTED Abnormal     Human Metapneumovirus by PCR Not Detected    Human Rhinovirus/Enterovirus by PCR Not Detected    Influenza A H1-2009 by PCR DETECTED Abnormal     Influenza B by PCR Not Detected    Mycoplasma pneumoniae by PCR Not Detected    Parainfluenza Virus 1 by PCR Not Detected    Parainfluenza Virus 2 by PCR Not Detected    Parainfluenza Virus 3 by PCR Not Detected    Parainfluenza Virus 4 by PCR Not Detected    Respiratory Syncytial Virus by PCR Not Detected   Narrative:         Review of Systems   Unable to perform ROS: Intubated       Review of Systems: All 14 review of systems negative other than as stated above    Social History     Tobacco Use    Smoking status: Former     Current packs/day: 0.00     Average packs/day: 1 pack/day for 40.0 years (40.0 ttl pk-yrs)     Types: Cigars, Cigarettes     Start date: 12/10/1980     Quit date: 12/10/2020     Years since quittin.0    Smokeless tobacco: Never   Vaping Use    Vaping status: Never Used   Substance Use Topics    Alcohol use: Never    Drug use: 
Infectious Diseases Inpatient Progress Note        HISTORY OF PRESENT ILLNESS:  Follow up atypical pneumonia,  COVID-19 infection, admitted with GI bleed and acute hypoxic respiratory failure on IV cefepime and p.o. vancomycin, well tolerated.  Patient remains to be in ICU.  Currently intubated on assist-control 70% and PEEP of 10   New diagnosis of influenza A, currently on Tamiflu started yesterday  Currently sedated  No endotracheal secretions suctioned  No hemoptysis  OG is connected to suction  Was given Lasix 40 mg with minimal response  No fevers  Procalcitonin is unremarkable  current Medications:     insulin glargine  10 Units SubCUTAneous Nightly    [START ON 1/11/2025] enoxaparin  1 mg/kg SubCUTAneous Daily    vancomycin  1,250 mg IntraVENous Once    pantoprazole (PROTONIX) 40 mg in sodium chloride (PF) 0.9 % 10 mL injection  40 mg IntraVENous Q12H    chlorhexidine  15 mL Mouth/Throat BID    insulin lispro  0-16 Units SubCUTAneous Q4H    docusate  100 mg Oral BID    methylPREDNISolone  40 mg IntraVENous Daily    [START ON 1/11/2025] ferrous Sulfate  300 mg Oral Every Other Day    [Held by provider] budesonide  0.5 mg Nebulization BID RT    meropenem  1,000 mg IntraVENous Q12H    vancomycin (VANCOCIN) intermittent dosing (placeholder)   Other RX Placeholder    oseltamivir 6mg/ml  30 mg Orogastric Daily    [Held by provider] tiotropium-olodaterol  2 puff Inhalation Daily    aspirin  81 mg Oral Daily    sodium chloride flush  5-40 mL IntraVENous 2 times per day    lidocaine  5 mL IntraDERmal Once    PARoxetine  40 mg Oral Daily    atorvastatin  40 mg Oral Nightly    QUEtiapine  50 mg Oral Nightly    folic acid  1 mg Oral Daily    [Held by provider] carvedilol  12.5 mg Oral BID WC    polyethylene glycol  17 g Oral BID    miconazole   Topical BID       Allergies:  Patient has no known allergies.      Review of Systems  unable to provide ROS because of sedation and intubation      Physical Exam  Vitals:    
Infectious Diseases Inpatient Progress Note        HISTORY OF PRESENT ILLNESS:  Follow up atypical pneumonia, recent COVID-19 infection 8 days ago, admitted with GI bleed and acute hypoxic respiratory failure on IV cefepime and p.o. vancomycin, well tolerated.  Patient was transferred out to ICU.  Currently intubated on assist-control 100% and PEEP of 10   Currently sedated  No endotracheal secretions suctioned  No hemoptysis  OG is connected to suction  Was given Lasix 20 mg with minimal response  No fevers  Procalcitonin is unremarkable  current Medications:     enoxaparin  1 mg/kg SubCUTAneous BID    pantoprazole (PROTONIX) 40 mg in sodium chloride (PF) 0.9 % 10 mL injection  40 mg IntraVENous Q12H    chlorhexidine  15 mL Mouth/Throat BID    insulin lispro  0-16 Units SubCUTAneous Q4H    docusate  100 mg Oral BID    [START ON 1/10/2025] methylPREDNISolone  40 mg IntraVENous Daily    [START ON 1/11/2025] ferrous Sulfate  300 mg Oral Every Other Day    budesonide  0.5 mg Nebulization BID RT    meropenem  1,000 mg IntraVENous Q12H    [Held by provider] tiotropium-olodaterol  2 puff Inhalation Daily    insulin glargine  5 Units SubCUTAneous Nightly    [START ON 1/10/2025] aspirin  81 mg Oral Daily    sodium chloride flush  5-40 mL IntraVENous 2 times per day    lidocaine  5 mL IntraDERmal Once    PARoxetine  40 mg Oral Daily    atorvastatin  40 mg Oral Nightly    QUEtiapine  50 mg Oral Nightly    folic acid  1 mg Oral Daily    [Held by provider] carvedilol  12.5 mg Oral BID WC    polyethylene glycol  17 g Oral BID    miconazole   Topical BID       Allergies:  Patient has no known allergies.      Review of Systems  unable to provide ROS because of sedation and intubation      Physical Exam  Vitals:    01/09/25 1515 01/09/25 1530 01/09/25 1545 01/09/25 1600   BP: 133/63 (!) 146/64 (!) 142/69 (!) 145/68   Pulse: 98 91 92 89   Resp: 26 26 26 26   Temp:       TempSrc:       SpO2: 98% 99% 96% 96%   Weight:       Height:    
Infectious Diseases Inpatient Progress Note        HISTORY OF PRESENT ILLNESS:  Follow up atypical pneumonia, recent COVID-19 infection 8 days ago, admitted with GI bleed and acute hypoxic respiratory failure on IV cefepime and p.o. vancomycin, well tolerated.  Patient was transferred out to ICU.  Currently on 11 L bubbler.  Reports productive cough of yellow phlegm.  Denies any current shortness of breath.  Denies any abdominal pain.  No bowel movements  On p.o. prednisone   patient has not been having any fevers.   Current Medications:     predniSONE  40 mg Oral Daily    tiotropium-olodaterol  2 puff Inhalation Daily    budesonide  0.5 mg Nebulization BID RT    insulin glargine  5 Units SubCUTAneous Nightly    pantoprazole  40 mg Oral BID AC    sodium chloride flush  5-40 mL IntraVENous 2 times per day    lidocaine  5 mL IntraDERmal Once    cefepime  1,000 mg IntraVENous Q12H    doxycycline  100 mg Oral 2 times per day    PARoxetine  40 mg Oral Daily    ferrous gluconate  324 mg Oral Every Other Day    enoxaparin  1 mg/kg SubCUTAneous Daily    [Held by provider] aspirin  81 mg Oral Daily    atorvastatin  40 mg Oral Nightly    docusate sodium  100 mg Oral BID    guaiFENesin  600 mg Oral BID    QUEtiapine  50 mg Oral Nightly    folic acid  1 mg Oral Daily    lidocaine viscous hcl  15 mL Mouth/Throat 4x daily    carvedilol  12.5 mg Oral BID WC    polyethylene glycol  17 g Oral BID    insulin lispro  0-8 Units SubCUTAneous 4x Daily AC & HS    miconazole   Topical BID       Allergies:  Patient has no known allergies.      Review of Systems  14 system review is negative other than HPI    Physical Exam  Vitals:    01/08/25 1115 01/08/25 1151 01/08/25 1511 01/08/25 1525   BP:  (!) 153/94 129/65    Pulse:  73 60    Resp:  18 18    Temp:       TempSrc:  Oral Oral    SpO2: 93% 92% 92% 95%   Weight:       Height:         General Appearance: alert and oriented to person, place and time, well-developed and well-nourished, in no 
Internal Medicine   Hospitalist   Progress Note    2025   2:32 PM    Name:  Isaak Mitchell  MRN:    [id number]     IP Day: 1     Admit Date: 2025  8:27 PM  PCP: Keith Yu MD    Code Status:  Full Code    Assessment and Plan:        Active Problems/ diagnosis:     Acute hypoxic respiratory failure in the setting of PE and possible pneumonia  Acute posthemorrhagic anemia in the setting of GI bleeding  GI bleeding status post EGD and colon procedure  Hyponatremia  Recent PE 2024-small subsegmental embolus in the left lower lobe  Dermatitis in the lower back  Type 2 diabetes with hyperglycemia    Plan  Repeat x-ray this morning shows worsening interstitial infiltrate.  Resume Zosyn.  Will add doxycycline.  Resume Lovenox therapeutic dose-renally dosed.  Monitor renal function.  Consult pulmonary  Wean down oxygen but keep saturation above 92%  Discussed with RN  Monitor H&H closely, monitor for any signs of bleeding, resume Protonix  Patient is on Diflucan for dermatitis-will resume for now  Sliding-scale insulin, hypoglycemia protocol  Discussed plan with patient  DVT PPx-already on Lovenox    7 pm- 7 am, please contact on call Hospitalist for any needs     Subjective:      no new events.  No new symptoms.  Dyspnea is about the same.  No new pain.    Physical Examination:      Vitals:  BP (!) 152/57   Pulse 73   Temp 97.7 °F (36.5 °C) (Oral)   Resp 18   Ht 1.651 m (5' 5\")   Wt 64.6 kg (142 lb 8 oz)   SpO2 97%   BMI 23.71 kg/m²   Temp (24hrs), Av.3 °F (36.8 °C), Min:97.5 °F (36.4 °C), Max:99 °F (37.2 °C)      General appearance: alert, cooperative and no distress.  Generally weak  Mental Status: oriented to person, place and time and normal affect  Lungs: Diminished bilaterally, normal effort  Heart: regular rate   Abdomen: soft, nontender, nondistended, bowel sounds present, no masses  Extremities: no edema, redness, tenderness in the calves  Skin: no gross lesions, 
[facility] OCCUPATIONAL THERAP[facility] MED SURG TREATMENT NOTE     Date: 2025  Patient Name: Isaak Mitchell        MRN: [id number]  Account: [id number]   : 1950  (74 y.o.)  Room: [place] [address]    Chart Review:    Restrictions  Restrictions/Precautions  Restrictions/Precautions: Fall Risk     Safety:  Safety Devices  Type of Devices: All fall risk precautions in place;Call light within reach;Left in bed;Bed alarm in place    Patient's birthday verified: Yes    Subjective:    Subjective: pt yelling into hallway that he needs a urinal       Pain at start of treatment: No    Pain at end of treatment: No      Objective:    ADL Status:  ADL  Grooming Skilled Clinical Factors: pt wipes hands with wipe after setup, brushes his hair after setup - both from bed level.  Toileting: Setup  Toileting Skilled Clinical Factors: setup of urinal at bed level - pt able to complete without physical assist after setup A  Additional Comments: Pt declines sitting EOB or transferring at this time. reports he washed up earlier this AM.      Therapy key for assistance levels -   Independent/Mod I = Pt. is able to perform task with no assistance but may require a device   Stand by assistance = Pt. does not perform task at an independent level but does not need physical assistance, requires verbal cues  Minimal, Moderate, Maximal Assistance = Pt. requires physical assistance (25%, 50%, 75% assist from helper) for task but is able to actively participate in task   Dependent = Pt. requires total assistance with task and is not able to actively participate with task completion    Orientation Status:  Orientation  Orientation Level: Oriented X4    Observation:  Observation/Palpation  Observation: pt requires encouragement to participate after using urinal    Pt declines UB strengthening offered on this date    Transfers:  Transfers  Transfer Comments: pt declines all transfers on this date    Bed Mobility  Bed mobility  Bed Mobility 
[facility]  Occupational Therapy        NAME:  Isaak Mitchell  ROOM: IC07/IC07-01  :  1950  DATE: 1/10/2025    Attempted to see Isaak Mitchell at 1310 on this date for:   [x]  Initial Evaluation   []  Treatment       Patient was unable to be seen due to:   [] Off unit for testing/procedure    [] Patient refused, stating \"    [] Therapy on hold due to     [] Nursing deferred due to    [x] Other:  Per RN, pt still intubated at this time and not yet ready to participate in therapy program. Will hold at this time.    Discussed with STELLA Hillman    Electronically signed by KRISTY Olivo/L on 1/10/25 at 1:10 PM EST  
[facility]   Facility/Department: [facility]  Speech Language Pathology  Clinical Bedside Swallow Evaluation    NAME:Isaak Mitchell  : 1950 (74 y.o.)   [x]   confirmed    MRN: [id number]  ROOM: [place] [address]  ADMISSION DATE: 2025  PATIENT DIAGNOSIS(ES): Acute respiratory failure, unspecified whether with hypoxia or hypercapnia [J96.00]  No chief complaint on file.    Patient Active Problem List    Diagnosis Date Noted    Diarrhea 2023    Atherosclerosis of native coronary artery of native heart with angina pectoris (HCC) 2023    Chest pain 2023    Contusion of rib on left side 2022    Acute duodenal ulcer 2025    Acute alteration in mental status 2025    Acute respiratory failure, unspecified whether with hypoxia or hypercapnia 2025    GI bleeding 2025    Acute respiratory failure 2024    Pneumonia due to infectious organism 2024    Poorly controlled diabetes mellitus (HCC) 2024    Pneumonia due to COVID-19 virus 2024    Adjustment disorder 2024    Delirium 2024    Anemia 12/10/2024    COPD exacerbation (HCC) 12/10/2024    Acute on chronic diastolic heart failure (HCC) 12/10/2024    Pulmonary hypertension (HCC) 12/10/2024    Dyspnea 2024    CINDY (iron deficiency anemia) 2024    Type 2 diabetes mellitus with chronic kidney disease 2024    Stage 3 chronic kidney disease, unspecified whether stage 3a or 3b CKD (HCC) 2024    Claudication ([facility]) 2024    Iron deficiency anemia, unspecified 2023    Esophageal reflux 2023    Rectal bleed 2020    Diverticulitis 2020    Type 2 diabetes mellitus, without long-term current use of insulin (HCC) 2020    Uncontrolled hypertension 2020    Other hyperlipidemia 2020    Anxiety 2020    History of acute myocardial infarction 2020     Past Medical History:   Diagnosis Date    AMI (acute myocardial infarction) 
[facility]  Change in Status Communication Form      To the referring physician:    Due to an acute change in this patient's medical status, new Occupational Therapy Eval and Treatment orders are required.  Pt has been intubated since 01-09 and failed extubation. Pt currently unable to participate in therapy program . Please reorder when pt. is medically stable to resume OOB activity, as appropriate.    We thank you for your referral.     Regards,   [facility] OT Department.     Electronically signed by KRISTY Olivo/L on 1/12/25 at 10:23 AM EST  
[facility]   Facility/Department: [facility] OR  Speech Language Pathology    Isaak Mitchell  1950  [facility]-  OR/NONE    Date: 1/7/2025      Speech Therapy attempted to see Isaak Mitchell on this date for a/an:    Clinical Bedside Swallow Evaluation    Pt was unable to be seen due to:   Patient is currently off unit - EGD. Will re-attempt at next opportunity.         Electronically signed by MARIIA Deleon on 1/7/25 at 9:34 AM EST   
[facility]   Facility/Department: [facility]  Speech Language Pathology    NAME:Isaak Mitchell  : 1950  ROOM: IC07/IC07-01      DATE: 2025      This patient was being seen by Speech Therapy for:  Dysphagia Treatment      However, due to this patient's change in medical status and current intubation, Speech Therapy will require new orders to continue to follow the patient. Please re-order, as needed.       Thank you,  Maria Luisa Mata, SLP, CCC-SLP, Date: 2025, Time: 8:31 AM   
[facility]   Facility/Department: [facility] ICU  Speech Language Pathology    NAME:Isaak Mitchell  : 1950  ROOM: IC15/IC15-01      DATE: 2025      This patient was being seen by Speech Therapy for:  Other: Clinical Bedside Swallow eval      However, due to this patient's change in medical status, Speech Therapy will require new orders to continue to follow the patient. Please re-order, as needed.       Thank you,  Maureen Jurado, SLP, CCC-SLP, Date: 2025, Time: 12:39 PM   
Patient rapid response @ apx. 10:45. Respiratory told  \" not needed \" twice. RW RRT  
Patient takes pills whole with applesauce. Patient unable to swallow pills with water.     All morning medications held, including BB, per anesthesia.     Electronically signed by Queta Blanton RN on 1/7/2025 at 8:56 AM    
Perfect serve sent to Diana WILLIAM regarding pt being maxed out on heated high flow stating at 86%-90%. Pt refusing Bi-pap respiratory is present on floor. Pt was placed on heated high flow due to pt refusing Bi-pap. Diana to see pt order placed for ABG. Diana NP came to talk to pt about wearing Bi-pap. Pt agreeable to wearing Bi-pap. Pt then began to pull Bi-pap off and was made a 1:1. Pt continued to pull at BI-pap unable to tolerate Bi-pap. Pt was placed back on heated yousuf flow. At this point message sent regarding pt BP elevated as well and being back on heated high. Pt began to complain of air hunger and not being able to breath. Pt began to use accessory muscles at this point Diana WILLIAM and Dr. Simpson were on the floor to see Pt and intubate. Pt given etomidate and succinylcholine. Pt intubated 23 to the dental ridge. Pt started on Pr  
Physical Therapy  Facility/Department: [facility] MED SURG IC07/IC07-01    NAME: Isaak Mitchell    : 1950 (74 y.o.)  MRN: [id number]    Account: [id number]  Gender: male      Pt continues with intubation and no current plan for extubation at this time.  Due to pt medical complexity, pt d/c from PT at this time.  Please re-order upon pt ability to participate with OOB activity.       Olinda Guzmán, PT, 25 at 10:23 AM      
Physical Therapy Med Surg Initial Assessment  Facility/Department: [address] TELEMETRY  Room: [place] [address]       NAME: Isaak Mitchell  : 1950 (74 y.o.)  MRN: [id number]  CODE STATUS: Full Code    Date of Service: 2025    Patient Diagnosis(es): Acute respiratory failure, unspecified whether with hypoxia or hypercapnia [J96.00]   No chief complaint on file.    Patient Active Problem List    Diagnosis Date Noted    Diarrhea 2023    Atherosclerosis of native coronary artery of native heart with angina pectoris (HCC) 2023    Chest pain 2023    Contusion of rib on left side 2022    Acute respiratory failure, unspecified whether with hypoxia or hypercapnia 2025    Acute respiratory failure 2024    Pneumonia of left lower lobe due to infectious organism 2024    Poorly controlled diabetes mellitus (HCC) 2024    COVID-19 virus infection 2024    Adjustment disorder 2024    Delirium 2024    Symptomatic anemia 12/10/2024    COPD exacerbation ([facility]) 12/10/2024    Acute on chronic diastolic heart failure (HCC) 12/10/2024    Pulmonary hypertension ([facility]) 12/10/2024    Dyspnea 2024    CINDY (iron deficiency anemia) 2024    Type 2 diabetes mellitus with chronic kidney disease 2024    Stage 3 chronic kidney disease, unspecified whether stage 3a or 3b CKD ([facility]) 2024    Claudication ([facility]) 2024    Iron deficiency anemia, unspecified 2023    Esophageal reflux 2023    Rectal bleed 2020    Diverticulitis 2020    Type 2 diabetes mellitus, without long-term current use of insulin ([facility]) 2020    Uncontrolled hypertension 2020    Other hyperlipidemia 2020    Anxiety 2020    History of acute myocardial infarction 2020        Past Medical History:   Diagnosis Date    AMI (acute myocardial infarction) ([facility]) 2020    CAD S/P percutaneous coronary angioplasty 2023    Cancer ([facility])     skin 
Physical Therapy Missed Treatment   Facility/Department: [facility] MED SURG W191/W191-01    NAME: Isaak Mitchell    : 1950 (74 y.o.)  MRN: [id number]    Account: [id number]  Gender: male      Attempted PT eval at 1359.  Pt able to answer social functional questions but adamantly refused any functional mobility.  Pt repeated, \"I want you to leave me alone\", even after education to participate.  Pt also stated \"I never want to see you again\".  RN made aware.        Will follow and attempt PT evaluation again at earliest availability.       Olinda Guzmán, PT, 25 at 2:12 PM      
Physical Therapy Missed Treatment   Facility/Department: [facility] MED SURG IC07/IC07-01    NAME: Isaak Mitchell    : 1950 (74 y.o.)  MRN: [id number]    Account: [id number]  Gender: male      Pt attempted for PT this afternoon. Discussion with RN, Rafy. Pt still intubated and unable to participate this date. Failed extubation trial this AM. Will continue to hold and reassess pt status at later date.    Will follow.      Nikole Gayle, PT, 01/10/25 at 1:10 PM    
Physical Therapy Missed Treatment   Facility/Department: [facility] MED SURG IC07/IC07-01    NAME: Isaak Mitchell    : 1950 (74 y.o.)  MRN: [id number]    Account: [id number]  Gender: male      Pt transferred to ICU and intubated d/t respiratory distress. Hold PT tx this date pending pt's ability to extubate and resume program.  Nursing staff notified.      Nikole Gayle, PT, 25 at 11:35 AM    
Physical Therapy Missed Treatment   Facility/Department: [facility] MED SURG W179/W179-01    NAME: Isaak Mitchell    : 1950 (74 y.o.)  MRN: [id number]    Account: [id number]  Gender: male      Attempted PT tx at 2:05pm w/ pt adamantly declining d/t \"accidents last night and waiting to talk to my .\" Encouraged pt to trial sitting EOB, performing bed ex's, etc., however pt cont'd to adamantly decline.     Will follow and attempt PT treatment again at earliest availability.       Graciela Lopez, PTA, 25 at 2:09 PM    
Physical Therapy Missed Treatment   Facility/Department: [facility] MED SURG IC15/IC15-01    NAME: Isaak Mitchell    : 1950 (74 y.o.)  MRN: [id number]    Account: [id number]  Gender: male      Pt transferred to ICU this date following RR. Will hold PT tx this date and reassess pt status on 25.     Nikole Gayle, PT, 25 at 12:58 PM    
Physician Progress Note    1/11/2025   2:45 PM    Name:  Isaak Mitchell  MRN:    [id number]     IP Day: 7     Admit Date: 1/4/2025  8:27 PM  PCP: Keith Yu MD    Code Status:  Full Code    Assessment and Plan:        1.  Acute on chronic respiratory failure with hypoxia suspect multifactorial etiology: New influenza pneumonia, possible new bacterial pneumonia, anemia in setting of known emphysema, bronchiectasis, small PE. Patient further decompensated 1/9 requiring intubation.   -Reviewed repeat EGD 1/7-nonbleeding duodenal ulcer with clean base.  Okay to resume therapeutic Lovenox for PE  -transfuse to maintain Hb>7.  S/p 1u 1/6  -1/9 broadened to IV Vancomycin + Meropenem per infectious disease  -treat influenza with tamiflu  -COVID-19 isolation can be d/c'd due to positive test from 12/19, however new influenza infection requires droplet isolation  -ventilator support, steroids, intermittent diuresis. Pulmonology following  -Prognosis guarded    2.  Hyperglycemia exacerbated by steroids:  -SSI.  Low-dose Lantus    High risk duodenal ulcer requiring IR guided arterial coiling 12/19/2024: PPI twice daily  CAD  COVID-19 infection December 2024  Former tobacco use ~50PY quit ~2019  CKD 3  History of type 2 diabetes last A1c 5.9  Hypertension  Moderate protein calorie malnutrition    Diet: Diet NPO  ADULT TUBE FEEDING; Orogastric; Peptide Based; Continuous; 20; No; 50; Q 4 hours  Full Code    Electronically signed by Pritesh Harrell DO on 1/11/2025 at 2:45 PM    Subjective:     Intubated and sedated    Current Facility-Administered Medications   Medication Dose Route Frequency Provider Last Rate Last Admin    meropenem (MERREM) 500 mg in sodium chloride 0.9 % 100 mL IVPB (mini-bag)  500 mg IntraVENous Q12H Debbi Florez MD        insulin glargine (LANTUS) injection vial 10 Units  10 Units SubCUTAneous Nightly Kanika Campa APRN - CNP   10 Units at 01/10/25 2231    enoxaparin (LOVENOX) injection 60 mg  1 
Physician Progress Note    1/12/2025   1:44 PM    Name:  Isaak Mitchell  MRN:    [id number]     IP Day: 8     Admit Date: 1/4/2025  8:27 PM  PCP: Keith Yu MD    Code Status:  Full Code    Assessment and Plan:        1.  Acute on chronic respiratory failure with hypoxia suspect multifactorial etiology: New influenza pneumonia, possible new bacterial pneumonia, anemia in setting of known emphysema, bronchiectasis, small PE. Patient further decompensated 1/9 requiring intubation.   -Reviewed repeat EGD 1/7-nonbleeding duodenal ulcer with clean base.  Continues on therapeutic Lovenox for PE  -transfuse to maintain Hb>7.  S/p 1u 1/6  -1/9 broadened to IV Vancomycin + Meropenem per infectious disease  -continue tamiflu  -ventilator support, proning, steroids, intermittent diuresis. Pulmonology following  -Prognosis guarded. Add Pall Care    2.  Hyperglycemia exacerbated by steroids:  -SSI.  Low-dose Lantus    High risk duodenal ulcer requiring IR guided arterial coiling 12/19/2024: PPI twice daily  CAD  COVID-19 infection December 2024  Former tobacco use ~50PY quit ~2019  CKD 3  History of type 2 diabetes last A1c 5.9  Hypertension  Moderate protein calorie malnutrition    Diet: Diet NPO  ADULT TUBE FEEDING; Orogastric; Peptide Based; Continuous; 20; No; 50; Q 4 hours  Full Code    Electronically signed by Pritesh Harrell DO on 1/12/2025 at 1:44 PM    Subjective:     Proned.  Intubated and sedated    Current Facility-Administered Medications   Medication Dose Route Frequency Provider Last Rate Last Admin    insulin glargine (LANTUS) injection vial 6 Units  6 Units SubCUTAneous Nightly Pritesh Harrell DO        meropenem (MERREM) 500 mg in sodium chloride 0.9 % 100 mL IVPB (mini-bag)  500 mg IntraVENous Q12H Debbi Florez MD   Stopped at 01/12/25 0821    enoxaparin (LOVENOX) injection 60 mg  1 mg/kg SubCUTAneous Daily Beckie Simpson MD   60 mg at 01/12/25 0830    labetalol (NORMODYNE;TRANDATE) injection 10 mg 
Physician Progress Note    1/14/2025   11:15 AM    Name:  Isaak Mitchell  MRN:    [id number]     IP Day: 10     Admit Date: 1/4/2025  8:27 PM  PCP: Keith Yu MD    Code Status:  Full Code    Assessment and Plan:        1.  Acute on chronic respiratory failure with hypoxia suspect multifactorial etiology: New influenza pneumonia, possible new bacterial pneumonia, anemia in setting of known emphysema, bronchiectasis, small PE. Patient further decompensated 1/9 requiring intubation.   -Reviewed repeat EGD 1/7-nonbleeding duodenal ulcer with clean base.  Continues on therapeutic Lovenox for PE  -transfuse to maintain Hb>7.  S/p 1u 1/6  -1/9 broadened to IV Vancomycin + Meropenem per infectious disease  -continue tamiflu  -ventilator support, proning, steroids, intermittent diuresis. Pulmonology following  -Prognosis guarded. Pall Care is following.    2.  Hyperglycemia exacerbated by steroids:  -SSI.  Low-dose Lantus    High risk duodenal ulcer requiring IR guided arterial coiling 12/19/2024: PPI twice daily  CAD  COVID-19 infection December 2024  Former tobacco use ~50PY quit ~2019  CKD 3  History of type 2 diabetes last A1c 5.9  Hypertension  Moderate protein calorie malnutrition    Diet: Diet NPO  ADULT TUBE FEEDING; Orogastric; Peptide Based; Continuous; 20; No; 50; Q 4 hours  Full Code    Electronically signed by John Manning DO on 1/14/2025 at 11:15 AM    Subjective:     Intubated and sedated    Current Facility-Administered Medications   Medication Dose Route Frequency Provider Last Rate Last Admin    magnesium sulfate 1000 mg in dextrose 5% 100 mL IVPB  1,000 mg IntraVENous PRN Glenn Alvarado MD        sodium phosphate 6 mmol in sodium chloride 0.9 % 250 mL IVPB  6 mmol IntraVENous PRN Glenn Alvarado MD        Or    sodium phosphate 12 mmol in sodium chloride 0.9 % 250 mL IVPB  12 mmol IntraVENous PRN Glenn Alvarado MD        Or    sodium phosphate 18 mmol in sodium chloride 0.9 % 500 mL IVPB  
Physician Progress Note    1/8/2025   4:20 PM    Name:  Isaak Mitchell  MRN:    [id number]     IP Day: 4     Admit Date: 1/4/2025  8:27 PM  PCP: Keith Yu MD    Code Status:  Full Code    Assessment and Plan:        1.  Acute on chronic respiratory failure with hypoxia suspect multifactorial etiology: Possible new bacterial pneumonia, anemia in setting of known emphysema, bronchiectasis, small PE  -Reviewed repeat EGD 1/7-nonbleeding duodenal ulcer with clean base.  Okay to resume therapeutic Lovenox for PE  -transfuse to maintain Hb>7.  S/p 1u 1/6  -continue cefepime and doxycycline per infectious disease  -Patient was positive for COVID-19 on 12/19 so isolation can be discontinued  -supportive respiratory care. Pulmonology following.  Steroids added  -Wean O2 as able  -PT/OT    2.  Hyperglycemia exacerbated by steroids:  -SSI.  Low-dose Lantus    High risk duodenal ulcer requiring IR guided arterial coiling 12/19/2024: PPI twice daily  CAD  COVID-19 infection December 2024  Former tobacco use ~50PY quit ~2019  CKD 3  History of type 2 diabetes last A1c 5.9  Hypertension  Moderate protein calorie malnutrition    Diet: ADULT DIET; Easy to Chew  Full Code    Electronically signed by Pritesh Harrell DO on 1/8/2025 at 4:20 PM    Subjective:     Denies dyspnea.  Remains on high flow nasal cannula    Current Facility-Administered Medications   Medication Dose Route Frequency Provider Last Rate Last Admin    predniSONE (DELTASONE) tablet 40 mg  40 mg Oral Daily Olya Gaston MD   40 mg at 01/08/25 0828    tiotropium-olodaterol (STIOLTO) 2.5-2.5 MCG/ACT inhaler 2 puff  2 puff Inhalation Daily Olya Gaston MD   2 puff at 01/08/25 0832    budesonide (PULMICORT) nebulizer suspension 500 mcg  0.5 mg Nebulization BID RT Olya Gaston MD   500 mcg at 01/08/25 0832    ipratropium 0.5 mg-albuterol 2.5 mg (DUONEB) nebulizer solution 1 Dose  1 Dose Inhalation Q4H PRN Olya Gaston MD   1 Dose at 01/08/25 
Physician Progress Note    1/9/2025   10:49 PM    Name:  Isaak Mitchell  MRN:    [id number]     IP Day: 5     Admit Date: 1/4/2025  8:27 PM  PCP: Keith Yu MD    Code Status:  Full Code    Assessment and Plan:        1.  Acute on chronic respiratory failure with hypoxia suspect multifactorial etiology: New influenza pneumonia, possible new bacterial pneumonia, anemia in setting of known emphysema, bronchiectasis, small PE. Patient further decompensated 1/9 requiring intubation.   -Reviewed repeat EGD 1/7-nonbleeding duodenal ulcer with clean base.  Okay to resume therapeutic Lovenox for PE  -transfuse to maintain Hb>7.  S/p 1u 1/6  -1/9 broadened to IV Vancomycin + Meropenem per infectious disease  -treat influenza with tamiflu  -COVID-19 isolation can be d/c'd due to positive test from 12/19, however new influenza infection requires droplet isolation  -ventilator support, steroids. Pulmonology following    2.  Hyperglycemia exacerbated by steroids:  -SSI.  Low-dose Lantus    High risk duodenal ulcer requiring IR guided arterial coiling 12/19/2024: PPI twice daily  CAD  COVID-19 infection December 2024  Former tobacco use ~50PY quit ~2019  CKD 3  History of type 2 diabetes last A1c 5.9  Hypertension  Moderate protein calorie malnutrition    Diet: Diet NPO  ADULT TUBE FEEDING; Orogastric; Peptide Based; Continuous; 20; No; 50; Q 4 hours  Full Code    Electronically signed by Pritesh Harrell DO on 1/9/2025 at 10:49 PM    Subjective:     Respiratory distress on bipap and ultimately intubated overnight. Currently intubated and sedated    Current Facility-Administered Medications   Medication Dose Route Frequency Provider Last Rate Last Admin    labetalol (NORMODYNE;TRANDATE) injection 10 mg  10 mg IntraVENous Q4H PRN Bolzan-Roche, Nicoletto, APRN - CNP        hydrALAZINE (APRESOLINE) injection 10 mg  10 mg IntraVENous Q6H PRN Bolzan-Roche, Nicoletto, APRN - CNP   10 mg at 01/09/25 0505    enoxaparin (LOVENOX) 
Progress Note  Date:1/10/2025       Room:[place] [address]  Patient Name:Isaak Mitchell     YOB: 1950     Age:74 y.o.        Subjective    Subjective patient remains intubated , no evidence of any active GI bleeding, hemoglobin is 8.6  Review of Systems  Objective         Vitals Last 24 Hours:  TEMPERATURE:  Temp  Av.4 °F (36.3 °C)  Min: 94.6 °F (34.8 °C)  Max: 98.8 °F (37.1 °C)  RESPIRATIONS RANGE: Resp  Av.1  Min: 0  Max: 26  PULSE OXIMETRY RANGE: SpO2  Av.1 %  Min: 92 %  Max: 100 %  PULSE RANGE: Pulse  Av.2  Min: 56  Max: 124  BLOOD PRESSURE RANGE: Systolic (24hrs), Av , Min:84 , Max:161   ; Diastolic (24hrs), Av, Min:36, Max:61    I/O (24Hr):    Intake/Output Summary (Last 24 hours) at 1/10/2025 1719  Last data filed at 1/10/2025 0619  Gross per 24 hour   Intake 2139.02 ml   Output 1100 ml   Net 1039.02 ml     Objective  Labs/Imaging/Diagnostics    Labs:  CBC:  Recent Labs     25  0338 25  0522 25  0731 25  1154 25  1528 01/10/25  0500   WBC 8.8  --  9.5  --   --   --  8.9   RBC 2.78*  --  3.23*  --   --   --  2.83*   HGB 8.2*  8.3*   < > 9.5*   < > 10.9* 9.6* 8.6*   HCT 24.3*  23.8*  --  27.9*  --   --   --  25.6*   MCV 87.4  --  86.4  --   --   --  90.5   RDW 14.6*  --  14.3  --   --   --  15.0*     --  360  --   --   --  339    < > = values in this interval not displayed.     CHEMISTRIES:  Recent Labs     2509/25  0522 25  0731 25  1154 25  1528 01/10/25  0500   *  --  135  --   --   --  137   K 4.5  --  4.0  --   --   --  4.2     --  106  --   --   --  103   CO2 16*  --  18*  --   --   --  22   BUN 34*  --  31*  --   --   --  37*   CREATININE 1.91*   < > 1.82*   < > 2.2* 2.1* 2.52*   GLUCOSE 261*  --  153*  --   --   --  135*   PHOS 4.3  --  2.6  --   --   --  6.2*   MG 2.2  --  1.9  --   --   --   --     < > = values in this interval not displayed. 
Progress Note  Date:2025       Room:[place] [address]  Patient Name:Isaak Mitchell     YOB: 1950     Age:74 y.o.        Subjective    Subjective patient is intubated, on IV antibiotics and is also on Lovenox for PE, NG return is bilious and no history of any further melena or rectal bleeding.  EGD showed a nonbleeding duodenal ulcer.  Hemoglobin is 9.6  Review of Systems  Objective         Vitals Last 24 Hours:  TEMPERATURE:  Temp  Av.3 °F (36.8 °C)  Min: 97.3 °F (36.3 °C)  Max: 99 °F (37.2 °C)  RESPIRATIONS RANGE: Resp  Av.8  Min: 0  Max: 28  PULSE OXIMETRY RANGE: SpO2  Av.1 %  Min: 84 %  Max: 100 %  PULSE RANGE: Pulse  Av.6  Min: 67  Max: 127  BLOOD PRESSURE RANGE: Systolic (24hrs), Av , Min:82 , Max:204   ; Diastolic (24hrs), Av, Min:41, Max:125    I/O (24Hr):    Intake/Output Summary (Last 24 hours) at 2025 1730  Last data filed at 2025 1720  Gross per 24 hour   Intake 871.55 ml   Output 575 ml   Net 296.55 ml     Objective  Labs/Imaging/Diagnostics    Labs:  CBC:  Recent Labs     25  0632 25  1043 25  0003 25  0528 25  0338 25  0522 25  0731 25  1154 25  1528   WBC 4.5*  --   --  8.8  --  9.5  --   --   --    RBC 2.53*  --   --  2.78*  --  3.23*  --   --   --    HGB 7.7*   < > 7.9* 8.2*  8.3*   < > 9.5* 9.7* 10.9* 9.6*   HCT 22.4*   < > 22.6* 24.3*  23.8*  --  27.9*  --   --   --    MCV 88.5  --   --  87.4  --  86.4  --   --   --    RDW 14.6*  --   --  14.6*  --  14.3  --   --   --      --   --  316  --  360  --   --   --     < > = values in this interval not displayed.     CHEMISTRIES:  Recent Labs     25  0632 25  1043 25  0528 25  0338 25  0522 25  0731 25  1154 25  1528   *  --  131*  --  135  --   --   --    K 4.1  --  4.5  --  4.0  --   --   --      --  103  --  106  --   --   --    CO2 16*  --  16*  --  18*  --   --   --    BUN 27*  
Pulmonary & Critical Care Medicine ICU Progress Note  Chief complaint : Respiratory failure    Subjunctive/24 hour events :   Patient seen and examined during multidisciplinary rounds with RN, charge nurse, RT, pharmacy, dietitian, and social service.       On vent, sedated unresponsive, currently on 85% FiO2, saturation in the high 80s, on Levophed at 2 mcg/min, no fever, urine output 600 cc, +3.8 L      New information updated in the note today, rest of the examination did not change compared to yesterday.  Social History     Tobacco Use    Smoking status: Former     Current packs/day: 0.00     Average packs/day: 1 pack/day for 40.0 years (40.0 ttl pk-yrs)     Types: Cigars, Cigarettes     Start date: 12/10/1980     Quit date: 12/10/2020     Years since quittin.0    Smokeless tobacco: Never   Substance Use Topics    Alcohol use: Never         Problem Relation Age of Onset    Heart Disease Mother     Diabetes Mother        Recent Labs     25  1154 25  1528   PHART 7.142* 7.338*   [id number] 64* 45   PO2ART 74* 114*       MV Settings:  Vent Mode: AC/VC Resp Rate (Set): 26 bpm/Vt (Set, mL): 390 mL/ /FiO2 : 85 %           IV:   propofol 25 mcg/kg/min (25)    fentaNYL 175 mcg/hr (25)    norepinephrine 2 mcg/min (25)    dexmedeTOMIDine HCl in NaCl Stopped (25 1546)    sodium chloride      sodium chloride      dextrose         Vitals:  BP (!) 103/52   Pulse 96   Temp 99 °F (37.2 °C)   Resp 26   Ht 1.651 m (5' 5\")   Wt 64.7 kg (142 lb 10.2 oz)   SpO2 (!) 88%   BMI 23.74 kg/m²    Tmax:        Intake/Output Summary (Last 24 hours) at 2025 0910  Last data filed at 2025 0831  Gross per 24 hour   Intake 1771.25 ml   Output 600 ml   Net 1171.25 ml       EXAM:  General: Unresponsive on vent  Head: normocephalic, atraumatic  Eyes:No gross abnormalities.  ENT:  MMM no lesions  Neck:  supple and no masses  Chest : Vent sounds, no wheezing, no rales, nontender, 
Pulmonary & Critical Care Medicine ICU Progress Note  Chief complaint : Respiratory failure    Subjunctive/24 hour events :   Patient seen and examined during multidisciplinary rounds with RN, charge nurse, RT, pharmacy, dietitian, and social service.       Sedated, starting to wake up and open his eyes, did not follow commands, get hypoxemic whenever head of the bed is elevated.  He is on Levophed at 1 mcg/min, no fever, currently on 70% FiO2 saturation 92%.  Urine output 650 cc, +2.7 L      New information updated in the note today, rest of the examination did not change compared to yesterday.  Social History     Tobacco Use    Smoking status: Former     Current packs/day: 0.00     Average packs/day: 1 pack/day for 40.0 years (40.0 ttl pk-yrs)     Types: Cigars, Cigarettes     Start date: 12/10/1980     Quit date: 12/10/2020     Years since quittin.0    Smokeless tobacco: Never   Substance Use Topics    Alcohol use: Never         Problem Relation Age of Onset    Heart Disease Mother     Diabetes Mother        Recent Labs     25  1154 25  1528   PHART 7.142* 7.338*   [id number] 64* 45   PO2ART 74* 114*       MV Settings:  Vent Mode: AC/VC Resp Rate (Set): 26 bpm/Vt (Set, mL): 390 mL/ /FiO2 : 70 %           IV:   propofol 15 mcg/kg/min (25)    fentaNYL 100 mcg/hr (25)    norepinephrine 1 mcg/min (25)    dexmedeTOMIDine HCl in NaCl 0.4 mcg/kg/hr (25)    sodium chloride      sodium chloride      dextrose         Vitals:  BP (!) 101/42   Pulse 69   Temp 97.2 °F (36.2 °C)   Resp 26   Ht 1.651 m (5' 5\")   Wt 63.6 kg (140 lb 3.4 oz)   SpO2 92%   BMI 23.33 kg/m²    Tmax:        Intake/Output Summary (Last 24 hours) at 2025 0837  Last data filed at 2025 0645  Gross per 24 hour   Intake 1037.35 ml   Output 650 ml   Net 387.35 ml       EXAM:  General: Unresponsive on vent  Head: normocephalic, atraumatic  Eyes:No gross abnormalities.  ENT:  MMM no 
Pulmonary & Critical Care Medicine ICU Progress Note  Chief complaint : Respiratory failure    Subjunctive/24 hour events :   Patient seen and examined during multidisciplinary rounds with RN, charge nurse, RT, pharmacy, dietitian, and social service.     Not doing good, proned, increased O2 requirement, pressor requirement creased to 60 mcg/min, he is on heparin drip as well, no fever, currently on 100% FiO2.  He is on CRRT.  Urine output 340 cc, CRRT output 1.7 L.      New information updated in the note today, rest of the examination did not change compared to yesterday.  Social History     Tobacco Use    Smoking status: Former     Current packs/day: 0.00     Average packs/day: 1 pack/day for 40.0 years (40.0 ttl pk-yrs)     Types: Cigars, Cigarettes     Start date: 12/10/1980     Quit date: 12/10/2020     Years since quittin.1    Smokeless tobacco: Never   Substance Use Topics    Alcohol use: Never         Problem Relation Age of Onset    Heart Disease Mother     Diabetes Mother        Recent Labs     25  0308 01/15/25  0405   PHART 7.271* 7.255*   [id number] 51* 53*   PO2ART 66* 283*       MV Settings:  Vent Mode: AC/VC Resp Rate (Set): 26 bpm/Vt (Set, mL): 390 mL/ /FiO2 : 100 %           IV:   VASOpressin 20 Units in sodium chloride 0.9 % 100 mL infusion      prismaSATE BGK 4/2.5 500 mL/hr at 25 2307    prismaSATE BGK 4/2.5 500 mL/hr at 25 1236    prismaSATE BGK 4/2.5 500 mL/hr at 25 1245    heparin (PORCINE) Infusion 18 Units/kg/hr (01/15/25 0655)    propofol 35 mcg/kg/min (01/15/25 0637)    fentaNYL 200 mcg/hr (01/15/25 0557)    norepinephrine 60 mcg/min (01/15/25 0806)    sodium chloride      sodium chloride      dextrose         Vitals:  BP (!) 84/50   Pulse (!) 126   Temp 98.8 °F (37.1 °C)   Resp 26   Ht 1.651 m (5' 5\")   Wt 62.2 kg (137 lb 2 oz)   SpO2 92%   BMI 22.82 kg/m²    Tmax:        Intake/Output Summary (Last 24 hours) at 1/15/2025 0819  Last data filed at 
Pulmonary & Critical Care Medicine ICU Progress Note  Chief complaint : Respiratory failure    Subjunctive/24 hour events :   Patient seen and examined during multidisciplinary rounds with RN, charge nurse, RT, pharmacy, dietitian, and social service.     Unresponsive, sedated on vent, currently on Levophed at 4 mcg/min, he is on 80% FiO2 saturation 95%, urine output 670 cc, no fever, NG output 1.1 L, +5 L, tolerates tube feed, no significant residual      New information updated in the note today, rest of the examination did not change compared to yesterday.  Social History     Tobacco Use    Smoking status: Former     Current packs/day: 0.00     Average packs/day: 1 pack/day for 40.0 years (40.0 ttl pk-yrs)     Types: Cigars, Cigarettes     Start date: 12/10/1980     Quit date: 12/10/2020     Years since quittin.0    Smokeless tobacco: Never   Substance Use Topics    Alcohol use: Never         Problem Relation Age of Onset    Heart Disease Mother     Diabetes Mother        Recent Labs     25  0308   PHART 7.271*   SCV3ZXC 51*   PO2ART 66*       MV Settings:  Vent Mode: AC/VC Resp Rate (Set): 26 bpm/Vt (Set, mL): 390 mL/ /FiO2 : 80 %           IV:   prismaSATE BGK 4/2.5      prismaSATE BGK 4/2.5      prismaSATE BGK 4/2.5      propofol 35 mcg/kg/min (25 0640)    fentaNYL 200 mcg/hr (25 0757)    norepinephrine 4 mcg/min (25 0640)    sodium chloride      sodium chloride      dextrose         Vitals:  BP (!) 112/44   Pulse 100   Temp 99.1 °F (37.3 °C)   Resp 26   Ht 1.651 m (5' 5\")   Wt 63.1 kg (139 lb 1.8 oz)   SpO2 95%   BMI 23.15 kg/m²    Tmax:        Intake/Output Summary (Last 24 hours) at 2025 1021  Last data filed at 2025 0800  Gross per 24 hour   Intake 1259.46 ml   Output 1775 ml   Net -515.54 ml       EXAM:  General: Unresponsive on vent  Head: normocephalic, atraumatic  Eyes:No gross abnormalities.  ENT:  MMM no lesions  Neck:  supple and no masses  Chest : Vent 
Rapid Responds called on 1 W r/t SOB and pt SpO2 deceasing. Pt was intubated during Rapid and transferred to ICU.     0620-  Pt to ICU from 1W on Vent, 7.5 ETT, 25cm at the gums.   Bedside handoff of care report received from Mariah DOUGLAS  Pt ID band checked. Dentures in cup on sink with pt sticker.   Wounds noted to gluteal cleft, one at the top of each buttock cheek.   Bruising noted BUE.   PICC noted to Right brachial.       Emergency orders place at bedside by Dr. Simpson. OG placed, 60cm at [facility], set to LIWS. Jones Cath placed for fluid management of critical pt. See MAR for medication order details.     Bedside handoff of care given to Rafy DOUGLAS          
Renal Progress Note    Assessment/  74 y.o. year old male with ckd stage 3b with b/l GFR in low 30s with risk factors of DM, HTN, CAD with stent.  Has COPD as well.  Urine with 1+ proteinuria with no sig rbc in urine.  CT scan a/p neg for obstruction.  Was admitted to [facility] 12/8/24 with SOB and then to [place] and then readmitted to [facility] with sob on 1/4.  Has diastolic heart faliure.  Underwent coil embolization of gastroduodenal artery on 12/19 around which time he had fevers as well.  Diagnosed with covid then.  We are consulted on 1/11 for JAKE / ATN.         Plan/  1- didn't respond well to lasix   2. D/w Dr. Gaston yesterday and Kanika Campa today.  Will proceed with CRRT given progressive JAKE / fluid overload.  Called girlfriend Emerita Asif [phone] who is listed as next of kin.   She gave consent for CRRT  3. Start with CVVH.  All 4k.  1500 RR (500/500/500).  Net uf negative 50 cc/hr        Patient Active Problem List:     History of acute myocardial infarction     Rectal bleed     Diverticulitis     Type 2 diabetes mellitus, without long-term current use of insulin (HCC)     Uncontrolled hypertension     Other hyperlipidemia     Anxiety     Contusion of rib on left side     Chest pain     Atherosclerosis of native coronary artery of native heart with angina pectoris (HCC)     Diarrhea     Iron deficiency anemia, unspecified     Esophageal reflux     Claudication (HCC)     Stage 3 chronic kidney disease, unspecified whether stage 3a or 3b CKD (HCC)     Type 2 diabetes mellitus with chronic kidney disease     Dyspnea     Anemia     COPD exacerbation (HCC)     Acute on chronic diastolic heart failure (HCC)     Pulmonary hypertension (HCC)     Delirium     Adjustment disorder     CINDY (iron deficiency anemia)     Pneumonia due to COVID-19 virus     Poorly controlled diabetes mellitus (HCC)     Pneumonia due to infectious organism     Acute respiratory failure     Acute respiratory failure, 
Renal Progress Note    Assessment/  74 y.o. year old male with ckd stage 3b with b/l GFR in low 30s with risk factors of DM, HTN, CAD with stent.  Has COPD as well.  Urine with 1+ proteinuria with no sig rbc in urine.  CT scan a/p neg for obstruction.  Was admitted to [facility] 12/8/24 with SOB and then to [place] and then readmitted to [facility] with sob on 1/4.  Has diastolic heart faliure.  Underwent coil embolization of gastroduodenal artery on 12/19 around which time he had fevers as well.  Diagnosed with covid then.  We are consulted on 1/11 for JAKE / ATN.         Plan/  1- didn't respond well to lasix yesterday but will try to redose  2. At risk for progressive JAKE and eventually needing RRT given multiorgan failure with being on pressors and vent  3. Retacrit given for anemia        Patient Active Problem List:     History of acute myocardial infarction     Rectal bleed     Diverticulitis     Type 2 diabetes mellitus, without long-term current use of insulin (HCC)     Uncontrolled hypertension     Other hyperlipidemia     Anxiety     Contusion of rib on left side     Chest pain     Atherosclerosis of native coronary artery of native heart with angina pectoris (HCC)     Diarrhea     Iron deficiency anemia, unspecified     Esophageal reflux     Claudication (HCC)     Stage 3 chronic kidney disease, unspecified whether stage 3a or 3b CKD (HCC)     Type 2 diabetes mellitus with chronic kidney disease     Dyspnea     Anemia     COPD exacerbation (HCC)     Acute on chronic diastolic heart failure (HCC)     Pulmonary hypertension (HCC)     Delirium     Adjustment disorder     CINDY (iron deficiency anemia)     Pneumonia due to COVID-19 virus     Poorly controlled diabetes mellitus (HCC)     Pneumonia due to infectious organism     Acute respiratory failure     Acute respiratory failure, unspecified whether with hypoxia or hypercapnia     GI bleeding     Acute alteration in mental status     Acute duodenal ulcer     
Renal Progress Note    Assessment/  74 y.o. year old male with ckd stage 3b with b/l GFR in low 30s with risk factors of DM, HTN, CAD with stent.  Has COPD as well.  Urine with 1+ proteinuria with no sig rbc in urine.  CT scan a/p neg for obstruction.  Was admitted to [facility] 12/8/24 with SOB and then to [place] and then readmitted to [facility] with sob on 1/4.  Has diastolic heart faliure.  Underwent coil embolization of gastroduodenal artery on 12/19 around which time he had fevers as well.  Diagnosed with covid then.  We are consulted on 1/11 for JAKE / ATN.         Plan/  1- poor prognosis  2.  Start with CVVH.  All 4k.  1500 RR (500/500/500).  Will increase to 2500 due to high k and phos and change to 50 cc /hr positive  3. D/w Dr. Gaston        Patient Active Problem List:     History of acute myocardial infarction     Rectal bleed     Diverticulitis     Type 2 diabetes mellitus, without long-term current use of insulin (HCC)     Uncontrolled hypertension     Other hyperlipidemia     Anxiety     Contusion of rib on left side     Chest pain     Atherosclerosis of native coronary artery of native heart with angina pectoris (HCC)     Diarrhea     Iron deficiency anemia, unspecified     Esophageal reflux     Claudication (HCC)     Stage 3 chronic kidney disease, unspecified whether stage 3a or 3b CKD (HCC)     Type 2 diabetes mellitus with chronic kidney disease     Dyspnea     Anemia     COPD exacerbation (HCC)     Acute on chronic diastolic heart failure (HCC)     Pulmonary hypertension (HCC)     Delirium     Adjustment disorder     CINDY (iron deficiency anemia)     Pneumonia due to COVID-19 virus     Poorly controlled diabetes mellitus (HCC)     Pneumonia due to infectious organism     Acute respiratory failure     Acute respiratory failure, unspecified whether with hypoxia or hypercapnia     GI bleeding     Acute alteration in mental status     Acute duodenal ulcer     Severe malnutrition (HCC)     ARDS 
Renal Progress Note    Assessment/ 74 y.o. year old male who presented to our facility 30 days ago for further evaluation and management of dyspnea generalized weakness at the emergency department he was noticed to have hemoglobin of 7.8 at that time patient admitted with acute on top of chronic dyspnea without hypoxia with underlying possible etiology chronic diastolic congestive heart failure with pulmonary hypertension and mild COPD exacerbation     This clinical presentation did take place in a patient with chronic comorbidity of COPD on home O2 diabetes type 2 diverticular disease with GI bleed coronary artery disease s/p PTCA and stenting with remote history of MI     Patient admission creatinine 1.9 with a GFR of 36 reflecting chronic kidney disease stage IIIb during his hospital stay(almost 1 months plus) creatinine and GFR has been fluctuating from cris of 1.5 and GFR of 45 2 weeks ago 26/ 12/ 24 creatinine to a peak of 2.5 creatinine yesterday and 2.9 creatinine today     Today there was no associated electrolyte or acid-base imbalance no wide gap patient was constantly anemic today hemoglobin is 8.4 platelets were always adequate 309      today since admission patient is in a +2.7 L with no clinical evidence of increased extracellular fluid     hemodynamically at 110 which is yesterday there is a stretch of blood pressure that did reach 64/46 earlier today there are some reading of 68/31      there was no contrast exposure during this hospitalization     -Chronic kidney disease stage IIIa 2D the underlying etiology should include diabetic nephropathy plus or minus hypertensive glomerulosclerosis  -Acute kidney injury most likely multifactorial prerenal azotemia plus or minus known oliguric ATN with observed hypotensive stretch with decreased kidney perfusion no further deterioration stabilized creatinine at 2.9 for the second day  -Anemia that could be related to the patient chronic kidney 
Report received from Rafy DOUGLAS. Assessments completed, see flowsheets. Medications adminstered per emar. OGT to TF, residuals 500cc.  
Spiritual Health History and Assessment/Progress Note  [facility]    Palliative Care, Follow-up,  ,  ,      Name: Isaak Mitchell MRN: [id number]    Age: 74 y.o.     Sex: male   Language: English   Jainism: Denominational   Acute respiratory failure, unspecified whether with hypoxia or hypercapnia     Date: 1/13/2025            Total Time Calculated: (P) 15 min              [place] [facility] [place]        Referral/Consult From: Rounding   Encounter Overview/Reason: Palliative Care, Follow-up  Service Provided For: Patient     reports, patient intubated, sleep ans resting at time of chaplains visit. Patient under pre-cautionary care,  offered prayer of wellness and healing.      attempted to conduct palliative care follow up with patient, unable to make connection with patient at this time.     Gem, Belief, Meaning:   Patient is connected with a gem tradition or spiritual practice  Family/Friends No family/friends present      Importance and Influence:  Patient unable to assess at this time  Family/Friends No family/friends present    Community:  Patient feels well-supported. Support system includes: Children  Family/Friends No family/friends present    Assessment and Plan of Care:     Patient Interventions include: Other: Prayer of comfort and healing  Family/Friends Interventions include: No family/friends present    Patient Plan of Care: Spiritual Care available upon further referral  Family/Friends Plan of Care: No family/friends present    Electronically signed by Chaplain Leigha on 1/13/2025 at 12:19 PM   
Spiritual Health History and Assessment/Progress Note  [facility]    Initial Encounter, Spiritual/Emotional Needs,  ,  ,      Name: Isaak Mitchell MRN: [id number]    Age: 74 y.o.     Sex: male   Language: English   Muslim: Christianity   Acute respiratory failure, unspecified whether with hypoxia or hypercapnia     Date: 1/9/2025            Total Time Calculated: 15 min              Sp[place]ual [place] in [facility]        Referral/Consult From: Rounding   Encounter Overview/Reason: Initial Encounter, Spiritual/Emotional Needs  Service Provided For: Patient     visited patient in ICu. Patient currently intubated and no family present    Gem, Belief, Meaning:   Patient unable to assess at this time  Family/Friends No family/friends present      Importance and Influence:  Patient unable to assess at this time  Family/Friends No family/friends present    Community:  Patient Other: Unable to assess  Family/Friends No family/friends present    Assessment and Plan of Care:     Patient Interventions include: Other: Ministry of presence/support  Family/Friends Interventions include: No family/friends present    Patient Plan of Care: Spiritual Care available upon further referral  Family/Friends Plan of Care: No family/friends present    Electronically signed by Chaplain Virginia on 1/9/2025 at 10:35 AM   
Spiritual Health History and Assessment/Progress Note  [facility]    Crisis, Code Blue,  ,      Name: Isaak Mitchell MRN: [id number]    Age: 74 y.o.     Sex: male   Language: English   Gnosticist: Pentecostalism   Acute respiratory failure, unspecified whether with hypoxia or hypercapnia     Date: 1/15/2025            Total Time Calculated: 35 min              Spiritual Assessment [place] in [facility]        Referral/Consult From: Multi-disciplinary team   Encounter Overview/Reason: Crisis  Service Provided For: Patient     responded to a Code Blue in ICU 7. Patient was already intubated prior to the code. No family were present but palliative care nurse contacted patient's family and informed them of patient's condition. Patient has 2 adult children who live in [place] and a significant other in [place].  provided support until ICU staff stabilized patient.     Gem, Belief, Meaning:   Patient unable to assess at this time  Family/Friends No family/friends present      Importance and Influence:  Patient unable to assess at this time  Family/Friends No family/friends present    Community:  Patient feels well-supported. Support system includes: Spouse/Partner and Children  Family/Friends No family/friends present    Assessment and Plan of Care:     Patient Interventions include: Other: Provide support/Ministry of presence  Family/Friends Interventions include: No family/friends present    Patient Plan of Care: Spiritual Care available upon further referral  Family/Friends Plan of Care: Spiritual Care available upon further referral    Electronically signed by Chaplain Virginia on 1/15/2025 at 10:49 AM   
Spoke to one of patient's legal next of kin Chinmay.     Lengthy goals of care discussion with Chinmay, discussed patient's current status, prone positioning, ARDs, covid/influenza. Chinmay and Angelina (daughter) live in [place]. Discussed very guarded prognosis with Chinmay.     Chinmay reports he has attempted in the past to patient's wishes but patient would avoid discussion or divert conversation. Chinmay reports was active in care planning while patient was at nursing home. Chinmay reports he doesn't feel his dad (father) would want full code approach in this current state however needs to further discuss with his sisters Angelina and Shelbie as well as Emerita. Emerita (significant other) wishes to continue all aggressive measures. Patient will maintain full code at this time until further family discussion takes place.     Discussed [place] Hierarchy of Surrogate Decision Makers (In ABSENCE of an appointed healthcare agent in Advance Directive / Healthcare Power of ) [place] Code 2133.08   Legal, Court-Appointed Guardian for patient   2. Spouse of patient (except where there is pending divorce, dissolution, legal separation, or annulment proceeding has been filed)   3. Adult child of the patient (if more than one, majority of the reasonably available children)   4. Parent of the patient   5. Adult sibling of the patient (if more than one, majority of the reasonably available siblings)       
[facility]  Occupational Therapy        NAME:  Isaak Mitchell  ROOM: IC07/IC07-01  :  1950  DATE: 2025    Pt transferred to ICU this date d/t change in respiratory status requiring intubation. Hold OT tx this date pending pt's ability to extubate and resume program.    Electronically signed by KRISTY Olivo/L on 25 at 8:25 AM EST  
Upon receiving shift report, pt. Intubated, in prone position, on high dose of levo.   Upon assessment, pt. Noted to be unresponsive, and pupils uneven, with left pupil being dilated and nonreactive. Per night shift report, this is change in findings.   Dr. Gaston rounded at bedside, discussed overnight events and pt. Instability, as well as neuro findings. Ordered to supine pt., get consent for art line, and then go for CT of head. Heparin drip off until CT done.     Called son, Chinmay, for art line consent.  Left fem art line placed by Dr. Gaston.   Pt. Hypotensive during insertion. Pressors titrated per verbal orders of Kanika WILLIAM and Dr. Gaston.     Dr. Alvarado rounded. Updated him on potassium level of am labs. CRRT running. Orders modified, settings on CRRT machine adjusted by Dr. Alvarado.     0922- pt. Suddenly became bradycardic and hypotensive. Pulse lost on art line and no pulse palpable. CPR immediately started. Code blue called. Pressors titrated per verbal orders. Sedation off.   Rosc obtained. Pt. Paced on lifepak.   Pupils both dilated and nonreactive. Dr. Garza rounded at bedside. Agreed with head CT order. Pt. Started on insulin drip.     Pt. Taken for CT scan of head, chest, abd./pelvis, taken by RT, kamran RN, and Perla DOUGLAS. Pt. Returned to ICU without issue. Notified Kanika WILLIAM and Dr. Gaston that scans done. Ordered to restart heparin. Labs drawn. Ordered to wean down levo as tolerable. See MAR. Even with sedation off, pt. Remains unresponsive. Started on arctic sun per orders.     Reported critical H+H 5.5 / 18.4 to Kanika WILLIAM. 2 units of blood ordered. Ordered to continue heparin drip. Type and screen sent to lab.   During assessment, pt. Noted to have low gastric residuals, but was noted to be coffee ground in color, notified Kanika WILLIAM. Ordered to keep pt. Strict npo, no TF or meds. Confirmed bicarb drip to be running at 150cc/hr.    Received critical potassium level of 7.6. Sent 
Detected    Bordetella pertussis by PCR Not Detected    Chlamydophilia pneumoniae by PCR Not Detected    Coronavirus 229E by PCR Not Detected    Coronavirus HKU1 by PCR Not Detected    Coronavirus NL63 by PCR Not Detected    Coronavirus OC43 by PCR Not Detected    SARS-CoV-2, PCR DETECTED Abnormal     Human Metapneumovirus by PCR Not Detected    Human Rhinovirus/Enterovirus by PCR Not Detected    Influenza A H1-2009 by PCR DETECTED Abnormal     Influenza B by PCR Not Detected    Mycoplasma pneumoniae by PCR Not Detected    Parainfluenza Virus 1 by PCR Not Detected    Parainfluenza Virus 2 by PCR Not Detected    Parainfluenza Virus 3 by PCR Not Detected    Parainfluenza Virus 4 by PCR Not Detected    Respiratory Syncytial Virus by PCR Not Detected   Narrative:             ASSESSMENT:  Patient Active Problem List   Diagnosis    History of acute myocardial infarction    Rectal bleed    Diverticulitis    Type 2 diabetes mellitus, without long-term current use of insulin ([facility])    Uncontrolled hypertension    Other hyperlipidemia    Anxiety    Contusion of rib on left side    Chest pain    Atherosclerosis of native coronary artery of native heart with angina pectoris ([facility])    Diarrhea    Iron deficiency anemia, unspecified    Esophageal reflux    Claudication ([facility])    Stage 3 chronic kidney disease, unspecified whether stage 3a or 3b CKD ([facility])    Type 2 diabetes mellitus with chronic kidney disease    Dyspnea    Anemia    COPD exacerbation ([facility])    Acute on chronic diastolic heart failure ([facility])    Pulmonary hypertension ([facility])    Delirium    Adjustment disorder    CINDY (iron deficiency anemia)    Pneumonia due to COVID-19 virus    Poorly controlled diabetes mellitus (HCC)    Pneumonia due to infectious organism    Acute respiratory failure    Acute respiratory failure, unspecified whether with hypoxia or hypercapnia    GI bleeding    Acute alteration in mental status    Acute duodenal ulcer    Severe malnutrition ([facility])    
Diana Jacinto APRN - CNP        hydrALAZINE (APRESOLINE) injection 10 mg  10 mg IntraVENous Q6H PRN Diana Jacinto APRN - CNP   10 mg at 01/09/25 0505    propofol infusion  5-50 mcg/kg/min IntraVENous Continuous Beckie Simpson MD 15.4 mL/hr at 01/10/25 1119 40 mcg/kg/min at 01/10/25 1119    fentaNYL (SUBLIMAZE) 1,000 mcg in sodium chloride 0.9% 100 mL infusion   mcg/hr IntraVENous Continuous Kanika Capma APRN - CNP 10 mL/hr at 01/10/25 1103 100 mcg/hr at 01/10/25 1103    norepinephrine (LEVOPHED) 16 mg in sodium chloride 0.9 % 250 mL infusion  1-100 mcg/min IntraVENous Continuous Melania Barfield MD 2.8 mL/hr at 01/10/25 0619 3 mcg/min at 01/10/25 0619    pantoprazole (PROTONIX) 40 mg in sodium chloride (PF) 0.9 % 10 mL injection  40 mg IntraVENous Q12H Kanika Campa APRN - CNP   40 mg at 01/10/25 0814    chlorhexidine (PERIDEX) 0.12 % solution 15 mL  15 mL Mouth/Throat BID Kanika Campa APRN - CNP   15 mL at 01/10/25 0814    insulin lispro (HUMALOG,ADMELOG) injection vial 0-16 Units  0-16 Units SubCUTAneous Q4H Kanika Campa APRN - CNP   4 Units at 01/10/25 1508    docusate (COLACE) 50 MG/5ML liquid 100 mg  100 mg Oral BID Kanika Campa APRN - CNP   100 mg at 01/10/25 0814    methylPREDNISolone sodium succ (SOLU-MEDROL) 40 mg in bacteriostatic water 1 mL injection  40 mg IntraVENous Daily Kanika Campa APRN - CNP   40 mg at 01/10/25 0814    dexmedeTOMIDine (PRECEDEX) 400 mcg in sodium chloride 0.9 % 100 mL infusion  0.1-1.5 mcg/kg/hr IntraVENous Continuous Kanika Campa APRN - CNP 6.1 mL/hr at 01/10/25 1146 0.4 mcg/kg/hr at 01/10/25 1146    [START ON 1/11/2025] ferrous Sulfate 300 (60 Fe) MG/5ML solution 300 mg  300 mg Oral Every Other Day Kanika Campa APRN - CNP        [Held by provider] budesonide (PULMICORT) nebulizer suspension 500 mcg  0.5 mg Nebulization BID RT Lalita Liang MD        meropenem (MERREM) 1,000 mg in sodium chloride 0.9 % 100 mL IVPB (mini-bag)  
sedated, intubated and restrained.   HENT:      Head: Atraumatic.   Cardiovascular:      Pulses: Normal pulses.   Pulmonary:      Effort: Pulmonary effort is normal. No respiratory distress. He is intubated.   Skin:     General: Skin is warm and dry.         Allergies:      No Known Allergies    Medications:      Current Facility-Administered Medications   Medication Dose Route Frequency Provider Last Rate Last Admin    magnesium sulfate 1000 mg in dextrose 5% 100 mL IVPB  1,000 mg IntraVENous PRN Glenn Alvarado MD        sodium phosphate 6 mmol in sodium chloride 0.9 % 250 mL IVPB  6 mmol IntraVENous PRN Glenn Alvarado MD        Or    sodium phosphate 12 mmol in sodium chloride 0.9 % 250 mL IVPB  12 mmol IntraVENous PRN Glenn Alvarado MD        Or    sodium phosphate 18 mmol in sodium chloride 0.9 % 500 mL IVPB  18 mmol IntraVENous PRN Glenn Alvarado MD        Or    sodium phosphate 24 mmol in sodium chloride 0.9 % 500 mL IVPB  24 mmol IntraVENous PRN Glenn Alvarado MD        sodium chloride 0.9 % bolus 2,000 mL  2,000 mL Dialysis PRN Glenn Alvarado MD        heparin (porcine) injection 1,100-1,900 Units  1,100-1,900 Units IntraCATHeter PRN Glenn Alvarado MD        And    heparin (porcine) injection 1,100-1,900 Units  1,100-1,900 Units IntraCATHeter PRN Glenn Alvarado MD        prismaSATE BGK 4/2.5 dialysis solution   Dialysis Continuous Glenn Alvarado  mL/hr at 01/14/25 1236 New Bag at 01/14/25 1236    prismaSATE BGK 4/2.5 dialysis solution   Dialysis Continuous Glenn Alvarado  mL/hr at 01/14/25 1236 New Bag at 01/14/25 1236    prismaSATE BGK 4/2.5 dialysis solution   Dialysis Continuous Glenn Alvarado  mL/hr at 01/14/25 1245 New Bag at 01/14/25 1245    heparin (porcine) injection 2,500 Units  2,500 Units IntraCATHeter PRN Kanika Campa APRN - CNP   2,500 Units at 01/14/25 1100    meropenem (MERREM) 1,000 mg in sodium chloride 0.9 % 100 mL IVPB (mini-bag)  1,000 mg 
    --  360  --     < > = values in this interval not displayed.      ABG: No results for input(s): \"PH\", \"PCO2\", \"PO2\" in the last 72 hours.        Assessment and Plan:         Impression:    - Acute on chronic hypoxic  respiratory failure.  Currently on mechanical ventilation  - Metabolic encephalopathy due to above.  -Shock requiring vasopressors  - Likely pulmonary edema.  -Likely superimposed bacterial pneumonia.  - COVID-19 infection.  -Combined pulm fibrosis and emphysema  - PE on  anticoagulation.  Currently on Lovenox   - COPD exacerbation.    Recommendations:    - Admit to ICU for hemodynamic and airway monitoring.  - Ventilator bundle.  Continue sedation for vent asynchrony.  Increase fentanyl.  - Check ABG and adjust vent settings.  - Antibiotic coverage.  - Check procalcitonin and BNP  - Vasopressors for MAP above 65  - Continue anticoagulation.  - Strict intake and output measurement.  Watch urine output closely  - Tight glucose control.        Prophylaxis:  Stress ulcer: [] PPI Agent [] H2RA [] Sucralfate [] Other:   VTE: [] Enoxaparin  [] SC Heparin  [] SCD      Full Code      Excluding procedures, the total critical care time caring for this patient with life threatening, unstable organ failure, including direct patient contact, review of medical record, management of life support systems, review of data including imaging and labs, discussions with other team members, patient's family and physicians at least 32 minutes so far today.        Electronically signed by Lalita Liang MD on 1/9/2025 at 9:00 AM            
Daily Tai Wallace MD   60 mg at 01/05/25 1001    [Held by provider] aspirin chewable tablet 81 mg  81 mg Oral Daily Tai Wallace MD   81 mg at 01/05/25 1001    ondansetron (ZOFRAN-ODT) disintegrating tablet 4 mg  4 mg Oral Q8H PRN Tai Wallace MD        Or    ondansetron (ZOFRAN) injection 4 mg  4 mg IntraVENous Q6H PRN Tai Wallace MD        polyethylene glycol (GLYCOLAX) packet 17 g  17 g Oral Daily PRN Tai Wallace MD        acetaminophen (TYLENOL) tablet 650 mg  650 mg Oral Q6H PRN Tai Wallace MD   650 mg at 01/06/25 0421    Or    acetaminophen (TYLENOL) suppository 650 mg  650 mg Rectal Q6H PRN Tai Wallace MD        atorvastatin (LIPITOR) tablet 40 mg  40 mg Oral Nightly Tai Wallace MD   40 mg at 01/06/25 2034    docusate sodium (COLACE) capsule 100 mg  100 mg Oral BID Tai Wallace MD   100 mg at 01/06/25 2034    guaiFENesin (MUCINEX) extended release tablet 600 mg  600 mg Oral BID Tai Wallace MD   600 mg at 01/07/25 1224    QUEtiapine (SEROQUEL) tablet 50 mg  50 mg Oral Nightly Tai Wallace MD   50 mg at 01/06/25 2034    folic acid (FOLVITE) tablet 1 mg  1 mg Oral Daily Tai Wallace MD   1 mg at 01/07/25 1223    lidocaine viscous hcl (XYLOCAINE) 2 % solution 15 mL  15 mL Mouth/Throat 4x daily Tai Wallace MD   15 mL at 01/07/25 1223    carvedilol (COREG) tablet 12.5 mg  12.5 mg Oral BID WC Tai Wallace MD   12.5 mg at 01/07/25 1223    polyethylene glycol (GLYCOLAX) packet 17 g  17 g Oral BID Tai Wallace MD   17 g at 01/05/25 2245    nystatin, stomahesive in petrolatum (ET MIX)   Topical PRN Tai Wallace MD   Given at 01/05/25 1211    insulin lispro (HUMALOG,ADMELOG) injection vial 0-8 Units  0-8 Units SubCUTAneous 4x Daily AC & HS Tai Wallace MD   2 Units at 01/07/25 1223    miconazole (MICOTIN) 2 % cream   Topical BID Tai Wallace MD   Given at 01/07/25 1244    fluticasone (FLONASE) 50 MCG/ACT nasal spray 1 spray  1 spray Each Nostril Daily PRN Tai Wallace MD        ALPRAZolam 
December 8    PLAN:  Continue IV meropenem for 1 week, start date is January 11  Continue Tamiflu  Vent and vasopressor support and weaning  Follow-up with pulmonary  Follow-up CBC BMP and clinically  Overall guarded prognosis   Discussed with RN    Debbi Florez MD        
diuresis if okay with intensivist  Follow-up CBC BMP and clinically  May DC isolation in 2 days since patient tested positive for COVID-19 8 days ago    Discussed with patient and RN    Debbi Florez MD        
nystatin, stomahesive in petrolatum, fluticasone, ALPRAZolam    Results: reviewed by me   CBC:   Recent Labs     01/08/25 0528 01/09/25 0338 01/09/25 0522 01/09/25 0731 01/09/25 1154 01/09/25  1528 01/10/25  0500   WBC 8.8  --  9.5  --   --   --  8.9   HGB 8.2*  8.3*   < > 9.5*   < > 10.9* 9.6* 8.6*   HCT 24.3*  23.8*  --  27.9*  --   --   --  25.6*   MCV 87.4  --  86.4  --   --   --  90.5     --  360  --   --   --  339    < > = values in this interval not displayed.     BMP:   Recent Labs     01/08/25 0528 01/09/25 0338 01/09/25 0522 01/09/25 0731 01/09/25  1154 01/09/25  1528 01/10/25  0500   *  --  135  --   --   --  137   K 4.5  --  4.0  --   --   --  4.2     --  106  --   --   --  103   CO2 16*  --  18*  --   --   --  22   PHOS 4.3  --  2.6  --   --   --  6.2*   BUN 34*  --  31*  --   --   --  37*   CREATININE 1.91*   < > 1.82*   < > 2.2* 2.1* 2.52*    < > = values in this interval not displayed.     LIVER PROFILE: No results for input(s): \"AST\", \"ALT\", \"LIPASE\", \"AMYLASE\", \"BILIDIR\", \"BILITOT\", \"ALKPHOS\" in the last 72 hours.    Invalid input(s): \"ALB\"  PT/INR: No results for input(s): \"PROTIME\", \"INR\" in the last 72 hours.  APTT: No results for input(s): \"APTT\" in the last 72 hours.  UA:No results for input(s): \"NITRITE\", \"COLORU\", \"PHUR\", \"LABCAST\", \"WBCUA\", \"RBCUA\", \"MUCUS\", \"TRICHOMONAS\", \"YEAST\", \"BACTERIA\", \"CLARITYU\", \"SPECGRAV\", \"LEUKOCYTESUR\", \"UROBILINOGEN\", \"BILIRUBINUR\", \"BLOODU\", \"GLUCOSEU\", \"AMORPHOUS\" in the last 72 hours.    Invalid input(s): \"KETONESU\"    Cultures:  Human metapneumovirus, influenza A  COVID-positive  Films:  CXR films reviewed by me and it showed multilobar infiltrate      Assessment:  This is a critically ill patient at risk of deterioration / death , needing close ICU monitoring and intervention due to below noted problems     Acute hypoxic respiratory failure    Influenza A/metapneumovirus pneumonia with bacterial coinfection  Septic 
reviewed by me   CBC:   Recent Labs     01/11/25  0541 01/11/25  1457 01/12/25  0546 01/13/25  0308 01/13/25  0539   WBC 7.1  --  10.7  --  13.2*   HGB 8.4*  --  9.2* 9.3* 8.8*   HCT 26.3* 26.0* 28.5*  --  26.0*   MCV 91.3  --  95.0*  --  96.3*     --  336  --  284     BMP:   Recent Labs     01/11/25  0541 01/12/25  0543 01/13/25  0308 01/13/25  0539    139  --  139   K 3.8 4.8  --  4.6    105  --  105   CO2 22 21  --  22   PHOS 5.8* 6.4*  --  7.6*   BUN 43* 51*  --  70*   CREATININE 2.93* 2.93* 3.1* 3.26*     LIVER PROFILE: No results for input(s): \"AST\", \"ALT\", \"LIPASE\", \"AMYLASE\", \"BILIDIR\", \"BILITOT\", \"ALKPHOS\" in the last 72 hours.    Invalid input(s): \"ALB\"  PT/INR: No results for input(s): \"PROTIME\", \"INR\" in the last 72 hours.  APTT: No results for input(s): \"APTT\" in the last 72 hours.  UA:No results for input(s): \"NITRITE\", \"COLORU\", \"PHUR\", \"LABCAST\", \"WBCUA\", \"RBCUA\", \"MUCUS\", \"TRICHOMONAS\", \"YEAST\", \"BACTERIA\", \"CLARITYU\", \"SPECGRAV\", \"LEUKOCYTESUR\", \"UROBILINOGEN\", \"BILIRUBINUR\", \"BLOODU\", \"GLUCOSEU\", \"AMORPHOUS\" in the last 72 hours.    Invalid input(s): \"KETONESU\"    Cultures:  Human metapneumovirus, influenza A  COVID-positive  Films:  CXR films reviewed by me and it showed improved multilobar infiltrates      Assessment:  This is a critically ill patient at risk of deterioration / death , needing close ICU monitoring and intervention due to below noted problems     Acute hypoxic respiratory failure    Platypnea, etiology is not clear, rule out shunting  Influenza A/metapneumovirus pneumonia with bacterial coinfection  Septic shock  COPD with acute exacerbation, improved  COVID-19 pneumonia, persistent positivity likely due to previous infection  Right lower lobe pneumonia  Acute PE, left lower lobe  Pulmonary fibrosis with emphysema  COPD with acute exacerbation  Diastolic dysfunction  Pulmonary hypertension, group 2 and 3  Chronic kidney disease, with worsening renal function   
with pulmonary  Follow-up CBC BMP and clinically  Overall guarded prognosis   Discussed with RN    Debbi Florez MD        
(XANAX) tablet 0.25 mg  0.25 mg Oral 4x Daily PRN Tai Wallace MD        fluconazole (DIFLUCAN) in 0.9 % sodium chloride IVPB 100 mg  100 mg IntraVENous Q24H Tai Wallace MD   Stopped at 25 1136       Physical Examination:      Vitals:  /60   Pulse 74   Temp 97.9 °F (36.6 °C) (Axillary)   Resp 20   Ht 1.651 m (5' 5\")   Wt 64.7 kg (142 lb 11.2 oz)   SpO2 98%   BMI 23.75 kg/m²   Temp (24hrs), Av.9 °F (36.6 °C), Min:97.7 °F (36.5 °C), Max:98.2 °F (36.8 °C)      General appearance: Tired and chronically ill-appearing but cooperative and in no acute distress.  Oriented x 3.  Lungs: Crackles in lower lung fields bilaterally, normal effort  Heart: regular rate and rhythm, no murmur  Abdomen: soft, nontender, nondistended, bowel sounds present, no masses  Extremities: no edema, redness, tenderness in the calves. Cap refill <2s  Skin: no gross lesions, rashes    Data:     Labs:  Recent Labs     25  0350 25  0546   WBC 5.7 5.2   HGB 7.2* 6.5*    Clumped     Recent Labs     25  2147 25  0546    135   K 4.1 4.1   * 108*   CO2 18* 16*   BUN 27* 26*   CREATININE 1.85* 1.94*   GLUCOSE 121* 138*     Recent Labs     25  0350 25  0546   AST 40 89*   ALT 72* 100*   BILITOT 0.4 0.6   ALKPHOS 101 149*     1/5 (L) vs 1/2 (R):    
Dependent/Total  Toileting: Unable to assess (Comment)    Functional Mobility:         Bed Mobility  Bed mobility  Rolling to Left: Maximum assistance  Rolling to Right: Dependent/Total    Seated and Standing Balance:       Functional Endurance:  Activity Tolerance  Activity Tolerance: Treatment limited secondary to medical complications (free text);Treatment limited secondary to decreased cognition;Patient limited by fatigue    D/C Recommendations:  OT D/C RECOMMENDATIONS  REQUIRES OT FOLLOW-UP: Yes    Equipment Recommendations:       OT Education:        OT Follow Up:   OT D/C RECOMMENDATIONS  REQUIRES OT FOLLOW-UP: Yes       Assessment/Discharge Disposition:     Performance deficits / Impairments: Decreased functional mobility , Decreased safe awareness, Decreased balance, Decreased coordination, Decreased posture, Decreased cognition, Decreased ADL status, Decreased endurance, Decreased strength, Decreased fine motor control  Prognosis: Fair  Discharge Recommendations: Continue to assess pending progress  Decision Making: Medium Complexity     History: moderate  Exam: 10 complexities  Assistance / Modification: Max A    [facility] Self care Score   How much help is needed for putting on and taking off regular lower body clothing?: Total  How much help is needed for bathing (which includes washing, rinsing, drying)?: A Lot  How much help is needed for toileting (which includes using toilet, bedpan, or urinal)?: Total  How much help is needed for putting on and taking off regular upper body clothing?: A Lot  How much help is needed for taking care of personal grooming?: A Lot  How much help for eating meals?: None  AM-[facility] Inpatient Daily Activity Raw Score: 12  AM-PAC Inpatient ADL T-Scale Score : 30.6  ADL Inpatient CMS 0-100% Score: 66.57    Therapy key for assistance levels -   Independent/Mod I = Pt. is able to perform task with no assistance but may require a device   Stand by assistance = Pt. does not 
01/09/25 0915   Wound Surface Area (cm^2) 3 cm^2 01/09/25 0915   Change in Wound Size % (l*w) 92.5 01/09/25 0915   Wound Volume (cm^3) 0 cm^3 01/09/25 0915   Wound Assessment [place]/red;Superficial 01/09/25 0915   Drainage Amount Scant (moist but unmeasurable) 01/09/25 0915   Drainage Description Serous 01/09/25 0915   Odor None 01/09/25 0915   Elizabeth-wound Assessment Blanchable erythema;Denuded;Maceration 01/09/25 0915   Margins Defined edges 01/09/25 0915   Number of days: 9     Assessment:    Wound Ostomy Nursing attempt to see patient the last 2 days, with refusal from patient yesterday. Patient is now in ICU on vent. ICD r/t incontinence present to bilateral buttocks and sacrococcygeal areas with denudation of skin throughout the affected areas. There is a stage 2 pressure injury (POA) to the right upper buttocks - wound is clean, pink, and superficial. Protective barrier cream with zinc applied.    Plan   Plan of Care: Wound 12/30/24 Elbow Posterior;Right;Lateral;Inner Scabbed wound noted to right inner elbow-Dressing/Treatment: Open to air  Wound 12/30/24 Buttocks Right-Dressing/Treatment: Zinc paste    Specialty Bed Required : Yes   [x] Low Air Loss   [] Pressure Redistribution  [] Fluid Immersion  [] Bariatric  [] Other:     Current Diet: Diet NPO  ADULT TUBE FEEDING; Orogastric; Peptide Based; Continuous; 20; No; 50; Q 4 hours  Dietician consult:  Yes    Discharge Plan:  Placement for patient upon discharge: TBD    Patient appropriate for Outpatient Wound Care Center: N/A    Referrals:  []   [] Home Health Care  [] Supplies  [] Other    Patient/Caregiver Teaching:  Level of patient/caregiver understanding able to:   [] Indicates understanding       [] Needs reinforcement  [] Unsuccessful      [] Verbal Understanding  [] Demonstrated understanding       [] No evidence of learning  [] Refused teaching         [x] N/A       Electronically signed by MANDY Del ValleN, RN, CWOCN on 1/9/2025 at 
Q8H PRN Tai Wallace MD        Or    ondansetron (ZOFRAN) injection 4 mg  4 mg IntraVENous Q6H PRN Tai Wallace MD        polyethylene glycol (GLYCOLAX) packet 17 g  17 g Oral Daily PRN Tai Wallace MD        acetaminophen (TYLENOL) tablet 650 mg  650 mg Oral Q6H PRN Tai Wallace MD   650 mg at 25 0421    Or    acetaminophen (TYLENOL) suppository 650 mg  650 mg Rectal Q6H PRN Tai Wallace MD        atorvastatin (LIPITOR) tablet 40 mg  40 mg Oral Nightly Tai Wallace MD   40 mg at 25    QUEtiapine (SEROQUEL) tablet 50 mg  50 mg Oral Nightly Tai Wallace MD   50 mg at 25    folic acid (FOLVITE) tablet 1 mg  1 mg Oral Daily Tai Wallace MD   1 mg at 25 0801    [Held by provider] carvedilol (COREG) tablet 12.5 mg  12.5 mg Oral BID WC Tai Wallace MD   12.5 mg at 25 1714    polyethylene glycol (GLYCOLAX) packet 17 g  17 g Oral BID Tai Wallace MD   17 g at 25 2137    nystatin, stomahesive in petrolatum (ET MIX)   Topical PRN Tai Wallace MD   Given at 25 1211    miconazole (MICOTIN) 2 % cream   Topical BID Tai Wallace MD   Given at 01/15/25 0821    fluticasone (FLONASE) 50 MCG/ACT nasal spray 1 spray  1 spray Each Nostril Daily PRN Tai Wallace MD        ALPRAZolam (XANAX) tablet 0.25 mg  0.25 mg Oral 4x Daily PRN Tai Wallace MD           Physical Examination:      Vitals:  BP (!) 84/50   Pulse (!) 131   Temp 98.8 °F (37.1 °C)   Resp 26   Ht 1.651 m (5' 5\")   Wt 62.2 kg (137 lb 2 oz)   SpO2 92%   BMI 22.82 kg/m²   Temp (24hrs), Av.7 °F (36.5 °C), Min:95.7 °F (35.4 °C), Max:99.3 °F (37.4 °C)      General appearance: ill appearing. Intubated and sedated.    Lungs: Crackles in lower lung fields bilaterally, normal effort  Heart: regular rate and rhythm, no murmur  Abdomen: soft, nontender, nondistended, bowel sounds present, no masses  Extremities: no edema, redness, tenderness in the calves. Cap refill <2s  Skin: no gross lesions, 
rhythm, no murmur  Abdomen: soft, nontender, nondistended, bowel sounds present, no masses  Extremities: no edema, redness, tenderness in the calves. Cap refill <2s  Skin: no gross lesions, rashes    Data:     Labs:  Recent Labs     01/12/25  0546 01/13/25  0308 01/13/25 0539   WBC 10.7  --  13.2*   HGB 9.2* 9.3* 8.8*     --  284     Recent Labs     01/12/25  0543 01/13/25 0308 01/13/25 0539     --  139   K 4.8  --  4.6     --  105   CO2 21  --  22   BUN 51*  --  70*   CREATININE 2.93* 3.1* 3.26*   GLUCOSE 105*  --  145*     No results for input(s): \"AST\", \"ALT\", \"BILITOT\", \"ALKPHOS\" in the last 72 hours.    Invalid input(s): \"ALB\"    1/5 (L) vs 1/2 (R):    
critical care time caring for this patient with life threatening, unstable organ failure, including direct patient contact, review of medical record, management of life support systems, review of data including imaging and labs, discussions with other team members, patient's family and physicians at least 32 minutes so far today.       Electronically signed by Lalita Liang MD on 1/7/2025 at 4:55 PM

## 2025-01-18 LAB
BLOOD BANK DISPENSE STATUS: NORMAL
BLOOD BANK PRODUCT CODE: NORMAL
BPU ID: NORMAL
DESCRIPTION BLOOD BANK: NORMAL

## (undated) DEVICE — TUBING, SUCTION, 1/4" X 10', STRAIGHT: Brand: MEDLINE

## (undated) DEVICE — SINGLE PORT MANIFOLD: Brand: NEPTUNE 2

## (undated) DEVICE — BRUSH ENDO CLN L90.5IN SHTH DIA1.7MM BRIST DIA5-7MM 2-6MM

## (undated) DEVICE — GLOVE ORTHO 8   MSG9480

## (undated) DEVICE — GLOVE ORANGE PI 8 1/2   MSG9085

## (undated) DEVICE — CONMED SCOPE SAVER BITE BLOCK, 20X27 MM: Brand: SCOPE SAVER

## (undated) DEVICE — TUBING IRRIGATION 140/160/180/190 SER GI ENDOSCP SMARTCAP

## (undated) DEVICE — TUBE SET 96 MM 64 MM H2O PERISTALTIC STD AUX CHANNEL

## (undated) DEVICE — ENDO CARRY-ON PROCEDURE KIT: Brand: ENDO CARRY-ON PROCEDURE KIT

## (undated) DEVICE — AGENT HEMOSTATIC POLYSACCHARIDE 230 CM 2.8 MM 3 GM ENDOCLOT

## (undated) DEVICE — FORCEPS BX L240CM JAW DIA2.8MM L CAP W/ NDL MIC MESH TOOTH